# Patient Record
Sex: MALE | Race: WHITE | NOT HISPANIC OR LATINO | Employment: OTHER | URBAN - METROPOLITAN AREA
[De-identification: names, ages, dates, MRNs, and addresses within clinical notes are randomized per-mention and may not be internally consistent; named-entity substitution may affect disease eponyms.]

---

## 2017-01-16 ENCOUNTER — OFFICE VISIT (OUTPATIENT)
Dept: PODIATRY | Facility: CLINIC | Age: 72
End: 2017-01-16
Payer: MEDICARE

## 2017-01-16 DIAGNOSIS — B35.1 ONYCHOMYCOSIS DUE TO DERMATOPHYTE: ICD-10-CM

## 2017-01-16 DIAGNOSIS — B35.3 TINEA PEDIS, UNSPECIFIED LATERALITY: ICD-10-CM

## 2017-01-16 DIAGNOSIS — E08.42 DIABETIC POLYNEUROPATHY ASSOCIATED WITH DIABETES MELLITUS DUE TO UNDERLYING CONDITION: Primary | ICD-10-CM

## 2017-01-16 PROCEDURE — 1160F RVW MEDS BY RX/DR IN RCRD: CPT | Mod: S$GLB,,,

## 2017-01-16 PROCEDURE — 1126F AMNT PAIN NOTED NONE PRSNT: CPT | Mod: S$GLB,,,

## 2017-01-16 PROCEDURE — 11721 DEBRIDE NAIL 6 OR MORE: CPT | Mod: Q9,S$GLB,,

## 2017-01-16 PROCEDURE — 99499 UNLISTED E&M SERVICE: CPT | Mod: S$GLB,,,

## 2017-01-16 PROCEDURE — 1157F ADVNC CARE PLAN IN RCRD: CPT | Mod: S$GLB,,,

## 2017-01-16 PROCEDURE — 1159F MED LIST DOCD IN RCRD: CPT | Mod: S$GLB,,,

## 2017-01-16 PROCEDURE — 3045F PR MOST RECENT HEMOGLOBIN A1C LEVEL 7.0-9.0%: CPT | Mod: S$GLB,,,

## 2017-01-16 PROCEDURE — 99999 PR PBB SHADOW E&M-EST. PATIENT-LVL II: CPT | Mod: PBBFAC,,,

## 2017-01-16 PROCEDURE — 99213 OFFICE O/P EST LOW 20 MIN: CPT | Mod: 25,S$GLB,,

## 2017-01-16 RX ORDER — GABAPENTIN 100 MG/1
100 CAPSULE ORAL NIGHTLY
Qty: 30 CAPSULE | Refills: 11 | Status: ON HOLD | OUTPATIENT
Start: 2017-01-16 | End: 2017-07-20 | Stop reason: HOSPADM

## 2017-01-16 NOTE — MR AVS SNAPSHOT
[facility]  [address]  [facility] [address]  Phone: [phone]                  Stu Garcia   2017 8:30 AM   Office Visit    Description:  Male : 1945   Provider:  Gabino Pathak DPM   [facility]  [facility]           Reason for Visit     Diabetic Foot Exam     Foot Pain           Diagnoses this Visit        Comments    Diabetic polyneuropathy associated with diabetes mellitus due to underlying condition    -  Primary     Onychomycosis due to dermatophyte         Tinea pedis, unspecified laterality                To Do List           Future Appointments        Provider Department Dept Phone    2017 8:00 AM LAB, [place] [facility] [phone]    2017 8:40 AM Cade Juan MD [facility] [phone]    2017 11:30 AM Gabino Pathak DPM [facility] [phone]      Goals (5 Years of Data)     None       These Medications        Disp Refills Start End    gabapentin (NEURONTIN) 100 MG capsule 30 capsule 11 2017    Take 1 capsule (100 mg total) by mouth every evening. - Oral    Pharmacy: [facility] [address] [place] [address] AT [facility] & [address] #: [phone]         [facility] [place] [facility]     [facility] [place] [facility] Nurse Care Line - 24/7 Assistance  Registered nurses in the [facility] provide clinical advisement, health education, appointment booking, and other advisory services.  Call for this free service at [phone].             Medications           Message regarding Medications     Verify the changes and/or additions to your medication regime listed below are the same as discussed with your clinician today.  If any of these changes or additions are incorrect, please notify your healthcare provider.        START taking these NEW medications        Refills    gabapentin (NEURONTIN) 100 MG capsule 11    Sig: Take 1 capsule  (100 mg total) by mouth every evening.    Class: Normal    Route: Oral           Verify that the below list of medications is an accurate representation of the medications you are currently taking.  If none reported, the list may be blank. If incorrect, please contact your healthcare provider. Carry this list with you in case of emergency.           Current Medications     alcohol swabs (BD ALCOHOL SWABS) PadM Use each time he test his blood sugar.    aspirin (ECOTRIN) 81 MG EC tablet Take 81 mg by mouth once daily.    blood glucose control, normal Soln Use as directed for accu check alyssa plus  testing device    blood sugar diagnostic (BLOOD GLUCOSE TEST) [facility] accu check alyssa plus test strips use once a day    clotrimazole (LOTRIMIN) 1 % Soln Apply topically 2 (two) times daily.    docusate sodium (COLACE) 100 MG capsule Take 100 mg by mouth 2 (two) times daily.    ferrous gluconate (FERGON) 324 MG tablet Take 324 mg by mouth 2 (two) times daily with meals.    ferrous sulfate 325 mg (65 mg iron) Tab tablet     glimepiride (AMARYL) 2 MG tablet Take 2 tablets (4 mg total) by mouth 2 (two) times daily.    GLUCOSAMINE HCL/CHONDR ZAMORA A NA (OSTEO BI-FLEX ORAL) Take by mouth.    lancets (ACCU-CHEK SOFTCLIX LANCETS) Misc 1 strip by Misc.(Non-Drug; Combo Route) route 3 (three) times daily.    metformin (GLUCOPHAGE) 500 MG tablet TAKE 2 TABLETS BY MOUTH TWICE DAILY    metoprolol tartrate (LOPRESSOR) 50 MG tablet TAKE 1 TABLET BY MOUTH TWICE DAILY    omeprazole (PRILOSEC) 40 MG capsule TAKE 1 CAPSULE BEFORE BREAKFAST.    simvastatin (ZOCOR) 40 MG tablet Take 1 tablet (40 mg total) by mouth every evening.    sodium-potassium bicarbonate (BELINDA-SELTZER GOLD) TbEF Take 2 tablets by mouth every evening.    terbinafine HCl (LAMISIL) 1 % cream Apply topically 2 (two) times daily. Apply to feet.    VIT A/VIT C/VIT E/ZINC/COPPER (ICAPS AREDS ORAL) Take 1 capsule by mouth 2 (two) times daily.    XARELTO 20 mg Tab TAKE 1 TABLET ONE TIME  DAILY    blood glucose control high&low (ACCU-CHEK SHASHANK CONTROL SOLN) Soln Inject 1 strip into the skin 2 (two) times daily.    gabapentin (NEURONTIN) 100 MG capsule Take 1 capsule (100 mg total) by mouth every evening.           Clinical Reference Information           Allergies as of 1/16/2017     Codeine      Immunizations Administered on Date of Encounter - 1/16/2017     None

## 2017-01-16 NOTE — PROGRESS NOTES
Chief Complaint   Patient presents with    Diabetic Foot Exam     HgbA1C: 7.3 PCP: Drake 11/7/16    Foot Pain     octaviano feet        Referred by Dr. Juan    History of present illness: Patient presents to the clinic for at risk diabetic foot care. Hx of neuropathy, CAD.    Patient is reporting thick and yellow discolored painful toenails and burning tingling and numbness to the extremities.  Patient wears [employer] tennis shoes.   Also reporting 6 months LE swelling. History of knee arthritis.    Review of Systems:   Vascular : negative rest pain, claudication, bruising   Musculoskeletal:+ for joint pain   Skin: negative for rashes, open wounds and lesions, + for thickened, painful and discolored toenails  Neurological: + for burning, tingling, paresthesia and numbness   All other unremarkable.    Past Medical, Family and Social History reviewed.    Constitutional:   Patient is oriented to person, place, and time. Vital signs are normal. Appears well-developed and well-nourished.     Vascular:   Dorsalis pedis pulses are 2+ on the right side, and 2+ on the left side.   Posterior tibial pulses are 2+ on the right side, and 2+ on the left side.   - digital hair growth, capillary fill time to all toes <3 seconds, toes are cool to touch  + swelling feet and ankles 2+ pitting    Skin/Dermatological:   Skin is thin, warm, shiny and atrophic. No cyanosis or clubbing. No rashes noted. No open wounds.   Nails yellow discolored, thickened 3 mm, elongated 3 mm with subungual debris and tenderness.    Musculoskeletal:   Pes cavus.  Mild bunions, hammertoes and bunionettes observed.  Decreased range of motion of bilateral midtarsal, subtalar joints, ankle joint dorsiflexion is restricted bilaterally. Muscle strength to tibialis anterior, extensor hallucis longus, extensor digitorum longus, peroneal muscles, flexor hallucis/digotorum longus, posterior tibial and gastrosoleal complex is 5/5.    Neurological:   + deficits  to sharp/dull, light touch or vibratory sensation bilateral feet     Assessment/Plan:   #1 Diabetes, neuropathy, foot deformity: Discussed diabetic footcare, daily foot inspections, tight blood sugar control and proper shoe gear. Recommended diabetic depth shoes with high toebox, padding for foot deformity and diabetic accommodative inserts/shoes and prescription was written.    #2 Onychomycosis/onychodystrophy, tinea fungus: With patient permission, all toenails were debrided sharply with a nail nipper down to the base. Rasp was used to reduce thickening of toenails.  Patient tolerated well. Rx clotrimazole solution for toenails and lamisil cream for feet.   Return to clinic 4 months.

## 2017-01-16 NOTE — PROGRESS NOTES
Chief Complaint   Patient presents with    Diabetic Foot Exam     HgbA1C: 7.3 PCP: Drake 11/7/16    Foot Pain     octaviano feet          History of present illness: Patient presents to the clinic for at risk diabetic foot care. Hx of neuropathy, CAD.    Patient is reporting thick and yellow discolored painful toenails and burning tingling and numbness to the extremities.  Patient wears diabetic shoes and inserts.  History of knee arthritis.    Review of Systems:   Vascular : negative rest pain, claudication, bruising   Musculoskeletal:+ for joint pain   Skin: negative for rashes, open wounds and lesions, + for thickened, painful and discolored toenails  Neurological: + for burning, tingling, paresthesia and numbness   All other unremarkable.    Past Medical, Family and Social History reviewed.    Constitutional:   Patient is oriented to person, place, and time. Vital signs are normal. Appears well-developed and well-nourished.     Vascular:   Dorsalis pedis pulses are 2+ on the right side, and 2+ on the left side.   Posterior tibial pulses are 2+ on the right side, and 2+ on the left side.   - digital hair growth, capillary fill time to all toes <3 seconds, toes are cool to touch  + swelling feet and ankles 2+ pitting    Skin/Dermatological:   Skin is thin, warm, shiny and atrophic. No cyanosis or clubbing. No rashes noted. No open wounds.   Nails yellow discolored, thickened 3 mm, elongated 3 mm with subungual debris and tenderness.    Musculoskeletal:   Pes cavus.  Mild bunions, hammertoes and bunionettes observed.  Decreased range of motion of bilateral midtarsal, subtalar joints, ankle joint dorsiflexion is restricted bilaterally. Muscle strength to tibialis anterior, extensor hallucis longus, extensor digitorum longus, peroneal muscles, flexor hallucis/digotorum longus, posterior tibial and gastrosoleal complex is 5/5.    Neurological:   + deficits to sharp/dull, light touch or vibratory sensation bilateral  feet     Assessment/Plan:   #1 Diabetes, neuropathy, foot deformity: Discussed diabetic footcare, daily foot inspections, tight blood sugar control and proper shoe gear. Continue diabetic shoes/inserts.  Rx neurontin 100 mg qhs.  #2 Onychomycosis/onychodystrophy, tinea fungus: With patient permission, all toenails were debrided sharply with a nail nipper down to the base. Rasp was used to reduce thickening of toenails.  Patient tolerated well. Rx clotrimazole solution for toenails and lamisil cream for feet.   Return to clinic 1 month to review neurontin dosage.

## 2017-02-06 ENCOUNTER — LAB VISIT (OUTPATIENT)
Dept: LAB | Facility: HOSPITAL | Age: 72
End: 2017-02-06
Attending: FAMILY MEDICINE
Payer: MEDICARE

## 2017-02-06 DIAGNOSIS — E11.319 TYPE 2 DIABETES MELLITUS WITH RETINOPATHY WITHOUT MACULAR EDEMA, WITHOUT LONG-TERM CURRENT USE OF INSULIN, UNSPECIFIED LATERALITY, UNSPECIFIED RETINOPATHY SEVERITY: ICD-10-CM

## 2017-02-06 LAB
ALBUMIN SERPL BCP-MCNC: 3.7 G/DL
ALP SERPL-CCNC: 89 U/L
ALT SERPL W/O P-5'-P-CCNC: 15 U/L
ANION GAP SERPL CALC-SCNC: 7 MMOL/L
AST SERPL-CCNC: 18 U/L
BILIRUB SERPL-MCNC: 0.4 MG/DL
BUN SERPL-MCNC: 18 MG/DL
CALCIUM SERPL-MCNC: 9.5 MG/DL
CHLORIDE SERPL-SCNC: 107 MMOL/L
CHOLEST/HDLC SERPL: 3.5 {RATIO}
CO2 SERPL-SCNC: 28 MMOL/L
CREAT SERPL-MCNC: 1.3 MG/DL
EST. GFR  (AFRICAN AMERICAN): >60 ML/MIN/1.73 M^2
EST. GFR  (NON AFRICAN AMERICAN): 54.5 ML/MIN/1.73 M^2
GLUCOSE SERPL-MCNC: 167 MG/DL
HDL/CHOLESTEROL RATIO: 28.5 %
HDLC SERPL-MCNC: 123 MG/DL
HDLC SERPL-MCNC: 35 MG/DL
LDLC SERPL CALC-MCNC: 62.8 MG/DL
NONHDLC SERPL-MCNC: 88 MG/DL
POTASSIUM SERPL-SCNC: 5.5 MMOL/L
PROT SERPL-MCNC: 6.9 G/DL
SODIUM SERPL-SCNC: 142 MMOL/L
TRIGL SERPL-MCNC: 126 MG/DL

## 2017-02-06 PROCEDURE — 80053 COMPREHEN METABOLIC PANEL: CPT

## 2017-02-06 PROCEDURE — 36415 COLL VENOUS BLD VENIPUNCTURE: CPT | Mod: PO

## 2017-02-06 PROCEDURE — 80061 LIPID PANEL: CPT

## 2017-02-06 PROCEDURE — 83036 HEMOGLOBIN GLYCOSYLATED A1C: CPT

## 2017-02-07 LAB
ESTIMATED AVG GLUCOSE: 169 MG/DL
HBA1C MFR BLD HPLC: 7.5 %

## 2017-02-13 ENCOUNTER — OFFICE VISIT (OUTPATIENT)
Dept: FAMILY MEDICINE | Facility: CLINIC | Age: 72
End: 2017-02-13
Payer: MEDICARE

## 2017-02-13 VITALS
SYSTOLIC BLOOD PRESSURE: 150 MMHG | WEIGHT: 283.94 LBS | HEART RATE: 80 BPM | DIASTOLIC BLOOD PRESSURE: 70 MMHG | HEIGHT: 77 IN | TEMPERATURE: 99 F | BODY MASS INDEX: 33.53 KG/M2

## 2017-02-13 DIAGNOSIS — M17.12 PRIMARY OSTEOARTHRITIS OF LEFT KNEE: ICD-10-CM

## 2017-02-13 DIAGNOSIS — I25.83 CORONARY ARTERY DISEASE DUE TO LIPID RICH PLAQUE: ICD-10-CM

## 2017-02-13 DIAGNOSIS — E11.319 TYPE 2 DIABETES MELLITUS WITH RETINOPATHY WITHOUT MACULAR EDEMA, WITHOUT LONG-TERM CURRENT USE OF INSULIN, UNSPECIFIED LATERALITY, UNSPECIFIED RETINOPATHY SEVERITY: Primary | ICD-10-CM

## 2017-02-13 DIAGNOSIS — E78.5 HYPERLIPIDEMIA, UNSPECIFIED HYPERLIPIDEMIA TYPE: ICD-10-CM

## 2017-02-13 DIAGNOSIS — I10 ESSENTIAL HYPERTENSION: ICD-10-CM

## 2017-02-13 DIAGNOSIS — I48.0 PAROXYSMAL ATRIAL FIBRILLATION: ICD-10-CM

## 2017-02-13 DIAGNOSIS — I25.10 CORONARY ARTERY DISEASE DUE TO LIPID RICH PLAQUE: ICD-10-CM

## 2017-02-13 PROCEDURE — 99999 PR PBB SHADOW E&M-EST. PATIENT-LVL III: CPT | Mod: PBBFAC,,, | Performed by: FAMILY MEDICINE

## 2017-02-13 PROCEDURE — 99499 UNLISTED E&M SERVICE: CPT | Mod: S$GLB,,, | Performed by: FAMILY MEDICINE

## 2017-02-13 PROCEDURE — 1125F AMNT PAIN NOTED PAIN PRSNT: CPT | Mod: S$GLB,,, | Performed by: FAMILY MEDICINE

## 2017-02-13 PROCEDURE — 1159F MED LIST DOCD IN RCRD: CPT | Mod: S$GLB,,, | Performed by: FAMILY MEDICINE

## 2017-02-13 PROCEDURE — 99214 OFFICE O/P EST MOD 30 MIN: CPT | Mod: S$GLB,,, | Performed by: FAMILY MEDICINE

## 2017-02-13 PROCEDURE — 1157F ADVNC CARE PLAN IN RCRD: CPT | Mod: S$GLB,,, | Performed by: FAMILY MEDICINE

## 2017-02-13 PROCEDURE — 3060F POS MICROALBUMINURIA REV: CPT | Mod: S$GLB,,, | Performed by: FAMILY MEDICINE

## 2017-02-13 PROCEDURE — 1160F RVW MEDS BY RX/DR IN RCRD: CPT | Mod: S$GLB,,, | Performed by: FAMILY MEDICINE

## 2017-02-13 PROCEDURE — 3078F DIAST BP <80 MM HG: CPT | Mod: S$GLB,,, | Performed by: FAMILY MEDICINE

## 2017-02-13 PROCEDURE — 2022F DILAT RTA XM EVC RTNOPTHY: CPT | Mod: S$GLB,,, | Performed by: FAMILY MEDICINE

## 2017-02-13 PROCEDURE — 3077F SYST BP >= 140 MM HG: CPT | Mod: S$GLB,,, | Performed by: FAMILY MEDICINE

## 2017-02-13 PROCEDURE — 3045F PR MOST RECENT HEMOGLOBIN A1C LEVEL 7.0-9.0%: CPT | Mod: S$GLB,,, | Performed by: FAMILY MEDICINE

## 2017-02-13 NOTE — PROGRESS NOTES
Subjective:       Patient ID: Stu Garcia is a 71 y.o. male.    Chief Complaint: Diabetes (3 month follow up with labs prior)    HPI Comments: Here to f/u on chronic health issues.    Diabetes - taking metformin 500mg 2 tabs BID and glimepiride 4mg BID(increased this 3 months ago)  Average 140, low of 108  CAD - following with Dr. Maldonado; assymptomatic; taking ASA 81mg daily. He has 1 episode of angina in the last 3 months.  Paroxysmal afib - taking xarelto 20mg daily; taking metoprolol 50mg 1/2 tab BID  HLD - taking simvastatin 40mg daily  Gerd, healing ulcers - taking Omeprazole 40mg daily    C/o progressive pain in the left knee. It is causing pain with walking and has been causing increased issues.  He is interested in definitive treatment for this.                  Diabetes   Pertinent negatives for hypoglycemia include no confusion, dizziness or headaches. Pertinent negatives for diabetes include no chest pain, no polydipsia, no polyuria and no weakness.   Knee Pain      Hypertension   Pertinent negatives include no chest pain, headaches, neck pain, palpitations or shortness of breath.       Past Medical History   Diagnosis Date    Anticoagulant long-term use      xarelto    Atrial fibrillation 2013     cardioverted    Bleb, lung     CAD (coronary artery disease) 2004     stents x 2    Cataract      OS    Colon polyp     Diabetes mellitus type 2 with retinopathy     Diverticulosis     GERD (gastroesophageal reflux disease)     HEARING LOSS     Hemorrhoid     HLD (hyperlipidemia)     Iron deficiency anemia     Neuropathy of leg     Pneumonia due to other staphylococcus     Prostate cancer        Past Surgical History   Procedure Laterality Date    Prostatectomy  2001     open    Tube thoracotomy  1966     pneumothorax left    Colonoscopy      Cataract extraction       bilateral    Hernia repair       umbilical       Review of patient's allergies indicates:   Allergen Reactions    Codeine  Nausea And Vomiting       Social History     Social History    Marital status:      Spouse name: N/A    Number of children: 2    Years of education: N/A     Occupational History         prior [employer]       Social History Main Topics    Smoking status: Former Smoker     Years: 20.00    Smokeless tobacco: Never Used      Comment: quit smoking in 1980's.    Alcohol use Yes      Comment: 1 drink every 2 weeks    Drug use: No    Sexual activity: Not on file     Other Topics Concern    Not on file     Social History Narrative    From [place]       Current Outpatient Prescriptions on File Prior to Visit   Medication Sig Dispense Refill    alcohol swabs (BD ALCOHOL SWABS) PadM Use each time he test his blood sugar. 100 each 4    aspirin (ECOTRIN) 81 MG EC tablet Take 81 mg by mouth once daily.      blood glucose control high&low (ACCU-CHEK SHASHANK CONTROL SOLN) Soln Inject 1 strip into the skin 2 (two) times daily. 100 each 12    blood glucose control, normal Soln Use as directed for accu check shashank plus  testing device 1 each 4    blood sugar diagnostic (BLOOD GLUCOSE TEST) Str[facility] accu check shashank plus test strips use once a day 100 strip 4    clotrimazole (LOTRIMIN) 1 % Soln Apply topically 2 (two) times daily. 30 mL 11    gabapentin (NEURONTIN) 100 MG capsule Take 1 capsule (100 mg total) by mouth every evening. 30 capsule 11    glimepiride (AMARYL) 2 MG tablet Take 2 tablets (4 mg total) by mouth 2 (two) times daily. 360 tablet 3    lancets (ACCU-CHEK SOFTCLIX LANCETS) Misc 1 strip by Misc.(Non-Drug; Combo Route) route 3 (three) times daily. 270 each 3    metformin (GLUCOPHAGE) 500 MG tablet TAKE 2 TABLETS BY MOUTH TWICE DAILY 360 tablet 3    metoprolol tartrate (LOPRESSOR) 50 MG tablet TAKE 1 TABLET BY MOUTH TWICE DAILY 60 tablet 6    omeprazole (PRILOSEC) 40 MG capsule TAKE 1 CAPSULE BEFORE BREAKFAST. 90 capsule 3    simvastatin (ZOCOR) 40 MG tablet Take 1 tablet (40 mg  total) by mouth every evening. 90 tablet 3    terbinafine HCl (LAMISIL) 1 % cream Apply topically 2 (two) times daily. Apply to feet. 42 g 11    VIT A/VIT C/VIT E/ZINC/COPPER (ICAPS AREDS ORAL) Take 1 capsule by mouth 2 (two) times daily.      XARELTO 20 mg Tab TAKE 1 TABLET ONE TIME DAILY 90 tablet 3    docusate sodium (COLACE) 100 MG capsule Take 100 mg by mouth 2 (two) times daily.      ferrous gluconate (FERGON) 324 MG tablet Take 324 mg by mouth 2 (two) times daily with meals.      ferrous sulfate 325 mg (65 mg iron) Tab tablet   2    GLUCOSAMINE HCL/CHONDR ZAMORA A NA (OSTEO BI-FLEX ORAL) Take by mouth.      sodium-potassium bicarbonate (BELINDA-SELTZER GOLD) TbEF Take 2 tablets by mouth every evening.       No current facility-administered medications on file prior to visit.        Family History   Problem Relation Age of Onset    Diabetes Father     Coronary artery disease Father     Cancer Father      stomach       Review of Systems   Constitutional: Negative for activity change, appetite change, chills, fever and unexpected weight change.   HENT: Positive for hearing loss. Negative for rhinorrhea, sore throat and trouble swallowing.    Eyes: Negative for pain, discharge and visual disturbance.   Respiratory: Negative for cough, chest tightness, shortness of breath and wheezing.    Cardiovascular: Positive for leg swelling. Negative for chest pain and palpitations.   Gastrointestinal: Negative for abdominal pain, blood in stool, constipation, diarrhea, nausea and vomiting.   Endocrine: Negative for polydipsia and polyuria.   Genitourinary: Negative for difficulty urinating, dysuria, frequency, hematuria and urgency.   Musculoskeletal: Positive for arthralgias (both knees) and back pain. Negative for gait problem, joint swelling and neck pain.   Skin: Negative for rash and wound.   Neurological: Negative for dizziness, weakness, light-headedness and headaches.   Hematological: Negative for adenopathy.  "  Psychiatric/Behavioral: Negative for confusion and dysphoric mood.       Objective:      Visit Vitals    BP (!) 150/70 (BP Location: Left arm, Patient Position: Sitting, BP Method: Manual)    Pulse 80    Temp 98.9 °F (37.2 °C) (Oral)    Ht 6' 4.5" (1.943 m)    Wt 128.8 kg (283 lb 15.2 oz)    BMI 34.11 kg/m2     Physical Exam   Constitutional: He is oriented to person, place, and time. He appears well-developed and well-nourished. He is active. No distress.   HENT:   Head: Normocephalic and atraumatic.   Right Ear: Tympanic membrane and external ear normal.   Left Ear: Tympanic membrane and external ear normal.   Mouth/Throat: Uvula is midline, oropharynx is clear and moist and mucous membranes are normal. No oropharyngeal exudate.   Eyes: Conjunctivae, EOM and lids are normal. Pupils are equal, round, and reactive to light.   Neck: Normal range of motion, full passive range of motion without pain and phonation normal. Neck supple. No thyroid mass and no thyromegaly present.   Cardiovascular: Normal rate, regular rhythm, normal heart sounds and intact distal pulses.  Exam reveals no gallop and no friction rub.    No murmur heard.  Pulmonary/Chest: Effort normal and breath sounds normal. No respiratory distress. He has no wheezes. He has no rales.   Musculoskeletal:        Left knee: He exhibits decreased range of motion. Tenderness found. Medial joint line and lateral joint line tenderness noted.   Lymphadenopathy:     He has no cervical adenopathy.   Neurological: He is alert and oriented to person, place, and time. No cranial nerve deficit. Coordination normal.   Skin: Skin is warm and dry.   Psychiatric: He has a normal mood and affect. His speech is normal and behavior is normal. Judgment and thought content normal.     bilateral LE 1+ edema to mid-shin    Results for orders placed or performed in visit on 02/06/17   Comprehensive metabolic panel   Result Value Ref Range    Sodium 142 136 - 145 mmol/L    " Potassium 5.5 (H) 3.5 - 5.1 mmol/L    Chloride 107 95 - 110 mmol/L    CO2 28 23 - 29 mmol/L    Glucose 167 (H) 70 - 110 mg/dL    BUN, Bld 18 8 - 23 mg/dL    Creatinine 1.3 0.5 - 1.4 mg/dL    Calcium 9.5 8.7 - 10.5 mg/dL    Total Protein 6.9 6.0 - 8.4 g/dL    Albumin 3.7 3.5 - 5.2 g/dL    Total Bilirubin 0.4 0.1 - 1.0 mg/dL    Alkaline Phosphatase 89 55 - 135 U/L    AST 18 10 - 40 U/L    ALT 15 10 - 44 U/L    Anion Gap 7 (L) 8 - 16 mmol/L    eGFR if African American >60.0 >60 mL/min/1.73 m^2    eGFR if non African American 54.5 (A) >60 mL/min/1.73 m^2   Hemoglobin A1c   Result Value Ref Range    Hemoglobin A1C 7.5 (H) 4.5 - 6.2 %    Estimated Avg Glucose 169 (H) 68 - 131 mg/dL   Lipid panel   Result Value Ref Range    Cholesterol 123 120 - 199 mg/dL    Triglycerides 126 30 - 150 mg/dL    HDL 35 (L) 40 - 75 mg/dL    LDL Cholesterol 62.8 (L) 63.0 - 159.0 mg/dL    HDL/Chol Ratio 28.5 20.0 - 50.0 %    Total Cholesterol/HDL Ratio 3.5 2.0 - 5.0    Non-HDL Cholesterol 88 mg/dL     Assessment:       1. 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 Primary osteoarthritis of left knee    3. Essential hypertension    4. Hyperlipidemia, unspecified hyperlipidemia type    5. Paroxysmal atrial fibrillation    6. Coronary artery disease due to lipid rich plaque        Plan:       Type 2 diabetes mellitus with retinopathy without macular edema, without long-term current use of insulin, unspecified laterality, unspecified retinopathy severity  -     Hemoglobin A1c; Future; Expected date: 8/12/17  -     Comprehensive metabolic panel; Future; Expected date: 8/12/17  -     Lipid panel; Future; Expected date: 8/12/17    Primary osteoarthritis of left knee  -     Ambulatory referral to Orthopedics    Essential hypertension    Hyperlipidemia, unspecified hyperlipidemia type    Paroxysmal atrial  fibrillation    Coronary artery disease due to lipid rich plaque        Continue metformin and Amaryl 4mg BID  Patient wishes to try weight loss before adding more medications  Goal weight of 260  F/u 6 months with labs  Continue present meds and specialist follow-up  Counseled on regular exercise, maintenance of a healthy weight, balanced diet rich in fruits/vegetables and lean protein, and avoidance of unhealthy habits like smoking and excessive alcohol intake.

## 2017-02-13 NOTE — MR AVS SNAPSHOT
[facility]  [address]  [facility] [address]  Phone: [phone]  Fax: [phone]                  Stu Garcia   2017 8:40 AM   Office Visit    Description:  Male : 1945   Provider:  Cade Juan MD   Department:  [facility]           Reason for Visit     Diabetes           Diagnoses this Visit        Comments    Primary osteoarthritis of left knee    -  Primary     Type 2 diabetes mellitus with retinopathy without macular edema, without long-term current use of insulin, unspecified laterality, unspecified retinopathy severity         Essential hypertension         Hyperlipidemia, unspecified hyperlipidemia type         Paroxysmal atrial fibrillation         Coronary artery disease due to lipid rich plaque                To Do List           Future Appointments        Provider Department Dept Phone    2/15/2017 9:15 AM Kale Cleary MD [facility] [phone]    2017 11:30 AM Gabino Pathak DPM [facility] [phone]    2017 9:25 AM [facility] [place] [facility] [phone]    2017 9:00 AM Cade Juan MD [facility] [phone]      Goals (5 Years of Data)     None      Follow-Up and Disposition     Return in about 6 months (around 2017).      [facility]     [facility] On [facility] Nurse Care Line - 24/7 Assistance  Registered nurses in the [facility] provide clinical advisement, health education, appointment booking, and other advisory services.  Call for this free service at [phone].             Medications           Message regarding Medications     Verify the changes and/or additions to your medication regime listed below are the same as discussed with your clinician today.  If any of these changes or additions are incorrect, please notify your healthcare provider.             Verify that the below list of medications is an  accurate representation of the medications you are currently taking.  If none reported, the list may be blank. If incorrect, please contact your healthcare provider. Carry this list with you in case of emergency.           Current Medications     alcohol swabs (BD ALCOHOL SWABS) PadM Use each time he test his blood sugar.    aspirin (ECOTRIN) 81 MG EC tablet Take 81 mg by mouth once daily.    blood glucose control high&low (ACCU-CHEK SHASHANK CONTROL SOLN) Soln Inject 1 strip into the skin 2 (two) times daily.    blood glucose control, normal Soln Use as directed for accu check shashank plus  testing device    blood sugar diagnostic (BLOOD GLUCOSE TEST) Str accu check shashank plus test strips use once a day    clotrimazole (LOTRIMIN) 1 % Soln Apply topically 2 (two) times daily.    gabapentin (NEURONTIN) 100 MG capsule Take 1 capsule (100 mg total) by mouth every evening.    glimepiride (AMARYL) 2 MG tablet Take 2 tablets (4 mg total) by mouth 2 (two) times daily.    lancets (ACCU-CHEK SOFTCLIX LANCETS) Misc 1 strip by Misc.(Non-Drug; Combo Route) route 3 (three) times daily.    metformin (GLUCOPHAGE) 500 MG tablet TAKE 2 TABLETS BY MOUTH TWICE DAILY    metoprolol tartrate (LOPRESSOR) 50 MG tablet TAKE 1 TABLET BY MOUTH TWICE DAILY    omeprazole (PRILOSEC) 40 MG capsule TAKE 1 CAPSULE BEFORE BREAKFAST.    simvastatin (ZOCOR) 40 MG tablet Take 1 tablet (40 mg total) by mouth every evening.    terbinafine HCl (LAMISIL) 1 % cream Apply topically 2 (two) times daily. Apply to feet.    VIT A/VIT C/VIT E/ZINC/COPPER (ICAPS AREDS ORAL) Take 1 capsule by mouth 2 (two) times daily.    XARELTO 20 mg Tab TAKE 1 TABLET ONE TIME DAILY    docusate sodium (COLACE) 100 MG capsule Take 100 mg by mouth 2 (two) times daily.    ferrous gluconate (FERGON) 324 MG tablet Take 324 mg by mouth 2 (two) times daily with meals.    ferrous sulfate 325 mg (65 mg iron) Tab tablet     GLUCOSAMINE HCL/CHONDR ZAMORA A NA (OSTEO BI-FLEX ORAL) Take by mouth.     "sodium-potassium bicarbonate (BELINDA-SELTZER GOLD) TbEF Take 2 tablets by mouth every evening.           Clinical Reference Information           Your Vitals Were     BP Pulse Temp    150/70 (BP Location: Left arm, Patient Position: Sitting, BP Method: Manual) 80 98.9 °F (37.2 °C) (Oral)    Height Weight BMI    6' 4.5" (1.943 m) 128.8 kg (283 lb 15.2 oz) 34.11 kg/m2      Blood Pressure          Most Recent Value    BP  (!)  150/70      Allergies as of 2/13/2017     Codeine      Immunizations Administered on Date of Encounter - 2/13/2017     None      Orders Placed During Today's Visit      Normal Orders This Visit    Ambulatory referral to Orthopedics     Future Labs/Procedures Expected by Expires    Comprehensive metabolic panel  8/12/2017 4/14/2018    Hemoglobin A1c  8/12/2017 2/13/2018    Lipid panel  8/12/2017 2/13/2018      Language Assistance Services     ATTENTION: Language assistance services are available, free of charge. Please call [phone].      ATENCIÓN: Si geoffla kailee, tiene a hoyos disposición servicios gratuitos de asistencia lingüística. Llame al [phone].     VIGNESH Ý: N?u b?n nói Ti?ng Vi?t, có các d?ch v? h? tr? ngôn ng? mi?n phí dành cho b?n. G?i s? [phone].         [facility] complies with applicable Federal civil rights laws and does not discriminate on the basis of race, color, national origin, age, disability, or sex.        "

## 2017-02-14 ENCOUNTER — CLINICAL SUPPORT (OUTPATIENT)
Dept: AUDIOLOGY | Facility: CLINIC | Age: 72
End: 2017-02-14

## 2017-02-14 ENCOUNTER — OFFICE VISIT (OUTPATIENT)
Dept: OTOLARYNGOLOGY | Facility: CLINIC | Age: 72
End: 2017-02-14
Payer: MEDICARE

## 2017-02-14 VITALS
SYSTOLIC BLOOD PRESSURE: 127 MMHG | WEIGHT: 283.75 LBS | HEIGHT: 76 IN | HEART RATE: 81 BPM | BODY MASS INDEX: 34.55 KG/M2 | DIASTOLIC BLOOD PRESSURE: 69 MMHG

## 2017-02-14 DIAGNOSIS — Z97.4 WEARS HEARING AID: Primary | ICD-10-CM

## 2017-02-14 DIAGNOSIS — T16.2XXA FOREIGN BODY OF LEFT EAR, INITIAL ENCOUNTER: ICD-10-CM

## 2017-02-14 DIAGNOSIS — H61.21 IMPACTED CERUMEN OF RIGHT EAR: ICD-10-CM

## 2017-02-14 DIAGNOSIS — H90.3 BILATERAL SENSORINEURAL HEARING LOSS: Primary | ICD-10-CM

## 2017-02-14 DIAGNOSIS — Z97.4 WEARS HEARING AID: ICD-10-CM

## 2017-02-14 DIAGNOSIS — M25.562 LEFT KNEE PAIN, UNSPECIFIED CHRONICITY: Primary | ICD-10-CM

## 2017-02-14 PROCEDURE — 1159F MED LIST DOCD IN RCRD: CPT | Mod: S$GLB,,, | Performed by: NURSE PRACTITIONER

## 2017-02-14 PROCEDURE — V5299 HEARING SERVICE: HCPCS | Mod: CSM,,, | Performed by: AUDIOLOGIST-HEARING AID FITTER

## 2017-02-14 PROCEDURE — 99999 PR PBB SHADOW E&M-EST. PATIENT-LVL IV: CPT | Mod: PBBFAC,,, | Performed by: NURSE PRACTITIONER

## 2017-02-14 PROCEDURE — 3074F SYST BP LT 130 MM HG: CPT | Mod: S$GLB,,, | Performed by: NURSE PRACTITIONER

## 2017-02-14 PROCEDURE — 1160F RVW MEDS BY RX/DR IN RCRD: CPT | Mod: S$GLB,,, | Performed by: NURSE PRACTITIONER

## 2017-02-14 PROCEDURE — 69200 CLEAR OUTER EAR CANAL: CPT | Mod: LT,S$GLB,, | Performed by: NURSE PRACTITIONER

## 2017-02-14 PROCEDURE — 99213 OFFICE O/P EST LOW 20 MIN: CPT | Mod: 25,S$GLB,, | Performed by: NURSE PRACTITIONER

## 2017-02-14 PROCEDURE — 1157F ADVNC CARE PLAN IN RCRD: CPT | Mod: S$GLB,,, | Performed by: NURSE PRACTITIONER

## 2017-02-14 PROCEDURE — 3078F DIAST BP <80 MM HG: CPT | Mod: S$GLB,,, | Performed by: NURSE PRACTITIONER

## 2017-02-14 PROCEDURE — 69210 REMOVE IMPACTED EAR WAX UNI: CPT | Mod: 59,RT,S$GLB, | Performed by: NURSE PRACTITIONER

## 2017-02-14 PROCEDURE — 1126F AMNT PAIN NOTED NONE PRSNT: CPT | Mod: S$GLB,,, | Performed by: NURSE PRACTITIONER

## 2017-02-14 NOTE — PROGRESS NOTES
"Subjective:       Patient ID: Stu Garcia is a 71 y.o. male.    Chief Complaint: Foreign Body in Ear    Foreign Body in Ear   Associated symptoms include hearing loss (wears hearing aids). Pertinent negatives include no cough, fever or wheezing.   Hearing Loss:    Associated symptoms: tinnitus.  No ear pain and no fever.    Patient states his hearing is declining despite using hearing aid, so not sure if problem with hearing aid. He has a hearing aid dome in his left ear canal. He states he needs to schedule audiogram and hearing aid adjustment.  He worked as an , wore ear protection.  He served an [employer] with artillary exposure many years ago.  He reports hearing damage occurred after a concert ~ 20 years ago.  He also has significant family history of hearing loss stating his mother "went deaf."  He has constant high pitched tinnitus.     Review of Systems   Constitutional: Negative.  Negative for fatigue and fever.   HENT: Positive for hearing loss (wears hearing aids) and tinnitus. Negative for ear discharge, ear pain and facial swelling.    Eyes: Negative.    Respiratory: Negative.  Negative for cough, shortness of breath and wheezing.    Cardiovascular: Negative.    Gastrointestinal: Negative.    Musculoskeletal: Negative.  Negative for neck pain.   Skin: Negative.  Negative for pallor and rash.   Neurological: Negative.    Hematological: Negative.    Psychiatric/Behavioral: Negative.        Objective:      Physical Exam   Constitutional: He is oriented to person, place, and time. Vital signs are normal. He appears well-developed and well-nourished. He is cooperative. He does not appear ill. No distress.   HENT:   Head: Normocephalic and atraumatic.   Right Ear: Tympanic membrane, external ear and ear canal normal. No drainage. Tympanic membrane is not erythematous. No middle ear effusion.   Left Ear: Tympanic membrane, external ear and ear canal normal. No drainage. Tympanic membrane is " not erythematous.  No middle ear effusion.   Nose: Nose normal. No mucosal edema, rhinorrhea or septal deviation. Right sinus exhibits no maxillary sinus tenderness and no frontal sinus tenderness. Left sinus exhibits no maxillary sinus tenderness and no frontal sinus tenderness.   Mouth/Throat: Uvula is midline, oropharynx is clear and moist and mucous membranes are normal. Mucous membranes are not pale, not dry and not cyanotic. No oral lesions. No posterior oropharyngeal edema or posterior oropharyngeal erythema.     SEPARATE PROCEDURE IN OFFICE:   Procedure: Removal of impacted cerumen, RIGHT   Pre Procedure Diagnosis: Cerumen Impaction   Post Procedure Diagnosis: Cerumen Impaction   Verbal informed consent in regards to risk of trauma to ear canal, ear drum or hearing, discomfort during procedure and/or inability to remove cerumen impaction in one session or unforeseen events or complications.   No anesthesia.     Procedure in detail:   Ear canal visualized bilateral with appropriate size ear speculum utilizing Operating Head Binocular Otomicroscope   Utilizing the following: Ring Currette of appropriate size, alligator forceps AD. The impacted cerumen of the ear canals was removed atraumatically. The TM and EAC were then inspected and found to be clear of wax. See description of TMs/EACs in PE above.   Complications: No   Condition: Improved/Good    SEPARATE PROCEDURE IN OFFICE:   Procedure: Removal of foreign body, LEFT  Pre Procedure Diagnosis: Foreign body of ear canal  Post Procedure Diagnosis: Foreign body of ear canal  Verbal informed consent in regards to risk of trauma to ear canal, ear drum or hearing, discomfort during procedure and/or inability to remove cerumen impaction in one session or unforeseen events or complications.   No anesthesia.     Procedure in detail:   Ear canal visualized bilateral with appropriate size ear speculum utilizing Operating Head Binocular Otomicroscope   Utilizing the  following: alligator forceps AS. Hearing aid dome was removed atraumatically. The TM and EAC were then inspected and found to be clear of wax. See description of TMs/EACs in PE above.   Complications: No   Condition: Improved/Good       Eyes: Conjunctivae are normal. Pupils are equal, round, and reactive to light. Right eye exhibits no discharge. Left eye exhibits no discharge. No scleral icterus.   Neck: Trachea normal and normal range of motion. Neck supple. No tracheal deviation present. No thyroid mass and no thyromegaly present.   Pulmonary/Chest: Effort normal. No respiratory distress. He has no wheezes.   Musculoskeletal: Normal range of motion.   Lymphadenopathy:        Head (right side): No submental, no preauricular and no posterior auricular adenopathy present.        Head (left side): No submental, no preauricular and no posterior auricular adenopathy present.     He has no cervical adenopathy.   Neurological: He is alert and oriented to person, place, and time.   Skin: Skin is warm, dry and intact. No lesion and no rash noted. He is not diaphoretic. No erythema. No pallor.   Psychiatric: He has a normal mood and affect. His speech is normal and behavior is normal. Judgment and thought content normal. Cognition and memory are normal.   Nursing note and vitals reviewed.      Assessment:     Hearing loss, wears hearing aids  Cerumen impaction removed AD  Foreign body removed AS  Plan:           Proceed with audiogram and hearing aid adjustment at patient's convenience  Return as needed for further ENT symptoms or concerns

## 2017-02-14 NOTE — PROGRESS NOTES
Pt was not seen in clinic this morning but dropped his left hearing aid due to a broken . The  box had broken off in his ear which he had to be removed by Nayana Peck, ENT NP. After a new  was put on the aid, the aid still wasn't working properly so it will be sent off for repair. Pt also renewed his warranty for both the hearing aids with a new expiration date of 2/5/18. When the aid returns from repair, pt needs to be scheduled for an Audio only appt for an updated audio and hearing aid adj appt.

## 2017-02-15 ENCOUNTER — OFFICE VISIT (OUTPATIENT)
Dept: ORTHOPEDICS | Facility: CLINIC | Age: 72
End: 2017-02-15
Payer: MEDICARE

## 2017-02-15 ENCOUNTER — HOSPITAL ENCOUNTER (OUTPATIENT)
Dept: RADIOLOGY | Facility: HOSPITAL | Age: 72
Discharge: HOME | End: 2017-02-15
Attending: ORTHOPAEDIC SURGERY
Payer: MEDICARE

## 2017-02-15 VITALS
DIASTOLIC BLOOD PRESSURE: 76 MMHG | BODY MASS INDEX: 34.46 KG/M2 | SYSTOLIC BLOOD PRESSURE: 127 MMHG | HEIGHT: 76 IN | HEART RATE: 89 BPM | WEIGHT: 283 LBS

## 2017-02-15 DIAGNOSIS — S83.242D TEAR OF MEDIAL MENISCUS OF LEFT KNEE, UNSPECIFIED TEAR TYPE, UNSPECIFIED WHETHER OLD OR CURRENT TEAR, SUBSEQUENT ENCOUNTER: Primary | ICD-10-CM

## 2017-02-15 DIAGNOSIS — M25.562 LEFT KNEE PAIN, UNSPECIFIED CHRONICITY: ICD-10-CM

## 2017-02-15 PROCEDURE — 1160F RVW MEDS BY RX/DR IN RCRD: CPT | Mod: S$GLB,,, | Performed by: ORTHOPAEDIC SURGERY

## 2017-02-15 PROCEDURE — 1125F AMNT PAIN NOTED PAIN PRSNT: CPT | Mod: S$GLB,,, | Performed by: ORTHOPAEDIC SURGERY

## 2017-02-15 PROCEDURE — 3078F DIAST BP <80 MM HG: CPT | Mod: S$GLB,,, | Performed by: ORTHOPAEDIC SURGERY

## 2017-02-15 PROCEDURE — 1159F MED LIST DOCD IN RCRD: CPT | Mod: S$GLB,,, | Performed by: ORTHOPAEDIC SURGERY

## 2017-02-15 PROCEDURE — 73564 X-RAY EXAM KNEE 4 OR MORE: CPT | Mod: 26,LT,, | Performed by: RADIOLOGY

## 2017-02-15 PROCEDURE — 99999 PR PBB SHADOW E&M-EST. PATIENT-LVL III: CPT | Mod: PBBFAC,,, | Performed by: ORTHOPAEDIC SURGERY

## 2017-02-15 PROCEDURE — 99213 OFFICE O/P EST LOW 20 MIN: CPT | Mod: S$GLB,,, | Performed by: ORTHOPAEDIC SURGERY

## 2017-02-15 PROCEDURE — 73564 X-RAY EXAM KNEE 4 OR MORE: CPT | Mod: TC,PO,LT

## 2017-02-15 PROCEDURE — 73562 X-RAY EXAM OF KNEE 3: CPT | Mod: 26,59,RT, | Performed by: RADIOLOGY

## 2017-02-15 PROCEDURE — 3074F SYST BP LT 130 MM HG: CPT | Mod: S$GLB,,, | Performed by: ORTHOPAEDIC SURGERY

## 2017-02-15 PROCEDURE — 1157F ADVNC CARE PLAN IN RCRD: CPT | Mod: S$GLB,,, | Performed by: ORTHOPAEDIC SURGERY

## 2017-02-15 NOTE — PROGRESS NOTES
Past Medical History   Diagnosis Date    Anticoagulant long-term use      xarelto    Atrial fibrillation 2013     cardioverted    Bleb, lung     CAD (coronary artery disease) 2004     stents x 2    Cataract      OS    Colon polyp     Diabetes mellitus type 2 with retinopathy     Diverticulosis     GERD (gastroesophageal reflux disease)     HEARING LOSS     Hemorrhoid     HLD (hyperlipidemia)     Iron deficiency anemia     Neuropathy of leg     Pneumonia due to other staphylococcus     Prostate cancer        Past Surgical History   Procedure Laterality Date    Prostatectomy  2001     open    Tube thoracotomy  1966     pneumothorax left    Colonoscopy      Cataract extraction       bilateral    Hernia repair       umbilical       Current Outpatient Prescriptions   Medication Sig    alcohol swabs (BD ALCOHOL SWABS) PadM Use each time he test his blood sugar.    aspirin (ECOTRIN) 81 MG EC tablet Take 81 mg by mouth once daily.    blood glucose control high&low (ACCU-CHEK SHASHANK CONTROL SOLN) Soln Inject 1 strip into the skin 2 (two) times daily.    blood glucose control, normal Soln Use as directed for accu check shashank plus  testing device    blood sugar diagnostic (BLOOD GLUCOSE TEST) Strp accu check shashank plus test strips use once a day    clotrimazole (LOTRIMIN) 1 % Soln Apply topically 2 (two) times daily.    docusate sodium (COLACE) 100 MG capsule Take 100 mg by mouth 2 (two) times daily.    ferrous gluconate (FERGON) 324 MG tablet Take 324 mg by mouth 2 (two) times daily with meals.    ferrous sulfate 325 mg (65 mg iron) Tab tablet     gabapentin (NEURONTIN) 100 MG capsule Take 1 capsule (100 mg total) by mouth every evening.    glimepiride (AMARYL) 2 MG tablet Take 2 tablets (4 mg total) by mouth 2 (two) times daily.    GLUCOSAMINE HCL/CHONDR ZAMORA A NA (OSTEO BI-FLEX ORAL) Take by mouth.    lancets (ACCU-CHEK SOFTCLIX LANCETS) Misc 1 strip by Misc.(Non-Drug; Combo Route) route 3  (three) times daily.    metformin (GLUCOPHAGE) 500 MG tablet TAKE 2 TABLETS BY MOUTH TWICE DAILY    metoprolol tartrate (LOPRESSOR) 50 MG tablet TAKE 1 TABLET BY MOUTH TWICE DAILY    omeprazole (PRILOSEC) 40 MG capsule TAKE 1 CAPSULE BEFORE BREAKFAST.    simvastatin (ZOCOR) 40 MG tablet Take 1 tablet (40 mg total) by mouth every evening.    sodium-potassium bicarbonate (BELINDA-SELTZER GOLD) TbEF Take 2 tablets by mouth every evening.    terbinafine HCl (LAMISIL) 1 % cream Apply topically 2 (two) times daily. Apply to feet.    VIT A/VIT C/VIT E/ZINC/COPPER (ICAPS AREDS ORAL) Take 1 capsule by mouth 2 (two) times daily.    XARELTO 20 mg Tab TAKE 1 TABLET ONE TIME DAILY     No current facility-administered medications for this visit.        Review of patient's allergies indicates:   Allergen Reactions    Codeine Nausea And Vomiting       Family History   Problem Relation Age of Onset    Diabetes Father     Coronary artery disease Father     Cancer Father      stomach       Social History     Social History    Marital status:      Spouse name: N/A    Number of children: 2    Years of education: N/A     Occupational History         prior [employer]       Social History Main Topics    Smoking status: Former Smoker     Years: 20.00    Smokeless tobacco: Never Used      Comment: quit smoking in 1980's.    Alcohol use Yes      Comment: 1 drink every 2 weeks    Drug use: No    Sexual activity: Not on file     Other Topics Concern    Not on file     Social History Narrative    From [place]       Chief Complaint:   Chief Complaint   Patient presents with    Left Knee - Pain       History: This is a 70-year-old male seen for left knee pain and swelling.  He has had pain for several months now.  Pain when walking and standing.  The patient states that the pain is manageable but does not like getting periodic swelling.  Rates the pain as 2 out of 10.  Patient did a little physical  therapy which seemed to aggravate it more.  Has tried anti-inflammatories without great success.    Present: The Synvisc one that we did back in July didn't really help.  He started to get some buckling in his knee.  Pain is medial and worse with hyperflexion.  Pain as a 2 out of 10.      Review of Systems:    Constitution: Negative for chills, fever, and sweats.  Negative for unexplained weight loss.    HENT:  Negative for headaches and blurry vision.    Cardiovascular:Negative for chest pain or irregular heart beat. Negative for hypertension.    Respiratory:  Negative for cough and shortness of breath.    Gastrointestinal: Negative for abdominal pain, heartburn, melena, nausea, and vomitting.    Genitourinary:  Negative bladder incontinence and dysuria.    Musculoskeletal:  See HPI    Neurological: Negative for numbness.    Psychiatric/Behavioral: Negative for depression.  The patient is not nervous/anxious.      Endocrine: Negative for polyuria    Hematologic/Lymphatic: Negative for bleeding problem.  Does not bruise/bleed easily.    Skin: Negative for poor would healing and rash      Physical Examination:    Vital Signs:    Vitals:    02/15/17 0857   BP: 127/76   Pulse: 89       Body mass index is 34.45 kg/(m^2).    This a well-developed, well nourished patient in no acute distress.  They are alert and oriented and cooperative to examination.  Pt. walks without an antalgic gait.      Examination of the left knee shows no rashes or erythema. There are no masses ecchymosis or effusion. Patient has full range of motion from 0-130°. Patient is nontender to palpation over lateral joint line and markedly tender to palpation over the medial joint line. Patient has a - Lachman exam, - anterior drawer exam, and - posterior drawer exam.  Positive medial Apley exam. Knee is stable to varus and valgus stress. 5 out of 5 motor strength. Palpable distal pulses. Intact light touch sensation.  Moderate Patellofemoral  crepitus      X-rays: X-rays of the left knee are available for review which show mild arthritic changes and patellar tilt more predominant the left knee     Assessment:: Left knee Medial meniscal tear and mild arthritis    Plan:  I reviewed the findings with him today.  I recommended getting an MRI to evaluate for possible meniscal tear.  Patient is having more significant joint line pain and now has mechanical symptoms and buckling of his knee.  He is tried a Synvisc 1 injection and some physical therapy without success.

## 2017-02-15 NOTE — LETTER
February 15, 2017      Cade Juan MD  [address]  [facility] [address]           [facility]  [address] [place] [address]  Phone: [phone]          Patient: Stu Garcia   MR Number: [id number]   YOB: 1945   Date of Visit: 2/15/2017       Dear Dr. Cade Juan:    Thank you for referring Stu Garcia to me for evaluation. Attached you will find relevant portions of my assessment and plan of care.    If you have questions, please do not hesitate to call me. I look forward to following Stu Garcia along with you.    Sincerely,    Kale Cleary MD    Enclosure  CC:  No Recipients    If you would like to receive this communication electronically, please contact [email or web address] or [phone] to request more information on [employer] access.    For providers and/or their staff who would like to refer a patient to [facility], please contact us through our one-stop-shop provider referral line, [facility] , at [phone].    If you feel you have received this communication in error or would no longer like to receive these types of communications, please e-mail [email or web address]

## 2017-02-20 ENCOUNTER — OFFICE VISIT (OUTPATIENT)
Dept: PODIATRY | Facility: CLINIC | Age: 72
End: 2017-02-20
Payer: MEDICARE

## 2017-02-20 VITALS — BODY MASS INDEX: 34.28 KG/M2 | WEIGHT: 281.5 LBS | HEIGHT: 76 IN

## 2017-02-20 DIAGNOSIS — E11.42 DIABETIC PERIPHERAL NEUROPATHY ASSOCIATED WITH TYPE 2 DIABETES MELLITUS: Primary | ICD-10-CM

## 2017-02-20 DIAGNOSIS — B35.1 ONYCHOMYCOSIS DUE TO DERMATOPHYTE: ICD-10-CM

## 2017-02-20 PROCEDURE — 3045F PR MOST RECENT HEMOGLOBIN A1C LEVEL 7.0-9.0%: CPT | Mod: S$GLB,,,

## 2017-02-20 PROCEDURE — 99999 PR PBB SHADOW E&M-EST. PATIENT-LVL II: CPT | Mod: PBBFAC,,,

## 2017-02-20 PROCEDURE — 1157F ADVNC CARE PLAN IN RCRD: CPT | Mod: S$GLB,,,

## 2017-02-20 PROCEDURE — 99213 OFFICE O/P EST LOW 20 MIN: CPT | Mod: S$GLB,,,

## 2017-02-20 PROCEDURE — 1126F AMNT PAIN NOTED NONE PRSNT: CPT | Mod: S$GLB,,,

## 2017-02-20 PROCEDURE — 2022F DILAT RTA XM EVC RTNOPTHY: CPT | Mod: S$GLB,,,

## 2017-02-20 PROCEDURE — 1159F MED LIST DOCD IN RCRD: CPT | Mod: S$GLB,,,

## 2017-02-20 PROCEDURE — 99499 UNLISTED E&M SERVICE: CPT | Mod: S$GLB,,,

## 2017-02-20 PROCEDURE — 3060F POS MICROALBUMINURIA REV: CPT | Mod: S$GLB,,,

## 2017-02-20 NOTE — MR AVS SNAPSHOT
[facility]  [address]  [facility] [address]  Phone: [phone]                  Stu Garcia   2017 11:30 AM   Office Visit    Description:  Male : 1945   Provider:  aGbino Pathak DPM   Department:  [facility]           Reason for Visit     Follow-up     Diabetes Mellitus                To Do List           Future Appointments        Provider Department Dept Phone    2017 10:45 AM Gabino Pathak DPM [facility] [phone]    2017 9:25 AM [facility], [place] [facility] [phone]    2017 9:00 AM Cade Juan MD [facility] [phone]      Goals (5 Years of Data)     None      [facility] On Call     [facility] On [facility] Nurse Care Line -  Assistance  Registered nurses in the [facility] provide clinical advisement, health education, appointment booking, and other advisory services.  Call for this free service at [phone].             Medications           Message regarding Medications     Verify the changes and/or additions to your medication regime listed below are the same as discussed with your clinician today.  If any of these changes or additions are incorrect, please notify your healthcare provider.             Verify that the below list of medications is an accurate representation of the medications you are currently taking.  If none reported, the list may be blank. If incorrect, please contact your healthcare provider. Carry this list with you in case of emergency.           Current Medications     alcohol swabs (BD ALCOHOL SWABS) PadM Use each time he test his blood sugar.    aspirin (ECOTRIN) 81 MG EC tablet Take 81 mg by mouth once daily.    blood glucose control high&low (ACCU-CHEK SHASHANK CONTROL SOLN) Soln Inject 1 strip into the skin 2 (two) times daily.    blood glucose control, normal Soln Use as directed for accu check shashank plus  testing device    blood sugar  "diagnostic (BLOOD GLUCOSE TEST) Strp accu check alyssa plus test strips use once a day    clotrimazole (LOTRIMIN) 1 % Soln Apply topically 2 (two) times daily.    docusate sodium (COLACE) 100 MG capsule Take 100 mg by mouth 2 (two) times daily.    ferrous gluconate (FERGON) 324 MG tablet Take 324 mg by mouth 2 (two) times daily with meals.    ferrous sulfate 325 mg (65 mg iron) Tab tablet     gabapentin (NEURONTIN) 100 MG capsule Take 1 capsule (100 mg total) by mouth every evening.    glimepiride (AMARYL) 2 MG tablet Take 2 tablets (4 mg total) by mouth 2 (two) times daily.    GLUCOSAMINE HCL/CHONDR HOYOS A NA (OSTEO BI-FLEX ORAL) Take by mouth.    lancets (ACCU-CHEK SOFTCLIX LANCETS) Misc 1 strip by Misc.(Non-Drug; Combo Route) route 3 (three) times daily.    metformin (GLUCOPHAGE) 500 MG tablet TAKE 2 TABLETS BY MOUTH TWICE DAILY    metoprolol tartrate (LOPRESSOR) 50 MG tablet TAKE 1 TABLET BY MOUTH TWICE DAILY    omeprazole (PRILOSEC) 40 MG capsule TAKE 1 CAPSULE BEFORE BREAKFAST.    simvastatin (ZOCOR) 40 MG tablet Take 1 tablet (40 mg total) by mouth every evening.    sodium-potassium bicarbonate (BELINDA-SELTZER GOLD) TbEF Take 2 tablets by mouth every evening.    terbinafine HCl (LAMISIL) 1 % cream Apply topically 2 (two) times daily. Apply to feet.    VIT A/VIT C/VIT E/ZINC/COPPER (ICAPS AREDS ORAL) Take 1 capsule by mouth 2 (two) times daily.    XARELTO 20 mg Tab TAKE 1 TABLET ONE TIME DAILY           Clinical Reference Information           Your Vitals Were     Height Weight BMI          6' 4" (1.93 m) 127.7 kg (281 lb 8.4 oz) 34.27 kg/m2        Allergies as of 2/20/2017     Codeine      Immunizations Administered on Date of Encounter - 2/20/2017     None      Language Assistance Services     ATTENTION: Language assistance services are available, free of charge. Please call [phone].      ATENCIÓN: Si habla perlaañol, tiene a hoyos disposición servicios gratuitos de asistencia lingüística. Llame al " [phone].     VIGNESH Ý: N?u b?n nói Ti?ng Vi?t, có các d?ch v? h? tr? ngôn ng? mi?n phí dành cho b?n. G?i s? [phone].         [facility] complies with applicable Federal civil rights laws and does not discriminate on the basis of race, color, national origin, age, disability, or sex.

## 2017-02-22 ENCOUNTER — OFFICE VISIT (OUTPATIENT)
Dept: ORTHOPEDICS | Facility: CLINIC | Age: 72
End: 2017-02-22
Payer: MEDICARE

## 2017-02-22 VITALS
HEIGHT: 76 IN | WEIGHT: 281 LBS | HEART RATE: 89 BPM | DIASTOLIC BLOOD PRESSURE: 74 MMHG | BODY MASS INDEX: 34.22 KG/M2 | SYSTOLIC BLOOD PRESSURE: 148 MMHG

## 2017-02-22 DIAGNOSIS — S83.242D ACUTE MEDIAL MENISCAL TEAR, LEFT, SUBSEQUENT ENCOUNTER: Primary | ICD-10-CM

## 2017-02-22 DIAGNOSIS — Z01.818 PREOP EXAMINATION: Primary | ICD-10-CM

## 2017-02-22 PROCEDURE — 3078F DIAST BP <80 MM HG: CPT | Mod: S$GLB,,, | Performed by: ORTHOPAEDIC SURGERY

## 2017-02-22 PROCEDURE — 1159F MED LIST DOCD IN RCRD: CPT | Mod: S$GLB,,, | Performed by: ORTHOPAEDIC SURGERY

## 2017-02-22 PROCEDURE — 1157F ADVNC CARE PLAN IN RCRD: CPT | Mod: S$GLB,,, | Performed by: ORTHOPAEDIC SURGERY

## 2017-02-22 PROCEDURE — 99999 PR PBB SHADOW E&M-EST. PATIENT-LVL III: CPT | Mod: PBBFAC,,, | Performed by: ORTHOPAEDIC SURGERY

## 2017-02-22 PROCEDURE — 1160F RVW MEDS BY RX/DR IN RCRD: CPT | Mod: S$GLB,,, | Performed by: ORTHOPAEDIC SURGERY

## 2017-02-22 PROCEDURE — 99214 OFFICE O/P EST MOD 30 MIN: CPT | Mod: 57,S$GLB,, | Performed by: ORTHOPAEDIC SURGERY

## 2017-02-22 PROCEDURE — 1125F AMNT PAIN NOTED PAIN PRSNT: CPT | Mod: S$GLB,,, | Performed by: ORTHOPAEDIC SURGERY

## 2017-02-22 PROCEDURE — 3077F SYST BP >= 140 MM HG: CPT | Mod: S$GLB,,, | Performed by: ORTHOPAEDIC SURGERY

## 2017-02-22 NOTE — PROGRESS NOTES
Past Medical History   Diagnosis Date    Anticoagulant long-term use      xarelto    Atrial fibrillation 2013     cardioverted    Bleb, lung     CAD (coronary artery disease) 2004     stents x 2    Cataract      OS    Colon polyp     Diabetes mellitus type 2 with retinopathy     Diverticulosis     GERD (gastroesophageal reflux disease)     HEARING LOSS     Hemorrhoid     HLD (hyperlipidemia)     Iron deficiency anemia     Neuropathy of leg     Pneumonia due to other staphylococcus     Prostate cancer        Past Surgical History   Procedure Laterality Date    Prostatectomy  2001     open    Tube thoracotomy  1966     pneumothorax left    Colonoscopy      Cataract extraction       bilateral    Hernia repair       umbilical       Current Outpatient Prescriptions   Medication Sig    alcohol swabs (BD ALCOHOL SWABS) PadM Use each time he test his blood sugar.    aspirin (ECOTRIN) 81 MG EC tablet Take 81 mg by mouth once daily.    blood glucose control high&low (ACCU-CHEK SHASHANK CONTROL SOLN) Soln Inject 1 strip into the skin 2 (two) times daily.    blood glucose control, normal Soln Use as directed for accu check shashank plus  testing device    blood sugar diagnostic (BLOOD GLUCOSE TEST) Strp accu check shashank plus test strips use once a day    clotrimazole (LOTRIMIN) 1 % Soln Apply topically 2 (two) times daily.    docusate sodium (COLACE) 100 MG capsule Take 100 mg by mouth 2 (two) times daily.    ferrous gluconate (FERGON) 324 MG tablet Take 324 mg by mouth 2 (two) times daily with meals.    ferrous sulfate 325 mg (65 mg iron) Tab tablet     gabapentin (NEURONTIN) 100 MG capsule Take 1 capsule (100 mg total) by mouth every evening.    glimepiride (AMARYL) 2 MG tablet Take 2 tablets (4 mg total) by mouth 2 (two) times daily.    GLUCOSAMINE HCL/CHONDR ZAMORA A NA (OSTEO BI-FLEX ORAL) Take by mouth.    lancets (ACCU-CHEK SOFTCLIX LANCETS) Misc 1 strip by Misc.(Non-Drug; Combo Route) route 3  (three) times daily.    metformin (GLUCOPHAGE) 500 MG tablet TAKE 2 TABLETS BY MOUTH TWICE DAILY    metoprolol tartrate (LOPRESSOR) 50 MG tablet TAKE 1 TABLET BY MOUTH TWICE DAILY    omeprazole (PRILOSEC) 40 MG capsule TAKE 1 CAPSULE BEFORE BREAKFAST.    simvastatin (ZOCOR) 40 MG tablet Take 1 tablet (40 mg total) by mouth every evening.    sodium-potassium bicarbonate (BELINDA-SELTZER GOLD) TbEF Take 2 tablets by mouth every evening.    terbinafine HCl (LAMISIL) 1 % cream Apply topically 2 (two) times daily. Apply to feet.    VIT A/VIT C/VIT E/ZINC/COPPER (ICAPS AREDS ORAL) Take 1 capsule by mouth 2 (two) times daily.    XARELTO 20 mg Tab TAKE 1 TABLET ONE TIME DAILY     No current facility-administered medications for this visit.        Review of patient's allergies indicates:   Allergen Reactions    Codeine Nausea And Vomiting       Family History   Problem Relation Age of Onset    Diabetes Father     Coronary artery disease Father     Cancer Father      stomach       Social History     Social History    Marital status:      Spouse name: N/A    Number of children: 2    Years of education: N/A     Occupational History         prior [employer]       Social History Main Topics    Smoking status: Former Smoker     Years: 20.00    Smokeless tobacco: Never Used      Comment: quit smoking in 1980's.    Alcohol use Yes      Comment: 1 drink every 2 weeks    Drug use: No    Sexual activity: Not on file     Other Topics Concern    Not on file     Social History Narrative    From [place]       Chief Complaint:   Chief Complaint   Patient presents with    Knee Pain     left knee-MRI Results        History: This is a 70-year-old male seen for left knee pain and swelling.  He has had pain for several months now.  Pain when walking and standing.  The patient states that the pain is manageable but does not like getting periodic swelling.  Rates the pain as 2 out of 10.  Patient did a  little physical therapy which seemed to aggravate it more.  Has tried anti-inflammatories without great success.    Present: The Synvisc one that we did back in July didn't really help.  He started to get some buckling in his knee.  Pain is medial and worse with hyperflexion.  Pain as a 2 out of 10.  MRI did show what looks like medial and lateral meniscal tears.      Review of Systems:    Constitution: Negative for chills, fever, and sweats.  Negative for unexplained weight loss.    HENT:  Negative for headaches and blurry vision.    Cardiovascular:Negative for chest pain or irregular heart beat. Negative for hypertension.    Respiratory:  Negative for cough and shortness of breath.    Gastrointestinal: Negative for abdominal pain, heartburn, melena, nausea, and vomitting.    Genitourinary:  Negative bladder incontinence and dysuria.    Musculoskeletal:  See HPI    Neurological: Negative for numbness.    Psychiatric/Behavioral: Negative for depression.  The patient is not nervous/anxious.      Endocrine: Negative for polyuria    Hematologic/Lymphatic: Negative for bleeding problem.  Does not bruise/bleed easily.    Skin: Negative for poor would healing and rash      Physical Examination:    Vital Signs:    Vitals:    02/22/17 1506   BP: (!) 148/74   Pulse: 89       Body mass index is 34.2 kg/(m^2).    This a well-developed, well nourished patient in no acute distress.  They are alert and oriented and cooperative to examination.  Pt. walks without an antalgic gait.      Examination of the left knee shows no rashes or erythema. There are no masses ecchymosis or effusion. Patient has full range of motion from 0-130°. Patient is nontender to palpation over lateral joint line and markedly tender to palpation over the medial joint line. Patient has a - Lachman exam, - anterior drawer exam, and - posterior drawer exam.  Positive medial Apley exam. Knee is stable to varus and valgus stress. 5 out of 5 motor strength.  Palpable distal pulses. Intact light touch sensation.  Moderate Patellofemoral crepitus    Heart is regular rate and rhythm without abnormal murmurs rubs or gallops  Lungs are clear to auscultation bilaterally  Abdomen is soft nontender nondistended    X-rays: X-rays of the left knee are available for review which show mild arthritic changes and patellar tilt more predominant the left knee    MRI of the left knee:1.  Tricompartmental osseous and chondral degenerative changes without evidence of acute marrow edema or displaced fracture.  2.  Degenerative changes of the medial and lateral menisci.  There are focal signal irregularities noted at the medial meniscal body, extending to the tibial articular surface, as well as the lateral meniscal root.  These findings raise suspicion for meniscal tears with no significantly displaced component identified.     Assessment:: Left knee Medial meniscal tear and mild arthritis    Plan:  I reviewed the findings with him today.  I recommended arthroscopy of the left knee.  Patient is tried formal physical therapy as well as injections without great success.  Pain is been present now for at least 7 months.  We discussed knee arthroscopy in detail in its recovery.  Patient will schedule at his convenience.Risks, benefits, and alternatives to the procedure were explained to the patient including but not limited to damage to nerves, arteries, blood vessels, bones, tendons, ligaments, stiffness, instability, infection, DVT, PE, as well as general anesthetic complications including seizure, stroke, heart attack and even death. The patient understood these risks and wished to proceed and signed the informed consent.

## 2017-03-06 DIAGNOSIS — H91.93 BILATERAL HEARING LOSS, UNSPECIFIED HEARING LOSS TYPE: Primary | ICD-10-CM

## 2017-03-08 ENCOUNTER — CLINICAL SUPPORT (OUTPATIENT)
Dept: AUDIOLOGY | Facility: CLINIC | Age: 72
End: 2017-03-08
Payer: MEDICARE

## 2017-03-08 ENCOUNTER — OFFICE VISIT (OUTPATIENT)
Dept: ORTHOPEDICS | Facility: CLINIC | Age: 72
End: 2017-03-08
Payer: MEDICARE

## 2017-03-08 ENCOUNTER — LAB VISIT (OUTPATIENT)
Dept: LAB | Facility: HOSPITAL | Age: 72
End: 2017-03-08
Attending: ORTHOPAEDIC SURGERY
Payer: MEDICARE

## 2017-03-08 VITALS
BODY MASS INDEX: 34.22 KG/M2 | HEART RATE: 82 BPM | WEIGHT: 281 LBS | SYSTOLIC BLOOD PRESSURE: 137 MMHG | DIASTOLIC BLOOD PRESSURE: 77 MMHG | HEIGHT: 76 IN

## 2017-03-08 DIAGNOSIS — Z01.818 PRE-OP TESTING: Primary | ICD-10-CM

## 2017-03-08 DIAGNOSIS — H93.293 IMPAIRED AUDITORY DISCRIMINATION, BILATERAL: ICD-10-CM

## 2017-03-08 DIAGNOSIS — Z97.4 WEARS HEARING AID: ICD-10-CM

## 2017-03-08 DIAGNOSIS — Z01.818 PRE-OP TESTING: ICD-10-CM

## 2017-03-08 DIAGNOSIS — H90.3 ASYMMETRIC SNHL (SENSORINEURAL HEARING LOSS): Primary | ICD-10-CM

## 2017-03-08 DIAGNOSIS — H93.13 BILATERAL TINNITUS: ICD-10-CM

## 2017-03-08 DIAGNOSIS — S83.242D ACUTE MEDIAL MENISCAL TEAR, LEFT, SUBSEQUENT ENCOUNTER: Primary | ICD-10-CM

## 2017-03-08 LAB
ANION GAP SERPL CALC-SCNC: 9 MMOL/L
BASOPHILS # BLD AUTO: 0.03 K/UL
BASOPHILS NFR BLD: 0.5 %
BUN SERPL-MCNC: 19 MG/DL
CALCIUM SERPL-MCNC: 9.4 MG/DL
CHLORIDE SERPL-SCNC: 105 MMOL/L
CO2 SERPL-SCNC: 26 MMOL/L
CREAT SERPL-MCNC: 1.2 MG/DL
DIFFERENTIAL METHOD: ABNORMAL
EOSINOPHIL # BLD AUTO: 0.3 K/UL
EOSINOPHIL NFR BLD: 5.3 %
ERYTHROCYTE [DISTWIDTH] IN BLOOD BY AUTOMATED COUNT: 14 %
EST. GFR  (AFRICAN AMERICAN): >60 ML/MIN/1.73 M^2
EST. GFR  (NON AFRICAN AMERICAN): >60 ML/MIN/1.73 M^2
GLUCOSE SERPL-MCNC: 156 MG/DL
HCT VFR BLD AUTO: 40.8 %
HGB BLD-MCNC: 12.8 G/DL
LYMPHOCYTES # BLD AUTO: 1 K/UL
LYMPHOCYTES NFR BLD: 16.5 %
MCH RBC QN AUTO: 28.8 PG
MCHC RBC AUTO-ENTMCNC: 31.4 %
MCV RBC AUTO: 92 FL
MONOCYTES # BLD AUTO: 0.5 K/UL
MONOCYTES NFR BLD: 8.9 %
NEUTROPHILS # BLD AUTO: 4.2 K/UL
NEUTROPHILS NFR BLD: 68.5 %
PLATELET # BLD AUTO: 131 K/UL
PMV BLD AUTO: 12.5 FL
POTASSIUM SERPL-SCNC: 5 MMOL/L
RBC # BLD AUTO: 4.45 M/UL
SODIUM SERPL-SCNC: 140 MMOL/L
WBC # BLD AUTO: 6.06 K/UL

## 2017-03-08 PROCEDURE — 99214 OFFICE O/P EST MOD 30 MIN: CPT | Mod: 57,S$GLB,, | Performed by: ORTHOPAEDIC SURGERY

## 2017-03-08 PROCEDURE — 1125F AMNT PAIN NOTED PAIN PRSNT: CPT | Mod: S$GLB,,, | Performed by: ORTHOPAEDIC SURGERY

## 2017-03-08 PROCEDURE — 1157F ADVNC CARE PLAN IN RCRD: CPT | Mod: S$GLB,,, | Performed by: ORTHOPAEDIC SURGERY

## 2017-03-08 PROCEDURE — 3078F DIAST BP <80 MM HG: CPT | Mod: S$GLB,,, | Performed by: ORTHOPAEDIC SURGERY

## 2017-03-08 PROCEDURE — 1159F MED LIST DOCD IN RCRD: CPT | Mod: S$GLB,,, | Performed by: ORTHOPAEDIC SURGERY

## 2017-03-08 PROCEDURE — 92557 COMPREHENSIVE HEARING TEST: CPT | Mod: S$GLB,,, | Performed by: AUDIOLOGIST

## 2017-03-08 PROCEDURE — 3075F SYST BP GE 130 - 139MM HG: CPT | Mod: S$GLB,,, | Performed by: ORTHOPAEDIC SURGERY

## 2017-03-08 PROCEDURE — 92567 TYMPANOMETRY: CPT | Mod: S$GLB,,, | Performed by: AUDIOLOGIST

## 2017-03-08 PROCEDURE — 99999 PR PBB SHADOW E&M-EST. PATIENT-LVL III: CPT | Mod: PBBFAC,,, | Performed by: ORTHOPAEDIC SURGERY

## 2017-03-08 PROCEDURE — 1160F RVW MEDS BY RX/DR IN RCRD: CPT | Mod: S$GLB,,, | Performed by: ORTHOPAEDIC SURGERY

## 2017-03-08 NOTE — PROGRESS NOTES
Past Medical History:   Diagnosis Date    Anticoagulant long-term use     xarelto    Atrial fibrillation 2013    cardioverted    Bleb, lung     CAD (coronary artery disease) 2004    stents x 2    Cataract     OS    Colon polyp     Diabetes mellitus type 2 with retinopathy     Diverticulosis     GERD (gastroesophageal reflux disease)     HEARING LOSS     Hemorrhoid     HLD (hyperlipidemia)     Iron deficiency anemia     Neuropathy of leg     Pneumonia due to other staphylococcus     Prostate cancer        Past Surgical History:   Procedure Laterality Date    CATARACT EXTRACTION      bilateral    COLONOSCOPY      HERNIA REPAIR      umbilical    PROSTATECTOMY  2001    open    TUBE THORACOTOMY  1966    pneumothorax left       Current Outpatient Prescriptions   Medication Sig    alcohol swabs (BD ALCOHOL SWABS) PadM Use each time he test his blood sugar.    aspirin (ECOTRIN) 81 MG EC tablet Take 81 mg by mouth once daily.    blood glucose control high&low (ACCU-CHEK SHASHANK CONTROL SOLN) Soln Inject 1 strip into the skin 2 (two) times daily.    blood glucose control, normal Soln Use as directed for accu check shashank plus  testing device    blood sugar diagnostic (BLOOD GLUCOSE TEST) Strp accu check shashank plus test strips use once a day    clotrimazole (LOTRIMIN) 1 % Soln Apply topically 2 (two) times daily.    docusate sodium (COLACE) 100 MG capsule Take 100 mg by mouth 2 (two) times daily.    ferrous gluconate (FERGON) 324 MG tablet Take 324 mg by mouth 2 (two) times daily with meals.    ferrous sulfate 325 mg (65 mg iron) Tab tablet     gabapentin (NEURONTIN) 100 MG capsule Take 1 capsule (100 mg total) by mouth every evening.    glimepiride (AMARYL) 2 MG tablet Take 2 tablets (4 mg total) by mouth 2 (two) times daily.    GLUCOSAMINE HCL/CHONDR ZAMORA A NA (OSTEO BI-FLEX ORAL) Take by mouth.    lancets (ACCU-CHEK SOFTCLIX LANCETS) Misc 1 strip by Misc.(Non-Drug; Combo Route) route 3 (three)  times daily.    metformin (GLUCOPHAGE) 500 MG tablet TAKE 2 TABLETS BY MOUTH TWICE DAILY    metoprolol tartrate (LOPRESSOR) 50 MG tablet TAKE 1 TABLET BY MOUTH TWICE DAILY    omeprazole (PRILOSEC) 40 MG capsule TAKE 1 CAPSULE BEFORE BREAKFAST.    simvastatin (ZOCOR) 40 MG tablet Take 1 tablet (40 mg total) by mouth every evening.    sodium-potassium bicarbonate (BELINDA-SELTZER GOLD) TbEF Take 2 tablets by mouth every evening.    terbinafine HCl (LAMISIL) 1 % cream Apply topically 2 (two) times daily. Apply to feet.    VIT A/VIT C/VIT E/ZINC/COPPER (ICAPS AREDS ORAL) Take 1 capsule by mouth 2 (two) times daily.    XARELTO 20 mg Tab TAKE 1 TABLET ONE TIME DAILY     No current facility-administered medications for this visit.        Review of patient's allergies indicates:   Allergen Reactions    Codeine Nausea And Vomiting       Family History   Problem Relation Age of Onset    Diabetes Father     Coronary artery disease Father     Cancer Father      stomach       Social History     Social History    Marital status:      Spouse name: N/A    Number of children: 2    Years of education: N/A     Occupational History         prior [employer]       Social History Main Topics    Smoking status: Former Smoker     Years: 20.00    Smokeless tobacco: Never Used      Comment: quit smoking in 1980's.    Alcohol use Yes      Comment: 1 drink every 2 weeks    Drug use: No    Sexual activity: Not on file     Other Topics Concern    Not on file     Social History Narrative    From [place]       Chief Complaint:   Chief Complaint   Patient presents with    Knee Pain     left knee pain-discuss sx       History: This is a 70-year-old male seen for left knee pain and swelling.  He has had pain for several months now.  Pain when walking and standing.  The patient states that the pain is manageable but does not like getting periodic swelling.  Rates the pain as 2 out of 10.  Patient did a  little physical therapy which seemed to aggravate it more.  Has tried anti-inflammatories without great success.    Present: The Synvisc one that we did back in July didn't really help.  He started to get some buckling in his knee.  Pain is medial and worse with hyperflexion.  Pain as a 2 out of 10.  MRI did show what looks like medial and lateral meniscal tears.      Review of Systems:    Constitution: Negative for chills, fever, and sweats.  Negative for unexplained weight loss.    HENT:  Negative for headaches and blurry vision.    Cardiovascular:Negative for chest pain or irregular heart beat. Negative for hypertension.    Respiratory:  Negative for cough and shortness of breath.    Gastrointestinal: Negative for abdominal pain, heartburn, melena, nausea, and vomitting.    Genitourinary:  Negative bladder incontinence and dysuria.    Musculoskeletal:  See HPI    Neurological: Negative for numbness.    Psychiatric/Behavioral: Negative for depression.  The patient is not nervous/anxious.      Endocrine: Negative for polyuria    Hematologic/Lymphatic: Negative for bleeding problem.  Does not bruise/bleed easily.    Skin: Negative for poor would healing and rash      Physical Examination:    Vital Signs:    Vitals:    03/08/17 0850   BP: 137/77   Pulse: 82       Body mass index is 34.2 kg/(m^2).    This a well-developed, well nourished patient in no acute distress.  They are alert and oriented and cooperative to examination.  Pt. walks without an antalgic gait.      Examination of the left knee shows no rashes or erythema. There are no masses ecchymosis or effusion. Patient has full range of motion from 0-130°. Patient is nontender to palpation over lateral joint line and markedly tender to palpation over the medial joint line. Patient has a - Lachman exam, - anterior drawer exam, and - posterior drawer exam.  Positive medial Apley exam. Knee is stable to varus and valgus stress. 5 out of 5 motor strength. Palpable  distal pulses. Intact light touch sensation.  Moderate Patellofemoral crepitus    Heart is regular rate and rhythm without abnormal murmurs rubs or gallops  Lungs are clear to auscultation bilaterally  Abdomen is soft nontender nondistended    X-rays: X-rays of the left knee are available for review which show mild arthritic changes and patellar tilt more predominant the left knee    MRI of the left knee:1.  Tricompartmental osseous and chondral degenerative changes without evidence of acute marrow edema or displaced fracture.  2.  Degenerative changes of the medial and lateral menisci.  There are focal signal irregularities noted at the medial meniscal body, extending to the tibial articular surface, as well as the lateral meniscal root.  These findings raise suspicion for meniscal tears with no significantly displaced component identified.     Assessment:: Left knee Medial meniscal tear and mild arthritis    Plan:  I reviewed the findings with him today.  I recommended arthroscopy of the left knee.  Patient has tried formal physical therapy as well as injections without great success.  Pain is been present now for at least 7 months.  We discussed knee arthroscopy in detail in its recovery.  Patient will schedule at his convenience.Risks, benefits, and alternatives to the procedure were explained to the patient including but not limited to damage to nerves, arteries, blood vessels, bones, tendons, ligaments, stiffness, instability, infection, DVT, PE, as well as general anesthetic complications including seizure, stroke, heart attack and even death. The patient understood these risks and wished to proceed and signed the informed consent.

## 2017-03-08 NOTE — PROGRESS NOTES
Audiological evaluation completed per order from Nayana Peck due to patient's history of bilateral hearing loss and use of binaural amplification. Pt reported that he has not been hearing as well out of his right ear but is unsure if the problem is his hearing aid or his hearing.      Audiogram results were reviewed in detail with patient and all questions were answered.    Consulted with Nayana Peck regarding the unilateral decrease in the patient's right-sided hearing.   Recommend continued daily use of binaural amplification and annual audiogram to monitor hearing loss.  The pt was seen for a hearing aid check and adjustment after his audiogram today.

## 2017-03-09 NOTE — PROGRESS NOTES
The patient was seen for a hearing aid follow-up appointment after having a hearing evaluation performed today.  Today's hearing thresholds were entered into the hearing aid software, and the programming for the left and right hearing aid was recalculated using the current hearing thresholds.  The right and left hearing aid was cleaned and checked.  A listening check revealed each aid to sound good.  An annual audiological evaluation was recommended.  The patient will contact us as needed.

## 2017-03-29 ENCOUNTER — PATIENT MESSAGE (OUTPATIENT)
Dept: ORTHOPEDICS | Facility: CLINIC | Age: 72
End: 2017-03-29

## 2017-03-29 ENCOUNTER — PATIENT MESSAGE (OUTPATIENT)
Dept: CARDIOLOGY | Facility: CLINIC | Age: 72
End: 2017-03-29

## 2017-03-29 ENCOUNTER — PATIENT MESSAGE (OUTPATIENT)
Dept: FAMILY MEDICINE | Facility: CLINIC | Age: 72
End: 2017-03-29

## 2017-03-31 ENCOUNTER — TELEPHONE (OUTPATIENT)
Dept: CARDIOLOGY | Facility: CLINIC | Age: 72
End: 2017-03-31

## 2017-03-31 NOTE — TELEPHONE ENCOUNTER
Forwarded msg to Dr Cleary re: ok to hold ASA 5-7 days prior to procedure & no contraindication for surgery/anesthesia from cardiac standpoint.    Called pt, wife answsered. Wife states needs to know how long to be off of Xarelto also (not in original msg inquiring to hold aspirin).  Gave instructions to hold aspirin 5-7 days prior to procedure. msg to Dr Maldonado to find out how long pt needs to hold Xarelto.   Will forward Dr Maldonado's response as soon as he responds.

## 2017-03-31 NOTE — TELEPHONE ENCOUNTER
Dr Maldonado,    Dr Cleary needs to know how long to hold  XARELTO prior to knee surgery on pt scheduled 4/7. Please advise

## 2017-03-31 NOTE — TELEPHONE ENCOUNTER
Per Dr Ennis     No contraindication for surgery/anesthesia from cardiac standpoint   Ok to hold asa 5-7 days

## 2017-04-03 RX ORDER — METOPROLOL TARTRATE 50 MG/1
TABLET ORAL
Qty: 60 TABLET | Refills: 0 | Status: SHIPPED | OUTPATIENT
Start: 2017-04-03 | End: 2017-04-26 | Stop reason: SDUPTHER

## 2017-04-06 ENCOUNTER — ANESTHESIA EVENT (OUTPATIENT)
Dept: SURGERY | Facility: HOSPITAL | Age: 72
End: 2017-04-06
Payer: MEDICARE

## 2017-04-06 NOTE — ANESTHESIA PREPROCEDURE EVALUATION
04/06/2017  Stu Garcia is a 71 y.o., male.    [facility] Anesthesia Evaluation    I have reviewed the Patient Summary Reports.    I have reviewed the Nursing Notes.      Review of Systems  Anesthesia Hx:  No problems with previous Anesthesia Denies Hx of Anesthetic complications    Social:  Former Smoker, Alcohol Use    Hematology/Oncology:         -- Anemia: Oncology Comments: Prostate ca    Cardiovascular:   Hypertension CAD  CABG/stent Dysrhythmias atrial fibrillation  Denies Angina. hyperlipidemia ECG has been reviewed. 12/2013 echo:  CONCLUSIONS     1 - Low normal to mildly depressed left ventricular systolic function (EF 50-55%).     2 - Normal left ventricular diastolic function.     3 - Concentric hypertrophy.     4 - The mitral valve is mildly sclerotic with evidence of mild systolic prolapse.   Neurological:   Peripheral Neuropathy    Endocrine:   Diabetes, type 2        Physical Exam  General:  Obesity    Airway/Jaw/Neck:  Airway Findings: Mouth Opening: Normal Tongue: Normal  General Airway Assessment: Adult  Mallampati: II  TM Distance: Normal, at least 6 cm  Jaw/Neck Findings:  Neck ROM: Normal ROM      Dental:  Dental Findings: Edentulous   Chest/Lungs:  Chest/Lungs Findings: Clear to auscultation, Normal Respiratory Rate     Heart/Vascular:  Heart Findings: Rate: Normal  Rhythm: Regular Rhythm             Anesthesia Plan  Type of Anesthesia, risks & benefits discussed:  Anesthesia Type:  general  Patient's Preference:   Intra-op Monitoring Plan: standard ASA monitors  Intra-op Monitoring Plan Comments:   Post Op Pain Control Plan:   Post Op Pain Control Plan Comments:   Induction:   IV  Beta Blocker:  Patient is on a Beta-Blocker and has received one dose within the past 24 hours (No further documentation required).       Informed Consent: Patient understands risks and agrees with Anesthesia  plan.  Questions answered. Anesthesia consent signed with patient.  ASA Score: 3     Day of Surgery Review of History & Physical: I have interviewed and examined the patient. I have reviewed the patient's H&P dated:  There are no significant changes.  H&P update referred to the provider.     Anesthesia Plan Notes: gen hernandez lma        Ready For Surgery From Anesthesia Perspective.

## 2017-04-07 ENCOUNTER — SURGERY (OUTPATIENT)
Age: 72
End: 2017-04-07

## 2017-04-07 ENCOUNTER — HOSPITAL ENCOUNTER (OUTPATIENT)
Facility: HOSPITAL | Age: 72
Discharge: HOME | End: 2017-04-07
Attending: ORTHOPAEDIC SURGERY | Admitting: ORTHOPAEDIC SURGERY
Payer: MEDICARE

## 2017-04-07 ENCOUNTER — ANESTHESIA (OUTPATIENT)
Dept: SURGERY | Facility: HOSPITAL | Age: 72
End: 2017-04-07
Payer: MEDICARE

## 2017-04-07 VITALS
TEMPERATURE: 98 F | HEART RATE: 70 BPM | HEIGHT: 76 IN | SYSTOLIC BLOOD PRESSURE: 148 MMHG | BODY MASS INDEX: 32.27 KG/M2 | DIASTOLIC BLOOD PRESSURE: 70 MMHG | WEIGHT: 265 LBS | RESPIRATION RATE: 16 BRPM | OXYGEN SATURATION: 98 %

## 2017-04-07 DIAGNOSIS — S83.242D ACUTE MEDIAL MENISCAL TEAR, LEFT, SUBSEQUENT ENCOUNTER: ICD-10-CM

## 2017-04-07 PROBLEM — S83.249A ACUTE MEDIAL MENISCAL TEAR: Status: ACTIVE | Noted: 2017-04-07

## 2017-04-07 LAB — GLUCOSE SERPL-MCNC: 113 MG/DL (ref 70–110)

## 2017-04-07 PROCEDURE — 25000003 PHARM REV CODE 250: Mod: PO | Performed by: ORTHOPAEDIC SURGERY

## 2017-04-07 PROCEDURE — 63600175 PHARM REV CODE 636 W HCPCS: Mod: PO | Performed by: NURSE ANESTHETIST, CERTIFIED REGISTERED

## 2017-04-07 PROCEDURE — 37000008 HC ANESTHESIA 1ST 15 MINUTES: Mod: PO | Performed by: ORTHOPAEDIC SURGERY

## 2017-04-07 PROCEDURE — 82962 GLUCOSE BLOOD TEST: CPT | Mod: PO | Performed by: ORTHOPAEDIC SURGERY

## 2017-04-07 PROCEDURE — 36000710: Mod: PO | Performed by: ORTHOPAEDIC SURGERY

## 2017-04-07 PROCEDURE — 63600175 PHARM REV CODE 636 W HCPCS: Mod: PO | Performed by: ORTHOPAEDIC SURGERY

## 2017-04-07 PROCEDURE — 71000039 HC RECOVERY, EACH ADD'L HOUR: Mod: PO | Performed by: ORTHOPAEDIC SURGERY

## 2017-04-07 PROCEDURE — D9220A PRA ANESTHESIA: Mod: CRNA,,, | Performed by: NURSE ANESTHETIST, CERTIFIED REGISTERED

## 2017-04-07 PROCEDURE — 63600175 PHARM REV CODE 636 W HCPCS: Mod: PO | Performed by: ANESTHESIOLOGY

## 2017-04-07 PROCEDURE — 37000009 HC ANESTHESIA EA ADD 15 MINS: Mod: PO | Performed by: ORTHOPAEDIC SURGERY

## 2017-04-07 PROCEDURE — 36000711: Mod: PO | Performed by: ORTHOPAEDIC SURGERY

## 2017-04-07 PROCEDURE — 25000003 PHARM REV CODE 250: Mod: PO | Performed by: ANESTHESIOLOGY

## 2017-04-07 PROCEDURE — 27201423 OPTIME MED/SURG SUP & DEVICES STERILE SUPPLY: Mod: PO | Performed by: ORTHOPAEDIC SURGERY

## 2017-04-07 PROCEDURE — 25000003 PHARM REV CODE 250: Mod: PO | Performed by: NURSE ANESTHETIST, CERTIFIED REGISTERED

## 2017-04-07 PROCEDURE — 29880 ARTHRS KNE SRG MNISECTMY M&L: CPT | Mod: LT,,, | Performed by: ORTHOPAEDIC SURGERY

## 2017-04-07 PROCEDURE — 71000033 HC RECOVERY, INTIAL HOUR: Mod: PO | Performed by: ORTHOPAEDIC SURGERY

## 2017-04-07 PROCEDURE — 27200651 HC AIRWAY, LMA: Mod: PO | Performed by: NURSE ANESTHETIST, CERTIFIED REGISTERED

## 2017-04-07 PROCEDURE — D9220A PRA ANESTHESIA: Mod: ANES,,, | Performed by: ANESTHESIOLOGY

## 2017-04-07 RX ORDER — LIDOCAINE HYDROCHLORIDE 10 MG/ML
1 INJECTION, SOLUTION EPIDURAL; INFILTRATION; INTRACAUDAL; PERINEURAL ONCE AS NEEDED
Status: DISCONTINUED | OUTPATIENT
Start: 2017-04-07 | End: 2017-04-07 | Stop reason: HOSPADM

## 2017-04-07 RX ORDER — SODIUM CHLORIDE 0.9 % (FLUSH) 0.9 %
3 SYRINGE (ML) INJECTION
Status: DISCONTINUED | OUTPATIENT
Start: 2017-04-07 | End: 2017-04-07 | Stop reason: HOSPADM

## 2017-04-07 RX ORDER — OXYCODONE HYDROCHLORIDE 5 MG/1
5 TABLET ORAL
Status: DISCONTINUED | OUTPATIENT
Start: 2017-04-07 | End: 2017-04-07 | Stop reason: HOSPADM

## 2017-04-07 RX ORDER — PROPOFOL 10 MG/ML
VIAL (ML) INTRAVENOUS
Status: DISCONTINUED | OUTPATIENT
Start: 2017-04-07 | End: 2017-04-07

## 2017-04-07 RX ORDER — HYDROCODONE BITARTRATE AND ACETAMINOPHEN 5; 325 MG/1; MG/1
1 TABLET ORAL EVERY 6 HOURS PRN
Qty: 40 TABLET | Refills: 0 | Status: SHIPPED | OUTPATIENT
Start: 2017-04-07 | End: 2017-04-17

## 2017-04-07 RX ORDER — FENTANYL CITRATE 50 UG/ML
INJECTION, SOLUTION INTRAMUSCULAR; INTRAVENOUS
Status: DISCONTINUED | OUTPATIENT
Start: 2017-04-07 | End: 2017-04-07

## 2017-04-07 RX ORDER — EPINEPHRINE 1 MG/ML
INJECTION, SOLUTION INTRACARDIAC; INTRAMUSCULAR; INTRAVENOUS; SUBCUTANEOUS
Status: DISCONTINUED | OUTPATIENT
Start: 2017-04-07 | End: 2017-04-07 | Stop reason: HOSPADM

## 2017-04-07 RX ORDER — CEFAZOLIN SODIUM 1 G/3ML
INJECTION, POWDER, FOR SOLUTION INTRAMUSCULAR; INTRAVENOUS
Status: DISCONTINUED | OUTPATIENT
Start: 2017-04-07 | End: 2017-04-07

## 2017-04-07 RX ORDER — FENTANYL CITRATE 50 UG/ML
25 INJECTION, SOLUTION INTRAMUSCULAR; INTRAVENOUS EVERY 5 MIN PRN
Status: COMPLETED | OUTPATIENT
Start: 2017-04-07 | End: 2017-04-07

## 2017-04-07 RX ORDER — BUPIVACAINE HYDROCHLORIDE AND EPINEPHRINE 2.5; 5 MG/ML; UG/ML
INJECTION, SOLUTION EPIDURAL; INFILTRATION; INTRACAUDAL; PERINEURAL
Status: DISCONTINUED | OUTPATIENT
Start: 2017-04-07 | End: 2017-04-07 | Stop reason: HOSPADM

## 2017-04-07 RX ORDER — ONDANSETRON 2 MG/ML
INJECTION INTRAMUSCULAR; INTRAVENOUS
Status: DISCONTINUED | OUTPATIENT
Start: 2017-04-07 | End: 2017-04-07

## 2017-04-07 RX ORDER — SODIUM CHLORIDE, SODIUM LACTATE, POTASSIUM CHLORIDE, CALCIUM CHLORIDE 600; 310; 30; 20 MG/100ML; MG/100ML; MG/100ML; MG/100ML
INJECTION, SOLUTION INTRAVENOUS CONTINUOUS
Status: DISCONTINUED | OUTPATIENT
Start: 2017-04-07 | End: 2017-04-07 | Stop reason: HOSPADM

## 2017-04-07 RX ORDER — DEXAMETHASONE SODIUM PHOSPHATE 4 MG/ML
8 INJECTION, SOLUTION INTRA-ARTICULAR; INTRALESIONAL; INTRAMUSCULAR; INTRAVENOUS; SOFT TISSUE
Status: COMPLETED | OUTPATIENT
Start: 2017-04-07 | End: 2017-04-07

## 2017-04-07 RX ORDER — MIDAZOLAM HYDROCHLORIDE 1 MG/ML
INJECTION, SOLUTION INTRAMUSCULAR; INTRAVENOUS
Status: DISCONTINUED | OUTPATIENT
Start: 2017-04-07 | End: 2017-04-07

## 2017-04-07 RX ORDER — ACETAMINOPHEN 10 MG/ML
INJECTION, SOLUTION INTRAVENOUS
Status: DISCONTINUED | OUTPATIENT
Start: 2017-04-07 | End: 2017-04-07

## 2017-04-07 RX ORDER — LIDOCAINE HCL/PF 100 MG/5ML
SYRINGE (ML) INTRAVENOUS
Status: DISCONTINUED | OUTPATIENT
Start: 2017-04-07 | End: 2017-04-07

## 2017-04-07 RX ADMIN — FENTANYL CITRATE 25 MCG: 50 INJECTION, SOLUTION INTRAMUSCULAR; INTRAVENOUS at 07:04

## 2017-04-07 RX ADMIN — MIDAZOLAM HYDROCHLORIDE 1 MG: 1 INJECTION, SOLUTION INTRAMUSCULAR; INTRAVENOUS at 06:04

## 2017-04-07 RX ADMIN — DEXAMETHASONE SODIUM PHOSPHATE 8 MG: 4 INJECTION, SOLUTION INTRAMUSCULAR; INTRAVENOUS at 06:04

## 2017-04-07 RX ADMIN — FENTANYL CITRATE 25 MCG: 50 INJECTION INTRAMUSCULAR; INTRAVENOUS at 08:04

## 2017-04-07 RX ADMIN — CEFAZOLIN 2 G: 1 INJECTION, POWDER, FOR SOLUTION INTRAVENOUS at 07:04

## 2017-04-07 RX ADMIN — LIDOCAINE HYDROCHLORIDE 100 MG: 20 INJECTION PARENTERAL at 07:04

## 2017-04-07 RX ADMIN — SODIUM CHLORIDE, SODIUM LACTATE, POTASSIUM CHLORIDE, AND CALCIUM CHLORIDE: .6; .31; .03; .02 INJECTION, SOLUTION INTRAVENOUS at 06:04

## 2017-04-07 RX ADMIN — OXYCODONE HYDROCHLORIDE 5 MG: 5 TABLET ORAL at 07:04

## 2017-04-07 RX ADMIN — FENTANYL CITRATE 25 MCG: 50 INJECTION INTRAMUSCULAR; INTRAVENOUS at 07:04

## 2017-04-07 RX ADMIN — BUPIVACAINE HYDROCHLORIDE AND EPINEPHRINE BITARTRATE 30 ML: 2.5; .0091 INJECTION, SOLUTION EPIDURAL; INFILTRATION; INTRACAUDAL; PERINEURAL at 07:04

## 2017-04-07 RX ADMIN — PROPOFOL 150 MG: 10 INJECTION, EMULSION INTRAVENOUS at 07:04

## 2017-04-07 RX ADMIN — EPINEPHRINE 1 MG: 1 INJECTION, SOLUTION INTRAMUSCULAR; SUBCUTANEOUS at 07:04

## 2017-04-07 RX ADMIN — FENTANYL CITRATE 50 MCG: 50 INJECTION, SOLUTION INTRAMUSCULAR; INTRAVENOUS at 07:04

## 2017-04-07 RX ADMIN — ACETAMINOPHEN 1000 MG: 10 INJECTION, SOLUTION INTRAVENOUS at 07:04

## 2017-04-07 RX ADMIN — ONDANSETRON 4 MG: 2 INJECTION, SOLUTION INTRAMUSCULAR; INTRAVENOUS at 07:04

## 2017-04-07 NOTE — ANESTHESIA POSTPROCEDURE EVALUATION
"Anesthesia Post Evaluation    Patient: Stu Garcia    Procedure(s) Performed: Procedure(s) (LRB):  ARTHROSCOPY-MENISCECTOMY (Left)    Final Anesthesia Type: general  Patient location during evaluation: PACU  Patient participation: Yes- Able to Participate  Level of consciousness: awake and alert and oriented  Post-procedure vital signs: reviewed and stable  Pain management: adequate  Airway patency: patent  PONV status at discharge: No PONV  Anesthetic complications: no      Cardiovascular status: hemodynamically stable  Respiratory status: unassisted, spontaneous ventilation and room air  Hydration status: euvolemic  Follow-up not needed.        Visit Vitals    BP (!) 148/70 (BP Location: Left arm, Patient Position: Lying, BP Method: Automatic)    Pulse 70    Temp 36.7 °C (98 °F) (Skin)    Resp 16    Ht 6' 4" (1.93 m)    Wt 120.2 kg (265 lb)    SpO2 98%    BMI 32.26 kg/m2       Pain/Sanchez Score: Pain Assessment Performed: Yes (4/7/2017  8:37 AM)  Presence of Pain: complains of pain/discomfort (4/7/2017  8:37 AM)  Pain Rating Prior to Med Admin: 2 (4/7/2017  8:37 AM)  Pain Rating Post Med Admin: 2 (4/7/2017  8:37 AM)  Sanchez Score: 10 (4/7/2017  8:37 AM)      "

## 2017-04-07 NOTE — IP AVS SNAPSHOT
[facility]  [address] [facility]  [place] [address]  Phone: [phone]           Patient Discharge Instructions   Our goal is to set you up for success. This packet includes information on your condition, medications, and your home care.  It will help you care for yourself to prevent having to return to the hospital.     Please ask your nurse if you have any questions.      There are many details to remember when preparing to leave the hospital. Here is what you will need to do:    1. Take your medicine. If you are prescribed medications, review your Medication List on the following pages. You may have new medications to  at the pharmacy and others that you'll need to stop taking. Review the instructions for how and when to take your medications. Talk with your doctor or nurses if you are unsure of what to do.     2. Go to your follow-up appointments. Specific follow-up information is listed in the following pages. Your may be contacted by a nurse or clinical provider about future appointments. Be sure we have all of the phone numbers to reach you. Please contact your provider's office if you are unable to make an appointment.     3. Watch for warning signs. Your doctor or nurse will give you detailed warning signs to watch for and when to call for assistance. These instructions may also include educational information about your condition. If you experience any of warning signs to your health, call your doctor.           [facility]  Unless otherwise directed by your provider, please   contact [facility], our nurse care line   that is available for 24/7 assistance.     [phone] (toll-free)     Registered nurses in the [facility]   provide: appointment scheduling, clinical advisement, health education, and other advisory services.                  ** Verify the list of medication(s) below is accurate and up to date. Carry this with you in case of  emergency. If your medications have changed, please notify your healthcare provider.             Medication List      START taking these medications        Additional Info                      hydrocodone-acetaminophen 5-325mg 5-325 mg per tablet   Commonly known as:  NORCO   Quantity:  40 tablet   Refills:  0   Dose:  1 tablet    Instructions:  Take 1 tablet by mouth every 6 (six) hours as needed for Pain.     Begin Date    AM    Noon    PM    Bedtime         CONTINUE taking these medications        Additional Info                      alcohol swabs Padm   Commonly known as:  BD ALCOHOL SWABS   Quantity:  100 each   Refills:  4    Instructions:  Use each time he test his blood sugar.     Begin Date    AM    Noon    PM    Bedtime       aspirin 81 MG EC tablet   Commonly known as:  ECOTRIN   Refills:  0   Dose:  81 mg    Instructions:  Take 81 mg by mouth once daily.     Begin Date    AM    Noon    PM    Bedtime       blood glucose control high,low Soln   Commonly known as:  ACCU-CHEK SHASHANK CONTROL SOLN   Quantity:  100 each   Refills:  12   Dose:  1 strip    Instructions:  Inject 1 strip into the skin 2 (two) times daily.     Begin Date    AM    Noon    PM    Bedtime       blood glucose control, normal Soln   Quantity:  1 each   Refills:  4    Instructions:  Use as directed for accu check shashank plus  testing device     Begin Date    AM    Noon    PM    Bedtime       blood sugar diagnostic Strp   Commonly known as:  BLOOD GLUCOSE TEST   Quantity:  100 strip   Refills:  4    Instructions:  accu check shashank plus test strips use once a day     Begin Date    AM    Noon    PM    Bedtime       gabapentin 100 MG capsule   Commonly known as:  NEURONTIN   Quantity:  30 capsule   Refills:  11   Dose:  100 mg    Instructions:  Take 1 capsule (100 mg total) by mouth every evening.     Begin Date    AM    Noon    PM    Bedtime       glimepiride 2 MG tablet   Commonly known as:  AMARYL   Quantity:  360 tablet   Refills:  3   Dose:   4 mg    Instructions:  Take 2 tablets (4 mg total) by mouth 2 (two) times daily.     Begin Date    AM    Noon    PM    Bedtime       ICAPS AREDS ORAL   Refills:  0   Dose:  1 capsule    Instructions:  Take 1 capsule by mouth 2 (two) times daily.     Begin Date    AM    Noon    PM    Bedtime       lancets Misc   Commonly known as:  ACCU-CHEK SOFTCLIX LANCETS   Quantity:  270 each   Refills:  3   Dose:  1 strip    Instructions:  1 strip by Misc.(Non-Drug; Combo Route) route 3 (three) times daily.     Begin Date    AM    Noon    PM    Bedtime       metformin 500 MG tablet   Commonly known as:  GLUCOPHAGE   Quantity:  360 tablet   Refills:  3    Instructions:  TAKE 2 TABLETS BY MOUTH TWICE DAILY     Begin Date    AM    Noon    PM    Bedtime       metoprolol tartrate 50 MG tablet   Commonly known as:  LOPRESSOR   Quantity:  60 tablet   Refills:  0    Instructions:  TAKE ONE TABLET BY MOUTH TWICE DAILY     Begin Date    AM    Noon    PM    Bedtime       multivitamin capsule   Refills:  0   Dose:  1 capsule    Instructions:  Take 1 capsule by mouth once daily.     Begin Date    AM    Noon    PM    Bedtime       omeprazole 40 MG capsule   Commonly known as:  PRILOSEC   Quantity:  90 capsule   Refills:  3    Instructions:  TAKE 1 CAPSULE BEFORE BREAKFAST.     Begin Date    AM    Noon    PM    Bedtime       simvastatin 40 MG tablet   Commonly known as:  ZOCOR   Quantity:  90 tablet   Refills:  3   Dose:  40 mg    Instructions:  Take 1 tablet (40 mg total) by mouth every evening.     Begin Date    AM    Noon    PM    Bedtime       terbinafine HCl 1 % cream   Commonly known as:  LAMISIL   Quantity:  42 g   Refills:  11    Instructions:  Apply topically 2 (two) times daily. Apply to feet.     Begin Date    AM    Noon    PM    Bedtime       XARELTO 20 mg Tab   Quantity:  90 tablet   Refills:  3   Generic drug:  rivaroxaban    Instructions:  TAKE 1 TABLET ONE TIME DAILY     Begin Date    AM    Noon    PM    Bedtime             Where to Get Your Medications      You can get these medications from any pharmacy     Bring a paper prescription for each of these medications     hydrocodone-acetaminophen 5-325mg 5-325 mg per tablet                  Please bring to all follow up appointments:    1. A copy of your discharge instructions.  2. All medicines you are currently taking in their original bottles.  3. Identification and insurance card.    Please arrive 15 minutes ahead of scheduled appointment time.    Please call 24 hours in advance if you must reschedule your appointment and/or time.        Your Scheduled Appointments     Apr 27, 2017 10:45 AM CDT   Established Patient Visit with Gabino Pathak DPM   [facility] ([facility] [place])    [address] [facility] [place] [address]   [phone]            Aug 07, 2017  9:25 AM CDT   Fasting Lab with [facility] [place] [facility] ([facility] [place])    [address] [facility] [place] [address]   [phone]            Aug 14, 2017  9:00 AM CDT   Established Patient Visit with Cade Juan MD   [facility] ([facility] [place])    [address] [facility] [place] [address]   [phone]              Follow-up Information     Follow up with Kale Cleary MD In 2 weeks.    Specialties:  Sports Medicine, Orthopedic Surgery    Why:  For suture removal, For wound re-check    Contact information:    [address] [place] [address] [place] [address]  [phone]          Discharge Instructions     Future Orders    Activity as tolerated     Call MD for:  redness, tenderness, or signs of infection (pain, swelling, redness, odor or green/yellow discharge around incision site)     Call MD for:  severe uncontrolled pain     Call MD for:  temperature >100.4     Diet general     Questions:    Total calories:      Fat restriction, if any:      Protein restriction, if any:      Na restriction, if any:      Fluid restriction:      Additional  restrictions:          Discharge Instructions       1.Diet: Ice chips, clear liquids, and then diet as tolerated. Drink plenty of liquids.  2.Ice the area at least three times a day (20 minutes per session).  3.Elevate the extremity above the level of the heart to help reduce swelling.  4.Pain medication can be taken every four to six hours as needed. It is helpful to take pain medication prior to physical therapy.  5.Any activity that requires precise thinking or accuracy should be avoided for a minimum of 72 hours after surgery and while on narcotic pain medication. This includes operating machinery and/or driving a vehicle.  6.All sutures/staples will be removed approximately 14 days from the time of surgery. Leave steri-strips (skin tapes) in place until sutures are removed.  7. If skin glue is used instead of stitches, do not apply ointments or solutions to the incision. Keep the incision dry. The skin glue will peel off in 3-4 weeks.  8. Change dressing on the first post-op day. Use gauze for the first 3 days, then start using Band-Aids over the incision sites.   9. All casts, splints, braces, slings, crutches, abduction pillows, etc... Are to be worn as instructed. Crutches are just to be used as needed. If not needed for soreness or balance, may weight bear as tolerated without the crutches.  10. Keep the incision dry for 10-14 days. A waterproof dressing (purchase at [employer], [employer], etc) can be used to shower. No bath, pool, hot tub until instructed.  11. Call [phone] with any questions or concerns.      Discharge Instructions: After Your Surgery  Youve just had surgery. During surgery you were given medicine called anesthesia to keep you relaxed and free of pain. After surgery you may have some pain or nausea. This is common. Here are some tips for feeling better and getting well after surgery.     Stay on schedule with your medication.   Going home  Your doctor or nurse will show you how to take care of  yourself when you go home. He or she will also answer your questions. Have an adult family member or friend drive you home. For the first 24 hours after your surgery:  · Do not drive or use heavy equipment.  · Do not make important decisions or sign legal papers.  · Do not drink alcohol.  · Have someone stay with you, if needed. He or she can watch for problems and help keep you safe.  Be sure to go to all follow-up visits with your doctor. And rest after your surgery for as long as your doctor tells you to.  Coping with pain  If you have pain after surgery, pain medicine will help you feel better. Take it as told, before pain becomes severe. Also, ask your doctor or pharmacist about other ways to control pain. This might be with heat, ice, or relaxation. And follow any other instructions your surgeon or nurse gives you.  Tips for taking pain medicine  To get the best relief possible, remember these points:  · Pain medicines can upset your stomach. Taking them with a little food may help.  · Most pain relievers taken by mouth need at least 20 to 30 minutes to start to work.  · Taking medicine on a schedule can help you remember to take it. Try to time your medicine so that you can take it before starting an activity. This might be before you get dressed, go for a walk, or sit down for dinner.  · Constipation is a common side effect of pain medicines. Call your doctor before taking any medicines such as laxatives or stool softeners to help ease constipation. Also ask if you should skip any foods. Drinking lots of fluids and eating foods such as fruits and vegetables that are high in fiber can also help. Remember, do not take laxatives unless your surgeon has prescribed them.  · Drinking alcohol and taking pain medicine can cause dizziness and slow your breathing. It can even be deadly. Do not drink alcohol while taking pain medicine.  · Pain medicine can make you react more slowly to things. Do not drive or run  machinery while taking pain medicine.  Your health care provider may tell you to take acetaminophen to help ease your pain. Ask him or her how much you are supposed to take each day. Acetaminophen or other pain relievers may interact with your prescription medicines or other over-the-counter (OTC) drugs. Some prescription medicines have acetaminophen and other ingredients. Using both prescription and OTC acetaminophen for pain can cause you to overdose. Read the labels on your OTC medicines with care. This will help you to clearly know the list of ingredients, how much to take, and any warnings. It may also help you not take too much acetaminophen. If you have questions or do not understand the information, ask your pharmacist or health care provider to explain it to you before you take the OTC medicine.  Managing nausea  Some people have an upset stomach after surgery. This is often because of anesthesia, pain, or pain medicine, or the stress of surgery. These tips will help you handle nausea and eat healthy foods as you get better. If you were on a special food plan before surgery, ask your doctor if you should follow it while you get better. These tips may help:  · Do not push yourself to eat. Your body will tell you when to eat and how much.  · Start off with clear liquids and soup. They are easier to digest.  · Next try semi-solid foods, such as mashed potatoes, applesauce, and gelatin, as you feel ready.  · Slowly move to solid foods. Dont eat fatty, rich, or spicy foods at first.  · Do not force yourself to have 3 large meals a day. Instead eat smaller amounts more often.  · Take pain medicines with a small amount of solid food, such as crackers or toast, to avoid nausea.     Call your surgeon if  · You still have pain an hour after taking medicine. The medicine may not be strong enough.  · You feel too sleepy, dizzy, or groggy. The medicine may be too strong.  · You have side effects like nausea, vomiting,  "or skin changes, such as rash, itching, or hives.       If you have obstructive sleep apnea  You were given anesthesia medicine during surgery to keep you comfortable and free of pain. After surgery, you may have more apnea spells because of this medicine and other medicines you were given. The spells may last longer than usual.   At home:  · Keep using the continuous positive airway pressure (CPAP) device when you sleep. Unless your health care provider tells you not to, use it when you sleep, day or night. CPAP is a common device used to treat obstructive sleep apnea.  · Talk with your provider before taking any pain medicine, muscle relaxants, or sedatives. Your provider will tell you about the possible dangers of taking these medicines.  Date Last Reviewed: 10/16/2014  © 6349-2947 [employer]. [address] [place] [address]. All rights reserved. This information is not intended as a substitute for professional medical care. Always follow your healthcare professional's instructions.              Primary Diagnosis     Your primary diagnosis was:  Tear Of Medial Meniscus Of Knee      Admission Information     Date & Time Provider Department CSN    4/7/2017  5:50 AM Kale Cleary MD [facility] [id number]      Care Providers     Provider Role Specialty Primary office phone    Kale Cleary MD Attending Provider Sports Medicine [phone]    Kale Cleary MD Surgeon  Sports Medicine [phone]      Your Vitals Were     BP Pulse Temp Resp Height Weight    126/59 (BP Location: Left arm, Patient Position: Lying, BP Method: Automatic) 66 97.8 °F (36.6 °C) (Skin) 16 6' 4" (1.93 m) 120.2 kg (265 lb)    SpO2 BMI             97% 32.26 kg/m2         Recent Lab Values        6/12/2012 6/18/2013 8/6/2014 6/12/2015 12/7/2015 8/2/2016 11/3/2016 2/6/2017     12:10 PM  4:20 PM  8:26 AM  8:33 AM  7:27 AM 10:21 AM  8:42 AM  8:19 AM    A1C 12.6 (H) 8.4 (H) 8.4 " (H) 7.3 (H) 7.1 (H) 7.6 (H) 7.3 (H) 7.5 (H)    Comment for A1C at 10:21 AM on 8/2/2016:  According to ADA guidelines, hemoglobin A1C <7.0% represents  optimal control in non-pregnant diabetic patients.  Different  metrics may apply to specific populations.   Standards of Medical Care in Diabetes - 2016.  For the purpose of screening for the presence of diabetes:  <5.7%     Consistent with the absence of diabetes  5.7-6.4%  Consistent with increasing risk for diabetes   (prediabetes)  >or=6.5%  Consistent with diabetes  Currently no consensus exists for use of hemoglobin A1C  for diagnosis of diabetes for children.      Comment for A1C at  8:42 AM on 11/3/2016:  According to ADA guidelines, hemoglobin A1C <7.0% represents  optimal control in non-pregnant diabetic patients.  Different  metrics may apply to specific populations.   Standards of Medical Care in Diabetes - 2016.  For the purpose of screening for the presence of diabetes:  <5.7%     Consistent with the absence of diabetes  5.7-6.4%  Consistent with increasing risk for diabetes   (prediabetes)  >or=6.5%  Consistent with diabetes  Currently no consensus exists for use of hemoglobin A1C  for diagnosis of diabetes for children.      Comment for A1C at  8:19 AM on 2/6/2017:  According to ADA guidelines, hemoglobin A1C <7.0% represents  optimal control in non-pregnant diabetic patients.  Different  metrics may apply to specific populations.   Standards of Medical Care in Diabetes - 2016.  For the purpose of screening for the presence of diabetes:  <5.7%     Consistent with the absence of diabetes  5.7-6.4%  Consistent with increasing risk for diabetes   (prediabetes)  >or=6.5%  Consistent with diabetes  Currently no consensus exists for use of hemoglobin A1C  for diagnosis of diabetes for children.        Allergies as of 4/7/2017        Reactions    Codeine Nausea And Vomiting      Advance Directives     An advance directive is a document which, in the event you  are no longer able to make decisions for yourself, tells your healthcare team what kind of treatment you do or do not want to receive, or who you would like to make those decisions for you.  If you do not currently have an advance directive, [facility] encourages you to create one.  For more information call:  [phone]), [phone] ([phone]),  or log on to [email or web address].        Smoking Cessation     If you would like to quit smoking:   You may be eligible for free services if you are a [place] resident and started smoking cigarettes before September 1, 1988.  Call the Smoking Cessation Trust (SCT) toll free at (100) 883-6972 or (288) 845-6546.   Call 7-861-QUIT-NOW if you do not meet the above criteria.   Contact us via email: [email or web address]   View our website for more information: [email or web address]        Language Assistance Services     ATTENTION: Language assistance services are available, free of charge. Please call [phone].      ATENCIÓN: Si habla español, tiene a hoyos disposición servicios gratuitos de asistencia lingüística. Llame al [phone].     CHÚ Ý: N?u b?n nói Ti?ng Vi?t, có các d?ch v? h? tr? ngôn ng? mi?n phí dành cho b?n. G?i s? [phone].        [employer] Information            [facility] complies with applicable Federal civil rights laws and does not discriminate on the basis of race, color, national origin, age, disability, or sex.

## 2017-04-07 NOTE — TRANSFER OF CARE
"Anesthesia Transfer of Care Note    Patient: Stu Garcia    Procedure(s) Performed: Procedure(s) (LRB):  ARTHROSCOPY-MENISCECTOMY (Left)    Patient location: PACU    Anesthesia Type: general    Transport from OR: Transported from OR on room air with adequate spontaneous ventilation    Post pain: adequate analgesia    Post assessment: no apparent anesthetic complications    Post vital signs: stable    Level of consciousness: awake    Nausea/Vomiting: no nausea/vomiting    Complications: none          Last vitals:   Visit Vitals    BP (!) 139/91 (BP Location: Right arm, Patient Position: Lying, BP Method: Automatic)    Pulse 72    Temp 36.4 °C (97.6 °F) (Skin)    Resp 18    Ht 6' 4" (1.93 m)    Wt 120.2 kg (265 lb)    SpO2 99%    BMI 32.26 kg/m2     "

## 2017-04-07 NOTE — DISCHARGE SUMMARY
[facility]  Discharge Note  Short Stay    Admit Date: 4/7/2017    Discharge Date and Time: 4/7/2017    Attending Physician: Kale Cleary MD     Discharge Provider: Kale Cleary    Diagnoses:  Active Hospital Problems    Diagnosis  POA    *Acute medial meniscal tear [S83.249A]  Yes      Resolved Hospital Problems    Diagnosis Date Resolved POA   No resolved problems to display.       Discharged Condition: good    Hospital Course: Patient was admitted for an outpatient procedure and tolerated the procedure well with no complications.    Final Diagnoses: Same as principal problem.    Disposition: Home or Self Care    Follow up/Patient Instructions:    Medications:  Reconciled Home Medications:   Current Discharge Medication List      START taking these medications    Details   hydrocodone-acetaminophen 5-325mg (NORCO) 5-325 mg per tablet Take 1 tablet by mouth every 6 (six) hours as needed for Pain.  Qty: 40 tablet, Refills: 0         CONTINUE these medications which have NOT CHANGED    Details   aspirin (ECOTRIN) 81 MG EC tablet Take 81 mg by mouth once daily.      gabapentin (NEURONTIN) 100 MG capsule Take 1 capsule (100 mg total) by mouth every evening.  Qty: 30 capsule, Refills: 11      glimepiride (AMARYL) 2 MG tablet Take 2 tablets (4 mg total) by mouth 2 (two) times daily.  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      omeprazole (PRILOSEC) 40 MG capsule TAKE 1 CAPSULE BEFORE BREAKFAST.  Qty: 90 capsule, Refills: 3       simvastatin (ZOCOR) 40 MG tablet Take 1 tablet (40 mg total) by mouth every evening.  Qty: 90 tablet, Refills: 3      VIT A/VIT C/VIT E/ZINC/COPPER (ICAPS AREDS ORAL) Take 1 capsule by mouth 2 (two) times daily.      XARELTO 20 mg Tab TAKE 1 TABLET ONE TIME DAILY  Qty: 90 tablet, Refills: 3    Associated Diagnoses: Paroxysmal atrial fibrillation      alcohol swabs (BD ALCOHOL SWABS) PadM Use each time he test his blood sugar.  Qty: 100 each, Refills: 4      blood glucose control high&low (ACCU-CHEK SHASHANK CONTROL SOLN) Soln Inject 1 strip into the skin 2 (two) times daily.  Qty: 100 each, Refills: 12      blood glucose control, normal Soln Use as directed for accu check shashank plus  testing device  Qty: 1 each, Refills: 4      blood sugar diagnostic (BLOOD GLUCOSE TEST) Strp accu check shashank plus test strips use once a day  Qty: 100 strip, Refills: 4      lancets (ACCU-CHEK SOFTCLIX LANCETS) Misc 1 strip by Misc.(Non-Drug; Combo Route) route 3 (three) times daily.  Qty: 270 each, Refills: 3      terbinafine HCl (LAMISIL) 1 % cream Apply topically 2 (two) times daily. Apply to feet.  Qty: 42 g, Refills: 11    Associated Diagnoses: Onychomycosis due to dermatophyte             Discharge Procedure Orders  Diet general     Activity as tolerated     Keep surgical extremity elevated     Ice to affected area     Call MD for:  temperature >100.4     Call MD for:  severe uncontrolled pain     Call MD for:  redness, tenderness, or signs of infection (pain, swelling, redness, odor or green/yellow discharge around incision site)     Remove dressing in 48 hours     Change dressing (specify)   Order Comments: Dressing change: One time per day using Waterproof Bandaids.       Follow-up Information     Follow up with Kale Cleary MD In 2 weeks.    Specialties:  Sports Medicine, Orthopedic Surgery    Why:  For suture removal, For wound re-check    Contact information:    [address] [place] [address] [address] Edwin [place]YES [address]  [phone]             Discharge Procedure Orders (must include Diet, Follow-up, Activity):    Discharge Procedure Orders (must include Diet, Follow-up, Activity)  Diet general     Activity as tolerated     Keep surgical extremity elevated     Ice to affected area     Call MD for:  temperature >100.4     Call MD for:  severe uncontrolled pain     Call MD for:  redness, tenderness, or signs of infection (pain, swelling, redness, odor or green/yellow discharge around incision site)     Remove dressing in 48 hours     Change dressing (specify)   Order Comments: Dressing change: One time per day using Waterproof Bandaids.

## 2017-04-07 NOTE — DISCHARGE INSTRUCTIONS
1.Diet: Ice chips, clear liquids, and then diet as tolerated. Drink plenty of liquids.  2.Ice the area at least three times a day (20 minutes per session).  3.Elevate the extremity above the level of the heart to help reduce swelling.  4.Pain medication can be taken every four to six hours as needed. It is helpful to take pain medication prior to physical therapy.  5.Any activity that requires precise thinking or accuracy should be avoided for a minimum of 72 hours after surgery and while on narcotic pain medication. This includes operating machinery and/or driving a vehicle.  6.All sutures/staples will be removed approximately 14 days from the time of surgery. Leave steri-strips (skin tapes) in place until sutures are removed.  7. If skin glue is used instead of stitches, do not apply ointments or solutions to the incision. Keep the incision dry. The skin glue will peel off in 3-4 weeks.  8. Change dressing on the first post-op day. Use gauze for the first 3 days, then start using Band-Aids over the incision sites.   9. All casts, splints, braces, slings, crutches, abduction pillows, etc... Are to be worn as instructed. Crutches are just to be used as needed. If not needed for soreness or balance, may weight bear as tolerated without the crutches.  10. Keep the incision dry for 10-14 days. A waterproof dressing (purchase at [employer], [employer], etc) can be used to shower. No bath, pool, hot tub until instructed.  11. Call [phone] with any questions or concerns.      Discharge Instructions: After Your Surgery  Youve just had surgery. During surgery you were given medicine called anesthesia to keep you relaxed and free of pain. After surgery you may have some pain or nausea. This is common. Here are some tips for feeling better and getting well after surgery.     Stay on schedule with your medication.   Going home  Your doctor or nurse will show you how to take care of yourself when you go home. He or she will also  answer your questions. Have an adult family member or friend drive you home. For the first 24 hours after your surgery:  · Do not drive or use heavy equipment.  · Do not make important decisions or sign legal papers.  · Do not drink alcohol.  · Have someone stay with you, if needed. He or she can watch for problems and help keep you safe.  Be sure to go to all follow-up visits with your doctor. And rest after your surgery for as long as your doctor tells you to.  Coping with pain  If you have pain after surgery, pain medicine will help you feel better. Take it as told, before pain becomes severe. Also, ask your doctor or pharmacist about other ways to control pain. This might be with heat, ice, or relaxation. And follow any other instructions your surgeon or nurse gives you.  Tips for taking pain medicine  To get the best relief possible, remember these points:  · Pain medicines can upset your stomach. Taking them with a little food may help.  · Most pain relievers taken by mouth need at least 20 to 30 minutes to start to work.  · Taking medicine on a schedule can help you remember to take it. Try to time your medicine so that you can take it before starting an activity. This might be before you get dressed, go for a walk, or sit down for dinner.  · Constipation is a common side effect of pain medicines. Call your doctor before taking any medicines such as laxatives or stool softeners to help ease constipation. Also ask if you should skip any foods. Drinking lots of fluids and eating foods such as fruits and vegetables that are high in fiber can also help. Remember, do not take laxatives unless your surgeon has prescribed them.  · Drinking alcohol and taking pain medicine can cause dizziness and slow your breathing. It can even be deadly. Do not drink alcohol while taking pain medicine.  · Pain medicine can make you react more slowly to things. Do not drive or run machinery while taking pain medicine.  Your health  care provider may tell you to take acetaminophen to help ease your pain. Ask him or her how much you are supposed to take each day. Acetaminophen or other pain relievers may interact with your prescription medicines or other over-the-counter (OTC) drugs. Some prescription medicines have acetaminophen and other ingredients. Using both prescription and OTC acetaminophen for pain can cause you to overdose. Read the labels on your OTC medicines with care. This will help you to clearly know the list of ingredients, how much to take, and any warnings. It may also help you not take too much acetaminophen. If you have questions or do not understand the information, ask your pharmacist or health care provider to explain it to you before you take the OTC medicine.  Managing nausea  Some people have an upset stomach after surgery. This is often because of anesthesia, pain, or pain medicine, or the stress of surgery. These tips will help you handle nausea and eat healthy foods as you get better. If you were on a special food plan before surgery, ask your doctor if you should follow it while you get better. These tips may help:  · Do not push yourself to eat. Your body will tell you when to eat and how much.  · Start off with clear liquids and soup. They are easier to digest.  · Next try semi-solid foods, such as mashed potatoes, applesauce, and gelatin, as you feel ready.  · Slowly move to solid foods. Dont eat fatty, rich, or spicy foods at first.  · Do not force yourself to have 3 large meals a day. Instead eat smaller amounts more often.  · Take pain medicines with a small amount of solid food, such as crackers or toast, to avoid nausea.     Call your surgeon if  · You still have pain an hour after taking medicine. The medicine may not be strong enough.  · You feel too sleepy, dizzy, or groggy. The medicine may be too strong.  · You have side effects like nausea, vomiting, or skin changes, such as rash, itching, or hives.        If you have obstructive sleep apnea  You were given anesthesia medicine during surgery to keep you comfortable and free of pain. After surgery, you may have more apnea spells because of this medicine and other medicines you were given. The spells may last longer than usual.   At home:  · Keep using the continuous positive airway pressure (CPAP) device when you sleep. Unless your health care provider tells you not to, use it when you sleep, day or night. CPAP is a common device used to treat obstructive sleep apnea.  · Talk with your provider before taking any pain medicine, muscle relaxants, or sedatives. Your provider will tell you about the possible dangers of taking these medicines.  Date Last Reviewed: 10/16/2014  © 2636-6669 [employer]. [address] [place] [address], [place] [address]. All rights reserved. This information is not intended as a substitute for professional medical care. Always follow your healthcare professional's instructions.

## 2017-04-07 NOTE — H&P (VIEW-ONLY)
Past Medical History:   Diagnosis Date    Anticoagulant long-term use     xarelto    Atrial fibrillation 2013    cardioverted    Bleb, lung     CAD (coronary artery disease) 2004    stents x 2    Cataract     OS    Colon polyp     Diabetes mellitus type 2 with retinopathy     Diverticulosis     GERD (gastroesophageal reflux disease)     HEARING LOSS     Hemorrhoid     HLD (hyperlipidemia)     Iron deficiency anemia     Neuropathy of leg     Pneumonia due to other staphylococcus     Prostate cancer        Past Surgical History:   Procedure Laterality Date    CATARACT EXTRACTION      bilateral    COLONOSCOPY      HERNIA REPAIR      umbilical    PROSTATECTOMY  2001    open    TUBE THORACOTOMY  1966    pneumothorax left       Current Outpatient Prescriptions   Medication Sig    alcohol swabs (BD ALCOHOL SWABS) PadM Use each time he test his blood sugar.    aspirin (ECOTRIN) 81 MG EC tablet Take 81 mg by mouth once daily.    blood glucose control high&low (ACCU-CHEK SHASHANK CONTROL SOLN) Soln Inject 1 strip into the skin 2 (two) times daily.    blood glucose control, normal Soln Use as directed for accu check shashank plus  testing device    blood sugar diagnostic (BLOOD GLUCOSE TEST) Strp accu check shashank plus test strips use once a day    clotrimazole (LOTRIMIN) 1 % Soln Apply topically 2 (two) times daily.    docusate sodium (COLACE) 100 MG capsule Take 100 mg by mouth 2 (two) times daily.    ferrous gluconate (FERGON) 324 MG tablet Take 324 mg by mouth 2 (two) times daily with meals.    ferrous sulfate 325 mg (65 mg iron) Tab tablet     gabapentin (NEURONTIN) 100 MG capsule Take 1 capsule (100 mg total) by mouth every evening.    glimepiride (AMARYL) 2 MG tablet Take 2 tablets (4 mg total) by mouth 2 (two) times daily.    GLUCOSAMINE HCL/CHONDR ZAMORA A NA (OSTEO BI-FLEX ORAL) Take by mouth.    lancets (ACCU-CHEK SOFTCLIX LANCETS) Misc 1 strip by Misc.(Non-Drug; Combo Route) route 3 (three)  times daily.    metformin (GLUCOPHAGE) 500 MG tablet TAKE 2 TABLETS BY MOUTH TWICE DAILY    metoprolol tartrate (LOPRESSOR) 50 MG tablet TAKE 1 TABLET BY MOUTH TWICE DAILY    omeprazole (PRILOSEC) 40 MG capsule TAKE 1 CAPSULE BEFORE BREAKFAST.    simvastatin (ZOCOR) 40 MG tablet Take 1 tablet (40 mg total) by mouth every evening.    sodium-potassium bicarbonate (BELINDA-SELTZER GOLD) TbEF Take 2 tablets by mouth every evening.    terbinafine HCl (LAMISIL) 1 % cream Apply topically 2 (two) times daily. Apply to feet.    VIT A/VIT C/VIT E/ZINC/COPPER (ICAPS AREDS ORAL) Take 1 capsule by mouth 2 (two) times daily.    XARELTO 20 mg Tab TAKE 1 TABLET ONE TIME DAILY     No current facility-administered medications for this visit.        Review of patient's allergies indicates:   Allergen Reactions    Codeine Nausea And Vomiting       Family History   Problem Relation Age of Onset    Diabetes Father     Coronary artery disease Father     Cancer Father      stomach       Social History     Social History    Marital status:      Spouse name: N/A    Number of children: 2    Years of education: N/A     Occupational History         prior Inflection       Social History Main Topics    Smoking status: Former Smoker     Years: 20.00    Smokeless tobacco: Never Used      Comment: quit smoking in 1980's.    Alcohol use Yes      Comment: 1 drink every 2 weeks    Drug use: No    Sexual activity: Not on file     Other Topics Concern    Not on file     Social History Narrative    From [place]       Chief Complaint:   Chief Complaint   Patient presents with    Knee Pain     left knee pain-discuss sx       History: This is a 70-year-old male seen for left knee pain and swelling.  He has had pain for several months now.  Pain when walking and standing.  The patient states that the pain is manageable but does not like getting periodic swelling.  Rates the pain as 2 out of 10.  Patient did a  little physical therapy which seemed to aggravate it more.  Has tried anti-inflammatories without great success.    Present: The Synvisc one that we did back in July didn't really help.  He started to get some buckling in his knee.  Pain is medial and worse with hyperflexion.  Pain as a 2 out of 10.  MRI did show what looks like medial and lateral meniscal tears.      Review of Systems:    Constitution: Negative for chills, fever, and sweats.  Negative for unexplained weight loss.    HENT:  Negative for headaches and blurry vision.    Cardiovascular:Negative for chest pain or irregular heart beat. Negative for hypertension.    Respiratory:  Negative for cough and shortness of breath.    Gastrointestinal: Negative for abdominal pain, heartburn, melena, nausea, and vomitting.    Genitourinary:  Negative bladder incontinence and dysuria.    Musculoskeletal:  See HPI    Neurological: Negative for numbness.    Psychiatric/Behavioral: Negative for depression.  The patient is not nervous/anxious.      Endocrine: Negative for polyuria    Hematologic/Lymphatic: Negative for bleeding problem.  Does not bruise/bleed easily.    Skin: Negative for poor would healing and rash      Physical Examination:    Vital Signs:    Vitals:    03/08/17 0850   BP: 137/77   Pulse: 82       Body mass index is 34.2 kg/(m^2).    This a well-developed, well nourished patient in no acute distress.  They are alert and oriented and cooperative to examination.  Pt. walks without an antalgic gait.      Examination of the left knee shows no rashes or erythema. There are no masses ecchymosis or effusion. Patient has full range of motion from 0-130°. Patient is nontender to palpation over lateral joint line and markedly tender to palpation over the medial joint line. Patient has a - Lachman exam, - anterior drawer exam, and - posterior drawer exam.  Positive medial Apley exam. Knee is stable to varus and valgus stress. 5 out of 5 motor strength. Palpable  distal pulses. Intact light touch sensation.  Moderate Patellofemoral crepitus    Heart is regular rate and rhythm without abnormal murmurs rubs or gallops  Lungs are clear to auscultation bilaterally  Abdomen is soft nontender nondistended    X-rays: X-rays of the left knee are available for review which show mild arthritic changes and patellar tilt more predominant the left knee    MRI of the left knee:1.  Tricompartmental osseous and chondral degenerative changes without evidence of acute marrow edema or displaced fracture.  2.  Degenerative changes of the medial and lateral menisci.  There are focal signal irregularities noted at the medial meniscal body, extending to the tibial articular surface, as well as the lateral meniscal root.  These findings raise suspicion for meniscal tears with no significantly displaced component identified.     Assessment:: Left knee Medial meniscal tear and mild arthritis    Plan:  I reviewed the findings with him today.  I recommended arthroscopy of the left knee.  Patient has tried formal physical therapy as well as injections without great success.  Pain is been present now for at least 7 months.  We discussed knee arthroscopy in detail in its recovery.  Patient will schedule at his convenience.Risks, benefits, and alternatives to the procedure were explained to the patient including but not limited to damage to nerves, arteries, blood vessels, bones, tendons, ligaments, stiffness, instability, infection, DVT, PE, as well as general anesthetic complications including seizure, stroke, heart attack and even death. The patient understood these risks and wished to proceed and signed the informed consent.

## 2017-04-07 NOTE — INTERVAL H&P NOTE
The patient has been examined and the H&P has been reviewed:    I concur with the findings and no changes have occurred since H&P was written.    Anesthesia/Surgery risks, benefits and alternative options discussed and understood by patient/family.          Active Hospital Problems    Diagnosis  POA    Acute medial meniscal tear [S83.856A]  Yes      Resolved Hospital Problems    Diagnosis Date Resolved POA   No resolved problems to display.

## 2017-04-07 NOTE — OP NOTE
[facility]  Orthopedic Surgery  Operative Note    SUMMARY     Date of Procedure: 4/7/2017     Procedure: Procedure(s) (LRB):  ARTHROSCOPY-partial medial and lateral MENISCECTOMY (Left)       Surgeon(s) and Role:     * Kale Cleary MD - Primary    Assistant: Rajinder Salvador    Pre-Operative Diagnosis: Acute medial meniscal tear, left, subsequent encounter [S83.242D]    Post-Operative Diagnosis: Post-Op Diagnosis Codes:     * Acute medial meniscal tear, left, subsequent encounter [S83.242D]    Anesthesia: LMA    Diagnostic arthroscopy findings: Diagnostic arthroscopy findings, the patient's medial compartment was thoroughly examined. The patient had mild cartilage wear of the femoral condyle and a complex posterior horn medial meniscal tear. ACL and PCL were both intact with   good tension. Lateral compartment showed central tearing as well with no articular   wear. In the patellofemoral joint, the patient had good central tracking without tilt and minimal chondromalacia    Complications: No    Estimated Blood Loss (EBL): 15mL           Implants: * No implants in log *    Specimens:   Specimen     None                  Condition: Good    Disposition: PACU - hemodynamically stable.    Attestation: I was present and scrubbed for the entire procedure.    INDICATIONS FOR THE PROCEDURE:70 yo male with left knee pain and medial meniscal tear that failed to respond to nonoperative measures.    PROCEDURE IN DETAIL: Risks, benefits and alternatives of the procedure were   explained to the patient including, but not limited to damage to nerves,   arteries, blood vessels. Also explained risk of infection, DVT, PE, continued pain due to arthritis,  as well as   anesthetic complications including seizure, stroke, heart attack and death. They  understood this and signed informed consent. The patient's Left knee was marked prior to coming to the Operating Room. Once there a formal   timeout was done in  which correct patient, procedure and op site were all   correctly identified and confirmed by the entire operating team. Ancef 2 g was   given prior to surgical incision. Laryngeal mask anesthesia was induced. The   patient's Left lower extremity was prepped and draped in normal sterile fashion.   Leg was then exsanguinated with a tourniquet and tourniquet was inflated up   300 mmHg. Standard inferior lateral portal was then made. A spinal needle was   used to localize an inferior medial portal and this was made under direct   arthroscopic visualization. Diagnostic arthroscopy was then performed with the   findings listed above. A combination of arthroscopic biters and 3.5mm full radius shaver was used to perform a partial medial menisectomy back to stable healthy appearing tissue. Shaver was then used to perform a chondroplasty of the medial femoral condyle to stable cartilage margins. The shaver was then used to resent the torn and frayed lateral meniscal tissue.      Proceeded with closing. All   excess water was removed from the knee joint. Portals were closed using   nylons. Portal was then injected with 0.25% Marcaine with epinephrine. Sterile   dressing was then applied. They were then extubated and awakened and transferred   from the Operating Room to the Recovery Room in stable condition.     Postop course is for an arthroscopic menisectomy

## 2017-04-10 ENCOUNTER — PATIENT MESSAGE (OUTPATIENT)
Dept: ORTHOPEDICS | Facility: CLINIC | Age: 72
End: 2017-04-10

## 2017-04-19 ENCOUNTER — OFFICE VISIT (OUTPATIENT)
Dept: ORTHOPEDICS | Facility: CLINIC | Age: 72
End: 2017-04-19
Payer: MEDICARE

## 2017-04-19 VITALS
BODY MASS INDEX: 32.27 KG/M2 | HEIGHT: 76 IN | WEIGHT: 265 LBS | HEART RATE: 70 BPM | SYSTOLIC BLOOD PRESSURE: 137 MMHG | DIASTOLIC BLOOD PRESSURE: 69 MMHG

## 2017-04-19 DIAGNOSIS — S83.242D ACUTE MEDIAL MENISCAL TEAR, LEFT, SUBSEQUENT ENCOUNTER: ICD-10-CM

## 2017-04-19 DIAGNOSIS — M17.12 PRIMARY OSTEOARTHRITIS OF LEFT KNEE: Primary | ICD-10-CM

## 2017-04-19 PROCEDURE — 99024 POSTOP FOLLOW-UP VISIT: CPT | Mod: S$GLB,,, | Performed by: ORTHOPAEDIC SURGERY

## 2017-04-19 PROCEDURE — 99999 PR PBB SHADOW E&M-EST. PATIENT-LVL III: CPT | Mod: PBBFAC,,, | Performed by: ORTHOPAEDIC SURGERY

## 2017-04-19 NOTE — PROGRESS NOTES
This note was created using Dragon dictation software.  It occasionally misinterpreted phrases or words.      Date of surgery: April 7, 2017    Chief complaint: Left knee pain    History of present illness: Is a 71-year-old gentleman who is 2 weeks out from left partial medial and lateral meniscectomies.  He is doing well.  The sharp pain medially is gone.  He still has a little bruising over the medial compartment.  Mild swelling.       Physical exam: Examination left leg shows well-healing surgical portals.  Patient does have a little bruising over the medial compartment.   Negative Homans sign.  Full range of motion. No calf Pain.       X-rays: None    Assessment: Status post left partial medial and lateral meniscectomies    Plan: I reviewed the arthroscopic photos with him today.  We removed the stitches.  Patient will start to do an knee exercise program that a previously for physical therapy.  I will see him back in 4 weeks.

## 2017-04-21 ENCOUNTER — PATIENT OUTREACH (OUTPATIENT)
Dept: ADMINISTRATIVE | Facility: HOSPITAL | Age: 72
End: 2017-04-21

## 2017-04-25 RX ORDER — SIMVASTATIN 40 MG/1
TABLET, FILM COATED ORAL
Qty: 90 TABLET | Refills: 3 | Status: SHIPPED | OUTPATIENT
Start: 2017-04-25 | End: 2017-08-09

## 2017-04-26 RX ORDER — METOPROLOL TARTRATE 50 MG/1
TABLET ORAL
Qty: 60 TABLET | Refills: 0 | Status: SHIPPED | OUTPATIENT
Start: 2017-04-26 | End: 2017-06-22 | Stop reason: DRUGHIGH

## 2017-04-27 ENCOUNTER — OFFICE VISIT (OUTPATIENT)
Dept: PODIATRY | Facility: CLINIC | Age: 72
End: 2017-04-27
Payer: MEDICARE

## 2017-04-27 VITALS — BODY MASS INDEX: 31.16 KG/M2 | HEIGHT: 77 IN | WEIGHT: 263.88 LBS

## 2017-04-27 DIAGNOSIS — B35.1 ONYCHOMYCOSIS DUE TO DERMATOPHYTE: ICD-10-CM

## 2017-04-27 DIAGNOSIS — E11.42 DIABETIC PERIPHERAL NEUROPATHY ASSOCIATED WITH TYPE 2 DIABETES MELLITUS: Primary | ICD-10-CM

## 2017-04-27 DIAGNOSIS — M21.6X9 CAVUS DEFORMITY, UNSPECIFIED LATERALITY: ICD-10-CM

## 2017-04-27 PROCEDURE — 99499 UNLISTED E&M SERVICE: CPT | Mod: S$GLB,,,

## 2017-04-27 PROCEDURE — 11721 DEBRIDE NAIL 6 OR MORE: CPT | Mod: Q9,S$GLB,,

## 2017-04-27 PROCEDURE — 99999 PR PBB SHADOW E&M-EST. PATIENT-LVL II: CPT | Mod: PBBFAC,,,

## 2017-04-27 NOTE — MR AVS SNAPSHOT
[facility]  [address] [facility] [address]  [place] [address]  Phone: [phone]                  Stu Garcia   2017 10:45 AM   Office Visit    Description:  Male : 1945   Provider:  Gabino Pathak DPM   Department:  [facility]           Reason for Visit     Diabetes Mellitus     Nail Care                To Do List           Future Appointments        Provider Department Dept Phone    2017 11:00 AM Kale Cleary MD [facility] [phone]    8/3/2017 10:45 AM Gabino Pathak DPM [facility] [phone]    2017 9:25 AM [facility] [place] [facility] [phone]    2017 9:00 AM Cade Juan MD [facility] [phone]      Goals (5 Years of Data)     None      [facility]     [facility] Nurse Care Line -  Assistance  Unless otherwise directed by your provider, please contact [facility], our nurse care line that is available for  assistance.     Registered nurses in the [facility] provide: appointment scheduling, clinical advisement, health education, and other advisory services.  Call: [phone] (toll free)               Medications           Message regarding Medications     Verify the changes and/or additions to your medication regime listed below are the same as discussed with your clinician today.  If any of these changes or additions are incorrect, please notify your healthcare provider.             Verify that the below list of medications is an accurate representation of the medications you are currently taking.  If none reported, the list may be blank. If incorrect, please contact your healthcare provider. Carry this list with you in case of emergency.           Current Medications     alcohol swabs (BD ALCOHOL SWABS) PadM Use each time he test his blood sugar.    aspirin (ECOTRIN) 81 MG EC tablet Take 81 mg by mouth once daily.    blood  "glucose control high&low (ACCU-CHEK SHASHANK CONTROL SOLN) Soln Inject 1 strip into the skin 2 (two) times daily.    blood glucose control, normal Soln Use as directed for accu check shashank plus  testing device    blood sugar diagnostic (BLOOD GLUCOSE TEST) Strp accu check shashank plus test strips use once a day    gabapentin (NEURONTIN) 100 MG capsule Take 1 capsule (100 mg total) by mouth every evening.    glimepiride (AMARYL) 2 MG tablet Take 2 tablets (4 mg total) by mouth 2 (two) times daily.    lancets (ACCU-CHEK SOFTCLIX LANCETS) Misc 1 strip by Misc.(Non-Drug; Combo Route) route 3 (three) times daily.    metformin (GLUCOPHAGE) 500 MG tablet TAKE 2 TABLETS BY MOUTH TWICE DAILY    metoprolol tartrate (LOPRESSOR) 50 MG tablet TAKE ONE TABLET BY MOUTH TWICE DAILY    multivitamin capsule Take 1 capsule by mouth once daily.    omeprazole (PRILOSEC) 40 MG capsule TAKE 1 CAPSULE BEFORE BREAKFAST.    simvastatin (ZOCOR) 40 MG tablet TAKE 1 TABLET EVERY EVENING    VIT A/VIT C/VIT E/ZINC/COPPER (ICAPS AREDS ORAL) Take 1 capsule by mouth 2 (two) times daily.    XARELTO 20 mg Tab TAKE 1 TABLET ONE TIME DAILY    terbinafine HCl (LAMISIL) 1 % cream Apply topically 2 (two) times daily. Apply to feet.           Clinical Reference Information           Your Vitals Were     Height Weight BMI          6' 4.5" (1.943 m) 119.7 kg (263 lb 14.3 oz) 31.7 kg/m2        Allergies as of 4/27/2017     Codeine      Immunizations Administered on Date of Encounter - 4/27/2017     None      Language Assistance Services     ATTENTION: Language assistance services are available, free of charge. Please call [phone].      ATENCIÓN: Si habla kailee, tiene a hoyos disposición servicios gratuitos de asistencia lingüística. Llame al [phone].     CHÚ Ý: N?u b?n nói Ti?ng Vi?t, có các d?ch v? h? tr? ngôn ng? mi?n phí dành cho b?n. G?i s? [phone].         [facility] complies with applicable Federal civil rights laws and does not " discriminate on the basis of race, color, national origin, age, disability, or sex.

## 2017-04-27 NOTE — PROGRESS NOTES
Chief Complaint   Patient presents with    Diabetes Mellitus     PCP:  Dr Juan  2/13/17; HgbA1c: 2/6/17  7.5    Nail Care         History of present illness: Patient returns to the clinic for at risk diabetic foot care. Hx of neuropathy, CAD.    Patient started gabapentin 100 mg qhs and has noticed improvement in the pain in his feet, still gets shooting pain.    Review of Systems:   Vascular : negative rest pain, claudication, bruising   Musculoskeletal:+ for joint pain   Skin: negative for rashes, open wounds and lesions, + for thickened, painful and discolored toenails  Neurological: + for burning, tingling, paresthesia and numbness   All other unremarkable.    Past Medical, Family and Social History reviewed.    Constitutional:   Patient is oriented to person, place, and time. Vital signs are normal. Appears well-developed and well-nourished.     Vascular:   Dorsalis pedis pulses are 2+ on the right side, and 2+ on the left side.   Posterior tibial pulses are 2+ on the right side, and 2+ on the left side.   - digital hair growth, capillary fill time to all toes <3 seconds, toes are cool to touch  + swelling feet and ankles 2+ pitting    Skin/Dermatological:   Skin is thin, warm, shiny and atrophic. No cyanosis or clubbing. No rashes noted. No open wounds.   Nails yellow discolored, thickened 3 mm, elongated 3 mm with subungual debris and tenderness.    Musculoskeletal:   Pes cavus.  Mild bunions, hammertoes and bunionettes observed.  Decreased range of motion of bilateral midtarsal, subtalar joints, ankle joint dorsiflexion is restricted bilaterally. Muscle strength to tibialis anterior, extensor hallucis longus, extensor digitorum longus, peroneal muscles, flexor hallucis/digotorum longus, posterior tibial and gastrosoleal complex is 5/5.    Neurological:   + deficits to sharp/dull, light touch or vibratory sensation bilateral feet     Assessment/Plan:   #1 Diabetes, neuropathy, foot deformity: Discussed  diabetic footcare, daily foot inspections, tight blood sugar control and proper shoe gear. Continue diabetic shoes/inserts.  Continue neurontin 100 mg qhs.  #2 Onychomycosis/onychodystrophy: Shoe inspection. Diabetic Foot Education. Patient reminded of the importance of good nutrition and blood sugar control to help prevent podiatric complications of diabetes. Patient instructed on proper foot hygeine. We discussed wearing proper shoe gear, daily foot inspections, never walking without protective shoe gear, never putting sharp instruments to feet.  We also discussed padding and shoes with high toe boxes for  foot deformities.    - With patient's permission, all ten toenails were aggressively reduced and debrided  to their soft tissue attachment mechanically with nail nipper, removing all offending nail and debris.  Patient relates relief following the procedure. Patient will continue to monitor the areas daily, inspect her feet, wear protective shoe gear when ambulatory, moisturizer to maintain skin integrity and follow in this office in approximately 3 months, sooner p.r.n.

## 2017-05-17 ENCOUNTER — OFFICE VISIT (OUTPATIENT)
Dept: ORTHOPEDICS | Facility: CLINIC | Age: 72
End: 2017-05-17
Payer: MEDICARE

## 2017-05-17 VITALS
HEIGHT: 77 IN | BODY MASS INDEX: 31.05 KG/M2 | HEART RATE: 75 BPM | DIASTOLIC BLOOD PRESSURE: 79 MMHG | WEIGHT: 263 LBS | SYSTOLIC BLOOD PRESSURE: 138 MMHG

## 2017-05-17 DIAGNOSIS — M17.12 PRIMARY OSTEOARTHRITIS OF LEFT KNEE: Primary | ICD-10-CM

## 2017-05-17 PROCEDURE — 99999 PR PBB SHADOW E&M-EST. PATIENT-LVL III: CPT | Mod: PBBFAC,,, | Performed by: ORTHOPAEDIC SURGERY

## 2017-05-17 PROCEDURE — 99024 POSTOP FOLLOW-UP VISIT: CPT | Mod: S$GLB,,, | Performed by: ORTHOPAEDIC SURGERY

## 2017-05-17 NOTE — PROGRESS NOTES
This note was created using Dragon dictation software.  It occasionally misinterpreted phrases or words.      Date of surgery: April 7, 2017    Chief complaint: Left knee pain    History of present illness: Is a 71-year-old gentleman who is 6 weeks out from left partial medial and lateral meniscectomies.  He is doing well.  The sharp pain medially is gone.  No more swelling.  Doing the home exercises.    Physical exam: Examination left leg shows healed surgical portals.  No joint line pain. Negative Homans sign.  Full range of motion. No calf Pain.     X-rays: None    Assessment: Status post left partial medial and lateral meniscectomies    Plan: He is doing well.  Continue with the exercises.  He will follow-up as needed.

## 2017-06-06 ENCOUNTER — TELEPHONE (OUTPATIENT)
Dept: FAMILY MEDICINE | Facility: CLINIC | Age: 72
End: 2017-06-06

## 2017-06-06 NOTE — TELEPHONE ENCOUNTER
Advised no appointment available today with Dr Juan, information for [facility] given.  States he has an appointment scheduled tomorrow with Nurse Kristie, and if he decides to go to [facility] today, he will cancel tomorrow's appointment.
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
## 2017-06-07 ENCOUNTER — OFFICE VISIT (OUTPATIENT)
Dept: FAMILY MEDICINE | Facility: CLINIC | Age: 72
End: 2017-06-07
Payer: MEDICARE

## 2017-06-07 VITALS
DIASTOLIC BLOOD PRESSURE: 60 MMHG | TEMPERATURE: 99 F | BODY MASS INDEX: 30.64 KG/M2 | HEART RATE: 75 BPM | HEIGHT: 77 IN | RESPIRATION RATE: 16 BRPM | SYSTOLIC BLOOD PRESSURE: 134 MMHG | WEIGHT: 259.5 LBS

## 2017-06-07 DIAGNOSIS — M25.562 ACUTE PAIN OF LEFT KNEE: ICD-10-CM

## 2017-06-07 DIAGNOSIS — S50.319A ABRASION, ELBOW W/O INFECTION: ICD-10-CM
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
## 2017-06-07 DIAGNOSIS — S50.02XA TRAUMATIC HEMATOMA OF LEFT ELBOW, INITIAL ENCOUNTER: Primary | ICD-10-CM

## 2017-06-07 PROCEDURE — 1159F MED LIST DOCD IN RCRD: CPT | Mod: S$GLB,,, | Performed by: NURSE PRACTITIONER

## 2017-06-07 PROCEDURE — 1125F AMNT PAIN NOTED PAIN PRSNT: CPT | Mod: S$GLB,,, | Performed by: NURSE PRACTITIONER

## 2017-06-07 PROCEDURE — 99999 PR PBB SHADOW E&M-EST. PATIENT-LVL V: CPT | Mod: PBBFAC,,, | Performed by: NURSE PRACTITIONER

## 2017-06-07 PROCEDURE — 99499 UNLISTED E&M SERVICE: CPT | Mod: S$GLB,,, | Performed by: NURSE PRACTITIONER

## 2017-06-07 PROCEDURE — 99214 OFFICE O/P EST MOD 30 MIN: CPT | Mod: S$GLB,,, | Performed by: NURSE PRACTITIONER

## 2017-06-07 RX ORDER — MUPIROCIN 20 MG/G
OINTMENT TOPICAL 3 TIMES DAILY
Qty: 30 G | Refills: 0 | Status: SHIPPED | OUTPATIENT
Start: 2017-06-07 | End: 2017-06-17

## 2017-06-07 NOTE — PROGRESS NOTES
Patient, Stu Garcia (MRN #[id number]), presented with a recent Platelet count less than 150 K/uL consistent with the definition of thrombocytopenia (ICD10 - D69.6).    Platelets   Date Value Ref Range Status   03/08/2017 131 (L) 150 - 350 K/uL Final     The patient's thrombocytopenia was monitored, evaluated, addressed and/or treated. This addendum to the medical record is made on 06/07/2017.
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
## 2017-06-08 ENCOUNTER — HOSPITAL ENCOUNTER (OUTPATIENT)
Dept: RADIOLOGY | Facility: HOSPITAL | Age: 72
Discharge: HOME | End: 2017-06-08
Attending: NURSE PRACTITIONER
Payer: MEDICARE
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
## 2017-06-08 DIAGNOSIS — S50.319A ABRASION, ELBOW W/O INFECTION: ICD-10-CM

## 2017-06-08 DIAGNOSIS — S50.02XA TRAUMATIC HEMATOMA OF LEFT ELBOW, INITIAL ENCOUNTER: ICD-10-CM

## 2017-06-08 DIAGNOSIS — M25.562 ACUTE PAIN OF LEFT KNEE: ICD-10-CM

## 2017-06-08 PROCEDURE — 73562 X-RAY EXAM OF KNEE 3: CPT | Mod: TC,PO,LT

## 2017-06-08 PROCEDURE — 76999 ECHO EXAMINATION PROCEDURE: CPT | Mod: TC,PO

## 2017-06-08 PROCEDURE — 73562 X-RAY EXAM OF KNEE 3: CPT | Mod: 26,LT,, | Performed by: RADIOLOGY

## 2017-06-08 PROCEDURE — 76882 US LMTD JT/FCL EVL NVASC XTR: CPT | Mod: 26,,, | Performed by: RADIOLOGY

## 2017-06-09 ENCOUNTER — TELEPHONE (OUTPATIENT)
Dept: FAMILY MEDICINE | Facility: CLINIC | Age: 72
End: 2017-06-09

## 2017-06-09 NOTE — TELEPHONE ENCOUNTER
----- Message from Tiny Kern sent at 6/9/2017  9:29 AM CDT -----  Test results.  Call patient at [phone].

## 2017-06-09 NOTE — TELEPHONE ENCOUNTER
----- Message from Mary Alice Toth sent at 6/9/2017 10:06 AM CDT -----  Contact: self  Patient called regarding test results. Please contact [phone]

## 2017-06-22 ENCOUNTER — OFFICE VISIT (OUTPATIENT)
Dept: FAMILY MEDICINE | Facility: CLINIC | Age: 72
End: 2017-06-22
Payer: MEDICARE

## 2017-06-22 VITALS
WEIGHT: 259.25 LBS | BODY MASS INDEX: 30.61 KG/M2 | HEART RATE: 76 BPM | TEMPERATURE: 99 F | DIASTOLIC BLOOD PRESSURE: 60 MMHG | SYSTOLIC BLOOD PRESSURE: 130 MMHG | HEIGHT: 77 IN | RESPIRATION RATE: 16 BRPM

## 2017-06-22 DIAGNOSIS — S50.319A ABRASION, ELBOW W/O INFECTION: ICD-10-CM
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
## 2017-06-22 DIAGNOSIS — M25.562 CHRONIC PAIN OF LEFT KNEE: ICD-10-CM

## 2017-06-22 DIAGNOSIS — Z79.01 LONG TERM CURRENT USE OF ANTICOAGULANT: ICD-10-CM

## 2017-06-22 DIAGNOSIS — G89.29 CHRONIC PAIN OF LEFT KNEE: ICD-10-CM

## 2017-06-22 DIAGNOSIS — S50.02XA TRAUMATIC HEMATOMA OF LEFT ELBOW, INITIAL ENCOUNTER: Primary | ICD-10-CM

## 2017-06-22 PROCEDURE — 99999 PR PBB SHADOW E&M-EST. PATIENT-LVL IV: CPT | Mod: PBBFAC,,, | Performed by: NURSE PRACTITIONER

## 2017-06-22 PROCEDURE — 99213 OFFICE O/P EST LOW 20 MIN: CPT | Mod: S$GLB,,, | Performed by: NURSE PRACTITIONER

## 2017-06-22 PROCEDURE — 1159F MED LIST DOCD IN RCRD: CPT | Mod: S$GLB,,, | Performed by: NURSE PRACTITIONER

## 2017-06-22 PROCEDURE — 1125F AMNT PAIN NOTED PAIN PRSNT: CPT | Mod: S$GLB,,, | Performed by: NURSE PRACTITIONER

## 2017-06-22 RX ORDER — METOPROLOL TARTRATE 50 MG/1
50 TABLET ORAL 2 TIMES DAILY
COMMUNITY
End: 2017-08-09 | Stop reason: SDUPTHER
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
## 2017-06-26 ENCOUNTER — HOSPITAL ENCOUNTER (OUTPATIENT)
Dept: RADIOLOGY | Facility: HOSPITAL | Age: 72
Discharge: HOME | End: 2017-06-26
Attending: FAMILY MEDICINE
Payer: MEDICARE

## 2017-06-26 ENCOUNTER — OFFICE VISIT (OUTPATIENT)
Dept: FAMILY MEDICINE | Facility: CLINIC | Age: 72
End: 2017-06-26
Payer: MEDICARE

## 2017-06-26 VITALS
WEIGHT: 261.44 LBS | BODY MASS INDEX: 30.87 KG/M2 | HEIGHT: 77 IN | TEMPERATURE: 99 F | SYSTOLIC BLOOD PRESSURE: 138 MMHG | HEART RATE: 80 BPM | RESPIRATION RATE: 18 BRPM | DIASTOLIC BLOOD PRESSURE: 60 MMHG

## 2017-06-26 DIAGNOSIS — S33.5XXS LUMBAR BACK SPRAIN, SEQUELA: Primary | ICD-10-CM

## 2017-06-26 DIAGNOSIS — S33.5XXS LUMBAR BACK SPRAIN, SEQUELA: ICD-10-CM

## 2017-06-26 PROCEDURE — 99213 OFFICE O/P EST LOW 20 MIN: CPT | Mod: S$GLB,,, | Performed by: FAMILY MEDICINE

## 2017-06-26 PROCEDURE — 1125F AMNT PAIN NOTED PAIN PRSNT: CPT | Mod: S$GLB,,, | Performed by: FAMILY MEDICINE

## 2017-06-26 PROCEDURE — 72100 X-RAY EXAM L-S SPINE 2/3 VWS: CPT | Mod: TC,PO

## 2017-06-26 PROCEDURE — 99999 PR PBB SHADOW E&M-EST. PATIENT-LVL III: CPT | Mod: PBBFAC,,, | Performed by: FAMILY MEDICINE

## 2017-06-26 PROCEDURE — 72100 X-RAY EXAM L-S SPINE 2/3 VWS: CPT | Mod: 26,,, | Performed by: RADIOLOGY

## 2017-06-26 PROCEDURE — 1159F MED LIST DOCD IN RCRD: CPT | Mod: S$GLB,,, | Performed by: FAMILY MEDICINE

## 2017-06-26 RX ORDER — TIZANIDINE 4 MG/1
4 TABLET ORAL EVERY 6 HOURS PRN
Qty: 30 TABLET | Refills: 0 | Status: SHIPPED | OUTPATIENT
Start: 2017-06-26 | End: 2017-07-28 | Stop reason: SDUPTHER

## 2017-06-26 NOTE — PROGRESS NOTES
Subjective:       Patient ID: Stu Garcia is a 71 y.o. male.    Chief Complaint: Back Pain (Symptoms for about four weeks)    It has been intermittent for the last month.  He was without pain for a couple of days last week.  Today he developed pain in the right lower back. Worse with movement.   He does get intermittent spasms.  using cane for support today.  2/10 presently, but can get to 9/10 intensity.  Used Ibuprofen with mild relief.      Back Pain   This is a new problem. The current episode started 1 to 4 weeks ago. The problem occurs 2 to 4 times per day. The problem has been rapidly worsening since onset. The quality of the pain is described as stabbing. The pain does not radiate. The pain is at a severity of 9/10. The pain is severe. The pain is worse during the day. The symptoms are aggravated by position. Stiffness is present all day. Associated symptoms include pelvic pain and weakness. Pertinent negatives include no abdominal pain, bladder incontinence, bowel incontinence, chest pain, dysuria, fever, headaches, numbness, paresis, paresthesias, perianal numbness, tingling or weight loss. Risk factors include history of cancer, history of osteoporosis, lack of exercise, poor posture, recent trauma, sedentary lifestyle and history of renal stones. He has tried NSAIDs for the symptoms. The treatment provided mild relief.       Past Medical History:   Diagnosis Date    Anticoagulant long-term use     xarelto,asa    Atrial fibrillation 2013    cardioverted    Bleb, lung     Cataract     OS    Colon polyp     Coronary artery disease     Diabetes mellitus     Diabetes mellitus, type 2     Diverticulosis     GERD (gastroesophageal reflux disease)     HEARING LOSS     bilateral aids    Hemorrhoid     HLD (hyperlipidemia)     Hypertension     Iron deficiency anemia     Neuropathy of leg     Pneumonia due to other staphylococcus     Prostate cancer     Spontaneous pneumothorax     bilateral        Past Surgical History:   Procedure Laterality Date    CARDIOVERSION      CATARACT EXTRACTION      bilateral    CHEST TUBE INSERTION      COLONOSCOPY      CORONARY STENT PLACEMENT      x 2    HERNIA REPAIR      umbilical    KNEE SURGERY Left 2017    PROSTATECTOMY  2001    open    TUBE THORACOTOMY  1966    pneumothorax left--open       Review of patient's allergies indicates:   Allergen Reactions    Codeine Nausea And Vomiting       Social History     Social History    Marital status:      Spouse name: N/A    Number of children: 2    Years of education: N/A     Occupational History         prior [employer]       Social History Main Topics    Smoking status: Former Smoker     Years: 20.00    Smokeless tobacco: Never Used      Comment: quit smoking in 1980's.    Alcohol use Yes      Comment: 1 drink every 2 weeks    Drug use: No    Sexual activity: Not on file     Other Topics Concern    Not on file     Social History Narrative    From [place]       Current Outpatient Prescriptions on File Prior to Visit   Medication Sig Dispense Refill    alcohol swabs (BD ALCOHOL SWABS) PadM Use each time he test his blood sugar. 100 each 4    aspirin (ECOTRIN) 81 MG EC tablet Take 81 mg by mouth once daily.      blood glucose control high&low (ACCU-CHEK SHASHANK CONTROL SOLN) Soln Inject 1 strip into the skin 2 (two) times daily. 100 each 12    blood glucose control, normal Soln Use as directed for accu check shashank plus  testing device 1 each 4    blood sugar diagnostic (BLOOD GLUCOSE TEST) Strp accu check shashank plus test strips use once a day 100 strip 4    gabapentin (NEURONTIN) 100 MG capsule Take 1 capsule (100 mg total) by mouth every evening. 30 capsule 11    glimepiride (AMARYL) 2 MG tablet Take 2 tablets (4 mg total) by mouth 2 (two) times daily. 360 tablet 3    lancets (ACCU-CHEK SOFTCLIX LANCETS) Misc 1 strip by Misc.(Non-Drug; Combo Route) route 3 (three) times  daily. 270 each 3    metformin (GLUCOPHAGE) 500 MG tablet TAKE 2 TABLETS BY MOUTH TWICE DAILY 360 tablet 3    metoprolol tartrate (LOPRESSOR) 50 MG tablet Take 50 mg by mouth 2 (two) times daily. Takes different: 1/2 a tablet bid      multivitamin capsule Take 1 capsule by mouth once daily.      omeprazole (PRILOSEC) 40 MG capsule TAKE 1 CAPSULE BEFORE BREAKFAST. 90 capsule 3    simvastatin (ZOCOR) 40 MG tablet TAKE 1 TABLET EVERY EVENING 90 tablet 3    terbinafine HCl (LAMISIL) 1 % cream Apply topically 2 (two) times daily. Apply to feet. 42 g 11    VIT A/VIT C/VIT E/ZINC/COPPER (ICAPS AREDS ORAL) Take 1 capsule by mouth 2 (two) times daily.      XARELTO 20 mg Tab TAKE 1 TABLET ONE TIME DAILY 90 tablet 3     No current facility-administered medications on file prior to visit.        Family History   Problem Relation Age of Onset    Diabetes Father     Coronary artery disease Father     Cancer Father      stomach       Review of Systems   Constitutional: Negative for appetite change, chills, fever, unexpected weight change and weight loss.   HENT: Negative for sore throat and trouble swallowing.    Eyes: Negative for pain and visual disturbance.   Respiratory: Negative for cough, chest tightness, shortness of breath and wheezing.    Cardiovascular: Negative for chest pain, palpitations and leg swelling.   Gastrointestinal: Negative for abdominal pain, blood in stool, bowel incontinence, constipation, diarrhea and nausea.   Genitourinary: Positive for pelvic pain. Negative for bladder incontinence, difficulty urinating, dysuria and hematuria.   Musculoskeletal: Positive for back pain. Negative for arthralgias, gait problem and neck pain.   Skin: Negative for rash and wound.   Neurological: Positive for weakness. Negative for dizziness, tingling, light-headedness, numbness, headaches and paresthesias.   Hematological: Negative for adenopathy.   Psychiatric/Behavioral: Negative for dysphoric mood.      "  Objective:      /60 (BP Location: Left arm, Patient Position: Sitting, BP Method: Manual)   Pulse 80   Temp 99.1 °F (37.3 °C) (Oral)   Resp 18   Ht 6' 4.5" (1.943 m)   Wt 118.6 kg (261 lb 7.5 oz)   BMI 31.41 kg/m²   Physical Exam   Constitutional: He is oriented to person, place, and time. He appears well-developed and well-nourished. He is active. No distress.   HENT:   Head: Normocephalic and atraumatic.   Right Ear: External ear normal.   Left Ear: External ear normal.   Mouth/Throat: Uvula is midline, oropharynx is clear and moist and mucous membranes are normal. No oropharyngeal exudate.   Eyes: Conjunctivae, EOM and lids are normal. Pupils are equal, round, and reactive to light.   Neck: Normal range of motion, full passive range of motion without pain and phonation normal. Neck supple. No thyroid mass and no thyromegaly present.   Cardiovascular: Normal rate, regular rhythm, normal heart sounds and intact distal pulses.  Exam reveals no gallop and no friction rub.    No murmur heard.  Pulmonary/Chest: Effort normal and breath sounds normal. No respiratory distress. He has no wheezes. He has no rales.   Musculoskeletal: Normal range of motion.        Lumbar back: He exhibits spasm. He exhibits normal range of motion and no bony tenderness.        Back:    Lymphadenopathy:     He has no cervical adenopathy.   Neurological: He is alert and oriented to person, place, and time. No cranial nerve deficit. Coordination normal.   Skin: Skin is warm and dry.   Psychiatric: He has a normal mood and affect. His speech is normal and behavior is normal. Judgment and thought content normal.       Assessment:       1. Lumbar back sprain, sequela        Plan:       Lumbar back sprain, sequela  -     X-Ray Lumbar Spine AP And Lateral; Future; Expected date: 06/26/2017  -     tizanidine (ZANAFLEX) 4 MG tablet; Take 1 tablet (4 mg total) by mouth every 6 (six) hours as needed.  Dispense: 30 tablet; Refill: 0      "   Back stretching, relative rest  Counseled on regular exercise, maintenance of a healthy weight, balanced diet rich in fruits/vegetables and lean protein, and avoidance of unhealthy habits like smoking and excessive alcohol intake.

## 2017-06-28 ENCOUNTER — PATIENT MESSAGE (OUTPATIENT)
Dept: FAMILY MEDICINE | Facility: CLINIC | Age: 72
End: 2017-06-28

## 2017-07-11 PROBLEM — S32.009A LUMBAR VERTEBRAL FRACTURE: Status: ACTIVE | Noted: 2017-07-11

## 2017-07-20 DIAGNOSIS — E11.319 TYPE 2 DIABETES MELLITUS WITH RETINOPATHY WITHOUT MACULAR EDEMA, WITHOUT LONG-TERM CURRENT USE OF INSULIN, UNSPECIFIED LATERALITY, UNSPECIFIED RETINOPATHY SEVERITY: ICD-10-CM

## 2017-07-20 RX ORDER — GLIMEPIRIDE 2 MG/1
TABLET ORAL
Qty: 360 TABLET | Refills: 3 | Status: SHIPPED | OUTPATIENT
Start: 2017-07-20 | End: 2017-08-03

## 2017-07-20 RX ORDER — OMEPRAZOLE 40 MG/1
CAPSULE, DELAYED RELEASE ORAL
Qty: 90 CAPSULE | Refills: 3 | Status: SHIPPED | OUTPATIENT
Start: 2017-07-20 | End: 2017-08-03

## 2017-07-25 ENCOUNTER — PATIENT MESSAGE (OUTPATIENT)
Dept: FAMILY MEDICINE | Facility: CLINIC | Age: 72
End: 2017-07-25

## 2017-07-27 NOTE — TELEPHONE ENCOUNTER
Ok to use tizanidine as needed.  Schedule him in held slot with me for hospital follow-up on the week of my return.

## 2017-07-28 ENCOUNTER — PATIENT MESSAGE (OUTPATIENT)
Dept: FAMILY MEDICINE | Facility: CLINIC | Age: 72
End: 2017-07-28

## 2017-07-28 DIAGNOSIS — S33.5XXS LUMBAR BACK SPRAIN, SEQUELA: ICD-10-CM

## 2017-07-28 RX ORDER — TIZANIDINE 4 MG/1
4 TABLET ORAL EVERY 6 HOURS PRN
Qty: 30 TABLET | Refills: 0 | Status: SHIPPED | OUTPATIENT
Start: 2017-07-28 | End: 2017-08-07 | Stop reason: SDUPTHER

## 2017-07-28 RX ORDER — TIZANIDINE 4 MG/1
TABLET ORAL
Qty: 385 TABLET | Refills: 0 | OUTPATIENT
Start: 2017-07-28

## 2017-07-28 RX ORDER — TIZANIDINE 4 MG/1
TABLET ORAL
Qty: 385 TABLET | Refills: 0 | Status: SHIPPED | OUTPATIENT
Start: 2017-07-28 | End: 2017-08-03 | Stop reason: SDUPTHER

## 2017-07-31 RX ORDER — METFORMIN HYDROCHLORIDE 500 MG/1
TABLET ORAL
Qty: 360 TABLET | Refills: 0 | Status: SHIPPED | OUTPATIENT
Start: 2017-07-31 | End: 2017-08-09

## 2017-08-03 ENCOUNTER — OFFICE VISIT (OUTPATIENT)
Dept: PODIATRY | Facility: CLINIC | Age: 72
End: 2017-08-03
Payer: MEDICARE

## 2017-08-03 VITALS — WEIGHT: 240.06 LBS | HEIGHT: 77 IN | BODY MASS INDEX: 28.34 KG/M2

## 2017-08-03 DIAGNOSIS — M20.10 HALLUX ABDUCTO VALGUS, UNSPECIFIED LATERALITY: ICD-10-CM

## 2017-08-03 DIAGNOSIS — E11.42 DIABETIC PERIPHERAL NEUROPATHY ASSOCIATED WITH TYPE 2 DIABETES MELLITUS: Primary | ICD-10-CM

## 2017-08-03 DIAGNOSIS — B35.1 ONYCHOMYCOSIS DUE TO DERMATOPHYTE: ICD-10-CM

## 2017-08-03 DIAGNOSIS — M20.40 HAMMER TOE, UNSPECIFIED LATERALITY: ICD-10-CM

## 2017-08-03 PROCEDURE — 99499 UNLISTED E&M SERVICE: CPT | Mod: S$GLB,,,

## 2017-08-03 PROCEDURE — 99999 PR PBB SHADOW E&M-EST. PATIENT-LVL II: CPT | Mod: PBBFAC,,,

## 2017-08-03 PROCEDURE — 11721 DEBRIDE NAIL 6 OR MORE: CPT | Mod: Q9,S$GLB,,

## 2017-08-03 NOTE — PROGRESS NOTES
Chief Complaint   Patient presents with    3 month f/u     6-26 Dr. Juan, 7-12 A1c 6.3         History of present illness: Patient returns to the clinic for at risk diabetic foot care. Hx of neuropathy, CAD.  He has had a fall and broke neck and back, Neurontin was topped, he is able to walk with walker.    Review of Systems:   Vascular : negative rest pain, claudication, bruising   Musculoskeletal:+ for joint pain   Skin: negative for rashes, open wounds and lesions, + for thickened, painful and discolored toenails  Neurological: + for burning, tingling, paresthesia and numbness   All other unremarkable.    Past Medical, Family and Social History reviewed.    Constitutional:   Patient is oriented to person, place, and time. Vital signs are normal. Appears well-developed and well-nourished.     Vascular:   Dorsalis pedis pulses are 2+ on the right side, and 2+ on the left side.   Posterior tibial pulses are 2+ on the right side, and 2+ on the left side.   - digital hair growth, capillary fill time to all toes <3 seconds, toes are cool to touch  + swelling feet and ankles 2+ pitting    Skin/Dermatological:   Skin is thin, warm, shiny and atrophic. No cyanosis or clubbing. No rashes noted. No open wounds.   Nails yellow discolored, thickened 3 mm, elongated 3 mm with subungual debris and tenderness.    Musculoskeletal:   Pes cavus.  Mild bunions, hammertoes and bunionettes observed.  Decreased range of motion of bilateral midtarsal, subtalar joints, ankle joint dorsiflexion is restricted bilaterally. Muscle strength to tibialis anterior, extensor hallucis longus, extensor digitorum longus, peroneal muscles, flexor hallucis/digotorum longus, posterior tibial and gastrosoleal complex is 5/5.    Neurological:   + deficits to sharp/dull, light touch or vibratory sensation bilateral feet     Assessment/Plan:   #1 Diabetes, neuropathy, foot deformity: Discussed diabetic footcare, daily foot inspections, tight blood  sugar control and proper shoe gear. Continue diabetic shoes/inserts.  #2 Onychomycosis/onychodystrophy, tinea fungus: Continue clotrimazole solution for toenails and lamisil cream for feet. All tend toenails were debrided to base x 10, patient tolerated well.  Return to clinic 4 months.

## 2017-08-07 DIAGNOSIS — S33.5XXS LUMBAR BACK SPRAIN, SEQUELA: ICD-10-CM

## 2017-08-07 RX ORDER — TIZANIDINE 4 MG/1
TABLET ORAL
Qty: 30 TABLET | Refills: 5 | Status: SHIPPED | OUTPATIENT
Start: 2017-08-07 | End: 2017-11-01

## 2017-08-08 ENCOUNTER — LAB VISIT (OUTPATIENT)
Dept: LAB | Facility: HOSPITAL | Age: 72
End: 2017-08-08
Attending: FAMILY MEDICINE
Payer: MEDICARE

## 2017-08-08 DIAGNOSIS — E11.319 TYPE 2 DIABETES MELLITUS WITH RETINOPATHY WITHOUT MACULAR EDEMA, WITHOUT LONG-TERM CURRENT USE OF INSULIN, UNSPECIFIED LATERALITY, UNSPECIFIED RETINOPATHY SEVERITY: ICD-10-CM

## 2017-08-08 LAB
ALBUMIN SERPL BCP-MCNC: 3.7 G/DL
ALP SERPL-CCNC: 127 U/L
ALT SERPL W/O P-5'-P-CCNC: 11 U/L
ANION GAP SERPL CALC-SCNC: 7 MMOL/L
AST SERPL-CCNC: 18 U/L
BILIRUB SERPL-MCNC: 0.6 MG/DL
BUN SERPL-MCNC: 13 MG/DL
CALCIUM SERPL-MCNC: 9.5 MG/DL
CHLORIDE SERPL-SCNC: 103 MMOL/L
CHOLEST/HDLC SERPL: 3.5 {RATIO}
CO2 SERPL-SCNC: 30 MMOL/L
CREAT SERPL-MCNC: 1.3 MG/DL
EST. GFR  (AFRICAN AMERICAN): >60 ML/MIN/1.73 M^2
EST. GFR  (NON AFRICAN AMERICAN): 54.5 ML/MIN/1.73 M^2
GLUCOSE SERPL-MCNC: 136 MG/DL
HDL/CHOLESTEROL RATIO: 28.9 %
HDLC SERPL-MCNC: 135 MG/DL
HDLC SERPL-MCNC: 39 MG/DL
LDLC SERPL CALC-MCNC: 72.8 MG/DL
NONHDLC SERPL-MCNC: 96 MG/DL
POTASSIUM SERPL-SCNC: 4.3 MMOL/L
PROT SERPL-MCNC: 6.9 G/DL
SODIUM SERPL-SCNC: 140 MMOL/L
TRIGL SERPL-MCNC: 116 MG/DL

## 2017-08-08 PROCEDURE — 80061 LIPID PANEL: CPT

## 2017-08-08 PROCEDURE — 36415 COLL VENOUS BLD VENIPUNCTURE: CPT | Mod: PO

## 2017-08-08 PROCEDURE — 80053 COMPREHEN METABOLIC PANEL: CPT

## 2017-08-08 PROCEDURE — 83036 HEMOGLOBIN GLYCOSYLATED A1C: CPT

## 2017-08-09 ENCOUNTER — OFFICE VISIT (OUTPATIENT)
Dept: FAMILY MEDICINE | Facility: CLINIC | Age: 72
End: 2017-08-09
Payer: MEDICARE

## 2017-08-09 ENCOUNTER — PATIENT MESSAGE (OUTPATIENT)
Dept: OTOLARYNGOLOGY | Facility: CLINIC | Age: 72
End: 2017-08-09

## 2017-08-09 VITALS
WEIGHT: 238.13 LBS | DIASTOLIC BLOOD PRESSURE: 58 MMHG | HEIGHT: 76 IN | BODY MASS INDEX: 29 KG/M2 | SYSTOLIC BLOOD PRESSURE: 100 MMHG | TEMPERATURE: 98 F | HEART RATE: 68 BPM | RESPIRATION RATE: 18 BRPM

## 2017-08-09 DIAGNOSIS — I48.0 PAROXYSMAL ATRIAL FIBRILLATION: ICD-10-CM

## 2017-08-09 DIAGNOSIS — S32.009D CLOSED FRACTURE OF LUMBAR VERTEBRA WITH ROUTINE HEALING, UNSPECIFIED FRACTURE MORPHOLOGY, UNSPECIFIED LUMBAR VERTEBRAL LEVEL, SUBSEQUENT ENCOUNTER: Primary | ICD-10-CM

## 2017-08-09 DIAGNOSIS — I25.10 CORONARY ARTERY DISEASE DUE TO LIPID RICH PLAQUE: ICD-10-CM

## 2017-08-09 DIAGNOSIS — I10 ESSENTIAL HYPERTENSION: ICD-10-CM

## 2017-08-09 DIAGNOSIS — E11.319 TYPE 2 DIABETES MELLITUS WITH RETINOPATHY WITHOUT MACULAR EDEMA, WITHOUT LONG-TERM CURRENT USE OF INSULIN, UNSPECIFIED LATERALITY, UNSPECIFIED RETINOPATHY SEVERITY: ICD-10-CM

## 2017-08-09 DIAGNOSIS — D50.9 IRON DEFICIENCY ANEMIA, UNSPECIFIED IRON DEFICIENCY ANEMIA TYPE: ICD-10-CM

## 2017-08-09 DIAGNOSIS — I25.83 CORONARY ARTERY DISEASE DUE TO LIPID RICH PLAQUE: ICD-10-CM

## 2017-08-09 DIAGNOSIS — E78.5 HYPERLIPIDEMIA, UNSPECIFIED HYPERLIPIDEMIA TYPE: ICD-10-CM

## 2017-08-09 DIAGNOSIS — R09.A2 GLOBUS SENSATION: ICD-10-CM

## 2017-08-09 DIAGNOSIS — S12.9XXS CERVICAL COMPRESSION FRACTURE, SEQUELA: ICD-10-CM

## 2017-08-09 LAB
ESTIMATED AVG GLUCOSE: 111 MG/DL
HBA1C MFR BLD HPLC: 5.5 %

## 2017-08-09 PROCEDURE — 1159F MED LIST DOCD IN RCRD: CPT | Mod: S$GLB,,, | Performed by: FAMILY MEDICINE

## 2017-08-09 PROCEDURE — 3074F SYST BP LT 130 MM HG: CPT | Mod: S$GLB,,, | Performed by: FAMILY MEDICINE

## 2017-08-09 PROCEDURE — 3044F HG A1C LEVEL LT 7.0%: CPT | Mod: S$GLB,,, | Performed by: FAMILY MEDICINE

## 2017-08-09 PROCEDURE — 99999 PR PBB SHADOW E&M-EST. PATIENT-LVL III: CPT | Mod: PBBFAC,,, | Performed by: FAMILY MEDICINE

## 2017-08-09 PROCEDURE — 3008F BODY MASS INDEX DOCD: CPT | Mod: S$GLB,,, | Performed by: FAMILY MEDICINE

## 2017-08-09 PROCEDURE — 99499 UNLISTED E&M SERVICE: CPT | Mod: S$GLB,,, | Performed by: FAMILY MEDICINE

## 2017-08-09 PROCEDURE — 1125F AMNT PAIN NOTED PAIN PRSNT: CPT | Mod: S$GLB,,, | Performed by: FAMILY MEDICINE

## 2017-08-09 PROCEDURE — 99214 OFFICE O/P EST MOD 30 MIN: CPT | Mod: S$GLB,,, | Performed by: FAMILY MEDICINE

## 2017-08-09 PROCEDURE — 3078F DIAST BP <80 MM HG: CPT | Mod: S$GLB,,, | Performed by: FAMILY MEDICINE

## 2017-08-09 RX ORDER — TRAMADOL HYDROCHLORIDE 50 MG/1
50 TABLET ORAL EVERY 6 HOURS PRN
COMMUNITY
End: 2017-11-01

## 2017-08-09 RX ORDER — METOPROLOL TARTRATE 50 MG/1
25 TABLET ORAL 2 TIMES DAILY
Qty: 30 TABLET | Refills: 11 | Status: SHIPPED | OUTPATIENT
Start: 2017-08-09 | End: 2017-08-14 | Stop reason: SDUPTHER

## 2017-08-09 NOTE — PROGRESS NOTES
Subjective:       Patient ID: Stu Garcia is a 71 y.o. male.    Chief Complaint: Lumbar Back Sprain (Hospital recheck)    He had fall in July from his attic and was hospitalized at [facility] and then [facility].  He suffered cervical fracture and lumbar vertebral fracture of L3 and L5.  Anterior cervical decompression fusion done on 7/6/2017 by Dr. Jeter.  He is wearing cervical collar with plan to remove this next week. He is still seeing Dr. Bee. 1-2/10  He is wearing a lumbar corsette for treatment of the lumbar vertebral fracture.     Patient reports difficulty swallowing solids (meats, rice) since the surgery. He still has a glubus sensation.  Last cervical xray showed no isues  He was seen in  in [facility] rehab. He did have a barium swallow, which I did review.   He is drinking soups and Ensure. He is crushing his tablet.  Neurosurgery discussed that these symptoms will resolve in time.    Back is gradually improving.  Using tizanidine every 4 hours.  Still with some issues with sleeping.    Diabetes - following diabetic diet diet; stopped taking metformin 500mg 2 tabs BID and glimepiride 4mg BID due to trouble swallowing.  Average 140, low of 108  CAD - following with Dr. Maldonado; assymptomatic; taking ASA 81mg daily.   Paroxysmal afib - taking Multaq 400mg BID, xarelto 20mg daily; taking metoprolol 50mg 1/2 tab BID  HLD - stopped taking simvastatin 40mg daily  Gerd, healing ulcers - stopped taking Omeprazole 40mg daily due to swallowing problems              Past Medical History:   Diagnosis Date    Anticoagulant long-term use     xarelto,asa    Atrial fibrillation 2013    cardioverted    Bleb, lung     Cataract     OS    Colon polyp     Coronary artery disease     Diabetes mellitus     Diabetes mellitus, type 2     Diverticulosis     GERD (gastroesophageal reflux disease)     HEARING LOSS     bilateral aids    Hemorrhoid     HLD (hyperlipidemia)     Hypertension     Iron deficiency anemia      Neuropathy of leg     Pneumonia due to other staphylococcus     Prostate cancer     Spontaneous pneumothorax     bilateral       Past Surgical History:   Procedure Laterality Date    CARDIOVERSION      CATARACT EXTRACTION      bilateral    CHEST TUBE INSERTION      COLONOSCOPY      CORONARY STENT PLACEMENT      x 2    FRACTURE SURGERY      c7 t1 ACDF     HERNIA REPAIR      umbilical    KNEE SURGERY Left 2017    PROSTATECTOMY  2001    open    TUBE THORACOTOMY  1966    pneumothorax left--open       Review of patient's allergies indicates:   Allergen Reactions    Codeine Nausea And Vomiting       Social History     Social History    Marital status:      Spouse name: N/A    Number of children: 2    Years of education: N/A     Occupational History         prior [employer]       Social History Main Topics    Smoking status: Former Smoker     Years: 20.00    Smokeless tobacco: Never Used      Comment: quit smoking in 1980's.    Alcohol use Yes      Comment: 1 drink every 2 weeks    Drug use: No    Sexual activity: Not on file     Other Topics Concern    Not on file     Social History Narrative    From [place]       Current Outpatient Prescriptions on File Prior to Visit   Medication Sig Dispense Refill    aspirin (ECOTRIN) 81 MG EC tablet Take 81 mg by mouth once daily.      dronedarone (MULTAQ) 400 mg Tab Take 1 tablet (400 mg total) by mouth 2 (two) times daily with meals. 60 tablet 0    hydrocodone-acetaminophen 5-325mg (NORCO) 5-325 mg per tablet Take 1 tablet by mouth every 4 (four) hours as needed (pain). 30 tablet 0    tizanidine (ZANAFLEX) 4 MG tablet TAKE 1 TABLET(4 MG) BY MOUTH EVERY 6 HOURS AS NEEDED 30 tablet 5     No current facility-administered medications on file prior to visit.        Family History   Problem Relation Age of Onset    Diabetes Father     Coronary artery disease Father     Cancer Father      stomach       Review of Systems  "  Constitutional: Negative for appetite change, chills, fever and unexpected weight change.   HENT: Positive for trouble swallowing. Negative for sore throat.    Eyes: Negative for pain and visual disturbance.   Respiratory: Negative for cough, chest tightness, shortness of breath and wheezing.    Cardiovascular: Negative for chest pain, palpitations and leg swelling.   Gastrointestinal: Negative for abdominal pain, blood in stool, constipation, diarrhea and nausea.   Genitourinary: Negative for difficulty urinating, dysuria and hematuria.   Musculoskeletal: Positive for back pain and neck pain. Negative for arthralgias and gait problem.   Skin: Negative for rash and wound.   Neurological: Negative for dizziness, weakness, light-headedness and headaches.   Hematological: Negative for adenopathy.   Psychiatric/Behavioral: Negative for dysphoric mood.       Objective:      BP (!) 100/58 (BP Location: Left arm, Patient Position: Sitting)   Pulse 68   Temp 98.3 °F (36.8 °C) (Oral)   Resp 18   Ht 6' 4" (1.93 m)   Wt 108 kg (238 lb 1.6 oz)   BMI 28.98 kg/m²   Physical Exam   Constitutional: He is oriented to person, place, and time. He appears well-developed and well-nourished. He is active. No distress.   HENT:   Head: Normocephalic and atraumatic.   Right Ear: External ear normal.   Left Ear: External ear normal.   Mouth/Throat: Uvula is midline, oropharynx is clear and moist and mucous membranes are normal. No oropharyngeal exudate.   Eyes: Conjunctivae, EOM and lids are normal. Pupils are equal, round, and reactive to light.   Neck: Normal range of motion, full passive range of motion without pain and phonation normal. Neck supple. No thyroid mass and no thyromegaly present.   Cardiovascular: Normal rate, regular rhythm, normal heart sounds and intact distal pulses.  Exam reveals no gallop and no friction rub.    No murmur heard.  Pulmonary/Chest: Effort normal and breath sounds normal. No respiratory distress. " He has no wheezes. He has no rales.   Musculoskeletal: Normal range of motion.   Lymphadenopathy:     He has no cervical adenopathy.   Neurological: He is alert and oriented to person, place, and time. No cranial nerve deficit. Coordination normal.   Skin: Skin is warm and dry.   Psychiatric: He has a normal mood and affect. His speech is normal and behavior is normal. Judgment and thought content normal.         Wearing neck and back brace    Results for orders placed or performed in visit on 08/08/17   Hemoglobin A1c   Result Value Ref Range    Hemoglobin A1C 5.5 4.0 - 5.6 %    Estimated Avg Glucose 111 68 - 131 mg/dL   Comprehensive metabolic panel   Result Value Ref Range    Sodium 140 136 - 145 mmol/L    Potassium 4.3 3.5 - 5.1 mmol/L    Chloride 103 95 - 110 mmol/L    CO2 30 (H) 23 - 29 mmol/L    Glucose 136 (H) 70 - 110 mg/dL    BUN, Bld 13 8 - 23 mg/dL    Creatinine 1.3 0.5 - 1.4 mg/dL    Calcium 9.5 8.7 - 10.5 mg/dL    Total Protein 6.9 6.0 - 8.4 g/dL    Albumin 3.7 3.5 - 5.2 g/dL    Total Bilirubin 0.6 0.1 - 1.0 mg/dL    Alkaline Phosphatase 127 55 - 135 U/L    AST 18 10 - 40 U/L    ALT 11 10 - 44 U/L    Anion Gap 7 (L) 8 - 16 mmol/L    eGFR if African American >60.0 >60 mL/min/1.73 m^2    eGFR if non African American 54.5 (A) >60 mL/min/1.73 m^2   Lipid panel   Result Value Ref Range    Cholesterol 135 120 - 199 mg/dL    Triglycerides 116 30 - 150 mg/dL    HDL 39 (L) 40 - 75 mg/dL    LDL Cholesterol 72.8 63.0 - 159.0 mg/dL    HDL/Chol Ratio 28.9 20.0 - 50.0 %    Total Cholesterol/HDL Ratio 3.5 2.0 - 5.0    Non-HDL Cholesterol 96 mg/dL     Hospital records reviewed    Assessment:       1. Closed fracture of lumbar vertebra with routine healing, unspecified fracture morphology, unspecified lumbar vertebral level, subsequent encounter    2. 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  Hyperlipidemia, unspecified hyperlipidemia type    4. Essential hypertension    5. Paroxysmal atrial fibrillation    6. Iron deficiency anemia, unspecified iron deficiency anemia type    7. Coronary artery disease due to lipid rich plaque    8. Cervical compression fracture, sequela    9. 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, without long-term current use of insulin, unspecified laterality, unspecified retinopathy severity  -     Comprehensive metabolic panel; Future; Expected date: 11/07/2017  -     Hemoglobin A1c; Future; Expected date: 11/07/2017  -     Lipid panel; Future; Expected date: 11/07/2017    Hyperlipidemia, unspecified hyperlipidemia type    Essential hypertension    Paroxysmal atrial fibrillation    Iron deficiency anemia, unspecified iron deficiency anemia type  -     CBC auto differential; Future; Expected date: 11/07/2017    Coronary artery disease due to lipid rich plaque    Cervical compression fracture, sequela    Globus sensation    Other orders  -     Discontinue: metoprolol tartrate (LOPRESSOR) 50 MG tablet; Take 0.5 tablets (25 mg total) by mouth 2 (two) times daily.  Dispense: 30 tablet; Refill: 11  -     rivaroxaban (XARELTO) 20 mg Tab; Take 1 tablet (20 mg total) by mouth daily with dinner or evening meal.  Dispense: 30 tablet; Refill: 11  -     Discontinue: lidocaine 5 % Gel; Apply 1 application topically every 4 (four) hours as needed.  Dispense: 113 g; Refill: 2

## 2017-08-09 NOTE — TELEPHONE ENCOUNTER
Pharmacy asking to change to 2% gel or change to 5% ointment.Lidocaine Gel does not come in 5%.Please advise Thanks

## 2017-08-10 ENCOUNTER — OFFICE VISIT (OUTPATIENT)
Dept: OTOLARYNGOLOGY | Facility: CLINIC | Age: 72
End: 2017-08-10
Payer: MEDICARE

## 2017-08-10 VITALS
HEART RATE: 68 BPM | BODY MASS INDEX: 27.9 KG/M2 | HEIGHT: 77 IN | SYSTOLIC BLOOD PRESSURE: 127 MMHG | DIASTOLIC BLOOD PRESSURE: 62 MMHG | WEIGHT: 236.31 LBS

## 2017-08-10 DIAGNOSIS — T16.2XXA FOREIGN BODY OF LEFT EAR, INITIAL ENCOUNTER: ICD-10-CM

## 2017-08-10 PROCEDURE — 69200 CLEAR OUTER EAR CANAL: CPT | Mod: LT,S$GLB,, | Performed by: OTOLARYNGOLOGY

## 2017-08-10 PROCEDURE — 99999 PR PBB SHADOW E&M-EST. PATIENT-LVL II: CPT | Mod: PBBFAC,,, | Performed by: OTOLARYNGOLOGY

## 2017-08-10 PROCEDURE — 99499 UNLISTED E&M SERVICE: CPT | Mod: S$GLB,,, | Performed by: OTOLARYNGOLOGY

## 2017-08-10 NOTE — TELEPHONE ENCOUNTER
----- Message from Arpit Reyes sent at 8/10/2017  7:51 AM CDT -----  Contact: wife-   Ania -[phone]  Patient needs an appointment today, patient has part of hearing aid stuck in his left ear. Thanks!

## 2017-08-10 NOTE — PROGRESS NOTES
Stu Garcia  1945   [id number]     HPI:  Stu is a 71 y.o. male with a foreign body in external auditory canal requiring magnification and micro instrumentation to remove.      Pre-procedure diagnosis: left Ear Foreign Body   Post-procedure diagnosis: Same    Procedure: Removal of left ear foreign body    Restraints: none   Anesthesia: none    Detail: Consent was obtained for the removal of foreign body from the ear(s). Removal of the object required a high level of expertise and use of an operating microscope and multiple micro-instruments.     With the patient in the supine position, we used the operating microscope to examine both ears with the appropriate sized ear speculum.  A variety of sterile, micro-instruments, including suction 3 and 5 and right angle pick, were utilized to remove the object atraumatically.  I performed the procedure which required a significant amount of time and effort. The tympanic membrane was then well visualized.  The patient tolerated the procedure well and there were no complications.    Findings:   The foreign body was removed from the left ear.  It was identified to be a hearing aid plug.    Right ear  The external canal was normal, and the tympanic membrane was intact with no evidence of middle ear fluid. But dry cerumen against TM    Left ear The external canal was normal, and the tympanic membrane was intact with no evidence of middle ear fluid.      Plan:   Drops: None  Follow-up 6 months for ear cleaning  Continue audiogram follow-up

## 2017-08-11 RX ORDER — LIDOCAINE 50 MG/G
OINTMENT TOPICAL
Qty: 240 G | Refills: 5 | Status: SHIPPED | OUTPATIENT
Start: 2017-08-11 | End: 2017-11-01

## 2017-08-14 RX ORDER — METOPROLOL TARTRATE 50 MG/1
TABLET ORAL
Qty: 60 TABLET | Refills: 0 | Status: SHIPPED | OUTPATIENT
Start: 2017-08-14 | End: 2018-03-28 | Stop reason: DRUGHIGH

## 2017-08-15 ENCOUNTER — TELEPHONE (OUTPATIENT)
Dept: CARDIOLOGY | Facility: CLINIC | Age: 72
End: 2017-08-15

## 2017-08-15 NOTE — TELEPHONE ENCOUNTER
Called patient to cancel his appointment for 8/16 with Dr Maldonado. He is to have a holter monitor as well as an echo before he comes in to see him left message and appointment canceled.

## 2017-08-16 DIAGNOSIS — I48.92 ATRIAL FLUTTER, UNSPECIFIED TYPE: Primary | ICD-10-CM

## 2017-08-16 DIAGNOSIS — I48.91 ATRIAL FIBRILLATION, UNSPECIFIED TYPE: ICD-10-CM

## 2017-08-16 NOTE — TELEPHONE ENCOUNTER
Please advise: patient was recently placed on MULTAQ and stopped his ELIQUIS due to a procedure. He wants to know if it ok to start back taking ELIQUIS with the MULTAQ

## 2017-08-27 ENCOUNTER — PATIENT MESSAGE (OUTPATIENT)
Dept: FAMILY MEDICINE | Facility: CLINIC | Age: 72
End: 2017-08-27

## 2017-09-06 ENCOUNTER — CLINICAL SUPPORT (OUTPATIENT)
Dept: CARDIOLOGY | Facility: CLINIC | Age: 72
End: 2017-09-06
Payer: MEDICARE

## 2017-09-06 DIAGNOSIS — I48.91 ATRIAL FIBRILLATION, UNSPECIFIED TYPE: ICD-10-CM

## 2017-09-06 DIAGNOSIS — I48.92 ATRIAL FLUTTER, UNSPECIFIED TYPE: ICD-10-CM

## 2017-09-06 LAB
AORTIC VALVE REGURGITATION: ABNORMAL
DIASTOLIC DYSFUNCTION: YES
ESTIMATED PA SYSTOLIC PRESSURE: 29.63
MITRAL VALVE MOBILITY: ABNORMAL
MITRAL VALVE REGURGITATION: ABNORMAL
RETIRED EF AND QEF - SEE NOTES: 45 (ref 55–65)
TRICUSPID VALVE REGURGITATION: ABNORMAL

## 2017-09-06 PROCEDURE — 93306 TTE W/DOPPLER COMPLETE: CPT | Mod: S$GLB,,, | Performed by: INTERNAL MEDICINE

## 2017-09-06 PROCEDURE — 93224 XTRNL ECG REC UP TO 48 HRS: CPT | Mod: S$GLB,,, | Performed by: INTERNAL MEDICINE

## 2017-09-08 ENCOUNTER — CLINICAL SUPPORT (OUTPATIENT)
Dept: AUDIOLOGY | Facility: CLINIC | Age: 72
End: 2017-09-08
Payer: MEDICARE

## 2017-09-08 ENCOUNTER — TELEPHONE (OUTPATIENT)
Dept: CARDIOLOGY | Facility: CLINIC | Age: 72
End: 2017-09-08

## 2017-09-08 DIAGNOSIS — Z97.4 HEARING AID WORN: Primary | ICD-10-CM

## 2017-09-08 NOTE — PROGRESS NOTES
Pt had a small closed Phonak dome stuck in his left ear that had come off of his hearing aid and remained in his ear canal when removing the hearing aid a few day ago.  He stated that he is concerned about this happening again.  Pt currently has size 2xS  for both Phonak PETRA BTE hearing aids so they were switched out for size 1xS receivers for both aids.  Pt informed that this will prevent the aids from going as deeply into his ear canal but that the shorter wires may make the hearing aids feel like they are pulling over the top of his ear and more likely to come out.  If this happens, patient is to call the clinic so his 2xS receivers can be put back on his hearing aids.  Pt was issued two packs of wax filters upon request. Pt is to f/u with a phone call to the hearing aid clinic next week to report how the 1xS receivers worked for him over the weekend.

## 2017-09-12 ENCOUNTER — TELEPHONE (OUTPATIENT)
Dept: CARDIOLOGY | Facility: CLINIC | Age: 72
End: 2017-09-12

## 2017-09-12 NOTE — TELEPHONE ENCOUNTER
----- Message from Eduard Cano MD sent at 9/11/2017  8:52 PM CDT -----  holter ok except for premature atrial contractions, benign

## 2017-09-15 ENCOUNTER — PATIENT MESSAGE (OUTPATIENT)
Dept: CARDIOLOGY | Facility: CLINIC | Age: 72
End: 2017-09-15

## 2017-11-01 ENCOUNTER — OFFICE VISIT (OUTPATIENT)
Dept: FAMILY MEDICINE | Facility: CLINIC | Age: 72
End: 2017-11-01
Payer: MEDICARE

## 2017-11-01 ENCOUNTER — PATIENT MESSAGE (OUTPATIENT)
Dept: FAMILY MEDICINE | Facility: CLINIC | Age: 72
End: 2017-11-01

## 2017-11-01 VITALS
SYSTOLIC BLOOD PRESSURE: 96 MMHG | WEIGHT: 235.25 LBS | DIASTOLIC BLOOD PRESSURE: 56 MMHG | TEMPERATURE: 102 F | OXYGEN SATURATION: 95 % | RESPIRATION RATE: 16 BRPM | BODY MASS INDEX: 27.78 KG/M2 | HEIGHT: 77 IN | HEART RATE: 115 BPM

## 2017-11-01 DIAGNOSIS — I48.0 PAROXYSMAL ATRIAL FIBRILLATION: ICD-10-CM

## 2017-11-01 DIAGNOSIS — I10 ESSENTIAL HYPERTENSION: ICD-10-CM

## 2017-11-01 DIAGNOSIS — Z79.01 LONG TERM CURRENT USE OF ANTICOAGULANT: ICD-10-CM

## 2017-11-01 DIAGNOSIS — R11.10 VOMITING, INTRACTABILITY OF VOMITING NOT SPECIFIED, PRESENCE OF NAUSEA NOT SPECIFIED, UNSPECIFIED VOMITING TYPE: ICD-10-CM

## 2017-11-01 DIAGNOSIS — R50.9 FEVER, UNSPECIFIED FEVER CAUSE: ICD-10-CM

## 2017-11-01 DIAGNOSIS — E11.69 TYPE 2 DIABETES MELLITUS WITH HYPERLIPIDEMIA: ICD-10-CM

## 2017-11-01 DIAGNOSIS — R00.0 TACHYCARDIA: ICD-10-CM

## 2017-11-01 DIAGNOSIS — I95.9 HYPOTENSION, UNSPECIFIED HYPOTENSION TYPE: ICD-10-CM

## 2017-11-01 DIAGNOSIS — I48.92 ATRIAL FLUTTER, UNSPECIFIED TYPE: ICD-10-CM

## 2017-11-01 DIAGNOSIS — R10.32 LLQ PAIN: Primary | ICD-10-CM

## 2017-11-01 DIAGNOSIS — R19.7 DIARRHEA, UNSPECIFIED TYPE: ICD-10-CM

## 2017-11-01 DIAGNOSIS — E78.5 TYPE 2 DIABETES MELLITUS WITH HYPERLIPIDEMIA: ICD-10-CM

## 2017-11-01 PROBLEM — K52.9 GASTROENTERITIS, ACUTE: Status: ACTIVE | Noted: 2017-11-01

## 2017-11-01 PROBLEM — N17.9 AKI (ACUTE KIDNEY INJURY): Status: ACTIVE | Noted: 2017-11-01

## 2017-11-01 PROBLEM — E86.0 DEHYDRATION, MODERATE: Status: ACTIVE | Noted: 2017-11-01

## 2017-11-01 PROCEDURE — 99215 OFFICE O/P EST HI 40 MIN: CPT | Mod: S$GLB,,, | Performed by: NURSE PRACTITIONER

## 2017-11-01 PROCEDURE — 99499 UNLISTED E&M SERVICE: CPT | Mod: S$GLB,,, | Performed by: NURSE PRACTITIONER

## 2017-11-01 PROCEDURE — 99999 PR PBB SHADOW E&M-EST. PATIENT-LVL IV: CPT | Mod: PBBFAC,,, | Performed by: NURSE PRACTITIONER

## 2017-11-01 RX ORDER — OMEPRAZOLE 40 MG/1
40 CAPSULE, DELAYED RELEASE ORAL EVERY MORNING
Status: ON HOLD | COMMUNITY
Start: 2017-08-21 | End: 2019-02-25 | Stop reason: HOSPADM

## 2017-11-01 NOTE — PROGRESS NOTES
"Subjective:       Patient ID: Stu Garcia is a 72 y.o. male.    Chief Complaint: Nausea (Started Sunday night ); Diarrhea; Emesis; Fatigue; and Fall    Saturday pt began with N/V and diarrhea "all day long". Daughter was sick with flu like symptoms last week.       Diarrhea    This is a new problem. The current episode started in the past 7 days. The problem occurs 5 to 10 times per day. The problem has been unchanged. The stool consistency is described as watery. Associated symptoms include abdominal pain, chills, a fever, myalgias and vomiting. Pertinent negatives include no arthralgias, bloating, coughing, headaches, increased  flatus, sweats, URI or weight loss. Nothing aggravates the symptoms. Risk factors include ill contacts (also listeria outbreak in bagged salads). Treatments tried: aleve. The treatment provided no relief. There is no history of inflammatory bowel disease, irritable bowel syndrome, malabsorption, a recent abdominal surgery or short gut syndrome.     Vitals:    11/01/17 1008   BP: (!) 96/56   Pulse: (!) 115   Resp: 16   Temp: (!) 102 °F (38.9 °C)     Review of Systems   Constitutional: Positive for activity change, appetite change, chills and fever. Negative for fatigue and weight loss.   HENT: Negative.  Negative for congestion, postnasal drip, sinus pain and sinus pressure.    Eyes: Negative.    Respiratory: Negative.  Negative for cough, chest tightness, shortness of breath and wheezing.    Cardiovascular: Negative.  Negative for chest pain.   Gastrointestinal: Positive for abdominal pain, diarrhea, nausea and vomiting. Negative for bloating, blood in stool and flatus.   Endocrine: Negative.    Genitourinary: Positive for difficulty urinating, flank pain and frequency. Negative for dysuria and hematuria.   Musculoskeletal: Positive for back pain, gait problem and myalgias. Negative for arthralgias.   Skin: Negative.  Negative for color change and rash.   Allergic/Immunologic: " Negative.    Neurological: Positive for weakness. Negative for dizziness, numbness and headaches.   Hematological: Negative.    Psychiatric/Behavioral: Negative.  Negative for agitation, behavioral problems, confusion and decreased concentration.       Past Medical History:   Diagnosis Date    Anticoagulant long-term use     xarelto,asa    Atrial fibrillation 2013    cardioverted    Bleb, lung     Cataract     OS    Colon polyp     Coronary artery disease     Diabetes mellitus     Diabetes mellitus, type 2     Diverticulosis     GERD (gastroesophageal reflux disease)     HEARING LOSS     bilateral aids    Hemorrhoid     HLD (hyperlipidemia)     Hypertension     Iron deficiency anemia     Neuropathy of leg     Pneumonia due to other staphylococcus     Prostate cancer     Spontaneous pneumothorax     bilateral     Objective:      Physical Exam   Constitutional: He is oriented to person, place, and time. He appears well-developed and well-nourished. He has a sickly appearance. He appears ill.   HENT:   Head: Normocephalic and atraumatic.   Right Ear: Hearing, tympanic membrane and ear canal normal.   Left Ear: Hearing, tympanic membrane and ear canal normal.   Nose: Nose normal. Right sinus exhibits no maxillary sinus tenderness and no frontal sinus tenderness. Left sinus exhibits no maxillary sinus tenderness and no frontal sinus tenderness.   Mouth/Throat: Uvula is midline and mucous membranes are normal. No posterior oropharyngeal erythema.   Eyes: Conjunctivae and EOM are normal. Pupils are equal, round, and reactive to light.   Neck: Normal range of motion. Neck supple.   Cardiovascular: Normal rate, regular rhythm, normal heart sounds and intact distal pulses.  Exam reveals no gallop and no friction rub.    No murmur heard.  Pulmonary/Chest: Effort normal and breath sounds normal. No respiratory distress. He has no wheezes. He has no rales.   Abdominal: Soft. Bowel sounds are normal. He  exhibits distension. There is tenderness in the left lower quadrant. There is no rigidity, no rebound and no guarding. No hernia. Hernia confirmed negative in the ventral area.   Musculoskeletal: Normal range of motion.   Generalized weakness - uses walker    Neurological: He is alert and oriented to person, place, and time.   Skin: Skin is warm and dry. No rash noted. No erythema.   Psychiatric: He has a normal mood and affect. His behavior is normal. Judgment and thought content normal.   Nursing note and vitals reviewed.      Assessment:       1. LLQ pain    2. Fever, unspecified fever cause    3. Diarrhea, unspecified type    4. Vomiting, intractability of vomiting not specified, presence of nausea not specified, unspecified vomiting type    5. Hypotension, unspecified hypotension type    6. Tachycardia    7. Paroxysmal atrial fibrillation    8. Essential hypertension    9. Atrial flutter, unspecified type    10. Long term current use of anticoagulant    11. Type 2 diabetes mellitus with hyperlipidemia        Plan:       -        Stu was seen today for nausea, diarrhea, emesis, fatigue and fall.    Diagnoses and all orders for this visit:    Stu was seen today for nausea, diarrhea, emesis, fatigue and fall.    Diagnoses and all orders for this visit:    LLQ pain  -   Fever, unspecified fever cause  -  Diarrhea, unspecified type  -    Vomiting, intractability of vomiting not specified, presence of nausea not specified, unspecified vomiting type  Hypotension, unspecified hypotension type  Tachycardia  Paroxysmal atrial fibrillation    Essential hypertension  Took meds this am   atrial flutter, unspecified type  On meds     Long term current use of anticoagulant  On meds for A fib   Type 2 diabetes mellitus with hyperlipidemia  Took metformin this am         Discussed case with Dr Juan - based on co morbidities and status of patient - will forward to ER for evaluation   Needs CT scan, labs and fluids    Patient and wife voiced understanding   Sent to [facility] ER

## 2017-11-01 NOTE — TELEPHONE ENCOUNTER
Spoke with spouse and patient is having flu like symptoms and not feeling well. Have patient scheduled to see Aminata Kristie NP this am. Verbalized understanding.

## 2017-11-02 PROBLEM — R78.81 BACTEREMIA: Status: ACTIVE | Noted: 2017-11-02

## 2017-11-02 PROBLEM — R78.81 GRAM-NEGATIVE BACTEREMIA: Status: ACTIVE | Noted: 2017-11-02

## 2017-11-03 DIAGNOSIS — E11.9 DIABETES MELLITUS WITHOUT COMPLICATION: ICD-10-CM

## 2017-11-04 PROBLEM — Z75.8 DISCHARGE PLANNING ISSUES: Status: ACTIVE | Noted: 2017-11-04

## 2017-11-04 PROBLEM — A02.0 COLITIS DUE TO SALMONELLA SPECIES: Status: ACTIVE | Noted: 2017-11-01

## 2017-11-09 ENCOUNTER — OFFICE VISIT (OUTPATIENT)
Dept: FAMILY MEDICINE | Facility: CLINIC | Age: 72
End: 2017-11-09
Payer: MEDICARE

## 2017-11-09 VITALS
HEIGHT: 77 IN | BODY MASS INDEX: 28.32 KG/M2 | SYSTOLIC BLOOD PRESSURE: 118 MMHG | WEIGHT: 239.88 LBS | OXYGEN SATURATION: 99 % | DIASTOLIC BLOOD PRESSURE: 70 MMHG | HEART RATE: 66 BPM

## 2017-11-09 DIAGNOSIS — I48.0 PAROXYSMAL ATRIAL FIBRILLATION: ICD-10-CM

## 2017-11-09 DIAGNOSIS — E78.5 HYPERLIPIDEMIA, UNSPECIFIED HYPERLIPIDEMIA TYPE: ICD-10-CM

## 2017-11-09 DIAGNOSIS — I10 ESSENTIAL HYPERTENSION: ICD-10-CM

## 2017-11-09 DIAGNOSIS — E11.69 TYPE 2 DIABETES MELLITUS WITH HYPERLIPIDEMIA: Primary | ICD-10-CM

## 2017-11-09 DIAGNOSIS — E78.5 TYPE 2 DIABETES MELLITUS WITH HYPERLIPIDEMIA: Primary | ICD-10-CM

## 2017-11-09 DIAGNOSIS — I25.83 CORONARY ARTERY DISEASE DUE TO LIPID RICH PLAQUE: ICD-10-CM

## 2017-11-09 DIAGNOSIS — A02.0 COLITIS DUE TO SALMONELLA SPECIES: ICD-10-CM

## 2017-11-09 DIAGNOSIS — I25.10 CORONARY ARTERY DISEASE DUE TO LIPID RICH PLAQUE: ICD-10-CM

## 2017-11-09 DIAGNOSIS — R78.81 BACTEREMIA: ICD-10-CM

## 2017-11-09 PROBLEM — E86.0 DEHYDRATION, MODERATE: Status: RESOLVED | Noted: 2017-11-01 | Resolved: 2017-11-09

## 2017-11-09 PROBLEM — R11.10 VOMITING: Status: RESOLVED | Noted: 2017-11-01 | Resolved: 2017-11-09

## 2017-11-09 PROBLEM — S83.249A ACUTE MEDIAL MENISCAL TEAR: Status: RESOLVED | Noted: 2017-04-07 | Resolved: 2017-11-09

## 2017-11-09 PROBLEM — N17.9 AKI (ACUTE KIDNEY INJURY): Status: RESOLVED | Noted: 2017-11-01 | Resolved: 2017-11-09

## 2017-11-09 PROCEDURE — 99499 UNLISTED E&M SERVICE: CPT | Mod: S$GLB,,, | Performed by: FAMILY MEDICINE

## 2017-11-09 PROCEDURE — 99214 OFFICE O/P EST MOD 30 MIN: CPT | Mod: S$GLB,,, | Performed by: FAMILY MEDICINE

## 2017-11-09 PROCEDURE — G0008 ADMIN INFLUENZA VIRUS VAC: HCPCS | Mod: S$GLB,,, | Performed by: FAMILY MEDICINE

## 2017-11-09 PROCEDURE — 90662 IIV NO PRSV INCREASED AG IM: CPT | Mod: S$GLB,,, | Performed by: FAMILY MEDICINE

## 2017-11-09 PROCEDURE — 99999 PR PBB SHADOW E&M-EST. PATIENT-LVL III: CPT | Mod: PBBFAC,,, | Performed by: FAMILY MEDICINE

## 2017-11-09 RX ORDER — GLIMEPIRIDE 2 MG/1
4 TABLET ORAL
Qty: 60 TABLET | Refills: 11 | Status: SHIPPED | OUTPATIENT
Start: 2017-11-09 | End: 2018-02-09 | Stop reason: SDUPTHER

## 2017-11-09 NOTE — PROGRESS NOTES
Subjective:       Patient ID: Stu Garcia is a 72 y.o. male.    Chief Complaint: Coronary Artery Disease (3 mos f/u, Hosp f/u) and Atrial Fibrillation    Salmonella bactermemia - recently hospitalized at [facility]. It was thought to be related to some contaminated salad. He is taking Augmentin. No further fever. BMs are once daily  He was also given 20 day of potassium.  S/p Anterior cervical decompression fusion done on 7/6/2017 by Dr. Jeter - neck stable; swallowing improved, but still crushing pills  Lumbar fracture - back pain resolved  Diabetes - following diabetic diet diet; He had stopped taking metformin 500mg 2 tabs BID and glimepiride 4mg BID due to trouble swallowing 3 months ago. Metformin reportedly makes him feel bad.  CAD - following with Dr. Maldonado; assymptomatic; taking ASA 81mg daily.   Paroxysmal afib - taking xarelto 20mg daily; taking metoprolol 50mg 1/2 tab BID  HLD - stopped taking simvastatin 40mg daily  Gerd, healing ulcers - taking Omeprazole 40mg daily            Coronary Artery Disease   Pertinent negatives include no chest pain, chest tightness, dizziness, leg swelling, palpitations or shortness of breath.   Atrial Fibrillation   Symptoms are negative for chest pain, dizziness, palpitations, shortness of breath and weakness. Past medical history includes atrial fibrillation and CAD.       Past Medical History:   Diagnosis Date    Anticoagulant long-term use     xarelto,asa    Atrial fibrillation 2013    cardioverted    Bleb, lung     Cataract     OS    Colon polyp     Coronary artery disease     Diabetes mellitus     Diabetes mellitus, type 2     Diverticulosis     GERD (gastroesophageal reflux disease)     HEARING LOSS     bilateral aids    Hemorrhoid     HLD (hyperlipidemia)     Hypertension     Iron deficiency anemia     Neuropathy of leg     Pneumonia due to other staphylococcus     Prostate cancer     prostate    Spontaneous pneumothorax     bilateral       Past  Surgical History:   Procedure Laterality Date    CARDIOVERSION      CATARACT EXTRACTION      bilateral    CHEST TUBE INSERTION      COLONOSCOPY      CORONARY STENT PLACEMENT      x 2    FRACTURE SURGERY      c7 t1 ACDF     HERNIA REPAIR      umbilical    KNEE SURGERY Left 2017    PROSTATECTOMY  2001    open    TUBE THORACOTOMY  1966    pneumothorax left--open       Review of patient's allergies indicates:   Allergen Reactions    Codeine Nausea And Vomiting       Social History     Social History    Marital status:      Spouse name: N/A    Number of children: 2    Years of education: N/A     Occupational History         prior [employer]       Social History Main Topics    Smoking status: Former Smoker     Years: 20.00    Smokeless tobacco: Never Used      Comment: quit smoking in 1980's.    Alcohol use Yes      Comment: 1 drink every 2 weeks    Drug use: No    Sexual activity: Not on file     Other Topics Concern    Not on file     Social History Narrative    From [place]       Current Outpatient Prescriptions on File Prior to Visit   Medication Sig Dispense Refill    acetaminophen (TYLENOL) 325 MG tablet Take 2 tablets (650 mg total) by mouth every 4 (four) hours as needed.  0    amoxicillin-pot clavulanate 250-62.5 mg/5ml (AUGMENTIN) 250-62.5 mg/5 mL suspension Take 10 mLs (500 mg total) by mouth every 8 (eight) hours. 300 mL 0    aspirin (ECOTRIN) 81 MG EC tablet Take 81 mg by mouth once daily.      L.acidophil,parac-S.therm-Bif. (RISAQUAD) Cap capsule Take 1 capsule by mouth once daily.      metoprolol tartrate (LOPRESSOR) 50 MG tablet TAKE ONE TABLET BY MOUTH TWICE DAILY 60 tablet 0    omeprazole (PRILOSEC) 40 MG capsule Take 40 mg by mouth every morning.       potassium chloride (KLOR-CON) 10 MEQ TbSR Take 1 tablet (10 mEq total) by mouth once daily. 20 tablet 0    rivaroxaban (XARELTO) 20 mg Tab Take 1 tablet (20 mg total) by mouth daily with dinner or  "evening meal. 30 tablet 11    traMADol (ULTRAM) 50 mg tablet Take 1 tablet (50 mg total) by mouth every 6 (six) hours as needed. 20 tablet 0    metFORMIN (GLUCOPHAGE) 1000 MG tablet Take 1,000 mg by mouth 2 (two) times daily with meals.       No current facility-administered medications on file prior to visit.        Family History   Problem Relation Age of Onset    Diabetes Father     Coronary artery disease Father     Cancer Father      stomach       Review of Systems   Constitutional: Negative for appetite change, chills, fever and unexpected weight change.   HENT: Positive for trouble swallowing. Negative for sore throat.    Eyes: Negative for pain and visual disturbance.   Respiratory: Negative for cough, chest tightness, shortness of breath and wheezing.    Cardiovascular: Negative for chest pain, palpitations and leg swelling.   Gastrointestinal: Negative for abdominal pain, blood in stool, constipation, diarrhea and nausea.   Genitourinary: Negative for difficulty urinating, dysuria and hematuria.   Musculoskeletal: Positive for back pain and neck pain. Negative for arthralgias and gait problem.   Skin: Negative for rash and wound.   Neurological: Negative for dizziness, weakness, light-headedness and headaches.   Hematological: Negative for adenopathy.   Psychiatric/Behavioral: Negative for dysphoric mood.       Objective:      /70   Pulse 66   Ht 6' 4.5" (1.943 m)   Wt 108.8 kg (239 lb 13.8 oz)   SpO2 99%   BMI 28.82 kg/m²   Physical Exam   Constitutional: He is oriented to person, place, and time. He appears well-developed and well-nourished. He is active. No distress.   HENT:   Head: Normocephalic and atraumatic.   Right Ear: External ear normal.   Left Ear: External ear normal.   Mouth/Throat: Uvula is midline, oropharynx is clear and moist and mucous membranes are normal. No oropharyngeal exudate.   Eyes: Conjunctivae, EOM and lids are normal. Pupils are equal, round, and reactive to " light.   Neck: Normal range of motion, full passive range of motion without pain and phonation normal. Neck supple. No thyroid mass and no thyromegaly present.   Cardiovascular: Normal rate, regular rhythm, normal heart sounds and intact distal pulses.  Exam reveals no gallop and no friction rub.    No murmur heard.  Pulmonary/Chest: Effort normal and breath sounds normal. No respiratory distress. He has no wheezes. He has no rales.   Abdominal: Soft. Normal appearance and bowel sounds are normal. There is no tenderness.   Musculoskeletal: Normal range of motion.   Lymphadenopathy:     He has no cervical adenopathy.   Neurological: He is alert and oriented to person, place, and time. No cranial nerve deficit. Coordination normal.   Skin: Skin is warm and dry.   Psychiatric: He has a normal mood and affect. His speech is normal and behavior is normal. Judgment and thought content normal.         Results for orders placed or performed during the hospital encounter of 11/01/17   Blood culture #1 **CANNOT BE ORDERED STAT**   Result Value Ref Range    Blood Culture, Routine No growth after 5 days.    Blood culture #2 **CANNOT BE ORDERED STAT**   Result Value Ref Range    Blood Culture, Routine Gram stain yandel bottle:  Gram negative rods      Blood Culture, Routine       Results called to and read back by: Clint Wills RN 11/02/2017  05:41 AD    Blood Culture, Routine Gram stain aer bottle: Gram negative rods      Blood Culture, Routine       Positive results previously called 11/02/2017  16:50 DTO    Blood Culture, Routine       SALMONELLA SPECIES  Sending to reference lab for confirmation  Results called to and read back by  Venessa Burden 11/04/2017  07:15 LCL         Susceptibility    Salmonella species - CULTURE, BLOOD     Ampicillin <=8 Sensitive mcg/mL     Ceftriaxone <=8 Sensitive mcg/mL     Ciprofloxacin <=1 Sensitive mcg/mL     Cefepime <=8 Sensitive mcg/mL     Trimeth/Sulfa <=2/38 Sensitive mcg/mL   Stool  culture **CANNOT BE ORDERED AS STAT**   Result Value Ref Range    Stool Culture       SALMONELLA SPECIES  Many  Results called to and read back by Venessa Burden 11/04/2017  07:17  Sending to reference lab for confirmation         Susceptibility    Salmonella species - CULTURE, STOOL     Ampicillin <=8 Sensitive mcg/mL     Ciprofloxacin <=1 Sensitive mcg/mL     Trimeth/Sulfa <=2/38 Sensitive mcg/mL   C Diff Toxin by PCR   Result Value Ref Range    C. diff PCR Negative Negative   E. coli 0157 antigen   Result Value Ref Range    Shiga Toxin 1 E.coli Negative     Shiga Toxin 2 E.coli Negative    Blood culture   Result Value Ref Range    Blood Culture, Routine No growth after 5 days.    Blood culture   Result Value Ref Range    Blood Culture, Routine No growth after 5 days.    CBC auto differential   Result Value Ref Range    WBC 3.60 (L) 3.90 - 12.70 K/uL    RBC 4.29 (L) 4.60 - 6.20 M/uL    Hemoglobin 13.0 (L) 14.0 - 18.0 g/dL    Hematocrit 39.0 (L) 40.0 - 54.0 %    MCV 91 82 - 98 fL    MCH 30.3 27.0 - 31.0 pg    MCHC 33.3 32.0 - 36.0 g/dL    RDW 13.2 11.5 - 14.5 %    Platelets 100 (L) 150 - 350 K/uL    MPV 12.2 9.2 - 12.9 fL    Gran # 2.8 1.8 - 7.7 K/uL    Lymph # 0.4 (L) 1.0 - 4.8 K/uL    Mono # 0.4 0.3 - 1.0 K/uL    Eos # 0.0 0.0 - 0.5 K/uL    Baso # 0.04 0.00 - 0.20 K/uL    nRBC 0 0 /100 WBC    Gran% 78.9 (H) 38.0 - 73.0 %    Lymph% 10.3 (L) 18.0 - 48.0 %    Mono% 9.7 4.0 - 15.0 %    Eosinophil% 0.0 0.0 - 8.0 %    Basophil% 1.1 0.0 - 1.9 %    Platelet Estimate Decreased (A)     Poik Slight     Poly Occasional     Ovalocytes Occasional     Differential Method Automated    Comprehensive metabolic panel (CMP)   Result Value Ref Range    Sodium 135 (L) 136 - 145 mmol/L    Potassium 3.2 (L) 3.5 - 5.1 mmol/L    Chloride 95 95 - 110 mmol/L    CO2 25 22 - 31 mmol/L    Glucose 177 (H) 70 - 110 mg/dL    BUN, Bld 45 (H) 9 - 21 mg/dL    Creatinine 2.12 (H) 0.50 - 1.40 mg/dL    Calcium 9.1 8.4 - 10.2 mg/dL    Total Protein 7.3  6.0 - 8.4 g/dL    Albumin 4.1 3.5 - 5.2 g/dL    Total Bilirubin 0.8 0.2 - 1.3 mg/dL    Alkaline Phosphatase 84 38 - 145 U/L    AST 33 17 - 59 U/L    ALT 28 10 - 44 U/L    Anion Gap 15 8 - 16 mmol/L    eGFR if African American 35 (A) >60 mL/min/1.73 m^2    eGFR if non African American 30 (A) >60 mL/min/1.73 m^2   Urinalysis Clean Catch   Result Value Ref Range    Specimen UA Urine, Unspecified     Color, UA Yellow Yellow, Straw, Romina    Appearance, UA Cloudy (A) Clear    pH, UA 6.0 5.0 - 8.0    Specific Gravity, UA 1.025 1.005 - 1.030    Protein, UA 2+ (A) Negative    Glucose, UA Negative Negative    Ketones, UA Negative Negative    Bilirubin (UA) Negative Negative    Occult Blood UA Negative Negative    Nitrite, UA Negative Negative    Urobilinogen, UA Negative <2.0 EU/dL    Leukocytes, UA Negative Negative   Lactic acid, plasma   Result Value Ref Range    Lactate (Lactic Acid) 1.6 0.5 - 2.2 mmol/L   WBC, Stool   Result Value Ref Range    Stool WBC No WBCs seen No neutrophils seen   Urinalysis Microscopic   Result Value Ref Range    RBC, UA 0 0 - 4 /hpf    WBC, UA 4 0 - 5 /hpf    Bacteria, UA Rare None-Occ /hpf    Hyaline Casts, UA 4 (A) 0 - 1 /lpf    Granular Casts, UA 10 (A) None /lpf    Amorphous, UA Moderate None-Moderate    Microscopic Comment SEE COMMENT    Hemoglobin A1c   Result Value Ref Range    Hemoglobin A1C 9.3 (H) 0.0 - 5.6 %    Estimated Avg Glucose 220 (H) 68 - 131 mg/dL   Basic Metabolic Panel (BMP)   Result Value Ref Range    Sodium 137 136 - 145 mmol/L    Potassium 3.0 (L) 3.5 - 5.1 mmol/L    Chloride 100 95 - 110 mmol/L    CO2 26 22 - 31 mmol/L    Glucose 109 70 - 110 mg/dL    BUN, Bld 42 (H) 9 - 21 mg/dL    Creatinine 1.66 (H) 0.50 - 1.40 mg/dL    Calcium 8.6 8.4 - 10.2 mg/dL    Anion Gap 11 8 - 16 mmol/L    eGFR if African American 47 (A) >60 mL/min/1.73 m^2    eGFR if non African American 40 (A) >60 mL/min/1.73 m^2   CBC auto differential   Result Value Ref Range    WBC 2.75 (L) 3.90 - 12.70  K/uL    RBC 3.60 (L) 4.60 - 6.20 M/uL    Hemoglobin 11.1 (L) 14.0 - 18.0 g/dL    Hematocrit 33.3 (L) 40.0 - 54.0 %    MCV 93 82 - 98 fL    MCH 30.8 27.0 - 31.0 pg    MCHC 33.3 32.0 - 36.0 g/dL    RDW 13.2 11.5 - 14.5 %    Platelets 75 (L) 150 - 350 K/uL    MPV 11.6 9.2 - 12.9 fL    Gran # 2.2 1.8 - 7.7 K/uL    nRBC 0 0 /100 WBC    Gran% 61.0 38.0 - 73.0 %    Lymph% 16.0 (L) 18.0 - 48.0 %    Mono% 4.0 4.0 - 15.0 %    Eosinophil% 1.0 0.0 - 8.0 %    Basophil% 0.0 0.0 - 1.9 %    Bands 18.0 %    Platelet Estimate Appears normal     Differential Method Manual    Magnesium   Result Value Ref Range    Magnesium 1.7 1.6 - 2.6 mg/dL   Basic metabolic panel   Result Value Ref Range    Sodium 138 136 - 145 mmol/L    Potassium 3.2 (L) 3.5 - 5.1 mmol/L    Chloride 102 95 - 110 mmol/L    CO2 27 22 - 31 mmol/L    Glucose 160 (H) 70 - 110 mg/dL    BUN, Bld 29 (H) 9 - 21 mg/dL    Creatinine 1.48 (H) 0.50 - 1.40 mg/dL    Calcium 9.0 8.4 - 10.2 mg/dL    Anion Gap 9 8 - 16 mmol/L    eGFR if African American 53 (A) >60 mL/min/1.73 m^2    eGFR if non African American 46 (A) >60 mL/min/1.73 m^2   CBC auto differential   Result Value Ref Range    WBC 3.66 (L) 3.90 - 12.70 K/uL    RBC 3.64 (L) 4.60 - 6.20 M/uL    Hemoglobin 11.0 (L) 14.0 - 18.0 g/dL    Hematocrit 33.2 (L) 40.0 - 54.0 %    MCV 91 82 - 98 fL    MCH 30.2 27.0 - 31.0 pg    MCHC 33.1 32.0 - 36.0 g/dL    RDW 13.2 11.5 - 14.5 %    Platelets 92 (L) 150 - 350 K/uL    MPV 11.4 9.2 - 12.9 fL    Gran # 2.4 1.8 - 7.7 K/uL    Lymph # 0.6 (L) 1.0 - 4.8 K/uL    Mono # 0.6 0.3 - 1.0 K/uL    Eos # 0.1 0.0 - 0.5 K/uL    Baso # 0.04 0.00 - 0.20 K/uL    nRBC 0 0 /100 WBC    Gran% 65.9 38.0 - 73.0 %    Lymph% 15.3 (L) 18.0 - 48.0 %    Mono% 15.8 (H) 4.0 - 15.0 %    Eosinophil% 1.9 0.0 - 8.0 %    Basophil% 1.1 0.0 - 1.9 %    Platelet Estimate Decreased (A)     Differential Method Automated    Magnesium   Result Value Ref Range    Magnesium 1.8 1.6 - 2.6 mg/dL   Basic metabolic panel   Result Value  Ref Range    Sodium 139 136 - 145 mmol/L    Potassium 3.0 (L) 3.5 - 5.1 mmol/L    Chloride 104 95 - 110 mmol/L    CO2 28 22 - 31 mmol/L    Glucose 151 (H) 70 - 110 mg/dL    BUN, Bld 16 9 - 21 mg/dL    Creatinine 1.13 0.50 - 1.40 mg/dL    Calcium 8.7 8.4 - 10.2 mg/dL    Anion Gap 7 (L) 8 - 16 mmol/L    eGFR if African American >60 >60 mL/min/1.73 m^2    eGFR if non African American >60 >60 mL/min/1.73 m^2   CBC auto differential   Result Value Ref Range    WBC 3.91 3.90 - 12.70 K/uL    RBC 3.37 (L) 4.60 - 6.20 M/uL    Hemoglobin 10.2 (L) 14.0 - 18.0 g/dL    Hematocrit 30.5 (L) 40.0 - 54.0 %    MCV 91 82 - 98 fL    MCH 30.3 27.0 - 31.0 pg    MCHC 33.4 32.0 - 36.0 g/dL    RDW 13.2 11.5 - 14.5 %    Platelets 106 (L) 150 - 350 K/uL    MPV 11.0 9.2 - 12.9 fL    Gran # 2.2 1.8 - 7.7 K/uL    Lymph # 1.0 1.0 - 4.8 K/uL    Mono # 0.6 0.3 - 1.0 K/uL    Eos # 0.2 0.0 - 0.5 K/uL    Baso # 0.03 0.00 - 0.20 K/uL    nRBC 0 0 /100 WBC    Gran% 55.5 38.0 - 73.0 %    Lymph% 24.8 18.0 - 48.0 %    Mono% 14.3 4.0 - 15.0 %    Eosinophil% 4.6 0.0 - 8.0 %    Basophil% 0.8 0.0 - 1.9 %    Differential Method Automated    Basic metabolic panel   Result Value Ref Range    Sodium 139 136 - 145 mmol/L    Potassium 3.3 (L) 3.5 - 5.1 mmol/L    Chloride 105 95 - 110 mmol/L    CO2 27 22 - 31 mmol/L    Glucose 138 (H) 70 - 110 mg/dL    BUN, Bld 9 9 - 21 mg/dL    Creatinine 1.06 0.50 - 1.40 mg/dL    Calcium 8.8 8.4 - 10.2 mg/dL    Anion Gap 7 (L) 8 - 16 mmol/L    eGFR if African American >60 >60 mL/min/1.73 m^2    eGFR if non African American >60 >60 mL/min/1.73 m^2   POCT glucose   Result Value Ref Range    POCT Glucose 143 (H) 70 - 110 mg/dL   POCT glucose   Result Value Ref Range    POCT Glucose 101 70 - 110 mg/dL   POCT glucose   Result Value Ref Range    POCT Glucose 144 (H) 70 - 110 mg/dL   POCT glucose   Result Value Ref Range    POCT Glucose 122 (H) 70 - 110 mg/dL   POCT glucose   Result Value Ref Range    POCT Glucose 181 (H) 70 - 110 mg/dL    POCT glucose   Result Value Ref Range    POCT Glucose 152 (H) 70 - 110 mg/dL   POCT glucose   Result Value Ref Range    POCT Glucose 193 (H) 70 - 110 mg/dL   POCT glucose   Result Value Ref Range    POCT Glucose 164 (H) 70 - 110 mg/dL   POCT glucose   Result Value Ref Range    POCT Glucose 204 (H) 70 - 110 mg/dL   POCT glucose   Result Value Ref Range    POCT Glucose 153 (H) 70 - 110 mg/dL   POCT glucose   Result Value Ref Range    POCT Glucose 151 (H) 70 - 110 mg/dL   POCT glucose   Result Value Ref Range    POCT Glucose 164 (H) 70 - 110 mg/dL   POCT glucose   Result Value Ref Range    POCT Glucose 175 (H) 70 - 110 mg/dL   POCT glucose   Result Value Ref Range    POCT Glucose 137 (H) 70 - 110 mg/dL     Hospital records reviewed    Hgb A1c 9.3    Assessment:       1. Type 2 diabetes mellitus with hyperlipidemia    2. Paroxysmal atrial fibrillation    3. Colitis due to Salmonella species    4. Bacteremia    5. Coronary artery disease due to lipid rich plaque    6. Essential hypertension    7. Hyperlipidemia, unspecified hyperlipidemia type        Plan:       Type 2 diabetes mellitus with hyperlipidemia  -     Comprehensive metabolic panel; Future; Expected date: 02/07/2018  -     Hemoglobin A1c; Future; Expected date: 02/07/2018  -     Lipid panel; Future; Expected date: 02/07/2018    Paroxysmal atrial fibrillation    Colitis due to Salmonella species    Bacteremia    Coronary artery disease due to lipid rich plaque    Essential hypertension    Hyperlipidemia, unspecified hyperlipidemia type    Other orders  -     glimepiride (AMARYL) 2 MG tablet; Take 2 tablets (4 mg total) by mouth daily with breakfast.  Dispense: 60 tablet; Refill: 11        Ok to stop potassium once completed current Rx  Diarrhea resolved. Complete course of Augmentin  Resume glimepiride  Continue f/u with neurosurgeon  Will recheck lipids in 3 months and consider resuming statin at that time  F/u 3 months with labs

## 2017-11-24 RX ORDER — RIVAROXABAN 20 MG/1
TABLET, FILM COATED ORAL
Qty: 30 TABLET | Refills: 3 | Status: SHIPPED | OUTPATIENT
Start: 2017-11-24 | End: 2018-03-26 | Stop reason: SDUPTHER

## 2017-12-06 ENCOUNTER — OFFICE VISIT (OUTPATIENT)
Dept: FAMILY MEDICINE | Facility: CLINIC | Age: 72
End: 2017-12-06
Payer: MEDICARE

## 2017-12-06 DIAGNOSIS — S32.009D CLOSED FRACTURE OF LUMBAR VERTEBRA WITH ROUTINE HEALING, UNSPECIFIED FRACTURE MORPHOLOGY, UNSPECIFIED LUMBAR VERTEBRAL LEVEL, SUBSEQUENT ENCOUNTER: ICD-10-CM

## 2017-12-06 DIAGNOSIS — E78.5 HYPERLIPIDEMIA, UNSPECIFIED HYPERLIPIDEMIA TYPE: ICD-10-CM

## 2017-12-06 DIAGNOSIS — I25.10 CORONARY ARTERY DISEASE DUE TO LIPID RICH PLAQUE: ICD-10-CM

## 2017-12-06 DIAGNOSIS — E11.9 TYPE 2 DIABETES MELLITUS TREATED WITHOUT INSULIN: ICD-10-CM

## 2017-12-06 DIAGNOSIS — I48.0 PAROXYSMAL ATRIAL FIBRILLATION: ICD-10-CM

## 2017-12-06 DIAGNOSIS — Z00.00 ENCOUNTER FOR PREVENTIVE HEALTH EXAMINATION: Primary | ICD-10-CM

## 2017-12-06 DIAGNOSIS — E78.5 TYPE 2 DIABETES MELLITUS WITH HYPERLIPIDEMIA: ICD-10-CM

## 2017-12-06 DIAGNOSIS — I25.83 CORONARY ARTERY DISEASE DUE TO LIPID RICH PLAQUE: ICD-10-CM

## 2017-12-06 DIAGNOSIS — Z79.01 LONG TERM CURRENT USE OF ANTICOAGULANT: ICD-10-CM

## 2017-12-06 DIAGNOSIS — I10 ESSENTIAL HYPERTENSION: ICD-10-CM

## 2017-12-06 DIAGNOSIS — D50.9 IRON DEFICIENCY ANEMIA, UNSPECIFIED IRON DEFICIENCY ANEMIA TYPE: ICD-10-CM

## 2017-12-06 DIAGNOSIS — E11.69 TYPE 2 DIABETES MELLITUS WITH HYPERLIPIDEMIA: ICD-10-CM

## 2017-12-06 DIAGNOSIS — M17.12 PRIMARY OSTEOARTHRITIS OF LEFT KNEE: ICD-10-CM

## 2017-12-06 DIAGNOSIS — D69.6 THROMBOCYTOPENIA: ICD-10-CM

## 2017-12-06 DIAGNOSIS — I42.9 CARDIOMYOPATHY, UNSPECIFIED TYPE: ICD-10-CM

## 2017-12-06 DIAGNOSIS — E11.319 TYPE 2 DIABETES MELLITUS WITH RETINOPATHY WITHOUT MACULAR EDEMA, WITHOUT LONG-TERM CURRENT USE OF INSULIN, UNSPECIFIED LATERALITY, UNSPECIFIED RETINOPATHY SEVERITY: ICD-10-CM

## 2017-12-06 DIAGNOSIS — Z85.46 HISTORY OF PROSTATE CANCER: ICD-10-CM

## 2017-12-06 PROBLEM — R78.81 BACTEREMIA: Status: RESOLVED | Noted: 2017-11-02 | Resolved: 2017-12-06

## 2017-12-06 PROBLEM — A02.0 COLITIS DUE TO SALMONELLA SPECIES: Status: RESOLVED | Noted: 2017-11-01 | Resolved: 2017-12-06

## 2017-12-06 PROCEDURE — 99999 PR PBB SHADOW E&M-EST. PATIENT-LVL V: CPT | Mod: PBBFAC,,, | Performed by: NURSE PRACTITIONER

## 2017-12-06 PROCEDURE — 99499 UNLISTED E&M SERVICE: CPT | Mod: S$GLB,,, | Performed by: NURSE PRACTITIONER

## 2017-12-06 PROCEDURE — G0439 PPPS, SUBSEQ VISIT: HCPCS | Mod: S$GLB,,, | Performed by: NURSE PRACTITIONER

## 2017-12-06 NOTE — PATIENT INSTRUCTIONS
Counseling and Referral of Other Preventative  (Italic type indicates deductible and co-insurance are waived)    Patient Name: Stu Garcia  Today's Date: 12/6/2017      SERVICE LIMITATIONS RECOMMENDATION    Vaccines    · Pneumococcal (once after 65)    · Influenza (annually)    · Hepatitis B (if medium/high risk)    · Prevnar 13      Hepatitis B medium/high risk factors:       - End-stage renal disease       - Hemophiliacs who received Factor VII or         IX concentrates       - Clients of institutions for the mentally             retarded       - Persons who live in the same house as          a HepB carrier       - Homosexual men       - Illicit injectable drug abusers     Pneumococcal: Done, no repeat necessary     Influenza: Done, repeat in one year     Hepatitis B: N/A     Prevnar 13: Done, no repeat necessary    Prostate cancer screening (annually to age 75)     Prostate specific antigen (PSA) Shared decision making with Provider. Sometimes a co-pay may be required if the patient decides to have this test. The USPSTF no longer recommends prostate cancer screening routinely in medicine: every 1 year    Colorectal cancer screening (to age 75)    · Fecal occult blood test (annual)  · Flexible sigmoidoscopy (5y)  · Screening colonoscopy (10y)  · Barium enema   Last done 2015, recommend to repeat every 5-7  years    Diabetes self-management training (no USPSTF recommendations)  Requires referral by treating physician for patient with diabetes or renal disease. 10 hours of initial DSMT sessions of no less than 30 minutes each in a continuous 12-month period. 2 hours of follow-up DSMT in subsequent years.  Scheduled, see appointments    Glaucoma screening (no USPSTF recommendation)  Diabetes mellitus, family history   , age 50 or over    American, age 65 or over  Recommend follow up with eye care professional regularly    Medical nutrition therapy for diabetes or renal disease (no  recommended schedule)  Requires referral by treating physician for patient with diabetes or renal disease or kidney transplant within the past 3 years.  Can be provided in same year as diabetes self-management training (DSMT), and CMS recommends medical nutrition therapy take place after DSMT. Up to 3 hours for initial year and 2 hours in subsequent years.  Scheduled, see appointments    Cardiovascular screening blood tests (every 5 years)  · Fasting lipid panel  Order as a panel if possible  Done this year, repeat every year    Diabetes screening tests (at least every 3 years, Medicare covers annually or at 6-month intervals for prediabetic patients)  · Fasting blood sugar (FBS) or glucose tolerance test (GTT)  Patient must be diagnosed with one of the following:       - Hypertension       - Dyslipidemia       - Obesity (BMI 30kg/m2)       - Previous elevated impaired FBS or GTT       ... or any two of the following:       - Overweight (BMI 25 but <30)       - Family history of diabetes       - Age 65 or older       - History of gestational diabetes or birth of baby weighing more than 9 pounds  N/A    Abdominal aortic aneurysm screening (once)  · Sonogram   Limited to patients who meet one of the following criteria:       - Men who are 65-75 years old and have smoked more than 100 cigarette in their lifetime       - Anyone with a family history of abdominal aortic aneurysm       - Anyone recommended for screening by the USPSTF  Done, no repeat necessary    HIV screening (annually for increased risk patients)  · HIV-1 and HIV-2 by EIA, or SEVERO, rapid antibody test or oral mucosa transudate  Patients must be at increased risk for HIV infection per USPSTF guidelines or pregnant. Tests covered annually for patient at increased risk or as requested by the patient. Pregnant patients may receive up to 3 tests during pregnancy.  Risks discussed, screening is not recommended    Smoking cessation counseling (up to 8  sessions per year)  Patients must be asymptomatic of tobacco-related conditions to receive as a preventative service.  Non-smoker    Subsequent annual wellness visit  At least 12 months since last AWV  Return in one year     The following information is provided to all patients.  This information is to help you find resources for any of the problems found today that may be affecting your health:                Living healthy guide: www.[facility].[place].[facility]      Understanding Diabetes: www.diabetes.org      Eating healthy: www.cdc.gov/healthyweight      CDC home safety checklist: www.cdc.gov/steadi/patient.html      Agency on Aging: www.goea.[place].[facility]      Alcoholics anonymous (AA): www.aa.org      Physical Activity: www.ron.nih.gov/yh6xyzq      Tobacco use: www.quitwithusla.org

## 2017-12-07 VITALS
DIASTOLIC BLOOD PRESSURE: 70 MMHG | SYSTOLIC BLOOD PRESSURE: 138 MMHG | HEART RATE: 70 BPM | HEIGHT: 77 IN | BODY MASS INDEX: 29.16 KG/M2 | WEIGHT: 246.94 LBS

## 2017-12-07 PROBLEM — I42.9 CARDIOMYOPATHY: Status: ACTIVE | Noted: 2017-12-07

## 2017-12-07 PROBLEM — D69.6 THROMBOCYTOPENIA: Status: ACTIVE | Noted: 2017-12-07

## 2017-12-07 PROBLEM — Z75.8 DISCHARGE PLANNING ISSUES: Status: RESOLVED | Noted: 2017-11-04 | Resolved: 2017-12-07

## 2017-12-07 NOTE — PROGRESS NOTES
"Stu Garcia presented for a  Medicare AWV and comprehensive Health Risk Assessment today. The following components were reviewed and updated:    · Medical history  · Family History  · Social history  · Allergies and Current Medications  · Health Risk Assessment  · Health Maintenance  · Care Team     ** See Completed Assessments for Annual Wellness Visit within the encounter summary.**       The following assessments were completed:  · Living Situation  · CAGE  · Depression Screening  · Timed Get Up and Go  · Whisper Test  · Cognitive Function Screening              · Nutrition Screening  · ADL Screening  · PAQ Screening    Vitals:    12/06/17 1310 12/06/17 1320   BP: (!) 142/72 138/70   BP Location: Left arm    Patient Position: Sitting    BP Method: Medium (Automatic)    Pulse: 70    Weight: 112 kg (246 lb 14.6 oz)    Height: 6' 4.5" (1.943 m)      Body mass index is 29.66 kg/m².  Physical Exam   Constitutional: He is oriented to person, place, and time. He appears well-developed and well-nourished. No distress.   HENT:   Head: Normocephalic and atraumatic.   Neck: Carotid bruit is not present.   Cardiovascular: Normal rate, regular rhythm and intact distal pulses.    No murmur heard.  Pulmonary/Chest: Effort normal and breath sounds normal. No respiratory distress. He has no wheezes.   Musculoskeletal:        Cervical back: He exhibits decreased range of motion.   Neurological: He is alert and oriented to person, place, and time.   Skin: Skin is warm and dry.   Psychiatric: He has a normal mood and affect. His behavior is normal. Judgment and thought content normal.         Diagnoses and health risks identified today and associated recommendations/orders:      1. Encounter for preventive health examination  Reviewed health maintenance and provided recommendations        2. 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     - Ambulatory Referral to Diabetes Education    3. Paroxysmal atrial fibrillation  Stable.   Controlled on current medications.  Followed by Rachael.       4. Coronary artery disease due to lipid rich plaque  Stable.   No CP  Followed by rachael.       5. Essential hypertension  Stable.   Controlled on current medications.  Followed by Rachael.       6. Hyperlipidemia, unspecified hyperlipidemia type  Continue to monitor lipids  Followed by Rachael.       7. Closed fracture of lumbar vertebra with routine healing, unspecified fracture morphology, unspecified lumbar vertebral level, subsequent encounter  Healing    Followed by neurosurgery.       8. History of prostate cancer  Stable.   Continue to monitor psa  Followed by Cade Juan MD .       9. Type 2 diabetes mellitus with hyperlipidemia  Last a1c 9.3  Encourage healthy food choices  Followed by Cade Juan MD .     - Ambulatory Referral to Diabetes Education    10. Primary osteoarthritis of left knee  Stable.     Followed by Cade Juan MD .       11. Iron deficiency anemia, unspecified iron deficiency anemia type  Continue to monitor with cbc  Followed by Cade Juan MD .       12. Long term current use of anticoagulant  Stable.     Followed by Rachael.       13. Type 2 diabetes mellitus treated without insulin  Encourage healthy food choices  Followed by Cade Juan MD .     - Ambulatory Referral to Diabetes Education    14. Thrombocytopenia  Continue to monitor   Followed by Cade Juan MD .       15. Cardiomyopathy, unspecified type  Continue to monitor with echo  Followed by Rachael.             Provided Stu with a 5-10 year written screening schedule and personal prevention plan. Recommendations were developed using the USPSTF age appropriate recommendations. Education, counseling, and referrals were provided as  needed. After Visit Summary printed and given to patient which includes a list of additional screenings\tests needed.    Return in about 1 year (around 12/6/2018).    Pilar Shields NP

## 2018-01-11 ENCOUNTER — PATIENT MESSAGE (OUTPATIENT)
Dept: FAMILY MEDICINE | Facility: CLINIC | Age: 73
End: 2018-01-11

## 2018-01-14 RX ORDER — BLOOD SUGAR DIAGNOSTIC
STRIP MISCELLANEOUS
Qty: 100 STRIP | Refills: 0 | Status: SHIPPED | OUTPATIENT
Start: 2018-01-14 | End: 2019-01-18 | Stop reason: SDUPTHER

## 2018-02-05 ENCOUNTER — LAB VISIT (OUTPATIENT)
Dept: LAB | Facility: HOSPITAL | Age: 73
End: 2018-02-05
Attending: FAMILY MEDICINE
Payer: MEDICARE

## 2018-02-05 DIAGNOSIS — E11.69 TYPE 2 DIABETES MELLITUS WITH HYPERLIPIDEMIA: ICD-10-CM

## 2018-02-05 DIAGNOSIS — E78.5 TYPE 2 DIABETES MELLITUS WITH HYPERLIPIDEMIA: ICD-10-CM

## 2018-02-05 DIAGNOSIS — R78.81 SALMONELLA BACTEREMIA: ICD-10-CM

## 2018-02-05 LAB
ALBUMIN SERPL BCP-MCNC: 3.6 G/DL
ALP SERPL-CCNC: 111 U/L
ALT SERPL W/O P-5'-P-CCNC: 14 U/L
ANION GAP SERPL CALC-SCNC: 6 MMOL/L
ANION GAP SERPL CALC-SCNC: 6 MMOL/L
AST SERPL-CCNC: 16 U/L
BILIRUB SERPL-MCNC: 0.3 MG/DL
BUN SERPL-MCNC: 22 MG/DL
BUN SERPL-MCNC: 22 MG/DL
CALCIUM SERPL-MCNC: 9 MG/DL
CALCIUM SERPL-MCNC: 9 MG/DL
CHLORIDE SERPL-SCNC: 105 MMOL/L
CHLORIDE SERPL-SCNC: 105 MMOL/L
CHOLEST SERPL-MCNC: 174 MG/DL
CHOLEST/HDLC SERPL: 4.1 {RATIO}
CO2 SERPL-SCNC: 30 MMOL/L
CO2 SERPL-SCNC: 30 MMOL/L
CREAT SERPL-MCNC: 1.2 MG/DL
CREAT SERPL-MCNC: 1.2 MG/DL
EST. GFR  (AFRICAN AMERICAN): >60 ML/MIN/1.73 M^2
EST. GFR  (AFRICAN AMERICAN): >60 ML/MIN/1.73 M^2
EST. GFR  (NON AFRICAN AMERICAN): 59.6 ML/MIN/1.73 M^2
EST. GFR  (NON AFRICAN AMERICAN): 59.6 ML/MIN/1.73 M^2
ESTIMATED AVG GLUCOSE: 154 MG/DL
GLUCOSE SERPL-MCNC: 171 MG/DL
GLUCOSE SERPL-MCNC: 171 MG/DL
HBA1C MFR BLD HPLC: 7 %
HDLC SERPL-MCNC: 42 MG/DL
HDLC SERPL: 24.1 %
LDLC SERPL CALC-MCNC: 115.2 MG/DL
NONHDLC SERPL-MCNC: 132 MG/DL
POTASSIUM SERPL-SCNC: 4.5 MMOL/L
POTASSIUM SERPL-SCNC: 4.5 MMOL/L
PROT SERPL-MCNC: 6.8 G/DL
SODIUM SERPL-SCNC: 141 MMOL/L
SODIUM SERPL-SCNC: 141 MMOL/L
TRIGL SERPL-MCNC: 84 MG/DL

## 2018-02-05 PROCEDURE — 80053 COMPREHEN METABOLIC PANEL: CPT

## 2018-02-05 PROCEDURE — 83036 HEMOGLOBIN GLYCOSYLATED A1C: CPT

## 2018-02-05 PROCEDURE — 36415 COLL VENOUS BLD VENIPUNCTURE: CPT | Mod: PO

## 2018-02-05 PROCEDURE — 80061 LIPID PANEL: CPT

## 2018-02-09 ENCOUNTER — OFFICE VISIT (OUTPATIENT)
Dept: FAMILY MEDICINE | Facility: CLINIC | Age: 73
End: 2018-02-09
Payer: MEDICARE

## 2018-02-09 VITALS
DIASTOLIC BLOOD PRESSURE: 70 MMHG | RESPIRATION RATE: 18 BRPM | OXYGEN SATURATION: 98 % | HEIGHT: 77 IN | SYSTOLIC BLOOD PRESSURE: 128 MMHG | HEART RATE: 80 BPM | WEIGHT: 262.13 LBS | BODY MASS INDEX: 30.95 KG/M2

## 2018-02-09 DIAGNOSIS — I48.0 PAROXYSMAL ATRIAL FIBRILLATION: ICD-10-CM

## 2018-02-09 DIAGNOSIS — E11.319 TYPE 2 DIABETES MELLITUS WITH RETINOPATHY WITHOUT MACULAR EDEMA, WITHOUT LONG-TERM CURRENT USE OF INSULIN, UNSPECIFIED LATERALITY, UNSPECIFIED RETINOPATHY SEVERITY: Primary | ICD-10-CM

## 2018-02-09 DIAGNOSIS — I10 ESSENTIAL HYPERTENSION: ICD-10-CM

## 2018-02-09 DIAGNOSIS — E78.5 HYPERLIPIDEMIA, UNSPECIFIED HYPERLIPIDEMIA TYPE: ICD-10-CM

## 2018-02-09 DIAGNOSIS — I25.10 CORONARY ARTERY DISEASE DUE TO LIPID RICH PLAQUE: ICD-10-CM

## 2018-02-09 DIAGNOSIS — I25.83 CORONARY ARTERY DISEASE DUE TO LIPID RICH PLAQUE: ICD-10-CM

## 2018-02-09 PROCEDURE — 3008F BODY MASS INDEX DOCD: CPT | Mod: S$GLB,,, | Performed by: FAMILY MEDICINE

## 2018-02-09 PROCEDURE — 1159F MED LIST DOCD IN RCRD: CPT | Mod: S$GLB,,, | Performed by: FAMILY MEDICINE

## 2018-02-09 PROCEDURE — 99999 PR PBB SHADOW E&M-EST. PATIENT-LVL IV: CPT | Mod: PBBFAC,,, | Performed by: FAMILY MEDICINE

## 2018-02-09 PROCEDURE — 99499 UNLISTED E&M SERVICE: CPT | Mod: S$GLB,,, | Performed by: FAMILY MEDICINE

## 2018-02-09 PROCEDURE — 99214 OFFICE O/P EST MOD 30 MIN: CPT | Mod: S$GLB,,, | Performed by: FAMILY MEDICINE

## 2018-02-09 PROCEDURE — 1126F AMNT PAIN NOTED NONE PRSNT: CPT | Mod: S$GLB,,, | Performed by: FAMILY MEDICINE

## 2018-02-09 RX ORDER — GLIMEPIRIDE 2 MG/1
8 TABLET ORAL
Qty: 360 TABLET | Refills: 3
Start: 2018-02-09 | End: 2018-03-28 | Stop reason: DRUGHIGH

## 2018-02-09 RX ORDER — SIMVASTATIN 40 MG/1
40 TABLET, FILM COATED ORAL NIGHTLY
Qty: 90 TABLET | Refills: 3 | Status: ON HOLD | OUTPATIENT
Start: 2018-02-09 | End: 2018-03-22 | Stop reason: HOSPADM

## 2018-02-09 NOTE — PROGRESS NOTES
Subjective:       Patient ID: Stu Garcia is a 72 y.o. male.    Chief Complaint: Follow-up (3 mos)        S/p Anterior cervical decompression fusion done on 7/6/2017 by Dr. Jeter - neck stable; swallowing improved, but still crushing pills  Lumbar fracture - back pain only occurring at night periodically  Diabetes - following diabetic diet diet; He glimepiride 2mg BID. Metformin reportedly makes him feel bad. BGs 160s. No hypoglycemia..  CAD - following with Dr. Maldonado; assymptomatic; taking ASA 81mg daily.   Paroxysmal afib - taking xarelto 20mg daily; taking metoprolol 50mg 1/2 tab BID  HLD - stopped taking simvastatin 40mg daily  Gerd, healing ulcers - taking Omeprazole 40mg daily            Coronary Artery Disease   Pertinent negatives include no chest pain, chest tightness, dizziness, leg swelling, palpitations or shortness of breath.   Atrial Fibrillation   Symptoms are negative for chest pain, dizziness, palpitations, shortness of breath and weakness. Past medical history includes atrial fibrillation and CAD.       Past Medical History:   Diagnosis Date    Anticoagulant long-term use     xarelto,asa    Atrial fibrillation 2013    cardioverted    Bleb, lung     Cataract     OS    Colon polyp     Coronary artery disease     Diabetes mellitus     Diabetes mellitus, type 2     Diverticulosis     GERD (gastroesophageal reflux disease)     HEARING LOSS     bilateral aids    Hemorrhoid     HLD (hyperlipidemia)     Hypertension     Iron deficiency anemia     Neuropathy of leg     Pneumonia due to other staphylococcus     Prostate cancer     prostate    Spontaneous pneumothorax     bilateral       Past Surgical History:   Procedure Laterality Date    CARDIOVERSION      CATARACT EXTRACTION      bilateral    CHEST TUBE INSERTION      COLONOSCOPY      CORONARY STENT PLACEMENT      x 2    FRACTURE SURGERY      c7 t1 ACDF     HERNIA REPAIR      umbilical    KNEE SURGERY Left 2017     PROSTATECTOMY  2001    open    TUBE THORACOTOMY  1966    pneumothorax left--open       Review of patient's allergies indicates:   Allergen Reactions    Codeine Nausea And Vomiting       Social History     Social History    Marital status:      Spouse name: N/A    Number of children: 2    Years of education: N/A     Occupational History         prior [employer]       Social History Main Topics    Smoking status: Former Smoker     Years: 20.00    Smokeless tobacco: Never Used      Comment: quit smoking in 1980's.    Alcohol use Yes      Comment: 1 drink every 2 weeks    Drug use: No    Sexual activity: Not on file     Other Topics Concern    Not on file     Social History Narrative    From [place]       Current Outpatient Prescriptions on File Prior to Visit   Medication Sig Dispense Refill    ACCU-CHEK SHASHANK PLUS TEST STRP Strp TEST ONCE DAILY 100 strip 0    acetaminophen (TYLENOL) 325 MG tablet Take 2 tablets (650 mg total) by mouth every 4 (four) hours as needed.  0    aspirin (ECOTRIN) 81 MG EC tablet Take 81 mg by mouth once daily.      blood sugar diagnostic Strp 1 strip by Misc.(Non-Drug; Combo Route) route once daily. 50 strip 11    metoprolol tartrate (LOPRESSOR) 50 MG tablet TAKE ONE TABLET BY MOUTH TWICE DAILY 60 tablet 0    omeprazole (PRILOSEC) 40 MG capsule Take 40 mg by mouth every morning.       XARELTO 20 mg Tab TAKE 1 TABLET ONE TIME DAILY 30 tablet 3     No current facility-administered medications on file prior to visit.        Family History   Problem Relation Age of Onset    Diabetes Father     Coronary artery disease Father     Cancer Father      stomach       Review of Systems   Constitutional: Negative for activity change, appetite change, chills, fever and unexpected weight change.   HENT: Positive for hearing loss, rhinorrhea and trouble swallowing. Negative for sore throat.    Eyes: Negative for pain, discharge and visual disturbance.  "  Respiratory: Negative for cough, chest tightness, shortness of breath and wheezing.    Cardiovascular: Negative for chest pain, palpitations and leg swelling.   Gastrointestinal: Negative for abdominal pain, blood in stool, constipation, diarrhea, nausea and vomiting.   Endocrine: Negative for polydipsia and polyuria.   Genitourinary: Negative for difficulty urinating, dysuria, hematuria and urgency.   Musculoskeletal: Positive for back pain and neck pain. Negative for arthralgias, gait problem and joint swelling.   Skin: Negative for rash and wound.   Neurological: Negative for dizziness, weakness, light-headedness and headaches.   Hematological: Negative for adenopathy.   Psychiatric/Behavioral: Negative for confusion and dysphoric mood.       Objective:      /70   Pulse 80   Resp 18   Ht 6' 4.5" (1.943 m)   Wt 118.9 kg (262 lb 2 oz)   SpO2 98%   BMI 31.49 kg/m²   Physical Exam   Constitutional: He is oriented to person, place, and time. He appears well-developed and well-nourished. He is active. No distress.   HENT:   Head: Normocephalic and atraumatic.   Right Ear: External ear normal.   Left Ear: External ear normal.   Mouth/Throat: Uvula is midline, oropharynx is clear and moist and mucous membranes are normal. No oropharyngeal exudate.   Eyes: Conjunctivae, EOM and lids are normal. Pupils are equal, round, and reactive to light.   Neck: Normal range of motion, full passive range of motion without pain and phonation normal. Neck supple. No thyroid mass and no thyromegaly present.   Cardiovascular: Normal rate, regular rhythm, normal heart sounds and intact distal pulses.  Exam reveals no gallop and no friction rub.    No murmur heard.  Pulmonary/Chest: Effort normal and breath sounds normal. No respiratory distress. He has no wheezes. He has no rales.   Abdominal: Soft. Normal appearance and bowel sounds are normal. There is no tenderness.   Musculoskeletal: Normal range of motion. "   Lymphadenopathy:     He has no cervical adenopathy.   Neurological: He is alert and oriented to person, place, and time. No cranial nerve deficit. Coordination normal.   Skin: Skin is warm and dry.   Psychiatric: He has a normal mood and affect. His speech is normal and behavior is normal. Judgment and thought content normal.         Results for orders placed or performed in visit on 02/05/18   Basic metabolic panel   Result Value Ref Range    Sodium 141 136 - 145 mmol/L    Potassium 4.5 3.5 - 5.1 mmol/L    Chloride 105 95 - 110 mmol/L    CO2 30 (H) 23 - 29 mmol/L    Glucose 171 (H) 70 - 110 mg/dL    BUN, Bld 22 8 - 23 mg/dL    Creatinine 1.2 0.5 - 1.4 mg/dL    Calcium 9.0 8.7 - 10.5 mg/dL    Anion Gap 6 (L) 8 - 16 mmol/L    eGFR if African American >60.0 >60 mL/min/1.73 m^2    eGFR if non  59.6 (A) >60 mL/min/1.73 m^2   Comprehensive metabolic panel   Result Value Ref Range    Sodium 141 136 - 145 mmol/L    Potassium 4.5 3.5 - 5.1 mmol/L    Chloride 105 95 - 110 mmol/L    CO2 30 (H) 23 - 29 mmol/L    Glucose 171 (H) 70 - 110 mg/dL    BUN, Bld 22 8 - 23 mg/dL    Creatinine 1.2 0.5 - 1.4 mg/dL    Calcium 9.0 8.7 - 10.5 mg/dL    Total Protein 6.8 6.0 - 8.4 g/dL    Albumin 3.6 3.5 - 5.2 g/dL    Total Bilirubin 0.3 0.1 - 1.0 mg/dL    Alkaline Phosphatase 111 55 - 135 U/L    AST 16 10 - 40 U/L    ALT 14 10 - 44 U/L    Anion Gap 6 (L) 8 - 16 mmol/L    eGFR if African American >60.0 >60 mL/min/1.73 m^2    eGFR if non  59.6 (A) >60 mL/min/1.73 m^2   Hemoglobin A1c   Result Value Ref Range    Hemoglobin A1C 7.0 (H) 4.0 - 5.6 %    Estimated Avg Glucose 154 (H) 68 - 131 mg/dL   Lipid panel   Result Value Ref Range    Cholesterol 174 120 - 199 mg/dL    Triglycerides 84 30 - 150 mg/dL    HDL 42 40 - 75 mg/dL    LDL Cholesterol 115.2 63.0 - 159.0 mg/dL    HDL/Chol Ratio 24.1 20.0 - 50.0 %    Total Cholesterol/HDL Ratio 4.1 2.0 - 5.0    Non-HDL Cholesterol 132 mg/dL     Hgb A1c down from  9.3  Assessment:       1. 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 Coronary artery disease due to lipid rich plaque    3. Essential hypertension    4. Hyperlipidemia, unspecified hyperlipidemia type    5. 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  Dispense: 90 tablet; Refill: 3  -     glimepiride (AMARYL) 2 MG tablet; Take 4 tablets (8 mg total) by mouth daily with breakfast.  Dispense: 360 tablet; Refill: 3          Overall improving  increase  glimepiride  Continue other present meds  Counseled on regular exercise, maintenance of a healthy weight, balanced diet rich in fruits/vegetables and lean protein, and avoidance of unhealthy habits like smoking and excessive alcohol intake.  F/u 3 months with labs

## 2018-03-16 ENCOUNTER — OFFICE VISIT (OUTPATIENT)
Dept: PRIMARY CARE CLINIC | Facility: CLINIC | Age: 73
End: 2018-03-16
Payer: MEDICARE

## 2018-03-16 VITALS
DIASTOLIC BLOOD PRESSURE: 64 MMHG | BODY MASS INDEX: 32.69 KG/M2 | HEIGHT: 77 IN | TEMPERATURE: 99 F | OXYGEN SATURATION: 97 % | HEART RATE: 77 BPM | WEIGHT: 276.88 LBS | SYSTOLIC BLOOD PRESSURE: 130 MMHG

## 2018-03-16 DIAGNOSIS — R07.9 CHEST PAIN ON EXERTION: Primary | ICD-10-CM

## 2018-03-16 DIAGNOSIS — R06.02 SOB (SHORTNESS OF BREATH) ON EXERTION: ICD-10-CM

## 2018-03-16 PROBLEM — I20.0 UNSTABLE ANGINA: Status: ACTIVE | Noted: 2018-03-16

## 2018-03-16 PROCEDURE — 99999 PR PBB SHADOW E&M-EST. PATIENT-LVL III: CPT | Mod: PBBFAC,,, | Performed by: NURSE PRACTITIONER

## 2018-03-16 PROCEDURE — 3078F DIAST BP <80 MM HG: CPT | Mod: CPTII,S$GLB,, | Performed by: NURSE PRACTITIONER

## 2018-03-16 PROCEDURE — 99214 OFFICE O/P EST MOD 30 MIN: CPT | Mod: S$GLB,,, | Performed by: NURSE PRACTITIONER

## 2018-03-16 PROCEDURE — 3075F SYST BP GE 130 - 139MM HG: CPT | Mod: CPTII,S$GLB,, | Performed by: NURSE PRACTITIONER

## 2018-03-16 NOTE — PROGRESS NOTES
Subjective:       Patient ID: Stu Garcia is a 72 y.o. male.    Chief Complaint: Chest Pain (started month ago- noticed walking to mailbox) and Shortness of Breath    Patient who is new to me presents with a c/o chest pain. Patient has been noticing an increase in chest pain with less activity. He has noticed he gets CP and SOB when walking to the mailbox. When the chest pain occurs it last for about 5 minutes and is relieved with rest. Patient has two cardiac stents.       Chest Pain    This is a new problem. The current episode started 1 to 4 weeks ago. The onset quality is sudden. The problem occurs daily. The problem has been gradually worsening. The pain is present in the epigastric region. The pain is at a severity of 4/10. The quality of the pain is described as pressure and stabbing. The pain radiates to the left arm, right arm and mid back. Associated symptoms include exertional chest pressure, lower extremity edema and shortness of breath. Pertinent negatives include no abdominal pain, back pain, fever, headaches, irregular heartbeat, malaise/fatigue, nausea, near-syncope, numbness or palpitations. The pain is aggravated by walking and movement. He has tried rest for the symptoms. The treatment provided mild relief. Risk factors include being elderly, male gender, obesity and sedentary lifestyle.   His past medical history is significant for CAD, diabetes and hypertension.     Review of Systems   Constitutional: Negative for chills, fatigue, fever and malaise/fatigue.   HENT: Negative for congestion, ear pain, sinus pressure and sore throat.    Respiratory: Positive for shortness of breath. Negative for wheezing.    Cardiovascular: Positive for chest pain and leg swelling. Negative for palpitations and near-syncope.   Gastrointestinal: Negative for abdominal distention, abdominal pain and nausea.   Genitourinary: Negative for dysuria and flank pain.   Musculoskeletal: Negative for back pain.   Skin:  Negative for color change and pallor.   Neurological: Negative for light-headedness, numbness and headaches.       Objective:      Physical Exam   Constitutional: He is oriented to person, place, and time. He appears well-developed and well-nourished.   HENT:   Head: Normocephalic and atraumatic.   Right Ear: External ear normal.   Left Ear: External ear normal.   Eyes: Conjunctivae and EOM are normal. Pupils are equal, round, and reactive to light.   Neck: Normal range of motion. Neck supple.   Cardiovascular: Normal rate and regular rhythm.    Murmur heard.  Pulmonary/Chest: Effort normal and breath sounds normal.   Abdominal: Soft. Bowel sounds are normal.   Musculoskeletal: Normal range of motion.   Neurological: He is alert and oriented to person, place, and time.   Skin: Skin is warm and dry.   Nursing note and vitals reviewed.      Assessment:       1. Chest pain on exertion    2. SOB (shortness of breath) on exertion        Plan:       Chest pain on exertion    SOB (shortness of breath) on exertion    Patient with chest pain on exertion and SOB, advised by Dr. Cano office to send patient to ED for further work up. Patient refusing to go by EMS and wants to wait for wife to pick him up. Discussed with patient that this may result in delay of care and he verbalized understanding. Patient monitored until wife was able to come and transport patient to [facility] ED. Report called to [facility] ED.

## 2018-03-17 PROBLEM — N17.9 AKI (ACUTE KIDNEY INJURY): Status: ACTIVE | Noted: 2018-03-17

## 2018-03-18 PROBLEM — I21.4 NSTEMI (NON-ST ELEVATED MYOCARDIAL INFARCTION): Status: ACTIVE | Noted: 2018-03-18

## 2018-03-18 PROBLEM — I25.110 CORONARY ARTERY DISEASE INVOLVING NATIVE CORONARY ARTERY OF NATIVE HEART WITH UNSTABLE ANGINA PECTORIS: Status: ACTIVE | Noted: 2018-03-18

## 2018-03-22 ENCOUNTER — TELEPHONE (OUTPATIENT)
Dept: CARDIOLOGY | Facility: CLINIC | Age: 73
End: 2018-03-22

## 2018-03-22 NOTE — TELEPHONE ENCOUNTER
----- Message from Tisha Mckeon sent at 3/22/2018  9:57 AM CDT -----  Contact: Jenny at [facility] [phone] on [address]   Jenny at [facility] [phone] on [address], calling for a one month follow up on stent placement  At [facility]. Please advise. Thanks.

## 2018-03-26 ENCOUNTER — LAB VISIT (OUTPATIENT)
Dept: LAB | Facility: HOSPITAL | Age: 73
End: 2018-03-26
Attending: INTERNAL MEDICINE
Payer: MEDICARE

## 2018-03-26 DIAGNOSIS — N17.9 AKI (ACUTE KIDNEY INJURY): ICD-10-CM

## 2018-03-26 LAB
ANION GAP SERPL CALC-SCNC: 6 MMOL/L
BUN SERPL-MCNC: 18 MG/DL
CALCIUM SERPL-MCNC: 9.5 MG/DL
CHLORIDE SERPL-SCNC: 103 MMOL/L
CO2 SERPL-SCNC: 28 MMOL/L
CREAT SERPL-MCNC: 1.3 MG/DL
EST. GFR  (AFRICAN AMERICAN): >60 ML/MIN/1.73 M^2
EST. GFR  (NON AFRICAN AMERICAN): 54.1 ML/MIN/1.73 M^2
GLUCOSE SERPL-MCNC: 190 MG/DL
POTASSIUM SERPL-SCNC: 5.6 MMOL/L
SODIUM SERPL-SCNC: 137 MMOL/L

## 2018-03-26 PROCEDURE — 80048 BASIC METABOLIC PNL TOTAL CA: CPT

## 2018-03-26 PROCEDURE — 36415 COLL VENOUS BLD VENIPUNCTURE: CPT | Mod: PO

## 2018-03-26 RX ORDER — RIVAROXABAN 20 MG/1
TABLET, FILM COATED ORAL
Qty: 30 TABLET | Refills: 3 | Status: SHIPPED | OUTPATIENT
Start: 2018-03-26 | End: 2018-06-28 | Stop reason: SDUPTHER

## 2018-03-28 ENCOUNTER — PES CALL (OUTPATIENT)
Dept: ADMINISTRATIVE | Facility: CLINIC | Age: 73
End: 2018-03-28

## 2018-03-28 PROBLEM — I42.9 CARDIOMYOPATHY: Status: RESOLVED | Noted: 2017-12-07 | Resolved: 2018-03-28

## 2018-04-04 DIAGNOSIS — I21.4 NSTEMI (NON-ST ELEVATED MYOCARDIAL INFARCTION): Primary | ICD-10-CM

## 2018-04-06 ENCOUNTER — CLINICAL SUPPORT (OUTPATIENT)
Dept: AUDIOLOGY | Facility: CLINIC | Age: 73
End: 2018-04-06
Payer: MEDICARE

## 2018-04-06 DIAGNOSIS — Z97.4 WEARS HEARING AID: Primary | ICD-10-CM

## 2018-04-06 NOTE — PROGRESS NOTES
"Pt brought his left hearing aid to the clinic. He stated that he dropped it on the floor at home and before he could pick it up his dog picked it up and chewed on it.  The hearing aid was severely damaged (crushed case, broken battery door, exposed electronics, etc.) but pt stated that he wanted to have the hearing aid sent to [employer] to see if they could "just replace the case if the electronics are working okay".  Informed pt that this is not an issue that is considered a routine "repair" and would instead fall under the Loss/Damage policy but due to the age of the hearing aid he is likely out of warranty.  Will send aid to [employer] for a repair consult and call patient regarding the outcome once we have heard back from [employer].   "

## 2018-04-12 ENCOUNTER — TELEPHONE (OUTPATIENT)
Dept: AUDIOLOGY | Facility: CLINIC | Age: 73
End: 2018-04-12

## 2018-04-17 NOTE — TELEPHONE ENCOUNTER
Informed pt that his original hearing aid cannot be repaired per [employer] due to excessive damage from the dog biting it (electronics not salvagable).  He is out of warranty so there is no Loss/Damage coverage available.  Told pt that I am waiting to hear back from [employer] rep re: the possibility of a different model of hearing aid being available for the pt to get as a replacement instead.  Will call pt back when I hear from her.

## 2018-04-19 ENCOUNTER — TELEPHONE (OUTPATIENT)
Dept: CARDIOLOGY | Facility: CLINIC | Age: 73
End: 2018-04-19

## 2018-04-19 NOTE — TELEPHONE ENCOUNTER
----- Message from Sis Ng sent at 4/19/2018  9:44 AM CDT -----  Contact: Tyson sue/ [facility]   Tyson sue/ [facility] calling to speak to the Nurse regarding patient. Please advise. Spoke with Debbie about correcting cardiac rehab and she is wanting to touch base.   Call back    Thanks!

## 2018-04-23 ENCOUNTER — OFFICE VISIT (OUTPATIENT)
Dept: CARDIOLOGY | Facility: CLINIC | Age: 73
End: 2018-04-23
Payer: MEDICARE

## 2018-04-23 VITALS
DIASTOLIC BLOOD PRESSURE: 67 MMHG | BODY MASS INDEX: 33.72 KG/M2 | HEART RATE: 72 BPM | SYSTOLIC BLOOD PRESSURE: 130 MMHG | WEIGHT: 276.88 LBS | RESPIRATION RATE: 16 BRPM | HEIGHT: 76 IN

## 2018-04-23 DIAGNOSIS — I10 ESSENTIAL HYPERTENSION: ICD-10-CM

## 2018-04-23 DIAGNOSIS — I48.0 PAF (PAROXYSMAL ATRIAL FIBRILLATION): ICD-10-CM

## 2018-04-23 DIAGNOSIS — I25.110 CORONARY ARTERY DISEASE INVOLVING NATIVE CORONARY ARTERY OF NATIVE HEART WITH UNSTABLE ANGINA PECTORIS: ICD-10-CM

## 2018-04-23 DIAGNOSIS — I25.10 CORONARY ARTERY DISEASE INVOLVING NATIVE CORONARY ARTERY OF NATIVE HEART WITHOUT ANGINA PECTORIS: Primary | ICD-10-CM

## 2018-04-23 DIAGNOSIS — E78.5 DYSLIPIDEMIA: ICD-10-CM

## 2018-04-23 DIAGNOSIS — S33.5XXS LUMBAR BACK SPRAIN, SEQUELA: ICD-10-CM

## 2018-04-23 PROCEDURE — 99499 UNLISTED E&M SERVICE: CPT | Mod: S$GLB,,, | Performed by: INTERNAL MEDICINE

## 2018-04-23 PROCEDURE — 3078F DIAST BP <80 MM HG: CPT | Mod: CPTII,S$GLB,, | Performed by: INTERNAL MEDICINE

## 2018-04-23 PROCEDURE — 99999 PR PBB SHADOW E&M-EST. PATIENT-LVL III: CPT | Mod: PBBFAC,,, | Performed by: INTERNAL MEDICINE

## 2018-04-23 PROCEDURE — 3075F SYST BP GE 130 - 139MM HG: CPT | Mod: CPTII,S$GLB,, | Performed by: INTERNAL MEDICINE

## 2018-04-23 PROCEDURE — 99214 OFFICE O/P EST MOD 30 MIN: CPT | Mod: S$GLB,,, | Performed by: INTERNAL MEDICINE

## 2018-04-23 RX ORDER — TIZANIDINE 4 MG/1
TABLET ORAL
Qty: 385 TABLET | Refills: 5 | Status: ON HOLD | OUTPATIENT
Start: 2018-04-23 | End: 2019-07-09 | Stop reason: HOSPADM

## 2018-04-23 RX ORDER — FUROSEMIDE 20 MG/1
20 TABLET ORAL
Qty: 20 TABLET | Refills: 3 | Status: SHIPPED | OUTPATIENT
Start: 2018-04-23 | End: 2018-08-20 | Stop reason: SDUPTHER

## 2018-04-23 NOTE — PROGRESS NOTES
Subjective:    Patient ID:  Stu Garcia is a 72 y.o. male who presents for follow-up of cad    HPI  Admitted to [facility] last month with cp/sob  Ended up getting multivessel pci  He states he felt much better after pci. Nevertheless, for the last few weeks he's got some swelling both feet and some SOB  No chest pain    Review of Systems   Constitution: Negative for decreased appetite, weakness, malaise/fatigue, weight gain and weight loss.   Cardiovascular: Positive for leg swelling. Negative for chest pain, dyspnea on exertion, palpitations and syncope.   Respiratory: Negative for cough and shortness of breath.    Gastrointestinal: Negative.    All other systems reviewed and are negative.       Objective:    Physical Exam   Constitutional: He is oriented to person, place, and time. He appears well-developed and well-nourished.   HENT:   Head: Normocephalic.   Eyes: Pupils are equal, round, and reactive to light.   Neck: Normal range of motion. Neck supple. No JVD present. Carotid bruit is not present. No thyromegaly present.   Cardiovascular: Normal rate, regular rhythm, normal heart sounds, intact distal pulses and normal pulses.   Occasional extrasystoles are present. PMI is not displaced.  Exam reveals no gallop.    No murmur heard.  Pulmonary/Chest: Effort normal and breath sounds normal.   Abdominal: Soft. Normal appearance. He exhibits no mass. There is no hepatosplenomegaly. There is no tenderness.   Musculoskeletal: Normal range of motion. He exhibits no edema.   Neurological: He is alert and oriented to person, place, and time. He has normal strength and normal reflexes. No sensory deficit.   Skin: Skin is warm and intact.   Psychiatric: He has a normal mood and affect.   Nursing note and vitals reviewed.        Assessment:       1. Coronary artery disease involving native coronary artery of native heart without angina pectoris    2. PAF (paroxysmal atrial fibrillation)    3. Essential hypertension    4.  Dyslipidemia    5. Coronary artery disease involving native coronary artery of native heart with unstable angina pectoris         Plan:   Lasix m-w-f  Continue all cardiac medications  Regular exercise program  Weight loss  3 m f/u

## 2018-04-24 ENCOUNTER — OFFICE VISIT (OUTPATIENT)
Dept: PODIATRY | Facility: CLINIC | Age: 73
End: 2018-04-24
Payer: MEDICARE

## 2018-04-24 VITALS — HEIGHT: 76 IN | BODY MASS INDEX: 33.92 KG/M2 | WEIGHT: 278.56 LBS

## 2018-04-24 DIAGNOSIS — B35.1 ONYCHOMYCOSIS DUE TO DERMATOPHYTE: ICD-10-CM

## 2018-04-24 DIAGNOSIS — E11.42 DIABETIC PERIPHERAL NEUROPATHY ASSOCIATED WITH TYPE 2 DIABETES MELLITUS: Primary | ICD-10-CM

## 2018-04-24 DIAGNOSIS — M20.40 HAMMER TOE, UNSPECIFIED LATERALITY: ICD-10-CM

## 2018-04-24 PROCEDURE — 3045F PR MOST RECENT HEMOGLOBIN A1C LEVEL 7.0-9.0%: CPT | Mod: CPTII,S$GLB,, | Performed by: PODIATRIST

## 2018-04-24 PROCEDURE — 99499 UNLISTED E&M SERVICE: CPT | Mod: S$GLB,,, | Performed by: PODIATRIST

## 2018-04-24 PROCEDURE — 99999 PR PBB SHADOW E&M-EST. PATIENT-LVL III: CPT | Mod: PBBFAC,,, | Performed by: PODIATRIST

## 2018-04-24 PROCEDURE — 99212 OFFICE O/P EST SF 10 MIN: CPT | Mod: S$GLB,,, | Performed by: PODIATRIST

## 2018-04-24 NOTE — PROGRESS NOTES
Subjective:      Patient ID: Stu Garcia is a 72 y.o. male.    Chief Complaint: Diabetic Foot Exam (Dr. Juan 02/19/2018)    Stu is a 72 y.o. male who presents to the clinic for evaluation and treatment of high risk feet. Stu has a past medical history of Anticoagulant long-term use; Arthritis; Atrial fibrillation (2013); Bleb, lung; Cataract; Colon polyp; Coronary artery disease; Coronary artery disease involving native coronary artery of native heart with unstable angina pectoris (3/18/2018); Diabetes mellitus; Diabetes mellitus, type 2; Diverticulosis; GERD (gastroesophageal reflux disease); HEARING LOSS; Hemorrhoid; HLD (hyperlipidemia); Hypertension; Iron deficiency anemia; Neuropathy of leg; Pneumonia due to other staphylococcus; Prostate cancer; and Spontaneous pneumothorax. The patient's chief complaint is Diabetes, increased risk amputation needing evaluation/management/optomization of foot care.    This patient has documented high risk feet requiring routine maintenance secondary to diabetes mellitis and those secondary complications of diabetes, as mentioned. no current symptoms.    PCP: Cade Juan MD    Date Last Seen by PCP:   Chief Complaint   Patient presents with    Diabetic Foot Exam     Dr. Juan 02/19/2018         Current shoe gear:  Affected Foot: Casual shoes     Unaffected Foot: Casual shoes    Hemoglobin A1C   Date Value Ref Range Status   03/16/2018 7.8 (H) 0.0 - 5.6 % Final     Comment:     Reference Interval:  5.0 - 5.6 Normal   5.7 - 6.4 High Risk   > 6.5 Diabetic    Hgb A1c results are standardized based on the (NGSP) National   Glycohemoglobin Standardization Program.    Hemoglobin A1C levels are related to mean serum/plasma glucose   during the preceding 2-3 months.        02/05/2018 7.0 (H) 4.0 - 5.6 % Final     Comment:     According to ADA guidelines, hemoglobin A1c <7.0% represents  optimal control in non-pregnant diabetic patients. Different  metrics may  apply to specific patient populations.   Standards of Medical Care in Diabetes-2016.  For the purpose of screening for the presence of diabetes:  <5.7%     Consistent with the absence of diabetes  5.7-6.4%  Consistent with increasing risk for diabetes   (prediabetes)  >or=6.5%  Consistent with diabetes  Currently, no consensus exists for use of hemoglobin A1c  for diagnosis of diabetes for children.  This Hemoglobin A1c assay has significant interference with fetal   hemoglobin   (HbF). The results are invalid for patients with abnormal amounts of   HbF,   including those with known Hereditary Persistence   of Fetal Hemoglobin. Heterozygous hemoglobin variants (HbAS, HbAC,   HbAD, HbAE, HbA2) do not significantly interfere with this assay;   however, presence of multiple variants in a sample may impact the %   interference.     11/01/2017 9.3 (H) 0.0 - 5.6 % Final     Comment:     Reference Interval:  5.0 - 5.6 Normal   5.7 - 6.4 High Risk   > 6.5 Diabetic    Hgb A1c results are standardized based on the (NGSP) National   Glycohemoglobin Standardization Program.    Hemoglobin A1C levels are related to mean serum/plasma glucose   during the preceding 2-3 months.            Review of Systems   Constitution: Negative for chills, diaphoresis, fever, malaise/fatigue and night sweats.   Cardiovascular: Positive for leg swelling. Negative for claudication, cyanosis and syncope.   Skin: Positive for nail changes. Negative for color change, dry skin, rash, suspicious lesions and unusual hair distribution.   Musculoskeletal: Negative for falls, joint pain, joint swelling, muscle cramps, muscle weakness and stiffness.   Gastrointestinal: Negative for constipation, diarrhea, nausea and vomiting.   Neurological: Positive for numbness and sensory change. Negative for brief paralysis, disturbances in coordination, focal weakness, paresthesias and tremors.           Objective:      Physical Exam   Constitutional: He is oriented to  person, place, and time. He appears well-developed and well-nourished. He is cooperative.   Oriented to time, place, and person.   Cardiovascular:   Pulses:       Dorsalis pedis pulses are 1+ on the right side, and 1+ on the left side.        Posterior tibial pulses are 1+ on the right side, and 1+ on the left side.   Capillary fill time 3-5 seconds.  All toes warm to touch.      Negative lower extremity edema bilateral.    Negative elevational pallor and dependent rubor bilateral.     Musculoskeletal:   Normal angle, base, station of gait. Decreased stride length, early heel off, moderately propulsive toe off bilateral.    All ten toes without clubbing, cyanosis, or signs of ischemia.      Patient has hammertoes of digits  2 bilateral with adductovarus position                 partially reducible without symptom today.      No pain to palpation bilateral lower extremities.      Range of motion, stability, muscle strength, and muscle tone are age and health appropriate normal bilateral feet and legs.       Lymphadenopathy:   Negative lymphadenopathy bilateral popliteal fossa and tarsal tunnel.  Negative lymphangitic streaking bilateral foot/ankle bilateral.     Neurological: He is alert and oriented to person, place, and time. He has normal strength. He is not disoriented. He displays no atrophy and no tremor. A sensory deficit is present. He exhibits normal muscle tone.   Reflex Scores:       Patellar reflexes are 2+ on the right side and 2+ on the left side.       Achilles reflexes are 2+ on the right side and 2+ on the left side.  Decreased/absent vibratory sensation bilateral feet to 128Hz tuning fork.    Diminished/loss of protective sensation all toes bilateral to 10 gram monofilament.     Skin: Skin is warm, dry and intact. No abrasion, no bruising, no burn, no ecchymosis, no laceration, no lesion, no petechiae and no rash noted. He is not diaphoretic. No cyanosis or erythema. No pallor. Nails show no  clubbing.   Skin thin, atrophic, with decreased density and distribution of pedal hair bilateral, but without hyperpigmentation, phil discoloration,  ulcers, masses, nodules or cords palpated bilateral feet and legs.      Toenails 1st, 2nd, 3rd, 4th, 5th  bilateral are hypertrophic thickened 2-3 mm, dystrophic, discolored tanish brown with tan, gray crumbly subungual debris.  Trimmed well and not tender to distal nail plate pressure, without periungual skin abnormality of each.               Assessment:       Encounter Diagnoses   Name Primary?    Diabetic peripheral neuropathy associated with type 2 diabetes mellitus Yes    Onychomycosis due to dermatophyte          Plan:       Stu was seen today for diabetic foot exam.    Diagnoses and all orders for this visit:    Diabetic peripheral neuropathy associated with type 2 diabetes mellitus    Onychomycosis due to dermatophyte      I counseled the patient on his conditions, their implications and medical management.        - Shoe inspection. Diabetic Foot Education. Patient reminded of the importance of good nutrition and blood sugar control to help prevent podiatric complications of diabetes. Patient instructed on proper foot hygeine. We discussed wearing proper shoe gear, daily foot inspections, never walking without protective shoe gear, never putting sharp instruments to feet, routine podiatric visits at least annually.      Discussed conservative treatment with shoes of adequate dimensions, material, and style to alleviate symptoms and delay or prevent surgical intervention.     Rx DM shoes, inserts - patient request - these may not be covered by insurance based on findings at this visit.    [facility]        Follow-up in about 1 year (around 4/24/2019).

## 2018-05-01 ENCOUNTER — TELEPHONE (OUTPATIENT)
Dept: PODIATRY | Facility: CLINIC | Age: 73
End: 2018-05-01

## 2018-05-01 NOTE — TELEPHONE ENCOUNTER
----- Message from Luis Wilson sent at 5/1/2018 10:03 AM CDT -----  Contact: Umberto from [facility]  Name of Who is Calling:Umberto      What is the request in detail: Umberto is calling requesting pt's latest clinical notes      Can the clinic reply by [facility]: no      [facility] Number to Call Back [facility] Umberto [phone] fax [phone]

## 2018-05-07 ENCOUNTER — LAB VISIT (OUTPATIENT)
Dept: LAB | Facility: HOSPITAL | Age: 73
End: 2018-05-07
Attending: FAMILY MEDICINE
Payer: MEDICARE

## 2018-05-07 DIAGNOSIS — E11.319 TYPE 2 DIABETES MELLITUS WITH RETINOPATHY WITHOUT MACULAR EDEMA, WITHOUT LONG-TERM CURRENT USE OF INSULIN, UNSPECIFIED LATERALITY, UNSPECIFIED RETINOPATHY SEVERITY: ICD-10-CM

## 2018-05-07 LAB
ALBUMIN SERPL BCP-MCNC: 3.9 G/DL
ALP SERPL-CCNC: 109 U/L
ALT SERPL W/O P-5'-P-CCNC: 17 U/L
ANION GAP SERPL CALC-SCNC: 8 MMOL/L
AST SERPL-CCNC: 16 U/L
BILIRUB SERPL-MCNC: 0.6 MG/DL
BUN SERPL-MCNC: 19 MG/DL
CALCIUM SERPL-MCNC: 9.7 MG/DL
CHLORIDE SERPL-SCNC: 106 MMOL/L
CHOLEST SERPL-MCNC: 98 MG/DL
CHOLEST/HDLC SERPL: 2.9 {RATIO}
CO2 SERPL-SCNC: 28 MMOL/L
CREAT SERPL-MCNC: 1.3 MG/DL
EST. GFR  (AFRICAN AMERICAN): >60 ML/MIN/1.73 M^2
EST. GFR  (NON AFRICAN AMERICAN): 54.1 ML/MIN/1.73 M^2
ESTIMATED AVG GLUCOSE: 183 MG/DL
GLUCOSE SERPL-MCNC: 185 MG/DL
HBA1C MFR BLD HPLC: 8 %
HDLC SERPL-MCNC: 34 MG/DL
HDLC SERPL: 34.7 %
LDLC SERPL CALC-MCNC: 45.4 MG/DL
NONHDLC SERPL-MCNC: 64 MG/DL
POTASSIUM SERPL-SCNC: 5.1 MMOL/L
PROT SERPL-MCNC: 7.2 G/DL
SODIUM SERPL-SCNC: 142 MMOL/L
TRIGL SERPL-MCNC: 93 MG/DL

## 2018-05-07 PROCEDURE — 80053 COMPREHEN METABOLIC PANEL: CPT

## 2018-05-07 PROCEDURE — 80061 LIPID PANEL: CPT

## 2018-05-07 PROCEDURE — 36415 COLL VENOUS BLD VENIPUNCTURE: CPT | Mod: PO

## 2018-05-07 PROCEDURE — 83036 HEMOGLOBIN GLYCOSYLATED A1C: CPT

## 2018-05-11 ENCOUNTER — OFFICE VISIT (OUTPATIENT)
Dept: FAMILY MEDICINE | Facility: CLINIC | Age: 73
End: 2018-05-11
Payer: MEDICARE

## 2018-05-11 VITALS
DIASTOLIC BLOOD PRESSURE: 60 MMHG | BODY MASS INDEX: 33.15 KG/M2 | HEART RATE: 74 BPM | OXYGEN SATURATION: 98 % | RESPIRATION RATE: 16 BRPM | SYSTOLIC BLOOD PRESSURE: 126 MMHG | HEIGHT: 76 IN | WEIGHT: 272.25 LBS | TEMPERATURE: 99 F

## 2018-05-11 DIAGNOSIS — I25.10 CORONARY ARTERY DISEASE DUE TO LIPID RICH PLAQUE: ICD-10-CM

## 2018-05-11 DIAGNOSIS — E78.5 HYPERLIPIDEMIA, UNSPECIFIED HYPERLIPIDEMIA TYPE: ICD-10-CM

## 2018-05-11 DIAGNOSIS — I10 ESSENTIAL HYPERTENSION: ICD-10-CM

## 2018-05-11 DIAGNOSIS — I25.83 CORONARY ARTERY DISEASE DUE TO LIPID RICH PLAQUE: ICD-10-CM

## 2018-05-11 DIAGNOSIS — E11.319 TYPE 2 DIABETES MELLITUS WITH RETINOPATHY WITHOUT MACULAR EDEMA, WITHOUT LONG-TERM CURRENT USE OF INSULIN, UNSPECIFIED LATERALITY, UNSPECIFIED RETINOPATHY SEVERITY: Primary | ICD-10-CM

## 2018-05-11 PROCEDURE — 3078F DIAST BP <80 MM HG: CPT | Mod: CPTII,S$GLB,, | Performed by: FAMILY MEDICINE

## 2018-05-11 PROCEDURE — 99999 PR PBB SHADOW E&M-EST. PATIENT-LVL IV: CPT | Mod: PBBFAC,,, | Performed by: FAMILY MEDICINE

## 2018-05-11 PROCEDURE — 3045F PR MOST RECENT HEMOGLOBIN A1C LEVEL 7.0-9.0%: CPT | Mod: CPTII,S$GLB,, | Performed by: FAMILY MEDICINE

## 2018-05-11 PROCEDURE — 99499 UNLISTED E&M SERVICE: CPT | Mod: S$GLB,,, | Performed by: FAMILY MEDICINE

## 2018-05-11 PROCEDURE — 99214 OFFICE O/P EST MOD 30 MIN: CPT | Mod: S$GLB,,, | Performed by: FAMILY MEDICINE

## 2018-05-11 PROCEDURE — 3074F SYST BP LT 130 MM HG: CPT | Mod: CPTII,S$GLB,, | Performed by: FAMILY MEDICINE

## 2018-05-11 NOTE — PROGRESS NOTES
Subjective:       Patient ID: Stu Garcia is a 72 y.o. male.    Chief Complaint: Diabetes (3 month follow up )        S/p Anterior cervical decompression fusion done on 7/6/2017 by Dr. Jeter - neck stable; swallowing improved, but still crushing pills  Lumbar fracture - back pain only occurring at night periodically  Diabetes - following diabetic diet diet; He glimepiride 2mg BID. Metformin reportedly makes him feel bad. BGs 150s. No hypoglycemia.  He is confused about the glimepiride and will need to check if he is taking this.  CAD - s/p 4 stents since last visit; following with Dr. Maldonado; assymptomatic; taking Plavix, xaelto and ASA 81mg daily. Going to cardiac rehab now  Paroxysmal afib - taking xarelto 20mg daily; taking metoprolol 50mg 1/2 tab BID, lisinopril 5mg  HLD - taking Lipitor 80mg daily  Gerd, healing ulcers - taking Omeprazole 40mg daily            Coronary Artery Disease   Pertinent negatives include no chest pain, chest tightness, dizziness, leg swelling, palpitations or shortness of breath.   Atrial Fibrillation   Symptoms are negative for chest pain, dizziness, palpitations, shortness of breath and weakness. Past medical history includes atrial fibrillation and CAD.   Diabetes   Pertinent negatives for hypoglycemia include no confusion, dizziness or headaches. Pertinent negatives for diabetes include no chest pain, no polydipsia, no polyuria and no weakness.       Past Medical History:   Diagnosis Date    Anticoagulant long-term use     xarelto,asa    Arthritis     Atrial fibrillation 2013    cardioverted    Bleb, lung     Cataract     OS    Colon polyp     Coronary artery disease     Coronary artery disease involving native coronary artery of native heart with unstable angina pectoris 3/18/2018    Diabetes mellitus     Diabetes mellitus, type 2     Diverticulosis     GERD (gastroesophageal reflux disease)     HEARING LOSS     bilateral aids    Hemorrhoid     HLD (hyperlipidemia)      Hypertension     Iron deficiency anemia     Neuropathy of leg     Pneumonia due to other staphylococcus     Prostate cancer     prostate    Spontaneous pneumothorax     bilateral       Past Surgical History:   Procedure Laterality Date    CARDIOVERSION      CATARACT EXTRACTION      bilateral    CERVICAL SPINE FRACTURE SURGERY      c7 t1 ACDF     CHEST TUBE INSERTION Right     COLONOSCOPY      CORONARY STENT PLACEMENT      x 2    HERNIA REPAIR      umbilical    KNEE SURGERY Left 2017    open thoracotomy Left 1966    With pleurectomy    Pleurectomy Left 1966    For treatment of left pneumothorax    PROSTATECTOMY  2001    open    TUBE THORACOTOMY  1966    pneumothorax left--open       Review of patient's allergies indicates:   Allergen Reactions    Codeine Nausea And Vomiting       Social History     Social History    Marital status:      Spouse name: N/A    Number of children: 2    Years of education: N/A     Occupational History         prior [employer]       Social History Main Topics    Smoking status: Former Smoker     Years: 20.00    Smokeless tobacco: Never Used      Comment: quit smoking in 1980's.    Alcohol use Yes      Comment: 1 drink every 2 weeks    Drug use: No    Sexual activity: Yes     Other Topics Concern    Not on file     Social History Narrative    From [place]       Current Outpatient Prescriptions on File Prior to Visit   Medication Sig Dispense Refill    ACCU-CHEK SHASHANK PLUS TEST STRP Strp TEST ONCE DAILY 100 strip 0    acetaminophen (TYLENOL) 325 MG tablet Take 2 tablets (650 mg total) by mouth every 4 (four) hours as needed.  0    aspirin (ECOTRIN) 81 MG EC tablet Take 1 tablet (81 mg total) by mouth every 7 days.  0    atorvastatin (LIPITOR) 80 MG tablet Take 1 tablet (80 mg total) by mouth every evening. 90 tablet 3    clopidogrel (PLAVIX) 75 mg tablet Take 1 tablet (75 mg total) by mouth once daily. 90 tablet 3    furosemide  (LASIX) 20 MG tablet Take 1 tablet (20 mg total) by mouth every Mon, Wed, Fri. 20 tablet 3    glimepiride (AMARYL) 2 MG tablet Take 4 mg by mouth 2 (two) times daily with meals.      lisinopril (PRINIVIL,ZESTRIL) 2.5 MG tablet Take 2 tablets (5 mg total) by mouth once daily. 180 tablet 3    metoprolol tartrate (LOPRESSOR) 50 MG tablet Take 25 mg by mouth 2 (two) times daily.      multivitamin capsule Take 1 capsule by mouth once daily.      nitroGLYCERIN (NITROSTAT) 0.4 MG SL tablet Place 1 tablet (0.4 mg total) under the tongue every 5 (five) minutes as needed for Chest pain. 20 tablet 0    omeprazole (PRILOSEC) 40 MG capsule Take 40 mg by mouth every morning.       tiZANidine (ZANAFLEX) 4 MG tablet TAKE 1 TABLET(4 MG) BY MOUTH EVERY 6 HOURS AS NEEDED 385 tablet 5    vit C,Z-Sh-oobtd-lutein-zeaxan (PRESERVISION AREDS 2) 840-856-67-1 mg-unit-mg-mg Cap Take 1 tablet by mouth once daily.      XARELTO 20 mg Tab TAKE 1 TABLET EVERY DAY 30 tablet 3     No current facility-administered medications on file prior to visit.        Family History   Problem Relation Age of Onset    Diabetes Father     Coronary artery disease Father     Cancer Father         stomach    Diabetes Brother        Review of Systems   Constitutional: Negative for activity change, appetite change, chills, fever and unexpected weight change.   HENT: Positive for hearing loss, rhinorrhea and trouble swallowing. Negative for sore throat.    Eyes: Negative for pain, discharge and visual disturbance.   Respiratory: Negative for cough, chest tightness, shortness of breath and wheezing.    Cardiovascular: Negative for chest pain, palpitations and leg swelling.   Gastrointestinal: Negative for abdominal pain, blood in stool, constipation, diarrhea, nausea and vomiting.   Endocrine: Negative for polydipsia and polyuria.   Genitourinary: Negative for difficulty urinating, dysuria, hematuria and urgency.   Musculoskeletal: Positive for back pain and  "neck pain. Negative for arthralgias, gait problem and joint swelling.   Skin: Negative for rash and wound.   Neurological: Negative for dizziness, weakness, light-headedness and headaches.   Hematological: Negative for adenopathy.   Psychiatric/Behavioral: Negative for confusion and dysphoric mood.       Objective:      /60 (BP Location: Right arm, Patient Position: Sitting, BP Method: Large (Manual))   Pulse 74   Temp 98.6 °F (37 °C) (Oral)   Resp 16   Ht 6' 4" (1.93 m)   Wt 123.5 kg (272 lb 4.3 oz)   SpO2 98%   BMI 33.14 kg/m²   Physical Exam   Constitutional: He is oriented to person, place, and time. He appears well-developed and well-nourished. He is active. No distress.   HENT:   Head: Normocephalic and atraumatic.   Right Ear: External ear normal.   Left Ear: External ear normal.   Mouth/Throat: Uvula is midline, oropharynx is clear and moist and mucous membranes are normal. No oropharyngeal exudate.   Eyes: Conjunctivae, EOM and lids are normal. Pupils are equal, round, and reactive to light.   Neck: Normal range of motion, full passive range of motion without pain and phonation normal. Neck supple. No thyroid mass and no thyromegaly present.   Cardiovascular: Normal rate, regular rhythm, normal heart sounds and intact distal pulses.  Exam reveals no gallop and no friction rub.    No murmur heard.  Pulmonary/Chest: Effort normal and breath sounds normal. No respiratory distress. He has no wheezes. He has no rales.   Abdominal: Soft. Normal appearance and bowel sounds are normal. There is no tenderness.   Musculoskeletal: Normal range of motion.   Lymphadenopathy:     He has no cervical adenopathy.   Neurological: He is alert and oriented to person, place, and time. No cranial nerve deficit. Coordination normal.   Skin: Skin is warm and dry.   Psychiatric: He has a normal mood and affect. His speech is normal and behavior is normal. Judgment and thought content normal.         Results for orders " placed or performed in visit on 05/07/18   Comprehensive metabolic panel   Result Value Ref Range    Sodium 142 136 - 145 mmol/L    Potassium 5.1 3.5 - 5.1 mmol/L    Chloride 106 95 - 110 mmol/L    CO2 28 23 - 29 mmol/L    Glucose 185 (H) 70 - 110 mg/dL    BUN, Bld 19 8 - 23 mg/dL    Creatinine 1.3 0.5 - 1.4 mg/dL    Calcium 9.7 8.7 - 10.5 mg/dL    Total Protein 7.2 6.0 - 8.4 g/dL    Albumin 3.9 3.5 - 5.2 g/dL    Total Bilirubin 0.6 0.1 - 1.0 mg/dL    Alkaline Phosphatase 109 55 - 135 U/L    AST 16 10 - 40 U/L    ALT 17 10 - 44 U/L    Anion Gap 8 8 - 16 mmol/L    eGFR if African American >60.0 >60 mL/min/1.73 m^2    eGFR if non  54.1 (A) >60 mL/min/1.73 m^2   Hemoglobin A1c   Result Value Ref Range    Hemoglobin A1C 8.0 (H) 4.0 - 5.6 %    Estimated Avg Glucose 183 (H) 68 - 131 mg/dL   Lipid panel   Result Value Ref Range    Cholesterol 98 (L) 120 - 199 mg/dL    Triglycerides 93 30 - 150 mg/dL    HDL 34 (L) 40 - 75 mg/dL    LDL Cholesterol 45.4 (L) 63.0 - 159.0 mg/dL    HDL/Chol Ratio 34.7 20.0 - 50.0 %    Total Cholesterol/HDL Ratio 2.9 2.0 - 5.0    Non-HDL Cholesterol 64 mg/dL     Hgb A1c down from 9.3    Assessment:       1. 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 Coronary artery disease due to lipid rich plaque    3. Essential hypertension    4. 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, without long-term current use of insulin, unspecified laterality, unspecified retinopathy severity  -     Comprehensive metabolic panel; Future; Expected date: 08/09/2018  -     Hemoglobin A1c; Future; Expected date: 08/09/2018    Coronary artery disease due to lipid rich plaque    Essential hypertension    Hyperlipidemia, unspecified hyperlipidemia type        Continue cardiac rehab  F/u with  cardiology as planned  Diabetes stable  Emphasized compliance with Glimepiride   Goal weight of 220  Continue other present meds  Counseled on regular exercise, maintenance of a healthy weight, balanced diet rich in fruits/vegetables and lean protein, and avoidance of unhealthy habits like smoking and excessive alcohol intake.  F/u 3 months with labs

## 2018-05-14 NOTE — PROGRESS NOTES
Patient, Stu Garcia (MRN #[id number]), presented with a recent Platelet count less than 150 K/uL consistent with the definition of thrombocytopenia (ICD10 - D69.6).    Platelets   Date Value Ref Range Status   03/17/2018 118 (L) 150 - 350 K/uL Final     The patient's thrombocytopenia was monitored, evaluated, addressed and/or treated. This addendum to the medical record is made on 05/14/2018.

## 2018-06-28 RX ORDER — RIVAROXABAN 20 MG/1
TABLET, FILM COATED ORAL
Qty: 90 TABLET | Refills: 3 | Status: ON HOLD | OUTPATIENT
Start: 2018-06-28 | End: 2019-02-25 | Stop reason: HOSPADM

## 2018-07-05 ENCOUNTER — PES CALL (OUTPATIENT)
Dept: ADMINISTRATIVE | Facility: CLINIC | Age: 73
End: 2018-07-05

## 2018-07-10 ENCOUNTER — OFFICE VISIT (OUTPATIENT)
Dept: CARDIOLOGY | Facility: CLINIC | Age: 73
End: 2018-07-10
Payer: MEDICARE

## 2018-07-10 VITALS
WEIGHT: 265.19 LBS | SYSTOLIC BLOOD PRESSURE: 124 MMHG | DIASTOLIC BLOOD PRESSURE: 71 MMHG | HEART RATE: 62 BPM | BODY MASS INDEX: 32.29 KG/M2 | HEIGHT: 76 IN

## 2018-07-10 DIAGNOSIS — I25.10 CORONARY ARTERY DISEASE INVOLVING NATIVE CORONARY ARTERY OF NATIVE HEART WITHOUT ANGINA PECTORIS: Primary | ICD-10-CM

## 2018-07-10 DIAGNOSIS — I48.0 PAF (PAROXYSMAL ATRIAL FIBRILLATION): ICD-10-CM

## 2018-07-10 DIAGNOSIS — E78.5 HYPERLIPIDEMIA, UNSPECIFIED HYPERLIPIDEMIA TYPE: ICD-10-CM

## 2018-07-10 DIAGNOSIS — E78.5 DYSLIPIDEMIA: ICD-10-CM

## 2018-07-10 DIAGNOSIS — I10 ESSENTIAL HYPERTENSION: ICD-10-CM

## 2018-07-10 PROCEDURE — 3078F DIAST BP <80 MM HG: CPT | Mod: CPTII,S$GLB,, | Performed by: INTERNAL MEDICINE

## 2018-07-10 PROCEDURE — 99999 PR PBB SHADOW E&M-EST. PATIENT-LVL II: CPT | Mod: PBBFAC,,, | Performed by: INTERNAL MEDICINE

## 2018-07-10 PROCEDURE — 3074F SYST BP LT 130 MM HG: CPT | Mod: CPTII,S$GLB,, | Performed by: INTERNAL MEDICINE

## 2018-07-10 PROCEDURE — 99214 OFFICE O/P EST MOD 30 MIN: CPT | Mod: S$GLB,,, | Performed by: INTERNAL MEDICINE

## 2018-07-10 NOTE — PROGRESS NOTES
Subjective:    Patient ID:  Stu Garcia is a 72 y.o. male who presents for follow-up of cad, paf    HPI  He comes for follow up with no major problems, no chest pain, no shortness of breath.  FC II, going to cardiac rehab    Review of Systems   Constitution: Negative for decreased appetite, weakness, malaise/fatigue, weight gain and weight loss.   Cardiovascular: Negative for chest pain, dyspnea on exertion, leg swelling, palpitations and syncope.   Respiratory: Negative for cough and shortness of breath.    Gastrointestinal: Negative.    All other systems reviewed and are negative.       Objective:    Physical Exam   Constitutional: He is oriented to person, place, and time. He appears well-developed and well-nourished.   HENT:   Head: Normocephalic.   Eyes: Pupils are equal, round, and reactive to light.   Neck: Normal range of motion. Neck supple. No JVD present. Carotid bruit is not present. No thyromegaly present.   Cardiovascular: Normal rate, regular rhythm, normal heart sounds, intact distal pulses and normal pulses.  PMI is not displaced.  Exam reveals no gallop.    No murmur heard.  Pulmonary/Chest: Effort normal and breath sounds normal.   Abdominal: Soft. Normal appearance. He exhibits no mass. There is no hepatosplenomegaly. There is no tenderness.   Musculoskeletal: Normal range of motion. He exhibits no edema.   Neurological: He is alert and oriented to person, place, and time. He has normal strength and normal reflexes. No sensory deficit.   Skin: Skin is warm and intact.   Psychiatric: He has a normal mood and affect.   Nursing note and vitals reviewed.        Assessment:       1. Coronary artery disease involving native coronary artery of native heart without angina pectoris    2. PAF (paroxysmal atrial fibrillation)    3. Hyperlipidemia, unspecified hyperlipidemia type    4. Essential hypertension    5. Dyslipidemia         Plan:     Continue all cardiac medications  Regular exercise  program  Weight loss  9 m f/u

## 2018-07-11 ENCOUNTER — OUTPATIENT CASE MANAGEMENT (OUTPATIENT)
Dept: ADMINISTRATIVE | Facility: OTHER | Age: 73
End: 2018-07-11

## 2018-07-11 NOTE — PROGRESS NOTES
The following patient has been assigned to Antoinette Pierre RN with Outpatient Complex Care Management for high risk screening.    Reason: High Risk Recently Seen in Clinic    Please contact \[facility]\""M at [phone] with any questions.    Thank you,    Carmela Bui    Outpatient Case Management

## 2018-07-12 ENCOUNTER — OUTPATIENT CASE MANAGEMENT (OUTPATIENT)
Dept: ADMINISTRATIVE | Facility: OTHER | Age: 73
End: 2018-07-12

## 2018-07-12 NOTE — PROGRESS NOTES
07/12/18-Attempt to screen patient for outpatient case management. No answer. Left message requesting call back. 1'st attempt to screen patient.

## 2018-07-17 ENCOUNTER — OUTPATIENT CASE MANAGEMENT (OUTPATIENT)
Dept: ADMINISTRATIVE | Facility: OTHER | Age: 73
End: 2018-07-17

## 2018-07-17 NOTE — PROGRESS NOTES
07/17/18-Attempt to screen patient for outpatient case management. Patient declined OPCM after explaining the program. Will mail OPCM brochure and my card in the mail to patient.

## 2018-08-06 ENCOUNTER — PES CALL (OUTPATIENT)
Dept: ADMINISTRATIVE | Facility: CLINIC | Age: 73
End: 2018-08-06

## 2018-08-08 ENCOUNTER — LAB VISIT (OUTPATIENT)
Dept: LAB | Facility: HOSPITAL | Age: 73
End: 2018-08-08
Attending: FAMILY MEDICINE
Payer: MEDICARE

## 2018-08-08 DIAGNOSIS — E11.319 TYPE 2 DIABETES MELLITUS WITH RETINOPATHY WITHOUT MACULAR EDEMA, WITHOUT LONG-TERM CURRENT USE OF INSULIN, UNSPECIFIED LATERALITY, UNSPECIFIED RETINOPATHY SEVERITY: ICD-10-CM

## 2018-08-08 LAB
ALBUMIN SERPL BCP-MCNC: 3.8 G/DL
ALP SERPL-CCNC: 115 U/L
ALT SERPL W/O P-5'-P-CCNC: 17 U/L
ANION GAP SERPL CALC-SCNC: 8 MMOL/L
AST SERPL-CCNC: 17 U/L
BILIRUB SERPL-MCNC: 0.5 MG/DL
BUN SERPL-MCNC: 19 MG/DL
CALCIUM SERPL-MCNC: 9.6 MG/DL
CHLORIDE SERPL-SCNC: 104 MMOL/L
CO2 SERPL-SCNC: 25 MMOL/L
CREAT SERPL-MCNC: 1.2 MG/DL
EST. GFR  (AFRICAN AMERICAN): >60 ML/MIN/1.73 M^2
EST. GFR  (NON AFRICAN AMERICAN): 59.6 ML/MIN/1.73 M^2
ESTIMATED AVG GLUCOSE: 169 MG/DL
GLUCOSE SERPL-MCNC: 178 MG/DL
HBA1C MFR BLD HPLC: 7.5 %
POTASSIUM SERPL-SCNC: 4.9 MMOL/L
PROT SERPL-MCNC: 6.8 G/DL
SODIUM SERPL-SCNC: 137 MMOL/L

## 2018-08-08 PROCEDURE — 36415 COLL VENOUS BLD VENIPUNCTURE: CPT | Mod: PO

## 2018-08-08 PROCEDURE — 83036 HEMOGLOBIN GLYCOSYLATED A1C: CPT

## 2018-08-08 PROCEDURE — 80053 COMPREHEN METABOLIC PANEL: CPT

## 2018-08-13 ENCOUNTER — OFFICE VISIT (OUTPATIENT)
Dept: FAMILY MEDICINE | Facility: CLINIC | Age: 73
End: 2018-08-13
Payer: MEDICARE

## 2018-08-13 VITALS
HEART RATE: 72 BPM | SYSTOLIC BLOOD PRESSURE: 120 MMHG | WEIGHT: 268.31 LBS | HEIGHT: 76 IN | BODY MASS INDEX: 32.67 KG/M2 | DIASTOLIC BLOOD PRESSURE: 60 MMHG | OXYGEN SATURATION: 97 % | RESPIRATION RATE: 18 BRPM

## 2018-08-13 DIAGNOSIS — I48.0 PAF (PAROXYSMAL ATRIAL FIBRILLATION): ICD-10-CM

## 2018-08-13 DIAGNOSIS — E78.5 DYSLIPIDEMIA: ICD-10-CM

## 2018-08-13 DIAGNOSIS — E78.5 HYPERLIPIDEMIA, UNSPECIFIED HYPERLIPIDEMIA TYPE: ICD-10-CM

## 2018-08-13 DIAGNOSIS — B07.9 VIRAL WARTS, UNSPECIFIED TYPE: ICD-10-CM

## 2018-08-13 DIAGNOSIS — I25.10 CORONARY ARTERY DISEASE INVOLVING NATIVE CORONARY ARTERY OF NATIVE HEART WITHOUT ANGINA PECTORIS: ICD-10-CM

## 2018-08-13 DIAGNOSIS — I10 ESSENTIAL HYPERTENSION: ICD-10-CM

## 2018-08-13 DIAGNOSIS — E11.69 TYPE 2 DIABETES MELLITUS WITH HYPERLIPIDEMIA: Primary | ICD-10-CM

## 2018-08-13 DIAGNOSIS — E78.5 TYPE 2 DIABETES MELLITUS WITH HYPERLIPIDEMIA: Primary | ICD-10-CM

## 2018-08-13 PROCEDURE — 99214 OFFICE O/P EST MOD 30 MIN: CPT | Mod: 25,S$GLB,, | Performed by: FAMILY MEDICINE

## 2018-08-13 PROCEDURE — 3074F SYST BP LT 130 MM HG: CPT | Mod: CPTII,S$GLB,, | Performed by: FAMILY MEDICINE

## 2018-08-13 PROCEDURE — 3078F DIAST BP <80 MM HG: CPT | Mod: CPTII,S$GLB,, | Performed by: FAMILY MEDICINE

## 2018-08-13 PROCEDURE — 99999 PR PBB SHADOW E&M-EST. PATIENT-LVL IV: CPT | Mod: PBBFAC,,, | Performed by: FAMILY MEDICINE

## 2018-08-13 PROCEDURE — 17000 DESTRUCT PREMALG LESION: CPT | Mod: S$GLB,,, | Performed by: FAMILY MEDICINE

## 2018-08-13 PROCEDURE — 3045F PR MOST RECENT HEMOGLOBIN A1C LEVEL 7.0-9.0%: CPT | Mod: CPTII,S$GLB,, | Performed by: FAMILY MEDICINE

## 2018-08-13 NOTE — PROGRESS NOTES
Subjective:       Patient ID: Stu Garcia is a 72 y.o. male.    Chief Complaint: Follow-up        S/p Anterior cervical decompression fusion done on 7/6/2017 by Dr. Jeter - neck stable; swallowing improved, but still crushing pills  Lumbar fracture - back pain only occurring at night periodically  Diabetes - following diabetic diet diet; He is taking glimepiride 4mg BID. Metformin reportedly makes him feel bad. BGs 170s. No hypoglycemia. Lost 4 pounds  CAD - s/p 4 stents since last visit; following with Dr. Maldonado; assymptomatic; taking Plavix, xaelto and ASA 81mg daily. Completing cardiac rehab today  Paroxysmal afib - taking xarelto 20mg daily; taking metoprolol 50mg 1/2 tab BID, lisinopril 5mg  HLD - taking Lipitor 80mg daily  Gerd - taking Omeprazole 40mg daily            Diabetes   Pertinent negatives for hypoglycemia include no confusion, dizziness or headaches. Pertinent negatives for diabetes include no chest pain, no polydipsia, no polyuria and no weakness.   Coronary Artery Disease   Pertinent negatives include no chest pain, chest tightness, dizziness, leg swelling, palpitations or shortness of breath.   Atrial Fibrillation   Symptoms are negative for chest pain, dizziness, palpitations, shortness of breath and weakness. Past medical history includes atrial fibrillation and CAD.       Past Medical History:   Diagnosis Date    Anticoagulant long-term use     xarelto,asa    Arthritis     Atrial fibrillation 2013    cardioverted    Bleb, lung     Cataract     OS    Colon polyp     Coronary artery disease     Coronary artery disease involving native coronary artery of native heart with unstable angina pectoris 3/18/2018    Diabetes mellitus     Diabetes mellitus, type 2     Diverticulosis     GERD (gastroesophageal reflux disease)     HEARING LOSS     bilateral aids    Hemorrhoid     HLD (hyperlipidemia)     Hypertension     Iron deficiency anemia     Neuropathy of leg     Pneumonia due to  other staphylococcus     Prostate cancer     prostate    Spontaneous pneumothorax     bilateral       Past Surgical History:   Procedure Laterality Date    CARDIOVERSION      CATARACT EXTRACTION      bilateral    CERVICAL SPINE FRACTURE SURGERY      c7 t1 ACDF     CHEST TUBE INSERTION Right     COLONOSCOPY      CORONARY STENT PLACEMENT      x 2    HERNIA REPAIR      umbilical    KNEE SURGERY Left 2017    open thoracotomy Left 1966    With pleurectomy    Pleurectomy Left 1966    For treatment of left pneumothorax    PROSTATECTOMY  2001    open    TUBE THORACOTOMY  1966    pneumothorax left--open       Review of patient's allergies indicates:   Allergen Reactions    Codeine Nausea And Vomiting       Social History     Socioeconomic History    Marital status:      Spouse name: Not on file    Number of children: 2    Years of education: Not on file    Highest education level: Not on file   Social Needs    Financial resource strain: Not on file    Food insecurity - worry: Not on file    Food insecurity - inability: Not on file    Transportation needs - medical: Not on file    Transportation needs - non-medical: Not on file   Occupational History    Occupation:     Occupation: prior [employer]    Tobacco Use    Smoking status: Former Smoker     Years: 20.00    Smokeless tobacco: Never Used    Tobacco comment: quit smoking in 1980's.   Substance and Sexual Activity    Alcohol use: Yes     Comment: 1 drink every 2 weeks    Drug use: No    Sexual activity: Yes   Other Topics Concern    Not on file   Social History Narrative    From [place]       Current Outpatient Medications on File Prior to Visit   Medication Sig Dispense Refill    ACCU-CHEK SHASHANK PLUS TEST STRP Strp TEST ONCE DAILY 100 strip 0    aspirin (ECOTRIN) 81 MG EC tablet Take 1 tablet (81 mg total) by mouth every 7 days.  0    atorvastatin (LIPITOR) 80 MG tablet Take 1 tablet (80 mg total) by mouth  every evening. 90 tablet 3    clopidogrel (PLAVIX) 75 mg tablet Take 1 tablet (75 mg total) by mouth once daily. 90 tablet 3    glimepiride (AMARYL) 2 MG tablet Take 4 mg by mouth 2 (two) times daily with meals.      lisinopril (PRINIVIL,ZESTRIL) 2.5 MG tablet Take 2 tablets (5 mg total) by mouth once daily. 180 tablet 3    multivitamin capsule Take 1 capsule by mouth once daily.      nitroGLYCERIN (NITROSTAT) 0.4 MG SL tablet Place 1 tablet (0.4 mg total) under the tongue every 5 (five) minutes as needed for Chest pain. 20 tablet 0    omeprazole (PRILOSEC) 40 MG capsule Take 40 mg by mouth every morning.       tiZANidine (ZANAFLEX) 4 MG tablet TAKE 1 TABLET(4 MG) BY MOUTH EVERY 6 HOURS AS NEEDED 385 tablet 5    vit C,F-Yq-xxymx-lutein-zeaxan (PRESERVISION AREDS 2) 073-199-85-1 mg-unit-mg-mg Cap Take 1 tablet by mouth once daily.      XARELTO 20 mg Tab TAKE 1 TABLET EVERY DAY 90 tablet 3     No current facility-administered medications on file prior to visit.        Family History   Problem Relation Age of Onset    Diabetes Father     Coronary artery disease Father     Cancer Father         stomach    Diabetes Brother        Review of Systems   Constitutional: Negative for activity change, appetite change, chills, fever and unexpected weight change.   HENT: Positive for hearing loss, rhinorrhea and trouble swallowing. Negative for sore throat.    Eyes: Negative for pain, discharge and visual disturbance.   Respiratory: Negative for cough, chest tightness, shortness of breath and wheezing.    Cardiovascular: Negative for chest pain, palpitations and leg swelling.   Gastrointestinal: Negative for abdominal pain, blood in stool, constipation, diarrhea, nausea and vomiting.   Endocrine: Negative for polydipsia and polyuria.   Genitourinary: Negative for difficulty urinating, dysuria, hematuria and urgency.   Musculoskeletal: Positive for back pain and neck pain. Negative for arthralgias, gait problem and  "joint swelling.   Skin: Negative for rash and wound.   Neurological: Negative for dizziness, weakness, light-headedness and headaches.   Hematological: Negative for adenopathy.   Psychiatric/Behavioral: Negative for confusion and dysphoric mood.       Objective:      /60   Pulse 72   Resp 18   Ht 6' 4" (1.93 m)   Wt 121.7 kg (268 lb 4.8 oz)   SpO2 97%   BMI 32.66 kg/m²   Physical Exam   Constitutional: He is oriented to person, place, and time. He appears well-developed and well-nourished. He is active. No distress.   HENT:   Head: Normocephalic and atraumatic.   Right Ear: External ear normal.   Left Ear: External ear normal.   Mouth/Throat: Uvula is midline, oropharynx is clear and moist and mucous membranes are normal. No oropharyngeal exudate.   Eyes: Conjunctivae, EOM and lids are normal. Pupils are equal, round, and reactive to light.   Neck: Normal range of motion, full passive range of motion without pain and phonation normal. Neck supple. No thyroid mass and no thyromegaly present.   Cardiovascular: Normal rate, regular rhythm, normal heart sounds and intact distal pulses. Exam reveals no gallop and no friction rub.   No murmur heard.  Pulmonary/Chest: Effort normal and breath sounds normal. No respiratory distress. He has no wheezes. He has no rales.   Abdominal: Soft. Normal appearance and bowel sounds are normal. There is no tenderness.   Musculoskeletal: Normal range of motion.   Lymphadenopathy:     He has no cervical adenopathy.   Neurological: He is alert and oriented to person, place, and time. No cranial nerve deficit. Coordination normal.   Skin: Skin is warm and dry.   Psychiatric: He has a normal mood and affect. His speech is normal and behavior is normal. Judgment and thought content normal.     right forearm: 7mm verrucuous papule    Results for orders placed or performed in visit on 08/08/18   Comprehensive metabolic panel   Result Value Ref Range    Sodium 137 136 - 145 mmol/L "    Potassium 4.9 3.5 - 5.1 mmol/L    Chloride 104 95 - 110 mmol/L    CO2 25 23 - 29 mmol/L    Glucose 178 (H) 70 - 110 mg/dL    BUN, Bld 19 8 - 23 mg/dL    Creatinine 1.2 0.5 - 1.4 mg/dL    Calcium 9.6 8.7 - 10.5 mg/dL    Total Protein 6.8 6.0 - 8.4 g/dL    Albumin 3.8 3.5 - 5.2 g/dL    Total Bilirubin 0.5 0.1 - 1.0 mg/dL    Alkaline Phosphatase 115 55 - 135 U/L    AST 17 10 - 40 U/L    ALT 17 10 - 44 U/L    Anion Gap 8 8 - 16 mmol/L    eGFR if African American >60.0 >60 mL/min/1.73 m^2    eGFR if non  59.6 (A) >60 mL/min/1.73 m^2   Hemoglobin A1c   Result Value Ref Range    Hemoglobin A1C 7.5 (H) 4.0 - 5.6 %    Estimated Avg Glucose 169 (H) 68 - 131 mg/dL       Assessment:       1. Type 2 diabetes mellitus with hyperlipidemia    2. Hyperlipidemia, unspecified hyperlipidemia type    3. Coronary artery disease involving native coronary artery of native heart without angina pectoris    4. Viral warts, unspecified type    5. PAF (paroxysmal atrial fibrillation)    6. Essential hypertension    7. Dyslipidemia        Plan:       Type 2 diabetes mellitus with hyperlipidemia  -     Comprehensive metabolic panel; Future; Expected date: 11/11/2018  -     Hemoglobin A1c; Future; Expected date: 11/11/2018    Hyperlipidemia, unspecified hyperlipidemia type    Coronary artery disease involving native coronary artery of native heart without angina pectoris    Viral warts, unspecified type    PAF (paroxysmal atrial fibrillation)    Essential hypertension    Dyslipidemia        Treated right forearm with Ln2 5 second freeze repeated once; counseled on basic wound care  F/u with cardiology as planned  Diabetes improving  Emphasized compliance with Glimepiride   Goal weight of 220  Continue other present meds  Counseled on regular exercise, maintenance of a healthy weight, balanced diet rich in fruits/vegetables and lean protein, and avoidance of unhealthy habits like smoking and excessive alcohol intake.  F/u 3  months with labs

## 2018-08-17 RX ORDER — METOPROLOL TARTRATE 50 MG/1
TABLET ORAL
Qty: 60 TABLET | Refills: 0 | Status: SHIPPED | OUTPATIENT
Start: 2018-08-17 | End: 2018-09-10 | Stop reason: SDUPTHER

## 2018-08-20 RX ORDER — FUROSEMIDE 20 MG/1
TABLET ORAL
Qty: 20 TABLET | Refills: 0 | Status: SHIPPED | OUTPATIENT
Start: 2018-08-20 | End: 2018-09-14 | Stop reason: SDUPTHER

## 2018-08-27 ENCOUNTER — PES CALL (OUTPATIENT)
Dept: ADMINISTRATIVE | Facility: CLINIC | Age: 73
End: 2018-08-27

## 2018-08-27 PROBLEM — I20.0 UNSTABLE ANGINA: Status: RESOLVED | Noted: 2018-03-16 | Resolved: 2018-08-27

## 2018-08-27 PROBLEM — N17.9 AKI (ACUTE KIDNEY INJURY): Status: RESOLVED | Noted: 2018-03-17 | Resolved: 2018-08-27

## 2018-08-27 PROBLEM — I21.4 NSTEMI (NON-ST ELEVATED MYOCARDIAL INFARCTION): Status: RESOLVED | Noted: 2018-03-18 | Resolved: 2018-08-27

## 2018-09-06 ENCOUNTER — TELEPHONE (OUTPATIENT)
Dept: FAMILY MEDICINE | Facility: CLINIC | Age: 73
End: 2018-09-06

## 2018-09-10 RX ORDER — METOPROLOL TARTRATE 50 MG/1
TABLET ORAL
Qty: 60 TABLET | Refills: 0 | Status: SHIPPED | OUTPATIENT
Start: 2018-09-10 | End: 2018-10-07 | Stop reason: SDUPTHER

## 2018-09-14 RX ORDER — FUROSEMIDE 20 MG/1
TABLET ORAL
Qty: 20 TABLET | Refills: 0 | Status: SHIPPED | OUTPATIENT
Start: 2018-09-14 | End: 2018-10-10 | Stop reason: SDUPTHER

## 2018-10-08 RX ORDER — METOPROLOL TARTRATE 50 MG/1
TABLET ORAL
Qty: 60 TABLET | Refills: 0 | Status: SHIPPED | OUTPATIENT
Start: 2018-10-08 | End: 2018-11-05 | Stop reason: SDUPTHER

## 2018-10-11 RX ORDER — FUROSEMIDE 20 MG/1
TABLET ORAL
Qty: 20 TABLET | Refills: 0 | Status: SHIPPED | OUTPATIENT
Start: 2018-10-11 | End: 2018-11-08 | Stop reason: SDUPTHER

## 2018-10-19 ENCOUNTER — PES CALL (OUTPATIENT)
Dept: ADMINISTRATIVE | Facility: CLINIC | Age: 73
End: 2018-10-19

## 2018-11-05 RX ORDER — METOPROLOL TARTRATE 50 MG/1
TABLET ORAL
Qty: 60 TABLET | Refills: 0 | Status: SHIPPED | OUTPATIENT
Start: 2018-11-05 | End: 2018-12-16 | Stop reason: SDUPTHER

## 2018-11-07 ENCOUNTER — LAB VISIT (OUTPATIENT)
Dept: LAB | Facility: HOSPITAL | Age: 73
End: 2018-11-07
Attending: FAMILY MEDICINE
Payer: MEDICARE

## 2018-11-07 DIAGNOSIS — E11.69 TYPE 2 DIABETES MELLITUS WITH HYPERLIPIDEMIA: ICD-10-CM

## 2018-11-07 DIAGNOSIS — E78.5 TYPE 2 DIABETES MELLITUS WITH HYPERLIPIDEMIA: ICD-10-CM

## 2018-11-07 LAB
ALBUMIN SERPL BCP-MCNC: 3.9 G/DL
ALP SERPL-CCNC: 116 U/L
ALT SERPL W/O P-5'-P-CCNC: 15 U/L
ANION GAP SERPL CALC-SCNC: 7 MMOL/L
AST SERPL-CCNC: 17 U/L
BILIRUB SERPL-MCNC: 0.7 MG/DL
BUN SERPL-MCNC: 23 MG/DL
CALCIUM SERPL-MCNC: 9.4 MG/DL
CHLORIDE SERPL-SCNC: 105 MMOL/L
CO2 SERPL-SCNC: 27 MMOL/L
CREAT SERPL-MCNC: 1.4 MG/DL
EST. GFR  (AFRICAN AMERICAN): 56.8 ML/MIN/1.73 M^2
EST. GFR  (NON AFRICAN AMERICAN): 49.1 ML/MIN/1.73 M^2
ESTIMATED AVG GLUCOSE: 192 MG/DL
GLUCOSE SERPL-MCNC: 188 MG/DL
HBA1C MFR BLD HPLC: 8.3 %
POTASSIUM SERPL-SCNC: 4.9 MMOL/L
PROT SERPL-MCNC: 7.3 G/DL
SODIUM SERPL-SCNC: 139 MMOL/L

## 2018-11-07 PROCEDURE — 36415 COLL VENOUS BLD VENIPUNCTURE: CPT | Mod: HCWC,PO

## 2018-11-07 PROCEDURE — 83036 HEMOGLOBIN GLYCOSYLATED A1C: CPT | Mod: HCWC

## 2018-11-07 PROCEDURE — 80053 COMPREHEN METABOLIC PANEL: CPT | Mod: HCWC

## 2018-11-08 NOTE — PROGRESS NOTES
Chief Complaint   Patient presents with    Follow-up     1 month f/u     Diabetes Mellitus     HgbA1c: 7.5 2/6/17 PCP: Drake 2/13/17         History of present illness: Patient returns to the clinic for at risk diabetic foot care. Hx of neuropathy, CAD.    Patient started gabapentin 100 mg qhs and has noticed improvement in the pain in his feet, still gets shooting pain.    Review of Systems:   Vascular : negative rest pain, claudication, bruising   Musculoskeletal:+ for joint pain   Skin: negative for rashes, open wounds and lesions, + for thickened, painful and discolored toenails  Neurological: + for burning, tingling, paresthesia and numbness   All other unremarkable.    Past Medical, Family and Social History reviewed.    Constitutional:   Patient is oriented to person, place, and time. Vital signs are normal. Appears well-developed and well-nourished.     Vascular:   Dorsalis pedis pulses are 2+ on the right side, and 2+ on the left side.   Posterior tibial pulses are 2+ on the right side, and 2+ on the left side.   - digital hair growth, capillary fill time to all toes <3 seconds, toes are cool to touch  + swelling feet and ankles 2+ pitting    Skin/Dermatological:   Skin is thin, warm, shiny and atrophic. No cyanosis or clubbing. No rashes noted. No open wounds.   Nails yellow discolored, thickened 3 mm, elongated 3 mm with subungual debris and tenderness.    Musculoskeletal:   Pes cavus.  Mild bunions, hammertoes and bunionettes observed.  Decreased range of motion of bilateral midtarsal, subtalar joints, ankle joint dorsiflexion is restricted bilaterally. Muscle strength to tibialis anterior, extensor hallucis longus, extensor digitorum longus, peroneal muscles, flexor hallucis/digotorum longus, posterior tibial and gastrosoleal complex is 5/5.    Neurological:   + deficits to sharp/dull, light touch or vibratory sensation bilateral feet     Assessment/Plan:   #1 Diabetes, neuropathy, foot deformity:  Discussed diabetic footcare, daily foot inspections, tight blood sugar control and proper shoe gear. Continue diabetic shoes/inserts.  Continue neurontin 100 mg qhs.  #2 Onychomycosis/onychodystrophy, tinea fungus: Continue clotrimazole solution for toenails and lamisil cream for feet.   Return to clinic 4 months.   Notification Instructions: Patient will be notified of biopsy results. However, patient instructed to call the office if not contacted within 2 weeks.

## 2018-11-09 RX ORDER — FUROSEMIDE 20 MG/1
TABLET ORAL
Qty: 20 TABLET | Refills: 0 | Status: SHIPPED | OUTPATIENT
Start: 2018-11-09 | End: 2019-05-14

## 2018-11-14 ENCOUNTER — OFFICE VISIT (OUTPATIENT)
Dept: FAMILY MEDICINE | Facility: CLINIC | Age: 73
End: 2018-11-14
Payer: MEDICARE

## 2018-11-14 VITALS
SYSTOLIC BLOOD PRESSURE: 130 MMHG | RESPIRATION RATE: 18 BRPM | HEIGHT: 76 IN | HEART RATE: 68 BPM | BODY MASS INDEX: 32.48 KG/M2 | DIASTOLIC BLOOD PRESSURE: 70 MMHG | OXYGEN SATURATION: 98 % | WEIGHT: 266.75 LBS

## 2018-11-14 DIAGNOSIS — I25.10 CORONARY ARTERY DISEASE INVOLVING NATIVE CORONARY ARTERY OF NATIVE HEART WITHOUT ANGINA PECTORIS: ICD-10-CM

## 2018-11-14 DIAGNOSIS — E11.69 TYPE 2 DIABETES MELLITUS WITH HYPERLIPIDEMIA: Primary | ICD-10-CM

## 2018-11-14 DIAGNOSIS — I48.0 PAF (PAROXYSMAL ATRIAL FIBRILLATION): ICD-10-CM

## 2018-11-14 DIAGNOSIS — E78.5 TYPE 2 DIABETES MELLITUS WITH HYPERLIPIDEMIA: Primary | ICD-10-CM

## 2018-11-14 DIAGNOSIS — I10 ESSENTIAL HYPERTENSION: ICD-10-CM

## 2018-11-14 PROCEDURE — G0008 ADMIN INFLUENZA VIRUS VAC: HCPCS | Mod: HCWC,S$GLB,, | Performed by: FAMILY MEDICINE

## 2018-11-14 PROCEDURE — 99999 PR PBB SHADOW E&M-EST. PATIENT-LVL IV: CPT | Mod: PBBFAC,HCWC,, | Performed by: FAMILY MEDICINE

## 2018-11-14 PROCEDURE — 1101F PT FALLS ASSESS-DOCD LE1/YR: CPT | Mod: CPTII,HCWC,S$GLB, | Performed by: FAMILY MEDICINE

## 2018-11-14 PROCEDURE — 99214 OFFICE O/P EST MOD 30 MIN: CPT | Mod: HCWC,25,S$GLB, | Performed by: FAMILY MEDICINE

## 2018-11-14 PROCEDURE — 3075F SYST BP GE 130 - 139MM HG: CPT | Mod: CPTII,HCWC,S$GLB, | Performed by: FAMILY MEDICINE

## 2018-11-14 PROCEDURE — 3045F PR MOST RECENT HEMOGLOBIN A1C LEVEL 7.0-9.0%: CPT | Mod: CPTII,HCWC,S$GLB, | Performed by: FAMILY MEDICINE

## 2018-11-14 PROCEDURE — 90662 IIV NO PRSV INCREASED AG IM: CPT | Mod: HCWC,S$GLB,, | Performed by: FAMILY MEDICINE

## 2018-11-14 PROCEDURE — 3078F DIAST BP <80 MM HG: CPT | Mod: CPTII,HCWC,S$GLB, | Performed by: FAMILY MEDICINE

## 2018-11-14 NOTE — PROGRESS NOTES
Subjective:       Patient ID: Stu Garcia is a 73 y.o. male.    Chief Complaint: Follow-up (flu shot)        S/p Anterior cervical decompression fusion done on 7/6/2017 by Dr. Jeter - neck stable; swallowing improved, but still crushing pills  Lumbar fracture - back pain only occurring at night periodically  Diabetes - following diabetic diet diet; He is taking glimepiride 4mg BID. Metformin reportedly makes him feel bad. BGs 170s. No hypoglycemia.   CAD - s/p 4 stents since last visit; following with Dr. Maldonado; assymptomatic; taking Plavix, xaelto and ASA 81mg daily.   Paroxysmal afib - taking xarelto 20mg daily; taking metoprolol 50mg 1/2 tab BID, lisinopril 5mg  HLD - taking Lipitor 80mg daily  Gerd - taking Omeprazole 40mg daily    Exercising 3 days a week            Diabetes   Pertinent negatives for hypoglycemia include no confusion, dizziness or headaches. Pertinent negatives for diabetes include no chest pain, no polydipsia, no polyuria and no weakness.   Coronary Artery Disease   Pertinent negatives include no chest pain, chest tightness, dizziness, leg swelling, palpitations or shortness of breath.   Atrial Fibrillation   Symptoms are negative for chest pain, dizziness, palpitations, shortness of breath and weakness. Past medical history includes atrial fibrillation and CAD.       Past Medical History:   Diagnosis Date    Anticoagulant long-term use     xarelto,asa    Arthritis     Atrial fibrillation 2013    cardioverted    Bleb, lung     Cataract     OS    Colon polyp     Coronary artery disease     Coronary artery disease involving native coronary artery of native heart with unstable angina pectoris 3/18/2018    Diabetes mellitus     Diabetes mellitus, type 2     Diverticulosis     GERD (gastroesophageal reflux disease)     HEARING LOSS     bilateral aids    Hemorrhoid     HLD (hyperlipidemia)     Hypertension     Iron deficiency anemia     Neuropathy of leg     Pneumonia due to  other staphylococcus     Prostate cancer     prostate    Spontaneous pneumothorax     bilateral       Past Surgical History:   Procedure Laterality Date    ARTHROSCOPY-KNEE W/ CHONDROPLASTY Left 4/7/2017    [facility]    ARTHROSCOPY-MENISCECTOMY Left 4/7/2017    [facility]    CARDIOVERSION      CATARACT EXTRACTION      bilateral    CERVICAL SPINE FRACTURE SURGERY      c7 t1 ACDF     CHEST TUBE INSERTION Right     COLONOSCOPY      COLONOSCOPY N/A 5/20/2015    [facility]s Jr[facility] ENDO    CORONARY STENT PLACEMENT      x 2    ESOPHAGOGASTRODUODENOSCOPY (EGD) N/A 5/20/2015    [facility] ENDO    HERNIA REPAIR      umbilical    KNEE SURGERY Left 2017    Left heart cath Right 3/19/2018    [facility] C[facility]    Left heart cath Left 3/17/2018    [facility] Parminder Duenas III, [facility] CATH    Left heart cath-RM # [id number] B Right 3/21/2018    [facility] CATH    open thoracotomy Left 1966    With pleurectomy    Pleurectomy Left 1966    For treatment of left pneumothorax    PROSTATECTOMY  2001    open    TUBE THORACOTOMY  1966    pneumothorax left--open       Review of patient's allergies indicates:   Allergen Reactions    Codeine Nausea And Vomiting       Social History     Socioeconomic History    Marital status:      Spouse name: Not on file    Number of children: 2    Years of education: Not on file    Highest education level: Not on file   Social Needs    Financial resource strain: Not on file    Food insecurity - worry: Not on file    Food insecurity - inability: Not on file    Transportation needs - medical: Not on file    Transportation needs - non-medical: Not on file   Occupational History    Occupation:     Occupation: prior [employer]    Tobacco Use     Smoking status: Former Smoker     Years: 20.00    Smokeless tobacco: Never Used    Tobacco comment: quit smoking in 1980's.   Substance and Sexual Activity    Alcohol use: Yes     Comment: 1 drink every 2 weeks    Drug use: No    Sexual activity: Yes   Other Topics Concern    Not on file   Social History Narrative    From [place]       Current Outpatient Medications on File Prior to Visit   Medication Sig Dispense Refill    ACCU-CHEK SHASHANK PLUS TEST STRP Strp TEST ONCE DAILY 100 strip 0    aspirin (ECOTRIN) 81 MG EC tablet Take 1 tablet (81 mg total) by mouth every 7 days.  0    atorvastatin (LIPITOR) 80 MG tablet Take 1 tablet (80 mg total) by mouth every evening. 90 tablet 3    clopidogrel (PLAVIX) 75 mg tablet Take 1 tablet (75 mg total) by mouth once daily. 90 tablet 3    furosemide (LASIX) 20 MG tablet TAKE ONE TABLET BY MOUTH EVERY MONDAY, WEDNESDAY, AND FRIDAY 20 tablet 0    glimepiride (AMARYL) 2 MG tablet Take 4 mg by mouth 2 (two) times daily with meals.      lisinopril (PRINIVIL,ZESTRIL) 2.5 MG tablet Take 2 tablets (5 mg total) by mouth once daily. 180 tablet 3    metoprolol tartrate (LOPRESSOR) 50 MG tablet TAKE ONE TABLET BY MOUTH TWICE DAILY 60 tablet 0    multivitamin capsule Take 1 capsule by mouth once daily.      nitroGLYCERIN (NITROSTAT) 0.4 MG SL tablet Place 1 tablet (0.4 mg total) under the tongue every 5 (five) minutes as needed for Chest pain. 20 tablet 0    omeprazole (PRILOSEC) 40 MG capsule Take 40 mg by mouth every morning.       tiZANidine (ZANAFLEX) 4 MG tablet TAKE 1 TABLET(4 MG) BY MOUTH EVERY 6 HOURS AS NEEDED 385 tablet 5    vit C,W-Bz-mwhep-lutein-zeaxan (PRESERVISION AREDS 2) 998-138-00-1 mg-unit-mg-mg Cap Take 1 tablet by mouth once daily.      XARELTO 20 mg Tab TAKE 1 TABLET EVERY DAY 90 tablet 3     No current facility-administered medications on file prior to visit.        Family History   Problem Relation Age of Onset    Diabetes Father     Coronary  "artery disease Father     Cancer Father         stomach    Diabetes Brother        Review of Systems   Constitutional: Negative for activity change, appetite change, chills, fever and unexpected weight change.   HENT: Positive for hearing loss, rhinorrhea and trouble swallowing. Negative for sore throat.    Eyes: Negative for pain, discharge and visual disturbance.   Respiratory: Negative for cough, chest tightness, shortness of breath and wheezing.    Cardiovascular: Negative for chest pain, palpitations and leg swelling.   Gastrointestinal: Negative for abdominal pain, blood in stool, constipation, diarrhea, nausea and vomiting.   Endocrine: Negative for polydipsia and polyuria.   Genitourinary: Negative for difficulty urinating, dysuria, hematuria and urgency.   Musculoskeletal: Positive for back pain and neck pain. Negative for arthralgias, gait problem and joint swelling.   Skin: Negative for rash and wound.   Neurological: Negative for dizziness, weakness, light-headedness and headaches.   Hematological: Negative for adenopathy.   Psychiatric/Behavioral: Negative for confusion and dysphoric mood.       Objective:      /70   Pulse 68   Resp 18   Ht 6' 4" (1.93 m)   Wt 121 kg (266 lb 12.1 oz)   SpO2 98%   BMI 32.47 kg/m²   Physical Exam   Constitutional: He is oriented to person, place, and time. He appears well-developed and well-nourished. He is active. No distress.   HENT:   Head: Normocephalic and atraumatic.   Right Ear: External ear normal.   Left Ear: External ear normal.   Mouth/Throat: Uvula is midline, oropharynx is clear and moist and mucous membranes are normal. No oropharyngeal exudate.   Eyes: Conjunctivae, EOM and lids are normal. Pupils are equal, round, and reactive to light.   Neck: Normal range of motion, full passive range of motion without pain and phonation normal. Neck supple. No thyroid mass and no thyromegaly present.   Cardiovascular: Normal rate, regular rhythm, normal " heart sounds and intact distal pulses. Exam reveals no gallop and no friction rub.   No murmur heard.  Pulmonary/Chest: Effort normal and breath sounds normal. No respiratory distress. He has no wheezes. He has no rales.   Abdominal: Soft. Normal appearance and bowel sounds are normal. There is no tenderness.   Musculoskeletal: Normal range of motion.   Lymphadenopathy:     He has no cervical adenopathy.   Neurological: He is alert and oriented to person, place, and time. No cranial nerve deficit. Coordination normal.   Skin: Skin is warm and dry.   Psychiatric: He has a normal mood and affect. His speech is normal and behavior is normal. Judgment and thought content normal.     right forearm: 7mm verrucuous papule    Results for orders placed or performed in visit on 11/07/18   Comprehensive metabolic panel   Result Value Ref Range    Sodium 139 136 - 145 mmol/L    Potassium 4.9 3.5 - 5.1 mmol/L    Chloride 105 95 - 110 mmol/L    CO2 27 23 - 29 mmol/L    Glucose 188 (H) 70 - 110 mg/dL    BUN, Bld 23 8 - 23 mg/dL    Creatinine 1.4 0.5 - 1.4 mg/dL    Calcium 9.4 8.7 - 10.5 mg/dL    Total Protein 7.3 6.0 - 8.4 g/dL    Albumin 3.9 3.5 - 5.2 g/dL    Total Bilirubin 0.7 0.1 - 1.0 mg/dL    Alkaline Phosphatase 116 55 - 135 U/L    AST 17 10 - 40 U/L    ALT 15 10 - 44 U/L    Anion Gap 7 (L) 8 - 16 mmol/L    eGFR if African American 56.8 (A) >60 mL/min/1.73 m^2    eGFR if non  49.1 (A) >60 mL/min/1.73 m^2   Hemoglobin A1c   Result Value Ref Range    Hemoglobin A1C 8.3 (H) 4.0 - 5.6 %    Estimated Avg Glucose 192 (H) 68 - 131 mg/dL       Assessment:       1. Type 2 diabetes mellitus with hyperlipidemia    2. Essential hypertension    3. Coronary artery disease involving native coronary artery of native heart without angina pectoris    4. PAF (paroxysmal atrial fibrillation)        Plan:       Type 2 diabetes mellitus with hyperlipidemia  -     Hemoglobin A1c; Future; Expected date: 02/12/2019  -      Comprehensive metabolic panel; Future; Expected date: 02/12/2019  -     Lipid panel; Future; Expected date: 02/12/2019    Essential hypertension    Coronary artery disease involving native coronary artery of native heart without angina pectoris    PAF (paroxysmal atrial fibrillation)    Other orders  -     Influenza - High Dose (65+) (PF) (IM)        Flu shot  F/u with cardiology as planned  Diabetes slightly higher  Emphasized compliance with Glimepiride   Goal weight of 220  Weight loss of 5 pounds  Continue other present meds  Counseled on regular exercise, maintenance of a healthy weight, balanced diet rich in fruits/vegetables and lean protein, and avoidance of unhealthy habits like smoking and excessive alcohol intake.    F/u 3 months with labs

## 2018-11-21 RX ORDER — GLIMEPIRIDE 2 MG/1
TABLET ORAL
Qty: 360 TABLET | Refills: 3 | Status: SHIPPED | OUTPATIENT
Start: 2018-11-21 | End: 2020-05-17 | Stop reason: SDUPTHER

## 2018-12-13 DIAGNOSIS — E78.5 TYPE 2 DIABETES MELLITUS WITH HYPERLIPIDEMIA: ICD-10-CM

## 2018-12-13 DIAGNOSIS — E11.69 TYPE 2 DIABETES MELLITUS WITH HYPERLIPIDEMIA: ICD-10-CM

## 2018-12-17 RX ORDER — METOPROLOL TARTRATE 50 MG/1
TABLET ORAL
Qty: 60 TABLET | Refills: 0 | Status: SHIPPED | OUTPATIENT
Start: 2018-12-17 | End: 2019-01-18 | Stop reason: SDUPTHER

## 2019-01-18 DIAGNOSIS — E78.5 TYPE 2 DIABETES MELLITUS WITH HYPERLIPIDEMIA: Primary | ICD-10-CM

## 2019-01-18 DIAGNOSIS — E11.69 TYPE 2 DIABETES MELLITUS WITH HYPERLIPIDEMIA: Primary | ICD-10-CM

## 2019-01-18 RX ORDER — METOPROLOL TARTRATE 50 MG/1
TABLET ORAL
Qty: 60 TABLET | Refills: 0 | Status: SHIPPED | OUTPATIENT
Start: 2019-01-18 | End: 2019-02-14

## 2019-01-18 RX ORDER — BLOOD SUGAR DIAGNOSTIC
STRIP MISCELLANEOUS
Qty: 100 STRIP | Refills: 2 | Status: SHIPPED | OUTPATIENT
Start: 2019-01-18 | End: 2019-11-04 | Stop reason: SDUPTHER

## 2019-01-18 NOTE — PROGRESS NOTES
Refill Authorization Note     is requesting a refill authorization.    Brief assessment and rationale for refill: APPROVE: prr  Amount/Quantity of medication ordered: 90d        Refills Authorized: Yes  If authorized number of refills: 2           Medication Therapy Plan: A1C elevated 11/18; LOV 11/18; approve 9 more  Name and strength of medication: ACCU-CHEK SHASHANK PLUS TEST STRIP 50S  How patient will take medication: test once daily  Medication reconciliation completed: No        Comments:   Lab Results   Component Value Date    HGBA1C 8.3 (H) 11/07/2018    HGBA1C 7.5 (H) 08/08/2018    HGBA1C 8.0 (H) 05/07/2018     No results found for: LABA1C

## 2019-02-07 ENCOUNTER — LAB VISIT (OUTPATIENT)
Dept: LAB | Facility: HOSPITAL | Age: 74
End: 2019-02-07
Attending: FAMILY MEDICINE
Payer: MEDICARE

## 2019-02-07 DIAGNOSIS — E78.5 TYPE 2 DIABETES MELLITUS WITH HYPERLIPIDEMIA: ICD-10-CM

## 2019-02-07 DIAGNOSIS — E11.69 TYPE 2 DIABETES MELLITUS WITH HYPERLIPIDEMIA: ICD-10-CM

## 2019-02-07 LAB
ALBUMIN SERPL BCP-MCNC: 3.8 G/DL
ALP SERPL-CCNC: 102 U/L
ALT SERPL W/O P-5'-P-CCNC: 12 U/L
ANION GAP SERPL CALC-SCNC: 7 MMOL/L
AST SERPL-CCNC: 14 U/L
BILIRUB SERPL-MCNC: 0.6 MG/DL
BUN SERPL-MCNC: 27 MG/DL
CALCIUM SERPL-MCNC: 9.9 MG/DL
CHLORIDE SERPL-SCNC: 105 MMOL/L
CHOLEST SERPL-MCNC: 94 MG/DL
CHOLEST/HDLC SERPL: 3 {RATIO}
CO2 SERPL-SCNC: 26 MMOL/L
CREAT SERPL-MCNC: 1.4 MG/DL
EST. GFR  (AFRICAN AMERICAN): 56.8 ML/MIN/1.73 M^2
EST. GFR  (NON AFRICAN AMERICAN): 49.1 ML/MIN/1.73 M^2
ESTIMATED AVG GLUCOSE: 180 MG/DL
GLUCOSE SERPL-MCNC: 189 MG/DL
HBA1C MFR BLD HPLC: 7.9 %
HDLC SERPL-MCNC: 31 MG/DL
HDLC SERPL: 33 %
LDLC SERPL CALC-MCNC: 42.6 MG/DL
NONHDLC SERPL-MCNC: 63 MG/DL
POTASSIUM SERPL-SCNC: 5.3 MMOL/L
PROT SERPL-MCNC: 6.8 G/DL
SODIUM SERPL-SCNC: 138 MMOL/L
TRIGL SERPL-MCNC: 102 MG/DL

## 2019-02-07 PROCEDURE — 36415 COLL VENOUS BLD VENIPUNCTURE: CPT | Mod: HCNC,PO

## 2019-02-07 PROCEDURE — 80061 LIPID PANEL: CPT | Mod: HCNC

## 2019-02-07 PROCEDURE — 83036 HEMOGLOBIN GLYCOSYLATED A1C: CPT | Mod: HCNC

## 2019-02-07 PROCEDURE — 80053 COMPREHEN METABOLIC PANEL: CPT | Mod: HCNC

## 2019-02-14 ENCOUNTER — PES CALL (OUTPATIENT)
Dept: ADMINISTRATIVE | Facility: CLINIC | Age: 74
End: 2019-02-14

## 2019-02-14 ENCOUNTER — OFFICE VISIT (OUTPATIENT)
Dept: FAMILY MEDICINE | Facility: CLINIC | Age: 74
End: 2019-02-14
Payer: MEDICARE

## 2019-02-14 ENCOUNTER — TELEPHONE (OUTPATIENT)
Dept: CARDIOLOGY | Facility: CLINIC | Age: 74
End: 2019-02-14

## 2019-02-14 VITALS
DIASTOLIC BLOOD PRESSURE: 64 MMHG | WEIGHT: 270.75 LBS | HEART RATE: 85 BPM | HEIGHT: 76 IN | RESPIRATION RATE: 18 BRPM | SYSTOLIC BLOOD PRESSURE: 122 MMHG | BODY MASS INDEX: 32.97 KG/M2 | OXYGEN SATURATION: 96 %

## 2019-02-14 DIAGNOSIS — I10 ESSENTIAL HYPERTENSION: ICD-10-CM

## 2019-02-14 DIAGNOSIS — I25.10 CORONARY ARTERY DISEASE INVOLVING NATIVE CORONARY ARTERY OF NATIVE HEART WITHOUT ANGINA PECTORIS: ICD-10-CM

## 2019-02-14 DIAGNOSIS — I48.0 PAF (PAROXYSMAL ATRIAL FIBRILLATION): ICD-10-CM

## 2019-02-14 DIAGNOSIS — I20.89 ANGINA OF EFFORT: ICD-10-CM

## 2019-02-14 DIAGNOSIS — E78.5 TYPE 2 DIABETES MELLITUS WITH HYPERLIPIDEMIA: Primary | ICD-10-CM

## 2019-02-14 DIAGNOSIS — E78.5 HYPERLIPIDEMIA, UNSPECIFIED HYPERLIPIDEMIA TYPE: ICD-10-CM

## 2019-02-14 DIAGNOSIS — E11.69 TYPE 2 DIABETES MELLITUS WITH HYPERLIPIDEMIA: Primary | ICD-10-CM

## 2019-02-14 PROCEDURE — 99999 PR PBB SHADOW E&M-EST. PATIENT-LVL IV: ICD-10-PCS | Mod: PBBFAC,,, | Performed by: FAMILY MEDICINE

## 2019-02-14 PROCEDURE — 3078F DIAST BP <80 MM HG: CPT | Mod: CPTII,S$GLB,, | Performed by: FAMILY MEDICINE

## 2019-02-14 PROCEDURE — 93000 EKG 12-LEAD: ICD-10-PCS | Mod: HCNC,S$GLB,, | Performed by: INTERNAL MEDICINE

## 2019-02-14 PROCEDURE — 99214 OFFICE O/P EST MOD 30 MIN: CPT | Mod: S$GLB,,, | Performed by: FAMILY MEDICINE

## 2019-02-14 PROCEDURE — 99999 PR PBB SHADOW E&M-EST. PATIENT-LVL IV: CPT | Mod: PBBFAC,,, | Performed by: FAMILY MEDICINE

## 2019-02-14 PROCEDURE — 1101F PT FALLS ASSESS-DOCD LE1/YR: CPT | Mod: CPTII,S$GLB,, | Performed by: FAMILY MEDICINE

## 2019-02-14 PROCEDURE — 99214 PR OFFICE/OUTPT VISIT, EST, LEVL IV, 30-39 MIN: ICD-10-PCS | Mod: S$GLB,,, | Performed by: FAMILY MEDICINE

## 2019-02-14 PROCEDURE — 3074F SYST BP LT 130 MM HG: CPT | Mod: CPTII,S$GLB,, | Performed by: FAMILY MEDICINE

## 2019-02-14 PROCEDURE — 3045F PR MOST RECENT HEMOGLOBIN A1C LEVEL 7.0-9.0%: CPT | Mod: CPTII,S$GLB,, | Performed by: FAMILY MEDICINE

## 2019-02-14 PROCEDURE — 99499 UNLISTED E&M SERVICE: CPT | Mod: HCNC,S$GLB,, | Performed by: FAMILY MEDICINE

## 2019-02-14 PROCEDURE — 93000 ELECTROCARDIOGRAM COMPLETE: CPT | Mod: HCNC,S$GLB,, | Performed by: INTERNAL MEDICINE

## 2019-02-14 PROCEDURE — 3074F PR MOST RECENT SYSTOLIC BLOOD PRESSURE < 130 MM HG: ICD-10-PCS | Mod: CPTII,S$GLB,, | Performed by: FAMILY MEDICINE

## 2019-02-14 PROCEDURE — 3045F PR MOST RECENT HEMOGLOBIN A1C LEVEL 7.0-9.0%: ICD-10-PCS | Mod: CPTII,S$GLB,, | Performed by: FAMILY MEDICINE

## 2019-02-14 PROCEDURE — 99499 RISK ADDL DX/OHS AUDIT: ICD-10-PCS | Mod: HCNC,S$GLB,, | Performed by: FAMILY MEDICINE

## 2019-02-14 PROCEDURE — 3078F PR MOST RECENT DIASTOLIC BLOOD PRESSURE < 80 MM HG: ICD-10-PCS | Mod: CPTII,S$GLB,, | Performed by: FAMILY MEDICINE

## 2019-02-14 PROCEDURE — 1101F PR PT FALLS ASSESS DOC 0-1 FALLS W/OUT INJ PAST YR: ICD-10-PCS | Mod: CPTII,S$GLB,, | Performed by: FAMILY MEDICINE

## 2019-02-14 RX ORDER — NITROGLYCERIN 0.4 MG/1
0.4 TABLET SUBLINGUAL EVERY 5 MIN PRN
Qty: 20 TABLET | Refills: 0 | Status: SHIPPED | OUTPATIENT
Start: 2019-02-14 | End: 2019-03-12 | Stop reason: SDUPTHER

## 2019-02-14 RX ORDER — METOPROLOL TARTRATE 50 MG/1
25 TABLET ORAL 2 TIMES DAILY
Qty: 30 TABLET | Refills: 11
Start: 2019-02-14 | End: 2019-12-23

## 2019-02-14 RX ORDER — LANCETS
1 EACH MISCELLANEOUS DAILY
Qty: 100 EACH | Refills: 3 | Status: SHIPPED | OUTPATIENT
Start: 2019-02-14 | End: 2019-11-05 | Stop reason: SDUPTHER

## 2019-02-14 NOTE — PROGRESS NOTES
Subjective:       Patient ID: Stu Garcia is a 73 y.o. male.    Chief Complaint: Follow-up        S/p Anterior cervical decompression fusion done on 7/6/2017 by Dr. Jeter - neck stable; swallowing improved, but still crushing pills  Lumbar fracture - back pain only occurring at night periodically  Diabetes - following diabetic diet diet; He is taking glimepiride 4mg BID. Metformin reportedly makes him feel bad. BGs 190s. No hypoglycemia.   CAD - s/p 4 stents; following with Dr. Maldonado; taking Plavix, xarelto and ASA 81mg daily.   He has been getting some intermittent angina pains. It does improve with rest.  Paroxysmal afib - taking xarelto 20mg daily; taking metoprolol 50mg 1/2 tab BID, lisinopril 5mg  HLD - taking Lipitor 80mg daily  Gerd - taking Omeprazole 40mg daily    Exercising 3 days a week at gym            Diabetes   Pertinent negatives for hypoglycemia include no confusion, dizziness or headaches. Pertinent negatives for diabetes include no chest pain, no polydipsia, no polyuria and no weakness.   Coronary Artery Disease   Symptoms include chest tightness. Pertinent negatives include no chest pain, dizziness, leg swelling, palpitations or shortness of breath.   Atrial Fibrillation   Symptoms are negative for chest pain, dizziness, palpitations, shortness of breath and weakness. Past medical history includes atrial fibrillation and CAD.       Past Medical History:   Diagnosis Date    Anticoagulant long-term use     xarelto,asa    Arthritis     Atrial fibrillation 2013    cardioverted    Bleb, lung     Cataract     OS    Colon polyp     Coronary artery disease     Coronary artery disease involving native coronary artery of native heart with unstable angina pectoris 3/18/2018    Diabetes mellitus     Diabetes mellitus, type 2     Diverticulosis     GERD (gastroesophageal reflux disease)     HEARING LOSS     bilateral aids    Hemorrhoid     HLD (hyperlipidemia)     Hypertension     Iron  deficiency anemia     Neuropathy of leg     Pneumonia due to other staphylococcus     Prostate cancer     prostate    Spontaneous pneumothorax     bilateral       Past Surgical History:   Procedure Laterality Date    ARTHROSCOPY-KNEE W/ CHONDROPLASTY Left 4/7/2017    Performed by Kale Cleary [facility] at [facility]    ARTHROSCOPY-MENISCECTOMY Left 4/7/2017    [facility]ary [facility] at [facility]    CARDIOVERSION      CATARACT EXTRACTION      bilateral    CERVICAL SPINE FRACTURE SURGERY      c7 t1 ACDF     CHEST TUBE INSERTION Right     COLONOSCOPY      COLONOSCOPY N/A 5/20/2015    Performed by Andrea Kerns Jr., MD at [facility]    CORONARY STENT PLACEMENT      x 2    ESOPHAGOGASTRODUODENOSCOPY (EGD) N/A 5/20/2015    Performed by Andrea Kerns Jr[facility] at [facility]    HERNIA REPAIR      umbilical    KNEE SURGERY Left 2017    Left heart cath Right 3/19/2018    [email or web address] CATH    Left heart cath Left 3/17/2018    Performed [facility] Parminder Duenas III, [facility] at [email or web address] CATH    Left heart cath-RM # 222 B Right 3/21/2018    [email or web address] CATH    open thoracotomy Left 1966    With pleurectomy    Pleurectomy Left 1966    For treatment of left pneumothorax    PROSTATECTOMY  2001    open    TUBE THORACOTOMY  1966    pneumothorax left--open       Review of patient's allergies indicates:   Allergen Reactions    Codeine Nausea And Vomiting       Social History     Socioeconomic History    Marital status:      Spouse name: Not on file    Number of children: 2    Years of education: Not on file    Highest education level: Not on file   Social Needs    Financial resource strain: Not on file    Food insecurity - worry: Not on file    Food insecurity - inability: Not on file    Transportation needs - medical: Not on file    Transportation needs - non-medical: Not on file   Occupational History    Occupation: service      Occupation: prior [employer]    Tobacco Use    Smoking status: Former Smoker     Years: 20.00    Smokeless tobacco: Never Used    Tobacco comment: quit smoking in 1980's.   Substance and Sexual Activity    Alcohol use: Yes     Comment: 1 drink every 2 weeks    Drug use: No    Sexual activity: Yes   Other Topics Concern    Not on file   Social History Narrative    From [place]       Current Outpatient Medications on File Prior to Visit   Medication Sig Dispense Refill    ACCU-CHEK SHASHANK PLUS TEST STRP Strp TEST ONCE DAILY 100 strip 2    aspirin (ECOTRIN) 81 MG EC tablet Take 1 tablet (81 mg total) by mouth every 7 days.  0    atorvastatin (LIPITOR) 80 MG tablet Take 1 tablet (80 mg total) by mouth every evening. 90 tablet 3    clopidogrel (PLAVIX) 75 mg tablet Take 1 tablet (75 mg total) by mouth once daily. 90 tablet 3    furosemide (LASIX) 20 MG tablet TAKE ONE TABLET BY MOUTH EVERY MONDAY, WEDNESDAY, AND FRIDAY 20 tablet 0    glimepiride (AMARYL) 2 MG tablet TAKE 2 TABLETS TWICE DAILY 360 tablet 3    lisinopril (PRINIVIL,ZESTRIL) 2.5 MG tablet Take 2 tablets (5 mg total) by mouth once daily. 180 tablet 3    multivitamin capsule Take 1 capsule by mouth once daily.      omeprazole (PRILOSEC) 40 MG capsule Take 40 mg by mouth every morning.       tiZANidine (ZANAFLEX) 4 MG tablet TAKE 1 TABLET(4 MG) BY MOUTH EVERY 6 HOURS AS NEEDED 385 tablet 5    vit C,N-Sg-mdaji-lutein-zeaxan (PRESERVISION AREDS 2) [id number] mg-unit-mg-mg Cap Take 1 tablet by mouth once daily.      XARELTO 20 mg Tab TAKE 1 TABLET EVERY DAY 90 tablet 3     No current facility-administered medications on file prior to visit.        Family History   Problem Relation Age of Onset    Diabetes Father     Coronary artery disease Father     Cancer Father         stomach    Diabetes Brother        Review of Systems   Constitutional: Negative for activity change, appetite change, chills, fever and unexpected weight  "change.   HENT: Positive for hearing loss, rhinorrhea and trouble swallowing. Negative for sore throat.    Eyes: Negative for pain, discharge and visual disturbance.   Respiratory: Positive for chest tightness. Negative for cough, shortness of breath and wheezing.    Cardiovascular: Negative for chest pain, palpitations and leg swelling.   Gastrointestinal: Negative for abdominal pain, blood in stool, constipation, diarrhea, nausea and vomiting.   Endocrine: Negative for polydipsia and polyuria.   Genitourinary: Negative for difficulty urinating, dysuria, hematuria and urgency.   Musculoskeletal: Positive for back pain and neck pain. Negative for arthralgias, gait problem and joint swelling.   Skin: Negative for rash and wound.   Neurological: Negative for dizziness, weakness, light-headedness and headaches.   Hematological: Negative for adenopathy.   Psychiatric/Behavioral: Negative for confusion and dysphoric mood.       Objective:      /64   Pulse 85   Resp 18   Ht 6' 4" (1.93 m)   Wt 122.8 kg (270 lb 11.6 oz)   SpO2 96%   BMI 32.95 kg/m²   Physical Exam   Constitutional: He is oriented to person, place, and time. He appears well-developed and well-nourished. He is active. No distress.   HENT:   Head: Normocephalic and atraumatic.   Right Ear: External ear normal.   Left Ear: External ear normal.   Mouth/Throat: Uvula is midline, oropharynx is clear and moist and mucous membranes are normal. No oropharyngeal exudate.   Eyes: Conjunctivae, EOM and lids are normal. Pupils are equal, round, and reactive to light.   Neck: Normal range of motion, full passive range of motion without pain and phonation normal. Neck supple. No thyroid mass and no thyromegaly present.   Cardiovascular: Normal rate, regular rhythm, normal heart sounds and intact distal pulses. Exam reveals no gallop and no friction rub.   No murmur heard.  Pulmonary/Chest: Effort normal and breath sounds normal. No respiratory distress. He has " no wheezes. He has no rales.   Abdominal: Soft. Normal appearance and bowel sounds are normal. There is no tenderness.   Musculoskeletal: Normal range of motion.   Lymphadenopathy:     He has no cervical adenopathy.   Neurological: He is alert and oriented to person, place, and time. No cranial nerve deficit. Coordination normal.   Skin: Skin is warm and dry.   Psychiatric: He has a normal mood and affect. His speech is normal and behavior is normal. Judgment and thought content normal.         Results for orders placed or performed in visit on 02/07/19   Hemoglobin A1c   Result Value Ref Range    Hemoglobin A1C 7.9 (H) 4.0 - 5.6 %    Estimated Avg Glucose 180 (H) 68 - 131 mg/dL   Comprehensive metabolic panel   Result Value Ref Range    Sodium 138 136 - 145 mmol/L    Potassium 5.3 (H) 3.5 - 5.1 mmol/L    Chloride 105 95 - 110 mmol/L    CO2 26 23 - 29 mmol/L    Glucose 189 (H) 70 - 110 mg/dL    BUN, Bld 27 (H) 8 - 23 mg/dL    Creatinine 1.4 0.5 - 1.4 mg/dL    Calcium 9.9 8.7 - 10.5 mg/dL    Total Protein 6.8 6.0 - 8.4 g/dL    Albumin 3.8 3.5 - 5.2 g/dL    Total Bilirubin 0.6 0.1 - 1.0 mg/dL    Alkaline Phosphatase 102 55 - 135 U/L    AST 14 10 - 40 U/L    ALT 12 10 - 44 U/L    Anion Gap 7 (L) 8 - 16 mmol/L    eGFR if African American 56.8 (A) >60 mL/min/1.73 m^2    eGFR if non  49.1 (A) >60 mL/min/1.73 m^2   Lipid panel   Result Value Ref Range    Cholesterol 94 (L) 120 - 199 mg/dL    Triglycerides 102 30 - 150 mg/dL    HDL 31 (L) 40 - 75 mg/dL    LDL Cholesterol 42.6 (L) 63.0 - 159.0 mg/dL    HDL/Chol Ratio 33.0 20.0 - 50.0 %    Total Cholesterol/HDL Ratio 3.0 2.0 - 5.0    Non-HDL Cholesterol 63 mg/dL     EKG: normal with no changes    Assessment:       1. Type 2 diabetes mellitus with hyperlipidemia    2. Essential hypertension    3. Coronary artery disease involving native coronary artery of native heart without angina pectoris    4. PAF (paroxysmal atrial fibrillation)    5. Hyperlipidemia,  unspecified hyperlipidemia type    6. Angina of effort        Plan:       Type 2 diabetes mellitus with hyperlipidemia  -     Hemoglobin A1c; Future; Expected date: 05/15/2019  -     Comprehensive metabolic panel; Future; Expected date: 05/15/2019    Essential hypertension    Coronary artery disease involving native coronary artery of native heart without angina pectoris    PAF (paroxysmal atrial fibrillation)    Hyperlipidemia, unspecified hyperlipidemia type    Angina of effort  -     IN OFFICE EKG 12-LEAD (to [place])    Other orders  -     metoprolol tartrate (LOPRESSOR) 50 MG tablet; Take 0.5 tablets (25 mg total) by mouth 2 (two) times daily.  Dispense: 30 tablet; Refill: 11  -     lancets (ACCU-CHEK SOFTCLIX LANCETS) Misc; 1 strip by Misc.(Non-Drug; Combo Route) route once daily.  Dispense: 100 each; Refill: 3  -     nitroGLYCERIN (NITROSTAT) 0.4 MG SL tablet; Place 1 tablet (0.4 mg total) under the tongue every 5 (five) minutes as needed for Chest pain.  Dispense: 20 tablet; Refill: 0          F/u with cardiology; appt arranged due to recent increase in angina  NTG to use PRN  Diabetes improved  Continue Glimepiride 4mg BID  Goal weight of 220  Weight loss efforts  Continue other present meds  Counseled on regular exercise, maintenance of a healthy weight, balanced diet rich in fruits/vegetables and lean protein, and avoidance of unhealthy habits like smoking and excessive alcohol intake.    F/u 3 months with labs

## 2019-02-14 NOTE — Clinical Note
Patient having some regular angina symoptoms. He is status post multiple stents last January.  He is due for his follow-up with Dr. Maldonado. Please schdule him an appointment to evaluate his concerns.

## 2019-02-14 NOTE — TELEPHONE ENCOUNTER
----- Message from Cade Juan MD sent at 2/14/2019  9:38 AM CST -----  Patient having some regular angina symoptoms. He is status post multiple stents last January.  He is due for his follow-up with Dr. Maldonado. Please schdule him an appointment to evaluate his concerns.

## 2019-02-15 ENCOUNTER — TELEPHONE (OUTPATIENT)
Dept: AUDIOLOGY | Facility: CLINIC | Age: 74
End: 2019-02-15

## 2019-02-15 NOTE — TELEPHONE ENCOUNTER
----- Message from Ludin Mota sent at 2/14/2019  3:05 PM CST -----  Type:  Patient Returning Call    Who Called:  Patient  Who Left Message for Patient:  Grazyna  Does the patient know what this is regarding?:  Appointment  Best Call Back Number:  [phone]  Additional Information:

## 2019-02-18 ENCOUNTER — PATIENT MESSAGE (OUTPATIENT)
Dept: CARDIOLOGY | Facility: CLINIC | Age: 74
End: 2019-02-18

## 2019-02-22 ENCOUNTER — TELEPHONE (OUTPATIENT)
Dept: GASTROENTEROLOGY | Facility: CLINIC | Age: 74
End: 2019-02-22

## 2019-02-22 PROBLEM — K92.2 GASTROINTESTINAL HEMORRHAGE: Status: ACTIVE | Noted: 2019-02-22

## 2019-02-22 PROBLEM — R07.9 CHEST PAIN: Status: ACTIVE | Noted: 2019-02-22

## 2019-02-22 PROBLEM — D62 ACUTE BLOOD LOSS ANEMIA: Status: ACTIVE | Noted: 2019-02-22

## 2019-02-22 NOTE — TELEPHONE ENCOUNTER
----- Message from Shala Rizzo sent at 2/22/2019  2:50 PM CST -----  Contact: [facility]  Placed call to pod, Dr. Cesar Zavala need to speak with Dr. Kerns please call back at [phone]

## 2019-02-26 ENCOUNTER — TELEPHONE (OUTPATIENT)
Dept: FAMILY MEDICINE | Facility: CLINIC | Age: 74
End: 2019-02-26

## 2019-02-26 DIAGNOSIS — C15.9 ESOPHAGEAL ADENOCARCINOMA: Primary | ICD-10-CM

## 2019-02-26 NOTE — TELEPHONE ENCOUNTER
----- Message from Kina Lowery sent at 2/25/2019  4:34 PM CST -----  Contact: pt wife   Calling to schedule a hospital follow up in a week and please advise. [phone]

## 2019-02-28 ENCOUNTER — PATIENT MESSAGE (OUTPATIENT)
Dept: FAMILY MEDICINE | Facility: CLINIC | Age: 74
End: 2019-02-28

## 2019-02-28 RX ORDER — PANTOPRAZOLE SODIUM 40 MG/1
40 FOR SUSPENSION ORAL DAILY
Qty: 30 PACKET | Refills: 1 | Status: CANCELLED | OUTPATIENT
Start: 2019-02-28 | End: 2020-02-28

## 2019-02-28 RX ORDER — PANTOPRAZOLE SODIUM 40 MG/1
40 TABLET, DELAYED RELEASE ORAL DAILY
Qty: 30 TABLET | Refills: 11 | Status: SHIPPED | OUTPATIENT
Start: 2019-02-28 | End: 2019-03-06

## 2019-03-01 ENCOUNTER — LAB VISIT (OUTPATIENT)
Dept: LAB | Facility: HOSPITAL | Age: 74
End: 2019-03-01
Payer: MEDICARE

## 2019-03-01 DIAGNOSIS — K22.89 ESOPHAGEAL MASS: ICD-10-CM

## 2019-03-01 DIAGNOSIS — E11.9 TYPE 2 DIABETES MELLITUS WITHOUT COMPLICATION: ICD-10-CM

## 2019-03-01 LAB
BASOPHILS # BLD AUTO: 0.04 K/UL
BASOPHILS NFR BLD: 0.6 %
DIFFERENTIAL METHOD: ABNORMAL
EOSINOPHIL # BLD AUTO: 0.3 K/UL
EOSINOPHIL NFR BLD: 4.1 %
ERYTHROCYTE [DISTWIDTH] IN BLOOD BY AUTOMATED COUNT: 18.6 %
HCT VFR BLD AUTO: 29.3 %
HGB BLD-MCNC: 8.3 G/DL
IMM GRANULOCYTES # BLD AUTO: 0.02 K/UL
IMM GRANULOCYTES NFR BLD AUTO: 0.3 %
LYMPHOCYTES # BLD AUTO: 0.9 K/UL
LYMPHOCYTES NFR BLD: 14 %
MCH RBC QN AUTO: 24.6 PG
MCHC RBC AUTO-ENTMCNC: 28.3 G/DL
MCV RBC AUTO: 87 FL
MONOCYTES # BLD AUTO: 0.6 K/UL
MONOCYTES NFR BLD: 10 %
NEUTROPHILS # BLD AUTO: 4.5 K/UL
NEUTROPHILS NFR BLD: 71 %
NRBC BLD-RTO: 0 /100 WBC
PLATELET # BLD AUTO: 184 K/UL
PMV BLD AUTO: 11.9 FL
RBC # BLD AUTO: 3.37 M/UL
WBC # BLD AUTO: 6.3 K/UL

## 2019-03-01 PROCEDURE — 36415 COLL VENOUS BLD VENIPUNCTURE: CPT | Mod: HCNC,PO

## 2019-03-01 PROCEDURE — 85025 COMPLETE CBC W/AUTO DIFF WBC: CPT | Mod: HCNC

## 2019-03-06 ENCOUNTER — TELEPHONE (OUTPATIENT)
Dept: ENDOSCOPY | Facility: HOSPITAL | Age: 74
End: 2019-03-06

## 2019-03-06 ENCOUNTER — OFFICE VISIT (OUTPATIENT)
Dept: FAMILY MEDICINE | Facility: CLINIC | Age: 74
End: 2019-03-06
Payer: MEDICARE

## 2019-03-06 VITALS
HEART RATE: 69 BPM | HEIGHT: 76 IN | OXYGEN SATURATION: 99 % | DIASTOLIC BLOOD PRESSURE: 68 MMHG | SYSTOLIC BLOOD PRESSURE: 130 MMHG | TEMPERATURE: 99 F | BODY MASS INDEX: 32.03 KG/M2 | WEIGHT: 263 LBS

## 2019-03-06 DIAGNOSIS — I48.0 PAF (PAROXYSMAL ATRIAL FIBRILLATION): ICD-10-CM

## 2019-03-06 DIAGNOSIS — C15.9 MALIGNANT NEOPLASM OF ESOPHAGUS, UNSPECIFIED LOCATION: Primary | ICD-10-CM

## 2019-03-06 DIAGNOSIS — E11.9 TYPE 2 DIABETES MELLITUS TREATED WITHOUT INSULIN: ICD-10-CM

## 2019-03-06 DIAGNOSIS — D62 ACUTE BLOOD LOSS ANEMIA: ICD-10-CM

## 2019-03-06 DIAGNOSIS — K92.2 GASTROINTESTINAL HEMORRHAGE, UNSPECIFIED GASTROINTESTINAL HEMORRHAGE TYPE: ICD-10-CM

## 2019-03-06 DIAGNOSIS — C16.0 GE JUNCTION CARCINOMA: Primary | ICD-10-CM

## 2019-03-06 PROCEDURE — 99499 RISK ADDL DX/OHS AUDIT: ICD-10-PCS | Mod: HCNC,S$GLB,, | Performed by: FAMILY MEDICINE

## 2019-03-06 PROCEDURE — 3078F PR MOST RECENT DIASTOLIC BLOOD PRESSURE < 80 MM HG: ICD-10-PCS | Mod: HCNC,CPTII,S$GLB, | Performed by: FAMILY MEDICINE

## 2019-03-06 PROCEDURE — 3075F PR MOST RECENT SYSTOLIC BLOOD PRESS GE 130-139MM HG: ICD-10-PCS | Mod: HCNC,CPTII,S$GLB, | Performed by: FAMILY MEDICINE

## 2019-03-06 PROCEDURE — 99499 UNLISTED E&M SERVICE: CPT | Mod: HCNC,S$GLB,, | Performed by: FAMILY MEDICINE

## 2019-03-06 PROCEDURE — 99999 PR PBB SHADOW E&M-EST. PATIENT-LVL V: ICD-10-PCS | Mod: PBBFAC,HCNC,, | Performed by: FAMILY MEDICINE

## 2019-03-06 PROCEDURE — 3045F PR MOST RECENT HEMOGLOBIN A1C LEVEL 7.0-9.0%: CPT | Mod: HCNC,CPTII,S$GLB, | Performed by: FAMILY MEDICINE

## 2019-03-06 PROCEDURE — 3045F PR MOST RECENT HEMOGLOBIN A1C LEVEL 7.0-9.0%: ICD-10-PCS | Mod: HCNC,CPTII,S$GLB, | Performed by: FAMILY MEDICINE

## 2019-03-06 PROCEDURE — 1101F PT FALLS ASSESS-DOCD LE1/YR: CPT | Mod: HCNC,CPTII,S$GLB, | Performed by: FAMILY MEDICINE

## 2019-03-06 PROCEDURE — 99214 PR OFFICE/OUTPT VISIT, EST, LEVL IV, 30-39 MIN: ICD-10-PCS | Mod: HCNC,S$GLB,, | Performed by: FAMILY MEDICINE

## 2019-03-06 PROCEDURE — 3075F SYST BP GE 130 - 139MM HG: CPT | Mod: HCNC,CPTII,S$GLB, | Performed by: FAMILY MEDICINE

## 2019-03-06 PROCEDURE — 1101F PR PT FALLS ASSESS DOC 0-1 FALLS W/OUT INJ PAST YR: ICD-10-PCS | Mod: HCNC,CPTII,S$GLB, | Performed by: FAMILY MEDICINE

## 2019-03-06 PROCEDURE — 99214 OFFICE O/P EST MOD 30 MIN: CPT | Mod: HCNC,S$GLB,, | Performed by: FAMILY MEDICINE

## 2019-03-06 PROCEDURE — 3078F DIAST BP <80 MM HG: CPT | Mod: HCNC,CPTII,S$GLB, | Performed by: FAMILY MEDICINE

## 2019-03-06 PROCEDURE — 99999 PR PBB SHADOW E&M-EST. PATIENT-LVL V: CPT | Mod: PBBFAC,HCNC,, | Performed by: FAMILY MEDICINE

## 2019-03-06 RX ORDER — PANTOPRAZOLE SODIUM 40 MG/1
40 TABLET, DELAYED RELEASE ORAL 2 TIMES DAILY
Qty: 60 TABLET | Refills: 11 | Status: SHIPPED | OUTPATIENT
Start: 2019-03-06 | End: 2019-08-14

## 2019-03-06 NOTE — PROGRESS NOTES
"Subjective:       Patient ID: Stu Garcia is a 73 y.o. male.    Chief Complaint: Follow-up (Hospital follow up, Needs a referal to Oncology )      Here to f/u on recent hospitalization.    Admission Date: 2/22/2019  Hospital Length of Stay: 1 days  Discharge Date and Time:  02/25/2019 11:33 AM  Attending Physician: Clint Trivedi MD   Discharging Provider: Clint Trivedi MD  Primary Care Provider: Cade Juan MD  HPI:   Per my initial H&P: "This is a 73-year-old gentleman with a history of hypertension, coronary artery disease whose last stent was approximately 1 year ago and is currently maintained on aspirin 81 mg every week, and atrial fibrillation maintained on Xarelto.  He has had issues in the past with a bleeding peptic ulcer.  His last colonoscopy was in 2015.  A polyp was removed at that time, path negative for malignancy.  He has had increasing dyspnea and dark stool for the past month or so.  No obvious bleeding.  More recently, he has experienced episodic chest pain that is associated with exertion.  It is relieved with rest, nitroglycerin, and aspirin. (He has been taking his aspirin more frequently due to chest pain.) His worsening symptoms prompted his presentation to the emergency department.  He was found to have significant anemia.  2 units PRBC were ordered.  The emergency room physician has a call in to GI.  The department hospital medicine is now consulted for admission."  Procedure(s) (LRB):  ESOPHAGOGASTRODUODENOSCOPY (EGD) (N/A)    Hospital Course:   The patient was admitted for further evaluation and treatment. Hb responded to total of 4 u PRBC this admission and has remained stable following most recent transfusion. No signs of gross bleeding. Chest pain resolved with transfusion. Cardiology believes this was due to anemia. GI was consulted. 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 Biopsy is pending. Hemostatic spray was applied. PPI was changed to PO BID. CT chest/abdomen with IV contrast was obtained for staging. This showed no obvious metastasis. GI is considering EUS as an outpatient. Xarelto and ASA remain on hold at discharge, but could potentially be restarted by cardiology as an outpatient (patient has an appointment with Dr. Cano in 1-2 weeks). The patient was cleared for discharge by GI and cardiology. He feels sufficiently improved to return home and is ready to leave. The patient will be discharged to home in good condition. He was told to return to the ER for any bleeding, dizziness, fatigue, chest pain, shortness of breath, fever, or any other concern. He will have close followup with the [facility] as well as oncology, GI, and cardiology. The patient is to have a CBC drawn on Friday to monitor anemia, results to Dr. Higgins. I discussed the case with Dr. Andrews on the day of discharge. The patient, Dr. Richards, Dr. Andrews, and Dr. Darnell are in agreement with the discharge plan.     Pathology revealled: GASTROESOPHAGEAL JUNCTION, TUMOR, BIOPSY:  - INVASIVE MODERATELY DIFFERENTIATED ADENOCARCINOMA.      He will see Dr. Milian for his Onc follow up rather than Dr. Andrews per request of YEN Sauer.      S/p Anterior cervical decompression fusion done on 7/6/2017 by Dr. Jeter - neck stable; swallowing improved, but still crushing pills  Lumbar fracture - back pain only occurring at night periodically  Diabetes - following diabetic diet diet; He is taking glimepiride 4mg BID. Metformin reportedly makes him feel bad. BGs 190s. No hypoglycemia.   CAD - s/p 4 stents; following with Dr. Maldonado; taking Plavix, xarelto and ASA 81mg daily.   He has been getting some intermittent angina pains. It does improve with rest.  Paroxysmal afib - taking xarelto 20mg daily; taking metoprolol 50mg 1/2 tab BID, lisinopril 5mg  HLD - taking Lipitor 80mg daily  Gerd - taking Omeprazole  40mg daily    Exercising 3 days a week at gym            Diabetes   Pertinent negatives for hypoglycemia include no confusion, dizziness or headaches. Pertinent negatives for diabetes include no chest pain, no polydipsia, no polyuria and no weakness.   Coronary Artery Disease   Symptoms include chest tightness. Pertinent negatives include no chest pain, dizziness, leg swelling, palpitations or shortness of breath.   Atrial Fibrillation   Symptoms are negative for chest pain, dizziness, palpitations, shortness of breath and weakness. Past medical history includes atrial fibrillation and CAD.       Past Medical History:   Diagnosis Date    Anticoagulant long-term use     xarelto,asa    Arthritis     Atrial fibrillation 2013    cardioverted    Bleb, lung     Cataract     OS    Colon polyp     Coronary artery disease     Coronary artery disease involving native coronary artery of native heart with unstable angina pectoris 3/18/2018    Diabetes mellitus     Diabetes mellitus, type 2     Diverticulosis     GERD (gastroesophageal reflux disease)     HEARING LOSS     bilateral aids    Hemorrhoid     HLD (hyperlipidemia)     Hypertension     Iron deficiency anemia     Neuropathy of leg     Pneumonia due to other staphylococcus     Prostate cancer     prostate    Spontaneous pneumothorax     bilateral       Past Surgical History:   Procedure Laterality Date    ARTHROSCOPY-KNEE W/ CHONDROPLASTY Left 4/7/2017    [facility] at [facility]    ARTHROSCOPY-MENISCECTOMY Left 4/7/2017    [facility] at [facility] OR    CARDIOVERSION      CATARACT EXTRACTION      bilateral    CERVICAL SPINE FRACTURE SURGERY      c7 t1 ACDF     CHEST TUBE INSERTION Right     COLONOSCOPY      COLONOSCOPY N/A 5/20/2015    Performed by Andrea Kerns Jr., MD at [facility] ENDO    CORONARY STENT PLACEMENT      x 2    ESOPHAGOGASTRODUODENOSCOPY (EGD) N/A 2/23/2019    Performed by  Jackeline Richards MD at [facility]    ESOPHAGOGASTRODUODENOSCOPY (EGD) N/A 5/20/2015    [facility]    HERNIA REPAIR      umbilical    KNEE SURGERY Left 2017    Left heart cath Right 3/19/2018    [facility]    Left heart cath Left 3/17/2018    Performed by Parminder Duenas III, MD at [facility]    Left heart cath-RM # 222 B Right 3/21/2018    [facility] at [facility]    open thoracotomy Left 1966    With pleurectomy    Pleurectomy Left 1966    For treatment of left pneumothorax    PROSTATECTOMY  2001    open    TUBE THORACOTOMY  1966    pneumothorax left--open       Review of patient's allergies indicates:   Allergen Reactions    Codeine Nausea And Vomiting       Social History     Socioeconomic History    Marital status:      Spouse name: Not on file    Number of children: 2    Years of education: Not on file    Highest education level: Not on file   Social Needs    Financial resource strain: Not on file    Food insecurity - worry: Not on file    Food insecurity - inability: Not on file    Transportation needs - medical: Not on file    Transportation needs - non-medical: Not on file   Occupational History    Occupation:     Occupation: prior [employer]    Tobacco Use    Smoking status: Former Smoker     Years: 20.00    Smokeless tobacco: Never Used    Tobacco comment: quit smoking in 1980's.   Substance and Sexual Activity    Alcohol use: Yes     Comment: 1 drink every 2 weeks    Drug use: No    Sexual activity: Yes   Other Topics Concern    Not on file   Social History Narrative    From [place]       Current Outpatient Medications on File Prior to Visit   Medication Sig Dispense Refill    ACCU-CHEK SHASHANK PLUS TEST STRP Strp TEST ONCE DAILY 100 strip 2    ferrous gluconate (FERGON) 324 MG tablet Take 1 tablet (324 mg total) by mouth daily with breakfast.       furosemide (LASIX) 20 MG tablet TAKE ONE TABLET BY MOUTH EVERY MONDAY, WEDNESDAY, AND FRIDAY 20 tablet 0    glimepiride (AMARYL) 2 MG tablet TAKE 2 TABLETS TWICE DAILY 360 tablet 3    lancets (ACCU-CHEK SOFTCLIX LANCETS) Misc 1 strip by Misc.(Non-Drug; Combo Route) route once daily. 100 each 3    metoprolol tartrate (LOPRESSOR) 50 MG tablet Take 0.5 tablets (25 mg total) by mouth 2 (two) times daily. 30 tablet 11    multivitamin capsule Take 1 capsule by mouth once daily.      nitroGLYCERIN (NITROSTAT) 0.4 MG SL tablet Place 1 tablet (0.4 mg total) under the tongue every 5 (five) minutes as needed for Chest pain. 20 tablet 0    tiZANidine (ZANAFLEX) 4 MG tablet TAKE 1 TABLET(4 MG) BY MOUTH EVERY 6 HOURS AS NEEDED 385 tablet 5    vit C,P-Zq-blcjl-lutein-zeaxan (PRESERVISION AREDS 2) 429-745-11-1 mg-unit-mg-mg Cap Take 1 tablet by mouth once daily.      [DISCONTINUED] pantoprazole (PROTONIX) 40 MG tablet Take 1 tablet (40 mg total) by mouth once daily. 30 tablet 11    atorvastatin (LIPITOR) 80 MG tablet Take 1 tablet (80 mg total) by mouth every evening. 90 tablet 3     No current facility-administered medications on file prior to visit.        Family History   Problem Relation Age of Onset    Diabetes Father     Coronary artery disease Father     Cancer Father         stomach    Diabetes Brother        Review of Systems   Constitutional: Negative for activity change, appetite change, chills, fever and unexpected weight change.   HENT: Positive for hearing loss, rhinorrhea and trouble swallowing. Negative for sore throat.    Eyes: Negative for pain, discharge and visual disturbance.   Respiratory: Positive for chest tightness. Negative for cough, shortness of breath and wheezing.    Cardiovascular: Negative for chest pain, palpitations and leg swelling.   Gastrointestinal: Negative for abdominal pain, blood in stool, constipation, diarrhea, nausea and vomiting.   Endocrine: Negative for polydipsia and  "polyuria.   Genitourinary: Negative for difficulty urinating, dysuria, hematuria and urgency.   Musculoskeletal: Positive for back pain and neck pain. Negative for arthralgias, gait problem and joint swelling.   Skin: Negative for rash and wound.   Neurological: Negative for dizziness, weakness, light-headedness and headaches.   Hematological: Negative for adenopathy.   Psychiatric/Behavioral: Negative for confusion and dysphoric mood.       Objective:      /68   Pulse 69   Temp 98.9 °F (37.2 °C)   Ht 6' 4" (1.93 m)   Wt 119.3 kg (263 lb 0.1 oz)   SpO2 99%   BMI 32.01 kg/m²   Physical Exam   Constitutional: He is oriented to person, place, and time. He appears well-developed and well-nourished. He is active. No distress.   HENT:   Head: Normocephalic and atraumatic.   Right Ear: External ear normal.   Left Ear: External ear normal.   Mouth/Throat: Uvula is midline, oropharynx is clear and moist and mucous membranes are normal. No oropharyngeal exudate.   Eyes: Conjunctivae, EOM and lids are normal. Pupils are equal, round, and reactive to light.   Neck: Normal range of motion, full passive range of motion without pain and phonation normal. Neck supple. No thyroid mass and no thyromegaly present.   Cardiovascular: Normal rate, regular rhythm, normal heart sounds and intact distal pulses. Exam reveals no gallop and no friction rub.   No murmur heard.  Pulmonary/Chest: Effort normal and breath sounds normal. No respiratory distress. He has no wheezes. He has no rales.   Abdominal: Soft. Normal appearance and bowel sounds are normal. There is tenderness in the epigastric area.   Musculoskeletal: Normal range of motion.   Lymphadenopathy:     He has no cervical adenopathy.   Neurological: He is alert and oriented to person, place, and time. No cranial nerve deficit. Coordination normal.   Skin: Skin is warm and dry.   Psychiatric: He has a normal mood and affect. His speech is normal and behavior is normal. " Judgment and thought content normal.         Results for orders placed or performed in visit on 03/01/19   CBC auto differential   Result Value Ref Range    WBC 6.30 3.90 - 12.70 K/uL    RBC 3.37 (L) 4.60 - 6.20 M/uL    Hemoglobin 8.3 (L) 14.0 - 18.0 g/dL    Hematocrit 29.3 (L) 40.0 - 54.0 %    MCV 87 82 - 98 fL    MCH 24.6 (L) 27.0 - 31.0 pg    MCHC 28.3 (L) 32.0 - 36.0 g/dL    RDW 18.6 (H) 11.5 - 14.5 %    Platelets 184 150 - 350 K/uL    MPV 11.9 9.2 - 12.9 fL    Immature Granulocytes 0.3 0.0 - 0.5 %    Gran # (ANC) 4.5 1.8 - 7.7 K/uL    Immature Grans (Abs) 0.02 0.00 - 0.04 K/uL    Lymph # 0.9 (L) 1.0 - 4.8 K/uL    Mono # 0.6 0.3 - 1.0 K/uL    Eos # 0.3 0.0 - 0.5 K/uL    Baso # 0.04 0.00 - 0.20 K/uL    nRBC 0 0 /100 WBC    Gran% 71.0 38.0 - 73.0 %    Lymph% 14.0 (L) 18.0 - 48.0 %    Mono% 10.0 4.0 - 15.0 %    Eosinophil% 4.1 0.0 - 8.0 %    Basophil% 0.6 0.0 - 1.9 %    Differential Method Automated      Hospital records reviewed    Assessment:       1. GE junction carcinoma    2. Type 2 diabetes mellitus treated without insulin    3. PAF (paroxysmal atrial fibrillation)    4. Gastrointestinal hemorrhage, unspecified gastrointestinal hemorrhage type    5. Acute blood loss anemia        Plan:       GE junction carcinoma  -     Ambulatory referral to Hematology / Oncology  -     Ambulatory referral to General Surgery    Type 2 diabetes mellitus treated without insulin    PAF (paroxysmal atrial fibrillation)    Gastrointestinal hemorrhage, unspecified gastrointestinal hemorrhage type    Acute blood loss anemia    Other orders  -     pantoprazole (PROTONIX) 40 MG tablet; Take 1 tablet (40 mg total) by mouth 2 (two) times daily.  Dispense: 60 tablet; Refill: 11        Take protonix BID  F/u with cardiology as planned  Surgery and oncology urgent appts arranged  Continue Glimepiride 4mg BID  Goal weight of 220  Weight loss efforts  Continue other present meds  Counseled on regular exercise, maintenance of a healthy  weight, balanced diet rich in fruits/vegetables and lean protein, and avoidance of unhealthy habits like smoking and excessive alcohol intake.    F/u 3 months with labs as planned

## 2019-03-07 ENCOUNTER — TELEPHONE (OUTPATIENT)
Dept: SURGERY | Facility: CLINIC | Age: 74
End: 2019-03-07

## 2019-03-07 ENCOUNTER — TELEPHONE (OUTPATIENT)
Dept: ENDOSCOPY | Facility: HOSPITAL | Age: 74
End: 2019-03-07

## 2019-03-08 ENCOUNTER — TELEPHONE (OUTPATIENT)
Dept: ENDOSCOPY | Facility: HOSPITAL | Age: 74
End: 2019-03-08

## 2019-03-08 NOTE — TELEPHONE ENCOUNTER
Dr. MORENO Shirley,  Received a notification patient is scheduled for an UEUS on 3/13/19 at 11 am. After reviewing chart and recent cardiac events, I requested for anesthesia (Dr. WILVER Cano) to review. He states due to recent events that the patient should be seen by Cardiologist (Dr. JESSICA Cano) to address blood thinners  and for clearance prior to procedure. Patient is not on blood thinners since last procedure. He is scheduled for a follow up with cardiologist on 4/9/19.    Thanks,  Fatou

## 2019-03-09 ENCOUNTER — PATIENT OUTREACH (OUTPATIENT)
Dept: ADMINISTRATIVE | Facility: HOSPITAL | Age: 74
End: 2019-03-09

## 2019-03-09 NOTE — PROGRESS NOTES
Health Maintenance Due   Topic Date Due    Urine Microalbumin  02/20/2018    Foot Exam  04/24/2019

## 2019-03-11 ENCOUNTER — TELEPHONE (OUTPATIENT)
Dept: CARDIOLOGY | Facility: CLINIC | Age: 74
End: 2019-03-11

## 2019-03-11 NOTE — TELEPHONE ENCOUNTER
----- Message from Arpit Reyes sent at 3/11/2019 12:35 PM CDT -----  Type: Needs Medical Advice    Who Called:  Wife Ania   Symptoms (please be specific):  NA   How long has patient had these symptoms:  RUBY   Pharmacy name and phone #:  RUBY   Best Call Back Number: [phone]  Additional Information: Patient's wife called for an update for clearance for EGD for the patient.

## 2019-03-11 NOTE — TELEPHONE ENCOUNTER
MD Fatou Mireles, GERTRUDE; Dora Joiner MA   Cc: Parminder De León RN   Caller: Unspecified (3 days ago,  3:26 PM)             OK. We can hold on scheduling his EUS for now. He can see surgical oncology first, and follow through with his cardiologist.      Patient informed. EUS postponed.

## 2019-03-12 ENCOUNTER — TELEPHONE (OUTPATIENT)
Dept: ENDOSCOPY | Facility: HOSPITAL | Age: 74
End: 2019-03-12

## 2019-03-12 ENCOUNTER — PATIENT MESSAGE (OUTPATIENT)
Dept: CARDIOLOGY | Facility: CLINIC | Age: 74
End: 2019-03-12

## 2019-03-12 NOTE — TELEPHONE ENCOUNTER
----- Message from Marga Duckworth sent at 3/12/2019  3:44 PM CDT -----  Contact: Ania Garcia (Spouse)[phone]  Needs Advice    Reason for call:She wants to speak with Sera. She did not want to leave detail        Communication Preference:call    Additional Information:

## 2019-03-12 NOTE — TELEPHONE ENCOUNTER
Spoke with the pt cardiac clearance has been faxed he has been notified and verbalized understanding.

## 2019-03-12 NOTE — TELEPHONE ENCOUNTER
----- Message from Marga Duckworth sent at 3/12/2019  3:44 PM CDT -----  Contact: Ania Garcia (Spouse)[phone]  Needs Advice    Reason for call:She wants to speak with Sera. She did not want to leave detail        Communication Preference:call    Additional Information:

## 2019-03-13 ENCOUNTER — INITIAL CONSULT (OUTPATIENT)
Dept: SURGERY | Facility: CLINIC | Age: 74
End: 2019-03-13
Payer: MEDICARE

## 2019-03-13 ENCOUNTER — ANESTHESIA EVENT (OUTPATIENT)
Dept: ENDOSCOPY | Facility: HOSPITAL | Age: 74
End: 2019-03-13
Payer: MEDICARE

## 2019-03-13 ENCOUNTER — ANESTHESIA (OUTPATIENT)
Dept: ENDOSCOPY | Facility: HOSPITAL | Age: 74
End: 2019-03-13
Payer: MEDICARE

## 2019-03-13 ENCOUNTER — PATIENT MESSAGE (OUTPATIENT)
Dept: SURGERY | Facility: CLINIC | Age: 74
End: 2019-03-13

## 2019-03-13 ENCOUNTER — HOSPITAL ENCOUNTER (OUTPATIENT)
Facility: HOSPITAL | Age: 74
Discharge: HOME | End: 2019-03-13
Attending: INTERNAL MEDICINE | Admitting: INTERNAL MEDICINE
Payer: MEDICARE

## 2019-03-13 VITALS
TEMPERATURE: 98 F | WEIGHT: 263 LBS | HEIGHT: 77 IN | BODY MASS INDEX: 31.05 KG/M2 | DIASTOLIC BLOOD PRESSURE: 62 MMHG | SYSTOLIC BLOOD PRESSURE: 130 MMHG | HEART RATE: 72 BPM | RESPIRATION RATE: 16 BRPM | OXYGEN SATURATION: 99 %

## 2019-03-13 VITALS
HEART RATE: 74 BPM | DIASTOLIC BLOOD PRESSURE: 62 MMHG | WEIGHT: 263.69 LBS | HEIGHT: 76 IN | SYSTOLIC BLOOD PRESSURE: 154 MMHG | BODY MASS INDEX: 32.11 KG/M2 | TEMPERATURE: 99 F

## 2019-03-13 DIAGNOSIS — E11.9 TYPE 2 DIABETES MELLITUS TREATED WITHOUT INSULIN: ICD-10-CM

## 2019-03-13 DIAGNOSIS — C16.0 GE JUNCTION CARCINOMA: Primary | ICD-10-CM

## 2019-03-13 DIAGNOSIS — Z95.5 HISTORY OF HEART ARTERY STENT: ICD-10-CM

## 2019-03-13 DIAGNOSIS — C15.9 ESOPHAGEAL CANCER: ICD-10-CM

## 2019-03-13 DIAGNOSIS — I48.0 PAF (PAROXYSMAL ATRIAL FIBRILLATION): ICD-10-CM

## 2019-03-13 DIAGNOSIS — I25.110 CORONARY ARTERY DISEASE INVOLVING NATIVE CORONARY ARTERY OF NATIVE HEART WITH UNSTABLE ANGINA PECTORIS: ICD-10-CM

## 2019-03-13 DIAGNOSIS — D64.9 SYMPTOMATIC ANEMIA: ICD-10-CM

## 2019-03-13 PROBLEM — R07.9 CHEST PAIN: Status: RESOLVED | Noted: 2019-02-22 | Resolved: 2019-03-13

## 2019-03-13 PROBLEM — D62 ACUTE BLOOD LOSS ANEMIA: Status: RESOLVED | Noted: 2019-02-22 | Resolved: 2019-03-13

## 2019-03-13 PROBLEM — K92.2 GASTROINTESTINAL HEMORRHAGE: Status: RESOLVED | Noted: 2019-02-22 | Resolved: 2019-03-13

## 2019-03-13 LAB — POCT GLUCOSE: 193 MG/DL (ref 70–110)

## 2019-03-13 PROCEDURE — 99204 PR OFFICE/OUTPT VISIT, NEW, LEVL IV, 45-59 MIN: ICD-10-PCS | Mod: HCNC,S$GLB,, | Performed by: NURSE PRACTITIONER

## 2019-03-13 PROCEDURE — 25000003 PHARM REV CODE 250: Mod: HCNC | Performed by: INTERNAL MEDICINE

## 2019-03-13 PROCEDURE — D9220A PRA ANESTHESIA: Mod: HCNC,ANES,, | Performed by: ANESTHESIOLOGY

## 2019-03-13 PROCEDURE — 43231 ESOPHAGOSCOP ULTRASOUND EXAM: CPT | Mod: HCNC | Performed by: INTERNAL MEDICINE

## 2019-03-13 PROCEDURE — 99999 PR PBB SHADOW E&M-EST. PATIENT-LVL III: CPT | Mod: PBBFAC,HCNC,, | Performed by: NURSE PRACTITIONER

## 2019-03-13 PROCEDURE — D9220A PRA ANESTHESIA: ICD-10-PCS | Mod: HCNC,ANES,, | Performed by: ANESTHESIOLOGY

## 2019-03-13 PROCEDURE — 37000008 HC ANESTHESIA 1ST 15 MINUTES: Mod: HCNC | Performed by: INTERNAL MEDICINE

## 2019-03-13 PROCEDURE — 37000009 HC ANESTHESIA EA ADD 15 MINS: Mod: HCNC | Performed by: INTERNAL MEDICINE

## 2019-03-13 PROCEDURE — 99999 PR PBB SHADOW E&M-EST. PATIENT-LVL III: ICD-10-PCS | Mod: PBBFAC,HCNC,, | Performed by: NURSE PRACTITIONER

## 2019-03-13 PROCEDURE — 63600175 PHARM REV CODE 636 W HCPCS: Mod: HCNC | Performed by: NURSE ANESTHETIST, CERTIFIED REGISTERED

## 2019-03-13 PROCEDURE — 99204 OFFICE O/P NEW MOD 45 MIN: CPT | Mod: HCNC,S$GLB,, | Performed by: NURSE PRACTITIONER

## 2019-03-13 PROCEDURE — 43259 PR ENDOSCOPIC ULTRASOUND EXAM: ICD-10-PCS | Mod: HCNC,,, | Performed by: INTERNAL MEDICINE

## 2019-03-13 PROCEDURE — 43259 EGD US EXAM DUODENUM/JEJUNUM: CPT | Mod: HCNC,,, | Performed by: INTERNAL MEDICINE

## 2019-03-13 PROCEDURE — 25000003 PHARM REV CODE 250: Mod: HCNC | Performed by: NURSE ANESTHETIST, CERTIFIED REGISTERED

## 2019-03-13 RX ORDER — FENTANYL CITRATE 50 UG/ML
INJECTION, SOLUTION INTRAMUSCULAR; INTRAVENOUS
Status: DISCONTINUED | OUTPATIENT
Start: 2019-03-13 | End: 2019-03-14

## 2019-03-13 RX ORDER — PROPOFOL 10 MG/ML
VIAL (ML) INTRAVENOUS
Status: DISCONTINUED | OUTPATIENT
Start: 2019-03-13 | End: 2019-03-14

## 2019-03-13 RX ORDER — SODIUM CHLORIDE 9 MG/ML
INJECTION, SOLUTION INTRAVENOUS CONTINUOUS
Status: DISCONTINUED | OUTPATIENT
Start: 2019-03-13 | End: 2019-03-13 | Stop reason: HOSPADM

## 2019-03-13 RX ORDER — PROPOFOL 10 MG/ML
VIAL (ML) INTRAVENOUS CONTINUOUS PRN
Status: DISCONTINUED | OUTPATIENT
Start: 2019-03-13 | End: 2019-03-14

## 2019-03-13 RX ORDER — LIDOCAINE HCL/PF 100 MG/5ML
SYRINGE (ML) INTRAVENOUS
Status: DISCONTINUED | OUTPATIENT
Start: 2019-03-13 | End: 2019-03-14

## 2019-03-13 RX ORDER — SODIUM CHLORIDE 9 MG/ML
INJECTION, SOLUTION INTRAVENOUS CONTINUOUS PRN
Status: DISCONTINUED | OUTPATIENT
Start: 2019-03-13 | End: 2019-03-14

## 2019-03-13 RX ADMIN — SODIUM CHLORIDE: 9 INJECTION, SOLUTION INTRAVENOUS at 12:03

## 2019-03-13 RX ADMIN — PROPOFOL 60 MG: 10 INJECTION, EMULSION INTRAVENOUS at 12:03

## 2019-03-13 RX ADMIN — SODIUM CHLORIDE: 0.9 INJECTION, SOLUTION INTRAVENOUS at 11:03

## 2019-03-13 RX ADMIN — FENTANYL CITRATE 25 MCG: 50 INJECTION, SOLUTION INTRAMUSCULAR; INTRAVENOUS at 12:03

## 2019-03-13 RX ADMIN — PROPOFOL 150 MCG/KG/MIN: 10 INJECTION, EMULSION INTRAVENOUS at 12:03

## 2019-03-13 RX ADMIN — LIDOCAINE HYDROCHLORIDE 75 MG: 20 INJECTION, SOLUTION INTRAVENOUS at 12:03

## 2019-03-13 NOTE — [employer] PATIENT INSTRUCTIONS
Discharge Summary/Instructions after an Endoscopic Procedure  Patient Name: Stu Garcia  Patient MRN: [id number]  Patient YOB: 1945  Wednesday, March 13, 2019  Andrew Shirley MD  RESTRICTIONS:  During your procedure today, you received medications for sedation.  These   medications may affect your judgment, balance and coordination.  Therefore,   for 24 hours, you have the following restrictions:   - DO NOT drive a car, operate machinery, make legal/financial decisions,   sign important papers or drink alcohol.    ACTIVITY:  Today: no heavy lifting, straining or running due to procedural   sedation/anesthesia.  The following day: return to full activity including work.  DIET:  Eat and drink normally unless instructed otherwise.     TREATMENT FOR COMMON SIDE EFFECTS:  - Mild abdominal pain, nausea, belching, bloating or excessive gas:  rest,   eat lightly and use a heating pad.  - Sore Throat: treat with throat lozenges and/or gargle with warm salt   water.  - Because air was used during the procedure, expelling large amounts of air   from your rectum or belching is normal.  - If a bowel prep was taken, you may not have a bowel movement for 1-3 days.    This is normal.  SYMPTOMS TO WATCH FOR AND REPORT TO YOUR PHYSICIAN:  1. Abdominal pain or bloating, other than gas cramps.  2. Chest pain.  3. Back pain.  4. Signs of infection such as: chills or fever occurring within 24 hours   after the procedure.  5. Rectal bleeding, which would show as bright red, maroon, or black stools.   (A tablespoon of blood from the rectum is not serious, especially if   hemorrhoids are present.)  6. Vomiting.  7. Weakness or dizziness.  GO DIRECTLY TO THE NEAREST EMERGENCY ROOM IF YOU HAVE ANY OF THE FOLLOWING:      Difficulty breathing              Chills and/or fever over 101 F   Persistent vomiting and/or vomiting blood   Severe abdominal pain   Severe chest pain   Black, tarry stools   Bleeding- more than one  tablespoon   Any other symptom or condition that you feel may need urgent attention  Your doctor recommends these additional instructions:  If any biopsies were taken, your doctors clinic will contact you in 1 to 2   weeks with any results.  - Discharge patient to home (ambulatory).   - Resume previous diet; Discharge to home (ambulatory); Resume outpatient   medications  - Return to primary care physician as previously scheduled.   - Follow-up with surgical oncology  For questions, problems or results please call your physician - Andrew Shirley MD at Work:  [phone].  [facility], EMERGENCY ROOM PHONE NUMBER: [phone]  IF A COMPLICATION OR EMERGENCY SITUATION ARISES AND YOU ARE UNABLE TO REACH   YOUR PHYSICIAN - GO DIRECTLY TO THE EMERGENCY ROOM.  Andrew Shirley MD  3/13/2019 12:23:15 PM  This report has been verified and signed electronically.  [employer]

## 2019-03-13 NOTE — ANESTHESIA PREPROCEDURE EVALUATION
03/13/2019  Stu Garcia is a 73 y.o., male.    Anesthesia Evaluation    I have reviewed the Patient Summary Reports.    I have reviewed the Nursing Notes.   I have reviewed the Medications.     Review of Systems  Anesthesia Hx:  No problems with previous Anesthesia  Neg history of prior surgery. Denies Family Hx of Anesthesia complications.   Denies Personal Hx of Anesthesia complications.   Social:  Non-Smoker    Hematology/Oncology:     Oncology Normal    -- Anemia: Hematology Comments: Hgb = 6.5    EENT/Dental:EENT/Dental Normal   Cardiovascular:   Hypertension CAD  Dysrhythmias atrial fibrillation Angina Coronary sttents   Pulmonary:  Pulmonary Normal Hx spontaneous pneumo   Renal/:  Renal/ Normal     Hepatic/GI:  Hepatic/GI Normal    Musculoskeletal:   Arthritis     Neurological:   Neuromuscular Disease, C spine surgery  Peripheral Neuropathy    Endocrine:   Diabetes, type 2    Dermatological:  Skin Normal    Psych:  Psychiatric Normal           Physical Exam  General:  Well nourished, Obesity    Airway/Jaw/Neck:  Airway Findings: Mouth Opening: Normal Tongue: Normal  General Airway Assessment: Adult, Average  Mallampati: II  Improves to I with phonation.  TM Distance: Normal, at least 6 cm  Jaw/Neck Findings:  Neck ROM: Normal ROM, Normal Extension, Painful  Neck Findings:     Eyes/Ears/Nose:  EYES/EARS/NOSE FINDINGS: Normal   Dental:  Dental Findings: Edentulous   Chest/Lungs:  Chest/Lungs Findings: Clear to auscultation, Normal Respiratory Rate     Heart/Vascular:  Heart Findings: Rate: Normal  Rhythm: Regular Rhythm  Sounds: Normal  Heart murmur: negative Vascular Findings:     Abdomen:  Abdomen Findings:  Normal     Musculoskeletal:  Musculoskeletal Findings:    Skin:  Skin Findings:     Mental Status:  Mental Status Findings:  Cooperative, Alert and Oriented         Anesthesia Plan  Type of  Anesthesia, risks & benefits discussed:  Anesthesia Type:  general  Patient's Preference:   Intra-op Monitoring Plan: standard ASA monitors  Intra-op Monitoring Plan Comments:   Post Op Pain Control Plan:   Post Op Pain Control Plan Comments:   Induction:   IV  Beta Blocker:  Patient is not currently on a Beta-Blocker (No further documentation required).       Informed Consent: Patient understands risks and agrees with Anesthesia plan.  Questions answered. Anesthesia consent signed with patient.  ASA Score: 3     Day of Surgery Review of History & Physical:            Ready For Surgery From Anesthesia Perspective.

## 2019-03-13 NOTE — PROGRESS NOTES
Patient seen with MELY Canchola. He presented with dark stool, CHAUDHARI, and anemia, but has NO swallowing symptoms or weight loss. EGD shows an esophageal mass at GE jxn beginning at 41 cm and extending into the cardia to a limited extent (pictures reviewed), and biopsy shows mod-diff JULISSA. CT of C/A/P shows little or no mass effect at the EG jxn and no metastatic disease. Incidental gallstones are noted.   He is s/p left open thoracotomy in 1966 with pleurectomy for blebs with recurrent PTX. Also s/p anterior cervical fusion, and coronary stenting x 4 (last in 2018). PMH also included DM2, afib, HTN and is on xarelto, ASA, and plavix.  Cscope last in 2015 showed a single polyp, removed.   EUS scheduled with Dr Shirley.

## 2019-03-13 NOTE — H&P
Short Stay Endoscopy History and Physical    PCP - Cade Juan MD  Referring Physician - Jackeline Richards MD  [address] [facility]  [address]  [place] [address]    Procedure - EGD/EUS  ASA - per anesthesia  Mallampati - per anesthesia  History of Anesthesia problems - no  Family history Anesthesia problems -  no   Plan of anesthesia - General    HPI:  This is a 73 y.o. male here for evaluation of: GE junction cancer    Reflux - no  Dysphagia - no  Abdominal pain - no  Diarrhea - no    ROS:  Constitutional: No fevers, chills, No weight loss  CV: No chest pain  Pulm: No cough, No shortness of breath  Ophtho: No vision changes  GI: see HPI  Derm: No rash    Medical History:  has a past medical history of Anticoagulant long-term use, Arthritis, Atrial fibrillation (2013), Bleb, lung, Cataract, Colon polyp, Coronary artery disease, Coronary artery disease involving native coronary artery of native heart with unstable angina pectoris (3/18/2018), Diabetes mellitus, Diabetes mellitus, type 2, Diverticulosis, General anesthetics causing adverse effect in therapeutic use, GERD (gastroesophageal reflux disease), HEARING LOSS, Hemorrhoid, HLD (hyperlipidemia), Hypertension, Iron deficiency anemia, Neuropathy of leg, Pneumonia due to other staphylococcus, Prostate cancer, and Spontaneous pneumothorax.    Surgical History:  has a past surgical history that includes Prostatectomy (2001); Tube thoracotomy (1966); Colonoscopy; Cataract extraction; Hernia repair; Coronary stent placement; Cardioversion; Chest tube insertion (Right); Knee surgery (Left, 2017); open thoracotomy (Left, 1966); Pleurectomy (Left, 1966); CERVICAL SPINE FRACTURE SURGERY; and Esophagogastroduodenoscopy (N/A, 2/23/2019).    Family History: family history includes Cancer in his father; Coronary artery disease in his father; Diabetes in his brother; Mental illness in his mother..    Social History:  reports that he has quit smoking. He quit after  20.00 years of use. he has never used smokeless tobacco. He reports that he drinks alcohol. He reports that he does not use drugs.    Review of patient's allergies indicates:   Allergen Reactions    Codeine Nausea And Vomiting       Medications:   Medications Prior to Admission   Medication Sig Dispense Refill Last Dose    atorvastatin (LIPITOR) 80 MG tablet Take 1 tablet (80 mg total) by mouth every evening. 90 tablet 3 3/12/2019 at Unknown time    ferrous gluconate (FERGON) 324 MG tablet Take 1 tablet (324 mg total) by mouth daily with breakfast.   3/12/2019 at Unknown time    glimepiride (AMARYL) 2 MG tablet TAKE 2 TABLETS TWICE DAILY 360 tablet 3 3/12/2019 at Unknown time    metoprolol tartrate (LOPRESSOR) 50 MG tablet Take 0.5 tablets (25 mg total) by mouth 2 (two) times daily. 30 tablet 11 3/12/2019 at Unknown time    multivitamin capsule Take 1 capsule by mouth once daily.   3/12/2019 at Unknown time    pantoprazole (PROTONIX) 40 MG tablet Take 1 tablet (40 mg total) by mouth 2 (two) times daily. 60 tablet 11 3/12/2019 at Unknown time    vit C,G-Oi-jkkey-lutein-zeaxan (PRESERVISION AREDS 2) 401-439-40-1 mg-unit-mg-mg Cap Take 1 tablet by mouth once daily.   3/12/2019 at Unknown time    ACCU-CHEK SHASHANK PLUS TEST STRP Strp TEST ONCE DAILY 100 strip 2 Taking    furosemide (LASIX) 20 MG tablet TAKE ONE TABLET BY MOUTH EVERY MONDAY, WEDNESDAY, AND FRIDAY 20 tablet 0 3/11/2019    lancets (ACCU-CHEK SOFTCLIX LANCETS) Misc 1 strip by Misc.(Non-Drug; Combo Route) route once daily. 100 each 3 Taking    nitroGLYCERIN (NITROSTAT) 0.4 MG SL tablet Place 1 tablet (0.4 mg total) under the tongue every 5 (five) minutes as needed for Chest pain. 20 tablet 0 More than a month at Unknown time    tiZANidine (ZANAFLEX) 4 MG tablet TAKE 1 TABLET(4 MG) BY MOUTH EVERY 6 HOURS AS NEEDED 385 tablet 5 Unknown at Unknown time       Physical Exam:    Vital Signs:   Vitals:    03/13/19 1122   BP: (!) 164/79   Pulse: 87    Resp: 16   Temp: 98.1 °F (36.7 °C)       General Appearance: Well appearing in no acute distress  Eyes:    No scleral icterus  ENT: Neck supple  Lungs: CTA anteriorly  Heart:  Regular rate  Abdomen: Soft, non tender, non distended with normal bowel sounds.  Extremities: No edema  Skin: No rash    Labs:  Lab Results   Component Value Date    WBC 6.30 03/01/2019    HGB 8.3 (L) 03/01/2019    HCT 29.3 (L) 03/01/2019     03/01/2019    CHOL 94 (L) 02/07/2019    TRIG 102 02/07/2019    HDL 31 (L) 02/07/2019    ALT 20 02/22/2019    AST 20 02/22/2019     02/24/2019    K 5.1 02/24/2019     02/24/2019    CREATININE 1.31 02/24/2019    BUN 30 (H) 02/24/2019    CO2 28 02/24/2019    TSH 0.67 07/23/2004    PSA 0.08 07/30/2010    INR 2.5 07/18/2014    HGBA1C 7.9 (H) 02/07/2019       I have explained the risks and benefits of this endoscopic procedure to the patient including but not limited to bleeding, inflammation, infection, perforation, and death.      Andrew Shirley MD

## 2019-03-13 NOTE — PROGRESS NOTES
"  Encounter Date:  3/13/2019    Patient ID: Stu Garcia  Age:  73 y.o. :  1945     Chief Complaint   Patient presents with    Consult     History:    Mr. Garcia is a 73 y.o. 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     Referred by: Dr. Juan    He had increasing dyspnea and dark stool that started in mid Dec 2018 without obvious bleeding. Then he developed episodic exertion chest pain. His worsening symptoms prompted his presentation to the ED, dx with significant anemia and admitted to hospital. Total of 4 units PRBC were transfused. Chest pain and SOB/ CHAUDHARI resolved with transfusion. Cardiology believed this was due to anemia. GI was consulted- EGD revealed a partially obstructing, likely malignant esophageal tumor at the GE junction with extension into the gastric cardia. CT chest/abdomen with IV contrast was obtained for staging. This showed no obvious metastasis.  Scheduled for EUS today. Xarelto, Plavix and ASA remain on hold since discharge. No further CP or SOB since discharge.   He has cardiology f/u appt scheduled with Dr. Cano on 19. Underwent 4 cardiac stents 2018, completed cardiac rehab , then for maintenance exercised at [facility] 1 hour 2-3 x week. He has not exercised at [facility] since hospital admission.     Tolerating 3 meals daily, occ snacks. Reports baseline dysphagia 2017 r/t trauma ("broke my neck when fell out of the attic when ladder slipped", s/p cervical fusion). No change to swallowing.   Intentional 13# wt loss over the past year with diet and exercise.     PMH also includes prostate CA tx with open surgery in , CAD - see above, afib on Xarelto, Plavix, ASA 81mg weekly T2DM taking single oral agent oral ZAMORA, off metformin in 2017 (A1c 7.9% prior to PRBC transfusion)    Data:     Radiology:  I personally reviewed these images with Dr. Milian:  19: CT " C/A/P:  No acute abnormalities are seen or evidence of metastatic disease.    2/23/19: Endoscopy:    A medium-sized, fungating mass with bleeding and stigmata of recent bleeding was found at the gastroesophageal junction, 41 cm from the incisors extending to the gastric cardia. The mass was partially obstructing and not circumferential.    2/23/19: Pathology:    GASTROESOPHAGEAL JUNCTION, TUMOR, BIOPSY:  - INVASIVE MODERATELY DIFFERENTIATED ADENOCARCINOMA    Labs:    Lab Results   Component Value Date    WBC 6.30 03/01/2019    HGB 8.3 (L) 03/01/2019    HCT 29.3 (L) 03/01/2019    MCV 87 03/01/2019     03/01/2019       Chemistry        Component Value Date/Time     02/24/2019 0313    K 5.1 02/24/2019 0313     02/24/2019 0313    CO2 28 02/24/2019 0313    BUN 30 (H) 02/24/2019 0313    CREATININE 1.31 02/24/2019 0313     (H) 02/24/2019 0313        Component Value Date/Time    CALCIUM 8.8 02/24/2019 0313    ALKPHOS 96 02/22/2019 1317    AST 20 02/22/2019 1317    ALT 20 02/22/2019 1317    BILITOT 0.3 02/22/2019 1317    ESTGFRAFRICA >60 02/24/2019 0313    EGFRNONAA 53 (A) 02/24/2019 0313          No results found for: CRP  No results found for: PREALBUMIN  Lab Results   Component Value Date    HGBA1C 7.9 (H) 02/07/2019       Past Medical History:   Diagnosis Date    Anticoagulant long-term use     xarelto,asa    Arthritis     Atrial fibrillation 2013    cardioverted    Bleb, lung     Cataract     OS    Colon polyp     Coronary artery disease     Coronary artery disease involving native coronary artery of native heart with unstable angina pectoris 3/18/2018    Diabetes mellitus     Diabetes mellitus, type 2     Diverticulosis     GERD (gastroesophageal reflux disease)     HEARING LOSS     bilateral aids    Hemorrhoid     HLD (hyperlipidemia)     Hypertension     Iron deficiency anemia     Neuropathy of leg     Pneumonia due to other staphylococcus     Prostate cancer     prostate     Spontaneous pneumothorax     bilateral     Past Surgical History:   Procedure Laterality Date    ARTHROSCOPY-KNEE W/ CHONDROPLASTY Left 4/7/2017    [facility]    ARTHROSCOPY-MENISCECTOMY Left 4/7/2017    [facility] OR    CARDIOVERSION      CATARACT EXTRACTION      bilateral    CERVICAL SPINE FRACTURE SURGERY      c7 t1 ACDF     CHEST TUBE INSERTION Right     COLONOSCOPY      COLONOSCOPY N/A 5/20/2015    [facility] Andrea Kerns Jr[facility] at [facility] ENDO    CORONARY STENT PLACEMENT      x 2    ESOPHAGOGASTRODUODENOSCOPY (EGD) N/A 2/23/2019    [facility] ENDO    ESOPHAGOGASTRODUODENOSCOPY (EGD) N/A 5/20/2015    [facility] Jr[facility] ENDO    HERNIA REPAIR      umbilical    KNEE SURGERY Left 2017    Left heart cath Right 3/19/2018    [facility] at [facility] CATH    Left heart cath Left 3/17/2018    [facility] Parminder Duenas III, [facility] at [facility]    Left heart cath-RM # 222 B Right 3/21/2018    [facility] at [facility]    open thoracotomy Left 1966    With pleurectomy    Pleurectomy Left 1966    For treatment of left pneumothorax    PROSTATECTOMY  2001    open    TUBE THORACOTOMY  1966    pneumothorax left--open     Current Outpatient Medications on File Prior to Visit   Medication Sig Dispense Refill    ACCU-CHEK SHASHANK PLUS TEST STRP Strp TEST ONCE DAILY 100 strip 2    atorvastatin (LIPITOR) 80 MG tablet Take 1 tablet (80 mg total) by mouth every evening. 90 tablet 3    ferrous gluconate (FERGON) 324 MG tablet Take 1 tablet (324 mg total) by mouth daily with breakfast.      furosemide (LASIX) 20 MG tablet TAKE ONE TABLET BY MOUTH EVERY MONDAY, WEDNESDAY, AND FRIDAY 20 tablet 0    glimepiride (AMARYL) 2 MG tablet TAKE 2 TABLETS TWICE DAILY 360 tablet 3    lancets (ACCU-CHEK SOFTCLIX LANCETS) Misc 1 strip by  Misc.(Non-Drug; Combo Route) route once daily. 100 each 3    metoprolol tartrate (LOPRESSOR) 50 MG tablet Take 0.5 tablets (25 mg total) by mouth 2 (two) times daily. 30 tablet 11    multivitamin capsule Take 1 capsule by mouth once daily.      nitroGLYCERIN (NITROSTAT) 0.4 MG SL tablet Place 1 tablet (0.4 mg total) under the tongue every 5 (five) minutes as needed for Chest pain. 20 tablet 0    pantoprazole (PROTONIX) 40 MG tablet Take 1 tablet (40 mg total) by mouth 2 (two) times daily. 60 tablet 11    tiZANidine (ZANAFLEX) 4 MG tablet TAKE 1 TABLET(4 MG) BY MOUTH EVERY 6 HOURS AS NEEDED 385 tablet 5    vit C,X-Bx-advdv-lutein-zeaxan (PRESERVISION AREDS 2) 424-477-29-1 mg-unit-mg-mg Cap Take 1 tablet by mouth once daily.       No current facility-administered medications on file prior to visit.      Review of patient's allergies indicates:   Allergen Reactions    Codeine Nausea And Vomiting       Family History:  His family history includes Cancer in his father; Coronary artery disease in his father; Diabetes in his brother; Mental illness in his mother.     Social History:  He reports that he has quit smoking. He quit after 20.00 years of use. he has never used smokeless tobacco. He reports that he drinks alcohol. He reports that he does not use drugs.     ROS:     Review of Systems   Constitutional: Negative for activity change, appetite change, fever and unexpected weight change.   HENT: Positive for trouble swallowing.    Eyes: Negative for visual disturbance.   Respiratory: Negative for cough, choking and shortness of breath.    Cardiovascular: Negative for chest pain and leg swelling.   Gastrointestinal: Negative for abdominal pain, diarrhea, nausea and vomiting.   Genitourinary: Negative for difficulty urinating.   Musculoskeletal: Positive for back pain.   Neurological: Positive for numbness (in bilateral hands, he attributes to fall in 2017).   Psychiatric/Behavioral: Negative for sleep  disturbance.     Pertinent positive/negatives detailed in HPI, all other systems negative.     Physical Exam:  There were no vitals taken for this visit.    Physical Exam    Constitutional:  Non-toxic, no acute distress.  Performance status: ECOG 0  Eyes:  Sclerae anicteric, gaze symmetrical  Neck:  Trachea midline, FROM  Resp:  Even and unlabored resp, CTA anterior bilaterally  CV:  Regular pulse, S1, S2, no murmurs, no rubs, no edema  Abd:  Soft, non-tender, no masses, no ascites, no superficial varices  Lymphatics:  No cervical, supraclavicular, lymphadenopathy  Musculoskeletal:  Ambulatory, normal gait, no muscle wasting  Neuro:  No gross deficits  Psych:  Awake, alert, oriented.  Answers and asks questions appropriately      ICD-10-CM ICD-9-CM    1. GE junction carcinoma C16.0 151.0    2. Symptomatic anemia D64.9 285.9    3. Coronary artery disease involving native coronary artery of native heart with unstable angina pectoris I25.110 414.01      411.1    4. PAF (paroxysmal atrial fibrillation) I48.0 427.31    5. History of heart artery stent Z95.5 V45.82    6. Type 2 diabetes mellitus treated without insulin E11.9 250.00        Plan:  EUS today to complete staging.   Discussed GE junction JULISSA and curative tx options including chemotherapy, chemoRT, and minimally invasive robotic esophagectomy.  Refer to hem onc [facility], they are requesting Dr. Cartwright to discuss neoadj tx options.     Keep upcoming cardiology appt 4/9/19 to discuss resume Xarelto/ Plavix/ ASA and exercise regimen. If no overt signs of bleeding on EUS, may consider resuming Rx next week. He is eager to return to [facility] but concerned about risk of re-bleeding.     T2DM reasonable controlled on single oral agent, A1c near goal. F/u with local PCP for long term DM mgmt.       Pt seen today with Dr. Milian.     Julia Goetz NP  Surgical Oncology  [facility] [place], [place]  Office: [phone]  Fax: [phone]        CC:   [place], Dr. Cartwright, Dr. Rachael Garcia 1945

## 2019-03-13 NOTE — LETTER
March 13, 2019      Cade Juan MD  [address] [facility] [address]  [facility] [address]           [facility]  [address]  [place] [address]  Phone: [phone]          Patient: Stu Garcia   MR Number: [id number]   YOB: 1945   Date of Visit: 3/13/2019       Dear Dr. Cade Juan:    Thank you for referring Stu Garcia to me for evaluation. Attached you will find relevant portions of my assessment and plan of care.    If you have questions, please do not hesitate to call me. I look forward to following Stu Garcia along with you.    Sincerely,    JENNIFFER Thomas,ANP-C    Enclosure  CC:  No Recipients    If you would like to receive this communication electronically, please contact [email or web address] or [phone] to request more information on [facility] access.    For providers and/or their staff who would like to refer a patient to [facility], please contact us through our one-stop-shop provider referral line, [facility], at [phone].    If you feel you have received this communication in error or would no longer like to receive these types of communications, please e-mail [email or web address]

## 2019-03-14 RX ORDER — NITROGLYCERIN 0.4 MG/1
TABLET SUBLINGUAL
Qty: 100 TABLET | Status: ON HOLD | OUTPATIENT
Start: 2019-03-14 | End: 2022-01-01

## 2019-03-14 NOTE — PROGRESS NOTES
I have reviewed and agree with the assessment below.  Seen by provider; commented as helping with CP; will approve.  Thanks

## 2019-03-14 NOTE — TRANSFER OF CARE
"Anesthesia Transfer of Care Note    Patient: Stu Garcia    Procedure(s) Performed: Procedure(s) (LRB):  ULTRASOUND, UPPER GI TRACT, ENDOSCOPIC (N/A)    Patient location: PACU    Anesthesia Type: general    Transport from OR: Transported from OR on room air with adequate spontaneous ventilation    Post pain: adequate analgesia    Post assessment: no apparent anesthetic complications and tolerated procedure well    Post vital signs: stable    Level of consciousness: awake, alert and oriented    Nausea/Vomiting: no nausea/vomiting    Complications: none    Transfer of care protocol was followed      Last vitals:   Visit Vitals  /62 (BP Location: Left arm)   Pulse 72   Temp 36.7 °C (98 °F)   Resp 16   Ht 6' 4.5" (1.943 m)   Wt 119.3 kg (263 lb)   SpO2 99%   BMI 31.60 kg/m²     "

## 2019-03-14 NOTE — PROGRESS NOTES
Refill Authorization Note     is requesting a refill authorization.    Brief assessment and rationale for refill: DEFER; new start  Name and strength of medication: nitroGLYCERIN (NITROSTAT) 0.4 MG SL tablet       Medication Therapy Plan: recently prescribed last month 02/14/2019; will pend for a full box of 100; defer to you     Medication reconciliation completed: No              How patient will take medication: tud          Comments:   Blood Pressure Readings: <139/89mmHg (12 months)  BP Readings from Last 3 Encounters:   03/13/19 130/62   03/13/19 (!) 154/62   03/06/19 130/68       APPOINTMENTS (past 12 m or future 3m authorizing provider)  LAST VISIT DATE  Cade uJan MD 3/6/2019         NEXT VISIT DATE  Cade Juan MD 5/14/2019

## 2019-03-14 NOTE — ANESTHESIA POSTPROCEDURE EVALUATION
"Anesthesia Post Evaluation    Patient: Stu Garcia    Procedure(s) Performed: Procedure(s) (LRB):  ULTRASOUND, UPPER GI TRACT, ENDOSCOPIC (N/A)    Final Anesthesia Type: general  Patient location during evaluation: PACU  Patient participation: Yes- Able to Participate  Level of consciousness: awake and alert and oriented  Post-procedure vital signs: reviewed and stable  Pain management: adequate  Airway patency: patent  PONV status at discharge: No PONV  Anesthetic complications: no      Cardiovascular status: hemodynamically stable  Respiratory status: unassisted, spontaneous ventilation and room air  Hydration status: euvolemic  Follow-up not needed.        Visit Vitals  /62 (BP Location: Left arm)   Pulse 72   Temp 36.7 °C (98 °F)   Resp 16   Ht 6' 4.5" (1.943 m)   Wt 119.3 kg (263 lb)   SpO2 99%   BMI 31.60 kg/m²       Pain/Sanchez Score: Sanchez Score: 9 (3/13/2019 12:30 PM)        "

## 2019-03-15 ENCOUNTER — TELEPHONE (OUTPATIENT)
Dept: SURGERY | Facility: CLINIC | Age: 74
End: 2019-03-15

## 2019-03-15 NOTE — TELEPHONE ENCOUNTER
Dr. Milian and I reviewed EUS procedure 3/13/19 per Dr. Shirley.  - A mass was found in the gastroesophageal                      junction. This was staged T2 N0 Mx by                       endosonographic criteria.    Spoke with pt regarding these results, confirmed he was already aware.     He plans to keep his appt for Wednesday 3/20/19 with Dr. Cartwright, med onc to discuss portia-adj chemotherapy.     He did not have any questions today.     Recommend f/u with surg onc after completed therapy per Dr. Cartwright.

## 2019-03-20 ENCOUNTER — INITIAL CONSULT (OUTPATIENT)
Dept: HEMATOLOGY/ONCOLOGY | Facility: CLINIC | Age: 74
End: 2019-03-20
Payer: MEDICARE

## 2019-03-20 ENCOUNTER — TELEPHONE (OUTPATIENT)
Dept: HEMATOLOGY/ONCOLOGY | Facility: CLINIC | Age: 74
End: 2019-03-20

## 2019-03-20 VITALS
TEMPERATURE: 99 F | DIASTOLIC BLOOD PRESSURE: 66 MMHG | BODY MASS INDEX: 31.1 KG/M2 | WEIGHT: 263.44 LBS | HEIGHT: 77 IN | SYSTOLIC BLOOD PRESSURE: 135 MMHG | HEART RATE: 66 BPM | RESPIRATION RATE: 18 BRPM

## 2019-03-20 DIAGNOSIS — C16.0 GE JUNCTION CARCINOMA: Primary | ICD-10-CM

## 2019-03-20 DIAGNOSIS — I48.0 PAF (PAROXYSMAL ATRIAL FIBRILLATION): ICD-10-CM

## 2019-03-20 DIAGNOSIS — D50.8 OTHER IRON DEFICIENCY ANEMIA: ICD-10-CM

## 2019-03-20 DIAGNOSIS — I25.110 CORONARY ARTERY DISEASE INVOLVING NATIVE CORONARY ARTERY OF NATIVE HEART WITH UNSTABLE ANGINA PECTORIS: ICD-10-CM

## 2019-03-20 DIAGNOSIS — E11.9 TYPE 2 DIABETES MELLITUS TREATED WITHOUT INSULIN: ICD-10-CM

## 2019-03-20 PROCEDURE — 99205 OFFICE O/P NEW HI 60 MIN: CPT | Mod: HCNC,S$GLB,, | Performed by: INTERNAL MEDICINE

## 2019-03-20 PROCEDURE — 3075F SYST BP GE 130 - 139MM HG: CPT | Mod: HCNC,CPTII,S$GLB, | Performed by: INTERNAL MEDICINE

## 2019-03-20 PROCEDURE — 1101F PR PT FALLS ASSESS DOC 0-1 FALLS W/OUT INJ PAST YR: ICD-10-PCS | Mod: HCNC,CPTII,S$GLB, | Performed by: INTERNAL MEDICINE

## 2019-03-20 PROCEDURE — 3075F PR MOST RECENT SYSTOLIC BLOOD PRESS GE 130-139MM HG: ICD-10-PCS | Mod: HCNC,CPTII,S$GLB, | Performed by: INTERNAL MEDICINE

## 2019-03-20 PROCEDURE — 99499 RISK ADDL DX/OHS AUDIT: ICD-10-PCS | Mod: HCNC,S$GLB,, | Performed by: INTERNAL MEDICINE

## 2019-03-20 PROCEDURE — 99999 PR PBB SHADOW E&M-EST. PATIENT-LVL IV: ICD-10-PCS | Mod: PBBFAC,HCNC,, | Performed by: INTERNAL MEDICINE

## 2019-03-20 PROCEDURE — 99499 UNLISTED E&M SERVICE: CPT | Mod: HCNC,S$GLB,, | Performed by: INTERNAL MEDICINE

## 2019-03-20 PROCEDURE — 3078F DIAST BP <80 MM HG: CPT | Mod: HCNC,CPTII,S$GLB, | Performed by: INTERNAL MEDICINE

## 2019-03-20 PROCEDURE — 3045F PR MOST RECENT HEMOGLOBIN A1C LEVEL 7.0-9.0%: CPT | Mod: HCNC,CPTII,S$GLB, | Performed by: INTERNAL MEDICINE

## 2019-03-20 PROCEDURE — 1101F PT FALLS ASSESS-DOCD LE1/YR: CPT | Mod: HCNC,CPTII,S$GLB, | Performed by: INTERNAL MEDICINE

## 2019-03-20 PROCEDURE — 99999 PR PBB SHADOW E&M-EST. PATIENT-LVL IV: CPT | Mod: PBBFAC,HCNC,, | Performed by: INTERNAL MEDICINE

## 2019-03-20 PROCEDURE — 3045F PR MOST RECENT HEMOGLOBIN A1C LEVEL 7.0-9.0%: ICD-10-PCS | Mod: HCNC,CPTII,S$GLB, | Performed by: INTERNAL MEDICINE

## 2019-03-20 PROCEDURE — 99205 PR OFFICE/OUTPT VISIT, NEW, LEVL V, 60-74 MIN: ICD-10-PCS | Mod: HCNC,S$GLB,, | Performed by: INTERNAL MEDICINE

## 2019-03-20 PROCEDURE — 3078F PR MOST RECENT DIASTOLIC BLOOD PRESSURE < 80 MM HG: ICD-10-PCS | Mod: HCNC,CPTII,S$GLB, | Performed by: INTERNAL MEDICINE

## 2019-03-20 NOTE — PLAN OF CARE
Gastroesophageal - No Medical Intervention - Off Treatment.    Patient Characteristics:  Esophageal & GE Junction, Adenocarcinoma, Preoperative or Nonsurgical Candidate   (Clinical Staging), cT1 - cT2, cN0, Surgical Candidate  Histology: Adenocarcinoma  Disease Classification: GE Junction  Therapeutic Status: Preoperative or Nonsurgical Candidate (Clinical Staging)  AJCC Grade: G2  AJCC 8 Stage Grouping: IIB  AJCC T Category: cT2  AJCC N Category: cN0  AJCC M Category: cM0  Patient Characteristics: Surgical Candidate

## 2019-03-20 NOTE — PROGRESS NOTES
Chief complaint:  GE junction carcinoma    History of present illness:  The patient is a 73-year-old white gentleman who had GI bleed that necessitated hospitalization and transfusion of 4 units of packed red blood cells.  Endoscopy revealed partially circumferential mass lesion at the GE junction which is biopsy proven to be adenocarcinoma.  The lesion appears to be 11 mm thick and penetrate the muscularis.  Endoscopic ultrasound did not reveal any evidence of lymph node or liver involvement.  CT scans of the chest abdomen and pelvis were negative for signs of distant metastatic disease.  Patient has been seen and evaluated by Dr. Milian who has requested our assistance with the administration neoadjuvant therapy prior to surgical resection.  Patient has not yet been evaluated by Radiation Oncology.    Patient's review of systems is remarkable for 10 lb weight loss over the last 3 months.  However, he has been actively dieting and exercising.  Patient suffers from persistent atrial fibrillation and had been fully anticoagulated at the time of his admission for GI bleed.  He is currently in sinus rhythm and off all blood thinner.  He is able to eat without difficulty.  No other pertinent findings on a 14 point review of systems.    Past medical history:  1.  Obesity  2.  Hypertension  3.  Hyperlipidemia  4.  Type 2 diabetes mellitus without insulin use  5.  Atrial fibrillation-currently sinus rhythm.  6.  Had been completely anticoagulated prior to this illness.  7.  Bilateral lower extremity venous varicosities  8.  Chronic thrombocytopenia.  9.  Degenerative joint disease  10.  Status post lumbar spinal fracture  11.  History of resected prostate cancer.  Twelve.  Coronary artery disease-status post implantation of intra coronary stent  13.  Status post hernia repair  14.  History of peptic ulcer disease    Allergies:  Codeine    Medications:  1.  Lipitor 80 mg nightly  2.  Fergon 324 mg daily with breakfast  3.   Lasix 20 mg p.o. daily  4.  Amaryl 2 mg p.o. daily  5.  Metoprolol 25 mg p.o. twice daily  6.  Multivitamin once daily  7.  Sublingual nitroglycerin as needed for angina  8.  Protonix 40 mg p.o. twice daily  9.  Zanaflex 4 mg 3 times daily as needed for muscle spasm  10.  Preservision 1 daily    Family/social history:  Nonsmoker, social alcohol user, care for primarily by his wife.  Father is  from cancer of unknown origin.    Physical examination:  The patient is a well-developed, obese, elderly, white gentleman, in no acute distress, who is alert and oriented x4 and has a weight of 263.5 lb.  VITAL SIGNS: Documented  and reviewed this visit.  HEENT: Normocephalic, atraumatic. Oral mucosa pink and moist. Lips without   lesions. Tongue midline. Oropharynx clear. Nonicteric sclerae.   NECK: Supple, no adenopathy. No carotid bruits, thyromegaly or thyroid nodule.   HEART: Regular rate and rhythm without murmur, gallop or rub.   LUNGS: Clear to auscultation bilaterally. Normal respiratory effort.   ABDOMEN: Soft, nontender, nondistended with positive normoactive bowel sounds,   no hepatosplenomegaly.   EXTREMITIES:  Bilateral venous varicosities/edema. Distal pulses are intact.   AXILLAE AND GROIN: No palpable pathologic lymphadenopathy is appreciated.   SKIN: Intact/turgor normal   NEUROLOGIC: Cranial nerves II-XII grossly intact. Motor: Good muscle bulk and   tone. Strength/sensory 5/5 throughout. Gait unstable.    Laboratory:  White count 6.3, hemoglobin 8.3, hematocrit 29.3, MCV 87, RDW 18.6, platelets 184, absolute neutrophil count 4500.  Sodium 140, potassium 5.1, chloride 107, CO2 28, BUN 30, creatinine 1.3, glucose 173, calcium 8.8, GFR 53.    Impression:  1.  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  2.  Suspected iron deficiency anemia due to chronic GI loss.  3.  Coronary artery disease.  4.  Type 2 diabetes mellitus without complication or insulin use.  5.  History of atrial  fibrillation-currently off all anticoagulation.    Plan:  1.  Obtain baseline lab to include CBC, CMP, LDH, magnesium, and 24 urine for creatinine clearance.  2.  Obtain iron, TIBC, ferritin, and soluble transferrin receptor.  We will supplement with intravenous iron as indicated after review of results.  3.  Consult Dr. Zaidi for implantation of MediPort catheter in order to facilitate the administration neoadjuvant therapy.  4.  Consult Radiation Oncology for assistance with neoadjuvant therapy.  5.  Arrange for evaluations by , financial counselor, and dietitian in anticipation of treatment.  6.  Have patient return to clinic to initiate combined modality therapy.  Specifically, the patient's chemotherapy will include weekly carboplatin at an AUC of 2 and weekly Taxol at 50 milligram/meter squared.  7.  40 min of contact time was spent counseling concerning diagnosis, stage II disease, rationale for neoadjuvant therapy, and I neoplastic/supportive care meds to be employed in therapy, aspects of the administration, potential side effects associated with therapy, interventions for these etc.  The patient and his wife voice understanding and wish to proceed as above.      renate Foley.

## 2019-03-20 NOTE — TELEPHONE ENCOUNTER
Spoke with Pt and notified him that Dr Andrews would like him to do a 24hr unine collection. Pt will need to come by and p/u the instructions and collection container. Pt stated he will come tomorrow and p/u it up. Pt also notified he will have labs to do as well. Pt verbalized understanding.

## 2019-03-21 ENCOUNTER — LAB VISIT (OUTPATIENT)
Dept: LAB | Facility: HOSPITAL | Age: 74
End: 2019-03-21
Attending: INTERNAL MEDICINE
Payer: MEDICARE

## 2019-03-21 DIAGNOSIS — C16.0 GE JUNCTION CARCINOMA: ICD-10-CM

## 2019-03-21 DIAGNOSIS — I25.110 CORONARY ARTERY DISEASE INVOLVING NATIVE CORONARY ARTERY OF NATIVE HEART WITH UNSTABLE ANGINA PECTORIS: ICD-10-CM

## 2019-03-21 DIAGNOSIS — E11.9 TYPE 2 DIABETES MELLITUS TREATED WITHOUT INSULIN: ICD-10-CM

## 2019-03-21 DIAGNOSIS — I48.0 PAF (PAROXYSMAL ATRIAL FIBRILLATION): ICD-10-CM

## 2019-03-21 DIAGNOSIS — D50.8 OTHER IRON DEFICIENCY ANEMIA: ICD-10-CM

## 2019-03-21 LAB
ALBUMIN SERPL BCP-MCNC: 4 G/DL
ALP SERPL-CCNC: 112 U/L
ALT SERPL W/O P-5'-P-CCNC: 18 U/L
ANION GAP SERPL CALC-SCNC: 8 MMOL/L
AST SERPL-CCNC: 18 U/L
BASOPHILS # BLD AUTO: 0.03 K/UL
BASOPHILS NFR BLD: 0.6 %
BILIRUB SERPL-MCNC: 0.4 MG/DL
BUN SERPL-MCNC: 19 MG/DL
CALCIUM SERPL-MCNC: 9.4 MG/DL
CHLORIDE SERPL-SCNC: 102 MMOL/L
CO2 SERPL-SCNC: 29 MMOL/L
CREAT SERPL-MCNC: 1.16 MG/DL
DIFFERENTIAL METHOD: ABNORMAL
EOSINOPHIL # BLD AUTO: 0.2 K/UL
EOSINOPHIL NFR BLD: 4.6 %
ERYTHROCYTE [DISTWIDTH] IN BLOOD BY AUTOMATED COUNT: 16.7 %
EST. GFR  (AFRICAN AMERICAN): >60 ML/MIN/1.73 M^2
EST. GFR  (NON AFRICAN AMERICAN): >60 ML/MIN/1.73 M^2
FERRITIN SERPL-MCNC: 8 NG/ML
GLUCOSE SERPL-MCNC: 237 MG/DL
HCT VFR BLD AUTO: 34.1 %
HGB BLD-MCNC: 10 G/DL
IMM GRANULOCYTES # BLD AUTO: 0.02 K/UL
IMM GRANULOCYTES NFR BLD AUTO: 0.4 %
IRON SATN MFR SERPL: 5 %
IRON SERPL-MCNC: 18 UG/DL
LDH SERPL L TO P-CCNC: 293 U/L
LYMPHOCYTES # BLD AUTO: 0.6 K/UL
LYMPHOCYTES NFR BLD: 12.9 %
MAGNESIUM SERPL-MCNC: 1.9 MG/DL
MCH RBC QN AUTO: 25.1 PG
MCHC RBC AUTO-ENTMCNC: 29.3 G/DL
MCV RBC AUTO: 86 FL
MONOCYTES # BLD AUTO: 0.4 K/UL
MONOCYTES NFR BLD: 8 %
NEUTROPHILS # BLD AUTO: 3.7 K/UL
NEUTROPHILS NFR BLD: 73.5 %
NRBC BLD-RTO: 0 /100 WBC
PLATELET # BLD AUTO: 146 K/UL
PMV BLD AUTO: 11.6 FL
POTASSIUM SERPL-SCNC: 4.5 MMOL/L
PROT SERPL-MCNC: 6.9 G/DL
RBC # BLD AUTO: 3.98 M/UL
SODIUM SERPL-SCNC: 139 MMOL/L
TOTAL IRON BINDING CAPACITY: 382 UG/DL
WBC # BLD AUTO: 4.98 K/UL

## 2019-03-21 PROCEDURE — 83615 LACTATE (LD) (LDH) ENZYME: CPT

## 2019-03-21 PROCEDURE — 83540 ASSAY OF IRON: CPT | Mod: PN

## 2019-03-21 PROCEDURE — 83735 ASSAY OF MAGNESIUM: CPT

## 2019-03-21 PROCEDURE — 80053 COMPREHEN METABOLIC PANEL: CPT

## 2019-03-21 PROCEDURE — 83735 ASSAY OF MAGNESIUM: CPT | Mod: PN

## 2019-03-21 PROCEDURE — 80053 COMPREHEN METABOLIC PANEL: CPT | Mod: PN

## 2019-03-21 PROCEDURE — 82728 ASSAY OF FERRITIN: CPT | Mod: PN

## 2019-03-21 PROCEDURE — 83615 LACTATE (LD) (LDH) ENZYME: CPT | Mod: PN

## 2019-03-21 PROCEDURE — 85025 COMPLETE CBC W/AUTO DIFF WBC: CPT | Mod: PN

## 2019-03-21 PROCEDURE — 84238 ASSAY NONENDOCRINE RECEPTOR: CPT | Mod: HCNC

## 2019-03-21 PROCEDURE — 82728 ASSAY OF FERRITIN: CPT

## 2019-03-21 PROCEDURE — 85025 COMPLETE CBC W/AUTO DIFF WBC: CPT

## 2019-03-21 PROCEDURE — 83540 ASSAY OF IRON: CPT

## 2019-03-22 ENCOUNTER — LAB VISIT (OUTPATIENT)
Dept: LAB | Facility: HOSPITAL | Age: 74
End: 2019-03-22
Attending: INTERNAL MEDICINE
Payer: MEDICARE

## 2019-03-22 DIAGNOSIS — C16.0 GE JUNCTION CARCINOMA: ICD-10-CM

## 2019-03-22 LAB
CREAT CL/1.73 SQ M 12H UR+SERPL-ARVRAT: 121 ML/MIN
CREAT SERPL-MCNC: 1.16 MG/DL
CREAT UR-MCNC: 83.4 MG/DL
CREATININE, URINE (MG/SPEC): 2022.5 MG/SPEC
URINE COLLECTION DURATION: 24 HR
URINE VOLUME: 2425 ML

## 2019-03-22 PROCEDURE — 82575 CREATININE CLEARANCE TEST: CPT

## 2019-03-22 PROCEDURE — 82575 CREATININE CLEARANCE TEST: CPT | Mod: PN

## 2019-03-23 LAB — STFR SERPL-MCNC: 14.1 MG/L

## 2019-03-26 ENCOUNTER — DOCUMENTATION ONLY (OUTPATIENT)
Dept: INFUSION THERAPY | Facility: HOSPITAL | Age: 74
End: 2019-03-26

## 2019-03-26 NOTE — PROGRESS NOTES
[3/26/2019 8:38 AM]  Katia Marie:    Thom Zendejas, [id number], need Feraheme dates, please     [3/26/2019 8:43 AM]    thom zendejas n [id number] 04/08/19 @2:30 and 04/11/19@1:30

## 2019-03-27 ENCOUNTER — OFFICE VISIT (OUTPATIENT)
Dept: SURGERY | Facility: CLINIC | Age: 74
End: 2019-03-27
Payer: MEDICARE

## 2019-03-27 ENCOUNTER — DOCUMENTATION ONLY (OUTPATIENT)
Dept: HEMATOLOGY/ONCOLOGY | Facility: CLINIC | Age: 74
End: 2019-03-27

## 2019-03-27 ENCOUNTER — TELEPHONE (OUTPATIENT)
Dept: SURGERY | Facility: CLINIC | Age: 74
End: 2019-03-27

## 2019-03-27 VITALS
TEMPERATURE: 99 F | WEIGHT: 264 LBS | HEIGHT: 77 IN | HEART RATE: 84 BPM | SYSTOLIC BLOOD PRESSURE: 144 MMHG | DIASTOLIC BLOOD PRESSURE: 67 MMHG | BODY MASS INDEX: 31.17 KG/M2

## 2019-03-27 DIAGNOSIS — C16.0 GE JUNCTION CARCINOMA: Primary | ICD-10-CM

## 2019-03-27 PROCEDURE — 99999 PR PBB SHADOW E&M-EST. PATIENT-LVL V: CPT | Mod: PBBFAC,,, | Performed by: SURGERY

## 2019-03-27 PROCEDURE — 1101F PR PT FALLS ASSESS DOC 0-1 FALLS W/OUT INJ PAST YR: ICD-10-PCS | Mod: CPTII,S$GLB,, | Performed by: SURGERY

## 2019-03-27 PROCEDURE — 99214 OFFICE O/P EST MOD 30 MIN: CPT | Mod: S$GLB,,, | Performed by: SURGERY

## 2019-03-27 PROCEDURE — 3077F PR MOST RECENT SYSTOLIC BLOOD PRESSURE >= 140 MM HG: ICD-10-PCS | Mod: CPTII,S$GLB,, | Performed by: SURGERY

## 2019-03-27 PROCEDURE — 3078F PR MOST RECENT DIASTOLIC BLOOD PRESSURE < 80 MM HG: ICD-10-PCS | Mod: CPTII,S$GLB,, | Performed by: SURGERY

## 2019-03-27 PROCEDURE — 99999 PR PBB SHADOW E&M-EST. PATIENT-LVL V: ICD-10-PCS | Mod: PBBFAC,,, | Performed by: SURGERY

## 2019-03-27 PROCEDURE — 99214 PR OFFICE/OUTPT VISIT, EST, LEVL IV, 30-39 MIN: ICD-10-PCS | Mod: S$GLB,,, | Performed by: SURGERY

## 2019-03-27 PROCEDURE — 1101F PT FALLS ASSESS-DOCD LE1/YR: CPT | Mod: CPTII,S$GLB,, | Performed by: SURGERY

## 2019-03-27 PROCEDURE — 3078F DIAST BP <80 MM HG: CPT | Mod: CPTII,S$GLB,, | Performed by: SURGERY

## 2019-03-27 PROCEDURE — 3077F SYST BP >= 140 MM HG: CPT | Mod: CPTII,S$GLB,, | Performed by: SURGERY

## 2019-03-27 RX ORDER — SODIUM CHLORIDE 9 MG/ML
INJECTION, SOLUTION INTRAVENOUS CONTINUOUS
Status: CANCELLED | OUTPATIENT
Start: 2019-03-27

## 2019-03-27 RX ORDER — LIDOCAINE HYDROCHLORIDE 10 MG/ML
1 INJECTION, SOLUTION EPIDURAL; INFILTRATION; INTRACAUDAL; PERINEURAL ONCE
Status: CANCELLED | OUTPATIENT
Start: 2019-03-27 | End: 2019-03-27

## 2019-03-27 NOTE — PROGRESS NOTES
Patient and wife here for chemotherapy education.  Discussed possible side effects with self-care tips. Provided copies of [facility] information.   Patient was allowed time to voice questions and concerns.  All were answered and patient verbalized understanding.    Patient does have prescriptions for anti-nausea medications. Instructed on how to take.  Date and time of 1st chemo to be determined, depending on XRT.  Voiced understanding.  Consent signed and witnessed.

## 2019-03-27 NOTE — PROGRESS NOTES
Subjective:       Patient ID: Stu Garcia is a 73 y.o. male.    Chief Complaint: Consult (Port placement)    HPI  This is a pleasant 72 yo M referred to me for evaluation of port placement. Pt notes that he was recently diagnosed with adenocarcinoma of the GE junction.  He has seen Dr Milian and plan has been made to proceed with neoadjuvant treatment.  Pt is in need of port to begin chemo.  Pt has history of CAD and had stents placed over a year ago.  He has had L sided chest surgery including pleurodesis for pneumothorax.  He deneis cardiac surgery and denies history of ESRD.  Pt stopped all blood thinners at time of diagnosis.   '     Review of Systems   Constitutional: Negative for activity change, appetite change, diaphoresis, fever and unexpected weight change.   Respiratory: Negative for cough, chest tightness and wheezing.    Cardiovascular: Negative for chest pain and palpitations.   Gastrointestinal: Positive for anal bleeding. Negative for abdominal distention, abdominal pain, blood in stool, constipation, diarrhea, nausea, rectal pain and vomiting.   Genitourinary: Negative for difficulty urinating, dysuria and hematuria.   Musculoskeletal: Negative for back pain and gait problem.   Skin: Negative for color change and wound.   Neurological: Negative for tremors and seizures.       Objective:      Physical Exam   Constitutional: He is oriented to person, place, and time. He appears well-developed and well-nourished.   HENT:   Head: Normocephalic and atraumatic.   Eyes: Pupils are equal, round, and reactive to light.   Neck: Normal range of motion. No tracheal deviation present. No thyromegaly present.   Cardiovascular: Normal rate, regular rhythm and normal heart sounds. Exam reveals no gallop and no friction rub.   No murmur heard.  Pulmonary/Chest: Effort normal and breath sounds normal. He has no wheezes. He exhibits no tenderness.   Abdominal: He exhibits no distension and no mass. There is no  tenderness. There is no rebound and no guarding.   Musculoskeletal: Normal range of motion.   Neurological: He is alert and oriented to person, place, and time. Coordination normal.   Skin: Skin is warm.   Psychiatric: He has a normal mood and affect.   Vitals reviewed.      Assessment:       1. GE junction carcinoma        Plan:       D/w pt.  I have offered to place port. I have explained risks and benefits of port placement to pt.  Informed consent obtained. Surgery scheduled for next thursday

## 2019-03-27 NOTE — PATIENT INSTRUCTIONS
Surgery is scheduled for 4/4/19 arrival time per the preop nurse.  Preop will call from [phone]  Fasting after midnight  Someone to drive you home      THE PREOP NURSE WILL CALL, SOMETIMES AS LATE AS 4 PM IN THE AFTERNOON THE DAY BEFORE SURGERY.    Bathe the night before and the morning of your procedure with a Chlorhexidine wash such as Hibiclens, can be purchased at most Pharmacy's.    Special Instruction:

## 2019-03-27 NOTE — LETTER
March 27, 2019      Clinton Andrews MD  [address] [facility] [address]  [facility] [address]           [facility]  [address] [facility] [address] [place] [address]  Phone: [phone]          Patient: Stu Garcia   MR Number: [id number]   YOB: 1945   Date of Visit: 3/27/2019       Dear Dr. Clinton Andrews:    Thank you for referring Stu Garcia to me for evaluation. Attached you will find relevant portions of my assessment and plan of care.    If you have questions, please do not hesitate to call me. I look forward to following Stu Garcia along with you.    Sincerely,    Nahum Zaidi MD    Enclosure  CC:  No Recipients    If you would like to receive this communication electronically, please contact [email or web address] or [phone] to request more information on [facility] access.    For providers and/or their staff who would like to refer a patient to [facility], please contact us through our one-stop-shop provider referral line, [facility] , at [phone].    If you feel you have received this communication in error or would no longer like to receive these types of communications, please e-mail [email or web address]

## 2019-03-27 NOTE — H&P (VIEW-ONLY)
Subjective:       Patient ID: Stu Garcia is a 73 y.o. male.    Chief Complaint: Consult (Port placement)    HPI  This is a pleasant 74 yo M referred to me for evaluation of port placement. Pt notes that he was recently diagnosed with adenocarcinoma of the GE junction.  He has seen Dr Milian and plan has been made to proceed with neoadjuvant treatment.  Pt is in need of port to begin chemo.  Pt has history of CAD and had stents placed over a year ago.  He has had L sided chest surgery including pleurodesis for pneumothorax.  He deneis cardiac surgery and denies history of ESRD.  Pt stopped all blood thinners at time of diagnosis.   '     Review of Systems   Constitutional: Negative for activity change, appetite change, diaphoresis, fever and unexpected weight change.   Respiratory: Negative for cough, chest tightness and wheezing.    Cardiovascular: Negative for chest pain and palpitations.   Gastrointestinal: Positive for anal bleeding. Negative for abdominal distention, abdominal pain, blood in stool, constipation, diarrhea, nausea, rectal pain and vomiting.   Genitourinary: Negative for difficulty urinating, dysuria and hematuria.   Musculoskeletal: Negative for back pain and gait problem.   Skin: Negative for color change and wound.   Neurological: Negative for tremors and seizures.       Objective:      Physical Exam   Constitutional: He is oriented to person, place, and time. He appears well-developed and well-nourished.   HENT:   Head: Normocephalic and atraumatic.   Eyes: Pupils are equal, round, and reactive to light.   Neck: Normal range of motion. No tracheal deviation present. No thyromegaly present.   Cardiovascular: Normal rate, regular rhythm and normal heart sounds. Exam reveals no gallop and no friction rub.   No murmur heard.  Pulmonary/Chest: Effort normal and breath sounds normal. He has no wheezes. He exhibits no tenderness.   Abdominal: He exhibits no distension and no mass. There is no  tenderness. There is no rebound and no guarding.   Musculoskeletal: Normal range of motion.   Neurological: He is alert and oriented to person, place, and time. Coordination normal.   Skin: Skin is warm.   Psychiatric: He has a normal mood and affect.   Vitals reviewed.      Assessment:       1. GE junction carcinoma        Plan:       D/w pt.  I have offered to place port. I have explained risks and benefits of port placement to pt.  Informed consent obtained. Surgery scheduled for next thursday

## 2019-03-28 RX ORDER — PROCHLORPERAZINE MALEATE 10 MG
10 TABLET ORAL EVERY 6 HOURS PRN
Qty: 120 TABLET | Refills: 1 | OUTPATIENT
Start: 2019-03-28

## 2019-03-31 RX ORDER — SODIUM CHLORIDE 0.9 % (FLUSH) 0.9 %
10 SYRINGE (ML) INJECTION
Status: CANCELLED | OUTPATIENT
Start: 2019-04-15

## 2019-03-31 RX ORDER — DIPHENHYDRAMINE HYDROCHLORIDE 50 MG/ML
25 INJECTION INTRAMUSCULAR; INTRAVENOUS
Status: CANCELLED
Start: 2019-04-08

## 2019-03-31 RX ORDER — DIPHENHYDRAMINE HYDROCHLORIDE 50 MG/ML
50 INJECTION INTRAMUSCULAR; INTRAVENOUS ONCE AS NEEDED
Status: CANCELLED | OUTPATIENT
Start: 2019-04-29

## 2019-03-31 RX ORDER — HEPARIN 100 UNIT/ML
500 SYRINGE INTRAVENOUS
Status: CANCELLED | OUTPATIENT
Start: 2019-04-08

## 2019-03-31 RX ORDER — HEPARIN 100 UNIT/ML
500 SYRINGE INTRAVENOUS
Status: CANCELLED | OUTPATIENT
Start: 2019-04-29

## 2019-03-31 RX ORDER — EPINEPHRINE 0.3 MG/.3ML
0.3 INJECTION SUBCUTANEOUS ONCE AS NEEDED
Status: CANCELLED | OUTPATIENT
Start: 2019-04-22

## 2019-03-31 RX ORDER — DIPHENHYDRAMINE HYDROCHLORIDE 50 MG/ML
50 INJECTION INTRAMUSCULAR; INTRAVENOUS ONCE AS NEEDED
Status: CANCELLED | OUTPATIENT
Start: 2019-05-06

## 2019-03-31 RX ORDER — SODIUM CHLORIDE 0.9 % (FLUSH) 0.9 %
10 SYRINGE (ML) INJECTION
Status: CANCELLED | OUTPATIENT
Start: 2019-04-08

## 2019-03-31 RX ORDER — FAMOTIDINE 10 MG/ML
20 INJECTION INTRAVENOUS
Status: CANCELLED | OUTPATIENT
Start: 2019-04-08

## 2019-03-31 RX ORDER — DIPHENHYDRAMINE HYDROCHLORIDE 50 MG/ML
50 INJECTION INTRAMUSCULAR; INTRAVENOUS ONCE AS NEEDED
Status: CANCELLED | OUTPATIENT
Start: 2019-04-22

## 2019-03-31 RX ORDER — SODIUM CHLORIDE 0.9 % (FLUSH) 0.9 %
10 SYRINGE (ML) INJECTION
Status: CANCELLED | OUTPATIENT
Start: 2019-05-06

## 2019-03-31 RX ORDER — HEPARIN 100 UNIT/ML
500 SYRINGE INTRAVENOUS
Status: CANCELLED | OUTPATIENT
Start: 2019-05-06

## 2019-03-31 RX ORDER — EPINEPHRINE 0.3 MG/.3ML
0.3 INJECTION SUBCUTANEOUS ONCE AS NEEDED
Status: CANCELLED | OUTPATIENT
Start: 2019-04-08

## 2019-03-31 RX ORDER — HEPARIN 100 UNIT/ML
500 SYRINGE INTRAVENOUS
Status: CANCELLED | OUTPATIENT
Start: 2019-04-22

## 2019-03-31 RX ORDER — SODIUM CHLORIDE 0.9 % (FLUSH) 0.9 %
10 SYRINGE (ML) INJECTION
Status: CANCELLED | OUTPATIENT
Start: 2019-04-29

## 2019-03-31 RX ORDER — SODIUM CHLORIDE 0.9 % (FLUSH) 0.9 %
10 SYRINGE (ML) INJECTION
Status: CANCELLED | OUTPATIENT
Start: 2019-04-22

## 2019-03-31 RX ORDER — DIPHENHYDRAMINE HYDROCHLORIDE 50 MG/ML
50 INJECTION INTRAMUSCULAR; INTRAVENOUS ONCE AS NEEDED
Status: CANCELLED | OUTPATIENT
Start: 2019-04-15

## 2019-03-31 RX ORDER — EPINEPHRINE 0.3 MG/.3ML
0.3 INJECTION SUBCUTANEOUS ONCE AS NEEDED
Status: CANCELLED | OUTPATIENT
Start: 2019-04-15

## 2019-03-31 RX ORDER — FAMOTIDINE 10 MG/ML
20 INJECTION INTRAVENOUS
Status: CANCELLED | OUTPATIENT
Start: 2019-05-06

## 2019-03-31 RX ORDER — HEPARIN 100 UNIT/ML
500 SYRINGE INTRAVENOUS
Status: CANCELLED | OUTPATIENT
Start: 2019-04-15

## 2019-03-31 RX ORDER — FAMOTIDINE 10 MG/ML
20 INJECTION INTRAVENOUS
Status: CANCELLED | OUTPATIENT
Start: 2019-04-29

## 2019-03-31 RX ORDER — DIPHENHYDRAMINE HYDROCHLORIDE 50 MG/ML
50 INJECTION INTRAMUSCULAR; INTRAVENOUS ONCE AS NEEDED
Status: CANCELLED | OUTPATIENT
Start: 2019-04-08

## 2019-03-31 RX ORDER — FAMOTIDINE 10 MG/ML
20 INJECTION INTRAVENOUS
Status: CANCELLED | OUTPATIENT
Start: 2019-04-15

## 2019-03-31 RX ORDER — FAMOTIDINE 10 MG/ML
20 INJECTION INTRAVENOUS
Status: CANCELLED | OUTPATIENT
Start: 2019-04-22

## 2019-03-31 RX ORDER — EPINEPHRINE 0.3 MG/.3ML
0.3 INJECTION SUBCUTANEOUS ONCE AS NEEDED
Status: CANCELLED | OUTPATIENT
Start: 2019-04-29

## 2019-03-31 RX ORDER — EPINEPHRINE 0.3 MG/.3ML
0.3 INJECTION SUBCUTANEOUS ONCE AS NEEDED
Status: CANCELLED | OUTPATIENT
Start: 2019-05-06

## 2019-03-31 RX ORDER — FAMOTIDINE 10 MG/ML
20 INJECTION INTRAVENOUS DAILY
Status: CANCELLED
Start: 2019-04-08

## 2019-04-01 DIAGNOSIS — C16.0 GE JUNCTION CARCINOMA: Primary | ICD-10-CM

## 2019-04-03 ENCOUNTER — TELEPHONE (OUTPATIENT)
Dept: SURGERY | Facility: CLINIC | Age: 74
End: 2019-04-03

## 2019-04-03 DIAGNOSIS — C16.0 GE JUNCTION CARCINOMA: Primary | ICD-10-CM

## 2019-04-03 NOTE — TELEPHONE ENCOUNTER
----- Message from Nas Singh sent at 4/3/2019  9:56 AM CDT -----  Contact: Ania Garcia (Spouse)  Type: Needs Medical Advice    Who Called:  Ania Garcia (Spouse)  Best Call Back Number: [phone]  Additional Information: Patient has not received call for information regarding procedure scheduled for 4/4/19 at 14:10. Please advise

## 2019-04-03 NOTE — TELEPHONE ENCOUNTER
I informed patients wife that the nurse from the surgery center will call today with instructions for his surgery tomorrow.  Pelon

## 2019-04-04 ENCOUNTER — ANESTHESIA (OUTPATIENT)
Dept: SURGERY | Facility: HOSPITAL | Age: 74
End: 2019-04-04
Payer: MEDICARE

## 2019-04-04 ENCOUNTER — HOSPITAL ENCOUNTER (OUTPATIENT)
Dept: RADIOLOGY | Facility: HOSPITAL | Age: 74
Discharge: HOME | End: 2019-04-04
Attending: SURGERY | Admitting: SURGERY
Payer: MEDICARE

## 2019-04-04 ENCOUNTER — HOSPITAL ENCOUNTER (OUTPATIENT)
Facility: HOSPITAL | Age: 74
Discharge: HOME | End: 2019-04-04
Attending: SURGERY | Admitting: SURGERY
Payer: MEDICARE

## 2019-04-04 ENCOUNTER — ANESTHESIA EVENT (OUTPATIENT)
Dept: SURGERY | Facility: HOSPITAL | Age: 74
End: 2019-04-04
Payer: MEDICARE

## 2019-04-04 DIAGNOSIS — C15.9 MALIGNANT NEOPLASM OF ESOPHAGUS, UNSPECIFIED LOCATION: Primary | ICD-10-CM

## 2019-04-04 DIAGNOSIS — C16.0 GE JUNCTION CARCINOMA: ICD-10-CM

## 2019-04-04 LAB — GLUCOSE SERPL-MCNC: 155 MG/DL (ref 70–110)

## 2019-04-04 PROCEDURE — 36561 INSERT TUNNELED CV CATH: CPT | Mod: HCNC,,, | Performed by: SURGERY

## 2019-04-04 PROCEDURE — 63600175 PHARM REV CODE 636 W HCPCS: Mod: HCNC,PO | Performed by: SURGERY

## 2019-04-04 PROCEDURE — 71000033 HC RECOVERY, INTIAL HOUR: Mod: HCNC,PO | Performed by: SURGERY

## 2019-04-04 PROCEDURE — D9220A PRA ANESTHESIA: ICD-10-PCS | Mod: HCNC,ANES,, | Performed by: ANESTHESIOLOGY

## 2019-04-04 PROCEDURE — 36561 PR INSERT TUNNELED CV CATH WITH PORT: ICD-10-PCS | Mod: HCNC,,, | Performed by: SURGERY

## 2019-04-04 PROCEDURE — 36000707: Mod: HCNC,PO | Performed by: SURGERY

## 2019-04-04 PROCEDURE — 76000 FLUOROSCOPY <1 HR PHYS/QHP: CPT | Mod: TC,HCNC,PO

## 2019-04-04 PROCEDURE — 37000008 HC ANESTHESIA 1ST 15 MINUTES: Mod: HCNC,PO | Performed by: SURGERY

## 2019-04-04 PROCEDURE — 25000003 PHARM REV CODE 250: Mod: HCNC,PO | Performed by: SURGERY

## 2019-04-04 PROCEDURE — 63600175 PHARM REV CODE 636 W HCPCS: Mod: HCNC,PO | Performed by: NURSE ANESTHETIST, CERTIFIED REGISTERED

## 2019-04-04 PROCEDURE — 82962 GLUCOSE BLOOD TEST: CPT | Mod: HCNC,PO | Performed by: SURGERY

## 2019-04-04 PROCEDURE — 77001 CHG FLUOROGUIDE CNTRL VEN ACCESS,PLACE,REPLACE,REMOVE: ICD-10-PCS | Mod: 26,HCNC,, | Performed by: SURGERY

## 2019-04-04 PROCEDURE — 37000009 HC ANESTHESIA EA ADD 15 MINS: Mod: HCNC,PO | Performed by: SURGERY

## 2019-04-04 PROCEDURE — 36000706: Mod: HCNC,PO | Performed by: SURGERY

## 2019-04-04 PROCEDURE — 71045 X-RAY EXAM CHEST 1 VIEW: CPT | Mod: 26,HCNC,, | Performed by: RADIOLOGY

## 2019-04-04 PROCEDURE — C1788 PORT, INDWELLING, IMP: HCPCS | Mod: HCNC,PO | Performed by: SURGERY

## 2019-04-04 PROCEDURE — D9220A PRA ANESTHESIA: Mod: HCNC,ANES,, | Performed by: ANESTHESIOLOGY

## 2019-04-04 PROCEDURE — 71000015 HC POSTOP RECOV 1ST HR: Mod: HCNC,PO | Performed by: SURGERY

## 2019-04-04 PROCEDURE — 77001 FLUOROGUIDE FOR VEIN DEVICE: CPT | Mod: TC,HCNC,PO

## 2019-04-04 PROCEDURE — 25000003 PHARM REV CODE 250: Mod: HCNC,PO | Performed by: ANESTHESIOLOGY

## 2019-04-04 PROCEDURE — 71045 X-RAY EXAM CHEST 1 VIEW: CPT | Mod: TC,HCNC,FY,PO

## 2019-04-04 PROCEDURE — 77001 FLUOROGUIDE FOR VEIN DEVICE: CPT | Mod: 26,HCNC,, | Performed by: SURGERY

## 2019-04-04 PROCEDURE — 71045 XR CHEST AP PORTABLE: ICD-10-PCS | Mod: 26,HCNC,, | Performed by: RADIOLOGY

## 2019-04-04 DEVICE — KIT POWERPORT SINGLE 8FR: Type: IMPLANTABLE DEVICE | Site: CHEST | Status: FUNCTIONAL

## 2019-04-04 RX ORDER — LIDOCAINE AND PRILOCAINE 25; 25 MG/G; MG/G
CREAM TOPICAL
Refills: 0 | Status: ON HOLD | COMMUNITY
Start: 2019-03-27 | End: 2022-01-01

## 2019-04-04 RX ORDER — SODIUM CHLORIDE 9 MG/ML
INJECTION, SOLUTION INTRAVENOUS CONTINUOUS
Status: DISCONTINUED | OUTPATIENT
Start: 2019-04-04 | End: 2019-04-04

## 2019-04-04 RX ORDER — SODIUM CHLORIDE 9 MG/ML
INJECTION, SOLUTION INTRAVENOUS CONTINUOUS
Status: DISCONTINUED | OUTPATIENT
Start: 2019-04-04 | End: 2019-04-04 | Stop reason: HOSPADM

## 2019-04-04 RX ORDER — PROPOFOL 10 MG/ML
VIAL (ML) INTRAVENOUS CONTINUOUS PRN
Status: DISCONTINUED | OUTPATIENT
Start: 2019-04-04 | End: 2019-04-04

## 2019-04-04 RX ORDER — SODIUM CHLORIDE, SODIUM LACTATE, POTASSIUM CHLORIDE, CALCIUM CHLORIDE 600; 310; 30; 20 MG/100ML; MG/100ML; MG/100ML; MG/100ML
INJECTION, SOLUTION INTRAVENOUS CONTINUOUS
Status: DISCONTINUED | OUTPATIENT
Start: 2019-04-04 | End: 2019-04-04 | Stop reason: HOSPADM

## 2019-04-04 RX ORDER — ONDANSETRON HYDROCHLORIDE 8 MG/1
TABLET, FILM COATED ORAL
Refills: 0 | COMMUNITY
Start: 2019-04-01 | End: 2019-04-22 | Stop reason: SDUPTHER

## 2019-04-04 RX ORDER — ONDANSETRON 2 MG/ML
INJECTION INTRAMUSCULAR; INTRAVENOUS
Status: DISCONTINUED | OUTPATIENT
Start: 2019-04-04 | End: 2019-04-04

## 2019-04-04 RX ORDER — CEFAZOLIN SODIUM 2 G/50ML
2 SOLUTION INTRAVENOUS
Status: COMPLETED | OUTPATIENT
Start: 2019-04-04 | End: 2019-04-04

## 2019-04-04 RX ORDER — LIDOCAINE HCL/PF 100 MG/5ML
SYRINGE (ML) INTRAVENOUS
Status: DISCONTINUED | OUTPATIENT
Start: 2019-04-04 | End: 2019-04-04

## 2019-04-04 RX ORDER — MIDAZOLAM HYDROCHLORIDE 1 MG/ML
INJECTION, SOLUTION INTRAMUSCULAR; INTRAVENOUS
Status: DISCONTINUED | OUTPATIENT
Start: 2019-04-04 | End: 2019-04-04

## 2019-04-04 RX ORDER — PROCHLORPERAZINE MALEATE 10 MG
TABLET ORAL
Refills: 6 | COMMUNITY
Start: 2019-03-27 | End: 2019-05-06 | Stop reason: SDUPTHER

## 2019-04-04 RX ORDER — PROPOFOL 10 MG/ML
VIAL (ML) INTRAVENOUS
Status: DISCONTINUED | OUTPATIENT
Start: 2019-04-04 | End: 2019-04-04

## 2019-04-04 RX ORDER — HYDROCODONE BITARTRATE AND ACETAMINOPHEN 5; 325 MG/1; MG/1
1 TABLET ORAL EVERY 6 HOURS PRN
Qty: 20 TABLET | Refills: 0 | Status: SHIPPED | OUTPATIENT
Start: 2019-04-04 | End: 2019-04-18 | Stop reason: SDUPTHER

## 2019-04-04 RX ORDER — OXYCODONE HYDROCHLORIDE 5 MG/1
5 TABLET ORAL
Status: DISCONTINUED | OUTPATIENT
Start: 2019-04-04 | End: 2019-04-04 | Stop reason: HOSPADM

## 2019-04-04 RX ORDER — HEPARIN 100 UNIT/ML
SYRINGE INTRAVENOUS
Status: DISCONTINUED | OUTPATIENT
Start: 2019-04-04 | End: 2019-04-04 | Stop reason: HOSPADM

## 2019-04-04 RX ORDER — FENTANYL CITRATE 50 UG/ML
25 INJECTION, SOLUTION INTRAMUSCULAR; INTRAVENOUS EVERY 5 MIN PRN
Status: DISCONTINUED | OUTPATIENT
Start: 2019-04-04 | End: 2019-04-04 | Stop reason: HOSPADM

## 2019-04-04 RX ORDER — BUPIVACAINE HCL/EPINEPHRINE 0.25-.0005
VIAL (ML) INJECTION
Status: DISCONTINUED | OUTPATIENT
Start: 2019-04-04 | End: 2019-04-04 | Stop reason: HOSPADM

## 2019-04-04 RX ORDER — HYDROMORPHONE HYDROCHLORIDE 2 MG/ML
0.2 INJECTION, SOLUTION INTRAMUSCULAR; INTRAVENOUS; SUBCUTANEOUS EVERY 5 MIN PRN
Status: DISCONTINUED | OUTPATIENT
Start: 2019-04-04 | End: 2019-04-04 | Stop reason: HOSPADM

## 2019-04-04 RX ORDER — FENTANYL CITRATE 50 UG/ML
INJECTION, SOLUTION INTRAMUSCULAR; INTRAVENOUS
Status: DISCONTINUED | OUTPATIENT
Start: 2019-04-04 | End: 2019-04-04

## 2019-04-04 RX ORDER — LIDOCAINE HYDROCHLORIDE 10 MG/ML
1 INJECTION, SOLUTION EPIDURAL; INFILTRATION; INTRACAUDAL; PERINEURAL ONCE
Status: DISCONTINUED | OUTPATIENT
Start: 2019-04-04 | End: 2019-04-04 | Stop reason: HOSPADM

## 2019-04-04 RX ADMIN — PROPOFOL 75 MCG/KG/MIN: 10 INJECTION, EMULSION INTRAVENOUS at 01:04

## 2019-04-04 RX ADMIN — SODIUM CHLORIDE, SODIUM LACTATE, POTASSIUM CHLORIDE, AND CALCIUM CHLORIDE: .6; .31; .03; .02 INJECTION, SOLUTION INTRAVENOUS at 12:04

## 2019-04-04 RX ADMIN — LIDOCAINE HYDROCHLORIDE 75 MG: 20 INJECTION PARENTERAL at 01:04

## 2019-04-04 RX ADMIN — PROPOFOL 30 MG: 10 INJECTION, EMULSION INTRAVENOUS at 01:04

## 2019-04-04 RX ADMIN — CEFAZOLIN SODIUM 2 G: 2 SOLUTION INTRAVENOUS at 01:04

## 2019-04-04 RX ADMIN — ONDANSETRON 4 MG: 2 INJECTION, SOLUTION INTRAMUSCULAR; INTRAVENOUS at 01:04

## 2019-04-04 RX ADMIN — MIDAZOLAM HYDROCHLORIDE 2 MG: 1 INJECTION, SOLUTION INTRAMUSCULAR; INTRAVENOUS at 01:04

## 2019-04-04 RX ADMIN — FENTANYL CITRATE 50 MCG: 50 INJECTION, SOLUTION INTRAMUSCULAR; INTRAVENOUS at 01:04

## 2019-04-04 NOTE — ANESTHESIA POSTPROCEDURE EVALUATION
Anesthesia Post Evaluation    Patient: Stu Garcia    Procedure(s) Performed: Procedure(s) (LRB):  [id number]-CATH (Right)    Final Anesthesia Type: MAC  Patient location during evaluation: PACU  Patient participation: Yes- Able to Participate  Level of consciousness: awake and alert, oriented and awake  Post-procedure vital signs: reviewed and stable  Pain management: adequate  Airway patency: patent  PONV status at discharge: No PONV  Anesthetic complications: no      Cardiovascular status: blood pressure returned to baseline and hemodynamically stable  Respiratory status: unassisted, spontaneous ventilation and room air  Hydration status: euvolemic  Follow-up not needed.          Vitals Value Taken Time   /59 4/4/2019  2:00 PM   Temp 36.1 °C (97 °F) 4/4/2019  2:00 PM   Pulse 67 4/4/2019  2:00 PM   Resp 17 4/4/2019  2:00 PM   SpO2 99 % 4/4/2019  2:00 PM         No case tracking events are documented in the log.      Pain/Sanchez Score: Sanchez Score: 10 (4/4/2019  2:01 PM)

## 2019-04-04 NOTE — ANESTHESIA PREPROCEDURE EVALUATION
04/04/2019  Stu Garcia is a 73 y.o., male.    Pre-op Assessment    I have reviewed the Patient Summary Reports.     I have reviewed the Nursing Notes.   I have reviewed the Medications.     Review of Systems  Anesthesia Hx:  No problems with previous Anesthesia  Neg history of prior surgery. Denies Family Hx of Anesthesia complications.   Denies Personal Hx of Anesthesia complications.   Social:  Non-Smoker    Hematology/Oncology:     Oncology Normal    -- Anemia: Hematology Comments: Hgb = 6.5    EENT/Dental:EENT/Dental Normal   Cardiovascular:   Hypertension CAD  Dysrhythmias atrial fibrillation Angina Coronary sttents   Pulmonary:  Pulmonary Normal Hx spontaneous pneumo   Renal/:  Renal/ Normal     Hepatic/GI:  Hepatic/GI Normal    Musculoskeletal:   Arthritis     Neurological:   Neuromuscular Disease, C spine surgery  Peripheral Neuropathy    Endocrine:   Diabetes, type 2    Dermatological:  Skin Normal    Psych:  Psychiatric Normal           Physical Exam  General:  Well nourished, Obesity    Airway/Jaw/Neck:  Airway Findings: Mouth Opening: Normal Tongue: Normal  General Airway Assessment: Adult, Average  Mallampati: II  Improves to I with phonation.  TM Distance: Normal, at least 6 cm  Jaw/Neck Findings:  Neck ROM: Normal ROM, Normal Extension, Painful  Neck Findings:     Eyes/Ears/Nose:  EYES/EARS/NOSE FINDINGS: Normal   Dental:  Dental Findings: Edentulous   Chest/Lungs:  Chest/Lungs Findings: Clear to auscultation, Normal Respiratory Rate     Heart/Vascular:  Heart Findings: Rate: Normal  Rhythm: Regular Rhythm  Sounds: Normal  Heart murmur: negative Vascular Findings:     Abdomen:  Abdomen Findings:  Normal     Musculoskeletal:  Musculoskeletal Findings:    Skin:  Skin Findings:     Mental Status:  Mental Status Findings:  Cooperative, Alert and Oriented         Anesthesia Plan  Type of  Anesthesia, risks & benefits discussed:  Anesthesia Type:  MAC  Patient's Preference: MAC  Intra-op Monitoring Plan: standard ASA monitors  Intra-op Monitoring Plan Comments:   Post Op Pain Control Plan:   Post Op Pain Control Plan Comments:   Induction:   IV  Beta Blocker:  Patient is not currently on a Beta-Blocker (No further documentation required).       Informed Consent: Patient understands risks and agrees with Anesthesia plan.  Questions answered. Anesthesia consent signed with patient.  ASA Score: 3     Day of Surgery Review of History & Physical:    H&P update referred to the surgeon.         Ready For Surgery From Anesthesia Perspective.

## 2019-04-04 NOTE — INTERVAL H&P NOTE
The patient has been examined and the H&P has been reviewed:    I concur with the findings and no changes have occurred since H&P was written.    Anesthesia/Surgery risks, benefits and alternative options discussed and understood by patient/family.          Active Hospital Problems    Diagnosis  POA    GE junction carcinoma [C16.0]  Yes      Resolved Hospital Problems   No resolved problems to display.

## 2019-04-04 NOTE — BRIEF OP NOTE
[facility]  Brief Operative Note     SUMMARY     Surgery Date: 4/4/2019     Surgeon(s) and Role:     * Nahum Zaidi MD - Primary    Assisting Surgeon: None    Pre-op Diagnosis:  GE junction carcinoma [C16.0]    Post-op Diagnosis:  Post-Op Diagnosis Codes:     * GE junction carcinoma [C16.0]    Procedure(s) (LRB):  [id number]-CATH (Bilateral)    Anesthesia: General/MAC    Description of the findings of the procedure: Attempted placement on L, unable to obtain access ( 2 arterial sticks).  Move to Right and obtain access on the R with one stick.     Findings/Key Components: see above    Estimated Blood Loss: 15 cc's      Specimens:   Specimen (12h ago, onward)    None          Discharge Note    SUMMARY     Admit Date: 4/4/2019    Discharge Date and Time:  04/04/2019 1:59 PM    Hospital Course (synopsis of major diagnoses, care, treatment, and services provided during the course of the hospital stay): Pt presented for port placement. Procedure and post op course were uneventful and pt was discharged to home.      Final Diagnosis: Post-Op Diagnosis Codes:     * GE junction carcinoma [C16.0]    Disposition: Home or Self Care    Follow Up/Patient Instructions:     Medications:  Reconciled Home Medications:      Medication List      START taking these medications    HYDROcodone-acetaminophen 5-325 mg per tablet  Commonly known as:  NORCO  Take 1 tablet by mouth every 6 (six) hours as needed for Pain.        ASK your doctor about these medications    ACCU-CHEK SHASHANK PLUS TEST STRP Strp  Generic drug:  blood sugar diagnostic  TEST ONCE DAILY     atorvastatin 80 MG tablet  Commonly known as:  LIPITOR  Take 1 tablet (80 mg total) by mouth every evening.     diphenhydrAMINE-aluminum-magnesium hydroxide-simethicone-lidocaine HCl 2%  Swish and spit 15 mLs every 4 (four) hours as needed.     ferrous gluconate 324 MG tablet  Commonly known as:  FERGON  Take 1 tablet (324 mg total) by mouth daily with  breakfast.     furosemide 20 MG tablet  Commonly known as:  LASIX  TAKE ONE TABLET BY MOUTH EVERY MONDAY, WEDNESDAY, AND FRIDAY     glimepiride 2 MG tablet  Commonly known as:  AMARYL  TAKE 2 TABLETS TWICE DAILY     lancets Misc  Commonly known as:  ACCU-CHEK SOFTCLIX LANCETS  1 strip by Misc.(Non-Drug; Combo Route) route once daily.     metoprolol tartrate 50 MG tablet  Commonly known as:  LOPRESSOR  Take 0.5 tablets (25 mg total) by mouth 2 (two) times daily.     multivitamin capsule  Take 1 capsule by mouth once daily.     nitroGLYCERIN 0.4 MG SL tablet  Commonly known as:  NITROSTAT  DISSOLVE 1 TABLET UNDER THE TONGUE EVERY 5 MINUTES AS NEEDED FOR CHEST PAIN     pantoprazole 40 MG tablet  Commonly known as:  PROTONIX  Take 1 tablet (40 mg total) by mouth 2 (two) times daily.     PRESERVISION AREDS-2 [phone] mg-unit-mg-mg Cap  Generic drug:  vit C,O-Hd-fasea-lutein-zeaxan  Take 1 tablet by mouth once daily.     tiZANidine 4 MG tablet  Commonly known as:  ZANAFLEX  TAKE 1 TABLET(4 MG) BY MOUTH EVERY 6 HOURS AS NEEDED          Discharge Procedure Orders   Diet general     Call MD for:  extreme fatigue     Call MD for:  persistent dizziness or light-headedness     Call MD for:  hives     Call MD for:  redness, tenderness, or signs of infection (pain, swelling, redness, odor or green/yellow discharge around incision site)     Call MD for:  difficulty breathing, headache or visual disturbances     Call MD for:  severe uncontrolled pain     Call MD for:  persistent nausea and vomiting     Call MD for:  temperature >100.4     Remove dressing in 48 hours     Activity as tolerated     Follow-up Information     Nahum Zaidi MD In 2 weeks.    Specialties:  General Surgery, Colon and Rectal Surgery, Surgery  Contact information:  [address] [place] [address] [phone]

## 2019-04-04 NOTE — TRANSFER OF CARE
"Anesthesia Transfer of Care Note    Patient: Stu Garcia    Procedure(s) Performed: Procedure(s) (LRB):  [id number]-[id number] (Bilateral)    Patient location: PACU    Anesthesia Type: MAC    Transport from OR: Transported from OR on room air with adequate spontaneous ventilation    Post pain: adequate analgesia    Post assessment: no apparent anesthetic complications and tolerated procedure well    Post vital signs: stable    Level of consciousness: awake    Nausea/Vomiting: no nausea/vomiting    Complications: none    Transfer of care protocol was followed      Last vitals:   Visit Vitals  /64 (BP Location: Right arm, Patient Position: Sitting)   Pulse 80   Temp 36.4 °C (97.5 °F) (Skin)   Resp 16   Ht 6' 4.5" (1.943 m)   Wt 118.8 kg (262 lb)   SpO2 99%   BMI 31.48 kg/m²     "

## 2019-04-04 NOTE — DISCHARGE INSTRUCTIONS
WOUND CARE    After Surgery    DOS:   May shower in 48 hours   May remove outer dressing in 48 hours. Leave steri-strips in place. If steri-strips get wet, pat dry. If they fall off, do not worry.   Minimal activity for 48 hours.   Advance diet as tolerated.   Resume home medications as prescribed    DONT:   Do not soak in the tub   No driving for 24 hours or while taking narcotic pain medication   DO NOT TAKE ADDITIONAL TYLENOL/ACETAMINOPHEN WHILE TAKING NARCOTIC PAIN MEDICATION THAT CONTAINS TYLENOL/ACETAMINOPHEN.    CALL PHYSICIAN FOR:   Redness, swelling or bleeding.   Fever greater then 101.   Drainage from the incision site that appears infected.   Persistent pain not relieved by pain medication.    RETURN APPOINTMENT: Call to schedule appointment in 10-14 days    FOR EMERGENCIES: Contact your doctor at [phone]

## 2019-04-04 NOTE — OP NOTE
DATE OF PROCEDURE:  04/04/2019    STAFF SURGEON:  Nahum Zaidi M.D.    PREOPERATIVE DIAGNOSIS:  Esophageal cancer.    POSTOPERATIVE DIAGNOSIS:  Esophageal cancer.    PROCEDURE:  Placement of right subclavian Port-A-Cath.    ANESTHESIA:  Monitored anesthesia.    INDICATION FOR PROCEDURE:  A pleasant 73-year-old gentleman presented to my   office with recent diagnosis of esophageal cancer and being treated with   chemoradiation.  He is scheduled for placement of port on the above-mentioned   date.    DESCRIPTION OF PROCEDURE:  Following signing of informed consent, he received   preoperative IV antibiotics, taken to the OR and placed in supine position.    SCDs were placed.  Monitored anesthesia was administered.  Chest and neck were   prepped and draped in standard fashion and appropriate timeout procedure was   then performed.  Having performed a timeout procedure, I obtained an access to   the left subclavian vein with one percutaneous stick.  On the first stick, I did   have an arterial blood confirmed by pulsatile flow.  I then hold pressure for 5   minutes, repeated this to the left side again and again obtained arterial flow.    At this point, all pressure goes to the right side.  I injected local.  I then   obtained access to the right subclavian vein with one percutaneous stick.    Guidewire was placed and position was confirmed with fluoroscopy.  I then   inserted local into the right chest and made an incision incorporating the   insertion site into my incision, carried down through the deep dermal tissue and   made a pocket to hold the port.  Having done this, I placed the dilator   tunneling sheath over the guidewire, removed the guidewire.  This was all done   under fluoroscopic visualization.  I removed the dilator and passed the catheter   into the tunneling sheath.  The tunneling sheath was removed.  I cut and then   positioned the catheter such that the proximal tip at the confluence of the    subclavian vein.  I then cut the distal tip and secured the port to the catheter   and secured the port down to the chest wall.  I ensured that the port both   flushed and aspirated easily, which it does.  I then irrigated and ensured   adequate hemostasis and closed the wound in 2 layers with 3-0 Vicryl and 4-0   Monocryl.  The patient was awakened and taken to the Recovery Room in stable   condition.  There were no immediate complications.  Blood loss was minimal.      LADI/IN  dd: 04/04/2019 14:03:52 (CDT)  td: 04/04/2019 14:43:14 (CDT)  Doc ID   #[id number]  Job ID #[id number]    CC:

## 2019-04-05 ENCOUNTER — DOCUMENTATION ONLY (OUTPATIENT)
Dept: INFUSION THERAPY | Facility: HOSPITAL | Age: 74
End: 2019-04-05

## 2019-04-05 VITALS
RESPIRATION RATE: 16 BRPM | WEIGHT: 262 LBS | HEIGHT: 77 IN | OXYGEN SATURATION: 99 % | HEART RATE: 72 BPM | TEMPERATURE: 97 F | SYSTOLIC BLOOD PRESSURE: 141 MMHG | BODY MASS INDEX: 30.94 KG/M2 | DIASTOLIC BLOOD PRESSURE: 66 MMHG

## 2019-04-05 NOTE — PROGRESS NOTES
Stuanny Garcia, 73 y.o.  1945    Diagnosis: Esophageal Cancer     History:  No prior history of chemotherapy    Labs:  WBC   Date Value Ref Range Status   03/21/2019 4.98 3.90 - 12.70 K/uL Final     Creatinine   Date Value Ref Range Status   03/22/2019 1.16 0.50 - 1.40 mg/dL Final       The patient is scheduled to start the following infusion: OP ESOPHAGEAL PACLITAXEL CARBOPLATIN WEEKLY    29-day cycle for 1 cycle of:    Chemotherapy:   -PACLitaxel (TAXOL) 50 mg/m2 = 102 mg in sodium chloride 0.9% 250 mL chemo infusion, Intravenous, on day 1, 8, 15, 22, 29    -CARBOplatin (PARAPLATIN) AUC = 2, 290 mg in sodium chloride 0.9% 250 mL chemo infusion,  Intravenous, on day 1, 8, 15, 22, 29    Premeds  -Diphenhydramine 50 mg IVPB  -Famotidine 20 mg IV  -Palonosetron 0.25 mg/Dexamethasone 12 mg     The treatment plan matches NCCN template

## 2019-04-08 ENCOUNTER — DOCUMENTATION ONLY (OUTPATIENT)
Dept: INFUSION THERAPY | Facility: HOSPITAL | Age: 74
End: 2019-04-08

## 2019-04-08 ENCOUNTER — OFFICE VISIT (OUTPATIENT)
Dept: HEMATOLOGY/ONCOLOGY | Facility: CLINIC | Age: 74
End: 2019-04-08
Payer: MEDICARE

## 2019-04-08 ENCOUNTER — INFUSION (OUTPATIENT)
Dept: INFUSION THERAPY | Facility: HOSPITAL | Age: 74
End: 2019-04-08
Attending: INTERNAL MEDICINE
Payer: MEDICARE

## 2019-04-08 VITALS
HEIGHT: 77 IN | SYSTOLIC BLOOD PRESSURE: 149 MMHG | TEMPERATURE: 98 F | BODY MASS INDEX: 30.64 KG/M2 | HEART RATE: 70 BPM | RESPIRATION RATE: 18 BRPM | WEIGHT: 259.5 LBS | DIASTOLIC BLOOD PRESSURE: 74 MMHG

## 2019-04-08 VITALS
HEART RATE: 71 BPM | DIASTOLIC BLOOD PRESSURE: 59 MMHG | RESPIRATION RATE: 18 BRPM | HEIGHT: 77 IN | SYSTOLIC BLOOD PRESSURE: 104 MMHG | TEMPERATURE: 98 F | WEIGHT: 259.5 LBS | BODY MASS INDEX: 30.64 KG/M2

## 2019-04-08 DIAGNOSIS — C16.0 GE JUNCTION CARCINOMA: Primary | ICD-10-CM

## 2019-04-08 DIAGNOSIS — D50.8 OTHER IRON DEFICIENCY ANEMIA: ICD-10-CM

## 2019-04-08 DIAGNOSIS — I25.110 CORONARY ARTERY DISEASE INVOLVING NATIVE CORONARY ARTERY OF NATIVE HEART WITH UNSTABLE ANGINA PECTORIS: ICD-10-CM

## 2019-04-08 DIAGNOSIS — I48.0 PAF (PAROXYSMAL ATRIAL FIBRILLATION): ICD-10-CM

## 2019-04-08 DIAGNOSIS — E11.9 TYPE 2 DIABETES MELLITUS TREATED WITHOUT INSULIN: ICD-10-CM

## 2019-04-08 PROCEDURE — 3045F PR MOST RECENT HEMOGLOBIN A1C LEVEL 7.0-9.0%: CPT | Mod: HCNC,CPTII,S$GLB, | Performed by: INTERNAL MEDICINE

## 2019-04-08 PROCEDURE — 96376 TX/PRO/DX INJ SAME DRUG ADON: CPT | Mod: HCNC,PN

## 2019-04-08 PROCEDURE — 3074F PR MOST RECENT SYSTOLIC BLOOD PRESSURE < 130 MM HG: ICD-10-PCS | Mod: HCNC,CPTII,S$GLB, | Performed by: INTERNAL MEDICINE

## 2019-04-08 PROCEDURE — 99214 OFFICE O/P EST MOD 30 MIN: CPT | Mod: HCNC,S$GLB,, | Performed by: INTERNAL MEDICINE

## 2019-04-08 PROCEDURE — A4216 STERILE WATER/SALINE, 10 ML: HCPCS | Mod: HCNC,PN | Performed by: INTERNAL MEDICINE

## 2019-04-08 PROCEDURE — 3078F PR MOST RECENT DIASTOLIC BLOOD PRESSURE < 80 MM HG: ICD-10-PCS | Mod: HCNC,CPTII,S$GLB, | Performed by: INTERNAL MEDICINE

## 2019-04-08 PROCEDURE — 99499 UNLISTED E&M SERVICE: CPT | Mod: HCNC,S$GLB,, | Performed by: INTERNAL MEDICINE

## 2019-04-08 PROCEDURE — 96417 CHEMO IV INFUS EACH ADDL SEQ: CPT | Mod: HCNC,PN

## 2019-04-08 PROCEDURE — 3078F DIAST BP <80 MM HG: CPT | Mod: HCNC,CPTII,S$GLB, | Performed by: INTERNAL MEDICINE

## 2019-04-08 PROCEDURE — 99999 PR PBB SHADOW E&M-EST. PATIENT-LVL III: ICD-10-PCS | Mod: PBBFAC,HCNC,, | Performed by: INTERNAL MEDICINE

## 2019-04-08 PROCEDURE — 99999 PR PBB SHADOW E&M-EST. PATIENT-LVL III: CPT | Mod: PBBFAC,HCNC,, | Performed by: INTERNAL MEDICINE

## 2019-04-08 PROCEDURE — 25000003 PHARM REV CODE 250: Mod: HCNC,PN | Performed by: INTERNAL MEDICINE

## 2019-04-08 PROCEDURE — 63600175 PHARM REV CODE 636 W HCPCS: Mod: HCNC,PN | Performed by: INTERNAL MEDICINE

## 2019-04-08 PROCEDURE — 96367 TX/PROPH/DG ADDL SEQ IV INF: CPT | Mod: HCNC,PN

## 2019-04-08 PROCEDURE — 1101F PR PT FALLS ASSESS DOC 0-1 FALLS W/OUT INJ PAST YR: ICD-10-PCS | Mod: HCNC,CPTII,S$GLB, | Performed by: INTERNAL MEDICINE

## 2019-04-08 PROCEDURE — 3074F SYST BP LT 130 MM HG: CPT | Mod: HCNC,CPTII,S$GLB, | Performed by: INTERNAL MEDICINE

## 2019-04-08 PROCEDURE — 1101F PT FALLS ASSESS-DOCD LE1/YR: CPT | Mod: HCNC,CPTII,S$GLB, | Performed by: INTERNAL MEDICINE

## 2019-04-08 PROCEDURE — S0028 INJECTION, FAMOTIDINE, 20 MG: HCPCS | Mod: HCNC,PN | Performed by: INTERNAL MEDICINE

## 2019-04-08 PROCEDURE — 99214 PR OFFICE/OUTPT VISIT, EST, LEVL IV, 30-39 MIN: ICD-10-PCS | Mod: HCNC,S$GLB,, | Performed by: INTERNAL MEDICINE

## 2019-04-08 PROCEDURE — 99499 RISK ADDL DX/OHS AUDIT: ICD-10-PCS | Mod: HCNC,S$GLB,, | Performed by: INTERNAL MEDICINE

## 2019-04-08 PROCEDURE — 3045F PR MOST RECENT HEMOGLOBIN A1C LEVEL 7.0-9.0%: ICD-10-PCS | Mod: HCNC,CPTII,S$GLB, | Performed by: INTERNAL MEDICINE

## 2019-04-08 PROCEDURE — 96413 CHEMO IV INFUSION 1 HR: CPT | Mod: HCNC,PN

## 2019-04-08 RX ORDER — HEPARIN 100 UNIT/ML
500 SYRINGE INTRAVENOUS
Status: CANCELLED | OUTPATIENT
Start: 2019-04-16

## 2019-04-08 RX ORDER — EPINEPHRINE 0.3 MG/.3ML
0.3 INJECTION SUBCUTANEOUS ONCE AS NEEDED
Status: DISCONTINUED | OUTPATIENT
Start: 2019-04-08 | End: 2019-04-08 | Stop reason: HOSPADM

## 2019-04-08 RX ORDER — HEPARIN 100 UNIT/ML
500 SYRINGE INTRAVENOUS
Status: DISCONTINUED | OUTPATIENT
Start: 2019-04-08 | End: 2019-04-08 | Stop reason: HOSPADM

## 2019-04-08 RX ORDER — SODIUM CHLORIDE 0.9 % (FLUSH) 0.9 %
10 SYRINGE (ML) INJECTION
Status: DISCONTINUED | OUTPATIENT
Start: 2019-04-08 | End: 2019-04-08 | Stop reason: HOSPADM

## 2019-04-08 RX ORDER — FAMOTIDINE 10 MG/ML
20 INJECTION INTRAVENOUS
Status: COMPLETED | OUTPATIENT
Start: 2019-04-08 | End: 2019-04-08

## 2019-04-08 RX ORDER — DIPHENHYDRAMINE HYDROCHLORIDE 50 MG/ML
50 INJECTION INTRAMUSCULAR; INTRAVENOUS ONCE AS NEEDED
Status: DISCONTINUED | OUTPATIENT
Start: 2019-04-08 | End: 2019-04-08 | Stop reason: HOSPADM

## 2019-04-08 RX ORDER — SODIUM CHLORIDE 0.9 % (FLUSH) 0.9 %
10 SYRINGE (ML) INJECTION
Status: CANCELLED | OUTPATIENT
Start: 2019-04-16

## 2019-04-08 RX ORDER — DIPHENHYDRAMINE HYDROCHLORIDE 50 MG/ML
25 INJECTION INTRAMUSCULAR; INTRAVENOUS
Status: CANCELLED
Start: 2019-04-16

## 2019-04-08 RX ORDER — FAMOTIDINE 10 MG/ML
20 INJECTION INTRAVENOUS DAILY
Status: CANCELLED
Start: 2019-04-16

## 2019-04-08 RX ADMIN — PACLITAXEL 102 MG: 6 INJECTION, SOLUTION INTRAVENOUS at 02:04

## 2019-04-08 RX ADMIN — Medication 10 ML: at 11:04

## 2019-04-08 RX ADMIN — DIPHENHYDRAMINE HYDROCHLORIDE 50 MG: 50 INJECTION, SOLUTION INTRAMUSCULAR; INTRAVENOUS at 12:04

## 2019-04-08 RX ADMIN — FAMOTIDINE 20 MG: 10 INJECTION, SOLUTION INTRAVENOUS at 11:04

## 2019-04-08 RX ADMIN — HEPARIN 500 UNITS: 100 SYRINGE at 03:04

## 2019-04-08 RX ADMIN — DEXAMETHASONE SODIUM PHOSPHATE: 4 INJECTION, SOLUTION INTRA-ARTICULAR; INTRALESIONAL; INTRAMUSCULAR; INTRAVENOUS; SOFT TISSUE at 11:04

## 2019-04-08 RX ADMIN — FERUMOXYTOL 510 MG: 510 INJECTION INTRAVENOUS at 12:04

## 2019-04-08 RX ADMIN — SODIUM CHLORIDE: 9 INJECTION, SOLUTION INTRAVENOUS at 11:04

## 2019-04-08 RX ADMIN — CARBOPLATIN 290 MG: 10 INJECTION, SOLUTION INTRAVENOUS at 03:04

## 2019-04-08 NOTE — PROGRESS NOTES
Patient, Stu Garcia (MRN #[id number]), presented with a recent Platelet count less than 150 K/uL consistent with the definition of thrombocytopenia (ICD10 - D69.6).    Platelets   Date Value Ref Range Status   03/21/2019 146 (L) 150 - 350 K/uL Final     The patient's thrombocytopenia was monitored, evaluated, addressed and/or treated. This addendum to the medical record is made on 04/08/2019.

## 2019-04-08 NOTE — PROGRESS NOTES
History of present illness:  The patient is a 73-year-old white gentleman with GE junction adenocarcinoma who returns to clinic to initiate combined modality neoadjuvant therapy.  No new complaints for pertinent findings on a 14 point review of systems.    Physical examination:  Well-developed, well-nourished, elderly, white gentleman, in no acute distress, who has a weight of 259.5 lb (decreased by 4 lb).  VITAL SIGNS: Documented  and reviewed this visit.  HEENT: Normocephalic, atraumatic. Oral mucosa pink and moist. Lips without   lesions. Tongue midline. Oropharynx clear. Nonicteric sclerae.   NECK: Supple, no adenopathy. No carotid bruits, thyromegaly or thyroid nodule.   HEART: Regular rate and rhythm without murmur, gallop or rub.   LUNGS: Clear to auscultation bilaterally. Normal respiratory effort.   ABDOMEN: Soft, nontender, nondistended with positive normoactive bowel sounds,   no hepatosplenomegaly.   EXTREMITIES:  Bilateral venous varicosities/edema. Distal pulses are intact.   AXILLAE AND GROIN: No palpable pathologic lymphadenopathy is appreciated.   SKIN: Intact/turgor normal   NEUROLOGIC: Cranial nerves II-XII grossly intact. Motor: Good muscle bulk and   tone. Strength/sensory 5/5 throughout. Gait unstable.     Laboratory:  White count 5, hemoglobin 10, hematocrit 34.1, platelets 146, absolute neutrophil count 3700.  Serum iron 18, TIBC 382, ferritin 8, percent saturated iron 5, soluble transferrin receptor 14.1.  Sodium 139, potassium 4.5, chloride 102, CO2 29, BUN 19, creatinine 1.2, glucose 237, calcium 9.4, magnesium 1.9, liver function test within normal limits, , GFR greater than 60.  Twenty-four urine for creatinine clearance reveals 121 mL/min clearance.    Impression:  1.  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  2.  Iron deficiency anemia due to chronic GI loss.  3.  Coronary artery disease.  4.  Type 2 diabetes mellitus without complication or insulin use.  5.  History of  atrial fibrillation-currently off all anticoagulation.    Plan:  1.  Proceed with 1st week of combined modality therapy is outlined in my last note.  2.  Intravenous iron replacement with Feraheme per protocol.  3.  Continue ongoing medical management of patient's cardiac disease and diabetes.  4.  Return to clinic in 1 week with interval CBC, BMP, and magnesium.    Advance Care Planning During this visit, I engaged the patient in the advance care planning process.  The patient and I reviewed the role for advance directives and their purpose in directing future healthcare if the patients unable to speak for him/herself.  At this point in time, the patient does have full decision-making capacity.  We discussed different extreme health states that they could experience, and reviewed what kind of medical care the patient would want in those situations.  The patient communicated that if they were comatose and had little chance of a meaningful recovery, they  would want machines/life-sustaining treatments used.  In addition to the above preference, other important end-of-life issues for the patient include any.  The patient  has not  completed a living will to reflect these preferences.  The patient has not already designated a healthcare power of  to make decisions on their behalf.

## 2019-04-08 NOTE — PLAN OF CARE
Problem: Fatigue (Oncology Care)  Goal: Improved Activity Tolerance    Intervention: Promote Energy Conservation  Pt in this shift for new start taxol/carbo and first dose of feraheme. Pt c/o fatigue this shift. Port accessed x1 attempt; positive blood return noted. All questions and concerns addressed at this time. Will continue to monitor.

## 2019-04-08 NOTE — PLAN OF CARE
Problem: Adult Inpatient Plan of Care  Goal: Plan of Care Review  Outcome: Ongoing (interventions implemented as appropriate)  Pt tolerated infusion well this shift. VSS. Pt uses [facility] for his appt schedule. Pt is leaving with wife at this time.

## 2019-04-09 ENCOUNTER — DOCUMENTATION ONLY (OUTPATIENT)
Dept: INFUSION THERAPY | Facility: HOSPITAL | Age: 74
End: 2019-04-09

## 2019-04-09 ENCOUNTER — OFFICE VISIT (OUTPATIENT)
Dept: CARDIOLOGY | Facility: CLINIC | Age: 74
End: 2019-04-09
Payer: MEDICARE

## 2019-04-09 VITALS
DIASTOLIC BLOOD PRESSURE: 67 MMHG | WEIGHT: 260.81 LBS | HEART RATE: 76 BPM | BODY MASS INDEX: 30.79 KG/M2 | HEIGHT: 77 IN | SYSTOLIC BLOOD PRESSURE: 123 MMHG

## 2019-04-09 DIAGNOSIS — E78.5 HYPERLIPIDEMIA, UNSPECIFIED HYPERLIPIDEMIA TYPE: ICD-10-CM

## 2019-04-09 DIAGNOSIS — I48.0 PAF (PAROXYSMAL ATRIAL FIBRILLATION): Primary | ICD-10-CM

## 2019-04-09 DIAGNOSIS — I10 ESSENTIAL HYPERTENSION: ICD-10-CM

## 2019-04-09 DIAGNOSIS — I25.110 CORONARY ARTERY DISEASE INVOLVING NATIVE CORONARY ARTERY OF NATIVE HEART WITH UNSTABLE ANGINA PECTORIS: ICD-10-CM

## 2019-04-09 PROCEDURE — 99499 UNLISTED E&M SERVICE: CPT | Mod: HCNC,S$GLB,, | Performed by: INTERNAL MEDICINE

## 2019-04-09 PROCEDURE — 99214 PR OFFICE/OUTPT VISIT, EST, LEVL IV, 30-39 MIN: ICD-10-PCS | Mod: HCNC,S$GLB,, | Performed by: INTERNAL MEDICINE

## 2019-04-09 PROCEDURE — 3078F PR MOST RECENT DIASTOLIC BLOOD PRESSURE < 80 MM HG: ICD-10-PCS | Mod: HCNC,CPTII,S$GLB, | Performed by: INTERNAL MEDICINE

## 2019-04-09 PROCEDURE — 99499 RISK ADDL DX/OHS AUDIT: ICD-10-PCS | Mod: HCNC,S$GLB,, | Performed by: INTERNAL MEDICINE

## 2019-04-09 PROCEDURE — 1101F PR PT FALLS ASSESS DOC 0-1 FALLS W/OUT INJ PAST YR: ICD-10-PCS | Mod: HCNC,CPTII,S$GLB, | Performed by: INTERNAL MEDICINE

## 2019-04-09 PROCEDURE — 1101F PT FALLS ASSESS-DOCD LE1/YR: CPT | Mod: HCNC,CPTII,S$GLB, | Performed by: INTERNAL MEDICINE

## 2019-04-09 PROCEDURE — 3074F PR MOST RECENT SYSTOLIC BLOOD PRESSURE < 130 MM HG: ICD-10-PCS | Mod: HCNC,CPTII,S$GLB, | Performed by: INTERNAL MEDICINE

## 2019-04-09 PROCEDURE — 3074F SYST BP LT 130 MM HG: CPT | Mod: HCNC,CPTII,S$GLB, | Performed by: INTERNAL MEDICINE

## 2019-04-09 PROCEDURE — 99999 PR PBB SHADOW E&M-EST. PATIENT-LVL III: ICD-10-PCS | Mod: PBBFAC,HCNC,, | Performed by: INTERNAL MEDICINE

## 2019-04-09 PROCEDURE — 99999 PR PBB SHADOW E&M-EST. PATIENT-LVL III: CPT | Mod: PBBFAC,HCNC,, | Performed by: INTERNAL MEDICINE

## 2019-04-09 PROCEDURE — 99214 OFFICE O/P EST MOD 30 MIN: CPT | Mod: HCNC,S$GLB,, | Performed by: INTERNAL MEDICINE

## 2019-04-09 PROCEDURE — 3078F DIAST BP <80 MM HG: CPT | Mod: HCNC,CPTII,S$GLB, | Performed by: INTERNAL MEDICINE

## 2019-04-10 ENCOUNTER — DOCUMENTATION ONLY (OUTPATIENT)
Dept: INFUSION THERAPY | Facility: HOSPITAL | Age: 74
End: 2019-04-10

## 2019-04-10 NOTE — PROGRESS NOTES
Nutrition: Met with patient and patients wife today while patient was here for radiation treatment. Already met patient  for chemo ed. Reviewed information regarding sore and  irritated throat. Will follow up with patient weekly. Ai uRsh MS, RD, LDN

## 2019-04-11 ENCOUNTER — INFUSION (OUTPATIENT)
Dept: INFUSION THERAPY | Facility: HOSPITAL | Age: 74
End: 2019-04-11
Attending: INTERNAL MEDICINE
Payer: MEDICARE

## 2019-04-11 VITALS
RESPIRATION RATE: 18 BRPM | WEIGHT: 259.06 LBS | SYSTOLIC BLOOD PRESSURE: 127 MMHG | TEMPERATURE: 98 F | HEIGHT: 77 IN | DIASTOLIC BLOOD PRESSURE: 65 MMHG | HEART RATE: 71 BPM | BODY MASS INDEX: 30.59 KG/M2

## 2019-04-11 DIAGNOSIS — D50.8 OTHER IRON DEFICIENCY ANEMIA: Primary | ICD-10-CM

## 2019-04-11 PROCEDURE — 96375 TX/PRO/DX INJ NEW DRUG ADDON: CPT | Mod: HCNC,PN

## 2019-04-11 PROCEDURE — 25000003 PHARM REV CODE 250: Mod: HCNC,PN | Performed by: INTERNAL MEDICINE

## 2019-04-11 PROCEDURE — 63600175 PHARM REV CODE 636 W HCPCS: Mod: HCNC,JG,PN | Performed by: INTERNAL MEDICINE

## 2019-04-11 PROCEDURE — S0028 INJECTION, FAMOTIDINE, 20 MG: HCPCS | Mod: HCNC,PN | Performed by: INTERNAL MEDICINE

## 2019-04-11 PROCEDURE — 96365 THER/PROPH/DIAG IV INF INIT: CPT | Mod: HCNC,PN

## 2019-04-11 PROCEDURE — A4216 STERILE WATER/SALINE, 10 ML: HCPCS | Mod: HCNC,PN | Performed by: INTERNAL MEDICINE

## 2019-04-11 RX ORDER — DEXAMETHASONE SODIUM PHOSPHATE 4 MG/ML
4 INJECTION, SOLUTION INTRA-ARTICULAR; INTRALESIONAL; INTRAMUSCULAR; INTRAVENOUS; SOFT TISSUE
Status: CANCELLED
Start: 2019-04-15

## 2019-04-11 RX ORDER — DEXAMETHASONE SODIUM PHOSPHATE 4 MG/ML
4 INJECTION, SOLUTION INTRA-ARTICULAR; INTRALESIONAL; INTRAMUSCULAR; INTRAVENOUS; SOFT TISSUE
Status: COMPLETED | OUTPATIENT
Start: 2019-04-11 | End: 2019-04-11

## 2019-04-11 RX ORDER — HEPARIN 100 UNIT/ML
500 SYRINGE INTRAVENOUS
Status: DISCONTINUED | OUTPATIENT
Start: 2019-04-11 | End: 2019-04-11 | Stop reason: HOSPADM

## 2019-04-11 RX ORDER — SODIUM CHLORIDE 0.9 % (FLUSH) 0.9 %
10 SYRINGE (ML) INJECTION
Status: CANCELLED | OUTPATIENT
Start: 2019-04-15

## 2019-04-11 RX ORDER — FAMOTIDINE 10 MG/ML
20 INJECTION INTRAVENOUS DAILY
Status: CANCELLED
Start: 2019-04-15

## 2019-04-11 RX ORDER — DIPHENHYDRAMINE HYDROCHLORIDE 50 MG/ML
25 INJECTION INTRAMUSCULAR; INTRAVENOUS
Status: COMPLETED | OUTPATIENT
Start: 2019-04-11 | End: 2019-04-11

## 2019-04-11 RX ORDER — SODIUM CHLORIDE 0.9 % (FLUSH) 0.9 %
10 SYRINGE (ML) INJECTION
Status: DISCONTINUED | OUTPATIENT
Start: 2019-04-11 | End: 2019-04-11 | Stop reason: HOSPADM

## 2019-04-11 RX ORDER — HEPARIN 100 UNIT/ML
500 SYRINGE INTRAVENOUS
Status: CANCELLED | OUTPATIENT
Start: 2019-04-15

## 2019-04-11 RX ORDER — DIPHENHYDRAMINE HYDROCHLORIDE 50 MG/ML
25 INJECTION INTRAMUSCULAR; INTRAVENOUS
Status: CANCELLED
Start: 2019-04-15

## 2019-04-11 RX ORDER — FAMOTIDINE 10 MG/ML
20 INJECTION INTRAVENOUS DAILY
Status: DISCONTINUED | OUTPATIENT
Start: 2019-04-11 | End: 2019-04-11 | Stop reason: HOSPADM

## 2019-04-11 RX ADMIN — FAMOTIDINE 20 MG: 10 INJECTION, SOLUTION INTRAVENOUS at 01:04

## 2019-04-11 RX ADMIN — DEXAMETHASONE SODIUM PHOSPHATE 4 MG: 4 INJECTION, SOLUTION INTRA-ARTICULAR; INTRALESIONAL; INTRAMUSCULAR; INTRAVENOUS; SOFT TISSUE at 01:04

## 2019-04-11 RX ADMIN — SODIUM CHLORIDE: 9 INJECTION, SOLUTION INTRAVENOUS at 01:04

## 2019-04-11 RX ADMIN — FERUMOXYTOL 510 MG: 510 INJECTION INTRAVENOUS at 02:04

## 2019-04-11 RX ADMIN — Medication 10 ML: at 03:04

## 2019-04-11 RX ADMIN — DIPHENHYDRAMINE HYDROCHLORIDE 25 MG: 50 INJECTION INTRAMUSCULAR; INTRAVENOUS at 01:04

## 2019-04-11 NOTE — PLAN OF CARE
Problem: Adult Inpatient Plan of Care  Goal: Plan of Care Review  Outcome: Ongoing (interventions implemented as appropriate)  Patient tolerated treatment without any complications. PAC NSL and deaccessed. D/C VS taken and within normal range. Patient aware of next appt date and time. Patient leaving with ind gait.

## 2019-04-15 ENCOUNTER — INFUSION (OUTPATIENT)
Dept: INFUSION THERAPY | Facility: HOSPITAL | Age: 74
End: 2019-04-15
Attending: INTERNAL MEDICINE
Payer: MEDICARE

## 2019-04-15 ENCOUNTER — OFFICE VISIT (OUTPATIENT)
Dept: HEMATOLOGY/ONCOLOGY | Facility: CLINIC | Age: 74
End: 2019-04-15
Payer: MEDICARE

## 2019-04-15 VITALS
DIASTOLIC BLOOD PRESSURE: 62 MMHG | HEIGHT: 77 IN | WEIGHT: 253.5 LBS | BODY MASS INDEX: 29.93 KG/M2 | SYSTOLIC BLOOD PRESSURE: 112 MMHG | HEART RATE: 81 BPM | TEMPERATURE: 98 F | RESPIRATION RATE: 18 BRPM

## 2019-04-15 VITALS
WEIGHT: 253.5 LBS | HEART RATE: 71 BPM | SYSTOLIC BLOOD PRESSURE: 124 MMHG | HEIGHT: 77 IN | DIASTOLIC BLOOD PRESSURE: 59 MMHG | RESPIRATION RATE: 16 BRPM | BODY MASS INDEX: 29.93 KG/M2 | TEMPERATURE: 98 F

## 2019-04-15 DIAGNOSIS — C16.0 GE JUNCTION CARCINOMA: Primary | ICD-10-CM

## 2019-04-15 DIAGNOSIS — T45.1X5A CHEMOTHERAPY-INDUCED THROMBOCYTOPENIA: ICD-10-CM

## 2019-04-15 DIAGNOSIS — E11.9 TYPE 2 DIABETES MELLITUS TREATED WITHOUT INSULIN: ICD-10-CM

## 2019-04-15 DIAGNOSIS — I48.0 PAF (PAROXYSMAL ATRIAL FIBRILLATION): ICD-10-CM

## 2019-04-15 DIAGNOSIS — T45.1X5A ANEMIA ASSOCIATED WITH CHEMOTHERAPY: ICD-10-CM

## 2019-04-15 DIAGNOSIS — I25.110 CORONARY ARTERY DISEASE INVOLVING NATIVE CORONARY ARTERY OF NATIVE HEART WITH UNSTABLE ANGINA PECTORIS: ICD-10-CM

## 2019-04-15 DIAGNOSIS — D50.8 OTHER IRON DEFICIENCY ANEMIA: ICD-10-CM

## 2019-04-15 DIAGNOSIS — D64.81 ANEMIA ASSOCIATED WITH CHEMOTHERAPY: ICD-10-CM

## 2019-04-15 DIAGNOSIS — D69.59 CHEMOTHERAPY-INDUCED THROMBOCYTOPENIA: ICD-10-CM

## 2019-04-15 PROCEDURE — S0028 INJECTION, FAMOTIDINE, 20 MG: HCPCS | Mod: HCNC,PN | Performed by: INTERNAL MEDICINE

## 2019-04-15 PROCEDURE — 96375 TX/PRO/DX INJ NEW DRUG ADDON: CPT | Mod: HCNC,PN

## 2019-04-15 PROCEDURE — 99214 OFFICE O/P EST MOD 30 MIN: CPT | Mod: HCNC,S$GLB,, | Performed by: INTERNAL MEDICINE

## 2019-04-15 PROCEDURE — 3078F DIAST BP <80 MM HG: CPT | Mod: HCNC,CPTII,S$GLB, | Performed by: INTERNAL MEDICINE

## 2019-04-15 PROCEDURE — 99999 PR PBB SHADOW E&M-EST. PATIENT-LVL III: ICD-10-PCS | Mod: PBBFAC,HCNC,, | Performed by: INTERNAL MEDICINE

## 2019-04-15 PROCEDURE — 25000003 PHARM REV CODE 250: Mod: HCNC,PN | Performed by: INTERNAL MEDICINE

## 2019-04-15 PROCEDURE — 99999 PR PBB SHADOW E&M-EST. PATIENT-LVL III: CPT | Mod: PBBFAC,HCNC,, | Performed by: INTERNAL MEDICINE

## 2019-04-15 PROCEDURE — 3045F PR MOST RECENT HEMOGLOBIN A1C LEVEL 7.0-9.0%: CPT | Mod: HCNC,CPTII,S$GLB, | Performed by: INTERNAL MEDICINE

## 2019-04-15 PROCEDURE — A4216 STERILE WATER/SALINE, 10 ML: HCPCS | Mod: HCNC,PN | Performed by: INTERNAL MEDICINE

## 2019-04-15 PROCEDURE — 99499 UNLISTED E&M SERVICE: CPT | Mod: HCNC,S$GLB,, | Performed by: INTERNAL MEDICINE

## 2019-04-15 PROCEDURE — 1101F PR PT FALLS ASSESS DOC 0-1 FALLS W/OUT INJ PAST YR: ICD-10-PCS | Mod: HCNC,CPTII,S$GLB, | Performed by: INTERNAL MEDICINE

## 2019-04-15 PROCEDURE — 63600175 PHARM REV CODE 636 W HCPCS: Mod: HCNC,PN | Performed by: INTERNAL MEDICINE

## 2019-04-15 PROCEDURE — 3045F PR MOST RECENT HEMOGLOBIN A1C LEVEL 7.0-9.0%: ICD-10-PCS | Mod: HCNC,CPTII,S$GLB, | Performed by: INTERNAL MEDICINE

## 2019-04-15 PROCEDURE — 96413 CHEMO IV INFUSION 1 HR: CPT | Mod: HCNC,PN

## 2019-04-15 PROCEDURE — 96367 TX/PROPH/DG ADDL SEQ IV INF: CPT | Mod: HCNC,PN

## 2019-04-15 PROCEDURE — 99214 PR OFFICE/OUTPT VISIT, EST, LEVL IV, 30-39 MIN: ICD-10-PCS | Mod: HCNC,S$GLB,, | Performed by: INTERNAL MEDICINE

## 2019-04-15 PROCEDURE — 3074F SYST BP LT 130 MM HG: CPT | Mod: HCNC,CPTII,S$GLB, | Performed by: INTERNAL MEDICINE

## 2019-04-15 PROCEDURE — 3078F PR MOST RECENT DIASTOLIC BLOOD PRESSURE < 80 MM HG: ICD-10-PCS | Mod: HCNC,CPTII,S$GLB, | Performed by: INTERNAL MEDICINE

## 2019-04-15 PROCEDURE — 99499 RISK ADDL DX/OHS AUDIT: ICD-10-PCS | Mod: HCNC,S$GLB,, | Performed by: INTERNAL MEDICINE

## 2019-04-15 PROCEDURE — 3074F PR MOST RECENT SYSTOLIC BLOOD PRESSURE < 130 MM HG: ICD-10-PCS | Mod: HCNC,CPTII,S$GLB, | Performed by: INTERNAL MEDICINE

## 2019-04-15 PROCEDURE — 96417 CHEMO IV INFUS EACH ADDL SEQ: CPT | Mod: HCNC,PN

## 2019-04-15 PROCEDURE — 1101F PT FALLS ASSESS-DOCD LE1/YR: CPT | Mod: HCNC,CPTII,S$GLB, | Performed by: INTERNAL MEDICINE

## 2019-04-15 RX ORDER — FAMOTIDINE 10 MG/ML
20 INJECTION INTRAVENOUS
Status: COMPLETED | OUTPATIENT
Start: 2019-04-15 | End: 2019-04-15

## 2019-04-15 RX ORDER — SODIUM CHLORIDE 0.9 % (FLUSH) 0.9 %
10 SYRINGE (ML) INJECTION
Status: DISCONTINUED | OUTPATIENT
Start: 2019-04-15 | End: 2019-04-15 | Stop reason: HOSPADM

## 2019-04-15 RX ADMIN — Medication 10 ML: at 02:04

## 2019-04-15 RX ADMIN — DIPHENHYDRAMINE HYDROCHLORIDE 50 MG: 50 INJECTION, SOLUTION INTRAMUSCULAR; INTRAVENOUS at 12:04

## 2019-04-15 RX ADMIN — FAMOTIDINE 20 MG: 10 INJECTION, SOLUTION INTRAVENOUS at 12:04

## 2019-04-15 RX ADMIN — PACLITAXEL 102 MG: 6 INJECTION, SOLUTION INTRAVENOUS at 12:04

## 2019-04-15 RX ADMIN — DEXAMETHASONE SODIUM PHOSPHATE: 4 INJECTION, SOLUTION INTRA-ARTICULAR; INTRALESIONAL; INTRAMUSCULAR; INTRAVENOUS; SOFT TISSUE at 11:04

## 2019-04-15 RX ADMIN — SODIUM CHLORIDE: 9 INJECTION, SOLUTION INTRAVENOUS at 11:04

## 2019-04-15 RX ADMIN — CARBOPLATIN 290 MG: 10 INJECTION, SOLUTION INTRAVENOUS at 01:04

## 2019-04-15 NOTE — PROGRESS NOTES
History of present illness:  The patient is a 73-year-old white gentleman known to me for a diagnosis of GE junction carcinoma who is receiving combined modality neoadjuvant therapy and returns to clinic for week 2 of treatment.  Patient tolerated 1st week of therapy without significant difficulties.  He continues to have some epigastric abdominal pain and is reluctant to use analgesic medications available to him for fear of addiction.  No other complaints for pertinent findings on a 14 point review of systems.    Physical examination:  The patient is a well-developed, well-nourished, elderly, white gentleman, in no acute distress, who has a weight of 253.5 lb  VITAL SIGNS: Documented  and reviewed this visit.  HEENT: Normocephalic, atraumatic. Oral mucosa pink and moist. Lips without   lesions. Tongue midline. Oropharynx clear. Nonicteric sclerae.   NECK: Supple, no adenopathy. No carotid bruits, thyromegaly or thyroid nodule.   HEART: Regular rate and rhythm without murmur, gallop or rub.   LUNGS: Clear to auscultation bilaterally. Normal respiratory effort.   ABDOMEN: Soft, nontender, nondistended with positive normoactive bowel sounds,   no hepatosplenomegaly.   EXTREMITIES: No cyanosis, clubbing or edema. Distal pulses are intact.   AXILLAE AND GROIN: No palpable pathologic lymphadenopathy is appreciated.   SKIN: Intact/turgor normal   NEUROLOGIC: Cranial nerves II-XII grossly intact. Motor: Good muscle bulk and   tone. Strength/sensory 5/5 throughout. Gait stable.     Laboratory:  White count 5.7, hemoglobin 11.1, hematocrit 37, platelets 135, absolute neutrophil count 4800.  Sodium 137, potassium 5.5, chloride 102, CO2 28, BUN 17, creatinine 1, glucose 205, calcium 9.5, magnesium 1.7, liver function test within normal limits, LDH is 302, GFR is greater than 60.    Impression:  1.  Adenocarcinoma of the GE junction.  2.  Anemia secondary to iron deficiency/chemotherapy administration.  3.  Chemotherapy  associated thrombocytopenia-mild and not in need of intervention.  4.  Paroxysmal atrial fibrillation.  5.  Coronary artery disease.  6.  Type 2 diabetes mellitus.  7.  Neoplasm associated pain.    Plan:  1.  Proceed with 2nd week of therapy to include Taxol 100 mg IV and carboplatin 290 mg IV with typical dexamethasone/Pepcid/Benadryl premedications.  2.  No additional interventions for anemia or thrombocytopenia at this time.  3.  Continue current medical management of numbers 4-6.  4.  Counseled patient regarding appropriate use of analgesic medications.  5.  Return to clinic in 1 week with interval CBC, BMP, and magnesium.

## 2019-04-15 NOTE — PLAN OF CARE
Problem: Adult Inpatient Plan of Care  Goal: Plan of Care Review  Outcome: Ongoing (interventions implemented as appropriate)  Pt tolerated Taxol/Carbo infusion well. No s/s of infusion reaction noted.  Instructed to call MD with any problems.

## 2019-04-16 ENCOUNTER — DOCUMENTATION ONLY (OUTPATIENT)
Dept: INFUSION THERAPY | Facility: HOSPITAL | Age: 74
End: 2019-04-16

## 2019-04-16 NOTE — PROGRESS NOTES
Nutrition: Met with patient and patients wife on 4/15/2019. patient is currently a moderate nutritional risk due to pain when swallowing. patient consuming softer foods. Wt: 254# Discussed the importance of weight maintenance, protein rich foods, soft moist foods, smoothie ingredients, ons, boost vhc, and benecalorie. Offered support and encoruagement. Will assist if and when needed. Ai Rush,MS, RD, LDN

## 2019-04-18 ENCOUNTER — PATIENT MESSAGE (OUTPATIENT)
Dept: HEMATOLOGY/ONCOLOGY | Facility: CLINIC | Age: 74
End: 2019-04-18

## 2019-04-18 DIAGNOSIS — G89.3 NEOPLASM RELATED PAIN: Primary | ICD-10-CM

## 2019-04-18 RX ORDER — HYDROCODONE BITARTRATE AND ACETAMINOPHEN 5; 325 MG/1; MG/1
1 TABLET ORAL EVERY 6 HOURS PRN
Qty: 20 TABLET | Refills: 0 | Status: SHIPPED | OUTPATIENT
Start: 2019-04-18 | End: 2019-04-22 | Stop reason: SDUPTHER

## 2019-04-18 RX ORDER — HYDROCODONE BITARTRATE AND ACETAMINOPHEN 5; 325 MG/1; MG/1
1 TABLET ORAL EVERY 6 HOURS PRN
Refills: 0 | OUTPATIENT
Start: 2019-04-18

## 2019-04-22 ENCOUNTER — TELEPHONE (OUTPATIENT)
Dept: HEMATOLOGY/ONCOLOGY | Facility: CLINIC | Age: 74
End: 2019-04-22

## 2019-04-22 ENCOUNTER — INFUSION (OUTPATIENT)
Dept: INFUSION THERAPY | Facility: HOSPITAL | Age: 74
End: 2019-04-22
Attending: INTERNAL MEDICINE
Payer: MEDICARE

## 2019-04-22 ENCOUNTER — PATIENT MESSAGE (OUTPATIENT)
Dept: HEMATOLOGY/ONCOLOGY | Facility: CLINIC | Age: 74
End: 2019-04-22

## 2019-04-22 ENCOUNTER — OFFICE VISIT (OUTPATIENT)
Dept: HEMATOLOGY/ONCOLOGY | Facility: CLINIC | Age: 74
End: 2019-04-22
Payer: MEDICARE

## 2019-04-22 VITALS
HEART RATE: 66 BPM | BODY MASS INDEX: 31.71 KG/M2 | SYSTOLIC BLOOD PRESSURE: 134 MMHG | TEMPERATURE: 99 F | RESPIRATION RATE: 18 BRPM | DIASTOLIC BLOOD PRESSURE: 72 MMHG | HEIGHT: 76 IN | WEIGHT: 260.38 LBS

## 2019-04-22 VITALS
SYSTOLIC BLOOD PRESSURE: 108 MMHG | RESPIRATION RATE: 18 BRPM | HEIGHT: 77 IN | TEMPERATURE: 98 F | HEART RATE: 69 BPM | WEIGHT: 260.38 LBS | DIASTOLIC BLOOD PRESSURE: 63 MMHG | BODY MASS INDEX: 30.75 KG/M2

## 2019-04-22 DIAGNOSIS — T45.1X5A LEUKOPENIA DUE TO ANTINEOPLASTIC CHEMOTHERAPY: ICD-10-CM

## 2019-04-22 DIAGNOSIS — C16.0 GE JUNCTION CARCINOMA: Primary | ICD-10-CM

## 2019-04-22 DIAGNOSIS — D70.1 LEUKOPENIA DUE TO ANTINEOPLASTIC CHEMOTHERAPY: ICD-10-CM

## 2019-04-22 DIAGNOSIS — T45.1X5A CINV (CHEMOTHERAPY-INDUCED NAUSEA AND VOMITING): Primary | ICD-10-CM

## 2019-04-22 DIAGNOSIS — G89.3 NEOPLASM RELATED PAIN: ICD-10-CM

## 2019-04-22 DIAGNOSIS — D64.81 ANEMIA ASSOCIATED WITH CHEMOTHERAPY: ICD-10-CM

## 2019-04-22 DIAGNOSIS — T45.1X5A CHEMOTHERAPY-INDUCED THROMBOCYTOPENIA: ICD-10-CM

## 2019-04-22 DIAGNOSIS — T45.1X5A ANEMIA ASSOCIATED WITH CHEMOTHERAPY: ICD-10-CM

## 2019-04-22 DIAGNOSIS — D69.59 CHEMOTHERAPY-INDUCED THROMBOCYTOPENIA: ICD-10-CM

## 2019-04-22 DIAGNOSIS — R11.2 CINV (CHEMOTHERAPY-INDUCED NAUSEA AND VOMITING): Primary | ICD-10-CM

## 2019-04-22 DIAGNOSIS — E87.5 HYPERKALEMIA: ICD-10-CM

## 2019-04-22 DIAGNOSIS — C16.0 GE JUNCTION CARCINOMA: ICD-10-CM

## 2019-04-22 DIAGNOSIS — I25.110 CORONARY ARTERY DISEASE INVOLVING NATIVE CORONARY ARTERY OF NATIVE HEART WITH UNSTABLE ANGINA PECTORIS: ICD-10-CM

## 2019-04-22 PROCEDURE — 96367 TX/PROPH/DG ADDL SEQ IV INF: CPT | Mod: HCNC,PN

## 2019-04-22 PROCEDURE — 96376 TX/PRO/DX INJ SAME DRUG ADON: CPT | Mod: HCNC,PN

## 2019-04-22 PROCEDURE — 99999 PR PBB SHADOW E&M-EST. PATIENT-LVL IV: ICD-10-PCS | Mod: PBBFAC,HCNC,, | Performed by: INTERNAL MEDICINE

## 2019-04-22 PROCEDURE — 96417 CHEMO IV INFUS EACH ADDL SEQ: CPT | Mod: HCNC,PN

## 2019-04-22 PROCEDURE — 3074F SYST BP LT 130 MM HG: CPT | Mod: HCNC,CPTII,S$GLB, | Performed by: INTERNAL MEDICINE

## 2019-04-22 PROCEDURE — S0028 INJECTION, FAMOTIDINE, 20 MG: HCPCS | Mod: HCNC,PN | Performed by: INTERNAL MEDICINE

## 2019-04-22 PROCEDURE — 1101F PT FALLS ASSESS-DOCD LE1/YR: CPT | Mod: HCNC,CPTII,S$GLB, | Performed by: INTERNAL MEDICINE

## 2019-04-22 PROCEDURE — 63600175 PHARM REV CODE 636 W HCPCS: Mod: HCNC,PN | Performed by: INTERNAL MEDICINE

## 2019-04-22 PROCEDURE — 99499 UNLISTED E&M SERVICE: CPT | Mod: HCNC,S$GLB,, | Performed by: INTERNAL MEDICINE

## 2019-04-22 PROCEDURE — A4216 STERILE WATER/SALINE, 10 ML: HCPCS | Mod: HCNC,PN | Performed by: INTERNAL MEDICINE

## 2019-04-22 PROCEDURE — 99214 OFFICE O/P EST MOD 30 MIN: CPT | Mod: HCNC,S$GLB,, | Performed by: INTERNAL MEDICINE

## 2019-04-22 PROCEDURE — 1101F PR PT FALLS ASSESS DOC 0-1 FALLS W/OUT INJ PAST YR: ICD-10-PCS | Mod: HCNC,CPTII,S$GLB, | Performed by: INTERNAL MEDICINE

## 2019-04-22 PROCEDURE — 99214 PR OFFICE/OUTPT VISIT, EST, LEVL IV, 30-39 MIN: ICD-10-PCS | Mod: HCNC,S$GLB,, | Performed by: INTERNAL MEDICINE

## 2019-04-22 PROCEDURE — 3078F PR MOST RECENT DIASTOLIC BLOOD PRESSURE < 80 MM HG: ICD-10-PCS | Mod: HCNC,CPTII,S$GLB, | Performed by: INTERNAL MEDICINE

## 2019-04-22 PROCEDURE — 99999 PR PBB SHADOW E&M-EST. PATIENT-LVL IV: CPT | Mod: PBBFAC,HCNC,, | Performed by: INTERNAL MEDICINE

## 2019-04-22 PROCEDURE — 3074F PR MOST RECENT SYSTOLIC BLOOD PRESSURE < 130 MM HG: ICD-10-PCS | Mod: HCNC,CPTII,S$GLB, | Performed by: INTERNAL MEDICINE

## 2019-04-22 PROCEDURE — 96413 CHEMO IV INFUSION 1 HR: CPT | Mod: HCNC,PN

## 2019-04-22 PROCEDURE — 99499 RISK ADDL DX/OHS AUDIT: ICD-10-PCS | Mod: HCNC,S$GLB,, | Performed by: INTERNAL MEDICINE

## 2019-04-22 PROCEDURE — 25000003 PHARM REV CODE 250: Mod: HCNC,PN | Performed by: INTERNAL MEDICINE

## 2019-04-22 PROCEDURE — 3078F DIAST BP <80 MM HG: CPT | Mod: HCNC,CPTII,S$GLB, | Performed by: INTERNAL MEDICINE

## 2019-04-22 PROCEDURE — 96365 THER/PROPH/DIAG IV INF INIT: CPT | Mod: HCNC,PN

## 2019-04-22 RX ORDER — EPINEPHRINE 0.3 MG/.3ML
0.3 INJECTION SUBCUTANEOUS ONCE AS NEEDED
Status: DISCONTINUED | OUTPATIENT
Start: 2019-04-22 | End: 2019-04-22 | Stop reason: HOSPADM

## 2019-04-22 RX ORDER — HYDROCODONE BITARTRATE AND ACETAMINOPHEN 5; 325 MG/1; MG/1
1 TABLET ORAL EVERY 6 HOURS PRN
Qty: 20 TABLET | Refills: 0 | Status: CANCELLED | OUTPATIENT
Start: 2019-04-22

## 2019-04-22 RX ORDER — ONDANSETRON HYDROCHLORIDE 8 MG/1
TABLET, FILM COATED ORAL
Qty: 30 TABLET | Refills: 3 | Status: SHIPPED | OUTPATIENT
Start: 2019-04-22 | End: 2020-08-18

## 2019-04-22 RX ORDER — ATORVASTATIN CALCIUM 80 MG/1
80 TABLET, FILM COATED ORAL NIGHTLY
Qty: 90 TABLET | Refills: 0 | Status: SHIPPED | OUTPATIENT
Start: 2019-04-22 | End: 2019-07-24 | Stop reason: SDUPTHER

## 2019-04-22 RX ORDER — DIPHENHYDRAMINE HYDROCHLORIDE 50 MG/ML
50 INJECTION INTRAMUSCULAR; INTRAVENOUS ONCE AS NEEDED
Status: DISCONTINUED | OUTPATIENT
Start: 2019-04-22 | End: 2019-04-22 | Stop reason: HOSPADM

## 2019-04-22 RX ORDER — HYDROCODONE BITARTRATE AND ACETAMINOPHEN 5; 325 MG/1; MG/1
1 TABLET ORAL EVERY 6 HOURS PRN
Qty: 60 TABLET | Refills: 0 | Status: SHIPPED | OUTPATIENT
Start: 2019-04-22 | End: 2019-04-22 | Stop reason: SDUPTHER

## 2019-04-22 RX ORDER — HYDROCODONE BITARTRATE AND ACETAMINOPHEN 5; 325 MG/1; MG/1
1 TABLET ORAL EVERY 6 HOURS PRN
Qty: 60 TABLET | Refills: 0 | Status: ON HOLD | OUTPATIENT
Start: 2019-04-22 | End: 2019-07-09 | Stop reason: HOSPADM

## 2019-04-22 RX ORDER — SODIUM CHLORIDE 0.9 % (FLUSH) 0.9 %
10 SYRINGE (ML) INJECTION
Status: DISCONTINUED | OUTPATIENT
Start: 2019-04-22 | End: 2019-04-22 | Stop reason: HOSPADM

## 2019-04-22 RX ORDER — HEPARIN 100 UNIT/ML
500 SYRINGE INTRAVENOUS
Status: DISCONTINUED | OUTPATIENT
Start: 2019-04-22 | End: 2019-04-22 | Stop reason: HOSPADM

## 2019-04-22 RX ORDER — FAMOTIDINE 10 MG/ML
20 INJECTION INTRAVENOUS
Status: COMPLETED | OUTPATIENT
Start: 2019-04-22 | End: 2019-04-22

## 2019-04-22 RX ADMIN — DIPHENHYDRAMINE HYDROCHLORIDE 50 MG: 50 INJECTION, SOLUTION INTRAMUSCULAR; INTRAVENOUS at 10:04

## 2019-04-22 RX ADMIN — CARBOPLATIN 290 MG: 10 INJECTION, SOLUTION INTRAVENOUS at 12:04

## 2019-04-22 RX ADMIN — DEXAMETHASONE SODIUM PHOSPHATE: 4 INJECTION, SOLUTION INTRA-ARTICULAR; INTRALESIONAL; INTRAMUSCULAR; INTRAVENOUS; SOFT TISSUE at 10:04

## 2019-04-22 RX ADMIN — HEPARIN 500 UNITS: 100 SYRINGE at 01:04

## 2019-04-22 RX ADMIN — SODIUM CHLORIDE: 9 INJECTION, SOLUTION INTRAVENOUS at 10:04

## 2019-04-22 RX ADMIN — Medication 10 ML: at 10:04

## 2019-04-22 RX ADMIN — PACLITAXEL 102 MG: 6 INJECTION, SOLUTION INTRAVENOUS at 11:04

## 2019-04-22 RX ADMIN — FAMOTIDINE 20 MG: 10 INJECTION, SOLUTION INTRAVENOUS at 10:04

## 2019-04-22 RX ADMIN — Medication 10 ML: at 01:04

## 2019-04-22 NOTE — PROGRESS NOTES
History of present illness:  The patient is a 73-year-old white gentleman well known to me for GE junction carcinoma who is receiving combined modality, neoadjuvant therapy and returns to clinic for day 15 of treatment.  Patient is out of Lipitor and hydrocodone.  He requests refills on both of these medications.  No other new complaints for pertinent findings on a 14 point review of systems.    Physical examination:  The patient is a well-developed, well-nourished, white gentleman, in no acute distress, who has a weight of 260.5 lb (increased by 1 lb).  VITAL SIGNS: Documented  and reviewed this visit.  HEENT: Normocephalic, atraumatic. Oral mucosa pink and moist. Lips without   lesions. Tongue midline. Oropharynx clear. Nonicteric sclerae.   NECK: Supple, no adenopathy. No carotid bruits, thyromegaly or thyroid nodule.   HEART: Regular rate and rhythm without murmur, gallop or rub.   LUNGS: Clear to auscultation bilaterally. Normal respiratory effort.   ABDOMEN: Soft, nontender, nondistended with positive normoactive bowel sounds,   no hepatosplenomegaly.   EXTREMITIES: No cyanosis, clubbing or edema. Distal pulses are intact.   AXILLAE AND GROIN: No palpable pathologic lymphadenopathy is appreciated.   SKIN: Intact/turgor normal   NEUROLOGIC: Cranial nerves II-XII grossly intact. Motor: Good muscle bulk and   tone. Strength/sensory 5/5 throughout. Gait unstable.     Laboratory:  White count 3.9, hemoglobin 11.2, hematocrit 37.3, platelets 90, absolute neutrophil count 3300.  Sodium 137, potassium 5.7, chloride 102, CO2 29, BUN 14, creatinine 0.9, glucose 200, calcium 9.8, magnesium 1.7, GFR is greater than 60.    Impression:  1.  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  2.  Chemotherapy associated anemia-mild and not in need of intervention.  3.  Chemotherapy associated thrombocytopenia-moderate and not in need of intervention.  4.  Chemotherapy associated leukopenia-mild and not in need of  intervention.  5.  Hyperkalemia.  6.  Neoplasm associated pain-currently well palliated.  7.  Coronary artery disease without angina.    Plan:  1.  Proceed with day 15 of therapy did consist of 100 mg of Taxol, and 290 mg of carboplatin.  2.  Patient will receive DP 10/0.25 and as needed Compazine for control of acute and delayed emesis.  3.  Typical Benadryl/Pepcid premedications prior to Taxol administration.  4.  Kayexalate 15 g p.o. as a 1 time dose for correction of hyperkalemia.  Patient has been counseled on limiting high potassium foods.  5.  Continue current analgesic regimen.  6.  Continue current medical management of coronary artery disease.  7.  Provided prescription refills as requested by patient.  8.  Return to clinic 1 week from now with interval CBC, BMP, and magnesium prior to appointment.    This note was created using voice recognition software and may contain grammatical errors.

## 2019-04-23 NOTE — PLAN OF CARE
Problem: Adult Inpatient Plan of Care  Goal: Plan of Care Review  Outcome: Ongoing (interventions implemented as appropriate)  Tolerated chemo infusion without any c/o.  RTC in 1 week for next tx.  Amb off unit with independent gait accompanied by spouse.  Reminded pt of Rx for kayex to reduce potasium level and to avoid potassium rich foods.  Verb agreement with plan.  MIRIAN

## 2019-04-25 ENCOUNTER — DOCUMENTATION ONLY (OUTPATIENT)
Dept: INFUSION THERAPY | Facility: HOSPITAL | Age: 74
End: 2019-04-25

## 2019-04-25 NOTE — PROGRESS NOTES
Nutrition: Met with patient and patients wife today while patient was here for radiation treatment. Patient informed me he is eating 3 meals per day of soft moist foods and drinking 2 boost vhc and 1 ensure plus and 1 boost plus per day. WT: 257# Discussed the importance of weight maintenance and nutrition during treatment. Encouraged patient to continue consume current intake. Offered support and encouragement. Nani Rush, MS, RD, LDN

## 2019-04-29 ENCOUNTER — TELEPHONE (OUTPATIENT)
Dept: HEMATOLOGY/ONCOLOGY | Facility: CLINIC | Age: 74
End: 2019-04-29

## 2019-04-29 ENCOUNTER — OFFICE VISIT (OUTPATIENT)
Dept: HEMATOLOGY/ONCOLOGY | Facility: CLINIC | Age: 74
End: 2019-04-29
Payer: MEDICARE

## 2019-04-29 ENCOUNTER — LAB VISIT (OUTPATIENT)
Dept: LAB | Facility: HOSPITAL | Age: 74
End: 2019-04-29
Attending: INTERNAL MEDICINE
Payer: MEDICARE

## 2019-04-29 VITALS
HEART RATE: 71 BPM | SYSTOLIC BLOOD PRESSURE: 146 MMHG | BODY MASS INDEX: 30.07 KG/M2 | WEIGHT: 254.63 LBS | HEIGHT: 77 IN | TEMPERATURE: 99 F | RESPIRATION RATE: 16 BRPM | DIASTOLIC BLOOD PRESSURE: 71 MMHG

## 2019-04-29 DIAGNOSIS — D69.59 CHEMOTHERAPY-INDUCED THROMBOCYTOPENIA: ICD-10-CM

## 2019-04-29 DIAGNOSIS — C16.0 GE JUNCTION CARCINOMA: ICD-10-CM

## 2019-04-29 DIAGNOSIS — C16.0 GE JUNCTION CARCINOMA: Primary | ICD-10-CM

## 2019-04-29 DIAGNOSIS — T45.1X5A LEUKOPENIA DUE TO ANTINEOPLASTIC CHEMOTHERAPY: ICD-10-CM

## 2019-04-29 DIAGNOSIS — T45.1X5A ANEMIA ASSOCIATED WITH CHEMOTHERAPY: ICD-10-CM

## 2019-04-29 DIAGNOSIS — T45.1X5A CHEMOTHERAPY-INDUCED THROMBOCYTOPENIA: ICD-10-CM

## 2019-04-29 DIAGNOSIS — E87.5 HYPERKALEMIA: ICD-10-CM

## 2019-04-29 DIAGNOSIS — D64.81 ANEMIA ASSOCIATED WITH CHEMOTHERAPY: ICD-10-CM

## 2019-04-29 DIAGNOSIS — D70.1 LEUKOPENIA DUE TO ANTINEOPLASTIC CHEMOTHERAPY: ICD-10-CM

## 2019-04-29 LAB
ANION GAP SERPL CALC-SCNC: 4 MMOL/L (ref 8–16)
BASOPHILS # BLD AUTO: 0.01 K/UL (ref 0–0.2)
BASOPHILS NFR BLD: 0.4 % (ref 0–1.9)
BUN SERPL-MCNC: 21 MG/DL (ref 9–21)
CALCIUM SERPL-MCNC: 10.1 MG/DL (ref 8.4–10.2)
CHLORIDE SERPL-SCNC: 101 MMOL/L (ref 95–110)
CO2 SERPL-SCNC: 33 MMOL/L (ref 22–31)
CREAT SERPL-MCNC: 0.97 MG/DL (ref 0.5–1.4)
DIFFERENTIAL METHOD: ABNORMAL
EOSINOPHIL # BLD AUTO: 0 K/UL (ref 0–0.5)
EOSINOPHIL NFR BLD: 0.7 % (ref 0–8)
ERYTHROCYTE [DISTWIDTH] IN BLOOD BY AUTOMATED COUNT: 18.9 % (ref 11.5–14.5)
EST. GFR  (AFRICAN AMERICAN): >60 ML/MIN/1.73 M^2
EST. GFR  (NON AFRICAN AMERICAN): >60 ML/MIN/1.73 M^2
GLUCOSE SERPL-MCNC: 172 MG/DL (ref 70–110)
HCT VFR BLD AUTO: 38.7 % (ref 40–54)
HGB BLD-MCNC: 11.8 G/DL (ref 14–18)
IMM GRANULOCYTES # BLD AUTO: 0.02 K/UL (ref 0–0.04)
IMM GRANULOCYTES NFR BLD AUTO: 0.7 % (ref 0–0.5)
LYMPHOCYTES # BLD AUTO: 0.2 K/UL (ref 1–4.8)
LYMPHOCYTES NFR BLD: 5.8 % (ref 18–48)
MAGNESIUM SERPL-MCNC: 1.9 MG/DL (ref 1.6–2.6)
MCH RBC QN AUTO: 26.5 PG (ref 27–31)
MCHC RBC AUTO-ENTMCNC: 30.5 G/DL (ref 32–36)
MCV RBC AUTO: 87 FL (ref 82–98)
MONOCYTES # BLD AUTO: 0.3 K/UL (ref 0.3–1)
MONOCYTES NFR BLD: 11.9 % (ref 4–15)
NEUTROPHILS # BLD AUTO: 2.2 K/UL (ref 1.8–7.7)
NEUTROPHILS NFR BLD: 80.5 % (ref 38–73)
NRBC BLD-RTO: 0 /100 WBC
PLATELET # BLD AUTO: 69 K/UL (ref 150–350)
PMV BLD AUTO: 10.9 FL (ref 9.2–12.9)
POTASSIUM SERPL-SCNC: 4.9 MMOL/L (ref 3.5–5.1)
RBC # BLD AUTO: 4.46 M/UL (ref 4.6–6.2)
SODIUM SERPL-SCNC: 138 MMOL/L (ref 136–145)
WBC # BLD AUTO: 2.78 K/UL (ref 3.9–12.7)

## 2019-04-29 PROCEDURE — 1101F PR PT FALLS ASSESS DOC 0-1 FALLS W/OUT INJ PAST YR: ICD-10-PCS | Mod: HCNC,CPTII,S$GLB, | Performed by: NURSE PRACTITIONER

## 2019-04-29 PROCEDURE — 36415 COLL VENOUS BLD VENIPUNCTURE: CPT | Mod: HCNC,PN

## 2019-04-29 PROCEDURE — 1101F PT FALLS ASSESS-DOCD LE1/YR: CPT | Mod: HCNC,CPTII,S$GLB, | Performed by: NURSE PRACTITIONER

## 2019-04-29 PROCEDURE — 85025 COMPLETE CBC W/AUTO DIFF WBC: CPT

## 2019-04-29 PROCEDURE — 83735 ASSAY OF MAGNESIUM: CPT | Mod: PN

## 2019-04-29 PROCEDURE — 80048 BASIC METABOLIC PNL TOTAL CA: CPT | Mod: PN

## 2019-04-29 PROCEDURE — 99213 OFFICE O/P EST LOW 20 MIN: CPT | Mod: HCNC,S$GLB,, | Performed by: NURSE PRACTITIONER

## 2019-04-29 PROCEDURE — 3077F PR MOST RECENT SYSTOLIC BLOOD PRESSURE >= 140 MM HG: ICD-10-PCS | Mod: HCNC,CPTII,S$GLB, | Performed by: NURSE PRACTITIONER

## 2019-04-29 PROCEDURE — 3077F SYST BP >= 140 MM HG: CPT | Mod: HCNC,CPTII,S$GLB, | Performed by: NURSE PRACTITIONER

## 2019-04-29 PROCEDURE — 99999 PR PBB SHADOW E&M-EST. PATIENT-LVL IV: CPT | Mod: PBBFAC,HCNC,, | Performed by: NURSE PRACTITIONER

## 2019-04-29 PROCEDURE — 99213 PR OFFICE/OUTPT VISIT, EST, LEVL III, 20-29 MIN: ICD-10-PCS | Mod: HCNC,S$GLB,, | Performed by: NURSE PRACTITIONER

## 2019-04-29 PROCEDURE — 83735 ASSAY OF MAGNESIUM: CPT

## 2019-04-29 PROCEDURE — 85025 COMPLETE CBC W/AUTO DIFF WBC: CPT | Mod: PN

## 2019-04-29 PROCEDURE — 3078F DIAST BP <80 MM HG: CPT | Mod: HCNC,CPTII,S$GLB, | Performed by: NURSE PRACTITIONER

## 2019-04-29 PROCEDURE — 3078F PR MOST RECENT DIASTOLIC BLOOD PRESSURE < 80 MM HG: ICD-10-PCS | Mod: HCNC,CPTII,S$GLB, | Performed by: NURSE PRACTITIONER

## 2019-04-29 PROCEDURE — 80048 BASIC METABOLIC PNL TOTAL CA: CPT

## 2019-04-29 PROCEDURE — 99999 PR PBB SHADOW E&M-EST. PATIENT-LVL IV: ICD-10-PCS | Mod: PBBFAC,HCNC,, | Performed by: NURSE PRACTITIONER

## 2019-04-29 NOTE — PROGRESS NOTES
History of present illness:  HPI:  The patient is a 73-year-old white gentleman well known to Dr. Andrews for GE junction carcinoma who is receiving combined modality, neoadjuvant therapy.  He returns to clinic for day 22 of treatment. He denies any fevers, chills, abdominal discomfort, nausea, vomiting, diarrhea, bleeding, petechia, pain, mouth sore, PN, etc.  His only complaint is that of constipation.  He has not been using his Miralax.  He will resume Miralax.  No other new complaints for pertinent findings on a 14 point review of systems.    Physical examination:  GENERAL:  Well-developed, well-nourished, white gentleman, in no acute distress.  Alert & oriented x 3.  VITAL SIGNS: Weight:  Loss of 5 1/2 pounds in 1 week  Wt Readings from Last 3 Encounters:   04/29/19 115.5 kg (254 lb 10.1 oz)   04/22/19 118.1 kg (260 lb 5.8 oz)   04/22/19 118.1 kg (260 lb 5.8 oz)     Temp Readings from Last 3 Encounters:   04/29/19 98.9 °F (37.2 °C)   04/22/19 98.8 °F (37.1 °C)   04/22/19 98.1 °F (36.7 °C)     BP Readings from Last 3 Encounters:   04/29/19 (!) 146/71   04/22/19 134/72   04/22/19 108/63     Pulse Readings from Last 3 Encounters:   04/29/19 71   04/22/19 66   04/22/19 69     HEENT: Normocephalic, atraumatic. Oral mucosa pink and moist. Lips without   lesions. Tongue midline. Oropharynx clear. Nonicteric sclerae.   NECK: Supple, no adenopathy. No carotid bruits, thyromegaly or thyroid nodule.   HEART: Regular rate and rhythm without murmur, gallop or rub.   LUNGS: Clear to auscultation bilaterally. Normal respiratory effort.   ABDOMEN: Soft, nontender, nondistended with positive normoactive bowel sounds,   no hepatosplenomegaly.   EXTREMITIES: No cyanosis, clubbing or edema. Distal pulses are intact.   AXILLAE AND GROIN: No palpable pathologic lymphadenopathy is appreciated.   SKIN: Intact/turgor normal   NEUROLOGIC: Cranial nerves II-XII grossly intact. Motor: Good muscle bulk and   tone. Strength/sensory 5/5  throughout. Gait unstable.     Laboratory:  Lab Results   Component Value Date    WBC 2.78 (L) 04/29/2019    HGB 11.8 (L) 04/29/2019    HCT 38.7 (L) 04/29/2019    MCV 87 04/29/2019    PLT 69 (L) 04/29/2019     80.5% grans, 5.8% lymph, ANC = 2238    BMP  Lab Results   Component Value Date     04/29/2019    K 4.9 04/29/2019     04/29/2019    CO2 33 (H) 04/29/2019    BUN 21 04/29/2019    CREATININE 0.97 04/29/2019    CALCIUM 10.1 04/29/2019    ANIONGAP 4 (L) 04/29/2019    ESTGFRAFRICA >60 04/29/2019    EGFRNONAA >60 04/29/2019     Magnesium:  1.9    Impression:  1.  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  2.  Chemotherapy associated anemia-mild and not in need of intervention.  3.  Chemotherapy associated thrombocytopenia-moderate and not in need of intervention - too low to proceed with chemo  4.  Chemotherapy associated leukopenia-stable.  5.  Hyperkalemia - resolved  6.  Neoplasm associated pain-currently well palliated.  7.  Coronary artery disease without angina.    Plan:  1.  HOLD chemotherapy today.  Notify [facility].  2.  Return to clinic in 1 week with interval CBC, BMP, MG prior.      Assessment/plan reviewed and approved by Dr. Andrews.

## 2019-04-29 NOTE — TELEPHONE ENCOUNTER
Spoke with Dr Caballero in Rad. Per Jaun, NP pt's chemo would be on hold for 1 week. Per Dr Caballero pt to continue with rad tx.     Pt notified of chemo hold for 1 week but will continue with rad tx.

## 2019-05-01 ENCOUNTER — PATIENT OUTREACH (OUTPATIENT)
Dept: ADMINISTRATIVE | Facility: HOSPITAL | Age: 74
End: 2019-05-01

## 2019-05-01 NOTE — PROGRESS NOTES
Health Maintenance Due   Topic Date Due    Urine Microalbumin  02/20/2018    Foot Exam  04/24/2019     [facility].

## 2019-05-02 ENCOUNTER — DOCUMENTATION ONLY (OUTPATIENT)
Dept: INFUSION THERAPY | Facility: HOSPITAL | Age: 74
End: 2019-05-02

## 2019-05-03 RX ORDER — HEPARIN 100 UNIT/ML
500 SYRINGE INTRAVENOUS
Status: CANCELLED | OUTPATIENT
Start: 2019-05-06

## 2019-05-03 RX ORDER — SODIUM CHLORIDE 0.9 % (FLUSH) 0.9 %
10 SYRINGE (ML) INJECTION
Status: CANCELLED | OUTPATIENT
Start: 2019-05-06

## 2019-05-06 ENCOUNTER — OFFICE VISIT (OUTPATIENT)
Dept: HEMATOLOGY/ONCOLOGY | Facility: CLINIC | Age: 74
End: 2019-05-06
Payer: MEDICARE

## 2019-05-06 ENCOUNTER — DOCUMENTATION ONLY (OUTPATIENT)
Dept: INFUSION THERAPY | Facility: HOSPITAL | Age: 74
End: 2019-05-06

## 2019-05-06 ENCOUNTER — INFUSION (OUTPATIENT)
Dept: INFUSION THERAPY | Facility: HOSPITAL | Age: 74
End: 2019-05-06
Attending: INTERNAL MEDICINE
Payer: MEDICARE

## 2019-05-06 VITALS
TEMPERATURE: 100 F | WEIGHT: 257.94 LBS | RESPIRATION RATE: 18 BRPM | BODY MASS INDEX: 30.46 KG/M2 | HEIGHT: 77 IN | DIASTOLIC BLOOD PRESSURE: 58 MMHG | HEART RATE: 70 BPM | SYSTOLIC BLOOD PRESSURE: 112 MMHG

## 2019-05-06 VITALS
HEART RATE: 86 BPM | HEIGHT: 77 IN | RESPIRATION RATE: 18 BRPM | BODY MASS INDEX: 30.46 KG/M2 | WEIGHT: 257.94 LBS | TEMPERATURE: 100 F | DIASTOLIC BLOOD PRESSURE: 65 MMHG | SYSTOLIC BLOOD PRESSURE: 127 MMHG

## 2019-05-06 DIAGNOSIS — C16.0 GE JUNCTION CARCINOMA: Primary | ICD-10-CM

## 2019-05-06 DIAGNOSIS — R50.9 FEVER, UNSPECIFIED FEVER CAUSE: Primary | ICD-10-CM

## 2019-05-06 DIAGNOSIS — T45.1X5A ANEMIA ASSOCIATED WITH CHEMOTHERAPY: ICD-10-CM

## 2019-05-06 DIAGNOSIS — T45.1X5A LEUKOPENIA DUE TO ANTINEOPLASTIC CHEMOTHERAPY: ICD-10-CM

## 2019-05-06 DIAGNOSIS — D64.81 ANEMIA ASSOCIATED WITH CHEMOTHERAPY: ICD-10-CM

## 2019-05-06 DIAGNOSIS — T45.1X5A CHEMOTHERAPY-INDUCED THROMBOCYTOPENIA: ICD-10-CM

## 2019-05-06 DIAGNOSIS — R50.9 FEVER, UNSPECIFIED FEVER CAUSE: ICD-10-CM

## 2019-05-06 DIAGNOSIS — D69.59 CHEMOTHERAPY-INDUCED THROMBOCYTOPENIA: ICD-10-CM

## 2019-05-06 DIAGNOSIS — D70.1 LEUKOPENIA DUE TO ANTINEOPLASTIC CHEMOTHERAPY: ICD-10-CM

## 2019-05-06 PROCEDURE — 3074F SYST BP LT 130 MM HG: CPT | Mod: HCNC,CPTII,S$GLB, | Performed by: INTERNAL MEDICINE

## 2019-05-06 PROCEDURE — 3074F PR MOST RECENT SYSTOLIC BLOOD PRESSURE < 130 MM HG: ICD-10-PCS | Mod: HCNC,CPTII,S$GLB, | Performed by: INTERNAL MEDICINE

## 2019-05-06 PROCEDURE — 99214 PR OFFICE/OUTPT VISIT, EST, LEVL IV, 30-39 MIN: ICD-10-PCS | Mod: HCNC,S$GLB,, | Performed by: INTERNAL MEDICINE

## 2019-05-06 PROCEDURE — 1101F PR PT FALLS ASSESS DOC 0-1 FALLS W/OUT INJ PAST YR: ICD-10-PCS | Mod: HCNC,CPTII,S$GLB, | Performed by: INTERNAL MEDICINE

## 2019-05-06 PROCEDURE — 99499 RISK ADDL DX/OHS AUDIT: ICD-10-PCS | Mod: HCNC,S$GLB,, | Performed by: INTERNAL MEDICINE

## 2019-05-06 PROCEDURE — 96372 THER/PROPH/DIAG INJ SC/IM: CPT | Mod: HCNC,PN

## 2019-05-06 PROCEDURE — 63600175 PHARM REV CODE 636 W HCPCS: Mod: HCNC,JG,PN | Performed by: INTERNAL MEDICINE

## 2019-05-06 PROCEDURE — 99499 UNLISTED E&M SERVICE: CPT | Mod: HCNC,S$GLB,, | Performed by: INTERNAL MEDICINE

## 2019-05-06 PROCEDURE — 1101F PT FALLS ASSESS-DOCD LE1/YR: CPT | Mod: HCNC,CPTII,S$GLB, | Performed by: INTERNAL MEDICINE

## 2019-05-06 PROCEDURE — 99999 PR PBB SHADOW E&M-EST. PATIENT-LVL IV: ICD-10-PCS | Mod: PBBFAC,HCNC,, | Performed by: INTERNAL MEDICINE

## 2019-05-06 PROCEDURE — 3078F DIAST BP <80 MM HG: CPT | Mod: HCNC,CPTII,S$GLB, | Performed by: INTERNAL MEDICINE

## 2019-05-06 PROCEDURE — 3078F PR MOST RECENT DIASTOLIC BLOOD PRESSURE < 80 MM HG: ICD-10-PCS | Mod: HCNC,CPTII,S$GLB, | Performed by: INTERNAL MEDICINE

## 2019-05-06 PROCEDURE — 99214 OFFICE O/P EST MOD 30 MIN: CPT | Mod: HCNC,S$GLB,, | Performed by: INTERNAL MEDICINE

## 2019-05-06 PROCEDURE — 99999 PR PBB SHADOW E&M-EST. PATIENT-LVL IV: CPT | Mod: PBBFAC,HCNC,, | Performed by: INTERNAL MEDICINE

## 2019-05-06 RX ORDER — PROCHLORPERAZINE MALEATE 10 MG
TABLET ORAL
Qty: 60 TABLET | Refills: 6 | Status: SHIPPED | OUTPATIENT
Start: 2019-05-06 | End: 2019-12-10 | Stop reason: SDUPTHER

## 2019-05-06 RX ORDER — LEVOFLOXACIN 500 MG/1
500 TABLET, FILM COATED ORAL DAILY
Qty: 7 TABLET | Refills: 0 | Status: SHIPPED | OUTPATIENT
Start: 2019-05-06 | End: 2019-06-19

## 2019-05-06 RX ADMIN — FILGRASTIM 480 MCG: 480 INJECTION, SOLUTION INTRAVENOUS; SUBCUTANEOUS at 12:05

## 2019-05-06 NOTE — PROGRESS NOTES
[5/6/2019 10:14 AM]  Radha Sierra:    mrn [id number], will not be getting chemo today, but will need to be set up for 3 doses of neupogen     [5/6/2019 10:17 AM]    ok will start working on auth for neupogen      [5/6/2019 10:18 AM]  Radha Sierra:    thanks. He's having labs and an xray.  So he'll be here a while.     [5/6/2019 10:19 AM]    ok and i'm assuming we are just cancel not delaying since he will be finished xrt correct     [5/6/2019 10:21 AM]  Radha Sierra:    yes.

## 2019-05-06 NOTE — PROGRESS NOTES
History of present illness:  The patient is a 73-year-old white gentleman well known to me for GE junction carcinoma who is receiving combined modality, neoadjuvant therapy.  Patient is due for day 29 of treatment.  Patient's course has been complicated by treatment associated leukopenia and thrombocytopenia.  Chemotherapy was held last week due to these issues.  Radiation therapy has continued.  Patient returns clinic today in hopes of resuming treatment.  He is febrile with a temperature of 100.4° and is feeling somewhat sluggish and fatigued.  No other pertinent findings on of systems.    Physical examination:  Well-developed, obese, white gentleman, in no acute distress, with a weight of 258 lb (increased by 3.5 lb).  VITAL SIGNS: Documented  and reviewed this visit.  HEENT: Normocephalic, atraumatic. Oral mucosa pink and moist. Lips without   lesions. Tongue midline. Oropharynx clear. Nonicteric sclerae.   NECK: Supple, no adenopathy. No carotid bruits, thyromegaly or thyroid nodule.   HEART: Regular rate and rhythm without murmur, gallop or rub.   LUNGS: Clear to auscultation bilaterally. Normal respiratory effort.   ABDOMEN: Soft, nontender, nondistended with positive normoactive bowel sounds,   no hepatosplenomegaly.   EXTREMITIES: No cyanosis, clubbing or edema. Distal pulses are intact.   AXILLAE AND GROIN: No palpable pathologic lymphadenopathy is appreciated.   SKIN: Intact/turgor normal   NEUROLOGIC: Cranial nerves II-XII grossly intact. Motor: Good muscle bulk and   tone. Strength/sensory 5/5 throughout. Gait stable.     Laboratory:  White count 2.6, hemoglobin 12.1, hematocrit 381, platelets 50, absolute neutrophil count 1900.  Sodium 133, potassium 5.1, chloride 97, CO2 28, BUN 19, creatinine 1.1, glucose 257, calcium 9.5, magnesium 1.6, GFR greater than 60.  Urinalysis is remarkable for 2+ protein, 2+ glucose, trace ketones, 6 red cells, 2 white cells, negative nitrite, negative leukocyte  esterase.    Microbiology:  Blood in urine cultures are pending at the time of dictation.    Chest x-ray:  No acute cardiopulmonary infiltrate is appreciated.    Impression:  1.  Adenocarcinoma of the GE junction (T2, N0, M0).  2.  Chemotherapy associated anemia.  3.  Chemotherapy associated thrombocytopenia-too severe to allow for therapy.  4.  Chemotherapy associated leukopenia-too severe to allow for therapy.  5.  Fever of uncertain origin.    Plan:  1.  Cancel final planned week of chemotherapy.  2.  Continue with radiation therapy as scheduled.  3.  Neupogen 480 mcg subcu daily x3 days starting today.  4.  Levaquin 500 mg p.o. daily x7 days-starting today.  5.  Follow culture results as they become available.  6.  Return to clinic 12 weeks from now with interval CBC, CMP, LDH, magnesium, and CT PET scan for restaging unless surgery would like to see the patient back sooner with contrast and had stat CT, so as to planned resection.    This note was created using voice recognition software and may contain grammatical errors.

## 2019-05-06 NOTE — PLAN OF CARE
Problem: Adult Inpatient Plan of Care  Goal: Plan of Care Review  Outcome: Ongoing (interventions implemented as appropriate)  Tolerated neupogen injection without any c/o; Discussed infection prevention and S/S to report to MD; encouraged to f/u with MD via phone number provided.  RTC tomorrow  as directed for next injeciton; Verb agreement with plan; Amb off unit with independent gait accompanied by spouse

## 2019-05-07 ENCOUNTER — TELEPHONE (OUTPATIENT)
Dept: HEMATOLOGY/ONCOLOGY | Facility: CLINIC | Age: 74
End: 2019-05-07

## 2019-05-07 ENCOUNTER — DOCUMENTATION ONLY (OUTPATIENT)
Dept: INFUSION THERAPY | Facility: HOSPITAL | Age: 74
End: 2019-05-07

## 2019-05-07 ENCOUNTER — INFUSION (OUTPATIENT)
Dept: INFUSION THERAPY | Facility: HOSPITAL | Age: 74
End: 2019-05-07
Attending: INTERNAL MEDICINE
Payer: MEDICARE

## 2019-05-07 VITALS
TEMPERATURE: 99 F | HEART RATE: 73 BPM | SYSTOLIC BLOOD PRESSURE: 124 MMHG | RESPIRATION RATE: 18 BRPM | DIASTOLIC BLOOD PRESSURE: 67 MMHG

## 2019-05-07 DIAGNOSIS — C16.0 GE JUNCTION CARCINOMA: Primary | ICD-10-CM

## 2019-05-07 PROCEDURE — 63600175 PHARM REV CODE 636 W HCPCS: Mod: HCNC,JG,PN | Performed by: INTERNAL MEDICINE

## 2019-05-07 PROCEDURE — 96372 THER/PROPH/DIAG INJ SC/IM: CPT | Mod: HCNC,PN

## 2019-05-07 RX ADMIN — FILGRASTIM 480 MCG: 480 INJECTION, SOLUTION INTRAVENOUS; SUBCUTANEOUS at 09:05

## 2019-05-07 NOTE — PROGRESS NOTES
[5/7/2019 3:56 PM]  WENCESLAO Marie:    Dr. Andrews would like Thom Zendejas back on schedule for next week, please     [5/7/2019 4:05 PM]    thom zendejas mrn [id number] 12:30 on monday

## 2019-05-07 NOTE — TELEPHONE ENCOUNTER
Done, see previous note  ----- Message from Clinton Andrews MD sent at 5/7/2019 12:52 PM CDT -----  Ok, we will arrange to see him back in the event we can give him a dose of chemo.  Thx.    Katia, can you get the patient back on the treatment schedule for next week?  thx.  ----- Message -----  From: Nabil Garay MD  Sent: 5/7/2019   5:50 AM  To: MD Clinton Gayle,    I saw your note from yesterday.  I have decided to give pt a break from RT this week.  I treated all last week, however, as you know, CBC from yesterday showed decrease in plt and WBC.  I will see pt next Monday and hopefully restart and finish RT.   Clinton

## 2019-05-08 ENCOUNTER — INFUSION (OUTPATIENT)
Dept: INFUSION THERAPY | Facility: HOSPITAL | Age: 74
End: 2019-05-08
Attending: NURSE PRACTITIONER
Payer: MEDICARE

## 2019-05-08 ENCOUNTER — TELEPHONE (OUTPATIENT)
Dept: SURGERY | Facility: CLINIC | Age: 74
End: 2019-05-08

## 2019-05-08 VITALS
DIASTOLIC BLOOD PRESSURE: 71 MMHG | RESPIRATION RATE: 20 BRPM | TEMPERATURE: 98 F | SYSTOLIC BLOOD PRESSURE: 141 MMHG | HEART RATE: 78 BPM

## 2019-05-08 DIAGNOSIS — C16.0 GE JUNCTION CARCINOMA: Primary | ICD-10-CM

## 2019-05-08 PROCEDURE — 63600175 PHARM REV CODE 636 W HCPCS: Mod: HCNC,JG,PN | Performed by: INTERNAL MEDICINE

## 2019-05-08 PROCEDURE — 96372 THER/PROPH/DIAG INJ SC/IM: CPT | Mod: HCNC,PN

## 2019-05-08 RX ADMIN — FILGRASTIM 480 MCG: 480 INJECTION, SOLUTION INTRAVENOUS; SUBCUTANEOUS at 09:05

## 2019-05-08 NOTE — TELEPHONE ENCOUNTER
----- Message from Sara Slade sent at 5/8/2019  3:19 PM CDT -----  Contact: pts wife   Patient Returning Call from [facility]    Who Left Message for Patient: Katie    Communication Preference: [phone]     Additional Information:

## 2019-05-13 ENCOUNTER — OFFICE VISIT (OUTPATIENT)
Dept: HEMATOLOGY/ONCOLOGY | Facility: CLINIC | Age: 74
End: 2019-05-13
Payer: MEDICARE

## 2019-05-13 ENCOUNTER — INFUSION (OUTPATIENT)
Dept: INFUSION THERAPY | Facility: HOSPITAL | Age: 74
End: 2019-05-13
Attending: INTERNAL MEDICINE
Payer: MEDICARE

## 2019-05-13 VITALS
SYSTOLIC BLOOD PRESSURE: 134 MMHG | HEIGHT: 77 IN | WEIGHT: 251.31 LBS | HEART RATE: 77 BPM | DIASTOLIC BLOOD PRESSURE: 71 MMHG | RESPIRATION RATE: 17 BRPM | BODY MASS INDEX: 29.67 KG/M2 | TEMPERATURE: 98 F

## 2019-05-13 VITALS
TEMPERATURE: 98 F | DIASTOLIC BLOOD PRESSURE: 66 MMHG | SYSTOLIC BLOOD PRESSURE: 116 MMHG | HEART RATE: 96 BPM | BODY MASS INDEX: 29.67 KG/M2 | WEIGHT: 251.31 LBS | HEIGHT: 77 IN | RESPIRATION RATE: 18 BRPM

## 2019-05-13 DIAGNOSIS — D69.59 CHEMOTHERAPY-INDUCED THROMBOCYTOPENIA: ICD-10-CM

## 2019-05-13 DIAGNOSIS — D64.81 ANEMIA ASSOCIATED WITH CHEMOTHERAPY: ICD-10-CM

## 2019-05-13 DIAGNOSIS — C16.0 GE JUNCTION CARCINOMA: Primary | ICD-10-CM

## 2019-05-13 DIAGNOSIS — T45.1X5A CHEMOTHERAPY-INDUCED THROMBOCYTOPENIA: ICD-10-CM

## 2019-05-13 DIAGNOSIS — T45.1X5A ANEMIA ASSOCIATED WITH CHEMOTHERAPY: ICD-10-CM

## 2019-05-13 DIAGNOSIS — C15.5 MALIGNANT NEOPLASM OF LOWER THIRD OF ESOPHAGUS: Primary | ICD-10-CM

## 2019-05-13 PROCEDURE — 99214 OFFICE O/P EST MOD 30 MIN: CPT | Mod: HCNC,S$GLB,, | Performed by: NURSE PRACTITIONER

## 2019-05-13 PROCEDURE — 3078F PR MOST RECENT DIASTOLIC BLOOD PRESSURE < 80 MM HG: ICD-10-PCS | Mod: HCNC,CPTII,S$GLB, | Performed by: NURSE PRACTITIONER

## 2019-05-13 PROCEDURE — 96417 CHEMO IV INFUS EACH ADDL SEQ: CPT | Mod: HCNC,PN

## 2019-05-13 PROCEDURE — 99214 PR OFFICE/OUTPT VISIT, EST, LEVL IV, 30-39 MIN: ICD-10-PCS | Mod: HCNC,S$GLB,, | Performed by: NURSE PRACTITIONER

## 2019-05-13 PROCEDURE — 96413 CHEMO IV INFUSION 1 HR: CPT | Mod: HCNC,PN

## 2019-05-13 PROCEDURE — 3078F DIAST BP <80 MM HG: CPT | Mod: HCNC,CPTII,S$GLB, | Performed by: NURSE PRACTITIONER

## 2019-05-13 PROCEDURE — 96367 TX/PROPH/DG ADDL SEQ IV INF: CPT | Mod: HCNC,PN

## 2019-05-13 PROCEDURE — 3074F SYST BP LT 130 MM HG: CPT | Mod: HCNC,CPTII,S$GLB, | Performed by: NURSE PRACTITIONER

## 2019-05-13 PROCEDURE — 99999 PR PBB SHADOW E&M-EST. PATIENT-LVL IV: CPT | Mod: PBBFAC,HCNC,, | Performed by: NURSE PRACTITIONER

## 2019-05-13 PROCEDURE — A4216 STERILE WATER/SALINE, 10 ML: HCPCS | Mod: HCNC,PN | Performed by: NURSE PRACTITIONER

## 2019-05-13 PROCEDURE — 1101F PR PT FALLS ASSESS DOC 0-1 FALLS W/OUT INJ PAST YR: ICD-10-PCS | Mod: HCNC,CPTII,S$GLB, | Performed by: NURSE PRACTITIONER

## 2019-05-13 PROCEDURE — 63600175 PHARM REV CODE 636 W HCPCS: Mod: HCNC,PN | Performed by: NURSE PRACTITIONER

## 2019-05-13 PROCEDURE — 99999 PR PBB SHADOW E&M-EST. PATIENT-LVL IV: ICD-10-PCS | Mod: PBBFAC,HCNC,, | Performed by: NURSE PRACTITIONER

## 2019-05-13 PROCEDURE — 25000003 PHARM REV CODE 250: Mod: HCNC,PN | Performed by: NURSE PRACTITIONER

## 2019-05-13 PROCEDURE — S0028 INJECTION, FAMOTIDINE, 20 MG: HCPCS | Mod: HCNC,PN | Performed by: NURSE PRACTITIONER

## 2019-05-13 PROCEDURE — 1101F PT FALLS ASSESS-DOCD LE1/YR: CPT | Mod: HCNC,CPTII,S$GLB, | Performed by: NURSE PRACTITIONER

## 2019-05-13 PROCEDURE — 3074F PR MOST RECENT SYSTOLIC BLOOD PRESSURE < 130 MM HG: ICD-10-PCS | Mod: HCNC,CPTII,S$GLB, | Performed by: NURSE PRACTITIONER

## 2019-05-13 PROCEDURE — 96375 TX/PRO/DX INJ NEW DRUG ADDON: CPT | Mod: HCNC,PN

## 2019-05-13 RX ORDER — FAMOTIDINE 10 MG/ML
20 INJECTION INTRAVENOUS
Status: COMPLETED | OUTPATIENT
Start: 2019-05-13 | End: 2019-05-13

## 2019-05-13 RX ORDER — FAMOTIDINE 10 MG/ML
20 INJECTION INTRAVENOUS
Status: CANCELLED | OUTPATIENT
Start: 2019-05-13

## 2019-05-13 RX ORDER — HEPARIN 100 UNIT/ML
500 SYRINGE INTRAVENOUS
Status: CANCELLED | OUTPATIENT
Start: 2019-05-13

## 2019-05-13 RX ORDER — SODIUM CHLORIDE 0.9 % (FLUSH) 0.9 %
10 SYRINGE (ML) INJECTION
Status: DISCONTINUED | OUTPATIENT
Start: 2019-05-13 | End: 2019-05-13 | Stop reason: HOSPADM

## 2019-05-13 RX ORDER — HEPARIN 100 UNIT/ML
500 SYRINGE INTRAVENOUS
Status: DISCONTINUED | OUTPATIENT
Start: 2019-05-13 | End: 2019-05-13 | Stop reason: HOSPADM

## 2019-05-13 RX ORDER — EPINEPHRINE 0.3 MG/.3ML
0.3 INJECTION SUBCUTANEOUS ONCE AS NEEDED
Status: CANCELLED | OUTPATIENT
Start: 2019-05-13

## 2019-05-13 RX ORDER — EPINEPHRINE 0.3 MG/.3ML
0.3 INJECTION SUBCUTANEOUS ONCE AS NEEDED
Status: DISCONTINUED | OUTPATIENT
Start: 2019-05-13 | End: 2019-05-13 | Stop reason: HOSPADM

## 2019-05-13 RX ORDER — SODIUM CHLORIDE 0.9 % (FLUSH) 0.9 %
10 SYRINGE (ML) INJECTION
Status: CANCELLED | OUTPATIENT
Start: 2019-05-13

## 2019-05-13 RX ORDER — DIPHENHYDRAMINE HYDROCHLORIDE 50 MG/ML
50 INJECTION INTRAMUSCULAR; INTRAVENOUS ONCE AS NEEDED
Status: CANCELLED | OUTPATIENT
Start: 2019-05-13

## 2019-05-13 RX ORDER — DIPHENHYDRAMINE HYDROCHLORIDE 50 MG/ML
50 INJECTION INTRAMUSCULAR; INTRAVENOUS ONCE AS NEEDED
Status: DISCONTINUED | OUTPATIENT
Start: 2019-05-13 | End: 2019-05-13 | Stop reason: HOSPADM

## 2019-05-13 RX ADMIN — SODIUM CHLORIDE, PRESERVATIVE FREE 10 ML: 5 INJECTION INTRAVENOUS at 12:05

## 2019-05-13 RX ADMIN — DEXAMETHASONE SODIUM PHOSPHATE: 4 INJECTION, SOLUTION INTRA-ARTICULAR; INTRALESIONAL; INTRAMUSCULAR; INTRAVENOUS; SOFT TISSUE at 12:05

## 2019-05-13 RX ADMIN — FAMOTIDINE 20 MG: 10 INJECTION, SOLUTION INTRAVENOUS at 12:05

## 2019-05-13 RX ADMIN — PACLITAXEL 102 MG: 6 INJECTION, SOLUTION INTRAVENOUS at 01:05

## 2019-05-13 RX ADMIN — SODIUM CHLORIDE, PRESERVATIVE FREE 500 UNITS: 5 INJECTION INTRAVENOUS at 03:05

## 2019-05-13 RX ADMIN — SODIUM CHLORIDE: 9 INJECTION, SOLUTION INTRAVENOUS at 12:05

## 2019-05-13 RX ADMIN — DIPHENHYDRAMINE HYDROCHLORIDE 50 MG: 50 INJECTION, SOLUTION INTRAMUSCULAR; INTRAVENOUS at 12:05

## 2019-05-13 RX ADMIN — CARBOPLATIN 290 MG: 10 INJECTION, SOLUTION INTRAVENOUS at 02:05

## 2019-05-13 RX ADMIN — SODIUM CHLORIDE, PRESERVATIVE FREE 10 ML: 5 INJECTION INTRAVENOUS at 03:05

## 2019-05-13 NOTE — PLAN OF CARE
Problem: Adult Inpatient Plan of Care  Goal: Plan of Care Review  Outcome: Ongoing (interventions implemented as appropriate)  Pt. Completed treatment, tolerated without noted distress.Vtial signs stable. Patient discharged from infusion center ambulatory with wife, pt left forgot walking cane at home. Pt encouraged to use cane when ambulating, use of night lights, and remove throw rugs to help prevent falls. Patient had all present questions answered.

## 2019-05-13 NOTE — PROGRESS NOTES
History of present illness:  The patient is a 73-year-old white gentleman well known to Dr. Andrews for GE junction carcinoma who is receiving combined modality, neoadjuvant therapy.  Patient's treatment of Day 22 & 29 were held due to treatment associated leukopenia and thrombocytopenia.  Initial plans were to discontinue chemotherapy.  However, Dr. Andrews spoke with [facility] & he will receive an additional week of chemotherapy to finish radiology.  He presents to the clinic today with his wife for evaluation prior to chemotherapy.  He is relaxed as he took a muscle relaxer to assist with positioning during radiation.  He denies any fevers, chills, abdominal discomfort, nausea, vomiting, diarrhea, bleeding, petechia, pain, mouth sore, PN, etc. Constipation continues and is managed with Miralax.  No other new complaints or pertinent findings on a 14 point review of systems.    Physical examination:  GENERAL:  Well-developed, obese, white gentleman, in no acute distress.  Alert & oriented x 3.  VITAL SIGNS: Weight:  Loss of 6 1/2 pounds in 1 week  Wt Readings from Last 3 Encounters:   05/13/19 114 kg (251 lb 5.2 oz)   05/13/19 114 kg (251 lb 5.2 oz)   05/06/19 117 kg (257 lb 15 oz)     Temp Readings from Last 3 Encounters:   05/13/19 98 °F (36.7 °C)   05/13/19 98 °F (36.7 °C)   05/08/19 98 °F (36.7 °C)     BP Readings from Last 3 Encounters:   05/13/19 116/66   05/13/19 116/66   05/08/19 (!) 141/71     Pulse Readings from Last 3 Encounters:   05/13/19 96   05/13/19 96   05/08/19 78     HEENT: Normocephalic, atraumatic. Oral mucosa pink and moist. Lips without   lesions. Tongue midline. Oropharynx clear. Nonicteric sclerae.   NECK: Supple, no adenopathy. No carotid bruits, thyromegaly or thyroid nodule.   HEART: Regular rate and rhythm without murmur, gallop or rub.   LUNGS: Clear to auscultation bilaterally. Normal respiratory effort.   ABDOMEN: Soft, nontender, nondistended with positive normoactive bowel sounds,   no  hepatosplenomegaly.   EXTREMITIES: No cyanosis, clubbing or edema. Distal pulses are intact.   AXILLAE AND GROIN: No palpable pathologic lymphadenopathy is appreciated.   SKIN: Intact/turgor normal   NEUROLOGIC: Cranial nerves II-XII grossly intact. Motor: Good muscle bulk and   tone. Strength/sensory 5/5 throughout. Gait stable.     Laboratory:  Lab Results   Component Value Date    WBC 4.32 05/13/2019    HGB 12.6 (L) 05/13/2019    HCT 39.8 (L) 05/13/2019    MCV 87 05/13/2019     (L) 05/13/2019     Differential remarkable for 76.4% grans, 9.7% lymph, ANC = 3300    BMP  Lab Results   Component Value Date     (L) 05/13/2019    K 4.4 05/13/2019     05/13/2019    CO2 26 05/13/2019    BUN 25 (H) 05/13/2019    CREATININE 1.12 05/13/2019    CALCIUM 9.5 05/13/2019    ANIONGAP 8 05/13/2019    ESTGFRAFRICA >60 05/13/2019    EGFRNONAA >60 05/13/2019     Magnesium:  1.8    Microbiology:  Blood in urine cultures:  No growth.    Impression:  1.  Adenocarcinoma of the GE junction (T2, N0, M0).  2.  Chemotherapy associated anemia - stable, asymptomatic.  3.  Chemotherapy associated thrombocytopenia - improved, stable.  4.  Chemotherapy associated leukopenia - improved.    Plan:  1.  Proceed with final week of chemotherapy to consist of 100 mg of Taxol, and 290 mg of carboplatin.  2.  Patient will receive DP 10/0.25 and as needed Compazine for control of acute and delayed emesis.  3.  Typical Benadryl/Pepcid premedications prior to Taxol administration.  4.  Follow up with Dr. Andrews as arranged on 07/22/19.  CT PET tbd ON 06/11/19.    Assessment/plan reviewed and approved by Dr. Andrews.

## 2019-05-14 ENCOUNTER — PATIENT MESSAGE (OUTPATIENT)
Dept: SURGERY | Facility: CLINIC | Age: 74
End: 2019-05-14

## 2019-05-14 ENCOUNTER — OFFICE VISIT (OUTPATIENT)
Dept: FAMILY MEDICINE | Facility: CLINIC | Age: 74
End: 2019-05-14
Payer: MEDICARE

## 2019-05-14 VITALS
BODY MASS INDEX: 29.55 KG/M2 | HEART RATE: 82 BPM | OXYGEN SATURATION: 97 % | SYSTOLIC BLOOD PRESSURE: 122 MMHG | TEMPERATURE: 99 F | WEIGHT: 250.25 LBS | DIASTOLIC BLOOD PRESSURE: 58 MMHG | HEIGHT: 77 IN | RESPIRATION RATE: 18 BRPM

## 2019-05-14 DIAGNOSIS — C15.5 MALIGNANT NEOPLASM OF LOWER THIRD OF ESOPHAGUS: ICD-10-CM

## 2019-05-14 DIAGNOSIS — I48.0 PAF (PAROXYSMAL ATRIAL FIBRILLATION): ICD-10-CM

## 2019-05-14 DIAGNOSIS — I10 ESSENTIAL HYPERTENSION: ICD-10-CM

## 2019-05-14 DIAGNOSIS — E11.9 TYPE 2 DIABETES MELLITUS TREATED WITHOUT INSULIN: Primary | ICD-10-CM

## 2019-05-14 DIAGNOSIS — E78.5 HYPERLIPIDEMIA, UNSPECIFIED HYPERLIPIDEMIA TYPE: ICD-10-CM

## 2019-05-14 DIAGNOSIS — F43.22 ADJUSTMENT REACTION WITH ANXIETY: ICD-10-CM

## 2019-05-14 PROCEDURE — 99999 PR PBB SHADOW E&M-EST. PATIENT-LVL IV: CPT | Mod: PBBFAC,HCNC,, | Performed by: FAMILY MEDICINE

## 2019-05-14 PROCEDURE — 3074F PR MOST RECENT SYSTOLIC BLOOD PRESSURE < 130 MM HG: ICD-10-PCS | Mod: HCNC,CPTII,S$GLB, | Performed by: FAMILY MEDICINE

## 2019-05-14 PROCEDURE — 3045F PR MOST RECENT HEMOGLOBIN A1C LEVEL 7.0-9.0%: CPT | Mod: HCNC,CPTII,S$GLB, | Performed by: FAMILY MEDICINE

## 2019-05-14 PROCEDURE — 99214 PR OFFICE/OUTPT VISIT, EST, LEVL IV, 30-39 MIN: ICD-10-PCS | Mod: HCNC,S$GLB,, | Performed by: FAMILY MEDICINE

## 2019-05-14 PROCEDURE — 3078F PR MOST RECENT DIASTOLIC BLOOD PRESSURE < 80 MM HG: ICD-10-PCS | Mod: HCNC,CPTII,S$GLB, | Performed by: FAMILY MEDICINE

## 2019-05-14 PROCEDURE — 99999 PR PBB SHADOW E&M-EST. PATIENT-LVL IV: ICD-10-PCS | Mod: PBBFAC,HCNC,, | Performed by: FAMILY MEDICINE

## 2019-05-14 PROCEDURE — 1101F PR PT FALLS ASSESS DOC 0-1 FALLS W/OUT INJ PAST YR: ICD-10-PCS | Mod: HCNC,CPTII,S$GLB, | Performed by: FAMILY MEDICINE

## 2019-05-14 PROCEDURE — 99214 OFFICE O/P EST MOD 30 MIN: CPT | Mod: HCNC,S$GLB,, | Performed by: FAMILY MEDICINE

## 2019-05-14 PROCEDURE — 3074F SYST BP LT 130 MM HG: CPT | Mod: HCNC,CPTII,S$GLB, | Performed by: FAMILY MEDICINE

## 2019-05-14 PROCEDURE — 1101F PT FALLS ASSESS-DOCD LE1/YR: CPT | Mod: HCNC,CPTII,S$GLB, | Performed by: FAMILY MEDICINE

## 2019-05-14 PROCEDURE — 3078F DIAST BP <80 MM HG: CPT | Mod: HCNC,CPTII,S$GLB, | Performed by: FAMILY MEDICINE

## 2019-05-14 PROCEDURE — 3045F PR MOST RECENT HEMOGLOBIN A1C LEVEL 7.0-9.0%: ICD-10-PCS | Mod: HCNC,CPTII,S$GLB, | Performed by: FAMILY MEDICINE

## 2019-05-14 RX ORDER — HYDROXYZINE HYDROCHLORIDE 25 MG/1
25 TABLET, FILM COATED ORAL 3 TIMES DAILY PRN
Qty: 30 TABLET | Refills: 1 | Status: SHIPPED | OUTPATIENT
Start: 2019-05-14 | End: 2019-08-14 | Stop reason: SDUPTHER

## 2019-05-14 NOTE — PROGRESS NOTES
Subjective:       Patient ID: Stu Garcia is a 73 y.o. male.    Chief Complaint: Follow-up (3 month)      Here to f/u on chronic health issues    INVASIVE MODERATELY DIFFERENTIATED ADENOCARCINOMA - esophagectomy scheduled; he has completed chemotherapy and has 1 more radiation treatment  S/p Anterior cervical decompression fusion done on 7/6/2017 by Dr. Jeter - neck stable; swallowing improved, but still crushing pills  Lumbar fracture - back pain only occurring at night periodically  Diabetes - following diabetic diet diet; He is taking glimepiride 4mg BID. Metformin reportedly makes him feel bad. BGs 190s. No hypoglycemia.   CAD - s/p 4 stents; following with Dr. Maldonado  He has been getting some intermittent angina pains. It does improve with rest.  Paroxysmal afib - taking metoprolol 50mg 1/2 tab BID, lisinopril 5mg; holding all blood thinners presently  HLD - taking Lipitor 80mg daily  Gerd - taking Omeprazole 40mg daily  Down 10 pounds since last appt  Here today with wife who reports he is more anxious, irritable, anxious on occasion.          Diabetes   Pertinent negatives for hypoglycemia include no confusion, dizziness or headaches. Pertinent negatives for diabetes include no chest pain, no polydipsia, no polyuria and no weakness.   Coronary Artery Disease   Symptoms include chest tightness. Pertinent negatives include no chest pain, dizziness, leg swelling, palpitations or shortness of breath.   Atrial Fibrillation   Symptoms are negative for chest pain, dizziness, palpitations, shortness of breath and weakness. Past medical history includes atrial fibrillation and CAD.       Past Medical History:   Diagnosis Date    Anticoagulant long-term use     xarelto,asa    Arthritis     Atrial fibrillation 2013    cardioverted    Bleb, lung     Cataract     OS    Colon polyp     Coronary artery disease     Coronary artery disease involving native coronary artery of native heart with unstable angina pectoris  3/18/2018    Diabetes mellitus     Diabetes mellitus, type 2     Diverticulosis     General anesthetics causing adverse effect in therapeutic use     delayed emergence per patient's wife    GERD (gastroesophageal reflux disease)     HEARING LOSS     bilateral aids    Hemorrhoid     HLD (hyperlipidemia)     Hypertension     Iron deficiency anemia     Neuropathy of leg     Pneumonia due to other staphylococcus     Prostate cancer     prostate    Spontaneous pneumothorax     bilateral       Past Surgical History:   Procedure Laterality Date    ARTHROSCOPY-KNEE W/ CHONDROPLASTY Left 4/7/2017    [facility]    ARTHROSCOPY-MENISCECTOMY Left 4/7/2017    [facility]    CARDIOVERSION      CATARACT EXTRACTION      bilateral    CERVICAL SPINE FRACTURE SURGERY      c7 t1 ACDF     CHEST TUBE INSERTION Right     COLONOSCOPY      COLONOSCOPY N/A 5/20/2015    Performed by Andrea Kerns Jr., [facility] at [facility] ENDO    CORONARY STENT PLACEMENT      x 2    ESOPHAGOGASTRODUODENOSCOPY (EGD) N/A 2/23/2019    [facility]    ESOPHAGOGASTRODUODENOSCOPY (EGD) N/A 5/20/2015    Per[facility] Jr[facility] at [facility] ENDO    HERNIA REPAIR      umbilical    WAKYXTELE-WRKA-I-CATH Right 4/4/2019    [facility] by Nahum Zaidi [facility] at [facility] OR    KNEE SURGERY Left 2017    Left heart cath Right 3/19/2018    [facility]o [facility] at [facility] CATH    Left heart cath Left 3/17/2018    [facility]    Left heart cath-RM # 222 B Right 3/21/2018    [facility] at [facility]    open thoracotomy Left 1966    With pleurectomy    Pleurectomy Left 1966    For treatment of left pneumothorax    PROSTATECTOMY  2001    open    TUBE THORACOTOMY  1966    pneumothorax left--open    ULTRASOUND, UPPER GI TRACT, ENDOSCOPIC N/A 3/13/2019    Performed  by Andrew Shirley MD at [facility] (2ND FLR)       Review of patient's allergies indicates:   Allergen Reactions    Codeine Nausea And Vomiting       Social History     Socioeconomic History    Marital status:      Spouse name: Not on file    Number of children: 2    Years of education: Not on file    Highest education level: Not on file   Occupational History    Occupation:     Occupation: prior [employer]    Social Needs    Financial resource strain: Not hard at all    Food insecurity:     Worry: Never true     Inability: Never true    Transportation needs:     Medical: No     Non-medical: No   Tobacco Use    Smoking status: Former Smoker     Years: 20.00    Smokeless tobacco: Never Used    Tobacco comment: quit smoking in 1980's.   Substance and Sexual Activity    Alcohol use: Yes     Frequency: Monthly or less     Drinks per session: 1 or 2     Binge frequency: Never     Comment: 1 drink every 2 weeks    Drug use: No    Sexual activity: Yes   Lifestyle    Physical activity:     Days per week: 0 days     Minutes per session: Not on file    Stress: Very much   Relationships    Social connections:     Talks on phone: More than three times a week     Gets together: Once a week     Attends Nondenominational service: Not on file     Active member of club or organization: No     Attends meetings of clubs or organizations: Patient refused     Relationship status:    Other Topics Concern    Not on file   Social History Narrative    From [place]       Current Outpatient Medications on File Prior to Visit   Medication Sig Dispense Refill    ACCU-CHEK SHASHANK PLUS TEST STRP Strp TEST ONCE DAILY 100 strip 2    atorvastatin (LIPITOR) 80 MG tablet Take 1 tablet (80 mg total) by mouth every evening. 90 tablet 0    diphenhydrAMINE-aluminum-magnesium hydroxide-simethicone-lidocaine HCl 2% Swish and spit 15 mLs every 4 (four) hours as needed. 250 mL 0    ferrous gluconate (FERGON) 324  MG tablet Take 1 tablet (324 mg total) by mouth daily with breakfast.      glimepiride (AMARYL) 2 MG tablet TAKE 2 TABLETS TWICE DAILY 360 tablet 3    HYDROcodone-acetaminophen (NORCO) 5-325 mg per tablet Take 1 tablet by mouth every 6 (six) hours as needed for Pain. 60 tablet 0    lancets (ACCU-CHEK SOFTCLIX LANCETS) Misc 1 strip by Misc.(Non-Drug; Combo Route) route once daily. 100 each 3    levoFLOXacin (LEVAQUIN) 500 MG tablet Take 1 tablet (500 mg total) by mouth once daily. 7 tablet 0    lidocaine-prilocaine (EMLA) cream U UTD  0    metoprolol tartrate (LOPRESSOR) 50 MG tablet Take 0.5 tablets (25 mg total) by mouth 2 (two) times daily. 30 tablet 11    multivitamin capsule Take 1 capsule by mouth once daily.      nitroGLYCERIN (NITROSTAT) 0.4 MG SL tablet DISSOLVE 1 TABLET UNDER THE TONGUE EVERY 5 MINUTES AS NEEDED FOR CHEST PAIN 100 tablet prn    ondansetron (ZOFRAN) 8 MG tablet TK 1 T PO 1 HOUR B D RADIATION TREATMENT RO PREVENT NAUSEA UTD 30 tablet 3    pantoprazole (PROTONIX) 40 MG tablet Take 1 tablet (40 mg total) by mouth 2 (two) times daily. 60 tablet 11    prochlorperazine (COMPAZINE) 10 MG tablet TK 1 TABLET PO Q 6 HOURS PRN FOR NAUSEA/VOMITING 60 tablet 6    tiZANidine (ZANAFLEX) 4 MG tablet TAKE 1 TABLET(4 MG) BY MOUTH EVERY 6 HOURS AS NEEDED 385 tablet 5    vit C,W-Yi-xzlvm-lutein-zeaxan (PRESERVISION AREDS 2) 084-095-91-1 mg-unit-mg-mg Cap Take 1 tablet by mouth once daily.      [DISCONTINUED] furosemide (LASIX) 20 MG tablet TAKE ONE TABLET BY MOUTH EVERY MONDAY, WEDNESDAY, AND FRIDAY 20 tablet 0     Current Facility-Administered Medications on File Prior to Visit   Medication Dose Route Frequency Provider Last Rate Last Dose    [COMPLETED] CARBOplatin (PARAPLATIN) 290 mg in sodium chloride 0.9% 250 mL chemo infusion  290 mg Intravenous 1 time in Clinic/HOD Tyson Vidal NP   Stopped at 05/13/19 6336    [COMPLETED] diphenhydramine (BENADRYL) 50 mg in NS 50 mL IVPB  50 mg  Intravenous 1 time in Clinic/HOD Tyson Vidal NP   Stopped at 05/13/19 1310    [COMPLETED] famotidine (PF) injection 20 mg  20 mg Intravenous 1 time in Clinic/HOD Tyson Vidal NP   20 mg at 05/13/19 1247    [COMPLETED] PACLitaxel (TAXOL) 50 mg/m2 = 102 mg in sodium chloride 0.9% 250 mL chemo infusion  50 mg/m2 (Order-Specific) Intravenous 1 time in Clinic/HOD Tyson Vidal NP   Stopped at 05/13/19 1419    [COMPLETED] palonosetron 0.25mg/dexamethasone 10mg IVPB   Intravenous 1 time in Clinic/HOD Tyson Vidal NP   Stopped at 05/13/19 1246    [COMPLETED] sodium chloride 0.9% 250 mL flush bag   Intravenous 1 time in Clinic/[facility] Vidal NP   Stopped at 05/13/19 1530    [DISCONTINUED] alteplase injection 2 mg  2 mg Intra-Catheter PRN Tyson Vidal NP        [DISCONTINUED] diphenhydrAMINE injection 50 mg  50 mg Intravenous Once PRN Tyson Vidal NP        [DISCONTINUED] EPINEPHrine (EPIPEN) 0.3 mg/0.3 mL pen injection 0.3 mg  0.3 mg Intramuscular Once PRN Tyson Vidal NP        [DISCONTINUED] heparin, porcine (PF) 100 unit/mL injection flush 500 Units  500 Units Intravenous PRN Tyson Vidal NP   500 Units at 05/13/19 1533    [DISCONTINUED] hydrocortisone sodium succinate injection 100 mg  100 mg Intravenous Once PRN Tyson Vidal NP        [DISCONTINUED] sodium chloride 0.9% flush 10 mL  10 mL Intravenous PRN Tyson Vidal NP   10 mL at 05/13/19 1532       Family History   Problem Relation Age of Onset    Mental illness Mother         dementia    Coronary artery disease Father     Cancer Father         stomach with mets    Diabetes Brother        Review of Systems   Constitutional: Negative for activity change, appetite change, chills, fever and unexpected weight change.   HENT: Positive for hearing loss, rhinorrhea and trouble swallowing. Negative for sore throat.    Eyes: Negative for pain, discharge and visual disturbance.   Respiratory: Positive for chest  "tightness. Negative for cough, shortness of breath and wheezing.    Cardiovascular: Negative for chest pain, palpitations and leg swelling.   Gastrointestinal: Negative for abdominal pain, blood in stool, constipation, diarrhea, nausea and vomiting.   Endocrine: Negative for polydipsia and polyuria.   Genitourinary: Negative for difficulty urinating, dysuria, hematuria and urgency.   Musculoskeletal: Positive for back pain and neck pain. Negative for arthralgias, gait problem and joint swelling.   Skin: Negative for rash and wound.   Neurological: Negative for dizziness, weakness, light-headedness and headaches.   Hematological: Negative for adenopathy.   Psychiatric/Behavioral: Negative for confusion and dysphoric mood.       Objective:      BP (!) 122/58 (BP Location: Left arm, Patient Position: Sitting)   Pulse 82   Temp 98.5 °F (36.9 °C)   Resp 18   Ht 6' 5" (1.956 m)   Wt 113.5 kg (250 lb 3.6 oz)   SpO2 97%   BMI 29.67 kg/m²   Physical Exam   Constitutional: He is oriented to person, place, and time. He appears well-developed and well-nourished. He is active. No distress.   HENT:   Head: Normocephalic and atraumatic.   Right Ear: External ear normal.   Left Ear: External ear normal.   Mouth/Throat: Uvula is midline, oropharynx is clear and moist and mucous membranes are normal. No oropharyngeal exudate.   Eyes: Pupils are equal, round, and reactive to light. Conjunctivae, EOM and lids are normal.   Neck: Normal range of motion, full passive range of motion without pain and phonation normal. Neck supple. No thyroid mass and no thyromegaly present.   Cardiovascular: Normal rate, regular rhythm, normal heart sounds and intact distal pulses. Exam reveals no gallop and no friction rub.   No murmur heard.  Pulmonary/Chest: Effort normal and breath sounds normal. No respiratory distress. He has no wheezes. He has no rales.   Abdominal: Soft. Normal appearance and bowel sounds are normal. There is tenderness in " the epigastric area.   Musculoskeletal: Normal range of motion.   Lymphadenopathy:     He has no cervical adenopathy.   Neurological: He is alert and oriented to person, place, and time. No cranial nerve deficit. Coordination normal.   Skin: Skin is warm and dry.   Psychiatric: He has a normal mood and affect. His speech is normal and behavior is normal. Judgment and thought content normal.     Foot exam: inspection normal, sensation intact; 2+ DP pulses bilaterally    Results for orders placed or performed in visit on 05/13/19   CBC w/ DIFF   Result Value Ref Range    WBC 4.32 3.90 - 12.70 K/uL    RBC 4.56 (L) 4.60 - 6.20 M/uL    Hemoglobin 12.6 (L) 14.0 - 18.0 g/dL    Hematocrit 39.8 (L) 40.0 - 54.0 %    Mean Corpuscular Volume 87 82 - 98 fL    Mean Corpuscular Hemoglobin 27.6 27.0 - 31.0 pg    Mean Corpuscular Hemoglobin Conc 31.7 (L) 32.0 - 36.0 g/dL    RDW 21.2 (H) 11.5 - 14.5 %    Platelets 103 (L) 150 - 350 K/uL    MPV 11.2 9.2 - 12.9 fL    Immature Granulocytes 0.9 (H) 0.0 - 0.5 %    Gran # (ANC) 3.3 1.8 - 7.7 K/uL    Immature Grans (Abs) 0.04 0.00 - 0.04 K/uL    Lymph # 0.4 (L) 1.0 - 4.8 K/uL    Mono # 0.5 0.3 - 1.0 K/uL    Eos # 0.0 0.0 - 0.5 K/uL    Baso # 0.02 0.00 - 0.20 K/uL    nRBC 0 0 /100 WBC    Gran% 76.4 (H) 38.0 - 73.0 %    Lymph% 9.7 (L) 18.0 - 48.0 %    Mono% 11.6 4.0 - 15.0 %    Eosinophil% 0.9 0.0 - 8.0 %    Basophil% 0.5 0.0 - 1.9 %    Differential Method Automated    CMP   Result Value Ref Range    Sodium 135 (L) 136 - 145 mmol/L    Potassium 4.4 3.5 - 5.1 mmol/L    Chloride 101 95 - 110 mmol/L    CO2 26 22 - 31 mmol/L    Glucose 242 (H) 70 - 110 mg/dL    BUN, Bld 25 (H) 9 - 21 mg/dL    Creatinine 1.12 0.50 - 1.40 mg/dL    Calcium 9.5 8.4 - 10.2 mg/dL    Total Protein 6.8 6.0 - 8.4 g/dL    Albumin 3.9 3.5 - 5.2 g/dL    Total Bilirubin 0.5 0.2 - 1.3 mg/dL    Alkaline Phosphatase 123 38 - 145 U/L    AST 25 17 - 59 U/L    ALT 30 10 - 44 U/L    Anion Gap 8 8 - 16 mmol/L    eGFR if African  American >60 >60 mL/min/1.73 m^2    eGFR if non African American >60 >60 mL/min/1.73 m^2   LDH   Result Value Ref Range     313 - 618 U/L   Magnesium   Result Value Ref Range    Magnesium 1.8 1.6 - 2.6 mg/dL     Hospital records reviewed    Assessment:       1. Type 2 diabetes mellitus treated without insulin    2. PAF (paroxysmal atrial fibrillation)    3. Hyperlipidemia, unspecified hyperlipidemia type    4. Essential hypertension    5. Malignant neoplasm of lower third of esophagus    6. Adjustment reaction with anxiety        Plan:       Type 2 diabetes mellitus treated without insulin  -     Hemoglobin A1c; Future; Expected date: 08/12/2019  -     Comprehensive metabolic panel; Future; Expected date: 08/12/2019    PAF (paroxysmal atrial fibrillation)    Hyperlipidemia, unspecified hyperlipidemia type    Essential hypertension    Malignant neoplasm of lower third of esophagus    Adjustment reaction with anxiety  -     hydrOXYzine HCl (ATARAX) 25 MG tablet; Take 1 tablet (25 mg total) by mouth 3 (three) times daily as needed for Anxiety.  Dispense: 30 tablet; Refill: 1        Continue protonix BID  F/u with cardiology as planned   Surgery and oncology urgent appts arranged  Continue Glimepiride 4mg BID; monitor diabetes with anticipated weight loss with upcoming surgery  Continue other present meds  Counseled on regular exercise, maintenance of a healthy weight, balanced diet rich in fruits/vegetables and lean protein, and avoidance of unhealthy habits like smoking and excessive alcohol intake.    F/u 3 months with labs

## 2019-05-15 ENCOUNTER — DOCUMENTATION ONLY (OUTPATIENT)
Dept: INFUSION THERAPY | Facility: HOSPITAL | Age: 74
End: 2019-05-15

## 2019-05-15 ENCOUNTER — PATIENT MESSAGE (OUTPATIENT)
Dept: SURGERY | Facility: CLINIC | Age: 74
End: 2019-05-15

## 2019-05-15 NOTE — PROGRESS NOTES
Nutrition: Met with patient and patients wife on 5/14/2019 to discuss weight loss and patients oral intake. patient is still able to swallow solid foods. States he is drinking glucerna WT: 251# Weight loss of 5#s since last week. Discussed glucerna 1.5 as an option to increase calories while maintaining blood sugar. Offered support and encourgement. Will follow up with patient next week. Ai Rush,MS, Rd, LDN

## 2019-05-17 ENCOUNTER — CLINICAL SUPPORT (OUTPATIENT)
Dept: AUDIOLOGY | Facility: CLINIC | Age: 74
End: 2019-05-17
Payer: MEDICARE

## 2019-05-17 PROCEDURE — V5014 PR HEARING AID REPAIR/MODIFYING: ICD-10-PCS | Mod: CSM,,, | Performed by: AUDIOLOGIST

## 2019-05-17 PROCEDURE — V5014 HEARING AID REPAIR/MODIFYING: HCPCS | Mod: CSM,,, | Performed by: AUDIOLOGIST

## 2019-05-28 ENCOUNTER — DOCUMENTATION ONLY (OUTPATIENT)
Dept: INFUSION THERAPY | Facility: HOSPITAL | Age: 74
End: 2019-05-28

## 2019-06-11 ENCOUNTER — HOSPITAL ENCOUNTER (OUTPATIENT)
Dept: RADIOLOGY | Facility: HOSPITAL | Age: 74
Discharge: HOME | End: 2019-06-11
Attending: SURGERY
Payer: MEDICARE

## 2019-06-11 DIAGNOSIS — C16.0 GE JUNCTION CARCINOMA: ICD-10-CM

## 2019-06-11 PROCEDURE — 78815 PET IMAGE W/CT SKULL-THIGH: CPT | Mod: TC,HCNC,PO

## 2019-06-11 PROCEDURE — 78815 PET IMAGE W/CT SKULL-THIGH: CPT | Mod: 26,PI,HCNC, | Performed by: RADIOLOGY

## 2019-06-11 PROCEDURE — A9552 F18 FDG: HCPCS | Mod: HCNC,PO

## 2019-06-11 PROCEDURE — 78815 NM PET CT ROUTINE: ICD-10-PCS | Mod: 26,PI,HCNC, | Performed by: RADIOLOGY

## 2019-06-17 ENCOUNTER — INFUSION (OUTPATIENT)
Dept: INFUSION THERAPY | Facility: HOSPITAL | Age: 74
End: 2019-06-17
Attending: INTERNAL MEDICINE
Payer: MEDICARE

## 2019-06-17 DIAGNOSIS — C16.0 GE JUNCTION CARCINOMA: Primary | ICD-10-CM

## 2019-06-17 PROCEDURE — 96523 IRRIG DRUG DELIVERY DEVICE: CPT | Mod: HCNC,PN

## 2019-06-17 PROCEDURE — 63600175 PHARM REV CODE 636 W HCPCS: Mod: HCNC,PN | Performed by: INTERNAL MEDICINE

## 2019-06-17 PROCEDURE — 25000003 PHARM REV CODE 250: Mod: HCNC,PN | Performed by: INTERNAL MEDICINE

## 2019-06-17 PROCEDURE — A4216 STERILE WATER/SALINE, 10 ML: HCPCS | Mod: HCNC,PN | Performed by: INTERNAL MEDICINE

## 2019-06-17 RX ORDER — HEPARIN 100 UNIT/ML
500 SYRINGE INTRAVENOUS
Status: COMPLETED | OUTPATIENT
Start: 2019-06-17 | End: 2019-06-17

## 2019-06-17 RX ORDER — SODIUM CHLORIDE 0.9 % (FLUSH) 0.9 %
10 SYRINGE (ML) INJECTION
Status: COMPLETED | OUTPATIENT
Start: 2019-06-17 | End: 2019-06-17

## 2019-06-17 RX ORDER — SODIUM CHLORIDE 0.9 % (FLUSH) 0.9 %
10 SYRINGE (ML) INJECTION
Status: CANCELLED | OUTPATIENT
Start: 2019-06-17

## 2019-06-17 RX ORDER — HEPARIN 100 UNIT/ML
500 SYRINGE INTRAVENOUS
Status: CANCELLED | OUTPATIENT
Start: 2019-06-17

## 2019-06-17 RX ADMIN — Medication 10 ML: at 09:06

## 2019-06-17 RX ADMIN — HEPARIN 500 UNITS: 100 SYRINGE at 09:06

## 2019-06-19 ENCOUNTER — OFFICE VISIT (OUTPATIENT)
Dept: SURGICAL ONCOLOGY | Facility: CLINIC | Age: 74
End: 2019-06-19
Payer: MEDICARE

## 2019-06-19 VITALS
BODY MASS INDEX: 29.99 KG/M2 | HEART RATE: 87 BPM | HEIGHT: 77 IN | SYSTOLIC BLOOD PRESSURE: 148 MMHG | WEIGHT: 254 LBS | DIASTOLIC BLOOD PRESSURE: 68 MMHG | TEMPERATURE: 99 F

## 2019-06-19 DIAGNOSIS — C16.0 GE JUNCTION CARCINOMA: Primary | ICD-10-CM

## 2019-06-19 PROCEDURE — 3077F PR MOST RECENT SYSTOLIC BLOOD PRESSURE >= 140 MM HG: ICD-10-PCS | Mod: HCNC,CPTII,S$GLB, | Performed by: SURGERY

## 2019-06-19 PROCEDURE — 3078F PR MOST RECENT DIASTOLIC BLOOD PRESSURE < 80 MM HG: ICD-10-PCS | Mod: HCNC,CPTII,S$GLB, | Performed by: SURGERY

## 2019-06-19 PROCEDURE — 99214 OFFICE O/P EST MOD 30 MIN: CPT | Mod: HCNC,S$GLB,, | Performed by: SURGERY

## 2019-06-19 PROCEDURE — 99999 PR PBB SHADOW E&M-EST. PATIENT-LVL III: CPT | Mod: PBBFAC,HCNC,, | Performed by: SURGERY

## 2019-06-19 PROCEDURE — 1101F PR PT FALLS ASSESS DOC 0-1 FALLS W/OUT INJ PAST YR: ICD-10-PCS | Mod: HCNC,CPTII,S$GLB, | Performed by: SURGERY

## 2019-06-19 PROCEDURE — 1101F PT FALLS ASSESS-DOCD LE1/YR: CPT | Mod: HCNC,CPTII,S$GLB, | Performed by: SURGERY

## 2019-06-19 PROCEDURE — 3077F SYST BP >= 140 MM HG: CPT | Mod: HCNC,CPTII,S$GLB, | Performed by: SURGERY

## 2019-06-19 PROCEDURE — 3078F DIAST BP <80 MM HG: CPT | Mod: HCNC,CPTII,S$GLB, | Performed by: SURGERY

## 2019-06-19 PROCEDURE — 99999 PR PBB SHADOW E&M-EST. PATIENT-LVL III: ICD-10-PCS | Mod: PBBFAC,HCNC,, | Performed by: SURGERY

## 2019-06-19 PROCEDURE — 99214 PR OFFICE/OUTPT VISIT, EST, LEVL IV, 30-39 MIN: ICD-10-PCS | Mod: HCNC,S$GLB,, | Performed by: SURGERY

## 2019-06-19 NOTE — H&P (VIEW-ONLY)
uT2N0 JULISSA of [facility] 1, who finished NACR on 5/17/19. Had treatment related odynophaghia, now resolved, and fatigue, which is improving (says he is about 70% of normal). He has resumed his gym workouts twice per week. No interval new medical problems    Past Medical History:   Diagnosis Date    Anticoagulant long-term use     xarelto,asa    Arthritis     Atrial fibrillation 2013    cardioverted    Bleb, lung     Cataract     OS    Colon polyp     Coronary artery disease     Coronary artery disease involving native coronary artery of native heart with unstable angina pectoris 3/18/2018    Diabetes mellitus     Diabetes mellitus, type 2     Diverticulosis     General anesthetics causing adverse effect in therapeutic use     delayed emergence per patient's wife    GERD (gastroesophageal reflux disease)     HEARING LOSS     bilateral aids    Hemorrhoid     HLD (hyperlipidemia)     Hypertension     Iron deficiency anemia     Neuropathy of leg     Pneumonia due to other staphylococcus     Prostate cancer     prostate    Spontaneous pneumothorax     bilateral     Medication List with Changes/Refills   Current Medications    ACCU-CHEK SHASHANK PLUS TEST STRP STRP    TEST ONCE DAILY    ATORVASTATIN (LIPITOR) 80 MG TABLET    Take 1 tablet (80 mg total) by mouth every evening.    DIPHENHYDRAMINE-ALUMINUM-MAGNESIUM HYDROXIDE-SIMETHICONE-LIDOCAINE HCL 2%    Swish and spit 15 mLs every 4 (four) hours as needed.    FERROUS GLUCONATE (FERGON) 324 MG TABLET    Take 1 tablet (324 mg total) by mouth daily with breakfast.    GLIMEPIRIDE (AMARYL) 2 MG TABLET    TAKE 2 TABLETS TWICE DAILY    HYDROCODONE-ACETAMINOPHEN (NORCO) 5-325 MG PER TABLET    Take 1 tablet by mouth every 6 (six) hours as needed for Pain.    HYDROXYZINE HCL (ATARAX) 25 MG TABLET    Take 1 tablet (25 mg total) by mouth 3 (three) times daily as needed for Anxiety.    LANCETS (ACCU-CHEK SOFTCLIX LANCETS) MISC    1 strip by Misc.(Non-Drug;  Combo Route) route once daily.    LIDOCAINE-PRILOCAINE (EMLA) CREAM    U UTD    METOPROLOL TARTRATE (LOPRESSOR) 50 MG TABLET    Take 0.5 tablets (25 mg total) by mouth 2 (two) times daily.    MULTIVITAMIN CAPSULE    Take 1 capsule by mouth once daily.    NITROGLYCERIN (NITROSTAT) 0.4 MG SL TABLET    DISSOLVE 1 TABLET UNDER THE TONGUE EVERY 5 MINUTES AS NEEDED FOR CHEST PAIN    ONDANSETRON (ZOFRAN) 8 MG TABLET    TK 1 T PO 1 HOUR B D RADIATION TREATMENT RO PREVENT NAUSEA UTD    PANTOPRAZOLE (PROTONIX) 40 MG TABLET    Take 1 tablet (40 mg total) by mouth 2 (two) times daily.    PROCHLORPERAZINE (COMPAZINE) 10 MG TABLET    TK 1 TABLET PO Q 6 HOURS PRN FOR NAUSEA/VOMITING    TIZANIDINE (ZANAFLEX) 4 MG TABLET    TAKE 1 TABLET(4 MG) BY MOUTH EVERY 6 HOURS AS NEEDED    VIT C,M-OW-LIBFU-LUTEIN-ZEAXAN (PRESERVISION AREDS 2) [id number] MG-UNIT-MG-MG CAP    Take 1 tablet by mouth once daily.   Discontinued Medications    LEVOFLOXACIN (LEVAQUIN) 500 MG TABLET    Take 1 tablet (500 mg total) by mouth once daily.     Review of patient's allergies indicates:   Allergen Reactions    Codeine Nausea And Vomiting     WD WN man in NAD  Vitals:    06/19/19 1027   BP: (!) 148/68   Pulse: 87   Temp: 98.8 °F (37.1 °C)     Neck ROM mildly limited but I do not expect it to be a problem intraoperatively.   S/p prior left thoracotomy for blebs/recurring PTX    At today's visit, I discussed in detail with him the indications, technique of, and risks/complications of esophagectomy and answered his questions in detail. I have strongly encouraged him to intensify his exercise program into a progressive, daily regimen.   Tentative surgery date set for early July.

## 2019-06-19 NOTE — PROGRESS NOTES
uT2N0 JULISSA of [facility] [address], who finished NACR on 5/17/19. Had treatment related odynophaghia, now resolved, and fatigue, which is improving (says he is about 70% of normal). He has resumed his gym workouts twice per week. No interval new medical problems    Past Medical History:   Diagnosis Date    Anticoagulant long-term use     xarelto,asa    Arthritis     Atrial fibrillation 2013    cardioverted    Bleb, lung     Cataract     OS    Colon polyp     Coronary artery disease     Coronary artery disease involving native coronary artery of native heart with unstable angina pectoris 3/18/2018    Diabetes mellitus     Diabetes mellitus, type 2     Diverticulosis     General anesthetics causing adverse effect in therapeutic use     delayed emergence per patient's wife    GERD (gastroesophageal reflux disease)     HEARING LOSS     bilateral aids    Hemorrhoid     HLD (hyperlipidemia)     Hypertension     Iron deficiency anemia     Neuropathy of leg     Pneumonia due to other staphylococcus     Prostate cancer     prostate    Spontaneous pneumothorax     bilateral     Medication List with Changes/Refills   Current Medications    ACCU-CHEK SHASHANK PLUS TEST STRP STRP    TEST ONCE DAILY    ATORVASTATIN (LIPITOR) 80 MG TABLET    Take 1 tablet (80 mg total) by mouth every evening.    DIPHENHYDRAMINE-ALUMINUM-MAGNESIUM HYDROXIDE-SIMETHICONE-LIDOCAINE HCL 2%    Swish and spit 15 mLs every 4 (four) hours as needed.    FERROUS GLUCONATE (FERGON) 324 MG TABLET    Take 1 tablet (324 mg total) by mouth daily with breakfast.    GLIMEPIRIDE (AMARYL) 2 MG TABLET    TAKE 2 TABLETS TWICE DAILY    HYDROCODONE-ACETAMINOPHEN (NORCO) 5-325 MG PER TABLET    Take 1 tablet by mouth every 6 (six) hours as needed for Pain.    HYDROXYZINE HCL (ATARAX) 25 MG TABLET    Take 1 tablet (25 mg total) by mouth 3 (three) times daily as needed for Anxiety.    LANCETS (ACCU-CHEK SOFTCLIX LANCETS) MISC    1 strip by Misc.(Non-Drug;  Combo Route) route once daily.    LIDOCAINE-PRILOCAINE (EMLA) CREAM    U UTD    METOPROLOL TARTRATE (LOPRESSOR) 50 MG TABLET    Take 0.5 tablets (25 mg total) by mouth 2 (two) times daily.    MULTIVITAMIN CAPSULE    Take 1 capsule by mouth once daily.    NITROGLYCERIN (NITROSTAT) 0.4 MG SL TABLET    DISSOLVE 1 TABLET UNDER THE TONGUE EVERY 5 MINUTES AS NEEDED FOR CHEST PAIN    ONDANSETRON (ZOFRAN) 8 MG TABLET    TK 1 T PO 1 HOUR B D RADIATION TREATMENT RO PREVENT NAUSEA UTD    PANTOPRAZOLE (PROTONIX) 40 MG TABLET    Take 1 tablet (40 mg total) by mouth 2 (two) times daily.    PROCHLORPERAZINE (COMPAZINE) 10 MG TABLET    TK 1 TABLET PO Q 6 HOURS PRN FOR NAUSEA/VOMITING    TIZANIDINE (ZANAFLEX) 4 MG TABLET    TAKE 1 TABLET(4 MG) BY MOUTH EVERY 6 HOURS AS NEEDED    VIT C,T-ZM-ZZHUU-LUTEIN-ZEAXAN (PRESERVISION AREDS 2) [id number] MG-UNIT-MG-MG CAP    Take 1 tablet by mouth once daily.   Discontinued Medications    LEVOFLOXACIN (LEVAQUIN) 500 MG TABLET    Take 1 tablet (500 mg total) by mouth once daily.     Review of patient's allergies indicates:   Allergen Reactions    Codeine Nausea And Vomiting     WD WN man in NAD  Vitals:    06/19/19 1027   BP: (!) 148/68   Pulse: 87   Temp: 98.8 °F (37.1 °C)     Neck ROM mildly limited but I do not expect it to be a problem intraoperatively.   S/p prior left thoracotomy for blebs/recurring PTX    At today's visit, I discussed in detail with him the indications, technique of, and risks/complications of esophagectomy and answered his questions in detail. I have strongly encouraged him to intensify his exercise program into a progressive, daily regimen.   Tentative surgery date set for early July.

## 2019-06-28 ENCOUNTER — OUTPATIENT CASE MANAGEMENT (OUTPATIENT)
Dept: ADMINISTRATIVE | Facility: OTHER | Age: 74
End: 2019-06-28

## 2019-06-28 DIAGNOSIS — C15.5 MALIGNANT NEOPLASM OF LOWER THIRD OF ESOPHAGUS: Primary | ICD-10-CM

## 2019-06-28 DIAGNOSIS — C15.9 ESOPHAGEAL CANCER: ICD-10-CM

## 2019-06-28 RX ORDER — TAMSULOSIN HYDROCHLORIDE 0.4 MG/1
0.4 CAPSULE ORAL DAILY
Status: CANCELLED | OUTPATIENT
Start: 2019-06-28

## 2019-06-28 RX ORDER — ENOXAPARIN SODIUM 100 MG/ML
40 INJECTION SUBCUTANEOUS ONCE
Status: CANCELLED | OUTPATIENT
Start: 2019-06-28

## 2019-06-28 RX ORDER — SODIUM CHLORIDE 9 MG/ML
INJECTION, SOLUTION INTRAVENOUS CONTINUOUS
Status: CANCELLED | OUTPATIENT
Start: 2019-06-28

## 2019-06-28 RX ORDER — LIDOCAINE HYDROCHLORIDE 10 MG/ML
1 INJECTION, SOLUTION EPIDURAL; INFILTRATION; INTRACAUDAL; PERINEURAL ONCE
Status: CANCELLED | OUTPATIENT
Start: 2019-06-28 | End: 2019-06-28

## 2019-06-28 RX ORDER — METRONIDAZOLE 500 MG/100ML
500 INJECTION, SOLUTION INTRAVENOUS
Status: CANCELLED | OUTPATIENT
Start: 2019-06-28

## 2019-07-01 ENCOUNTER — ANESTHESIA EVENT (OUTPATIENT)
Dept: SURGERY | Facility: HOSPITAL | Age: 74
DRG: 327 | End: 2019-07-01
Payer: MEDICARE

## 2019-07-01 ENCOUNTER — OFFICE VISIT (OUTPATIENT)
Dept: OTOLARYNGOLOGY | Facility: CLINIC | Age: 74
End: 2019-07-01
Payer: MEDICARE

## 2019-07-01 ENCOUNTER — TELEPHONE (OUTPATIENT)
Dept: SURGERY | Facility: CLINIC | Age: 74
End: 2019-07-01

## 2019-07-01 VITALS
WEIGHT: 255 LBS | BODY MASS INDEX: 30.64 KG/M2 | SYSTOLIC BLOOD PRESSURE: 132 MMHG | DIASTOLIC BLOOD PRESSURE: 73 MMHG | HEART RATE: 96 BPM

## 2019-07-01 DIAGNOSIS — C15.5 MALIGNANT NEOPLASM OF LOWER THIRD OF ESOPHAGUS: ICD-10-CM

## 2019-07-01 PROCEDURE — 1101F PT FALLS ASSESS-DOCD LE1/YR: CPT | Mod: HCNC,CPTII,S$GLB, | Performed by: NURSE PRACTITIONER

## 2019-07-01 PROCEDURE — 31575 PR LARYNGOSCOPY, FLEXIBLE; DIAGNOSTIC: ICD-10-PCS | Mod: HCNC,S$GLB,, | Performed by: NURSE PRACTITIONER

## 2019-07-01 PROCEDURE — 3075F SYST BP GE 130 - 139MM HG: CPT | Mod: HCNC,CPTII,S$GLB, | Performed by: NURSE PRACTITIONER

## 2019-07-01 PROCEDURE — 99214 PR OFFICE/OUTPT VISIT, EST, LEVL IV, 30-39 MIN: ICD-10-PCS | Mod: 25,HCNC,S$GLB, | Performed by: NURSE PRACTITIONER

## 2019-07-01 PROCEDURE — 99999 PR PBB SHADOW E&M-EST. PATIENT-LVL V: ICD-10-PCS | Mod: PBBFAC,HCNC,, | Performed by: NURSE PRACTITIONER

## 2019-07-01 PROCEDURE — 99999 PR PBB SHADOW E&M-EST. PATIENT-LVL V: CPT | Mod: PBBFAC,HCNC,, | Performed by: NURSE PRACTITIONER

## 2019-07-01 PROCEDURE — 3078F PR MOST RECENT DIASTOLIC BLOOD PRESSURE < 80 MM HG: ICD-10-PCS | Mod: HCNC,CPTII,S$GLB, | Performed by: NURSE PRACTITIONER

## 2019-07-01 PROCEDURE — 31575 DIAGNOSTIC LARYNGOSCOPY: CPT | Mod: HCNC,S$GLB,, | Performed by: NURSE PRACTITIONER

## 2019-07-01 PROCEDURE — 3075F PR MOST RECENT SYSTOLIC BLOOD PRESS GE 130-139MM HG: ICD-10-PCS | Mod: HCNC,CPTII,S$GLB, | Performed by: NURSE PRACTITIONER

## 2019-07-01 PROCEDURE — 1101F PR PT FALLS ASSESS DOC 0-1 FALLS W/OUT INJ PAST YR: ICD-10-PCS | Mod: HCNC,CPTII,S$GLB, | Performed by: NURSE PRACTITIONER

## 2019-07-01 PROCEDURE — 3078F DIAST BP <80 MM HG: CPT | Mod: HCNC,CPTII,S$GLB, | Performed by: NURSE PRACTITIONER

## 2019-07-01 PROCEDURE — 99214 OFFICE O/P EST MOD 30 MIN: CPT | Mod: 25,HCNC,S$GLB, | Performed by: NURSE PRACTITIONER

## 2019-07-01 NOTE — ANESTHESIA PREPROCEDURE EVALUATION
07/01/2019  Stu Garcia is a 73 y.o., male.    Anesthesia Evaluation    I have reviewed the Patient Summary Reports.    I have reviewed the Nursing Notes.      Review of Systems  Anesthesia Hx:  No problems with previous Anesthesia Hx of Anesthetic complications Emerges combative per wife   Hematology/Oncology:  Hematology Normal   Oncology Normal     EENT/Dental:EENT/Dental Normal   Cardiovascular:   Hypertension CAD   Angina    Pulmonary:   Pneumonia    Renal/:  Renal/ Normal     Hepatic/GI:   GERD    Musculoskeletal:   Arthritis     Neurological:   Neuromuscular Disease,    Endocrine:   Diabetes    Dermatological:  Skin Normal    Psych:  Psychiatric Normal           Physical Exam  General:  Well nourished    Airway/Jaw/Neck:  Airway Findings: Mouth Opening: Normal Tongue: Normal  General Airway Assessment: Adult  Mallampati: III  Improves to II with phonation.  TM Distance: Normal, at least 6 cm  Jaw/Neck Findings:  Neck ROM: Extension Decreased, Mild       Chest/Lungs:  Chest/Lungs Findings: Normal Respiratory Rate     Heart/Vascular:  Heart Findings: Rate: Normal        Mental Status:  Mental Status Findings:  Alert and Oriented         Anesthesia Plan  Type of Anesthesia, risks & benefits discussed:  Anesthesia Type:  general  Patient's Preference: General   Intra-op Monitoring Plan: arterial line and standard ASA monitors  Intra-op Monitoring Plan Comments:   Post Op Pain Control Plan: multimodal analgesia  Post Op Pain Control Plan Comments:   Induction:   IV  Beta Blocker:  Patient is on a Beta-Blocker and has received one dose within the past 24 hours (No further documentation required).       Informed Consent: Patient understands risks and agrees with Anesthesia plan.  Questions answered. Anesthesia consent signed with patient.  ASA Score: 3     Day of Surgery Review of History & Physical:     H&P update referred to the surgeon.     Anesthesia Plan Notes: NPO confirmed (francia this AM).  Discussed case in detail with Dr. Milian.  Plan bronchial blocker initially.  No need for central venous access per Dr. Milian.  Plan rapid sequence induction for aspiration risk.  Coronary disease history noted and reviewed. Plan a-line for close monitoring.        Ready For Surgery From Anesthesia Perspective.

## 2019-07-01 NOTE — TELEPHONE ENCOUNTER
Instructed to shower twice with hibiclens (pm and am), no food after midnight but okay to have clear liquids including 12 oz gatorade before 5am.      Will take metoprolol and pantoprazole in am

## 2019-07-02 ENCOUNTER — HOSPITAL ENCOUNTER (INPATIENT)
Facility: HOSPITAL | Age: 74
LOS: 7 days | Discharge: HOME HEALTH | DRG: 327 | End: 2019-07-09
Attending: SURGERY | Admitting: SURGERY
Payer: MEDICARE

## 2019-07-02 ENCOUNTER — ANESTHESIA (OUTPATIENT)
Dept: SURGERY | Facility: HOSPITAL | Age: 74
DRG: 327 | End: 2019-07-02
Payer: MEDICARE

## 2019-07-02 DIAGNOSIS — I48.0 PAF (PAROXYSMAL ATRIAL FIBRILLATION): ICD-10-CM

## 2019-07-02 DIAGNOSIS — Z78.9 ON ENTERAL NUTRITION: ICD-10-CM

## 2019-07-02 DIAGNOSIS — D69.6 THROMBOCYTOPENIA: ICD-10-CM

## 2019-07-02 DIAGNOSIS — I25.110 CORONARY ARTERY DISEASE INVOLVING NATIVE CORONARY ARTERY OF NATIVE HEART WITH UNSTABLE ANGINA PECTORIS: ICD-10-CM

## 2019-07-02 DIAGNOSIS — Z79.01 CHRONIC ANTICOAGULATION: Primary | ICD-10-CM

## 2019-07-02 DIAGNOSIS — I48.91 ATRIAL FIBRILLATION, UNSPECIFIED TYPE: ICD-10-CM

## 2019-07-02 DIAGNOSIS — I48.91 A-FIB: ICD-10-CM

## 2019-07-02 DIAGNOSIS — I48.19 PERSISTENT ATRIAL FIBRILLATION: ICD-10-CM

## 2019-07-02 DIAGNOSIS — E11.9 TYPE 2 DIABETES MELLITUS TREATED WITHOUT INSULIN: ICD-10-CM

## 2019-07-02 DIAGNOSIS — C15.9 ESOPHAGEAL CANCER: ICD-10-CM

## 2019-07-02 DIAGNOSIS — I48.91 ATRIAL FIBRILLATION: ICD-10-CM

## 2019-07-02 DIAGNOSIS — C15.5 MALIGNANT NEOPLASM OF LOWER THIRD OF ESOPHAGUS: ICD-10-CM

## 2019-07-02 DIAGNOSIS — I10 ESSENTIAL HYPERTENSION: ICD-10-CM

## 2019-07-02 LAB
ABO + RH BLD: NORMAL
ALLENS TEST: ABNORMAL
BLD GP AB SCN CELLS X3 SERPL QL: NORMAL
DELSYS: ABNORMAL
FLOW: 6
GLUCOSE SERPL-MCNC: 139 MG/DL (ref 70–110)
GLUCOSE SERPL-MCNC: 161 MG/DL (ref 70–110)
GLUCOSE SERPL-MCNC: 194 MG/DL (ref 70–110)
GLUCOSE SERPL-MCNC: 202 MG/DL (ref 70–110)
GLUCOSE SERPL-MCNC: 214 MG/DL (ref 70–110)
GLUCOSE SERPL-MCNC: 239 MG/DL (ref 70–110)
GLUCOSE SERPL-MCNC: 239 MG/DL (ref 70–110)
HCO3 UR-SCNC: 18.9 MMOL/L (ref 24–28)
HCO3 UR-SCNC: 20 MMOL/L (ref 24–28)
HCO3 UR-SCNC: 20.5 MMOL/L (ref 24–28)
HCO3 UR-SCNC: 21.1 MMOL/L (ref 24–28)
HCO3 UR-SCNC: 22 MMOL/L (ref 24–28)
HCO3 UR-SCNC: 23.9 MMOL/L (ref 24–28)
HCO3 UR-SCNC: 25.4 MMOL/L (ref 24–28)
HCO3 UR-SCNC: 25.7 MMOL/L (ref 24–28)
HCO3 UR-SCNC: 26.3 MMOL/L (ref 24–28)
HCT VFR BLD CALC: 28 %PCV (ref 36–54)
HCT VFR BLD CALC: 30 %PCV (ref 36–54)
HCT VFR BLD CALC: 30 %PCV (ref 36–54)
HCT VFR BLD CALC: 33 %PCV (ref 36–54)
HCT VFR BLD CALC: 35 %PCV (ref 36–54)
HCT VFR BLD CALC: 36 %PCV (ref 36–54)
HCT VFR BLD CALC: 36 %PCV (ref 36–54)
LDH SERPL L TO P-CCNC: 1.57 MMOL/L (ref 0.36–1.25)
MODE: ABNORMAL
PCO2 BLDA: 35.4 MMHG (ref 35–45)
PCO2 BLDA: 41.1 MMHG (ref 35–45)
PCO2 BLDA: 41.5 MMHG (ref 35–45)
PCO2 BLDA: 42.5 MMHG (ref 35–45)
PCO2 BLDA: 45.2 MMHG (ref 35–45)
PCO2 BLDA: 45.7 MMHG (ref 35–45)
PCO2 BLDA: 56.2 MMHG (ref 35–45)
PCO2 BLDA: 59.6 MMHG (ref 35–45)
PCO2 BLDA: 72.2 MMHG (ref 35–45)
PH SMN: 7.17 [PH] (ref 7.35–7.45)
PH SMN: 7.24 [PH] (ref 7.35–7.45)
PH SMN: 7.25 [PH] (ref 7.35–7.45)
PH SMN: 7.27 [PH] (ref 7.35–7.45)
PH SMN: 7.27 [PH] (ref 7.35–7.45)
PH SMN: 7.28 [PH] (ref 7.35–7.45)
PH SMN: 7.34 [PH] (ref 7.35–7.45)
PH SMN: 7.36 [PH] (ref 7.35–7.45)
PH SMN: 7.37 [PH] (ref 7.35–7.45)
PO2 BLDA: 102 MMHG (ref 80–100)
PO2 BLDA: 112 MMHG (ref 80–100)
PO2 BLDA: 264 MMHG (ref 80–100)
PO2 BLDA: 275 MMHG (ref 80–100)
PO2 BLDA: 73 MMHG (ref 80–100)
PO2 BLDA: 77 MMHG (ref 80–100)
PO2 BLDA: 82 MMHG (ref 80–100)
PO2 BLDA: 84 MMHG (ref 80–100)
PO2 BLDA: 93 MMHG (ref 80–100)
POC BE: -1 MMOL/L
POC BE: -2 MMOL/L
POC BE: -4 MMOL/L
POC BE: -5 MMOL/L
POC BE: -6 MMOL/L
POC BE: -7 MMOL/L
POC BE: -8 MMOL/L
POC IONIZED CALCIUM: 1 MMOL/L (ref 1.06–1.42)
POC IONIZED CALCIUM: 1.02 MMOL/L (ref 1.06–1.42)
POC IONIZED CALCIUM: 1.07 MMOL/L (ref 1.06–1.42)
POC IONIZED CALCIUM: 1.17 MMOL/L (ref 1.06–1.42)
POC IONIZED CALCIUM: 1.22 MMOL/L (ref 1.06–1.42)
POC IONIZED CALCIUM: 1.22 MMOL/L (ref 1.06–1.42)
POC IONIZED CALCIUM: 1.27 MMOL/L (ref 1.06–1.42)
POC SATURATED O2: 100 % (ref 95–100)
POC SATURATED O2: 100 % (ref 95–100)
POC SATURATED O2: 92 % (ref 95–100)
POC SATURATED O2: 93 % (ref 95–100)
POC SATURATED O2: 94 % (ref 95–100)
POC SATURATED O2: 94 % (ref 95–100)
POC SATURATED O2: 95 % (ref 95–100)
POC SATURATED O2: 97 % (ref 95–100)
POC SATURATED O2: 98 % (ref 95–100)
POC TCO2: 20 MMOL/L (ref 23–27)
POC TCO2: 21 MMOL/L (ref 23–27)
POC TCO2: 22 MMOL/L (ref 23–27)
POC TCO2: 22 MMOL/L (ref 23–27)
POC TCO2: 23 MMOL/L (ref 23–27)
POC TCO2: 25 MMOL/L (ref 23–27)
POC TCO2: 27 MMOL/L (ref 23–27)
POC TCO2: 27 MMOL/L (ref 23–27)
POC TCO2: 28 MMOL/L (ref 23–27)
POCT GLUCOSE: 122 MG/DL (ref 70–110)
POCT GLUCOSE: 130 MG/DL (ref 70–110)
POCT GLUCOSE: 193 MG/DL (ref 70–110)
POCT GLUCOSE: 213 MG/DL (ref 70–110)
POCT GLUCOSE: 221 MG/DL (ref 70–110)
POCT GLUCOSE: 247 MG/DL (ref 70–110)
POTASSIUM BLD-SCNC: 2.9 MMOL/L (ref 3.5–5.1)
POTASSIUM BLD-SCNC: 2.9 MMOL/L (ref 3.5–5.1)
POTASSIUM BLD-SCNC: 3.5 MMOL/L (ref 3.5–5.1)
POTASSIUM BLD-SCNC: 4.1 MMOL/L (ref 3.5–5.1)
POTASSIUM BLD-SCNC: 4.1 MMOL/L (ref 3.5–5.1)
POTASSIUM BLD-SCNC: 4.3 MMOL/L (ref 3.5–5.1)
POTASSIUM BLD-SCNC: 4.6 MMOL/L (ref 3.5–5.1)
SAMPLE: ABNORMAL
SITE: ABNORMAL
SODIUM BLD-SCNC: 138 MMOL/L (ref 136–145)
SODIUM BLD-SCNC: 138 MMOL/L (ref 136–145)
SODIUM BLD-SCNC: 139 MMOL/L (ref 136–145)
SODIUM BLD-SCNC: 140 MMOL/L (ref 136–145)
SODIUM BLD-SCNC: 140 MMOL/L (ref 136–145)
SODIUM BLD-SCNC: 143 MMOL/L (ref 136–145)
SODIUM BLD-SCNC: 143 MMOL/L (ref 136–145)
SP02: 98

## 2019-07-02 PROCEDURE — 88307 TISSUE EXAM BY PATHOLOGIST: CPT | Mod: 26,HCNC,, | Performed by: PATHOLOGY

## 2019-07-02 PROCEDURE — 27000221 HC OXYGEN, UP TO 24 HOURS: Mod: HCNC

## 2019-07-02 PROCEDURE — 63600175 PHARM REV CODE 636 W HCPCS: Mod: HCNC | Performed by: ANESTHESIOLOGY

## 2019-07-02 PROCEDURE — 63600175 PHARM REV CODE 636 W HCPCS: Mod: HCNC

## 2019-07-02 PROCEDURE — 99900035 HC TECH TIME PER 15 MIN (STAT): Mod: HCNC

## 2019-07-02 PROCEDURE — 37799 UNLISTED PX VASCULAR SURGERY: CPT | Mod: HCNC

## 2019-07-02 PROCEDURE — 88332 PR  PATH CONSULT IN SURG,W ADDN FRZ SEC: ICD-10-PCS | Mod: 26,HCNC,, | Performed by: PATHOLOGY

## 2019-07-02 PROCEDURE — 27800505 HC CATH, RADIAL ARTERY KIT: Mod: HCNC | Performed by: NURSE ANESTHETIST, CERTIFIED REGISTERED

## 2019-07-02 PROCEDURE — 63600175 PHARM REV CODE 636 W HCPCS: Mod: HCNC | Performed by: STUDENT IN AN ORGANIZED HEALTH CARE EDUCATION/TRAINING PROGRAM

## 2019-07-02 PROCEDURE — S0028 INJECTION, FAMOTIDINE, 20 MG: HCPCS | Mod: HCNC | Performed by: NURSE ANESTHETIST, CERTIFIED REGISTERED

## 2019-07-02 PROCEDURE — 88309 TISSUE SPECIMEN TO PATHOLOGY - SURGERY: ICD-10-PCS | Mod: 26,HCNC,, | Performed by: PATHOLOGY

## 2019-07-02 PROCEDURE — D9220A PRA ANESTHESIA: ICD-10-PCS | Mod: HCNC,ANES,, | Performed by: ANESTHESIOLOGY

## 2019-07-02 PROCEDURE — 82962 GLUCOSE BLOOD TEST: CPT | Mod: HCNC | Performed by: SURGERY

## 2019-07-02 PROCEDURE — 88342 IMHCHEM/IMCYTCHM 1ST ANTB: CPT | Mod: 26,HCNC,, | Performed by: PATHOLOGY

## 2019-07-02 PROCEDURE — 32674 PR THORACOSCOPY LYMPH NODE EXC: ICD-10-PCS | Mod: HCNC,,, | Performed by: SURGERY

## 2019-07-02 PROCEDURE — 82803 BLOOD GASES ANY COMBINATION: CPT | Mod: HCNC

## 2019-07-02 PROCEDURE — P9045 ALBUMIN (HUMAN), 5%, 250 ML: HCPCS | Mod: HCNC,JG | Performed by: NURSE ANESTHETIST, CERTIFIED REGISTERED

## 2019-07-02 PROCEDURE — 43288 ESPHG THRSC MOBLJ: CPT | Mod: 22,HCNC,, | Performed by: SURGERY

## 2019-07-02 PROCEDURE — 27200677 HC TRANSDUCER MONITOR KIT SINGLE: Mod: HCNC | Performed by: NURSE ANESTHETIST, CERTIFIED REGISTERED

## 2019-07-02 PROCEDURE — 27201423 OPTIME MED/SURG SUP & DEVICES STERILE SUPPLY: Mod: HCNC | Performed by: SURGERY

## 2019-07-02 PROCEDURE — S0028 INJECTION, FAMOTIDINE, 20 MG: HCPCS | Mod: HCNC | Performed by: STUDENT IN AN ORGANIZED HEALTH CARE EDUCATION/TRAINING PROGRAM

## 2019-07-02 PROCEDURE — 31720 CLEARANCE OF AIRWAYS: CPT | Mod: HCNC

## 2019-07-02 PROCEDURE — 63600175 PHARM REV CODE 636 W HCPCS: Mod: HCNC | Performed by: SURGERY

## 2019-07-02 PROCEDURE — 27201037 HC PRESSURE MONITORING SET UP: Mod: HCNC

## 2019-07-02 PROCEDURE — 43288 PR ESOPHAGECTOMY, TOTAL/NEAR TOTAL, W/THORASC MOBL: ICD-10-PCS | Mod: 22,HCNC,, | Performed by: SURGERY

## 2019-07-02 PROCEDURE — 71000033 HC RECOVERY, INTIAL HOUR: Mod: HCNC | Performed by: SURGERY

## 2019-07-02 PROCEDURE — 36000712 HC OR TIME LEV V 1ST 15 MIN: Mod: HCNC | Performed by: SURGERY

## 2019-07-02 PROCEDURE — 25000003 PHARM REV CODE 250: Mod: HCNC | Performed by: NURSE ANESTHETIST, CERTIFIED REGISTERED

## 2019-07-02 PROCEDURE — 88309 TISSUE EXAM BY PATHOLOGIST: CPT | Mod: 26,HCNC,, | Performed by: PATHOLOGY

## 2019-07-02 PROCEDURE — 25000003 PHARM REV CODE 250: Mod: HCNC | Performed by: SURGERY

## 2019-07-02 PROCEDURE — 63600175 PHARM REV CODE 636 W HCPCS: Mod: HCNC | Performed by: NURSE ANESTHETIST, CERTIFIED REGISTERED

## 2019-07-02 PROCEDURE — 88342 IMHCHEM/IMCYTCHM 1ST ANTB: CPT | Mod: HCNC | Performed by: PATHOLOGY

## 2019-07-02 PROCEDURE — 86920 COMPATIBILITY TEST SPIN: CPT | Mod: HCNC

## 2019-07-02 PROCEDURE — 88307 TISSUE SPECIMEN TO PATHOLOGY - SURGERY: ICD-10-PCS | Mod: 26,HCNC,, | Performed by: PATHOLOGY

## 2019-07-02 PROCEDURE — 25000242 PHARM REV CODE 250 ALT 637 W/ HCPCS: Mod: HCNC | Performed by: STUDENT IN AN ORGANIZED HEALTH CARE EDUCATION/TRAINING PROGRAM

## 2019-07-02 PROCEDURE — S0030 INJECTION, METRONIDAZOLE: HCPCS | Mod: HCNC | Performed by: SURGERY

## 2019-07-02 PROCEDURE — 36000713 HC OR TIME LEV V EA ADD 15 MIN: Mod: HCNC | Performed by: SURGERY

## 2019-07-02 PROCEDURE — 27200696 HC TUBE, ENDOBRONCHIAL BLOCKER: Mod: HCNC | Performed by: NURSE ANESTHETIST, CERTIFIED REGISTERED

## 2019-07-02 PROCEDURE — A4216 STERILE WATER/SALINE, 10 ML: HCPCS | Mod: HCNC | Performed by: NURSE ANESTHETIST, CERTIFIED REGISTERED

## 2019-07-02 PROCEDURE — 27201040 HC RC 50 FILTER: Mod: HCNC

## 2019-07-02 PROCEDURE — 88342 TISSUE SPECIMEN TO PATHOLOGY - SURGERY: ICD-10-PCS | Mod: 26,HCNC,, | Performed by: PATHOLOGY

## 2019-07-02 PROCEDURE — 71000039 HC RECOVERY, EACH ADD'L HOUR: Mod: HCNC | Performed by: SURGERY

## 2019-07-02 PROCEDURE — 37000008 HC ANESTHESIA 1ST 15 MINUTES: Mod: HCNC | Performed by: SURGERY

## 2019-07-02 PROCEDURE — C1894 INTRO/SHEATH, NON-LASER: HCPCS | Mod: HCNC | Performed by: SURGERY

## 2019-07-02 PROCEDURE — 37000009 HC ANESTHESIA EA ADD 15 MINS: Mod: HCNC | Performed by: SURGERY

## 2019-07-02 PROCEDURE — 32674 THORACOSCOPY LYMPH NODE EXC: CPT | Mod: HCNC,,, | Performed by: SURGERY

## 2019-07-02 PROCEDURE — 86901 BLOOD TYPING SEROLOGIC RH(D): CPT | Mod: HCNC

## 2019-07-02 PROCEDURE — 88332 PATH CONSLTJ SURG EA ADD BLK: CPT | Mod: 26,HCNC,, | Performed by: PATHOLOGY

## 2019-07-02 PROCEDURE — C1729 CATH, DRAINAGE: HCPCS | Mod: HCNC | Performed by: SURGERY

## 2019-07-02 PROCEDURE — D9220A PRA ANESTHESIA: Mod: HCNC,ANES,, | Performed by: ANESTHESIOLOGY

## 2019-07-02 PROCEDURE — 27200695 HC TUBE, ENDOBRONCHIAL: Mod: HCNC | Performed by: NURSE ANESTHETIST, CERTIFIED REGISTERED

## 2019-07-02 PROCEDURE — 88331 PATH CONSLTJ SURG 1 BLK 1SPC: CPT | Mod: HCNC | Performed by: PATHOLOGY

## 2019-07-02 PROCEDURE — 44015 INSERT NEEDLE CATH BOWEL: CPT | Mod: HCNC,,, | Performed by: SURGERY

## 2019-07-02 PROCEDURE — 25000003 PHARM REV CODE 250: Mod: HCNC

## 2019-07-02 PROCEDURE — 12000002 HC ACUTE/MED SURGE SEMI-PRIVATE ROOM: Mod: HCNC

## 2019-07-02 PROCEDURE — 88331 TISSUE SPECIMEN TO PATHOLOGY - SURGERY: ICD-10-PCS | Mod: 26,HCNC,, | Performed by: PATHOLOGY

## 2019-07-02 PROCEDURE — 94640 AIRWAY INHALATION TREATMENT: CPT | Mod: HCNC

## 2019-07-02 PROCEDURE — 94761 N-INVAS EAR/PLS OXIMETRY MLT: CPT | Mod: HCNC

## 2019-07-02 PROCEDURE — C9290 INJ, BUPIVACAINE LIPOSOME: HCPCS | Mod: HCNC | Performed by: SURGERY

## 2019-07-02 PROCEDURE — 27100025 HC TUBING, SET FLUID WARMER: Mod: HCNC | Performed by: NURSE ANESTHETIST, CERTIFIED REGISTERED

## 2019-07-02 PROCEDURE — 44015 PR INSERT TUBE-BOWEL,ENTERAL ALIMENT: ICD-10-PCS | Mod: HCNC,,, | Performed by: SURGERY

## 2019-07-02 PROCEDURE — 25000003 PHARM REV CODE 250: Mod: HCNC | Performed by: STUDENT IN AN ORGANIZED HEALTH CARE EDUCATION/TRAINING PROGRAM

## 2019-07-02 PROCEDURE — 88331 PATH CONSLTJ SURG 1 BLK 1SPC: CPT | Mod: 26,HCNC,, | Performed by: PATHOLOGY

## 2019-07-02 RX ORDER — ONDANSETRON 2 MG/ML
INJECTION INTRAMUSCULAR; INTRAVENOUS
Status: DISCONTINUED | OUTPATIENT
Start: 2019-07-02 | End: 2019-07-02

## 2019-07-02 RX ORDER — DEXMEDETOMIDINE HYDROCHLORIDE 100 UG/ML
INJECTION, SOLUTION INTRAVENOUS
Status: DISCONTINUED | OUTPATIENT
Start: 2019-07-02 | End: 2019-07-02

## 2019-07-02 RX ORDER — MIDAZOLAM HYDROCHLORIDE 1 MG/ML
INJECTION, SOLUTION INTRAMUSCULAR; INTRAVENOUS
Status: DISCONTINUED | OUTPATIENT
Start: 2019-07-02 | End: 2019-07-02

## 2019-07-02 RX ORDER — IPRATROPIUM BROMIDE AND ALBUTEROL SULFATE 2.5; .5 MG/3ML; MG/3ML
3 SOLUTION RESPIRATORY (INHALATION) ONCE
Status: COMPLETED | OUTPATIENT
Start: 2019-07-02 | End: 2019-07-02

## 2019-07-02 RX ORDER — NALOXONE HCL 0.4 MG/ML
0.02 VIAL (ML) INJECTION
Status: DISCONTINUED | OUTPATIENT
Start: 2019-07-02 | End: 2019-07-03

## 2019-07-02 RX ORDER — EPHEDRINE SULFATE 50 MG/ML
INJECTION, SOLUTION INTRAVENOUS
Status: DISCONTINUED | OUTPATIENT
Start: 2019-07-02 | End: 2019-07-02

## 2019-07-02 RX ORDER — NEOSTIGMINE METHYLSULFATE 1 MG/ML
INJECTION, SOLUTION INTRAVENOUS
Status: DISCONTINUED | OUTPATIENT
Start: 2019-07-02 | End: 2019-07-02

## 2019-07-02 RX ORDER — ENOXAPARIN SODIUM 100 MG/ML
40 INJECTION SUBCUTANEOUS EVERY 24 HOURS
Status: DISCONTINUED | OUTPATIENT
Start: 2019-07-02 | End: 2019-07-05

## 2019-07-02 RX ORDER — PHENYLEPHRINE HYDROCHLORIDE 10 MG/ML
INJECTION INTRAVENOUS
Status: DISCONTINUED | OUTPATIENT
Start: 2019-07-02 | End: 2019-07-02

## 2019-07-02 RX ORDER — GLYCOPYRROLATE 0.2 MG/ML
INJECTION INTRAMUSCULAR; INTRAVENOUS
Status: DISCONTINUED | OUTPATIENT
Start: 2019-07-02 | End: 2019-07-02

## 2019-07-02 RX ORDER — FAMOTIDINE 10 MG/ML
20 INJECTION INTRAVENOUS EVERY 12 HOURS
Status: DISCONTINUED | OUTPATIENT
Start: 2019-07-02 | End: 2019-07-09 | Stop reason: HOSPADM

## 2019-07-02 RX ORDER — METOPROLOL TARTRATE 1 MG/ML
10 INJECTION, SOLUTION INTRAVENOUS EVERY 6 HOURS
Status: DISCONTINUED | OUTPATIENT
Start: 2019-07-02 | End: 2019-07-05

## 2019-07-02 RX ORDER — BUPIVACAINE HYDROCHLORIDE 2.5 MG/ML
INJECTION, SOLUTION EPIDURAL; INFILTRATION; INTRACAUDAL
Status: DISCONTINUED | OUTPATIENT
Start: 2019-07-02 | End: 2019-07-02 | Stop reason: HOSPADM

## 2019-07-02 RX ORDER — SODIUM CHLORIDE 0.9 % (FLUSH) 0.9 %
3 SYRINGE (ML) INJECTION
Status: DISCONTINUED | OUTPATIENT
Start: 2019-07-02 | End: 2019-07-03 | Stop reason: HOSPADM

## 2019-07-02 RX ORDER — HYDROMORPHONE HYDROCHLORIDE 1 MG/ML
0.2 INJECTION, SOLUTION INTRAMUSCULAR; INTRAVENOUS; SUBCUTANEOUS EVERY 5 MIN PRN
Status: DISCONTINUED | OUTPATIENT
Start: 2019-07-02 | End: 2019-07-03 | Stop reason: HOSPADM

## 2019-07-02 RX ORDER — PROPOFOL 10 MG/ML
VIAL (ML) INTRAVENOUS
Status: DISCONTINUED | OUTPATIENT
Start: 2019-07-02 | End: 2019-07-02

## 2019-07-02 RX ORDER — METOPROLOL TARTRATE 1 MG/ML
5 INJECTION, SOLUTION INTRAVENOUS EVERY 6 HOURS
Status: DISCONTINUED | OUTPATIENT
Start: 2019-07-02 | End: 2019-07-02

## 2019-07-02 RX ORDER — CALCIUM GLUCONATE 98 MG/ML
INJECTION, SOLUTION INTRAVENOUS
Status: DISCONTINUED | OUTPATIENT
Start: 2019-07-02 | End: 2019-07-02

## 2019-07-02 RX ORDER — LEVALBUTEROL INHALATION SOLUTION 0.63 MG/3ML
0.63 SOLUTION RESPIRATORY (INHALATION)
Status: DISCONTINUED | OUTPATIENT
Start: 2019-07-03 | End: 2019-07-09 | Stop reason: HOSPADM

## 2019-07-02 RX ORDER — TAMSULOSIN HYDROCHLORIDE 0.4 MG/1
0.4 CAPSULE ORAL DAILY
Status: DISCONTINUED | OUTPATIENT
Start: 2019-07-02 | End: 2019-07-03 | Stop reason: HOSPADM

## 2019-07-02 RX ORDER — SODIUM CHLORIDE 9 MG/ML
INJECTION, SOLUTION INTRAVENOUS CONTINUOUS
Status: DISCONTINUED | OUTPATIENT
Start: 2019-07-02 | End: 2019-07-03

## 2019-07-02 RX ORDER — DEXAMETHASONE SODIUM PHOSPHATE 4 MG/ML
INJECTION, SOLUTION INTRA-ARTICULAR; INTRALESIONAL; INTRAMUSCULAR; INTRAVENOUS; SOFT TISSUE
Status: DISCONTINUED | OUTPATIENT
Start: 2019-07-02 | End: 2019-07-02

## 2019-07-02 RX ORDER — KETAMINE HYDROCHLORIDE 10 MG/ML
INJECTION, SOLUTION INTRAMUSCULAR; INTRAVENOUS
Status: DISCONTINUED | OUTPATIENT
Start: 2019-07-02 | End: 2019-07-02

## 2019-07-02 RX ORDER — ONDANSETRON 2 MG/ML
8 INJECTION INTRAMUSCULAR; INTRAVENOUS EVERY 8 HOURS PRN
Status: DISCONTINUED | OUTPATIENT
Start: 2019-07-02 | End: 2019-07-09 | Stop reason: HOSPADM

## 2019-07-02 RX ORDER — HYDROMORPHONE HYDROCHLORIDE 1 MG/ML
INJECTION, SOLUTION INTRAMUSCULAR; INTRAVENOUS; SUBCUTANEOUS
Status: COMPLETED
Start: 2019-07-02 | End: 2019-07-02

## 2019-07-02 RX ORDER — ALBUMIN HUMAN 50 G/1000ML
SOLUTION INTRAVENOUS CONTINUOUS PRN
Status: DISCONTINUED | OUTPATIENT
Start: 2019-07-02 | End: 2019-07-02

## 2019-07-02 RX ORDER — ACETAMINOPHEN 10 MG/ML
INJECTION, SOLUTION INTRAVENOUS
Status: DISCONTINUED | OUTPATIENT
Start: 2019-07-02 | End: 2019-07-02

## 2019-07-02 RX ORDER — METRONIDAZOLE 500 MG/100ML
500 INJECTION, SOLUTION INTRAVENOUS
Status: COMPLETED | OUTPATIENT
Start: 2019-07-02 | End: 2019-07-02

## 2019-07-02 RX ORDER — ENOXAPARIN SODIUM 100 MG/ML
40 INJECTION SUBCUTANEOUS ONCE
Status: COMPLETED | OUTPATIENT
Start: 2019-07-02 | End: 2019-07-02

## 2019-07-02 RX ORDER — LIDOCAINE HCL/PF 100 MG/5ML
SYRINGE (ML) INTRAVENOUS
Status: DISCONTINUED | OUTPATIENT
Start: 2019-07-02 | End: 2019-07-02

## 2019-07-02 RX ORDER — SODIUM CHLORIDE 9 MG/ML
INJECTION, SOLUTION INTRAVENOUS CONTINUOUS
Status: DISCONTINUED | OUTPATIENT
Start: 2019-07-02 | End: 2019-07-02

## 2019-07-02 RX ORDER — HYDROMORPHONE HCL IN 0.9% NACL 6 MG/30 ML
PATIENT CONTROLLED ANALGESIA SYRINGE INTRAVENOUS CONTINUOUS
Status: DISCONTINUED | OUTPATIENT
Start: 2019-07-02 | End: 2019-07-03

## 2019-07-02 RX ORDER — CIPROFLOXACIN 2 MG/ML
400 INJECTION, SOLUTION INTRAVENOUS
Status: COMPLETED | OUTPATIENT
Start: 2019-07-02 | End: 2019-07-02

## 2019-07-02 RX ORDER — LIDOCAINE HYDROCHLORIDE 10 MG/ML
1 INJECTION, SOLUTION EPIDURAL; INFILTRATION; INTRACAUDAL; PERINEURAL ONCE
Status: COMPLETED | OUTPATIENT
Start: 2019-07-02 | End: 2019-07-02

## 2019-07-02 RX ORDER — FENTANYL CITRATE 50 UG/ML
INJECTION, SOLUTION INTRAMUSCULAR; INTRAVENOUS
Status: DISCONTINUED | OUTPATIENT
Start: 2019-07-02 | End: 2019-07-02

## 2019-07-02 RX ORDER — FAMOTIDINE 10 MG/ML
INJECTION INTRAVENOUS
Status: DISCONTINUED | OUTPATIENT
Start: 2019-07-02 | End: 2019-07-02

## 2019-07-02 RX ORDER — TAMSULOSIN HYDROCHLORIDE 0.4 MG/1
CAPSULE ORAL
Status: COMPLETED
Start: 2019-07-02 | End: 2019-07-02

## 2019-07-02 RX ORDER — CEFAZOLIN SODIUM 1 G/3ML
2 INJECTION, POWDER, FOR SOLUTION INTRAMUSCULAR; INTRAVENOUS
Status: COMPLETED | OUTPATIENT
Start: 2019-07-02 | End: 2019-07-03

## 2019-07-02 RX ORDER — POTASSIUM CHLORIDE 14.9 MG/ML
INJECTION INTRAVENOUS CONTINUOUS PRN
Status: DISCONTINUED | OUTPATIENT
Start: 2019-07-02 | End: 2019-07-02

## 2019-07-02 RX ORDER — SUCCINYLCHOLINE CHLORIDE 20 MG/ML
INJECTION INTRAMUSCULAR; INTRAVENOUS
Status: DISCONTINUED | OUTPATIENT
Start: 2019-07-02 | End: 2019-07-02

## 2019-07-02 RX ORDER — ROCURONIUM BROMIDE 10 MG/ML
INJECTION, SOLUTION INTRAVENOUS
Status: DISCONTINUED | OUTPATIENT
Start: 2019-07-02 | End: 2019-07-02

## 2019-07-02 RX ADMIN — TAMSULOSIN HYDROCHLORIDE 0.4 MG: 0.4 CAPSULE ORAL at 06:07

## 2019-07-02 RX ADMIN — SODIUM CHLORIDE, SODIUM GLUCONATE, SODIUM ACETATE, POTASSIUM CHLORIDE, MAGNESIUM CHLORIDE, SODIUM PHOSPHATE, DIBASIC, AND POTASSIUM PHOSPHATE: .53; .5; .37; .037; .03; .012; .00082 INJECTION, SOLUTION INTRAVENOUS at 10:07

## 2019-07-02 RX ADMIN — FENTANYL CITRATE 100 MCG: 50 INJECTION, SOLUTION INTRAMUSCULAR; INTRAVENOUS at 07:07

## 2019-07-02 RX ADMIN — KETAMINE HYDROCHLORIDE 20 MG: 10 INJECTION, SOLUTION INTRAMUSCULAR; INTRAVENOUS at 07:07

## 2019-07-02 RX ADMIN — KETAMINE HYDROCHLORIDE 10 MG: 10 INJECTION, SOLUTION INTRAMUSCULAR; INTRAVENOUS at 12:07

## 2019-07-02 RX ADMIN — ROCURONIUM BROMIDE 20 MG: 10 INJECTION, SOLUTION INTRAVENOUS at 12:07

## 2019-07-02 RX ADMIN — KETAMINE HYDROCHLORIDE 10 MG: 10 INJECTION, SOLUTION INTRAMUSCULAR; INTRAVENOUS at 09:07

## 2019-07-02 RX ADMIN — POTASSIUM CHLORIDE: 200 INJECTION, SOLUTION INTRAVENOUS at 02:07

## 2019-07-02 RX ADMIN — METRONIDAZOLE 500 MG: 500 SOLUTION INTRAVENOUS at 03:07

## 2019-07-02 RX ADMIN — PHENYLEPHRINE HYDROCHLORIDE 200 MCG: 10 INJECTION INTRAVENOUS at 07:07

## 2019-07-02 RX ADMIN — ALBUMIN (HUMAN): 12.5 SOLUTION INTRAVENOUS at 02:07

## 2019-07-02 RX ADMIN — HYDROMORPHONE HYDROCHLORIDE 0.2 MG: 1 INJECTION, SOLUTION INTRAMUSCULAR; INTRAVENOUS; SUBCUTANEOUS at 07:07

## 2019-07-02 RX ADMIN — DEXMEDETOMIDINE HYDROCHLORIDE 8 MCG: 100 INJECTION, SOLUTION, CONCENTRATE INTRAVENOUS at 05:07

## 2019-07-02 RX ADMIN — MIDAZOLAM HYDROCHLORIDE 2 MG: 1 INJECTION, SOLUTION INTRAMUSCULAR; INTRAVENOUS at 07:07

## 2019-07-02 RX ADMIN — ROCURONIUM BROMIDE 20 MG: 10 INJECTION, SOLUTION INTRAVENOUS at 01:07

## 2019-07-02 RX ADMIN — ROCURONIUM BROMIDE 40 MG: 10 INJECTION, SOLUTION INTRAVENOUS at 11:07

## 2019-07-02 RX ADMIN — PROPOFOL 50 MG: 10 INJECTION, EMULSION INTRAVENOUS at 04:07

## 2019-07-02 RX ADMIN — ROCURONIUM BROMIDE 10 MG: 10 INJECTION, SOLUTION INTRAVENOUS at 09:07

## 2019-07-02 RX ADMIN — CIPROFLOXACIN 400 MG: 2 INJECTION, SOLUTION INTRAVENOUS at 07:07

## 2019-07-02 RX ADMIN — SODIUM CHLORIDE: 0.9 INJECTION, SOLUTION INTRAVENOUS at 05:07

## 2019-07-02 RX ADMIN — IPRATROPIUM BROMIDE AND ALBUTEROL SULFATE 3 ML: .5; 3 SOLUTION RESPIRATORY (INHALATION) at 10:07

## 2019-07-02 RX ADMIN — SODIUM CHLORIDE, SODIUM GLUCONATE, SODIUM ACETATE, POTASSIUM CHLORIDE, MAGNESIUM CHLORIDE, SODIUM PHOSPHATE, DIBASIC, AND POTASSIUM PHOSPHATE: .53; .5; .37; .037; .03; .012; .00082 INJECTION, SOLUTION INTRAVENOUS at 03:07

## 2019-07-02 RX ADMIN — ROCURONIUM BROMIDE 40 MG: 10 INJECTION, SOLUTION INTRAVENOUS at 08:07

## 2019-07-02 RX ADMIN — FENTANYL CITRATE 50 MCG: 50 INJECTION, SOLUTION INTRAMUSCULAR; INTRAVENOUS at 09:07

## 2019-07-02 RX ADMIN — Medication: at 07:07

## 2019-07-02 RX ADMIN — NEOSTIGMINE METHYLSULFATE 5 MG: 1 INJECTION INTRAVENOUS at 05:07

## 2019-07-02 RX ADMIN — SODIUM CHLORIDE 3 UNITS/HR: 9 INJECTION, SOLUTION INTRAVENOUS at 08:07

## 2019-07-02 RX ADMIN — LIDOCAINE HYDROCHLORIDE 100 MG: 20 INJECTION, SOLUTION INTRAVENOUS at 07:07

## 2019-07-02 RX ADMIN — FENTANYL CITRATE 50 MCG: 50 INJECTION, SOLUTION INTRAMUSCULAR; INTRAVENOUS at 03:07

## 2019-07-02 RX ADMIN — FAMOTIDINE 20 MG: 10 INJECTION INTRAVENOUS at 08:07

## 2019-07-02 RX ADMIN — ACETAMINOPHEN 1000 MG: 10 INJECTION, SOLUTION INTRAVENOUS at 07:07

## 2019-07-02 RX ADMIN — METRONIDAZOLE 500 MG: 500 SOLUTION INTRAVENOUS at 07:07

## 2019-07-02 RX ADMIN — SODIUM CHLORIDE, SODIUM GLUCONATE, SODIUM ACETATE, POTASSIUM CHLORIDE, MAGNESIUM CHLORIDE, SODIUM PHOSPHATE, DIBASIC, AND POTASSIUM PHOSPHATE: .53; .5; .37; .037; .03; .012; .00082 INJECTION, SOLUTION INTRAVENOUS at 12:07

## 2019-07-02 RX ADMIN — SODIUM CHLORIDE, SODIUM GLUCONATE, SODIUM ACETATE, POTASSIUM CHLORIDE, MAGNESIUM CHLORIDE, SODIUM PHOSPHATE, DIBASIC, AND POTASSIUM PHOSPHATE: .53; .5; .37; .037; .03; .012; .00082 INJECTION, SOLUTION INTRAVENOUS at 07:07

## 2019-07-02 RX ADMIN — POTASSIUM CHLORIDE: 200 INJECTION, SOLUTION INTRAVENOUS at 03:07

## 2019-07-02 RX ADMIN — ENOXAPARIN SODIUM 40 MG: 100 INJECTION SUBCUTANEOUS at 06:07

## 2019-07-02 RX ADMIN — HYDROMORPHONE HYDROCHLORIDE 0.2 MG: 1 INJECTION, SOLUTION INTRAMUSCULAR; INTRAVENOUS; SUBCUTANEOUS at 08:07

## 2019-07-02 RX ADMIN — SODIUM CHLORIDE: 0.9 INJECTION, SOLUTION INTRAVENOUS at 06:07

## 2019-07-02 RX ADMIN — PROPOFOL 50 MG: 10 INJECTION, EMULSION INTRAVENOUS at 01:07

## 2019-07-02 RX ADMIN — ROCURONIUM BROMIDE 5 MG: 10 INJECTION, SOLUTION INTRAVENOUS at 07:07

## 2019-07-02 RX ADMIN — SODIUM CHLORIDE, SODIUM GLUCONATE, SODIUM ACETATE, POTASSIUM CHLORIDE, MAGNESIUM CHLORIDE, SODIUM PHOSPHATE, DIBASIC, AND POTASSIUM PHOSPHATE: .53; .5; .37; .037; .03; .012; .00082 INJECTION, SOLUTION INTRAVENOUS at 08:07

## 2019-07-02 RX ADMIN — KETAMINE HYDROCHLORIDE 30 MG: 10 INJECTION, SOLUTION INTRAMUSCULAR; INTRAVENOUS at 07:07

## 2019-07-02 RX ADMIN — EPHEDRINE SULFATE 10 MG: 50 INJECTION, SOLUTION INTRAMUSCULAR; INTRAVENOUS; SUBCUTANEOUS at 01:07

## 2019-07-02 RX ADMIN — KETAMINE HYDROCHLORIDE 10 MG: 10 INJECTION, SOLUTION INTRAMUSCULAR; INTRAVENOUS at 10:07

## 2019-07-02 RX ADMIN — ROCURONIUM BROMIDE 10 MG: 10 INJECTION, SOLUTION INTRAVENOUS at 10:07

## 2019-07-02 RX ADMIN — DEXAMETHASONE SODIUM PHOSPHATE 8 MG: 4 INJECTION, SOLUTION INTRAMUSCULAR; INTRAVENOUS at 07:07

## 2019-07-02 RX ADMIN — ROCURONIUM BROMIDE 10 MG: 10 INJECTION, SOLUTION INTRAVENOUS at 02:07

## 2019-07-02 RX ADMIN — LIDOCAINE HYDROCHLORIDE 0.2 MG: 10 INJECTION, SOLUTION EPIDURAL; INFILTRATION; INTRACAUDAL; PERINEURAL at 06:07

## 2019-07-02 RX ADMIN — ACETAMINOPHEN 1000 MG: 10 INJECTION, SOLUTION INTRAVENOUS at 04:07

## 2019-07-02 RX ADMIN — SODIUM CHLORIDE 0.25 MCG/KG/MIN: 9 INJECTION, SOLUTION INTRAVENOUS at 08:07

## 2019-07-02 RX ADMIN — SUCCINYLCHOLINE CHLORIDE 200 MG: 20 INJECTION, SOLUTION INTRAMUSCULAR; INTRAVENOUS at 07:07

## 2019-07-02 RX ADMIN — CEFAZOLIN 2 G: 1 INJECTION, POWDER, FOR SOLUTION INTRAMUSCULAR; INTRAVENOUS at 06:07

## 2019-07-02 RX ADMIN — GLYCOPYRROLATE 0.6 MG: 0.2 INJECTION, SOLUTION INTRAMUSCULAR; INTRAVENOUS at 05:07

## 2019-07-02 RX ADMIN — ONDANSETRON 4 MG: 2 INJECTION INTRAMUSCULAR; INTRAVENOUS at 05:07

## 2019-07-02 RX ADMIN — CALCIUM GLUCONATE 2 G: 94 INJECTION, SOLUTION INTRAVENOUS at 02:07

## 2019-07-02 RX ADMIN — DEXMEDETOMIDINE HYDROCHLORIDE 0.5 MCG/KG/HR: 100 INJECTION, SOLUTION INTRAVENOUS at 04:07

## 2019-07-02 RX ADMIN — FAMOTIDINE 20 MG: 10 INJECTION, SOLUTION INTRAVENOUS at 03:07

## 2019-07-02 RX ADMIN — ROCURONIUM BROMIDE 45 MG: 10 INJECTION, SOLUTION INTRAVENOUS at 07:07

## 2019-07-02 RX ADMIN — METOPROLOL TARTRATE 10 MG: 5 INJECTION INTRAVENOUS at 06:07

## 2019-07-02 RX ADMIN — KETAMINE HYDROCHLORIDE 10 MG: 10 INJECTION, SOLUTION INTRAMUSCULAR; INTRAVENOUS at 04:07

## 2019-07-02 RX ADMIN — KETAMINE HYDROCHLORIDE 10 MG: 10 INJECTION, SOLUTION INTRAMUSCULAR; INTRAVENOUS at 01:07

## 2019-07-02 RX ADMIN — PROPOFOL 150 MG: 10 INJECTION, EMULSION INTRAVENOUS at 07:07

## 2019-07-02 RX ADMIN — ALBUMIN (HUMAN): 12.5 SOLUTION INTRAVENOUS at 12:07

## 2019-07-02 NOTE — PLAN OF CARE
0720- prior to case Veronique LEDESMA checked davinci instruments Prior to use  0755- Family notified of case starting and notified every hour via text that procedure progressing

## 2019-07-02 NOTE — TRANSFER OF CARE
"Anesthesia Transfer of Care Note    Patient: Stu Garcia    Procedure(s) Performed: Procedure(s) (LRB):  XI ROBOTIC ESOPHAGECTOMY (N/A)    Patient location: PACU    Anesthesia Type: general    Transport from OR: Transported from OR on 6-10 L/min O2 by face mask with adequate spontaneous ventilation    Post pain: adequate analgesia    Post assessment: no apparent anesthetic complications and tolerated procedure well    Post vital signs: stable    Level of consciousness: sedated    Nausea/Vomiting: no nausea/vomiting    Complications: none          Last vitals:   Visit Vitals  BP (!) 156/74 (BP Location: Right arm, Patient Position: Lying)   Pulse 72   Temp 36.3 °C (97.3 °F) (Temporal)   Resp 10   Ht 6' 4" (1.93 m)   Wt 115.7 kg (255 lb)   SpO2 95%   BMI 31.04 kg/m²     "

## 2019-07-02 NOTE — BRIEF OP NOTE
Preop Dx: Adenocarcinoma of the distal esophagus, s/p neoadjuvant chemotherapy and radiation therapy  Postop Dx: same  Surgeon: Maicol  Assistant: Jeanette  Operative Procedure: Robotic-assisted total thoracic esophagectomy, proximal gastrectomy, and cervical esophagogastrostomy via right chest, abdomen, and left neck approach; mediastinal and abdominal lymphadenectomy; Witzel jejunostomy  Brief Detail: Moderately difficult case owing to mediastinal adhesions and fibrosis and the patient's large body habitus. No metastatic disease. Margins negative on frozen section. Right chest tube, left neck Ab drain, and 12 Fr Jtube placed  EBL: 400 ccs  CPT code:

## 2019-07-02 NOTE — INTERVAL H&P NOTE
The patient has been examined and the H&P has been reviewed:    I concur with the findings and no changes have occurred since H&P was written.    Anesthesia/Surgery risks, benefits and alternative options discussed and understood by patient/family.          Active Hospital Problems    Diagnosis  POA    Esophageal cancer [C15.9]  Yes      Resolved Hospital Problems   No resolved problems to display.

## 2019-07-02 NOTE — PROGRESS NOTES
"Subjective:       Patient ID: Stu Garcia is a 73 y.o. male.    Chief Complaint: Consult    HPI     Stu Garcia is a 73 y.o. male who has been referred by Dr. Milian for a vocal cord evaluation. He recently completed chemoradiation for esophageal cancer. He reports that his voice "hasn't been the same" since spine surgery last year. It is not hoarse, but he feels it is not as strong as before, especially when singing.  His voice is not progressively worsening over this time. There are not pitch breaks or cracks. There is not vocal fatigue. He  denies dysphagia, odynophagia, throat pain, and otalgia.  There is no hemoptysis or hematemesis.  He is breathing well.     Past Medical History:   Diagnosis Date    Anticoagulant long-term use     xarelto,asa    Arthritis     Atrial fibrillation 2013    cardioverted    Bleb, lung     Cataract     OS    Colon polyp     Coronary artery disease     Coronary artery disease involving native coronary artery of native heart with unstable angina pectoris 3/18/2018    Diabetes mellitus     Diabetes mellitus, type 2     Diverticulosis     General anesthetics causing adverse effect in therapeutic use     delayed emergence per patient's wife    GERD (gastroesophageal reflux disease)     HEARING LOSS     bilateral aids    Hemorrhoid     HLD (hyperlipidemia)     Hypertension     Iron deficiency anemia     Neuropathy of leg     Pneumonia due to other staphylococcus     Prostate cancer     prostate    Spontaneous pneumothorax     bilateral       Past Surgical History:   Procedure Laterality Date    ARTHROSCOPY-KNEE W/ CHONDROPLASTY Left 4/7/2017    [facility]ary [facility]    ARTHROSCOPY-MENISCECTOMY Left 4/7/2017    [facility] at [facility] OR    CARDIOVERSION      CATARACT EXTRACTION      bilateral    CERVICAL SPINE FRACTURE SURGERY      c7 t1 ACDF     CHEST TUBE INSERTION Right     COLONOSCOPY      " COLONOSCOPY N/A 5/20/2015    Per[facility] by [facility] Kerns Jr., [facility] at [facility] ENDO    CORONARY STENT PLACEMENT      x 2    ESOPHAGOGASTRODUODENOSCOPY (EGD) N/A 2/23/2019    [facility]eline Richards [facility] END[facility]    ESOPHAGOGASTRODUODENOSCOPY (EGD) N/A 5/20/2015    Performed by [facility] Kerns Jr[facility] at [facility] ENDO    HERNIA REPAIR      umbilical    HUJMYHQPE-FSER-C-CATH Right 4/4/2019    Performed by Nahum Zaidi [facility] at [facility] OR    KNEE SURGERY Left 2017    Left heart cath Right 3/19/2018    [facility]ard [facility]o [facility] at [facility] CATH    Left heart cath Left 3/17/2018    [facility] at [facility]    Left heart cath-RM # [id number] B Right 3/21/2018    [facility] at [facility]    open thoracotomy Left 1966    With pleurectomy    Pleurectomy Left 1966    For treatment of left pneumothorax    PROSTATECTOMY  2001    open    TUBE THORACOTOMY  1966    pneumothorax left--open    ULTRASOUND, UPPER GI TRACT, ENDOSCOPIC N/A 3/13/2019    Performed by Andrew Shirley MD at [facility] ENDO (2ND FLR)       No current facility-administered medications for this visit.   No current outpatient medications on file.    Facility-Administered Medications Ordered in Other Visits:     0.9%  NaCl infusion, , Intravenous, Continuous, Chad Milian MD, Stopped at 07/02/19 0858    acetaminophen (10 mg/mL) injection, , , PRN, Tisha Ospina CRNA, 1,000 mg at 07/02/19 0730    albumin human 5% bottle, , , Continuous PRN, Tisha Ospina CRNA, Stopped at 07/02/19 1306    bupivacaine (PF) 0.25% (2.5 mg/ml) injection, , , PRN, Chad Milian MD, 30 mL at 07/02/19 1216    bupivacaine liposome (PF) 1.3 % (13.3 mg/mL) suspension, , , PRN, Chad Milian MD, 20 mL at 07/02/19 1100    dexamethasone injection, , , PRN, Tisha Ospina CRNA, 8 mg at 07/02/19 0725    electrolyte-S (ISOLYTE), , , Continuous PRN, Tisha Ospina, CRNA    fentaNYL injection, , ,  PRN, Tisha Ospina CRNA, 50 mcg at 07/02/19 0932    insulin regular (Humulin R) 100 Units in sodium chloride 0.9% 100 mL infusion, , , Continuous PRN, Tisha Ospina CRNA, Last Rate: 3 mL/hr at 07/02/19 0830, 3 Units/hr at 07/02/19 0830    ketamine injection, , , PRN, Tisha Ospina CRNA, 10 mg at 07/02/19 1234    [START ON 7/3/2019] levalbuterol nebulizer solution 0.63 mg, 0.63 mg, Nebulization, Q6H WAKE, Caren Lassiter, NP    lidocaine (cardiac) injection, , , PRN, Tisha Ospina CRNA, 100 mg at 07/02/19 0712    midazolam injection, , Intravenous, PRN, Tisha Ospina CRNA, 2 mg at 07/02/19 0708    phenylephrine (KARLO-SYNEPHRINE) 20 mg in sodium chloride 0.9% 250 mL infusion, , , Continuous PRN, Tisha Ospina CRNA, Last Rate: 26 mL/hr at 07/02/19 1247, 0.3 mcg/kg/min at 07/02/19 1247    phenylephrine injection, , , PRN, Tisha Ospina CRNA, 200 mcg at 07/02/19 0728    propofol (DIPRIVAN) 10 mg/mL infusion, , , PRN, Tisha Ospina, CRNA, 150 mg at 07/02/19 0712    rocuronium injection, , , PRN, Tisha Ospina CRNA, 20 mg at 07/02/19 1234    succinylcholine injection, , , PRN, Tisha Ospina, CRNA, 200 mg at 07/02/19 0712    tamsulosin 24 hr capsule 0.4 mg, 0.4 mg, Oral, Daily, Chad Milian MD, 0.4 mg at 07/02/19 0609    Review of patient's allergies indicates:   Allergen Reactions    Codeine Nausea And Vomiting       Social History     Socioeconomic History    Marital status:      Spouse name: Not on file    Number of children: 2    Years of education: Not on file    Highest education level: Not on file   Occupational History    Occupation:     Occupation: prior [employer]    Social Needs    Financial resource strain: Not hard at all    Food insecurity:     Worry: Never true     Inability: Never true    Transportation needs:     Medical: No     Non-medical: No   Tobacco Use    Smoking status: Former  Smoker     Years: 20.00    Smokeless tobacco: Never Used    Tobacco comment: quit smoking in 1980's.   Substance and Sexual Activity    Alcohol use: Yes     Frequency: Monthly or less     Drinks per session: 1 or 2     Binge frequency: Never     Comment: 1 drink every 2 weeks    Drug use: No    Sexual activity: Yes   Lifestyle    Physical activity:     Days per week: 0 days     Minutes per session: Not on file    Stress: Very much   Relationships    Social connections:     Talks on phone: More than three times a week     Gets together: Once a week     Attends Mu-ism service: Not on file     Active member of club or organization: No     Attends meetings of clubs or organizations: Patient refused     Relationship status:    Other Topics Concern    Not on file   Social History Narrative    From [place]       Family History   Problem Relation Age of Onset    Mental illness Mother         dementia    Coronary artery disease Father     Cancer Father         stomach with mets    Diabetes Brother        Review of Systems   Constitutional: Negative for appetite change, chills, diaphoresis, fatigue, fever and unexpected weight change.   HENT: Positive for voice change. Negative for congestion, dental problem, drooling, ear discharge, ear pain, facial swelling, hearing loss, mouth sores, nosebleeds, postnasal drip, rhinorrhea, sinus pressure, sneezing, sore throat, tinnitus and trouble swallowing.    Eyes: Negative for pain, discharge, redness and itching.   Respiratory: Negative for cough and shortness of breath.    Cardiovascular: Negative for chest pain.   Gastrointestinal: Negative for abdominal distention, abdominal pain, diarrhea, nausea and vomiting.   Endocrine: Negative for cold intolerance and heat intolerance.   Genitourinary: Negative for difficulty urinating.   Musculoskeletal: Negative for neck pain and neck stiffness.   Skin: Negative for rash.   Neurological: Negative for dizziness,  weakness and headaches.   Hematological: Negative for adenopathy.       Objective:      Physical Exam   Constitutional: He is oriented to person, place, and time. He appears well-developed and well-nourished. He is cooperative. He does not appear ill. No distress.   HENT:   Head: Normocephalic and atraumatic.   Right Ear: Hearing and external ear normal.   Left Ear: Hearing and external ear normal.   Nose: Nose normal. No mucosal edema, rhinorrhea or nasal deformity. No epistaxis.  No foreign bodies. Right sinus exhibits no maxillary sinus tenderness and no frontal sinus tenderness. Left sinus exhibits no maxillary sinus tenderness and no frontal sinus tenderness.   Mouth/Throat: Uvula is midline, oropharynx is clear and moist and mucous membranes are normal. Mucous membranes are not pale, not dry and not cyanotic. He does not have dentures. No oral lesions. No trismus in the jaw. Normal dentition. No uvula swelling or dental caries. No oropharyngeal exudate, posterior oropharyngeal edema, posterior oropharyngeal erythema or tonsillar abscesses.   Procedure: Flexible laryngoscopy  In order to fully examine the upper aerodigestive tract, including the larynx, in a patient with a hyperactive gag reflex, flexible endoscopy is required.  After explaining the procedure and obtaining verbal consent, a timeout was performed with the patient's participation according to the universal protocol. Both nasal cavities were anesthetized with 4% Xylocaine spray mixed with Anselmo-Synephrine. The flexible laryngoscope (#[id number]) was inserted into the nasal cavity and advanced to visualize the nasal cavity, nasopharynx, the posterior oropharynx, hypopharynx, and the endolarynx with the above findings noted. The scope was removed and the procedure terminated. The patient tolerated this procedure well without apparent complication.      FINDINGS  Nasopharynx - the torus is clear. There are no lesions of the posterior wall.   Oropharynx -  no lesions of the tongue base. There is no obvious fullness or asymmetry.  Hypopharynx - there are no lesions of the pyriform sinuses or postcricoid region    Larynx - there are no lesions of the supraglottic or glottic larynx. Vocal fold mobility is normal with complete closure.      Eyes: Conjunctivae are normal. Right eye exhibits no discharge. Left eye exhibits no discharge. No scleral icterus.   Neck: Trachea normal, normal range of motion and phonation normal. Neck supple. No JVD present. No tracheal tenderness present. No tracheal deviation present. No thyroid mass and no thyromegaly present.   Salivary glands - there are no lesions or asymmetric findings in the submandibular or parotid glands     Cardiovascular: Normal rate.   Pulmonary/Chest: Effort normal. No stridor. No respiratory distress.   Lymphadenopathy:        Head (right side): No submental, no submandibular, no tonsillar and no preauricular adenopathy present.        Head (left side): No submental, no submandibular, no tonsillar and no preauricular adenopathy present.     He has no cervical adenopathy.   Neurological: He is alert and oriented to person, place, and time. No cranial nerve deficit.   Skin: Skin is warm and dry. No rash noted. He is not diaphoretic. No erythema. No pallor.   Psychiatric: He has a normal mood and affect. His behavior is normal. Thought content normal.   Vitals reviewed.      Assessment:       1. Malignant neoplasm of lower third of esophagus        Plan:       Problem List Items Addressed This Visit        Oncology    Esophageal cancer     Stu Garcia has normal vocal cord function.  No vocal cord abnormalities are seen on flexible laryngoscopy.  We discussed the possibility of injury to the recurrent laryngeal nerve during surgery.  If he has any issues with his voice post op, he should follow up with our laryngologist, Dr. Reynaldo Allan. He verbalized understanding.  Questions answered. He will RTC prn.

## 2019-07-02 NOTE — ASSESSMENT & PLAN NOTE
Stu Garcia has normal vocal cord function.  No vocal cord abnormalities are seen on flexible laryngoscopy.  We discussed the possibility of injury to the recurrent laryngeal nerve during surgery.  If he has any issues with his voice post op, he should follow up with our laryngologist, Dr. Reynaldo Allan. He verbalized understanding.  Questions answered. He will RTC prn.

## 2019-07-02 NOTE — PLAN OF CARE
Pt prepared for procedure.    Pt resting comfortably in room, call light within reach.    Needs: Anesthesia consent, H&P update

## 2019-07-03 LAB
ALBUMIN SERPL BCP-MCNC: 3.3 G/DL (ref 3.5–5.2)
ALBUMIN SERPL BCP-MCNC: 3.4 G/DL (ref 3.5–5.2)
ALLENS TEST: ABNORMAL
ALP SERPL-CCNC: 97 U/L (ref 55–135)
ALP SERPL-CCNC: 99 U/L (ref 55–135)
ALT SERPL W/O P-5'-P-CCNC: 64 U/L (ref 10–44)
ALT SERPL W/O P-5'-P-CCNC: 67 U/L (ref 10–44)
ANION GAP SERPL CALC-SCNC: 10 MMOL/L (ref 8–16)
ANION GAP SERPL CALC-SCNC: 9 MMOL/L (ref 8–16)
APTT BLDCRRT: 28.8 SEC (ref 21–32)
AST SERPL-CCNC: 87 U/L (ref 10–40)
AST SERPL-CCNC: 92 U/L (ref 10–40)
BASOPHILS # BLD AUTO: 0 K/UL (ref 0–0.2)
BASOPHILS # BLD AUTO: 0.01 K/UL (ref 0–0.2)
BASOPHILS NFR BLD: 0 % (ref 0–1.9)
BASOPHILS NFR BLD: 0.1 % (ref 0–1.9)
BILIRUB SERPL-MCNC: 0.8 MG/DL (ref 0.1–1)
BILIRUB SERPL-MCNC: 0.9 MG/DL (ref 0.1–1)
BUN SERPL-MCNC: 17 MG/DL (ref 8–23)
BUN SERPL-MCNC: 17 MG/DL (ref 8–23)
CALCIUM SERPL-MCNC: 8.3 MG/DL (ref 8.7–10.5)
CALCIUM SERPL-MCNC: 8.4 MG/DL (ref 8.7–10.5)
CHLORIDE SERPL-SCNC: 105 MMOL/L (ref 95–110)
CHLORIDE SERPL-SCNC: 107 MMOL/L (ref 95–110)
CO2 SERPL-SCNC: 19 MMOL/L (ref 23–29)
CO2 SERPL-SCNC: 21 MMOL/L (ref 23–29)
CREAT SERPL-MCNC: 0.8 MG/DL (ref 0.5–1.4)
CREAT SERPL-MCNC: 0.9 MG/DL (ref 0.5–1.4)
DELSYS: ABNORMAL
DIFFERENTIAL METHOD: ABNORMAL
DIFFERENTIAL METHOD: ABNORMAL
EOSINOPHIL # BLD AUTO: 0 K/UL (ref 0–0.5)
EOSINOPHIL # BLD AUTO: 0 K/UL (ref 0–0.5)
EOSINOPHIL NFR BLD: 0 % (ref 0–8)
EOSINOPHIL NFR BLD: 0 % (ref 0–8)
ERYTHROCYTE [DISTWIDTH] IN BLOOD BY AUTOMATED COUNT: 20.7 % (ref 11.5–14.5)
ERYTHROCYTE [DISTWIDTH] IN BLOOD BY AUTOMATED COUNT: 20.7 % (ref 11.5–14.5)
ERYTHROCYTE [SEDIMENTATION RATE] IN BLOOD BY WESTERGREN METHOD: 12 MM/H
ERYTHROCYTE [SEDIMENTATION RATE] IN BLOOD BY WESTERGREN METHOD: 12 MM/H
ERYTHROCYTE [SEDIMENTATION RATE] IN BLOOD BY WESTERGREN METHOD: 21 MM/H
EST. GFR  (AFRICAN AMERICAN): >60 ML/MIN/1.73 M^2
EST. GFR  (AFRICAN AMERICAN): >60 ML/MIN/1.73 M^2
EST. GFR  (NON AFRICAN AMERICAN): >60 ML/MIN/1.73 M^2
EST. GFR  (NON AFRICAN AMERICAN): >60 ML/MIN/1.73 M^2
FIO2: 50
FIO2: 50
FIO2: 60
FLOW: 25
FLOW: 25
FLOW: 6
GLUCOSE SERPL-MCNC: 241 MG/DL (ref 70–110)
GLUCOSE SERPL-MCNC: 248 MG/DL (ref 70–110)
HCO3 UR-SCNC: 22.8 MMOL/L (ref 24–28)
HCO3 UR-SCNC: 22.8 MMOL/L (ref 24–28)
HCO3 UR-SCNC: 23.6 MMOL/L (ref 24–28)
HCT VFR BLD AUTO: 34.6 % (ref 40–54)
HCT VFR BLD AUTO: 36.5 % (ref 40–54)
HGB BLD-MCNC: 11.3 G/DL (ref 14–18)
HGB BLD-MCNC: 11.6 G/DL (ref 14–18)
IMM GRANULOCYTES # BLD AUTO: 0.01 K/UL (ref 0–0.04)
IMM GRANULOCYTES # BLD AUTO: 0.01 K/UL (ref 0–0.04)
IMM GRANULOCYTES NFR BLD AUTO: 0.1 % (ref 0–0.5)
IMM GRANULOCYTES NFR BLD AUTO: 0.1 % (ref 0–0.5)
INR PPP: 1.1 (ref 0.8–1.2)
LYMPHOCYTES # BLD AUTO: 0.2 K/UL (ref 1–4.8)
LYMPHOCYTES # BLD AUTO: 0.2 K/UL (ref 1–4.8)
LYMPHOCYTES NFR BLD: 2.3 % (ref 18–48)
LYMPHOCYTES NFR BLD: 2.3 % (ref 18–48)
MAGNESIUM SERPL-MCNC: 1.8 MG/DL (ref 1.6–2.6)
MCH RBC QN AUTO: 30.9 PG (ref 27–31)
MCH RBC QN AUTO: 31 PG (ref 27–31)
MCHC RBC AUTO-ENTMCNC: 31.8 G/DL (ref 32–36)
MCHC RBC AUTO-ENTMCNC: 32.7 G/DL (ref 32–36)
MCV RBC AUTO: 95 FL (ref 82–98)
MCV RBC AUTO: 97 FL (ref 82–98)
MODE: ABNORMAL
MONOCYTES # BLD AUTO: 0.6 K/UL (ref 0.3–1)
MONOCYTES # BLD AUTO: 0.6 K/UL (ref 0.3–1)
MONOCYTES NFR BLD: 8.2 % (ref 4–15)
MONOCYTES NFR BLD: 8.5 % (ref 4–15)
NEUTROPHILS # BLD AUTO: 6.1 K/UL (ref 1.8–7.7)
NEUTROPHILS # BLD AUTO: 6.2 K/UL (ref 1.8–7.7)
NEUTROPHILS NFR BLD: 89 % (ref 38–73)
NEUTROPHILS NFR BLD: 89.4 % (ref 38–73)
NRBC BLD-RTO: 0 /100 WBC
NRBC BLD-RTO: 0 /100 WBC
PCO2 BLDA: 45.3 MMHG (ref 35–45)
PCO2 BLDA: 53.8 MMHG (ref 35–45)
PCO2 BLDA: 55.6 MMHG (ref 35–45)
PH SMN: 7.22 [PH] (ref 7.35–7.45)
PH SMN: 7.25 [PH] (ref 7.35–7.45)
PH SMN: 7.31 [PH] (ref 7.35–7.45)
PHOSPHATE SERPL-MCNC: 3.5 MG/DL (ref 2.7–4.5)
PLATELET # BLD AUTO: 79 K/UL (ref 150–350)
PLATELET # BLD AUTO: 82 K/UL (ref 150–350)
PMV BLD AUTO: 11 FL (ref 9.2–12.9)
PMV BLD AUTO: 11.1 FL (ref 9.2–12.9)
PO2 BLDA: 124 MMHG (ref 80–100)
PO2 BLDA: 127 MMHG (ref 80–100)
PO2 BLDA: 130 MMHG (ref 80–100)
POC BE: -3 MMOL/L
POC BE: -4 MMOL/L
POC BE: -5 MMOL/L
POC SATURATED O2: 98 % (ref 95–100)
POC SATURATED O2: 98 % (ref 95–100)
POC SATURATED O2: 99 % (ref 95–100)
POC TCO2: 24 MMOL/L (ref 23–27)
POC TCO2: 24 MMOL/L (ref 23–27)
POC TCO2: 25 MMOL/L (ref 23–27)
POCT GLUCOSE: 174 MG/DL (ref 70–110)
POCT GLUCOSE: 203 MG/DL (ref 70–110)
POTASSIUM SERPL-SCNC: 4.7 MMOL/L (ref 3.5–5.1)
POTASSIUM SERPL-SCNC: 5 MMOL/L (ref 3.5–5.1)
PROT SERPL-MCNC: 5.7 G/DL (ref 6–8.4)
PROT SERPL-MCNC: 5.9 G/DL (ref 6–8.4)
PROTHROMBIN TIME: 11.1 SEC (ref 9–12.5)
RBC # BLD AUTO: 3.64 M/UL (ref 4.6–6.2)
RBC # BLD AUTO: 3.76 M/UL (ref 4.6–6.2)
SAMPLE: ABNORMAL
SITE: ABNORMAL
SODIUM SERPL-SCNC: 135 MMOL/L (ref 136–145)
SODIUM SERPL-SCNC: 136 MMOL/L (ref 136–145)
SP02: 100
WBC # BLD AUTO: 6.86 K/UL (ref 3.9–12.7)
WBC # BLD AUTO: 6.91 K/UL (ref 3.9–12.7)

## 2019-07-03 PROCEDURE — 20000000 HC ICU ROOM: Mod: HCNC

## 2019-07-03 PROCEDURE — 80053 COMPREHEN METABOLIC PANEL: CPT | Mod: HCNC

## 2019-07-03 PROCEDURE — 25000242 PHARM REV CODE 250 ALT 637 W/ HCPCS: Mod: HCNC | Performed by: NURSE PRACTITIONER

## 2019-07-03 PROCEDURE — 37799 UNLISTED PX VASCULAR SURGERY: CPT | Mod: HCNC

## 2019-07-03 PROCEDURE — 94770 HC EXHALED C02 TEST: CPT | Mod: HCNC

## 2019-07-03 PROCEDURE — 85025 COMPLETE CBC W/AUTO DIFF WBC: CPT | Mod: HCNC

## 2019-07-03 PROCEDURE — 84100 ASSAY OF PHOSPHORUS: CPT | Mod: HCNC

## 2019-07-03 PROCEDURE — 99900035 HC TECH TIME PER 15 MIN (STAT): Mod: HCNC

## 2019-07-03 PROCEDURE — 27000646 HC AEROBIKA DEVICE: Mod: HCNC

## 2019-07-03 PROCEDURE — 94640 AIRWAY INHALATION TREATMENT: CPT | Mod: HCNC

## 2019-07-03 PROCEDURE — 36415 COLL VENOUS BLD VENIPUNCTURE: CPT | Mod: HCNC

## 2019-07-03 PROCEDURE — 82803 BLOOD GASES ANY COMBINATION: CPT | Mod: HCNC

## 2019-07-03 PROCEDURE — 27100171 HC OXYGEN HIGH FLOW UP TO 24 HOURS: Mod: HCNC

## 2019-07-03 PROCEDURE — 94761 N-INVAS EAR/PLS OXIMETRY MLT: CPT | Mod: HCNC

## 2019-07-03 PROCEDURE — 85610 PROTHROMBIN TIME: CPT | Mod: HCNC

## 2019-07-03 PROCEDURE — 94664 DEMO&/EVAL PT USE INHALER: CPT | Mod: HCNC

## 2019-07-03 PROCEDURE — 63600175 PHARM REV CODE 636 W HCPCS: Mod: HCNC | Performed by: STUDENT IN AN ORGANIZED HEALTH CARE EDUCATION/TRAINING PROGRAM

## 2019-07-03 PROCEDURE — 27100092 HC HIGH FLOW DELIVERY CANNULA: Mod: HCNC

## 2019-07-03 PROCEDURE — 97535 SELF CARE MNGMENT TRAINING: CPT | Mod: HCNC

## 2019-07-03 PROCEDURE — S0028 INJECTION, FAMOTIDINE, 20 MG: HCPCS | Mod: HCNC | Performed by: STUDENT IN AN ORGANIZED HEALTH CARE EDUCATION/TRAINING PROGRAM

## 2019-07-03 PROCEDURE — 97165 OT EVAL LOW COMPLEX 30 MIN: CPT | Mod: HCNC

## 2019-07-03 PROCEDURE — 80053 COMPREHEN METABOLIC PANEL: CPT | Mod: 91,HCNC

## 2019-07-03 PROCEDURE — 27000221 HC OXYGEN, UP TO 24 HOURS: Mod: HCNC

## 2019-07-03 PROCEDURE — 94799 UNLISTED PULMONARY SVC/PX: CPT | Mod: HCNC

## 2019-07-03 PROCEDURE — 63600175 PHARM REV CODE 636 W HCPCS: Mod: HCNC

## 2019-07-03 PROCEDURE — 99223 1ST HOSP IP/OBS HIGH 75: CPT | Mod: 24,HCNC,GC, | Performed by: SURGERY

## 2019-07-03 PROCEDURE — 85730 THROMBOPLASTIN TIME PARTIAL: CPT | Mod: HCNC

## 2019-07-03 PROCEDURE — 25000003 PHARM REV CODE 250: Mod: HCNC | Performed by: STUDENT IN AN ORGANIZED HEALTH CARE EDUCATION/TRAINING PROGRAM

## 2019-07-03 PROCEDURE — 83735 ASSAY OF MAGNESIUM: CPT | Mod: HCNC

## 2019-07-03 PROCEDURE — 99223 PR INITIAL HOSPITAL CARE,LEVL III: ICD-10-PCS | Mod: 24,HCNC,GC, | Performed by: SURGERY

## 2019-07-03 PROCEDURE — 97161 PT EVAL LOW COMPLEX 20 MIN: CPT | Mod: HCNC

## 2019-07-03 RX ORDER — POTASSIUM CHLORIDE 7.45 MG/ML
40 INJECTION INTRAVENOUS
Status: DISCONTINUED | OUTPATIENT
Start: 2019-07-03 | End: 2019-07-05

## 2019-07-03 RX ORDER — HYDROMORPHONE HYDROCHLORIDE 2 MG/ML
INJECTION, SOLUTION INTRAMUSCULAR; INTRAVENOUS; SUBCUTANEOUS
Status: COMPLETED
Start: 2019-07-03 | End: 2019-07-03

## 2019-07-03 RX ORDER — NALOXONE HCL 0.4 MG/ML
0.02 VIAL (ML) INJECTION
Status: DISCONTINUED | OUTPATIENT
Start: 2019-07-03 | End: 2019-07-05

## 2019-07-03 RX ORDER — GLUCAGON 1 MG
1 KIT INJECTION
Status: DISCONTINUED | OUTPATIENT
Start: 2019-07-03 | End: 2019-07-09 | Stop reason: HOSPADM

## 2019-07-03 RX ORDER — TAMSULOSIN HYDROCHLORIDE 0.4 MG/1
0.4 CAPSULE ORAL DAILY
Status: DISCONTINUED | OUTPATIENT
Start: 2019-07-03 | End: 2019-07-09 | Stop reason: HOSPADM

## 2019-07-03 RX ORDER — MAGNESIUM SULFATE HEPTAHYDRATE 40 MG/ML
4 INJECTION, SOLUTION INTRAVENOUS
Status: DISCONTINUED | OUTPATIENT
Start: 2019-07-03 | End: 2019-07-05

## 2019-07-03 RX ORDER — HYDROMORPHONE HYDROCHLORIDE 1 MG/ML
0.2 INJECTION, SOLUTION INTRAMUSCULAR; INTRAVENOUS; SUBCUTANEOUS EVERY 6 HOURS PRN
Status: DISCONTINUED | OUTPATIENT
Start: 2019-07-03 | End: 2019-07-03

## 2019-07-03 RX ORDER — INSULIN ASPART 100 [IU]/ML
0-5 INJECTION, SOLUTION INTRAVENOUS; SUBCUTANEOUS EVERY 6 HOURS PRN
Status: DISCONTINUED | OUTPATIENT
Start: 2019-07-03 | End: 2019-07-09 | Stop reason: HOSPADM

## 2019-07-03 RX ORDER — NALOXONE HCL 0.4 MG/ML
0.02 VIAL (ML) INJECTION
Status: DISCONTINUED | OUTPATIENT
Start: 2019-07-03 | End: 2019-07-03

## 2019-07-03 RX ORDER — MAGNESIUM SULFATE HEPTAHYDRATE 40 MG/ML
2 INJECTION, SOLUTION INTRAVENOUS ONCE
Status: COMPLETED | OUTPATIENT
Start: 2019-07-03 | End: 2019-07-03

## 2019-07-03 RX ORDER — HYDROMORPHONE HCL IN 0.9% NACL 6 MG/30 ML
PATIENT CONTROLLED ANALGESIA SYRINGE INTRAVENOUS CONTINUOUS
Status: DISCONTINUED | OUTPATIENT
Start: 2019-07-03 | End: 2019-07-03

## 2019-07-03 RX ORDER — MAGNESIUM SULFATE HEPTAHYDRATE 40 MG/ML
2 INJECTION, SOLUTION INTRAVENOUS
Status: DISCONTINUED | OUTPATIENT
Start: 2019-07-03 | End: 2019-07-05

## 2019-07-03 RX ORDER — HYDROMORPHONE HYDROCHLORIDE 2 MG/ML
2 INJECTION, SOLUTION INTRAMUSCULAR; INTRAVENOUS; SUBCUTANEOUS ONCE
Status: COMPLETED | OUTPATIENT
Start: 2019-07-03 | End: 2019-07-03

## 2019-07-03 RX ORDER — HYDROMORPHONE HYDROCHLORIDE 1 MG/ML
0.5 INJECTION, SOLUTION INTRAMUSCULAR; INTRAVENOUS; SUBCUTANEOUS EVERY 6 HOURS PRN
Status: DISCONTINUED | OUTPATIENT
Start: 2019-07-03 | End: 2019-07-04

## 2019-07-03 RX ADMIN — METOPROLOL TARTRATE 10 MG: 5 INJECTION INTRAVENOUS at 05:07

## 2019-07-03 RX ADMIN — HYDROMORPHONE HYDROCHLORIDE 0.5 MG: 1 INJECTION, SOLUTION INTRAMUSCULAR; INTRAVENOUS; SUBCUTANEOUS at 09:07

## 2019-07-03 RX ADMIN — LEVALBUTEROL HYDROCHLORIDE 0.63 MG: 0.63 SOLUTION RESPIRATORY (INHALATION) at 09:07

## 2019-07-03 RX ADMIN — MAGNESIUM SULFATE IN WATER 2 G: 40 INJECTION, SOLUTION INTRAVENOUS at 05:07

## 2019-07-03 RX ADMIN — HYDROMORPHONE HYDROCHLORIDE 2 MG: 2 INJECTION INTRAMUSCULAR; INTRAVENOUS; SUBCUTANEOUS at 01:07

## 2019-07-03 RX ADMIN — METOPROLOL TARTRATE 10 MG: 5 INJECTION INTRAVENOUS at 12:07

## 2019-07-03 RX ADMIN — LEVALBUTEROL HYDROCHLORIDE 0.63 MG: 0.63 SOLUTION RESPIRATORY (INHALATION) at 02:07

## 2019-07-03 RX ADMIN — FAMOTIDINE 20 MG: 10 INJECTION INTRAVENOUS at 08:07

## 2019-07-03 RX ADMIN — ENOXAPARIN SODIUM 40 MG: 100 INJECTION SUBCUTANEOUS at 05:07

## 2019-07-03 RX ADMIN — LEVALBUTEROL HYDROCHLORIDE 0.63 MG: 0.63 SOLUTION RESPIRATORY (INHALATION) at 08:07

## 2019-07-03 RX ADMIN — HYDROMORPHONE HYDROCHLORIDE 0.5 MG: 1 INJECTION, SOLUTION INTRAMUSCULAR; INTRAVENOUS; SUBCUTANEOUS at 03:07

## 2019-07-03 RX ADMIN — INSULIN ASPART 2 UNITS: 100 INJECTION, SOLUTION INTRAVENOUS; SUBCUTANEOUS at 12:07

## 2019-07-03 RX ADMIN — HYDROMORPHONE HYDROCHLORIDE 2 MG: 2 INJECTION, SOLUTION INTRAMUSCULAR; INTRAVENOUS; SUBCUTANEOUS at 01:07

## 2019-07-03 RX ADMIN — TAMSULOSIN HYDROCHLORIDE 0.4 MG: 0.4 CAPSULE ORAL at 10:07

## 2019-07-03 RX ADMIN — HYDROMORPHONE HYDROCHLORIDE 0.2 MG: 1 INJECTION, SOLUTION INTRAMUSCULAR; INTRAVENOUS; SUBCUTANEOUS at 08:07

## 2019-07-03 RX ADMIN — CEFAZOLIN 2 G: 1 INJECTION, POWDER, FOR SOLUTION INTRAMUSCULAR; INTRAVENOUS at 03:07

## 2019-07-03 NOTE — PROGRESS NOTES
"Pt stated,"He feels like he's drowning." Informed Dr. Bruce of the situation and to come see the pt. Dr. Bruce ordered an ABG and STAT Xray.   "

## 2019-07-03 NOTE — NURSING
Report received from Brittany LOREDO. Pt transported to SICU [address] with portable telemetrysimple face mask 6 L. Pt connected to ICU monitor Wall O2. SICU team called and made aware of patient arrival. New orders received and implemented. Pt assessed, immediate needs met. All questions answered, emotional support provided.     Admit Skin Note: No skin breakdown noted. Foam dressings applied to prevent skin breakdown.

## 2019-07-03 NOTE — ANESTHESIA POSTPROCEDURE EVALUATION
Anesthesia Post Evaluation    Patient: Stu Garcia    Procedure(s) Performed: Procedure(s) (LRB):  XI ROBOTIC ESOPHAGECTOMY (N/A)    Final Anesthesia Type: general  Patient location during evaluation: PACU  Patient participation: Yes- Able to Participate  Level of consciousness: awake and alert  Post-procedure vital signs: reviewed and stable  Pain management: adequate  Airway patency: patent  PONV status at discharge: No PONV  Anesthetic complications: no      Cardiovascular status: blood pressure returned to baseline  Respiratory status: unassisted  Hydration status: euvolemic  Follow-up not needed.          Vitals Value Taken Time   /73 7/2/2019  8:31 PM   Temp 36.2 °C (97.2 °F) 7/2/2019  7:00 PM   Pulse 62 7/2/2019  8:34 PM   Resp 7 7/2/2019  8:34 PM   SpO2 97 % 7/2/2019  8:34 PM   Vitals shown include unvalidated device data.      No case tracking events are documented in the log.      Pain/Sanchez Score: Pain Rating Prior to Med Admin: 7 (7/2/2019  8:03 PM)  Sanchez Score: 8 (7/2/2019  7:00 PM)

## 2019-07-03 NOTE — ASSESSMENT & PLAN NOTE
Stu Garcia is a 73 y.o. male with a history of esophageal cancer s/p esophagectomy on 7/2/19    Pathway:  NPO  IVF  DVT ppx  Keep parker, flomax  Beta blockade (IV conversion from home dose)  Ambulate TID  PT/OT  Chest tube to suction, maybe water seal later today  Stepdown to PCU

## 2019-07-03 NOTE — H&P
"[facility]  Critical Care - Surgery  Progress Note    Patient Name: Stu Garcia  MRN: [id number]  Admission Date: 7/2/2019  Hospital Length of Stay: 1 days  Code Status: Full Code  Attending Provider: Chad Milian MD  Primary Care Provider: Cade Juan MD   Principal Problem: Esophageal cancer    Subjective:     Hospital/ICU Course:  Pt is a 72 yo male w/ and PMH of esophageal cancer and multiple  Other comorbidities. He was admitted on 7/2 for a total thoracic esophagectomy, proximal gastrectomy, and cervical esophagogastrostomy; mediastinal and abdominal lymphadenectomy; Witzel jejunostomy. Postoperatively he presented to the PACU on 2L NC w/ sats of 95%. While in PACU he began to have difficulty breathing and stated to the nurse that it "feels like i'm drowning". MD order stat ABG and CXR. Patient was transferred to the SICU for shortness of breath and evidence of hypoxia on ABG. He was transferred on 6L NC with 98% sats. Pt continued to have increased work of breathing in the SICU and another ABG was obtained and continued to be monitored and was put on 25L, 50% comfort flow. Blood pH increased from 7.22 on arrival to 7.31. PCA was d'cd and PRN dilaudid was ordered due to increased somnolence and apneic episodes on the PCA.  Chest tube output 150c. Urine catheter 40-75cc/hr. GIGI drain output 10cc    Follow-up For: Procedure(s) (LRB):  XI ROBOTIC ESOPHAGECTOMY (N/A)    Post-Operative Day: 1 Day Post-Op    Objective:     Vital Signs (Most Recent):  Temp: 98.4 °F (36.9 °C) (07/03/19 1100)  Pulse: 82 (07/03/19 1100)  Resp: 10 (07/03/19 1100)  BP: 124/64 (07/03/19 0800)  SpO2: 99 % (07/03/19 1100) Vital Signs (24h Range):  Temp:  [97.2 °F (36.2 °C)-98.4 °F (36.9 °C)] 98.4 °F (36.9 °C)  Pulse:  [60-93] 82  Resp:  [6-35] 10  SpO2:  [94 %-100 %] 99 %  BP: (106-156)/(57-81) 124/64  Arterial Line BP: (105-155)/(39-73) 130/41     Weight: 115.7 kg (255 lb)  Body mass index is 31.04 " kg/m².      Intake/Output Summary (Last 24 hours) at 7/3/2019 1138  Last data filed at 7/3/2019 1100  Gross per 24 hour   Intake 7326.97 ml   Output 3160 ml   Net 4166.97 ml       Physical Exam   Constitutional: He is oriented to person, place, and time. He appears well-developed. He appears distressed (increased work of breathing with apneic episodes).   HENT:   Head: Normocephalic and atraumatic.   Neck:   Left GIGI drain in neck   Cardiovascular: Regular rhythm. Tachycardia present.   Pulmonary/Chest: Breath sounds normal. Apnea and tachypnea noted.   In obvious distress with increased work of breathing   Genitourinary:   Genitourinary Comments: Martinez catheter in place   Neurological: He is alert and oriented to person, place, and time.   Skin: Skin is warm and dry.   Psychiatric:   Pt has increased agitation mostly 2/2 to difficulty breathing       Vents:  Oxygen Concentration (%): 40 (07/03/19 1100)    Lines/Drains/Airways     Central Venous Catheter Line                 Port A Cath Single Lumen 04/04/19 right subclavian 90 days          Drain                 Chest Tube 07/02/19 1 Right 24 Fr. 1 day         Closed/Suction Drain 07/02/19 Left Neck Bulb 19 Fr. 1 day         Gastrostomy/Enterostomy 07/02/19 Jejunostomy tube LUQ feeding 1 day         Urethral Catheter 07/02/19 Non-latex 16 Fr. 1 day          Arterial Line                 Arterial Line 07/02/19 0720 Left Radial 1 day          Peripheral Intravenous Line                 Peripheral IV - Single Lumen 07/02/19 0545 18 G Left Forearm 1 day         Peripheral IV - Single Lumen 07/02/19 0730 16 G Right Hand 1 day         Peripheral IV - Single Lumen 07/02/19 0735 18 G Left Hand 1 day                Significant Labs:    CBC/Anemia Profile:  Recent Labs   Lab 07/02/19  1641 07/03/19  0109 07/03/19  0334   WBC  --  6.91 6.86   HGB  --  11.6* 11.3*   HCT 30* 36.5* 34.6*   PLT  --  82* 79*   MCV  --  97 95   RDW  --  20.7* 20.7*        Chemistries:  Recent Labs    Lab 07/03/19  0109 07/03/19  0334    135*   K 5.0 4.7    105   CO2 19* 21*   BUN 17 17   CREATININE 0.8 0.9   CALCIUM 8.4* 8.3*   ALBUMIN 3.4* 3.3*   PROT 5.9* 5.7*   BILITOT 0.9 0.8   ALKPHOS 99 97   ALT 67* 64*   AST 92* 87*   MG  --  1.8   PHOS  --  3.5       ABGs:   Recent Labs   Lab 07/03/19  0508   PH 7.310*   PCO2 45.3*   HCO3 22.8*   POCSATURATED 99   BE -3     Significant Imaging:  I have reviewed all pertinent imaging results/findings within the past 24 hours.    Assessment/Plan:     * Esophageal cancer  Plan:    Neuro: -Pain control dilaudid prn  No sedation    Pulmonary:   -Pt on comfort flow oxygen at 25L 50%  -Wean as tolerated    Cardiac:  -MAP goal >65  -Left radial arterial line  -not requiring any pressors    Renal:   -Martinez catheter in place  -monitor UOP   -Bun/Cr 17/0.9    Fluids/Electrolytes/Nutrition/GI:   -Nutritional status: NPO - inpatient consult to nutrition ordered  -replace lytes PRN  -NS @ 75ml/hr on admission to SICU DC'd  -Bowel regimen  -LUQ gastrostomy jejunostomy tube  -Right chest tube in place (24 Fr.), Left neck GIGI drain (19 Fr.) - monitor outpurts    Hematology/Oncology:  -H/H 11.3/34.6 trending down. Continue to monitor w/ daily CBC  -Anticoagulation: Lovenox 40mg qd    Infectious Disease:   -Afebrile  -WBC WNL  -no scheduled Abx, monitor temp and WBC and order Abx as appropriate  -No pending cultures    Endocrine:  -Glucose goal of 120-180  -Sliding scale incsulin ordered    -PPX  Lovenox 40mg IV  Famotidine IV    Dispo:  -Continue care in the ICU setting  -Monitor ABG and continue to wean support o2 as tolerated      Venita Louise MD  Critical Care - Surgery  [facility]

## 2019-07-03 NOTE — NURSING TRANSFER
Nursing Transfer Note      7/3/2019     Transfer [id number]    Transfer via bed    Transfer with tele, O2, artline, BP    Transported by RN, transporter    Medicines sent: NS, dilaudid    Chart send with patient: yes    Notified: bactrim    Patient reassessed at: upon arrival to the unit

## 2019-07-03 NOTE — SUBJECTIVE & OBJECTIVE
"Pt is a 72 yo male w/ and PMH of esophageal cancer and multiple  Other comorbidities. He was admitted on 7/2 for a total thoracic esophagectomy, proximal gastrectomy, and cervical esophagogastrostomy; mediastinal and abdominal lymphadenectomy; Witzel jejunostomy. Postoperatively he presented to the PACU on 2L NC w/ sats of 95%. While in PACU he began to have difficulty breathing and stated to the nurse that it "feels like i'm drowning". MD order stat ABG and CXR. Patient was transferred to the SICU for shortness of breath and evidence of hypoxia on ABG. He was transferred on 6L NC with 98% sats. Pt continued to have increased work of breathing in the SICU and another ABG was obtained and continued to be monitored and was put on 25L, 50% comfort flow. Blood pH increased from 7.22 on arrival to 7.31. PCA was d'cd and PRN dilaudid was ordered due to increased somnolence and apneic episodes on the PCA.  Chest tube output 150c. Urine catheter 40-75cc/hr. GIGI drain output 10cc    Follow-up For: Procedure(s) (LRB):  XI ROBOTIC ESOPHAGECTOMY (N/A)    Post-Operative Day: 1 Day Post-Op    Objective:     Vital Signs (Most Recent):  Temp: 98.4 °F (36.9 °C) (07/03/19 1100)  Pulse: 82 (07/03/19 1100)  Resp: 10 (07/03/19 1100)  BP: 124/64 (07/03/19 0800)  SpO2: 99 % (07/03/19 1100) Vital Signs (24h Range):  Temp:  [97.2 °F (36.2 °C)-98.4 °F (36.9 °C)] 98.4 °F (36.9 °C)  Pulse:  [60-93] 82  Resp:  [6-35] 10  SpO2:  [94 %-100 %] 99 %  BP: (106-156)/(57-81) 124/64  Arterial Line BP: (105-155)/(39-73) 130/41     Weight: 115.7 kg (255 lb)  Body mass index is 31.04 kg/m².      Intake/Output Summary (Last 24 hours) at 7/3/2019 1138  Last data filed at 7/3/2019 1100  Gross per 24 hour   Intake 7326.97 ml   Output 3160 ml   Net 4166.97 ml       Physical Exam   Constitutional: He is oriented to person, place, and time. He appears well-developed. He appears distressed (increased work of breathing with apneic episodes).   HENT:   Head: " Normocephalic and atraumatic.   Neck:   Left GIGI drain in neck   Cardiovascular: Regular rhythm. Tachycardia present.   Pulmonary/Chest: Breath sounds normal. Apnea and tachypnea noted.   In obvious distress with increased work of breathing   Genitourinary:   Genitourinary Comments: Martinez catheter in place   Neurological: He is alert and oriented to person, place, and time.   Skin: Skin is warm and dry.   Psychiatric:   Pt has increased agitation mostly 2/2 to difficulty breathing       Vents:  Oxygen Concentration (%): 40 (07/03/19 1100)    Lines/Drains/Airways     Central Venous Catheter Line                 Port A Cath Single Lumen 04/04/19 right subclavian 90 days          Drain                 Chest Tube 07/02/19 1 Right 24 Fr. 1 day         Closed/Suction Drain 07/02/19 Left Neck Bulb 19 Fr. 1 day         Gastrostomy/Enterostomy 07/02/19 Jejunostomy tube LUQ feeding 1 day         Urethral Catheter 07/02/19 Non-latex 16 Fr. 1 day          Arterial Line                 Arterial Line 07/02/19 0720 Left Radial 1 day          Peripheral Intravenous Line                 Peripheral IV - Single Lumen 07/02/19 0545 18 G Left Forearm 1 day         Peripheral IV - Single Lumen 07/02/19 0730 16 G Right Hand 1 day         Peripheral IV - Single Lumen 07/02/19 0735 18 G Left Hand 1 day                Significant Labs:    CBC/Anemia Profile:  Recent Labs   Lab 07/02/19  1641 07/03/19  0109 07/03/19  0334   WBC  --  6.91 6.86   HGB  --  11.6* 11.3*   HCT 30* 36.5* 34.6*   PLT  --  82* 79*   MCV  --  97 95   RDW  --  20.7* 20.7*        Chemistries:  Recent Labs   Lab 07/03/19  0109 07/03/19  0334    135*   K 5.0 4.7    105   CO2 19* 21*   BUN 17 17   CREATININE 0.8 0.9   CALCIUM 8.4* 8.3*   ALBUMIN 3.4* 3.3*   PROT 5.9* 5.7*   BILITOT 0.9 0.8   ALKPHOS 99 97   ALT 67* 64*   AST 92* 87*   MG  --  1.8   PHOS  --  3.5       ABGs:   Recent Labs   Lab 07/03/19  0508   PH 7.310*   PCO2 45.3*   HCO3 22.8*   POCSATURATED 99    BE -3     Significant Imaging:  I have reviewed all pertinent imaging results/findings within the past 24 hours.

## 2019-07-03 NOTE — PLAN OF CARE
POC reviewed with pt, acknowledged understanding. Pt AAO x 4. Pt remains free of falls/injuries. Pt on telemetry remains SR. Pt blood glucose being monitored q 4. Pt tolerating NPO diet. Pt pain controlled with prescribed meds, pca pump. Pt wearing SCDs. Pt on 6 L Simple Face Mask. Pt has difficulty breathing using accessory muscles. Respiratory nasal suction pt and gave a breathing treatment per MD order. No acute events throughout shift. No distress noted, will continue to monitor.

## 2019-07-03 NOTE — PLAN OF CARE
Patient is a 73 year old male admitted from home 7/2/2019 and underwent Robot Assisted Total Thoracic Esophagectomy, Proximal Gastrectomy, Cervical Esophagogastrostomy via Right Chest, Abdominal and Left Neck Approach, Mediastinal and Abdominal LND, J tube placement.  Patient is expected to discharge home with home health and tube feeds +/- 7/8/2019.    Patient in room with wife during visit.  Patient lives in a one story home with his wife, Ania, who will drive him home and obtain anything he needs after discharge.  Patient and wife have no preference for any home health or tube feed company.  Education on nutrition, activity, pain control done and [facility] explained to and given to patient and/or family with understanding verbalized.  CM name and number written on white board in patient's room with request to call for any questions and concerns. Will continue to follow for needs.    PCP  Cade Juan MD  [address] / [place] [address] [phone]      [facility] [address] [place] [address] AT SEC OF [facility]ESS [facility]  [address] [place] [address]  Phone: [phone] Fax: [phone]    [facility] - [facility] [address] [facility]  [address] [facility] [address]  Phone: [phone] Fax: [phone]      Extended Emergency Contact Information  Primary Emergency Contact: Ania Garcia  Address: [address] [place] [address] [address] [place] [id number] [place] [address] of [place]  Mobile Phone: [phone]  Relation: Spouse       07/03/19 1424   Discharge Assessment   Assessment Type Discharge Planning Reassessment   Confirmed/corrected address and phone number on facesheet? Yes   Assessment information obtained from? Patient   Expected Length of Stay (days) 6   Communicated expected length of stay with patient/caregiver yes   Prior to hospitilization cognitive status: Alert/Oriented   Prior to  hospitalization functional status: Independent;Assistive Equipment   Current cognitive status: Alert/Oriented   Current Functional Status: Independent;Assistive Equipment;Needs Assistance   Lives With spouse   Able to Return to Prior Arrangements yes   Is patient able to care for self after discharge? Yes   Who are your caregiver(s) and their phone number(s)? wife   Patient's perception of discharge disposition home health   Equipment Currently Used at Home cane, straight;walker, standard;shower chair   Do you have any problems affording any of your prescribed medications? No   Is the patient taking medications as prescribed? yes   Does the patient have transportation home? Yes   Transportation Anticipated family or friend will provide   Discharge Plan A Home with family;Home Health   Discharge Plan B Skilled Nursing Facility   DME Needed Upon Discharge  feeding device   Patient/Family in Agreement with Plan yes

## 2019-07-03 NOTE — PLAN OF CARE
Problem: Adult Inpatient Plan of Care  Goal: Plan of Care Review  Recommendations  Recommendation/Intervention:   1. As medically able, recommend initiating TF of Impact Peptide 1.5 at a goal rate of 65 mL/hr - to provide 2340 kcal/day, 147g protein/day, and 1201mL free fluid/day.   2. When nocturnal TF desired, recommend Impact Peptide 1.5 at 85 mL/hr from 2p-8a - to provide 2295 kcal/day, 144g protein/day, and 1178mL free fluid/day.   RD to monitor.    Goals: Patient to receive nutrition by RD follow-up  Nutrition Goal Status: new    Full assessment completed, see RD Note 7/3/2019.

## 2019-07-03 NOTE — HPI
"Pt is a 72 yo male w/ and PMH of esophageal cancer and multiple  Other comorbidities. He was admitted on 7/2 for a total thoracic esophagectomy, proximal gastrectomy, and cervical esophagogastrostomy; mediastinal and abdominal lymphadenectomy; Witzel jejunostomy. Postoperatively he presented to the PACU on 2L NC w/ sats of 95%. While in PACU he began to have difficulty breathing and stated to the nurse that it "feels like i'm drowning". MD order stat ABG and CXR. Patient was transferred to the SICU for shortness of breath and evidence of hypoxia on ABG. He was transferred on 6L NC with 98% sats. Pt continued to have increased work of breathing in the SICU and another ABG was obtained and continued to be monitored and was put on 25L, 50% comfort flow. Blood pH increased from 7.22 on arrival to 7.31. PCA was d'cd and PRN dilaudid was ordered due to increased somnolence and apneic episodes on the PCA.  Chest tube output 150c. Urine catheter 40-75cc/hr. GIGI drain output 10cc.   "

## 2019-07-03 NOTE — PROGRESS NOTES
Pt coughing, sounds congested. Pt SATS still 95 % on 2 L NC. Informed MD on call. Respiratory at the bedside to nasal suction the pt per MD order. MD on call ordered a breathing treatment.

## 2019-07-03 NOTE — PLAN OF CARE
Problem: Occupational Therapy Goal  Goal: Occupational Therapy Goal  Goals to be met by: 7/13/19     Patient will increase functional independence with ADLs by performing:    Feeding with Modified [place].  UE Dressing with Supervision.  LE Dressing with Supervision.  Grooming while standing at sink with Supervision.  Toileting from toilet with Supervision for hygiene and clothing management.   Toilet transfer to toilet with Supervision.    Outcome: Ongoing (interventions implemented as appropriate)  OT eval completed, and above goals established. LORRIE Hatch  7/3/2019

## 2019-07-03 NOTE — PT/OT/SLP EVAL
Occupational Therapy   Evaluation    Name: Stu Garcia  MRN: [id number]  Admitting Diagnosis:  Esophageal cancer 1 Day Post-Op s/p esophagectomy    Recommendations:     Discharge Recommendations: home health OT  Discharge Equipment Recommendations:  grab bar, tub/shower  Barriers to discharge:  None    Assessment:     Stu Garcia is a 73 y.o. male with a medical diagnosis of Esophageal cancer.  He presents with decreased ADL (I) and endurance. Performance deficits affecting function: weakness, impaired self care skills, impaired balance, impaired functional mobilty, impaired endurance, gait instability, impaired cardiopulmonary response to activity, pain, decreased safety awareness.      Rehab Prognosis: Good; patient would benefit from acute skilled OT services to address these deficits and reach maximum level of function.       Plan:     Patient to be seen 4 x/week to address the above listed problems via self-care/home management, therapeutic activities, therapeutic exercises  · Plan of Care Expires: 08/02/19  · Plan of Care Reviewed with: patient, spouse    Subjective     Chief Complaint: weakness  Patient/Family Comments/goals: to get better and go home    Occupational Profile:  Living Environment: lives with spouse in [facility] with  entrance; tub/shower combo  Previous level of function: (I) with ADL and ambulation  Roles and Routines: retired; drives; works around the house; works out 5x/week  Equipment Used at Home:  shower chair, walker, standard, cane, straight  Assistance upon Discharge: spouse can assist    Pain/Comfort:  · Pain Rating 1: 5/10  · Location - Side 1: Bilateral  · Location - Orientation 1: generalized  · Location 1: abdomen  · Pain Addressed 1: Reposition, Distraction  · Pain Rating Post-Intervention 1: 5/10    Patients cultural, spiritual, Christian conflicts given the current situation: no    Objective:     Communicated with: RN prior to session.  Patient found up in chair with  arterial line, blood pressure cuff, chest tube, central line, GIGI drain, oxygen, pulse ox (continuous), peripheral IV, telemetry( Comfort Flow) upon OT entry to room.    General Precautions: Standard, fall, respiratory   Orthopedic Precautions:    Braces:       Occupational Performance:    Bed Mobility:    · NT    Functional Mobility/Transfers:  · Patient completed Sit <> Stand Transfer with minimum assistance  with  no assistive device from b/s chair  · Functional Mobility: Minimal A x ~6 steps forward and backwards x 2 trials (limited by Comfort Flow)    Activities of Daily Living:  · Grooming: contact guard assistance in standing  · Upper Body Dressing: minimum assistance    · Lower Body Dressing: moderate assistance    · Toileting: moderate assistance      Cognitive/Visual Perceptual:  Oriented to: Person, Place, Time and Situation  Follows Commands/attention: Follows multistep  commands  Communication: clear/fluent  Memory:  No Deficits noted  Safety awareness/insight to disability: intact  Coping skills/emotional control: Appropriate to situation    Physical Exam:  Postural examination/scapula alignment:    -       No postural abnormalities identified  Sensation:    -       Intact  Upper Extremity Range of Motion:     -       Right Upper Extremity: WFL  -       Left Upper Extremity: WFL  Upper Extremity Strength:    -       Right Upper Extremity: WFL  -       Left Upper Extremity: WFL   Strength:    -       Right Upper Extremity: WFL  -       Left Upper Extremity: WFL  Fine Motor Coordination:    -       Intact  Gross motor coordination:   WFL      AMPAC 6 Click ADL:  AMPAC Total Score: 14    Treatment & Education:  Pt ed on OT  POC  Pt stood for self-care task with CGA<>Minimal A for balance 2* occasional posterior lean  Pt ed on safety with transfer and standing activity  Education:    Patient left up in chair with all lines intact, call button in reach, RN notified and spouse present at beginning of  session    GOALS:   Multidisciplinary Problems     Occupational Therapy Goals        Problem: Occupational Therapy Goal    Goal Priority Disciplines Outcome Interventions   Occupational Therapy Goal     OT, PT/OT Ongoing (interventions implemented as appropriate)    Description:  Goals to be met by: 7/13/19     Patient will increase functional independence with ADLs by performing:    Feeding with Modified [place].  UE Dressing with Supervision.  LE Dressing with Supervision.  Grooming while standing at sink with Supervision.  Toileting from toilet with Supervision for hygiene and clothing management.   Toilet transfer to toilet with Supervision.                      History:     Past Medical History:   Diagnosis Date    Anticoagulant long-term use     xarelto,asa    Arthritis     Atrial fibrillation 2013    cardioverted    Bleb, lung     Cataract     OS    Colon polyp     Coronary artery disease     Coronary artery disease involving native coronary artery of native heart with unstable angina pectoris 3/18/2018    Diabetes mellitus     Diabetes mellitus, type 2     Diverticulosis     General anesthetics causing adverse effect in therapeutic use     delayed emergence per patient's wife    GERD (gastroesophageal reflux disease)     HEARING LOSS     bilateral aids    Hemorrhoid     HLD (hyperlipidemia)     Hypertension     Iron deficiency anemia     Neuropathy of leg     Pneumonia due to other staphylococcus     Prostate cancer     prostate    Spontaneous pneumothorax     bilateral       Past Surgical History:   Procedure Laterality Date    ARTHROSCOPY-KNEE W/ CHONDROPLASTY Left 4/7/2017    [facility]    ARTHROSCOPY-MENISCECTOMY Left 4/7/2017    [facility] at [facility] OR    CARDIOVERSION      CATARACT EXTRACTION      bilateral    CERVICAL SPINE FRACTURE SURGERY      c7 t1 ACDF     CHEST TUBE INSERTION Right     COLONOSCOPY       COLONOSCOPY N/A 5/20/2015    Performed by Andrea Kerns Jr., [facility] at [facility] ENDO    CORONARY STENT PLACEMENT      x 2    ESOPHAGOGASTRODUODENOSCOPY (EGD) N/A 2/23/2019    Performed by Jackeline Richards MD at [facility] ENDO    ESOPHAGOGASTRODUODENOSCOPY (EGD) N/A 5/20/2015    Performed by Andrea [facility]erns Jr., MD at [facility] ENDO    HERNIA REPAIR      umbilical    SDPBNXHKX-ASNG-H-CATH Right 4/4/2019    Performed by Nahum Zaidi [facility] at [facility] OR    KNEE SURGERY Left 2017    Left heart cath Right 3/19/2018    Performed by Eduard Cano MD at [facility] CATH    Left heart cath Left 3/17/2018    Performed by Parminder Duenas III, [facility] at [facility]    Left heart cath-RM # [id number] B Right 3/21/2018    Performed by Eduard Cano [facility] at [facility]    open thoracotomy Left 1966    With pleurectomy    Pleurectomy Left 1966    For treatment of left pneumothorax    PROSTATECTOMY  2001    open    TUBE THORACOTOMY  1966    pneumothorax left--open    ULTRASOUND, UPPER GI TRACT, ENDOSCOPIC N/A 3/13/2019    Performed by Andrew Shirley MD at [facility] ENDO (2ND FLR)    XI ROBOTIC ESOPHAGECTOMY N/A 7/2/2019    Performed by Chad Milian MD at [facility] OR 2ND FLR       Time Tracking:     OT Date of Treatment: 07/03/19  OT Start Time: 1006  OT Stop Time: 1025  OT Total Time (min): 19 min    Billable Minutes:Evaluation 9  Self Care/Home Management 10    LORRIE Hatch  7/3/2019

## 2019-07-03 NOTE — ASSESSMENT & PLAN NOTE
Plan:    Neuro: -Pain control dilaudid prn  No sedation    Pulmonary:   -Pt on comfort flow oxygen at 25L 50%  -Wean as tolerated    Cardiac:  -MAP goal >65  -Left radial arterial line  -not requiring any pressors    Renal:   -Martinez catheter in place  -monitor UOP   -Bun/Cr 17/0.9    Fluids/Electrolytes/Nutrition/GI:   -Nutritional status: NPO - inpatient consult to nutrition ordered  -replace lytes PRN  -NS @ 75ml/hr on admission to SICU DC'd  -Bowel regimen  -LUQ gastrostomy jejunostomy tube  -Right chest tube in place (24 Fr.), Left neck GIGI drain (19 Fr.) - monitor outpurts    Hematology/Oncology:  -H/H 11.3/34.6 trending down. Continue to monitor w/ daily CBC  -Anticoagulation: Lovenox 40mg qd    Infectious Disease:   -Afebrile  -WBC WNL  -no scheduled Abx, monitor temp and WBC and order Abx as appropriate  -No pending cultures    Endocrine:  -Glucose goal of 120-180  -Sliding scale incsulin ordered    -PPX  Lovenox 40mg IV  Famotidine IV    Dispo:  -Continue care in the ICU setting  -Monitor ABG and continue to wean support o2 as tolerated

## 2019-07-03 NOTE — PROGRESS NOTES
[facility]  General Surgery  Progress Note    Subjective:     History of Present Illness:  Mr. Garcia is a 73M transferred to the ICU after     Post-Op Info:  Procedure(s) (LRB):  XI ROBOTIC ESOPHAGECTOMY (N/A)   1 Day Post-Op     Interval History: Patient transferred to ICU for patient's subjective feeling of shortness of breath and mild hypoxia. Improved significantly overnight. No other new complaints or concerns. Pain well controlled. Oxygen requirements decreasing.     Medications:  Continuous Infusions:   hydromorphone in 0.9 % NaCl 6 mg/30 ml       Scheduled Meds:   enoxaparin  40 mg Subcutaneous Daily    famotidine (PF)  20 mg Intravenous Q12H    levalbuterol  0.63 mg Nebulization Q6H WAKE    metoprolol  10 mg Intravenous Q6H    tamsulosin  0.4 mg Oral Daily     PRN Meds:calcium gluconate IVPB, calcium gluconate IVPB, calcium gluconate IVPB, Dextrose 10% Bolus, Dextrose 10% Bolus, glucagon (human recombinant), insulin aspart U-100, magnesium sulfate IVPB, magnesium sulfate IVPB, naloxone, naloxone, ondansetron, potassium chloride in water **AND** potassium chloride in water **AND** potassium chloride in water, promethazine (PHENERGAN) IVPB, sodium phosphate IVPB, sodium phosphate IVPB, sodium phosphate IVPB     Review of patient's allergies indicates:   Allergen Reactions    Codeine Nausea And Vomiting     Objective:     Vital Signs (Most Recent):  Temp: 98.1 °F (36.7 °C) (07/03/19 0700)  Pulse: 78 (07/03/19 0904)  Resp: 13 (07/03/19 0904)  BP: (!) 115/59 (07/03/19 0600)  SpO2: 99 % (07/03/19 0904) Vital Signs (24h Range):  Temp:  [97.2 °F (36.2 °C)-98.2 °F (36.8 °C)] 98.1 °F (36.7 °C)  Pulse:  [60-93] 78  Resp:  [6-35] 13  SpO2:  [94 %-100 %] 99 %  BP: (106-156)/(57-81) 115/59  Arterial Line BP: (105-155)/(39-73) 119/48     Weight: 115.7 kg (255 lb)  Body mass index is 31.04 kg/m².    Intake/Output - Last 3 Shifts       07/01 0700 - 07/02 0659 07/02 0700 - 07/03 0659 07/03 0700 -  07/04 0659    I.V. (mL/kg)  8637 (74.6)     Blood  500     IV Piggyback  20     Total Intake(mL/kg)  9157 (79.1)     Urine (mL/kg/hr)  1695 (0.6) 235 (0.8)    Drains  60     Other  200     Blood  400     Chest Tube  660 140    Total Output  3015 375    Net  +6142 -375                 Physical Exam   Constitutional: He is oriented to person, place, and time. No distress.   HENT:   Head: Atraumatic.   Eyes: Conjunctivae are normal.   Cardiovascular: Normal rate.   Pulmonary/Chest: Effort normal. No respiratory distress.   Abdominal: Soft. He exhibits no distension. There is no tenderness.   Neurological: He is alert and oriented to person, place, and time.       Significant Labs:  CBC:   Recent Labs   Lab 07/03/19  0334   WBC 6.86   RBC 3.64*   HGB 11.3*   HCT 34.6*   PLT 79*   MCV 95   MCH 31.0   MCHC 32.7     CMP:   Recent Labs   Lab 07/03/19  0334   *   CALCIUM 8.3*   ALBUMIN 3.3*   PROT 5.7*   *   K 4.7   CO2 21*      BUN 17   CREATININE 0.9   ALKPHOS 97   ALT 64*   AST 87*   BILITOT 0.8     ABGs:   Recent Labs   Lab 07/03/19  0508   PH 7.310*   PCO2 45.3*   PO2 127*   HCO3 22.8*   POCSATURATED 99   BE -3       Significant Diagnostics:  I have reviewed all pertinent imaging results/findings within the past 24 hours.    Assessment/Plan:     * Esophageal cancer  Stu Garcia is a 73 y.o. male with a history of esophageal cancer s/p esophagectomy on 7/2/19    Pathway:  NPO  IVF  DVT ppx  Keep parker, flomax  Beta blockade (IV conversion from home dose)  Ambulate TID  PT/OT  Chest tube to suction, maybe water seal later today  Stepdown to PCU          Clint Charlton MD  General Surgery  [facility]

## 2019-07-03 NOTE — SUBJECTIVE & OBJECTIVE
Interval History: Patient transferred to ICU for patient's subjective feeling of shortness of breath and mild hypoxia. Improved significantly overnight. No other new complaints or concerns. Pain well controlled. Oxygen requirements decreasing.     Medications:  Continuous Infusions:   hydromorphone in 0.9 % NaCl 6 mg/30 ml       Scheduled Meds:   enoxaparin  40 mg Subcutaneous Daily    famotidine (PF)  20 mg Intravenous Q12H    levalbuterol  0.63 mg Nebulization Q6H WAKE    metoprolol  10 mg Intravenous Q6H    tamsulosin  0.4 mg Oral Daily     PRN Meds:calcium gluconate IVPB, calcium gluconate IVPB, calcium gluconate IVPB, Dextrose 10% Bolus, Dextrose 10% Bolus, glucagon (human recombinant), insulin aspart U-100, magnesium sulfate IVPB, magnesium sulfate IVPB, naloxone, naloxone, ondansetron, potassium chloride in water **AND** potassium chloride in water **AND** potassium chloride in water, promethazine (PHENERGAN) IVPB, sodium phosphate IVPB, sodium phosphate IVPB, sodium phosphate IVPB     Review of patient's allergies indicates:   Allergen Reactions    Codeine Nausea And Vomiting     Objective:     Vital Signs (Most Recent):  Temp: 98.1 °F (36.7 °C) (07/03/19 0700)  Pulse: 78 (07/03/19 0904)  Resp: 13 (07/03/19 0904)  BP: (!) 115/59 (07/03/19 0600)  SpO2: 99 % (07/03/19 0904) Vital Signs (24h Range):  Temp:  [97.2 °F (36.2 °C)-98.2 °F (36.8 °C)] 98.1 °F (36.7 °C)  Pulse:  [60-93] 78  Resp:  [6-35] 13  SpO2:  [94 %-100 %] 99 %  BP: (106-156)/(57-81) 115/59  Arterial Line BP: (105-155)/(39-73) 119/48     Weight: 115.7 kg (255 lb)  Body mass index is 31.04 kg/m².    Intake/Output - Last 3 Shifts       07/01 0700 - 07/02 0659 07/02 0700 - 07/03 0659 07/03 0700 - 07/04 0659    I.V. (mL/kg)  8637 (74.6)     Blood  500     IV Piggyback  20     Total Intake(mL/kg)  9157 (79.1)     Urine (mL/kg/hr)  1695 (0.6) 235 (0.8)    Drains  60     Other  200     Blood  400     Chest Tube  660 140    Total Output  3015 375     Net  +6142 -271                 Physical Exam   Constitutional: He is oriented to person, place, and time. No distress.   HENT:   Head: Atraumatic.   Eyes: Conjunctivae are normal.   Cardiovascular: Normal rate.   Pulmonary/Chest: Effort normal. No respiratory distress.   Abdominal: Soft. He exhibits no distension. There is no tenderness.   Neurological: He is alert and oriented to person, place, and time.       Significant Labs:  CBC:   Recent Labs   Lab 07/03/19  0334   WBC 6.86   RBC 3.64*   HGB 11.3*   HCT 34.6*   PLT 79*   MCV 95   MCH 31.0   MCHC 32.7     CMP:   Recent Labs   Lab 07/03/19  0334   *   CALCIUM 8.3*   ALBUMIN 3.3*   PROT 5.7*   *   K 4.7   CO2 21*      BUN 17   CREATININE 0.9   ALKPHOS 97   ALT 64*   AST 87*   BILITOT 0.8     ABGs:   Recent Labs   Lab 07/03/19  0508   PH 7.310*   PCO2 45.3*   PO2 127*   HCO3 22.8*   POCSATURATED 99   BE -3       Significant Diagnostics:  I have reviewed all pertinent imaging results/findings within the past 24 hours.

## 2019-07-03 NOTE — PLAN OF CARE
Problem: Infection  Goal: Infection Symptom Resolution  Outcome: Ongoing (interventions implemented as appropriate)  .    Problem: Diabetes Comorbidity  Goal: Blood Glucose Level Within Desired Range  Outcome: Ongoing (interventions implemented as appropriate)  .    Problem: Fall Injury Risk  Goal: Absence of Fall and Fall-Related Injury  Outcome: Ongoing (interventions implemented as appropriate)  .    Problem: Skin Injury Risk Increased  Goal: Skin Health and Integrity  Outcome: Ongoing (interventions implemented as appropriate)  .

## 2019-07-03 NOTE — ADDENDUM NOTE
Addendum  created 07/03/19 0812 by Tisha Ospina, CRNA    Intraprocedure Flowsheets edited, Intraprocedure LDAs edited, LDA properties accepted

## 2019-07-03 NOTE — OP NOTE
DATE OF PROCEDURE:  07/02/2019    OPERATING SURGEON:  Chad Milian M.D.    ASSISTANT:  Clint Reid M.D. (RES).    PREOPERATIVE DIAGNOSIS:  Carcinoma of the esophagogastric junction.    POSTOPERATIVE DIAGNOSIS:  Carcinoma of the esophagogastric junction.    OPERATIVE PROCEDURES:  Total thoracic esophagectomy, proximal gastrectomy, and   cervical esophagogastrostomy via right chest, abdominal, and left neck approach,   robotic-assisted; mediastinal and abdominal lymphadenectomy; Witzel   jejunostomy.    PROCEDURE IN DETAIL:  The patient is placed in the supine position on the   operating table.  Adequate general endotracheal anesthesia is induced using a   right endobronchial blocker for right lung isolation.  The patient is placed in   the left lateral decubitus position and appropriately padded by the Surgical and   Anesthesiology teams.  The right hemithorax is prepped and draped in sterile   fashion.  A trocar incision is made in the right fifth intercostal space at the   anterior axillary line.  Initially, we do not have a free space here.  I   enlarged the incision slightly and using finger dissection, take down a pleural   adhesion and create a space.  I can then insert an 8 mm port.  A 10 mm of   insufflation pressure is added and we do have at least an initial working space,   although upon placing the thoracoscope through the port, it is clear that the   patient has multiple pleural adhesions.  A second port is placed in the seventh   interspace at the posterior axillary line under direct thoracoscopic vision.    Working through this port, I began to take down pleural adhesions and when   sufficient adhesions have been taken down, a third robotic working port and an   11 mm assistant port are added.  The da Wayne Xi robotic cart is then rolled to   the patient's bedside and docked and the instruments are inserted under direct   vision and I moved to the console.  Multiple additional pleural adhesions are    then taken down using the robot and ultimately we do have the adhesions taken   down and do have an adequate working space, although at times during the   procedure, we are not able to completely control the ventilation of the right   lung and exposure, though adequate is not optimal.  The right lung is gently   retracted anteriorly.  The inferior pulmonary ligament is taken down.  There   appears to be a significant amount of fibrosis in the mediastinum, particularly   along the left side of the esophagus.  Some of this may be radiation related,   but some of it may also be related to the patient's previous left pleurectomy   through the left thoracotomy.  Overall, the mediastinal dissection is   considerably more difficult than normal and requires a significant amount of   additional time.  The mediastinal pleura is opened, taking a wide patch   overlying the esophagus.  The azygos vein is elevated and divided using the   vascular stapler.  Mediastinal lymph node dissection at levels 4, 7, and 8 is   systematically carried out.  A Penrose drain is placed around the esophagus and   used to elevate it and we are able to mobilize the esophagus down to the hiatus.    Superiorly, our mobilization is above the level of the azygos vein, but there   is still some adherence of the esophagus on the left side at the apex of the   thorax and I plan to take this down through our neck incision.  At this point, a   24-K[place] Ab drain is brought through our assistant port site and tacked   into place along the mediastinum.  The robotic instruments are withdrawn.  The   right lung is reexpanded.  The camera is also withdrawn.  The robot is undocked   and moved from the patient's bed and the trocar sites are closed in the usual   fashion.  Exparel is also infiltrated posterior to each port site.  The patient   is then placed in the supine position with a pad beneath the left flank and a   shoulder roll.  His head is supported.   The patient does have some limited range   of motion of the neck and we carefully support his head throughout the   procedure.  The abdomen, anterior chest, and left neck are prepped and draped in   sterile fashion.  The Veress needle is introduced in the supraumbilical   position and intraperitoneal positioning is confirmed.  Insufflation is carried   out without difficulty.  Using the Optiview under direct vision, a 5 mm port is   placed just to the left of and above the umbilicus.  We do have a free space.    Laparoscopy is carried out.  The peritoneal surfaces and omentum are   unremarkable.  There is no ascites.  Visible portions of the liver are normal   without focal lesions.  I do not see any evidence of chronic liver disease and   therefore, I do not think this is the etiology of the patient's   thrombocytopenia.  Under direct laparoscopic vision, three robotic working   ports, a 5 mm liver retractor port, and an 11 mm assistant port are added and   our initial 5 mm port is upsized to an 8 mm robotic working port.  The robotic   cart is again brought to the patient's bedside and docked and the camera and   instruments are inserted under direct vision and again I moved to the console.    The stomach is elevated.  The gastrocolic omentum is opened using the vessel   sealer staying outside of and carefully preserving the right gastroepiploic   arcade.  The entire greater curvature of the stomach is mobilized, taking down   the short gastric vessels.  Inferiorly, the right gastroepiploic pedicle is   mobilized to the base of the mesentery.  The gastrohepatic omentum is widely   taken.  All soft tissue and martín tissue around the crura are mobilized down on   to the specimen.  The left gastric pedicle is elevated beginning our dissection   along the common hepatic artery and proximal splenic artery nodes are mobilized   up to the left gastric pedicle and the pedicle is taken using the vascular   stapler.  A  pyloromyotomy is carried out in the usual fashion.  Dissection   within the right and left judith is next performed and quickly communicates with   our previous thoracic dissection and the Penrose drain, which I had been placed   around the lower thoracic esophagus is drawn into the abdomen and traction on it   is used to complete mobilization of the distal esophagus.  The stomach is now   free.  The stomach is divided using the robotic stapler first with two green   loads and then a total of 8 blue loads including the GE junction, the cardia and   most of the lesser curve along with the abdominal lymph node dissection en bloc   with the esophagus.  This creates a long gastric conduit based on the greater   curvature of the stomach and the right gastroepiploic arcade.  The specimen is   stuffed into the right chest.  Posterior crural repair is performed using   horizontal mattress sutures of 2-0 silk.  The lower aspect of the specimen is   then hitched to the upper aspect of the conduit with a 2-0 silk U stitch.  The   robotic instruments are then withdrawn.  The robot is undocked and moved from   the patient's bedside using our existing ports.  A laparoscopic J-tube is placed   in the usual fashion using a 12-Citizen of [place] red rubber catheter.  The abdomen is   then partially desufflated.  Attention is turned to the left neck.  The left   neck counterincision is made and deepened medial to the sternocleidomastoid and   medial to the carotid entering the prevertebral space.  There are some adhesions   here from the patient's previous anterior spinal fixation.  The esophagus is   mobilized away from the prevertebral fascia.  Careful dissection   circumferentially staying right on the esophagus to protect the recurrent   laryngeal nerves is performed and the esophagus is encircled with a Penrose   drain.  I then begin a gentle blunt dissection into the superior mediastinum.    Unfortunately, I am not able to completely  mobilize the esophagus and free it   completely.  I do not want to disrupt it with traction.  I therefore elect to   divide the esophagus in the lower neck.  A Penrose drain is affixed to the   distal end with a 2-0 silk.  I then made a mini laparotomy just sufficient to   allow me to place my hand into the abdominal cavity, and with traction on the   specimen from below, I am able to extract the esophagus at the same time pulling   the Penrose down into the abdomen.  The specimen is then detached from the   conduit and submitted for frozen section and our proximal and distal margins are   negative.  The gastric conduit is hitched to the Penrose and gently elevated   back through the posterior mediastinum to the neck and we have ample length.  In   the neck, a side-to-side FAB stapled anastomosis is performed using the   [employer] purple load 45 mm.  The transverse enterotomies are closed with   interrupted 3-0 Vicryl.  Gentle traction on the antrum reduces any redundancy   nicely.  The neck is then irrigated with sterile saline and closed over 19-Ab   drainage.  Returning to the abdomen, our two 11 mm port sites are closed at the   fascial level using the Abisai-Mckenzie needle and 0 Vicryl.  The   minilaparotomy is closed with running 0 PDS.  Exparel solution is infiltrated   into the deep muscular layer on either side of the incision.  Skin incisions are   closed using subcuticular technique.  Sterile dressings are applied.  The   patient has tolerated the procedure well.  He is brought to the Recovery Room in   stable condition.      SAYRA/IN  dd: 07/02/2019 17:57:10 (CDT)  td: 07/02/2019 20:13:28 (RIGO)  Doc ID   #[id number]  Job ID #[id number]    CC:

## 2019-07-03 NOTE — HPI
uT2N0 JULISSA of [facility], who finished NACR on 5/17/19. Had treatment related odynophaghia, now resolved, and fatigue, which is improving (says he is about 70% of normal). He has resumed his gym workouts twice per week. No interval new medical problems.

## 2019-07-03 NOTE — HOSPITAL COURSE
"Pt is a 74 yo male w/ and PMH of esophageal cancer and multiple  Other comorbidities. He was admitted on 7/2 for a total thoracic esophagectomy, proximal gastrectomy, and cervical esophagogastrostomy; mediastinal and abdominal lymphadenectomy; Witzel jejunostomy. Postoperatively he presented to the PACU on 2L NC w/ sats of 95%. While in PACU he began to have difficulty breathing and stated to the nurse that it "feels like i'm drowning". MD order stat ABG and CXR. Patient was transferred to the SICU for shortness of breath and evidence of hypoxia on ABG. He was transferred on 6L NC with 98% sats. Pt continued to have increased work of breathing in the SICU and another ABG was obtained and continued to be monitored and was put on 25L, 50% comfort flow. Blood pH increased from 7.22 on arrival to 7.31. PCA was d'cd and q6 PRN dilaudid was ordered due to increased somnolence and apneic episodes on the PCA.  Chest tube output 150c. Urine catheter 40-75cc/hr. GIGI drain output 10cc.   "

## 2019-07-03 NOTE — PLAN OF CARE
[facility]       HOME  HEALTH ORDERS                                    FACE TO FACE ENCOUNTER      Patient Name: Stu Garcia  YOB: 1945    PCP: Cade Juan MD   PCP Address: [address] / [place] [address]  PCP Phone Number: [phone]  PCP Fax: [phone]    Encounter Date: 7/9/2019  Admit to Home Health    Diagnoses:  Active Hospital Problems    Diagnosis  POA    *Esophageal cancer [C15.9]  Yes    Coronary artery disease involving native coronary artery of native heart with unstable angina pectoris [I25.110]  Yes     Multivessel pci 3/2018      Thrombocytopenia [D69.6]  Yes    Type 2 diabetes mellitus treated without insulin [E11.9]  Yes    Essential hypertension [I10]  Yes    Chronic anticoagulation [Z79.01]  Not Applicable      Resolved Hospital Problems   No resolved problems to display.       Future Appointments   Date Time Provider Department Center   7/22/2019  1:00 PM LAB, [facility] DRAW STATION [facility] LAB [facility]   7/22/2019  1:40 PM Clinton Andrews MD [facility] HEMONC [facility]   7/29/2019  9:30 AM INJECTION SCHEDULE, [facility] CHEMOTHERAPY STO CHEMO [facility]   8/7/2019 10:05 AM LAB, [facility] LAB [place]   8/14/2019  1:40 PM Cade Juan MD [facility] MED [place]   10/9/2019 11:30 AM Eduard Cano MD [facility] CARDIO [place]       I have seen and examined this patient face to face today. My clinical findings that support the need for the home health skilled services and home bound status are the following:  Weakness/numbness causing balance and gait disturbance due to Surgery making it taxing to leave home.    Allergies:  Review of patient's allergies indicates:   Allergen Reactions    Codeine Nausea And Vomiting       Diet:  NPO except for medications with a sip of water. (amiodarone, xarelto and lopressor)    Activities: activity as tolerated    Nursing:   SN to complete comprehensive  assessment including routine vital signs. Instruct on disease process and s/s of complications to report to MD. Review/verify medication list sent home with the patient at time of discharge  and instruct patient/caregiver as needed. Frequency may be adjusted depending on start of care date.    Notify MD if SBP > 160 or < 90; DBP > 90 or < 50; HR > 120 or < 50; Temp > 101      CONSULTS:    Physical Therapy to evaluate and treat. Evaluate for home safety and equipment needs; Establish/upgrade home exercise program. Perform / instruct on therapeutic exercises, gait training, transfer training, and Range of Motion.  Occupational Therapy to evaluate and treat. Evaluate home environment for safety and equipment needs. Perform/Instruct on transfers, ADL training, ROM, and therapeutic exercises.   to evaluate for community resources/long-range planning.  Aide to provide assistance with personal care, ADLs, and vital signs.      MISCELLANEOUS CARE:  Diabetic Care:   SN to perform and educate Diabetic management with blood glucose monitoring:, Fingerstick blood sugar AC and HS and Report CBG < 60 or > 350 to physician.    Medications: Review discharge medications with patient and family and provide education.      Current Discharge Medication List      CONTINUE these medications which have NOT CHANGED    Details   atorvastatin (LIPITOR) 80 MG tablet Take 1 tablet (80 mg total) by mouth every evening.  Qty: 90 tablet, Refills: 0    Associated Diagnoses: Coronary artery disease involving native coronary artery of native heart with unstable angina pectoris      glimepiride (AMARYL) 2 MG tablet TAKE 2 TABLETS TWICE DAILY  Qty: 360 tablet, Refills: 3      metoprolol tartrate (LOPRESSOR) 50 MG tablet Take 0.5 tablets (25 mg total) by mouth 2 (two) times daily.  Qty: 30 tablet, Refills: 11      multivitamin capsule Take 1 capsule by mouth once daily.      pantoprazole (PROTONIX) 40 MG tablet Take 1 tablet (40 mg total)  by mouth 2 (two) times daily.  Qty: 60 tablet, Refills: 11      vit C,O-Zp-dafnt-lutein-zeaxan (PRESERVISION AREDS 2) 468-191-06-1 mg-unit-mg-mg Cap Take 1 tablet by mouth once daily.      ACCU-CHEK SHASHANK PLUS TEST STRP Strp TEST ONCE DAILY  Qty: 100 strip, Refills: 2    Associated Diagnoses: Type 2 diabetes mellitus with hyperlipidemia      diphenhydrAMINE-aluminum-magnesium hydroxide-simethicone-lidocaine HCl 2% Swish and spit 15 mLs every 4 (four) hours as needed.  Qty: 250 mL, Refills: 0    Associated Diagnoses: GE junction carcinoma      ferrous gluconate (FERGON) 324 MG tablet Take 1 tablet (324 mg total) by mouth daily with breakfast.      HYDROcodone-acetaminophen (NORCO) 5-325 mg per tablet Take 1 tablet by mouth every 6 (six) hours as needed for Pain.  Qty: 60 tablet, Refills: 0    Associated Diagnoses: Neoplasm related pain      hydrOXYzine HCl (ATARAX) 25 MG tablet Take 1 tablet (25 mg total) by mouth 3 (three) times daily as needed for Anxiety.  Qty: 30 tablet, Refills: 1    Associated Diagnoses: Adjustment reaction with anxiety      lancets (ACCU-CHEK SOFTCLIX LANCETS) Misc 1 strip by Misc.(Non-Drug; Combo Route) route once daily.  Qty: 100 each, Refills: 3      lidocaine-prilocaine (EMLA) cream U UTD  Refills: 0      nitroGLYCERIN (NITROSTAT) 0.4 MG SL tablet DISSOLVE 1 TABLET UNDER THE TONGUE EVERY 5 MINUTES AS NEEDED FOR CHEST PAIN  Qty: 100 tablet, Refills: prn      ondansetron (ZOFRAN) 8 MG tablet TK 1 T PO 1 HOUR B D RADIATION TREATMENT RO PREVENT NAUSEA UTD  Qty: 30 tablet, Refills: 3    Associated Diagnoses: CINV (chemotherapy-induced nausea and vomiting)      prochlorperazine (COMPAZINE) 10 MG tablet TK 1 TABLET PO Q 6 HOURS PRN FOR NAUSEA/VOMITING  Qty: 60 tablet, Refills: 6    Associated Diagnoses: GE junction carcinoma      tiZANidine (ZANAFLEX) 4 MG tablet TAKE 1 TABLET(4 MG) BY MOUTH EVERY 6 HOURS AS NEEDED  Qty: 385 tablet, Refills: 5    Comments: **Patient requests 90 days  supply**  Associated Diagnoses: Lumbar back sprain, sequela             Disciplines requested: Nursing Services to provide:   Other: S/P Esophagectomy   Flush jejunostomy tube with 20cc water Q 8 hours   Empty chano drain to left neck every 12 hours and record output     Free water flushes of 200cc via jejunostomy tube TID while awake and free water infusion @ 30cc/hour while tube feeds are infusing.     Monitor abdomen for redness, oozing from staple site, abdominal distension, or pain not controlled by pain medication.     TUBE FEEDS via jejunostomy tube: Isosource 1.5 (or equivalent) @ 40cc/hour (7/9/2019) from 2p-8a.  Increase by 10cc every day at 2pm to a goal of 85cc/hour.    Vitals signs daily. Call MD with Temperature > 101, SBP > 180, HR < 50.   Physician to follow patient's care (the person listed here will be responsible for signing ongoing orders): Other: Dr. MORENO Milian, Dr. TYRON Sauceda [phone] office [phone] fax Requested Start of Care Date: 7/3/2019    I certify that this patient is confined to his home and needs intermittent skilled nursing care, physical therapy and occupational therapy.      Electronically Signed  _________________________________  Caren Lassiter NP  7/9/2019

## 2019-07-03 NOTE — CONSULTS
"  [facility]  Adult Nutrition  Consult Note    SUMMARY     Recommendations  Recommendation/Intervention:   1. As medically able, recommend initiating TF of Impact Peptide 1.5 at a goal rate of 65 mL/hr - to provide 2340 kcal/day, 147g protein/day, and 1201mL free fluid/day.   2. When nocturnal TF desired, recommend Impact Peptide 1.5 at 85 mL/hr from 2p-8a - to provide 2295 kcal/day, 144g protein/day, and 1178mL free fluid/day.   RD to monitor.    Goals: Patient to receive nutrition by RD follow-up  Nutrition Goal Status: new  Communication of RD Recs: (POC)    Reason for Assessment  Reason For Assessment: consult  Diagnosis: cancer diagnosis/related complications, surgery/postoperative complications(esophageal cancer s/p esophagectomy 7/2)  Relevant Medical History: Afib, CAD, DM2, diverticulosis, GERD, HTN, HLD  Interdisciplinary Rounds: attended  General Information Comments: POD#1 s/p esophagectomy, proximal gastrectomy, and J-tube placement. Extubated. NPO. Patient with age appropriate muscle wasting with no other physical signs of malnutriton and no weight loss PTA.  Nutrition Discharge Planning: Adequate nutrition via TF.    Nutrition Risk Screen  Nutrition Risk Screen: dysphagia or difficulty swallowing    Nutrition/Diet History  Spiritual, Cultural Beliefs, Yazdanism Practices, Values that Affect Care: no  Factors Affecting Nutritional Intake: NPO, altered gastrointestinal function    Anthropometrics  Temp: 98.4 °F (36.9 °C)  Height Method: Stated  Height: 6' 4" (193 cm)  Height (inches): 76 in  Weight Method: Stated  Weight: 115.7 kg (255 lb)  Weight (lb): 255 lb  Ideal Body Weight (IBW), Male: 202 lb  % Ideal Body Weight, Male (lb): 126.24 lb  BMI (Calculated): 31.1  BMI Grade: 30 - 34.9- obesity - grade I    Lab/Procedures/Meds  Pertinent Labs Reviewed: reviewed  Pertinent Labs Comments: Na 135, Glu 241, POCT Glu 203-221, HgbA1c 7.9, Ca 8.3, Alb 3.3  Pertinent Medications Reviewed: " reviewed  Pertinent Medications Comments: famotidine    Estimated/Assessed Needs  Weight Used For Calorie Calculations: 115.7 kg (255 lb 1.2 oz)  Energy Calorie Requirements (kcal): 2314 kcal/day  Energy Need Method: Kcal/kg(20)  Protein Requirements: 139 g/day(1.2 g/kg)  Weight Used For Protein Calculations: 115.7 kg (255 lb 1.2 oz)  Fluid Requirements (mL): 1 mL/kcal or per MD  Estimated Fluid Requirement Method: RDA Method  RDA Method (mL): 2314    Nutrition Prescription Ordered  Current Diet Order: NPO    Evaluation of Received Nutrient/Fluid Intake  I/O: +6.2L x 24hrs  Comments: No BM recorded  % Intake of Estimated Energy Needs: 0 - 25 %  % Meal Intake: NPO    Nutrition Risk  Level of Risk/Frequency of Follow-up: high(2x/week)     Assessment and Plan  Nutrition Problem  Inadequate energy intake    Related to (etiology):   Esophageal cancer s/p esophagectomy 7/2    Signs and Symptoms (as evidenced by):   NPO with no alternative means of nutrition at this time, TF not started yed    Interventions/Recommendations (treatment strategy):  Collaboration of nutrition care with other providers    Nutrition Diagnosis Status:   New    Monitor and Evaluation  Food and Nutrient Intake: energy intake, enteral nutrition intake  Food and Nutrient Adminstration: enteral and parenteral nutrition administration  Physical Activity and Function: nutrition-related ADLs and IADLs  Anthropometric Measurements: weight, weight change  Biochemical Data, Medical Tests and Procedures: electrolyte and renal panel, gastrointestinal profile, glucose/endocrine profile, inflammatory profile  Nutrition-Focused Physical Findings: overall appearance     Nutrition Follow-Up  RD Follow-up?: Yes

## 2019-07-03 NOTE — PT/OT/SLP EVAL
Physical Therapy Evaluation    Patient Name:  Stu Garcia   MRN:  [id number]  Admitting Diagnosis:  Esophageal cancer   Recent Surgery: Procedure(s) (LRB):  XI ROBOTIC ESOPHAGECTOMY (N/A) 1 Day Post-Op    Recommendations:     Discharge Recommendations:  home health PT   Discharge Equipment Recommendations: (TBD based on progress)   Barriers to discharge: Decreased caregiver support    Plan:     During this hospitalization, patient to be seen 4 x/week to address the above listed problems via gait training, therapeutic activities, therapeutic exercises, neuromuscular re-education  · Plan of Care Expires:  08/03/19   Plan of Care Reviewed with: patient, spouse    Assessment:     Stu Garcia is a 73 y.o. male admitted with a medical diagnosis of Esophageal cancer. Pt with impaired balance with dynamic standing activities due to inability to stabilize with lateral weight shift. Pt unable to attempt gait today due to length of lines, however assistance required with dynamic standing activities indicate pt will be limited in ambulation due to decreased balance. Pt with increased respiratory rate with activity, indicating impairments in exercise tolerance. Pt with limited functional mobility and impaired balance indicating pt is at risk for falls. Pt is unable to return to prior roles and responsibilities at this time without assistance requiring additional skilled acute PT services to address impairments for return to PLOF and decrease caregiver burden. When medically stable, pt appropriate for d/c to home with HHPT to further improve pt mobility.     Problem List: weakness, gait instability, impaired cardiopulmonary response to activity, impaired endurance, impaired balance, decreased safety awareness, impaired self care skills, orthopedic precautions, impaired functional mobilty  Rehab Prognosis: Good     GOALS:   Multidisciplinary Problems     Physical Therapy Goals        Problem: Physical Therapy Goal    Goal  Priority Disciplines Outcome Goal Variances Interventions   Physical Therapy Goal     PT, PT/OT Ongoing (interventions implemented as appropriate)     Description:  Goals to be met by: 2019    Patient will increase functional independence with mobility by performin. Supine <> sit with [place].  2. Sit <> stand transfer with [place] using No Assistive Device.  3. Bed <> chair transfer via Step Transfer with Supervision using No Assistive Device.  4. Gait  x 300 feet with Supervision using Straight Cane to prepare for community ambulation and endurance activities.  5. Able to tolerate exercise for 15-20 reps with independence.  6. Ascend/descend 2 stairs with right Handrails Stand-by Assistance using Straight Cane.                        History:   Living Environment:  Patient lives with wife in a single family home with 2 DAMIAN.   Prior Level of Function: Patient reports being modified independent with mobility & with ADLs. Pt reported use of straight cane when ambulating community distances, did not use assistive device throughout the house. Patient uses DME as follows: cane, straight. DME owned (not currently used): rolling walker.  Roles/Repsonsibilities: Hand Dominance: right Work: no. Drive: yes.  Hobbies: did not report hobbies.    Patient reports they will have assistance from wife upon discharge.    Past Medical History:   Diagnosis Date    Anticoagulant long-term use     xarelto,asa    Arthritis     Atrial fibrillation     cardioverted    Bleb, lung     Cataract     OS    Colon polyp     Coronary artery disease     Coronary artery disease involving native coronary artery of native heart with unstable angina pectoris 3/18/2018    Diabetes mellitus     Diabetes mellitus, type 2     Diverticulosis     General anesthetics causing adverse effect in therapeutic use     delayed emergence per patient's wife    GERD (gastroesophageal reflux disease)     HEARING LOSS     bilateral  aids    Hemorrhoid     HLD (hyperlipidemia)     Hypertension     Iron deficiency anemia     Neuropathy of leg     Pneumonia due to other staphylococcus     Prostate cancer     prostate    Spontaneous pneumothorax     bilateral       Past Surgical History:   Procedure Laterality Date    ARTHROSCOPY-KNEE W/ CHONDROPLASTY Left 4/7/2017    [facility] at [facility] OR    ARTHROSCOPY-MENISCECTOMY Left 4/7/2017    Performed by [facility] at [facility] OR    CARDIOVERSION      CATARACT EXTRACTION      bilateral    CERVICAL SPINE FRACTURE SURGERY      c7 t1 ACDF     CHEST TUBE INSERTION Right     COLONOSCOPY      COLONOSCOPY N/A 5/20/2015    Performed by Andrea Kerns Jr., MD at [facility] ENDO    CORONARY STENT PLACEMENT      x 2    ESOPHAGOGASTRODUODENOSCOPY (EGD) N/A 2/23/2019    [facility]eline Richards [facility] ENDO    ESOPHAGOGASTRODUODENOSCOPY (EGD) N/A 5/20/2015    Performed by Andrea Kerns Jr., MD at [facility] ENDO    HERNIA REPAIR      umbilical    GFBCGLEIA-RFRM-P-CATH Right 4/4/2019    Performed by Nahum Zaidi MD at [facility] OR    KNEE SURGERY Left 2017    Left heart cath Right 3/19/2018    [facility]formed [facility] Eduard Cano [facility] at [facility] CATH    Left heart cath Left 3/17/2018    Performed by Parminder Duenas III, MD at [facility] CATH    Left heart cath-RM # [id number] B Right 3/21/2018    [facility]    open thoracotomy Left 1966    With pleurectomy    Pleurectomy Left 1966    For treatment of left pneumothorax    PROSTATECTOMY  2001    open    TUBE THORACOTOMY  1966    pneumothorax left--open    ULTRASOUND, UPPER GI TRACT, ENDOSCOPIC N/A 3/13/2019    Performed by Andrew Shirley MD at [facility] ENDO (2ND FLR)    XI ROBOTIC ESOPHAGECTOMY N/A 7/2/2019    Performed by Chad Milian MD at [facility] OR 2ND FLR       Subjective     Communicated with RN prior to session.  Patient found seated in bedside chair upon PT entry to room,  agreeable to evaluation.  Pt's wife present throughout session.  Pt was willing to participate in session today.     Pain/Comfort:  · Pain Rating 1: (pt did not provide pain rating)    Objective:     Patient found with: arterial line, blood pressure cuff, central line, chest tube, parker catheter, GIGI drain, oxygen, peripheral IV, pulse ox (continuous), SCD, telemetry     General Precautions: Standard, Cardiac fall   Orthopedic Precautions:N/A   Braces: N/A   Body mass index is 31.04 kg/m².  Oxygen Device: Comfort Flow 40% & 20L  Vital Signs:   Heart Rate: 82   SaO2%: 100   Blood Pressure: 138/44 (71)    Exam:  · Mental Status: Patient is AxOx4 and follows multi-step commands. Pt is Alert and Cooperative during session.  · Skin Integrity: Visible skin intact  · Edema: None noted   · Sensation: Intact  · Hearing: Impaired: has hearing aid   · Vision:  Intact  · Postural Assessment: slouched posture, rounded shoulders, forward head and posterior pelvic tilt  · Range of Motion:  · RUE: WFL  · LUE: WFL  · RLE: WFL  · LLE: WFL  · Strength Exam:  · Upper Extremity Strength: grossly WFL  · Lower Extremity Strength  Right LE  Left LE    Knee extension: 5/5 Knee extension: 5/5   Knee flexion: 5/5 Knee flexion: 5/5   Hip flexion: 4+/5 Hip flexion: 4+/5   Ankle dorsiflexion:   5/5 Ankle dorsiflexion:   5/5   Ankle plantarflexion: 5/5 assessed with pt in seated Ankle plantarflexion: 5/5 assessed with pt in seated       Outcome Measures:  AM-PAC 6 CLICK MOBILITY  Turning over in bed (including adjusting bedclothes, sheets and blankets)?: 3  Sitting down on and standing up from a chair with arms (e.g., wheelchair, bedside commode, etc.): 3  Moving from lying on back to sitting on the side of the bed?: 3  Moving to and from a bed to a chair (including a wheelchair)?: 3  Need to walk in hospital room?: 3  Climbing 3-5 steps with a railing?: 2  Basic Mobility Total Score: 17     Functional Mobility:  Additional staff present: PT  Bed  Mobility:   · Pt found/returned to bedside chair    Transfers:   · Sit <> Stand Transfer: minimum assistance with no assistive device   · Stand <> Sit Transfer: minimum assistance with no assistive device   · [id number] from bedside chair      Gait:   · Not performed due to limitation in line length (comfort flow)      Therapeutic Activities & Exercises:   *Standing Marches and Calf Raises: Pt performed 2 sets x 10 repetitions with facilitation for correct performance and sequencing. Exercises performed to develop and maintain pt's  strength, endurance, balance, posture and core stabilization. Pt required min A to maintain balance with activities. Pt required min verbal cues to maintain appropriate speed (to slow down) to improve safety and quality of movement.     Education:  Pt and wife educated on role of acute PT and POC. Pt and wife verbalized understanding.     Patient left up in chair, with head in midline, neutral pelvis & heels floated for skin protection with all lines intact, call button in reach and RN notified.    Time Tracking:     PT Received On: 07/03/19  PT Start Time: 1125     PT Stop Time: 1140  PT Total Time (min): 15 min       Billable Minutes: Evaluation 15    Ariana Field, SPT  7/3/2019

## 2019-07-03 NOTE — NURSING
MD made aware of patient's chest tube output of 150 cc in the last two hours. No new orders given at this time. Will continue to monitor.

## 2019-07-03 NOTE — PLAN OF CARE
"Problem: Adult Inpatient Plan of Care  Goal: Plan of Care Review  Outcome: Ongoing (interventions implemented as appropriate)  Dx: Esophageal cancer    Shift Events: VSS throughout shift. ABGs drawn to monitor oxygenation. Oxygen titrated to 25 L 50% comfort flow. O2 sats greater than 98%. PH increased from 7.22 to 7.31. Continuing to monitor. Encouraged pt to cough up secretions. NS and PCA gtt discontinued. PRN dilaudid given to maintain adequate pain control. Plan of care reviewed with patient. Questions and concerns addressed. Will continue to monitor.     Neuro: AAO x4, Follows Commands and Moves All Extremities    Vital Signs: BP (!) 122/57 (BP Location: Right arm, Patient Position: Lying)   Pulse 81   Temp 98.1 °F (36.7 °C) (Oral)   Resp 12   Ht 6' 4" (1.93 m)   Wt 115.7 kg (255 lb)   SpO2 98%   BMI 31.04 kg/m²     Diet: NPO    Urine Output: Urinary Catheter 40-75 cc/hour    Drains: GIGI Drain, total output 15 cc / shift and Chest Tube, total output 150 cc /  shift    Labs/Accuchecks: Daily labs.    Skin: No skin breakdown noted.          "

## 2019-07-03 NOTE — NURSING
MD made aware of patient's increased work of breathing and use of abdominal muscles to breathe. MD called to bedside. ABGs ordered. Patient switched from simple face mask @ 6 L to comfort flow. Will draw repeat ABG @ 0330. Will continue to monitor.

## 2019-07-04 LAB
ALBUMIN SERPL BCP-MCNC: 2.8 G/DL (ref 3.5–5.2)
ALP SERPL-CCNC: 87 U/L (ref 55–135)
ALT SERPL W/O P-5'-P-CCNC: 60 U/L (ref 10–44)
ANION GAP SERPL CALC-SCNC: 7 MMOL/L (ref 8–16)
AST SERPL-CCNC: 71 U/L (ref 10–40)
BASOPHILS # BLD AUTO: 0.02 K/UL (ref 0–0.2)
BASOPHILS NFR BLD: 0.3 % (ref 0–1.9)
BILIRUB SERPL-MCNC: 1 MG/DL (ref 0.1–1)
BUN SERPL-MCNC: 14 MG/DL (ref 8–23)
CALCIUM SERPL-MCNC: 8.4 MG/DL (ref 8.7–10.5)
CHLORIDE SERPL-SCNC: 106 MMOL/L (ref 95–110)
CO2 SERPL-SCNC: 22 MMOL/L (ref 23–29)
CREAT SERPL-MCNC: 0.8 MG/DL (ref 0.5–1.4)
DIFFERENTIAL METHOD: ABNORMAL
EOSINOPHIL # BLD AUTO: 0 K/UL (ref 0–0.5)
EOSINOPHIL NFR BLD: 0 % (ref 0–8)
ERYTHROCYTE [DISTWIDTH] IN BLOOD BY AUTOMATED COUNT: 21.2 % (ref 11.5–14.5)
EST. GFR  (AFRICAN AMERICAN): >60 ML/MIN/1.73 M^2
EST. GFR  (NON AFRICAN AMERICAN): >60 ML/MIN/1.73 M^2
GLUCOSE SERPL-MCNC: 175 MG/DL (ref 70–110)
HCT VFR BLD AUTO: 34 % (ref 40–54)
HGB BLD-MCNC: 11 G/DL (ref 14–18)
IMM GRANULOCYTES # BLD AUTO: 0.03 K/UL (ref 0–0.04)
IMM GRANULOCYTES NFR BLD AUTO: 0.4 % (ref 0–0.5)
LYMPHOCYTES # BLD AUTO: 0.3 K/UL (ref 1–4.8)
LYMPHOCYTES NFR BLD: 4.2 % (ref 18–48)
MAGNESIUM SERPL-MCNC: 1.7 MG/DL (ref 1.6–2.6)
MAGNESIUM SERPL-MCNC: 2.1 MG/DL (ref 1.6–2.6)
MCH RBC QN AUTO: 31.5 PG (ref 27–31)
MCHC RBC AUTO-ENTMCNC: 32.4 G/DL (ref 32–36)
MCV RBC AUTO: 97 FL (ref 82–98)
MONOCYTES # BLD AUTO: 0.8 K/UL (ref 0.3–1)
MONOCYTES NFR BLD: 10.4 % (ref 4–15)
NEUTROPHILS # BLD AUTO: 6.2 K/UL (ref 1.8–7.7)
NEUTROPHILS NFR BLD: 84.7 % (ref 38–73)
NRBC BLD-RTO: 0 /100 WBC
PHOSPHATE SERPL-MCNC: 1.8 MG/DL (ref 2.7–4.5)
PHOSPHATE SERPL-MCNC: 1.9 MG/DL (ref 2.7–4.5)
PLATELET # BLD AUTO: 76 K/UL (ref 150–350)
PMV BLD AUTO: 11 FL (ref 9.2–12.9)
POCT GLUCOSE: 156 MG/DL (ref 70–110)
POCT GLUCOSE: 166 MG/DL (ref 70–110)
POCT GLUCOSE: 179 MG/DL (ref 70–110)
POCT GLUCOSE: 210 MG/DL (ref 70–110)
POTASSIUM SERPL-SCNC: 4.5 MMOL/L (ref 3.5–5.1)
PROT SERPL-MCNC: 5.3 G/DL (ref 6–8.4)
RBC # BLD AUTO: 3.49 M/UL (ref 4.6–6.2)
SODIUM SERPL-SCNC: 135 MMOL/L (ref 136–145)
WBC # BLD AUTO: 7.31 K/UL (ref 3.9–12.7)

## 2019-07-04 PROCEDURE — S0028 INJECTION, FAMOTIDINE, 20 MG: HCPCS | Mod: HCNC | Performed by: STUDENT IN AN ORGANIZED HEALTH CARE EDUCATION/TRAINING PROGRAM

## 2019-07-04 PROCEDURE — 94770 HC EXHALED C02 TEST: CPT | Mod: HCNC

## 2019-07-04 PROCEDURE — 27000221 HC OXYGEN, UP TO 24 HOURS: Mod: HCNC

## 2019-07-04 PROCEDURE — 84100 ASSAY OF PHOSPHORUS: CPT | Mod: HCNC

## 2019-07-04 PROCEDURE — 25000003 PHARM REV CODE 250: Mod: HCNC | Performed by: STUDENT IN AN ORGANIZED HEALTH CARE EDUCATION/TRAINING PROGRAM

## 2019-07-04 PROCEDURE — 25000242 PHARM REV CODE 250 ALT 637 W/ HCPCS: Mod: HCNC | Performed by: NURSE PRACTITIONER

## 2019-07-04 PROCEDURE — 84100 ASSAY OF PHOSPHORUS: CPT | Mod: 91,HCNC

## 2019-07-04 PROCEDURE — 63600175 PHARM REV CODE 636 W HCPCS: Mod: HCNC | Performed by: STUDENT IN AN ORGANIZED HEALTH CARE EDUCATION/TRAINING PROGRAM

## 2019-07-04 PROCEDURE — 86141 C-REACTIVE PROTEIN HS: CPT | Mod: HCNC

## 2019-07-04 PROCEDURE — 94799 UNLISTED PULMONARY SVC/PX: CPT | Mod: HCNC

## 2019-07-04 PROCEDURE — 20600001 HC STEP DOWN PRIVATE ROOM: Mod: HCNC

## 2019-07-04 PROCEDURE — 85025 COMPLETE CBC W/AUTO DIFF WBC: CPT | Mod: HCNC

## 2019-07-04 PROCEDURE — 80053 COMPREHEN METABOLIC PANEL: CPT | Mod: HCNC

## 2019-07-04 PROCEDURE — 94664 DEMO&/EVAL PT USE INHALER: CPT | Mod: HCNC

## 2019-07-04 PROCEDURE — 94761 N-INVAS EAR/PLS OXIMETRY MLT: CPT | Mod: HCNC

## 2019-07-04 PROCEDURE — 83735 ASSAY OF MAGNESIUM: CPT | Mod: 91,HCNC

## 2019-07-04 PROCEDURE — 99900035 HC TECH TIME PER 15 MIN (STAT): Mod: HCNC

## 2019-07-04 PROCEDURE — 36415 COLL VENOUS BLD VENIPUNCTURE: CPT | Mod: HCNC

## 2019-07-04 PROCEDURE — 94640 AIRWAY INHALATION TREATMENT: CPT | Mod: HCNC

## 2019-07-04 PROCEDURE — 83735 ASSAY OF MAGNESIUM: CPT | Mod: HCNC

## 2019-07-04 PROCEDURE — 99233 PR SUBSEQUENT HOSPITAL CARE,LEVL III: ICD-10-PCS | Mod: 24,HCNC,GC, | Performed by: SURGERY

## 2019-07-04 PROCEDURE — 99233 SBSQ HOSP IP/OBS HIGH 50: CPT | Mod: 24,HCNC,GC, | Performed by: SURGERY

## 2019-07-04 RX ORDER — NALOXONE HCL 0.4 MG/ML
0.02 VIAL (ML) INJECTION
Status: DISCONTINUED | OUTPATIENT
Start: 2019-07-04 | End: 2019-07-05

## 2019-07-04 RX ORDER — HYDROMORPHONE HCL IN 0.9% NACL 6 MG/30 ML
PATIENT CONTROLLED ANALGESIA SYRINGE INTRAVENOUS CONTINUOUS
Status: DISCONTINUED | OUTPATIENT
Start: 2019-07-04 | End: 2019-07-05

## 2019-07-04 RX ORDER — KETOROLAC TROMETHAMINE 30 MG/ML
15 INJECTION, SOLUTION INTRAMUSCULAR; INTRAVENOUS EVERY 8 HOURS
Status: DISPENSED | OUTPATIENT
Start: 2019-07-04 | End: 2019-07-07

## 2019-07-04 RX ORDER — KETOROLAC TROMETHAMINE 30 MG/ML
15 INJECTION, SOLUTION INTRAMUSCULAR; INTRAVENOUS EVERY 6 HOURS
Status: DISCONTINUED | OUTPATIENT
Start: 2019-07-04 | End: 2019-07-04

## 2019-07-04 RX ADMIN — LEVALBUTEROL HYDROCHLORIDE 0.63 MG: 0.63 SOLUTION RESPIRATORY (INHALATION) at 08:07

## 2019-07-04 RX ADMIN — HYDROMORPHONE HYDROCHLORIDE 0.5 MG: 1 INJECTION, SOLUTION INTRAMUSCULAR; INTRAVENOUS; SUBCUTANEOUS at 03:07

## 2019-07-04 RX ADMIN — METOPROLOL TARTRATE 10 MG: 5 INJECTION INTRAVENOUS at 12:07

## 2019-07-04 RX ADMIN — SODIUM PHOSPHATE, MONOBASIC, MONOHYDRATE 20.01 MMOL: 276; 142 INJECTION, SOLUTION INTRAVENOUS at 06:07

## 2019-07-04 RX ADMIN — TAMSULOSIN HYDROCHLORIDE 0.4 MG: 0.4 CAPSULE ORAL at 10:07

## 2019-07-04 RX ADMIN — SODIUM PHOSPHATE, MONOBASIC, MONOHYDRATE 20.01 MMOL: 276; 142 INJECTION, SOLUTION INTRAVENOUS at 07:07

## 2019-07-04 RX ADMIN — INSULIN ASPART 1 UNITS: 100 INJECTION, SOLUTION INTRAVENOUS; SUBCUTANEOUS at 11:07

## 2019-07-04 RX ADMIN — FAMOTIDINE 20 MG: 10 INJECTION INTRAVENOUS at 09:07

## 2019-07-04 RX ADMIN — LEVALBUTEROL HYDROCHLORIDE 0.63 MG: 0.63 SOLUTION RESPIRATORY (INHALATION) at 02:07

## 2019-07-04 RX ADMIN — FAMOTIDINE 20 MG: 10 INJECTION INTRAVENOUS at 08:07

## 2019-07-04 RX ADMIN — METOPROLOL TARTRATE 10 MG: 5 INJECTION INTRAVENOUS at 05:07

## 2019-07-04 RX ADMIN — MAGNESIUM SULFATE IN WATER 2 G: 40 INJECTION, SOLUTION INTRAVENOUS at 06:07

## 2019-07-04 RX ADMIN — KETOROLAC TROMETHAMINE 15 MG: 30 INJECTION, SOLUTION INTRAMUSCULAR; INTRAVENOUS at 09:07

## 2019-07-04 RX ADMIN — ENOXAPARIN SODIUM 40 MG: 100 INJECTION SUBCUTANEOUS at 05:07

## 2019-07-04 RX ADMIN — Medication: at 09:07

## 2019-07-04 NOTE — NURSING
Shift Events: VSS throughout shift. 2 L nasal cannula. O2 sats greater than 98%. Plan to wean O2 today.  Encouraged pt to cough up secretions. PRN dilaudid given to maintain adequate pain control. MD made aware of J tube verification needed. Abdominal xray taken. Awaiting evaluation. Patient up to chair. No insulin coverage required. Plan of care reviewed with patient. Questions and concerns addressed. Will continue to monitor.      Neuro: AAO x4, Follows Commands and Moves All Extremities     Diet: NPO     Urine Output: Urinary Catheter 500 cc/shift     Drains: GIGI Drain, total output 10 cc / shift and Chest Tube, total output 250 cc /  shift     Labs/Accuchecks: Daily labs.     Skin: No skin breakdown noted.

## 2019-07-04 NOTE — CONSULTS
MEDICAL NUTRITION THERAPY  Reason for consult: J-tube feeds    Pt seen and assessed 7/3. RD to continue following per protocol.     Recommendations  Recommendation/Intervention:   1. As medically able, recommend initiating TF of Impact Peptide 1.5 at a goal rate of 65 mL/hr - to provide 2340 kcal/day, 147g protein/day, and 1201mL free fluid/day. No GRV checks via J-tube.  2. When nocturnal TF desired, recommend Impact Peptide 1.5 at 85 mL/hr from 2p-8a - to provide 2295 kcal/day, 144g protein/day, and 1178mL free fluid/day.     Please re-consult as needed.     Jyoti Cabrera RD, LDN

## 2019-07-04 NOTE — ASSESSMENT & PLAN NOTE
Stu Garcia is a 73 y.o. male with a history of esophageal cancer s/p esophagectomy on 7/2/19    Pathway:  NPO  D/c IVF  DVT ppx  D/c parker  PCA  Toradol   Beta blockade (IV conversion from home dose)  Ambulate TID  PT/OT  Chest tube to water seal   Stepdown to PCU

## 2019-07-04 NOTE — PROGRESS NOTES
[facility]  General Surgery  Progress Note    Subjective:     History of Present Illness:  Mr. Garcia is a 73M transferred to the ICU after     Post-Op Info:  Procedure(s) (LRB):  XI ROBOTIC ESOPHAGECTOMY (N/A)   2 Days Post-Op     Interval History: NAEON.  Still with some abdominal pain.  Down to 2L NC.  VSS.       Medications:  Continuous Infusions:   hydromorphone in 0.9 % NaCl 6 mg/30 ml       Scheduled Meds:   enoxaparin  40 mg Subcutaneous Daily    famotidine (PF)  20 mg Intravenous Q12H    ketorolac  15 mg Intravenous Q6H    levalbuterol  0.63 mg Nebulization Q6H WAKE    metoprolol  10 mg Intravenous Q6H    tamsulosin  0.4 mg Oral Daily     PRN Meds:calcium gluconate IVPB, calcium gluconate IVPB, calcium gluconate IVPB, Dextrose 10% Bolus, Dextrose 10% Bolus, glucagon (human recombinant), HYDROmorphone, insulin aspart U-100, magnesium sulfate IVPB, magnesium sulfate IVPB, naloxone, naloxone, ondansetron, potassium chloride in water **AND** potassium chloride in water **AND** potassium chloride in water, promethazine (PHENERGAN) IVPB, sodium phosphate IVPB, sodium phosphate IVPB, sodium phosphate IVPB     Review of patient's allergies indicates:   Allergen Reactions    Codeine Nausea And Vomiting     Objective:     Vital Signs (Most Recent):  Temp: 99 °F (37.2 °C) (07/04/19 0701)  Pulse: 81 (07/04/19 0730)  Resp: 17 (07/04/19 0730)  BP: (!) 141/65 (07/04/19 0701)  SpO2: 99 % (07/04/19 0730) Vital Signs (24h Range):  Temp:  [98.4 °F (36.9 °C)-99.9 °F (37.7 °C)] 99 °F (37.2 °C)  Pulse:  [] 81  Resp:  [10-32] 17  SpO2:  [95 %-100 %] 99 %  BP: (126-165)/() 141/65  Arterial Line BP: (114-179)/(36-62) 145/43     Weight: 115.7 kg (255 lb)  Body mass index is 31.04 kg/m².    Intake/Output - Last 3 Shifts       07/02 0700 - 07/03 0659 07/03 0700 - 07/04 0659 07/04 0700 - 07/05 0659    I.V. (mL/kg) 8637 (74.6) 50 (0.4)     Blood 500      NG/GT  120     IV Piggyback 20      Total  Intake(mL/kg) 9157 (79.1) 170 (1.5)     Urine (mL/kg/hr) 1695 (0.6) 1365 (0.5) 45 (0.3)    Drains 60 10 3    Other 200      Blood 400      Chest Tube 660 480 30    Total Output 3015 1855 78    Net +6142 -1685 -78                 Physical Exam   Constitutional: He is oriented to person, place, and time. No distress.   Eyes: Conjunctivae are normal.   Neck:   GIGI drain intact with serous output.  Incision clean and dry.   Cardiovascular: Normal rate.   Pulmonary/Chest: Effort normal. No respiratory distress.   CT to water seal with serous drainage    Abdominal: Soft. He exhibits no distension. There is no tenderness.   Incisions clean and dry.  J tube in place    Musculoskeletal: He exhibits no edema.   Neurological: He is alert and oriented to person, place, and time.   Psychiatric: He has a normal mood and affect.       Significant Labs:  CBC:   Recent Labs   Lab 07/04/19  0320   WBC 7.31   RBC 3.49*   HGB 11.0*   HCT 34.0*   PLT 76*   MCV 97   MCH 31.5*   MCHC 32.4     CMP:   Recent Labs   Lab 07/04/19  0320   *   CALCIUM 8.4*   ALBUMIN 2.8*   PROT 5.3*   *   K 4.5   CO2 22*      BUN 14   CREATININE 0.8   ALKPHOS 87   ALT 60*   AST 71*   BILITOT 1.0     ABGs:   Recent Labs   Lab 07/03/19  0508   PH 7.310*   PCO2 45.3*   PO2 127*   HCO3 22.8*   POCSATURATED 99   BE -3       Significant Diagnostics:  I have reviewed all pertinent imaging results/findings within the past 24 hours.    Assessment/Plan:     * Esophageal cancer  Stu Garcia is a 73 y.o. male with a history of esophageal cancer s/p esophagectomy on 7/2/19    Pathway:  NPO  D/c IVF  DVT ppx  D/c parker  PCA  Toradol   Beta blockade (IV conversion from home dose)  Ambulate TID  PT/OT  Chest tube to water seal   Stepdown to PCU          Aniceto Russell MD  General Surgery  [facility]

## 2019-07-04 NOTE — PROGRESS NOTES
"[facility]  Critical Care - Surgery  Progress Note    Patient Name: Stu Garcia  MRN: [id number]  Admission Date: 7/2/2019  Hospital Length of Stay: 2 days  Code Status: Full Code  Attending Provider: Chad Milian MD  Primary Care Provider: Cade Juan MD   Principal Problem: Esophageal cancer    Subjective:     Hospital/ICU Course:  Pt is a 72 yo male w/ and PMH of esophageal cancer and multiple  Other comorbidities. He was admitted on 7/2 for a total thoracic esophagectomy, proximal gastrectomy, and cervical esophagogastrostomy; mediastinal and abdominal lymphadenectomy; Witzel jejunostomy. Postoperatively he presented to the PACU on 2L NC w/ sats of 95%. While in PACU he began to have difficulty breathing and stated to the nurse that it "feels like i'm drowning". MD order stat ABG and CXR. Patient was transferred to the SICU for shortness of breath and evidence of hypoxia on ABG. He was transferred on 6L NC with 98% sats. Pt continued to have increased work of breathing in the SICU and another ABG was obtained and continued to be monitored and was put on 25L, 50% comfort flow. Blood pH increased from 7.22 on arrival to 7.31. PCA was d'cd and q6 PRN dilaudid was ordered due to increased somnolence and apneic episodes on the PCA.  Chest tube output 150c. Urine catheter 40-75cc/hr. GIGI drain output 10cc.     Interval History/Significant Events: NAEO. Pt weaned to 3L O2 via NC, 99%. J-tube placed, pending abdominal xray review. Plan to start TF today. Nurse reports when pt is lying down, occasionally will have difficulty breathing and seems to be related to anxiety.     Follow-up For: Procedure(s) (LRB):  XI ROBOTIC ESOPHAGECTOMY (N/A)    Post-Operative Day: 2 Days Post-Op    Objective:     Vital Signs (Most Recent):  Temp: 99.3 °F (37.4 °C) (07/04/19 0300)  Pulse: 76 (07/04/19 0645)  Resp: 11 (07/04/19 0645)  BP: (!) 142/66 (07/04/19 0600)  SpO2: 99 % (07/04/19 0645) Vital Signs (24h " Range):  Temp:  [98.4 °F (36.9 °C)-99.9 °F (37.7 °C)] 99.3 °F (37.4 °C)  Pulse:  [] 76  Resp:  [10-32] 11  SpO2:  [95 %-100 %] 99 %  BP: (124-165)/() 142/66  Arterial Line BP: (114-179)/(36-62) 141/47     Weight: 115.7 kg (255 lb)  Body mass index is 31.04 kg/m².      Intake/Output Summary (Last 24 hours) at 7/4/2019 0715  Last data filed at 7/4/2019 0600  Gross per 24 hour   Intake 120 ml   Output 1630 ml   Net -1510 ml       Physical Exam   Constitutional: He is oriented to person, place, and time. He appears well-developed and well-nourished.   Pt out of bed and sitting in chair   HENT:   Head: Normocephalic and atraumatic.   Neck:   Left GIGI drain c/d/i   Cardiovascular: Normal rate and regular rhythm.   Pulmonary/Chest: Effort normal and breath sounds normal.   Abdominal: Soft. Bowel sounds are normal.   6 laparoscopic incisions on abdomen and 1 midline incision. All c/d/i   Genitourinary:   Genitourinary Comments: Martinez catheter in place   Neurological: He is alert and oriented to person, place, and time.   Skin: Skin is warm and dry.   Psychiatric: He has a normal mood and affect. His behavior is normal.       Vents:  Oxygen Concentration (%): 30 (07/03/19 1500)    Lines/Drains/Airways     Central Venous Catheter Line                 Port A Cath Single Lumen 04/04/19 right subclavian 91 days          Drain                 Chest Tube 07/02/19 1 Right 24 Fr. 2 days         Closed/Suction Drain 07/02/19 Left Neck Bulb 19 Fr. 2 days         Gastrostomy/Enterostomy 07/02/19 Jejunostomy tube LUQ feeding 2 days         Urethral Catheter 07/02/19 Non-latex 16 Fr. 2 days          Arterial Line                 Arterial Line 07/02/19 0720 Left Radial 1 day          Peripheral Intravenous Line                 Peripheral IV - Single Lumen 07/02/19 0545 18 G Left Forearm 2 days         Peripheral IV - Single Lumen 07/02/19 0730 16 G Right Hand 1 day         Peripheral IV - Single Lumen 07/02/19 0735 18 G Left  Hand 1 day                Significant Labs:    CBC/Anemia Profile:  Recent Labs   Lab 07/03/19  0109 07/03/19  0334 07/04/19  0320   WBC 6.91 6.86 7.31   HGB 11.6* 11.3* 11.0*   HCT 36.5* 34.6* 34.0*   PLT 82* 79* 76*   MCV 97 95 97   RDW 20.7* 20.7* 21.2*        Chemistries:  Recent Labs   Lab 07/03/19  0109 07/03/19  0334 07/04/19  0320    135* 135*   K 5.0 4.7 4.5    105 106   CO2 19* 21* 22*   BUN 17 17 14   CREATININE 0.8 0.9 0.8   CALCIUM 8.4* 8.3* 8.4*   ALBUMIN 3.4* 3.3* 2.8*   PROT 5.9* 5.7* 5.3*   BILITOT 0.9 0.8 1.0   ALKPHOS 99 97 87   ALT 67* 64* 60*   AST 92* 87* 71*   MG  --  1.8 1.7   PHOS  --  3.5 1.8*       ABGs:   Recent Labs   Lab 07/03/19  0508   PH 7.310*   PCO2 45.3*   HCO3 22.8*   POCSATURATED 99   BE -3       Significant Imaging:  I have reviewed all pertinent imaging results/findings within the past 24 hours.    Assessment/Plan:     * Esophageal cancer  Plan:    Neuro:  -Pain control dilaudid prn. Restarted dilaudid PCA  -No sedation    Pulmonary:   -Pt weaned from comfort flow to 3L O2 NC  -Continue to wean to room air as tolerated    Cardiac:  -MAP goal >65  -Left radial arterial line  -not requiring any pressors    Renal:   -Martinez catheter in place  -Urine output adequate  -Bun/Cr 14/0.8 (17/0.9)     Fluids/Electrolytes/Nutrition/GI:   -Nutritional status: NPO - plan to start TF today  -replace lytes PRN  -NS @ 75ml/hr on admission to SICU DC'd  -Bowel regimen  -LUQ gastrostomy jejunostomy tube  -Right chest tube in place 250-300ml output  -Left neck GIGI drain 10ml output    Hematology/Oncology:  -H/H 11/34 trending down. Continue to monitor w/ daily CBC    Infectious Disease:   -Afebrile  -WBC WNL  -no scheduled Abx, monitor temp and WBC and order Abx as appropriate  -No pending cultures    Endocrine:  -Glucose goal of 120-180  -Sliding scale incsulin ordered    -PPX  Lovenox 40mg IV  Famotidine IV    Dispo:  -Continue care in the ICU setting  -Monitor ABG and continue to  wean support o2 as tolerated  -Start tube feeds today  -Plan for step-down       Venita Louise MD  Critical Care - Surgery  [facility]

## 2019-07-04 NOTE — SUBJECTIVE & OBJECTIVE
Interval History: NAEON.  Still with some abdominal pain.  Down to 2L NC.  VSS.       Medications:  Continuous Infusions:   hydromorphone in 0.9 % NaCl 6 mg/30 ml       Scheduled Meds:   enoxaparin  40 mg Subcutaneous Daily    famotidine (PF)  20 mg Intravenous Q12H    ketorolac  15 mg Intravenous Q6H    levalbuterol  0.63 mg Nebulization Q6H WAKE    metoprolol  10 mg Intravenous Q6H    tamsulosin  0.4 mg Oral Daily     PRN Meds:calcium gluconate IVPB, calcium gluconate IVPB, calcium gluconate IVPB, Dextrose 10% Bolus, Dextrose 10% Bolus, glucagon (human recombinant), HYDROmorphone, insulin aspart U-100, magnesium sulfate IVPB, magnesium sulfate IVPB, naloxone, naloxone, ondansetron, potassium chloride in water **AND** potassium chloride in water **AND** potassium chloride in water, promethazine (PHENERGAN) IVPB, sodium phosphate IVPB, sodium phosphate IVPB, sodium phosphate IVPB     Review of patient's allergies indicates:   Allergen Reactions    Codeine Nausea And Vomiting     Objective:     Vital Signs (Most Recent):  Temp: 99 °F (37.2 °C) (07/04/19 0701)  Pulse: 81 (07/04/19 0730)  Resp: 17 (07/04/19 0730)  BP: (!) 141/65 (07/04/19 0701)  SpO2: 99 % (07/04/19 0730) Vital Signs (24h Range):  Temp:  [98.4 °F (36.9 °C)-99.9 °F (37.7 °C)] 99 °F (37.2 °C)  Pulse:  [] 81  Resp:  [10-32] 17  SpO2:  [95 %-100 %] 99 %  BP: (126-165)/() 141/65  Arterial Line BP: (114-179)/(36-62) 145/43     Weight: 115.7 kg (255 lb)  Body mass index is 31.04 kg/m².    Intake/Output - Last 3 Shifts       07/02 0700 - 07/03 0659 07/03 0700 - 07/04 0659 07/04 0700 - 07/05 0659    I.V. (mL/kg) 8637 (74.6) 50 (0.4)     Blood 500      NG/GT  120     IV Piggyback 20      Total Intake(mL/kg) 9157 (79.1) 170 (1.5)     Urine (mL/kg/hr) 1695 (0.6) 1365 (0.5) 45 (0.3)    Drains 60 10 3    Other 200      Blood 400      Chest Tube 660 480 30    Total Output 3018 2596 78    Net +6174 -1685 -78                 Physical Exam    Constitutional: He is oriented to person, place, and time. No distress.   Eyes: Conjunctivae are normal.   Neck:   GIGI drain intact with serous output.  Incision clean and dry.   Cardiovascular: Normal rate.   Pulmonary/Chest: Effort normal. No respiratory distress.   CT to water seal with serous drainage    Abdominal: Soft. He exhibits no distension. There is no tenderness.   Incisions clean and dry.  J tube in place    Musculoskeletal: He exhibits no edema.   Neurological: He is alert and oriented to person, place, and time.   Psychiatric: He has a normal mood and affect.       Significant Labs:  CBC:   Recent Labs   Lab 07/04/19  0320   WBC 7.31   RBC 3.49*   HGB 11.0*   HCT 34.0*   PLT 76*   MCV 97   MCH 31.5*   MCHC 32.4     CMP:   Recent Labs   Lab 07/04/19  0320   *   CALCIUM 8.4*   ALBUMIN 2.8*   PROT 5.3*   *   K 4.5   CO2 22*      BUN 14   CREATININE 0.8   ALKPHOS 87   ALT 60*   AST 71*   BILITOT 1.0     ABGs:   Recent Labs   Lab 07/03/19  0508   PH 7.310*   PCO2 45.3*   PO2 127*   HCO3 22.8*   POCSATURATED 99   BE -3       Significant Diagnostics:  I have reviewed all pertinent imaging results/findings within the past 24 hours.

## 2019-07-04 NOTE — ASSESSMENT & PLAN NOTE
Plan:    Neuro:  -Pain control dilaudid prn. Restarted dilaudid PCA  -No sedation    Pulmonary:   -Pt weaned from comfort flow to 4L O2 NC  -Continue to wean to room air as tolerated    Cardiac:  -MAP goal >65  -Left radial arterial line  -not requiring any pressors    Renal:   -Martinez catheter in place  -Urine output adequate  -Bun/Cr 14/0.8 (17/0.9)     Fluids/Electrolytes/Nutrition/GI:   -Nutritional status: NPO - plan to start TF today  -replace lytes PRN  -NS @ 75ml/hr on admission to SICU DC'd  -Bowel regimen  -LUQ gastrostomy jejunostomy tube  -Right chest tube in place 250-300ml output  -Left neck GIGI drain 10ml output    Hematology/Oncology:  -H/H 11/34 trending down. Continue to monitor w/ daily CBC    Infectious Disease:   -Afebrile  -WBC WNL  -no scheduled Abx, monitor temp and WBC and order Abx as appropriate  -No pending cultures    Endocrine:  -Glucose goal of 120-180  -Sliding scale incsulin ordered    -PPX  Lovenox 40mg IV  Famotidine IV    Dispo:  -Continue care in the ICU setting  -Monitor ABG and continue to wean support o2 as tolerated  -Start tube feeds today  -Plan for step-down

## 2019-07-04 NOTE — SUBJECTIVE & OBJECTIVE
Interval History/Significant Events: NAEO. Pt weaned to 4L O2 via NC. J-tube placed, pending abdominal xray review. Plan to start TF today.     Follow-up For: Procedure(s) (LRB):  XI ROBOTIC ESOPHAGECTOMY (N/A)    Post-Operative Day: 2 Days Post-Op    Objective:     Vital Signs (Most Recent):  Temp: 99.3 °F (37.4 °C) (07/04/19 0300)  Pulse: 76 (07/04/19 0645)  Resp: 11 (07/04/19 0645)  BP: (!) 142/66 (07/04/19 0600)  SpO2: 99 % (07/04/19 0645) Vital Signs (24h Range):  Temp:  [98.4 °F (36.9 °C)-99.9 °F (37.7 °C)] 99.3 °F (37.4 °C)  Pulse:  [] 76  Resp:  [10-32] 11  SpO2:  [95 %-100 %] 99 %  BP: (124-165)/() 142/66  Arterial Line BP: (114-179)/(36-62) 141/47     Weight: 115.7 kg (255 lb)  Body mass index is 31.04 kg/m².      Intake/Output Summary (Last 24 hours) at 7/4/2019 0715  Last data filed at 7/4/2019 0600  Gross per 24 hour   Intake 120 ml   Output 1630 ml   Net -1510 ml       Physical Exam   Constitutional: He is oriented to person, place, and time. He appears well-developed and well-nourished.   Pt out of bed and sitting in chair   HENT:   Head: Normocephalic and atraumatic.   Neck:   Left GIGI drain c/d/i   Cardiovascular: Normal rate and regular rhythm.   Pulmonary/Chest: Effort normal and breath sounds normal.   Abdominal: Soft. Bowel sounds are normal.   6 laparoscopic incisions on abdomen and 1 midline incision. All c/d/i   Genitourinary:   Genitourinary Comments: Martinez catheter in place   Neurological: He is alert and oriented to person, place, and time.   Skin: Skin is warm and dry.   Psychiatric: He has a normal mood and affect. His behavior is normal.       Vents:  Oxygen Concentration (%): 30 (07/03/19 1500)    Lines/Drains/Airways     Central Venous Catheter Line                 Port A Cath Single Lumen 04/04/19 right subclavian 91 days          Drain                 Chest Tube 07/02/19 1 Right 24 Fr. 2 days         Closed/Suction Drain 07/02/19 Left Neck Bulb 19 Fr. 2 days          Gastrostomy/Enterostomy 07/02/19 Jejunostomy tube LUQ feeding 2 days         Urethral Catheter 07/02/19 Non-latex 16 Fr. 2 days          Arterial Line                 Arterial Line 07/02/19 0720 Left Radial 1 day          Peripheral Intravenous Line                 Peripheral IV - Single Lumen 07/02/19 0545 18 G Left Forearm 2 days         Peripheral IV - Single Lumen 07/02/19 0730 16 G Right Hand 1 day         Peripheral IV - Single Lumen 07/02/19 0735 18 G Left Hand 1 day                Significant Labs:    CBC/Anemia Profile:  Recent Labs   Lab 07/03/19  0109 07/03/19  0334 07/04/19  0320   WBC 6.91 6.86 7.31   HGB 11.6* 11.3* 11.0*   HCT 36.5* 34.6* 34.0*   PLT 82* 79* 76*   MCV 97 95 97   RDW 20.7* 20.7* 21.2*        Chemistries:  Recent Labs   Lab 07/03/19 0109 07/03/19  0334 07/04/19  0320    135* 135*   K 5.0 4.7 4.5    105 106   CO2 19* 21* 22*   BUN 17 17 14   CREATININE 0.8 0.9 0.8   CALCIUM 8.4* 8.3* 8.4*   ALBUMIN 3.4* 3.3* 2.8*   PROT 5.9* 5.7* 5.3*   BILITOT 0.9 0.8 1.0   ALKPHOS 99 97 87   ALT 67* 64* 60*   AST 92* 87* 71*   MG  --  1.8 1.7   PHOS  --  3.5 1.8*       ABGs:   Recent Labs   Lab 07/03/19  0508   PH 7.310*   PCO2 45.3*   HCO3 22.8*   POCSATURATED 99   BE -3       Significant Imaging:  I have reviewed all pertinent imaging results/findings within the past 24 hours.

## 2019-07-05 LAB
ALBUMIN SERPL BCP-MCNC: 2.7 G/DL (ref 3.5–5.2)
ALP SERPL-CCNC: 89 U/L (ref 55–135)
ALT SERPL W/O P-5'-P-CCNC: 49 U/L (ref 10–44)
ANION GAP SERPL CALC-SCNC: 8 MMOL/L (ref 8–16)
AST SERPL-CCNC: 42 U/L (ref 10–40)
BASOPHILS # BLD AUTO: 0.01 K/UL (ref 0–0.2)
BASOPHILS NFR BLD: 0.2 % (ref 0–1.9)
BILIRUB SERPL-MCNC: 1.1 MG/DL (ref 0.1–1)
BUN SERPL-MCNC: 19 MG/DL (ref 8–23)
CALCIUM SERPL-MCNC: 9.1 MG/DL (ref 8.7–10.5)
CHLORIDE SERPL-SCNC: 101 MMOL/L (ref 95–110)
CO2 SERPL-SCNC: 25 MMOL/L (ref 23–29)
CREAT SERPL-MCNC: 0.9 MG/DL (ref 0.5–1.4)
CRP SERPL-MCNC: 153.3 MG/L (ref 0–3.19)
CRP SERPL-MCNC: 153.51 MG/L (ref 0–3.19)
DIFFERENTIAL METHOD: ABNORMAL
EOSINOPHIL # BLD AUTO: 0.1 K/UL (ref 0–0.5)
EOSINOPHIL NFR BLD: 0.9 % (ref 0–8)
ERYTHROCYTE [DISTWIDTH] IN BLOOD BY AUTOMATED COUNT: 20.6 % (ref 11.5–14.5)
EST. GFR  (AFRICAN AMERICAN): >60 ML/MIN/1.73 M^2
EST. GFR  (NON AFRICAN AMERICAN): >60 ML/MIN/1.73 M^2
GLUCOSE SERPL-MCNC: 136 MG/DL (ref 70–110)
HCT VFR BLD AUTO: 34.8 % (ref 40–54)
HGB BLD-MCNC: 11.1 G/DL (ref 14–18)
IMM GRANULOCYTES # BLD AUTO: 0.02 K/UL (ref 0–0.04)
IMM GRANULOCYTES NFR BLD AUTO: 0.4 % (ref 0–0.5)
LYMPHOCYTES # BLD AUTO: 0.4 K/UL (ref 1–4.8)
LYMPHOCYTES NFR BLD: 7.2 % (ref 18–48)
MAGNESIUM SERPL-MCNC: 2.1 MG/DL (ref 1.6–2.6)
MCH RBC QN AUTO: 31.4 PG (ref 27–31)
MCHC RBC AUTO-ENTMCNC: 31.9 G/DL (ref 32–36)
MCV RBC AUTO: 99 FL (ref 82–98)
MONOCYTES # BLD AUTO: 0.7 K/UL (ref 0.3–1)
MONOCYTES NFR BLD: 12.4 % (ref 4–15)
NEUTROPHILS # BLD AUTO: 4.3 K/UL (ref 1.8–7.7)
NEUTROPHILS NFR BLD: 78.9 % (ref 38–73)
NRBC BLD-RTO: 0 /100 WBC
PHOSPHATE SERPL-MCNC: 3.1 MG/DL (ref 2.7–4.5)
PLATELET # BLD AUTO: 77 K/UL (ref 150–350)
PMV BLD AUTO: 11.3 FL (ref 9.2–12.9)
POCT GLUCOSE: 145 MG/DL (ref 70–110)
POCT GLUCOSE: 164 MG/DL (ref 70–110)
POCT GLUCOSE: 181 MG/DL (ref 70–110)
POCT GLUCOSE: 188 MG/DL (ref 70–110)
POTASSIUM SERPL-SCNC: 3.8 MMOL/L (ref 3.5–5.1)
PROT SERPL-MCNC: 5.8 G/DL (ref 6–8.4)
RBC # BLD AUTO: 3.53 M/UL (ref 4.6–6.2)
SODIUM SERPL-SCNC: 134 MMOL/L (ref 136–145)
WBC # BLD AUTO: 5.39 K/UL (ref 3.9–12.7)

## 2019-07-05 PROCEDURE — 25000242 PHARM REV CODE 250 ALT 637 W/ HCPCS: Mod: HCNC | Performed by: NURSE PRACTITIONER

## 2019-07-05 PROCEDURE — 36415 COLL VENOUS BLD VENIPUNCTURE: CPT | Mod: HCNC

## 2019-07-05 PROCEDURE — 25000003 PHARM REV CODE 250: Mod: HCNC | Performed by: STUDENT IN AN ORGANIZED HEALTH CARE EDUCATION/TRAINING PROGRAM

## 2019-07-05 PROCEDURE — 86141 C-REACTIVE PROTEIN HS: CPT | Mod: HCNC

## 2019-07-05 PROCEDURE — 99222 1ST HOSP IP/OBS MODERATE 55: CPT | Mod: HCNC,25,GC, | Performed by: INTERNAL MEDICINE

## 2019-07-05 PROCEDURE — 93010 ELECTROCARDIOGRAM REPORT: CPT | Mod: HCNC,,, | Performed by: INTERNAL MEDICINE

## 2019-07-05 PROCEDURE — 94664 DEMO&/EVAL PT USE INHALER: CPT | Mod: HCNC

## 2019-07-05 PROCEDURE — 63600175 PHARM REV CODE 636 W HCPCS: Mod: HCNC | Performed by: STUDENT IN AN ORGANIZED HEALTH CARE EDUCATION/TRAINING PROGRAM

## 2019-07-05 PROCEDURE — 25000003 PHARM REV CODE 250: Mod: HCNC | Performed by: NURSE PRACTITIONER

## 2019-07-05 PROCEDURE — 20600001 HC STEP DOWN PRIVATE ROOM: Mod: HCNC

## 2019-07-05 PROCEDURE — 85025 COMPLETE CBC W/AUTO DIFF WBC: CPT | Mod: HCNC

## 2019-07-05 PROCEDURE — 84100 ASSAY OF PHOSPHORUS: CPT | Mod: HCNC

## 2019-07-05 PROCEDURE — 80053 COMPREHEN METABOLIC PANEL: CPT | Mod: HCNC

## 2019-07-05 PROCEDURE — 94640 AIRWAY INHALATION TREATMENT: CPT | Mod: HCNC

## 2019-07-05 PROCEDURE — 94761 N-INVAS EAR/PLS OXIMETRY MLT: CPT | Mod: HCNC

## 2019-07-05 PROCEDURE — 93005 ELECTROCARDIOGRAM TRACING: CPT | Mod: HCNC

## 2019-07-05 PROCEDURE — 93010 EKG 12-LEAD: ICD-10-PCS | Mod: HCNC,76,, | Performed by: INTERNAL MEDICINE

## 2019-07-05 PROCEDURE — 83735 ASSAY OF MAGNESIUM: CPT | Mod: HCNC

## 2019-07-05 PROCEDURE — 99222 PR INITIAL HOSPITAL CARE,LEVL II: ICD-10-PCS | Mod: HCNC,25,GC, | Performed by: INTERNAL MEDICINE

## 2019-07-05 PROCEDURE — 99900035 HC TECH TIME PER 15 MIN (STAT): Mod: HCNC

## 2019-07-05 PROCEDURE — 94799 UNLISTED PULMONARY SVC/PX: CPT | Mod: HCNC

## 2019-07-05 PROCEDURE — S0028 INJECTION, FAMOTIDINE, 20 MG: HCPCS | Mod: HCNC | Performed by: STUDENT IN AN ORGANIZED HEALTH CARE EDUCATION/TRAINING PROGRAM

## 2019-07-05 PROCEDURE — 63600175 PHARM REV CODE 636 W HCPCS: Mod: HCNC | Performed by: NURSE PRACTITIONER

## 2019-07-05 PROCEDURE — 93010 ELECTROCARDIOGRAM REPORT: CPT | Mod: HCNC,76,, | Performed by: INTERNAL MEDICINE

## 2019-07-05 RX ORDER — HYDROCODONE BITARTRATE AND ACETAMINOPHEN 7.5; 325 MG/15ML; MG/15ML
20 SOLUTION ORAL EVERY 4 HOURS PRN
Status: DISCONTINUED | OUTPATIENT
Start: 2019-07-05 | End: 2019-07-09 | Stop reason: HOSPADM

## 2019-07-05 RX ORDER — METOPROLOL TARTRATE 1 MG/ML
5 INJECTION, SOLUTION INTRAVENOUS EVERY 5 MIN PRN
Status: COMPLETED | OUTPATIENT
Start: 2019-07-05 | End: 2019-07-05

## 2019-07-05 RX ORDER — HYDROCODONE BITARTRATE AND ACETAMINOPHEN 7.5; 325 MG/15ML; MG/15ML
10 SOLUTION ORAL EVERY 4 HOURS PRN
Status: DISCONTINUED | OUTPATIENT
Start: 2019-07-05 | End: 2019-07-09 | Stop reason: HOSPADM

## 2019-07-05 RX ORDER — METOPROLOL TARTRATE 1 MG/ML
5 INJECTION, SOLUTION INTRAVENOUS ONCE
Status: COMPLETED | OUTPATIENT
Start: 2019-07-05 | End: 2019-07-05

## 2019-07-05 RX ORDER — ENOXAPARIN SODIUM 150 MG/ML
1 INJECTION SUBCUTANEOUS
Status: DISCONTINUED | OUTPATIENT
Start: 2019-07-05 | End: 2019-07-09 | Stop reason: HOSPADM

## 2019-07-05 RX ADMIN — HYDROCODONE BITARTRATE AND ACETAMINOPHEN 20 ML: 7.5; 325 SOLUTION ORAL at 08:07

## 2019-07-05 RX ADMIN — METOPROLOL TARTRATE 10 MG: 5 INJECTION INTRAVENOUS at 05:07

## 2019-07-05 RX ADMIN — FAMOTIDINE 20 MG: 10 INJECTION INTRAVENOUS at 08:07

## 2019-07-05 RX ADMIN — KETOROLAC TROMETHAMINE 15 MG: 30 INJECTION, SOLUTION INTRAMUSCULAR; INTRAVENOUS at 05:07

## 2019-07-05 RX ADMIN — METOPROLOL TARTRATE 10 MG: 5 INJECTION INTRAVENOUS at 12:07

## 2019-07-05 RX ADMIN — TAMSULOSIN HYDROCHLORIDE 0.4 MG: 0.4 CAPSULE ORAL at 08:07

## 2019-07-05 RX ADMIN — METOPROLOL TARTRATE 5 MG: 5 INJECTION INTRAVENOUS at 01:07

## 2019-07-05 RX ADMIN — Medication 25 MG: at 10:07

## 2019-07-05 RX ADMIN — HYDROCODONE BITARTRATE AND ACETAMINOPHEN 10 ML: 7.5; 325 SOLUTION ORAL at 05:07

## 2019-07-05 RX ADMIN — HYDROCODONE BITARTRATE AND ACETAMINOPHEN 10 ML: 7.5; 325 SOLUTION ORAL at 01:07

## 2019-07-05 RX ADMIN — Medication 25 MG: at 08:07

## 2019-07-05 RX ADMIN — KETOROLAC TROMETHAMINE 15 MG: 30 INJECTION, SOLUTION INTRAMUSCULAR; INTRAVENOUS at 01:07

## 2019-07-05 RX ADMIN — FAMOTIDINE 20 MG: 10 INJECTION INTRAVENOUS at 10:07

## 2019-07-05 RX ADMIN — LEVALBUTEROL HYDROCHLORIDE 0.63 MG: 0.63 SOLUTION RESPIRATORY (INHALATION) at 08:07

## 2019-07-05 RX ADMIN — LEVALBUTEROL HYDROCHLORIDE 0.63 MG: 0.63 SOLUTION RESPIRATORY (INHALATION) at 01:07

## 2019-07-05 RX ADMIN — KETOROLAC TROMETHAMINE 15 MG: 30 INJECTION, SOLUTION INTRAMUSCULAR; INTRAVENOUS at 10:07

## 2019-07-05 RX ADMIN — AMIODARONE HYDROCHLORIDE 150 MG: 1.5 INJECTION, SOLUTION INTRAVENOUS at 02:07

## 2019-07-05 RX ADMIN — METOPROLOL TARTRATE 5 MG: 5 INJECTION INTRAVENOUS at 02:07

## 2019-07-05 RX ADMIN — AMIODARONE HYDROCHLORIDE 0.5 MG/MIN: 1.8 INJECTION, SOLUTION INTRAVENOUS at 10:07

## 2019-07-05 RX ADMIN — AMIODARONE HYDROCHLORIDE 1 MG/MIN: 1.8 INJECTION, SOLUTION INTRAVENOUS at 02:07

## 2019-07-05 NOTE — CONSULTS
[facility]  Cardiology  Consult Note    Patient Name: Stu Garcia  MRN: [id number]  Admission Date: 7/2/2019  Hospital Length of Stay: 3 days  Code Status: Full Code   Attending Provider: Chad Milian MD   Consulting Provider: Nabil Garcia MD  Primary Care Physician: Cade Juan MD  Principal Problem:Esophageal cancer    Patient information was obtained from patient and ER records.     Inpatient consult to Cardiology  Consult performed by: Nabil Garcia MD  Consult ordered by: Clint Reid MD        Subjective:        HPI:   Pt is a 74 yo male w/ and PMH of esophageal cancer, Afib, coronary artery disease, HTN, diabetes and multiple other comorbidities. He was admitted on 7/2 for a total thoracic esophagectomy, proximal gastrectomy, and cervical esophagogastrostomy; mediastinal and abdominal lymphadenectomy; Witzel jejunostomy. Patient has a history of paroxysmal, nonsymptomatic, Afib documented in 2013 for which he received cardioversion. He has been asymptomatic until immediately following esophagectomy on 7/2. He then returned to normal sinus rhythm for 24 hours before converting back to Afib at 1130 AM on 7/5.    Past Medical History:   Diagnosis Date    Anticoagulant long-term use     xarelto,asa    Arthritis     Atrial fibrillation 2013    cardioverted    Bleb, lung     Cataract     OS    Colon polyp     Coronary artery disease     Coronary artery disease involving native coronary artery of native heart with unstable angina pectoris 3/18/2018    Diabetes mellitus     Diabetes mellitus, type 2     Diverticulosis     General anesthetics causing adverse effect in therapeutic use     delayed emergence per patient's wife    GERD (gastroesophageal reflux disease)     HEARING LOSS     bilateral aids    Hemorrhoid     HLD (hyperlipidemia)     Hypertension     Iron deficiency anemia     Neuropathy of leg     Pneumonia due to other staphylococcus     Prostate  cancer     prostate    Spontaneous pneumothorax     bilateral       Past Surgical History:   Procedure Laterality Date    ARTHROSCOPY-KNEE W/ CHONDROPLASTY Left 4/7/2017    [facility]    ARTHROSCOPY-MENISCECTOMY Left 4/7/2017    [facility] at [facility] OR    CARDIOVERSION      CATARACT EXTRACTION      bilateral    CERVICAL SPINE FRACTURE SURGERY      c7 t1 ACDF     CHEST TUBE INSERTION Right     COLONOSCOPY      COLONOSCOPY N/A 5/20/2015    Performed by Andrea Kerns Jr., MD at [facility] ENDO    CORONARY STENT PLACEMENT      x 2    ESOPHAGOGASTRODUODENOSCOPY (EGD) N/A 2/23/2019    [facility] by [facility]eline Richards [facility] at [facility] ENDO    ESOPHAGOGASTRODUODENOSCOPY (EGD) N/A 5/20/2015    Performed by Andrea Kerns Jr., MD at [facility] ENDO    HERNIA REPAIR      umbilical    EURSQREIY-FEWV-W-CATH Right 4/4/2019    Performed by Nahum Zaidi MD at [facility] OR    KNEE SURGERY Left 2017    Left heart cath Right 3/19/2018    Performed by Eduard Cano MD at [facility] CATH    Left heart cath Left 3/17/2018    Performed by Parminder Duenas III, MD at [facility] CATH    Left heart cath-RM # 222 B Right 3/21/2018    [facility] at [facility]    open thoracotomy Left 1966    With pleurectomy    Pleurectomy Left 1966    For treatment of left pneumothorax    PROSTATECTOMY  2001    open    TUBE THORACOTOMY  1966    pneumothorax left--open    ULTRASOUND, UPPER GI TRACT, ENDOSCOPIC N/A 3/13/2019    Performed by Andrew Shirley [facility] at [facility] ENDO (2ND FLR)    XI ROBOTIC ESOPHAGECTOMY N/A 7/2/2019    Performed by Chad Milian MD at [facility] OR 2ND FLR       Review of patient's allergies indicates:   Allergen Reactions    Codeine Nausea And Vomiting       No current facility-administered medications on file prior to encounter.      Current Outpatient Medications on File Prior to Encounter   Medication Sig    atorvastatin (LIPITOR) 80 MG tablet  Take 1 tablet (80 mg total) by mouth every evening.    glimepiride (AMARYL) 2 MG tablet TAKE 2 TABLETS TWICE DAILY    metoprolol tartrate (LOPRESSOR) 50 MG tablet Take 0.5 tablets (25 mg total) by mouth 2 (two) times daily.    multivitamin capsule Take 1 capsule by mouth once daily.    pantoprazole (PROTONIX) 40 MG tablet Take 1 tablet (40 mg total) by mouth 2 (two) times daily.    vit C,J-Vp-wwgew-lutein-zeaxan (PRESERVISION AREDS 2) 732-275-97-1 mg-unit-mg-mg Cap Take 1 tablet by mouth once daily.    ACCU-CHEK SHASHANK PLUS TEST STRP Strp TEST ONCE DAILY    diphenhydrAMINE-aluminum-magnesium hydroxide-simethicone-lidocaine HCl 2% Swish and spit 15 mLs every 4 (four) hours as needed.    ferrous gluconate (FERGON) 324 MG tablet Take 1 tablet (324 mg total) by mouth daily with breakfast.    HYDROcodone-acetaminophen (NORCO) 5-325 mg per tablet Take 1 tablet by mouth every 6 (six) hours as needed for Pain.    lancets (ACCU-CHEK SOFTCLIX LANCETS) Misc 1 strip by Misc.(Non-Drug; Combo Route) route once daily.    lidocaine-prilocaine (EMLA) cream U UTD    nitroGLYCERIN (NITROSTAT) 0.4 MG SL tablet DISSOLVE 1 TABLET UNDER THE TONGUE EVERY 5 MINUTES AS NEEDED FOR CHEST PAIN    ondansetron (ZOFRAN) 8 MG tablet TK 1 T PO 1 HOUR B D RADIATION TREATMENT RO PREVENT NAUSEA UTD    prochlorperazine (COMPAZINE) 10 MG tablet TK 1 TABLET PO Q 6 HOURS PRN FOR NAUSEA/VOMITING    tiZANidine (ZANAFLEX) 4 MG tablet TAKE 1 TABLET(4 MG) BY MOUTH EVERY 6 HOURS AS NEEDED     Family History     Problem Relation (Age of Onset)    Cancer Father    Coronary artery disease Father    Diabetes Brother    Mental illness Mother        Tobacco Use    Smoking status: Former Smoker     Years: 20.00    Smokeless tobacco: Never Used    Tobacco comment: quit smoking in 1980's.   Substance and Sexual Activity    Alcohol use: Yes     Frequency: Monthly or less     Drinks per session: 1 or 2     Binge frequency: Never     Comment: 1 drink every  2 weeks    Drug use: No    Sexual activity: Yes     Review of Systems   Constitution: Negative for decreased appetite and fever.   HENT:        Patient s/p 3 days esophagectomy   Cardiovascular: Negative for chest pain, palpitations and syncope.   Respiratory: Negative for cough and shortness of breath.      Objective:     Vital Signs (Most Recent):  Temp: 97.9 °F (36.6 °C) (07/05/19 1637)  Pulse: 102 (07/05/19 1637)  Resp: 17 (07/05/19 1637)  BP: (!) 102/55 (07/05/19 1637)  SpO2: 98 % (07/05/19 1637) Vital Signs (24h Range):  Temp:  [97.9 °F (36.6 °C)-99 °F (37.2 °C)] 97.9 °F (36.6 °C)  Pulse:  [] 102  Resp:  [13-44] 17  SpO2:  [94 %-98 %] 98 %  BP: (102-152)/(55-76) 102/55  Arterial Line BP: (115-153)/(49-58) 123/50     Weight: 120.2 kg (265 lb)  Body mass index is 32.26 kg/m².    SpO2: 98 %  O2 Device (Oxygen Therapy): room air      Intake/Output Summary (Last 24 hours) at 7/5/2019 1703  Last data filed at 7/5/2019 1300  Gross per 24 hour   Intake --   Output 865 ml   Net -865 ml       Lines/Drains/Airways     Central Venous Catheter Line                 Port A Cath Single Lumen 04/04/19 right subclavian 92 days          Drain                 Closed/Suction Drain 07/02/19 Left Neck Bulb 19 Fr. 3 days         Gastrostomy/Enterostomy 07/02/19 Jejunostomy tube LUQ feeding 3 days          Peripheral Intravenous Line                 Peripheral IV - Single Lumen 07/02/19 0545 18 G Left Forearm 3 days         Peripheral IV - Single Lumen 07/04/19 2112 20 G Right Antecubital less than 1 day                Physical Exam   Constitutional: He is oriented to person, place, and time. He appears well-developed and well-nourished.   Cardiovascular: Normal heart sounds and intact distal pulses.   Pulmonary/Chest: Effort normal. No respiratory distress.   Abdominal: Soft. There is no tenderness. There is no guarding.   Musculoskeletal: He exhibits no edema.   Neurological: He is alert and oriented to person, place, and  time.   Skin: Skin is warm and dry.       Significant Labs:   Recent Lab Results       07/05/19  1222   07/05/19  0540   07/05/19  0335   07/04/19  2349   07/04/19  2336        Albumin     2.7         Alkaline Phosphatase     89         ALT     49         Anion Gap     8         AST     42         Baso #     0.01         Basophil%     0.2         BILIRUBIN TOTAL     1.1  Comment:  For infants and newborns, interpretation of results should be based  on gestational age, weight and in agreement with clinical  observations.  Premature Infant recommended reference ranges:  Up to 24 hours.............<8.0 mg/dL  Up to 48 hours............<12.0 mg/dL  3-5 days..................<15.0 mg/dL  6-29 days.................<15.0 mg/dL           BUN, Bld     19         Calcium     9.1         Chloride     101         CO2     25         Creatinine     0.9         CRP, High Sensitivity     153.30 153.51       Differential Method     Automated         eGFR if      >60.0         eGFR if non      >60.0  Comment:  Calculation used to obtain the estimated glomerular filtration  rate (eGFR) is the CKD-EPI equation.            Eos #     0.1         Eosinophil%     0.9         Glucose     136         Gran # (ANC)     4.3         Gran%     78.9         Hematocrit     34.8         Hemoglobin     11.1         Immature Grans (Abs)     0.02  Comment:  Mild elevation in immature granulocytes is non specific and   can be seen in a variety of conditions including stress response,   acute inflammation, trauma and pregnancy. Correlation with other   laboratory and clinical findings is essential.           Immature Granulocytes     0.4         Lymph #     0.4         Lymph%     7.2         Magnesium     2.1         MCH     31.4         MCHC     31.9         MCV     99         Mono #     0.7         Mono%     12.4         MPV     11.3         nRBC     0         Phosphorus     3.1         Platelets     77         POCT  Glucose 181 145     210     Potassium     3.8         PROTEIN TOTAL     5.8         RBC     3.53         RDW     20.6         Sodium     134         WBC     5.39                          07/04/19  1845        Albumin       Alkaline Phosphatase       ALT       Anion Gap       AST       Baso #       Basophil%       BILIRUBIN TOTAL       BUN, Bld       Calcium       Chloride       CO2       Creatinine       CRP, High Sensitivity       Differential Method       eGFR if        eGFR if non        Eos #       Eosinophil%       Glucose       Gran # (ANC)       Gran%       Hematocrit       Hemoglobin       Immature Grans (Abs)       Immature Granulocytes       Lymph #       Lymph%       Magnesium       MCH       MCHC       MCV       Mono #       Mono%       MPV       nRBC       Phosphorus       Platelets       POCT Glucose 164     Potassium       PROTEIN TOTAL       RBC       RDW       Sodium       WBC             Significant Imaging: EKG: Demonstrated Afib since 1130 AM (7/5)    Assessment and Plan:     PAF (paroxysmal atrial fibrillation)  History of Afib in 2013 which resolved with cardioversion. Patient went into Afib immediately following esophagastrectomy, which then resolved spontaneously for 24 hours, before beginning again at 1130 on 7/5. Our recommendations are as follows:  - ChadsVasc of 6- agree with current regimen of Lovenox 1mg/kg BID   - Agree with current amiodarone loading regimen of 1 mg/kg for 6 hours, followed by .5 mg/kg thereafter  -Patient unable to undergo RIGO due to esophagectomy   -will follow up, cardioversion possible if Afib persists        VTE Risk Mitigation (From admission, onward)        Ordered     enoxaparin injection 120 mg  Every 12 hours (non-standard times)      07/05/19 1718     IP VTE HIGH RISK PATIENT  Once      07/02/19 1817     Place sequential compression device  Until discontinued      07/02/19 0514     Place MAL hose  Until discontinued       07/02/19 0514          Thank you for your consult. I will follow-up with patient. Please contact us if you have any additional questions.    Nabil Garcia MD  Cardiology   [facility]

## 2019-07-05 NOTE — HPI
Pt is a 72 yo male w/ and PMH of esophageal cancer, Afib, coronary artery disease, HTN, diabetes and multiple other comorbidities. He was admitted on 7/2 for a total thoracic esophagectomy, proximal gastrectomy, and cervical esophagogastrostomy; mediastinal and abdominal lymphadenectomy; Witzel jejunostomy. Patient has a history of paroxysmal, nonsymptomatic, Afib documented in 2013 for which he received cardioversion. He has been asymptomatic until immediately following esophagectomy on 7/2. He then returned to normal sinus rhythm for 24 hours before converting back to Afib at 1130 AM on 7/5.

## 2019-07-05 NOTE — NURSING TRANSFER
Nursing Transfer Note      7/4/2019     Transfer To: 1023    Transfer via wheelchair    Transfer with cardiac monitoring    Transported by Roberto Carlos LOREDO    Medicines sent: yes    Chart send with patient: Yes    Notified: spouse    Patient reassessed at: 7/4, 2230    Upon arrival to floor: cardiac monitor applied, patient oriented to room, call bell in reach and bed in lowest position

## 2019-07-05 NOTE — ASSESSMENT & PLAN NOTE
History of Afib in 2013 which resolved with cardioversion. Patient went into Afib immediately following esophagastrectomy, which then resolved spontaneously for 24 hours, before beginning again at 1130 on 7/5. Our recommendations are as follows:  - ChadsVasc of 6- agree with current regimen of Lovenox 1mg/kg BID   - Agree with current amiodarone loading regimen of 1 mg/kg for 6 hours, followed by .5 mg/kg thereafter  -Patient unable to undergo RIGO due to esophagectomy   -will follow up, cardioversion possible if Afib persists

## 2019-07-05 NOTE — PLAN OF CARE
SW was informed in morning huddle that Pt is on pathway to discharge home on Monday with home health and home tube feeds. Per Desirae RIOS's, Admission Assessment note, Pt does not have a preference for home health or home infusion agency. SW sent all necessary referral information to [facility] and [facility] and informed both agencies of Pt's anticipated discharge for Monday. SW to continue to follow.     Tia Ford LCSW        07/05/19 1021   Post-Acute Status   Post-Acute Authorization HME;Home Health/Hospice   HME Status Referrals Sent   Home Health/Hospice Status Referrals Sent

## 2019-07-05 NOTE — SUBJECTIVE & OBJECTIVE
Past Medical History:   Diagnosis Date    Anticoagulant long-term use     xarelto,asa    Arthritis     Atrial fibrillation 2013    cardioverted    Bleb, lung     Cataract     OS    Colon polyp     Coronary artery disease     Coronary artery disease involving native coronary artery of native heart with unstable angina pectoris 3/18/2018    Diabetes mellitus     Diabetes mellitus, type 2     Diverticulosis     General anesthetics causing adverse effect in therapeutic use     delayed emergence per patient's wife    GERD (gastroesophageal reflux disease)     HEARING LOSS     bilateral aids    Hemorrhoid     HLD (hyperlipidemia)     Hypertension     Iron deficiency anemia     Neuropathy of leg     Pneumonia due to other staphylococcus     Prostate cancer     prostate    Spontaneous pneumothorax     bilateral       Past Surgical History:   Procedure Laterality Date    ARTHROSCOPY-KNEE W/ CHONDROPLASTY Left 4/7/2017    Performed by Kale Cleary MD at [facility] OR    ARTHROSCOPY-MENISCECTOMY Left 4/7/2017    Performed by Kale Cleary MD at [facility] OR    CARDIOVERSION      CATARACT EXTRACTION      bilateral    CERVICAL SPINE FRACTURE SURGERY      c7 t1 ACDF     CHEST TUBE INSERTION Right     COLONOSCOPY      COLONOSCOPY N/A 5/20/2015    Performed by Andrea Kerns Jr., MD at [facility] ENDO    CORONARY STENT PLACEMENT      x 2    ESOPHAGOGASTRODUODENOSCOPY (EGD) N/A 2/23/2019    Performed by Jackeline Richards MD at [facility] ENDO    ESOPHAGOGASTRODUODENOSCOPY (EGD) N/A 5/20/2015    Performed by Andrea Kerns Jr., MD at [facility] ENDO    HERNIA REPAIR      umbilical    [id number]-Q-CATH Right 4/4/2019    Performed by Nahum Zaidi MD at [facility] OR    KNEE SURGERY Left 2017    Left heart cath Right 3/19/2018    Performed by Eduard Cano MD at [facility] CATH    Left heart cath Left 3/17/2018    Performed by Parminder Duenas III, MD at [facility] CATH    Left heart cath-RM # 222 B Right  3/21/2018    Performed by [facility]ard [facility] at [facility] CATH    open thoracotomy Left 1966    With pleurectomy    Pleurectomy Left 1966    For treatment of left pneumothorax    PROSTATECTOMY  2001    open    TUBE THORACOTOMY  1966    pneumothorax left--open    ULTRASOUND, UPPER GI TRACT, ENDOSCOPIC N/A 3/13/2019    Performed by Andrew Shirley MD at [facility] ENDO (2ND FLR)    XI ROBOTIC ESOPHAGECTOMY N/A 7/2/2019    Performed by Chad Milian MD at [facility] OR 2ND FLR       Review of patient's allergies indicates:   Allergen Reactions    Codeine Nausea And Vomiting       No current facility-administered medications on file prior to encounter.      Current Outpatient Medications on File Prior to Encounter   Medication Sig    atorvastatin (LIPITOR) 80 MG tablet Take 1 tablet (80 mg total) by mouth every evening.    glimepiride (AMARYL) 2 MG tablet TAKE 2 TABLETS TWICE DAILY    metoprolol tartrate (LOPRESSOR) 50 MG tablet Take 0.5 tablets (25 mg total) by mouth 2 (two) times daily.    multivitamin capsule Take 1 capsule by mouth once daily.    pantoprazole (PROTONIX) 40 MG tablet Take 1 tablet (40 mg total) by mouth 2 (two) times daily.    vit C,V-Av-hojql-lutein-zeaxan (PRESERVISION AREDS 2) 051-187-88-1 mg-unit-mg-mg Cap Take 1 tablet by mouth once daily.    ACCU-CHEK SHASHANK PLUS TEST STRP Strp TEST ONCE DAILY    diphenhydrAMINE-aluminum-magnesium hydroxide-simethicone-lidocaine HCl 2% Swish and spit 15 mLs every 4 (four) hours as needed.    ferrous gluconate (FERGON) 324 MG tablet Take 1 tablet (324 mg total) by mouth daily with breakfast.    HYDROcodone-acetaminophen (NORCO) 5-325 mg per tablet Take 1 tablet by mouth every 6 (six) hours as needed for Pain.    lancets (ACCU-CHEK SOFTCLIX LANCETS) Misc 1 strip by Misc.(Non-Drug; Combo Route) route once daily.    lidocaine-prilocaine (EMLA) cream U UTD    nitroGLYCERIN (NITROSTAT) 0.4 MG SL tablet DISSOLVE 1 TABLET UNDER THE TONGUE EVERY 5 MINUTES AS  NEEDED FOR CHEST PAIN    ondansetron (ZOFRAN) 8 MG tablet TK 1 T PO 1 HOUR B D RADIATION TREATMENT RO PREVENT NAUSEA UTD    prochlorperazine (COMPAZINE) 10 MG tablet TK 1 TABLET PO Q 6 HOURS PRN FOR NAUSEA/VOMITING    tiZANidine (ZANAFLEX) 4 MG tablet TAKE 1 TABLET(4 MG) BY MOUTH EVERY 6 HOURS AS NEEDED     Family History     Problem Relation (Age of Onset)    Cancer Father    Coronary artery disease Father    Diabetes Brother    Mental illness Mother        Tobacco Use    Smoking status: Former Smoker     Years: 20.00    Smokeless tobacco: Never Used    Tobacco comment: quit smoking in 1980's.   Substance and Sexual Activity    Alcohol use: Yes     Frequency: Monthly or less     Drinks per session: 1 or 2     Binge frequency: Never     Comment: 1 drink every 2 weeks    Drug use: No    Sexual activity: Yes     Review of Systems   Constitution: Negative for decreased appetite and fever.   HENT:        Patient s/p 3 days esophagectomy   Cardiovascular: Negative for chest pain, palpitations and syncope.   Respiratory: Negative for cough and shortness of breath.      Objective:     Vital Signs (Most Recent):  Temp: 97.9 °F (36.6 °C) (07/05/19 1637)  Pulse: 102 (07/05/19 1637)  Resp: 17 (07/05/19 1637)  BP: (!) 102/55 (07/05/19 1637)  SpO2: 98 % (07/05/19 1637) Vital Signs (24h Range):  Temp:  [97.9 °F (36.6 °C)-99 °F (37.2 °C)] 97.9 °F (36.6 °C)  Pulse:  [] 102  Resp:  [13-44] 17  SpO2:  [94 %-98 %] 98 %  BP: (102-152)/(55-76) 102/55  Arterial Line BP: (115-153)/(49-58) 123/50     Weight: 120.2 kg (265 lb)  Body mass index is 32.26 kg/m².    SpO2: 98 %  O2 Device (Oxygen Therapy): room air      Intake/Output Summary (Last 24 hours) at 7/5/2019 1703  Last data filed at 7/5/2019 1300  Gross per 24 hour   Intake --   Output 865 ml   Net -865 ml       Lines/Drains/Airways     Central Venous Catheter Line                 Port A Cath Single Lumen 04/04/19 right subclavian 92 days          Drain                  Closed/Suction Drain 07/02/19 Left Neck Bulb 19 Fr. 3 days         Gastrostomy/Enterostomy 07/02/19 Jejunostomy tube LUQ feeding 3 days          Peripheral Intravenous Line                 Peripheral IV - Single Lumen 07/02/19 0545 18 G Left Forearm 3 days         Peripheral IV - Single Lumen 07/04/19 2112 20 G Right Antecubital less than 1 day                Physical Exam   Constitutional: He is oriented to person, place, and time. He appears well-developed and well-nourished.   Cardiovascular: Normal heart sounds and intact distal pulses.   Pulmonary/Chest: Effort normal. No respiratory distress.   Abdominal: Soft. There is no tenderness. There is no guarding.   Musculoskeletal: He exhibits no edema.   Neurological: He is alert and oriented to person, place, and time.   Skin: Skin is warm and dry.       Significant Labs:   Recent Lab Results       07/05/19  1222   07/05/19  0540   07/05/19  0335   07/04/19  2349   07/04/19  2336        Albumin     2.7         Alkaline Phosphatase     89         ALT     49         Anion Gap     8         AST     42         Baso #     0.01         Basophil%     0.2         BILIRUBIN TOTAL     1.1  Comment:  For infants and newborns, interpretation of results should be based  on gestational age, weight and in agreement with clinical  observations.  Premature Infant recommended reference ranges:  Up to 24 hours.............<8.0 mg/dL  Up to 48 hours............<12.0 mg/dL  3-5 days..................<15.0 mg/dL  6-29 days.................<15.0 mg/dL           BUN, Bld     19         Calcium     9.1         Chloride     101         CO2     25         Creatinine     0.9         CRP, High Sensitivity     153.30 153.51       Differential Method     Automated         eGFR if      >60.0         eGFR if non      >60.0  Comment:  Calculation used to obtain the estimated glomerular filtration  rate (eGFR) is the CKD-EPI equation.            Eos #     0.1          Eosinophil%     0.9         Glucose     136         Gran # (ANC)     4.3         Gran%     78.9         Hematocrit     34.8         Hemoglobin     11.1         Immature Grans (Abs)     0.02  Comment:  Mild elevation in immature granulocytes is non specific and   can be seen in a variety of conditions including stress response,   acute inflammation, trauma and pregnancy. Correlation with other   laboratory and clinical findings is essential.           Immature Granulocytes     0.4         Lymph #     0.4         Lymph%     7.2         Magnesium     2.1         MCH     31.4         MCHC     31.9         MCV     99         Mono #     0.7         Mono%     12.4         MPV     11.3         nRBC     0         Phosphorus     3.1         Platelets     77         POCT Glucose 181 145     210     Potassium     3.8         PROTEIN TOTAL     5.8         RBC     3.53         RDW     20.6         Sodium     134         WBC     5.39                          07/04/19  1845        Albumin       Alkaline Phosphatase       ALT       Anion Gap       AST       Baso #       Basophil%       BILIRUBIN TOTAL       BUN, Bld       Calcium       Chloride       CO2       Creatinine       CRP, High Sensitivity       Differential Method       eGFR if        eGFR if non        Eos #       Eosinophil%       Glucose       Gran # (ANC)       Gran%       Hematocrit       Hemoglobin       Immature Grans (Abs)       Immature Granulocytes       Lymph #       Lymph%       Magnesium       MCH       MCHC       MCV       Mono #       Mono%       MPV       nRBC       Phosphorus       Platelets       POCT Glucose 164     Potassium       PROTEIN TOTAL       RBC       RDW       Sodium       WBC             Significant Imaging: EKG: Demonstrated Afib since 1130 AM (7/5)

## 2019-07-05 NOTE — ASSESSMENT & PLAN NOTE
Stu Garcia is a 73 y.o. male with a history of esophageal cancer s/p esophagectomy on 7/2/19    Pathway:  - NPO  - D/c IVF  - J tube feeds at 20cc/hr 2p-8a  - DVT ppx  - d/c PCA  - Toradol   - Hycet via J tube PRN for pain  - Beta blockade via J tube  - Ambulate TID  - Shower  - PT/OT  - d/c Chest tube

## 2019-07-05 NOTE — SUBJECTIVE & OBJECTIVE
Interval History: NAEON.  Still with some abdominal pain.  VSS.  No fevers or chills.  Urinating without difficulty post cath removal. Transferred from ICU to PCU    Medications:  Continuous Infusions:   hydromorphone in 0.9 % NaCl 6 mg/30 ml       Scheduled Meds:   enoxaparin  40 mg Subcutaneous Daily    famotidine (PF)  20 mg Intravenous Q12H    ketorolac  15 mg Intravenous Q8H    levalbuterol  0.63 mg Nebulization Q6H WAKE    metoprolol  10 mg Intravenous Q6H    tamsulosin  0.4 mg Oral Daily     PRN Meds:calcium gluconate IVPB, calcium gluconate IVPB, calcium gluconate IVPB, Dextrose 10% Bolus, Dextrose 10% Bolus, glucagon (human recombinant), insulin aspart U-100, magnesium sulfate IVPB, magnesium sulfate IVPB, naloxone, naloxone, ondansetron, potassium chloride in water **AND** potassium chloride in water **AND** potassium chloride in water, promethazine (PHENERGAN) IVPB, sodium phosphate IVPB, sodium phosphate IVPB, sodium phosphate IVPB     Review of patient's allergies indicates:   Allergen Reactions    Codeine Nausea And Vomiting     Objective:     Vital Signs (Most Recent):  Temp: 98.3 °F (36.8 °C) (07/05/19 0317)  Pulse: 83 (07/05/19 0317)  Resp: 14 (07/05/19 0317)  BP: (!) 152/71 (07/05/19 0317)  SpO2: 95 % (07/05/19 0317) Vital Signs (24h Range):  Temp:  [98.2 °F (36.8 °C)-99.1 °F (37.3 °C)] 98.3 °F (36.8 °C)  Pulse:  [77-93] 83  Resp:  [12-44] 14  SpO2:  [94 %-100 %] 95 %  BP: (120-152)/(56-76) 152/71  Arterial Line BP: (115-165)/(42-61) 123/50     Weight: 120.7 kg (266 lb)  Body mass index is 32.38 kg/m².    Intake/Output - Last 3 Shifts       07/03 0700 - 07/04 0659 07/04 0700 - 07/05 0659    I.V. (mL/kg) 50 (0.4) 182 (1.5)    NG/     IV Piggyback  250    Total Intake(mL/kg) 170 (1.5) 432 (3.6)    Urine (mL/kg/hr) 1365 (0.5) 640 (0.2)    Drains 10 18    Stool  0    Chest Tube 480 290    Total Output 185 948    Net -6947 -779          Urine Occurrence  202 x    Stool Occurrence  0 x           Physical Exam   Constitutional: He is oriented to person, place, and time. No distress.   Eyes: Conjunctivae are normal.   Neck:   GIGI drain intact with serous output.  Incision clean and dry.   Cardiovascular: Normal rate.   Pulmonary/Chest: Effort normal. No respiratory distress.   CT to water seal with serous drainage    Abdominal: Soft. He exhibits no distension.   Incisions clean and dry.  J tube in place.  Mild tenderness around incision sites.   Musculoskeletal: He exhibits no edema.   Neurological: He is alert and oriented to person, place, and time.   Psychiatric: He has a normal mood and affect.       Significant Labs:  CBC:   Recent Labs   Lab 07/05/19  0335   WBC 5.39   RBC 3.53*   HGB 11.1*   HCT 34.8*   PLT 77*   MCV 99*   MCH 31.4*   MCHC 31.9*     CMP:   Recent Labs   Lab 07/05/19  0335   *   CALCIUM 9.1   ALBUMIN 2.7*   PROT 5.8*   *   K 3.8   CO2 25      BUN 19   CREATININE 0.9   ALKPHOS 89   ALT 49*   AST 42*   BILITOT 1.1*     ABGs:   Recent Labs   Lab 07/03/19  0508   PH 7.310*   PCO2 45.3*   PO2 127*   HCO3 22.8*   POCSATURATED 99   BE -3       Significant Diagnostics:  I have reviewed all pertinent imaging results/findings within the past 24 hours.

## 2019-07-05 NOTE — PROGRESS NOTES
[facility]  General Surgery  Progress Note    Subjective:     History of Present Illness:  No notes on file    Post-Op Info:  Procedure(s) (LRB):  XI ROBOTIC ESOPHAGECTOMY (N/A)   3 Days Post-Op     Interval History: NAEON.  Still with some abdominal pain.  VSS.  No fevers or chills.  Urinating without difficulty post cath removal. Transferred from ICU to PCU    Medications:  Continuous Infusions:   hydromorphone in 0.9 % NaCl 6 mg/30 ml       Scheduled Meds:   enoxaparin  40 mg Subcutaneous Daily    famotidine (PF)  20 mg Intravenous Q12H    ketorolac  15 mg Intravenous Q8H    levalbuterol  0.63 mg Nebulization Q6H WAKE    metoprolol  10 mg Intravenous Q6H    tamsulosin  0.4 mg Oral Daily     PRN Meds:calcium gluconate IVPB, calcium gluconate IVPB, calcium gluconate IVPB, Dextrose 10% Bolus, Dextrose 10% Bolus, glucagon (human recombinant), insulin aspart U-100, magnesium sulfate IVPB, magnesium sulfate IVPB, naloxone, naloxone, ondansetron, potassium chloride in water **AND** potassium chloride in water **AND** potassium chloride in water, promethazine (PHENERGAN) IVPB, sodium phosphate IVPB, sodium phosphate IVPB, sodium phosphate IVPB     Review of patient's allergies indicates:   Allergen Reactions    Codeine Nausea And Vomiting     Objective:     Vital Signs (Most Recent):  Temp: 98.3 °F (36.8 °C) (07/05/19 0317)  Pulse: 83 (07/05/19 0317)  Resp: 14 (07/05/19 0317)  BP: (!) 152/71 (07/05/19 0317)  SpO2: 95 % (07/05/19 0317) Vital Signs (24h Range):  Temp:  [98.2 °F (36.8 °C)-99.1 °F (37.3 °C)] 98.3 °F (36.8 °C)  Pulse:  [77-93] 83  Resp:  [12-44] 14  SpO2:  [94 %-100 %] 95 %  BP: (120-152)/(56-76) 152/71  Arterial Line BP: (115-165)/(42-61) 123/50     Weight: 120.7 kg (266 lb)  Body mass index is 32.38 kg/m².    Intake/Output - Last 3 Shifts       07/03 0700 - 07/04 0659 07/04 0700 - 07/05 0659    I.V. (mL/kg) 50 (0.4) 182 (1.5)    NG/     IV Piggyback  250    Total  Intake(mL/kg) 170 (1.5) 432 (3.6)    Urine (mL/kg/hr) 1365 (0.5) 640 (0.2)    Drains 10 18    Stool  0    Chest Tube 480 290    Total Output 1855 948    Net -2015 -516          Urine Occurrence  202 x    Stool Occurrence  0 x          Physical Exam   Constitutional: He is oriented to person, place, and time. No distress.   Eyes: Conjunctivae are normal.   Neck:   GIGI drain intact with serous output.  Incision clean and dry.   Cardiovascular: Normal rate.   Pulmonary/Chest: Effort normal. No respiratory distress.   CT to water seal with serous drainage    Abdominal: Soft. He exhibits no distension.   Incisions clean and dry.  J tube in place.  Mild tenderness around incision sites.   Musculoskeletal: He exhibits no edema.   Neurological: He is alert and oriented to person, place, and time.   Psychiatric: He has a normal mood and affect.       Significant Labs:  CBC:   Recent Labs   Lab 07/05/19  0335   WBC 5.39   RBC 3.53*   HGB 11.1*   HCT 34.8*   PLT 77*   MCV 99*   MCH 31.4*   MCHC 31.9*     CMP:   Recent Labs   Lab 07/05/19  0335   *   CALCIUM 9.1   ALBUMIN 2.7*   PROT 5.8*   *   K 3.8   CO2 25      BUN 19   CREATININE 0.9   ALKPHOS 89   ALT 49*   AST 42*   BILITOT 1.1*     ABGs:   Recent Labs   Lab 07/03/19  0508   PH 7.310*   PCO2 45.3*   PO2 127*   HCO3 22.8*   POCSATURATED 99   BE -3       Significant Diagnostics:  I have reviewed all pertinent imaging results/findings within the past 24 hours.    Assessment/Plan:     * Esophageal cancer  Stu Garcia is a 73 y.o. male with a history of esophageal cancer s/p esophagectomy on 7/2/19    Pathway:  - NPO  - D/c IVF  - J tube feeds at 20cc/hr 2p-8a  - DVT ppx  - d/c PCA  - Toradol   - Hycet via J tube PRN for pain  - Beta blockade via J tube  - Ambulate TID  - Shower  - PT/OT  - d/c Chest tube          Shane Rodriguez MD  General Surgery  [facility]

## 2019-07-05 NOTE — PROGRESS NOTES
Spoke with patients nurse who pointed out the patient had return to sinus rhythm around 5:10pm.      Telemetry appears to show p waves and a regular rate and rhythm.    Ordering EKG to document and check sinus rhythm.    Continuing consult with cardiology for continued recs on management.  Appreciate their assistance with our patient.    Shane Rodriguez, PGY 1  General Surgery/Surgical Oncology  Pager: [phone]

## 2019-07-05 NOTE — PLAN OF CARE
Problem: Adult Inpatient Plan of Care  Goal: Patient-Specific Goal (Individualization)  Outcome: Ongoing (interventions implemented as appropriate)  POC reviewed with Stu Garcia and family at 1400. Pt verbalized understanding. Questions and concerns addressed. No acute events today. Pt progressing toward goals. Pt with transfer orders. Martinez removed. Abdomen appears distended and is painful to palpation. SICU assessed. RN to continue to monitor. PCA pump helpful with abdominal pain. Chest tube output 290 ml for shift. Will continue to monitor. See flowsheets for full assessment and VS info.

## 2019-07-05 NOTE — PLAN OF CARE
Patient is POD#3 from Esophagectomy, transferred to [facility] last night and is expected to go home early next week with home health and tube feeds.  Will continue to follow for needs.       07/05/19 1530   Discharge Reassessment   Assessment Type Discharge Planning Reassessment   Discharge Plan A Home with family;Home Health   Discharge Plan B Skilled Nursing Facility   Post-Acute Status   Post-Acute Authorization Home Health/Hospice   HME Status Referrals Sent   Discharge Delays (!) Diet Not Ready for Discharge

## 2019-07-05 NOTE — PROGRESS NOTES
Plan of care reviewed with patient and spouse, verbalizes understanding. AAOx4, VSS. Lap sites with elias HOLLAND. Patient doing well this shift, off of oxygen, sats remain above 97% on room air, patient reports no SOB. Patient tolerating tube feeds at 20, reports no N/V. Ambulated with patient in hallway x2, tolerated very well. After walking patient at 1400, patient was applied back to tele monitor and was in A-fib, patient is asymptomatic, team made aware, patient currently on Amiodarone drip and is rate controlled.  Patient states no other needs, reports pain controlled with PRN meds. Patient states no other needs at this time, call light in reach, WCTM.

## 2019-07-06 LAB
ALBUMIN SERPL BCP-MCNC: 2.6 G/DL (ref 3.5–5.2)
ALP SERPL-CCNC: 79 U/L (ref 55–135)
ALT SERPL W/O P-5'-P-CCNC: 38 U/L (ref 10–44)
ANION GAP SERPL CALC-SCNC: 10 MMOL/L (ref 8–16)
AST SERPL-CCNC: 29 U/L (ref 10–40)
BASOPHILS # BLD AUTO: 0.01 K/UL (ref 0–0.2)
BASOPHILS NFR BLD: 0.2 % (ref 0–1.9)
BILIRUB SERPL-MCNC: 0.7 MG/DL (ref 0.1–1)
BLD PROD TYP BPU: NORMAL
BLD PROD TYP BPU: NORMAL
BLOOD UNIT EXPIRATION DATE: NORMAL
BLOOD UNIT EXPIRATION DATE: NORMAL
BLOOD UNIT TYPE CODE: 5100
BLOOD UNIT TYPE CODE: 5100
BLOOD UNIT TYPE: NORMAL
BLOOD UNIT TYPE: NORMAL
BUN SERPL-MCNC: 26 MG/DL (ref 8–23)
CALCIUM SERPL-MCNC: 9 MG/DL (ref 8.7–10.5)
CHLORIDE SERPL-SCNC: 103 MMOL/L (ref 95–110)
CO2 SERPL-SCNC: 25 MMOL/L (ref 23–29)
CODING SYSTEM: NORMAL
CODING SYSTEM: NORMAL
CREAT SERPL-MCNC: 0.9 MG/DL (ref 0.5–1.4)
CRP SERPL-MCNC: 122.2 MG/L (ref 0–3.19)
DIFFERENTIAL METHOD: ABNORMAL
DISPENSE STATUS: NORMAL
DISPENSE STATUS: NORMAL
EOSINOPHIL # BLD AUTO: 0.1 K/UL (ref 0–0.5)
EOSINOPHIL NFR BLD: 3 % (ref 0–8)
ERYTHROCYTE [DISTWIDTH] IN BLOOD BY AUTOMATED COUNT: 20 % (ref 11.5–14.5)
EST. GFR  (AFRICAN AMERICAN): >60 ML/MIN/1.73 M^2
EST. GFR  (NON AFRICAN AMERICAN): >60 ML/MIN/1.73 M^2
GLUCOSE SERPL-MCNC: 151 MG/DL (ref 70–110)
HCT VFR BLD AUTO: 33.9 % (ref 40–54)
HGB BLD-MCNC: 10.9 G/DL (ref 14–18)
IMM GRANULOCYTES # BLD AUTO: 0.02 K/UL (ref 0–0.04)
IMM GRANULOCYTES NFR BLD AUTO: 0.5 % (ref 0–0.5)
LYMPHOCYTES # BLD AUTO: 0.3 K/UL (ref 1–4.8)
LYMPHOCYTES NFR BLD: 5.7 % (ref 18–48)
MAGNESIUM SERPL-MCNC: 2.1 MG/DL (ref 1.6–2.6)
MCH RBC QN AUTO: 31.5 PG (ref 27–31)
MCHC RBC AUTO-ENTMCNC: 32.2 G/DL (ref 32–36)
MCV RBC AUTO: 98 FL (ref 82–98)
MONOCYTES # BLD AUTO: 0.5 K/UL (ref 0.3–1)
MONOCYTES NFR BLD: 10.9 % (ref 4–15)
NEUTROPHILS # BLD AUTO: 3.5 K/UL (ref 1.8–7.7)
NEUTROPHILS NFR BLD: 79.7 % (ref 38–73)
NRBC BLD-RTO: 0 /100 WBC
PHOSPHATE SERPL-MCNC: 3.7 MG/DL (ref 2.7–4.5)
PLATELET # BLD AUTO: 93 K/UL (ref 150–350)
PMV BLD AUTO: 11.2 FL (ref 9.2–12.9)
POCT GLUCOSE: 159 MG/DL (ref 70–110)
POCT GLUCOSE: 180 MG/DL (ref 70–110)
POCT GLUCOSE: 199 MG/DL (ref 70–110)
POTASSIUM SERPL-SCNC: 3.7 MMOL/L (ref 3.5–5.1)
PROT SERPL-MCNC: 5.4 G/DL (ref 6–8.4)
RBC # BLD AUTO: 3.46 M/UL (ref 4.6–6.2)
SODIUM SERPL-SCNC: 138 MMOL/L (ref 136–145)
TRANS ERYTHROCYTES VOL PATIENT: NORMAL ML
TRANS ERYTHROCYTES VOL PATIENT: NORMAL ML
WBC # BLD AUTO: 4.39 K/UL (ref 3.9–12.7)

## 2019-07-06 PROCEDURE — 93005 ELECTROCARDIOGRAM TRACING: CPT | Mod: HCNC

## 2019-07-06 PROCEDURE — 94640 AIRWAY INHALATION TREATMENT: CPT | Mod: HCNC

## 2019-07-06 PROCEDURE — S0028 INJECTION, FAMOTIDINE, 20 MG: HCPCS | Mod: HCNC | Performed by: STUDENT IN AN ORGANIZED HEALTH CARE EDUCATION/TRAINING PROGRAM

## 2019-07-06 PROCEDURE — 84100 ASSAY OF PHOSPHORUS: CPT | Mod: HCNC

## 2019-07-06 PROCEDURE — 99232 PR SUBSEQUENT HOSPITAL CARE,LEVL II: ICD-10-PCS | Mod: HCNC,25,GC, | Performed by: INTERNAL MEDICINE

## 2019-07-06 PROCEDURE — 63600175 PHARM REV CODE 636 W HCPCS: Mod: HCNC | Performed by: STUDENT IN AN ORGANIZED HEALTH CARE EDUCATION/TRAINING PROGRAM

## 2019-07-06 PROCEDURE — 63600175 PHARM REV CODE 636 W HCPCS: Mod: HCNC | Performed by: NURSE PRACTITIONER

## 2019-07-06 PROCEDURE — 25000003 PHARM REV CODE 250: Mod: HCNC | Performed by: STUDENT IN AN ORGANIZED HEALTH CARE EDUCATION/TRAINING PROGRAM

## 2019-07-06 PROCEDURE — 99232 SBSQ HOSP IP/OBS MODERATE 35: CPT | Mod: HCNC,25,GC, | Performed by: INTERNAL MEDICINE

## 2019-07-06 PROCEDURE — 83735 ASSAY OF MAGNESIUM: CPT | Mod: HCNC

## 2019-07-06 PROCEDURE — 20600001 HC STEP DOWN PRIVATE ROOM: Mod: HCNC

## 2019-07-06 PROCEDURE — 93010 ELECTROCARDIOGRAM REPORT: CPT | Mod: HCNC,,, | Performed by: INTERNAL MEDICINE

## 2019-07-06 PROCEDURE — 94761 N-INVAS EAR/PLS OXIMETRY MLT: CPT | Mod: HCNC

## 2019-07-06 PROCEDURE — 86141 C-REACTIVE PROTEIN HS: CPT | Mod: HCNC

## 2019-07-06 PROCEDURE — 25000242 PHARM REV CODE 250 ALT 637 W/ HCPCS: Mod: HCNC | Performed by: NURSE PRACTITIONER

## 2019-07-06 PROCEDURE — 99900035 HC TECH TIME PER 15 MIN (STAT): Mod: HCNC

## 2019-07-06 PROCEDURE — 97116 GAIT TRAINING THERAPY: CPT | Mod: HCNC

## 2019-07-06 PROCEDURE — 94664 DEMO&/EVAL PT USE INHALER: CPT | Mod: HCNC

## 2019-07-06 PROCEDURE — 80053 COMPREHEN METABOLIC PANEL: CPT | Mod: HCNC

## 2019-07-06 PROCEDURE — 97110 THERAPEUTIC EXERCISES: CPT | Mod: HCNC

## 2019-07-06 PROCEDURE — 36415 COLL VENOUS BLD VENIPUNCTURE: CPT | Mod: HCNC

## 2019-07-06 PROCEDURE — 97530 THERAPEUTIC ACTIVITIES: CPT | Mod: HCNC

## 2019-07-06 PROCEDURE — 93010 EKG 12-LEAD: ICD-10-PCS | Mod: HCNC,,, | Performed by: INTERNAL MEDICINE

## 2019-07-06 PROCEDURE — 85025 COMPLETE CBC W/AUTO DIFF WBC: CPT | Mod: HCNC

## 2019-07-06 PROCEDURE — 94799 UNLISTED PULMONARY SVC/PX: CPT | Mod: HCNC

## 2019-07-06 RX ORDER — POTASSIUM CHLORIDE 20 MEQ/15ML
40 SOLUTION ORAL ONCE
Status: COMPLETED | OUTPATIENT
Start: 2019-07-06 | End: 2019-07-06

## 2019-07-06 RX ADMIN — ENOXAPARIN SODIUM 120 MG: 120 INJECTION, SOLUTION INTRAVENOUS; SUBCUTANEOUS at 06:07

## 2019-07-06 RX ADMIN — KETOROLAC TROMETHAMINE 15 MG: 30 INJECTION, SOLUTION INTRAMUSCULAR; INTRAVENOUS at 05:07

## 2019-07-06 RX ADMIN — KETOROLAC TROMETHAMINE 15 MG: 30 INJECTION, SOLUTION INTRAMUSCULAR; INTRAVENOUS at 09:07

## 2019-07-06 RX ADMIN — FAMOTIDINE 20 MG: 10 INJECTION INTRAVENOUS at 08:07

## 2019-07-06 RX ADMIN — HYDROCODONE BITARTRATE AND ACETAMINOPHEN 10 ML: 7.5; 325 SOLUTION ORAL at 07:07

## 2019-07-06 RX ADMIN — TAMSULOSIN HYDROCHLORIDE 0.4 MG: 0.4 CAPSULE ORAL at 08:07

## 2019-07-06 RX ADMIN — HYDROCODONE BITARTRATE AND ACETAMINOPHEN 10 ML: 7.5; 325 SOLUTION ORAL at 11:07

## 2019-07-06 RX ADMIN — ENOXAPARIN SODIUM 120 MG: 120 INJECTION, SOLUTION INTRAVENOUS; SUBCUTANEOUS at 05:07

## 2019-07-06 RX ADMIN — Medication 25 MG: at 08:07

## 2019-07-06 RX ADMIN — POTASSIUM CHLORIDE 40 MEQ: 20 SOLUTION ORAL at 08:07

## 2019-07-06 RX ADMIN — HYDROCODONE BITARTRATE AND ACETAMINOPHEN 10 ML: 7.5; 325 SOLUTION ORAL at 05:07

## 2019-07-06 RX ADMIN — AMIODARONE HYDROCHLORIDE 0.5 MG/MIN: 1.8 INJECTION, SOLUTION INTRAVENOUS at 08:07

## 2019-07-06 RX ADMIN — LEVALBUTEROL HYDROCHLORIDE 0.63 MG: 0.63 SOLUTION RESPIRATORY (INHALATION) at 07:07

## 2019-07-06 RX ADMIN — LEVALBUTEROL HYDROCHLORIDE 0.63 MG: 0.63 SOLUTION RESPIRATORY (INHALATION) at 01:07

## 2019-07-06 RX ADMIN — AMIODARONE HYDROCHLORIDE 0.5 MG/MIN: 1.8 INJECTION, SOLUTION INTRAVENOUS at 07:07

## 2019-07-06 RX ADMIN — KETOROLAC TROMETHAMINE 15 MG: 30 INJECTION, SOLUTION INTRAMUSCULAR; INTRAVENOUS at 02:07

## 2019-07-06 NOTE — PROGRESS NOTES
[facility]  Cardiology  Progress Note    Patient Name: Stu Garcia  MRN: [id number]  Admission Date: 7/2/2019  Hospital Length of Stay: 4 days  Code Status: Full Code   Attending Physician: Chad Milian MD   Primary Care Physician: Cade Juan MD  Expected Discharge Date: 7/8/2019  Principal Problem:Esophageal cancer    Subjective:     Hospital Course:   No notes on file    Interval History:   No overnight issues  Telemetry: converted NSR at 1700, patient asymptomatic  Spouse at bedside   Minimal drainage from Haris drain    Review of Systems   Constitution: Negative for chills, decreased appetite, diaphoresis, fever, weight gain and weight loss.   Eyes: Negative for blurred vision.   Cardiovascular: Negative for chest pain, dyspnea on exertion, irregular heartbeat, leg swelling, near-syncope, orthopnea and palpitations.   Respiratory: Negative for cough, shortness of breath, snoring and wheezing.    Gastrointestinal: Negative for abdominal pain, nausea and vomiting.   Genitourinary: Negative for bladder incontinence and urgency.     Objective:     Vital Signs (Most Recent):  Temp: 98.3 °F (36.8 °C) (07/06/19 0841)  Pulse: 76 (07/06/19 0714)  Resp: (!) 22 (07/06/19 0714)  BP: (!) 142/70 (07/06/19 0841)  SpO2: 95 % (07/06/19 0714) Vital Signs (24h Range):  Temp:  [96.6 °F (35.9 °C)-99 °F (37.2 °C)] 98.3 °F (36.8 °C)  Pulse:  [] 76  Resp:  [7-22] 22  SpO2:  [95 %-98 %] 95 %  BP: (102-171)/(55-79) 142/70     Weight: 116.9 kg (257 lb 12.8 oz)  Body mass index is 31.38 kg/m².     SpO2: 95 %  O2 Device (Oxygen Therapy): room air      Intake/Output Summary (Last 24 hours) at 7/6/2019 1107  Last data filed at 7/6/2019 0900  Gross per 24 hour   Intake 887.6 ml   Output 1470 ml   Net -582.4 ml       Lines/Drains/Airways     Central Venous Catheter Line                 Port A Cath Single Lumen 04/04/19 right subclavian 93 days          Drain                 Closed/Suction Drain 07/02/19 Left  Neck Bulb 19 Fr. 4 days         Gastrostomy/Enterostomy 07/02/19 Jejunostomy tube LUQ feeding 4 days          Peripheral Intravenous Line                 Peripheral IV - Single Lumen 07/02/19 0545 18 G Left Forearm 4 days         Peripheral IV - Single Lumen 07/04/19 2112 20 G Right Antecubital 1 day                Physical Exam   Constitutional: He is oriented to person, place, and time. He appears well-developed and well-nourished.       Cardiovascular: Normal heart sounds and intact distal pulses.   Pulmonary/Chest: Effort normal. No respiratory distress.   Abdominal: Soft. There is no tenderness. There is no guarding.   Musculoskeletal: He exhibits no edema.   Neurological: He is alert and oriented to person, place, and time.   Skin: Skin is warm and dry.       Significant Labs:   CMP   Recent Labs   Lab 07/05/19 0335 07/06/19  0533   * 138   K 3.8 3.7    103   CO2 25 25   * 151*   BUN 19 26*   CREATININE 0.9 0.9   CALCIUM 9.1 9.0   PROT 5.8* 5.4*   ALBUMIN 2.7* 2.6*   BILITOT 1.1* 0.7   ALKPHOS 89 79   AST 42* 29   ALT 49* 38   ANIONGAP 8 10   ESTGFRAFRICA >60.0 >60.0   EGFRNONAA >60.0 >60.0   , CBC   Recent Labs   Lab 07/05/19 0335 07/06/19  0528   WBC 5.39 4.39   HGB 11.1* 10.9*   HCT 34.8* 33.9*   PLT 77* 93*   , INR No results for input(s): INR, PROTIME in the last 48 hours. and Lipid Panel No results for input(s): CHOL, HDL, LDLCALC, TRIG, CHOLHDL in the last 48 hours.    Significant Imaging: Echocardiogram: Transthoracic echo (TTE) complete (Cupid Only): No results found for this or any previous visit.    Assessment and Plan:     PAF (paroxysmal atrial fibrillation)  Post operative Afib   - Hx of Afib s/p DCCV (2013)    - Esophageal CA s/p esophagoenterostomy   - HD stable and asymptomatic   - TTE: EF 55%, JAILYN wnl, LA measuring 33cc/ms   - A1c 8.0      - Cardiology consult team will sign off, please call for any further questions or concerns  - Follow up with Outpatient Cardiology (  Pt already has appt)  - Rate control with Metoprolol 25mg BID   - Recommend Rhythm control as follows   - Discontinue Amiodarone 0.5mg/hr ggt, transition to 400mg qday x 2 weeks, followed by 200mg x 3 months   - Amiodarone can be discontinued in 3 months if primary cardiology okay with this  - Needs Life long AC with ChadsVasc of 6   - Recommend NOAC if cleared by surgical team, can transition to Eliquis / Xarelto (PTA)   - Continue Lovenox 1mg/kg BID meanwhile   - Caution with RIGO in the future, if needed for DCCV   - Maintain K > 4, Mag > 2 and Ca/iCal WNL to decrease arrhythmogenic potential            VTE Risk Mitigation (From admission, onward)        Ordered     enoxaparin injection 120 mg  Every 12 hours (non-standard times)      07/05/19 1718     IP VTE HIGH RISK PATIENT  Once      07/02/19 1817     Place sequential compression device  Until discontinued      07/02/19 0514     Place MAL hose  Until discontinued      07/02/19 0514          Zuri Talavera MD  Cardiology  [facility]

## 2019-07-06 NOTE — ASSESSMENT & PLAN NOTE
Post operative Afib   - Hx of Afib s/p DCCV (2013)    - Esophageal CA s/p esophagoenterostomy   - HD stable and asymptomatic   - TTE: EF 55%   - A1c 8.0      - Cardiology consult team will sign off, please call for any further questions or concerns  - Follow up with Outpatient Cardiology ( Pt already has appt)  - Rate control with Metoprolol 25mg BID   - Recommend Rhythm control as follows   - Discontinue Amiodarone 0.5mg/hr ggt, transition to 400mg qday x 2 weeks, followed by 200mg x 3 months   - Amiodarone can be discontinued in 3 months if primary cardiology okay with this  - Needs Life long AC with ChadsVasc of 6   - Recommend NOAC if cleared by surgical team, can transition to Eliquis / Xarelto (PTA)   - Continue Lovenox 1mg/kg BID meanwhile   - Caution with RIGO in the future, if needed for DCCV   - Maintain K > 4, Mag > 2 and Ca/iCal WNL to decrease arrhythmogenic potential

## 2019-07-06 NOTE — ASSESSMENT & PLAN NOTE
Stu Garcia is a 73 y.o. male with a history of esophageal cancer s/p esophagectomy on 7/2/19    Pathway:  - NPO  - J tube feeds at 30cc/hr 2p-8a  - Terapeutic lovenox - see a fib  - J tube pain meds  - Toradol   - Hycet via J tube PRN for pain  - Beta blockade via J tube  - Amio  - Ambulate TID  - Shower  - PT/OT

## 2019-07-06 NOTE — PT/OT/SLP PROGRESS
Physical Therapy Treatment    Patient Name:  Stu Garcia   MRN:  [id number]    Recommendations:     Discharge Recommendations:  home health PT   Discharge Equipment Recommendations: (TBD, pending progress.)   Barriers to discharge: None    Assessment:     Stu Garcia is a 73 y.o. male admitted with a medical diagnosis of Esophageal cancer.  He presents with the following impairments/functional limitations:  weakness, impaired endurance, impaired self care skills, impaired functional mobilty, gait instability, impaired cardiopulmonary response to activity, edema, impaired skin . Patient ambulates with decreased, but steady reddy. No shortness of breath of balance issues were noted. Patient showed excellent disposition and participation with all activities.    Rehab Prognosis: Good; patient would benefit from acute skilled PT services to address these deficits and reach maximum level of function.    Recent Surgery: Procedure(s) (LRB):  XI ROBOTIC ESOPHAGECTOMY (N/A) 4 Days Post-Op    Plan:     During this hospitalization, patient to be seen 4 x/week to address the identified rehab impairments via gait training, therapeutic activities, therapeutic exercises, neuromuscular re-education and progress toward the following goals:    · Plan of Care Expires:  08/03/19    Subjective     Chief Complaint: none stated  Patient/Family Comments/goals: to go home soon.  Pain/Comfort:  · Pain Rating 1: 0/10  · Location 1: abdomen  · Pain Addressed 1: Reposition  · Pain Rating Post-Intervention 1: 2/10      Objective:     Communicated with nsg prior to session.  Patient found up in chair with telemetry, pulse ox (continuous), peripheral IV upon PT entry to room.     General Precautions: Standard, fall   Orthopedic Precautions:N/A   Braces: N/A     Functional Mobility:  · Transfers:     · Sit to Stand:  minimum assistance with rolling walker  · Gait: 54 ft x 2 trials with RW and CGA with chair follow and IV pole in  tow.      AM-PAC 6 CLICK MOBILITY  Turning over in bed (including adjusting bedclothes, sheets and blankets)?: 3  Sitting down on and standing up from a chair with arms (e.g., wheelchair, bedside commode, etc.): 3  Moving from lying on back to sitting on the side of the bed?: 3  Moving to and from a bed to a chair (including a wheelchair)?: 3  Need to walk in hospital room?: 3  Climbing 3-5 steps with a railing?: 2  Basic Mobility Total Score: 17       Therapeutic Activities and Exercises:   Donned a second gown. Assisted to the toilette. There ex in sitting: LAQ, HIP FLEX AND HEEL/TOE RAISES 2X15 REPS B LE.    Patient left up in chair with all lines intact, call button in reach and SPOUSE  present..    GOALS:   Multidisciplinary Problems     Physical Therapy Goals        Problem: Physical Therapy Goal    Goal Priority Disciplines Outcome Goal Variances Interventions   Physical Therapy Goal     PT, PT/OT Ongoing (interventions implemented as appropriate)     Description:  Goals to be met by: 2019    Patient will increase functional independence with mobility by performin. Supine <> sit with [place].  2. Sit <> stand transfer with [place] using No Assistive Device.  3. Bed <> chair transfer via Step Transfer with Supervision using No Assistive Device.  4. Gait  x 300 feet with Supervision using Straight Cane to prepare for community ambulation and endurance activities.  5. Able to tolerate exercise for 15-20 reps with independence.  6. Ascend/descend 2 stairs with right Handrails Stand-by Assistance using Straight Cane.                        Time Tracking:     PT Received On: 19  PT Start Time: 1432     PT Stop Time: 1515  PT Total Time (min): 43 min     Billable Minutes: Gait Training 13, Therapeutic Activity 20 and Therapeutic Exercise 10    Treatment Type: Treatment  PT/PTA: PTA     PTA Visit Number: Kuldeep Cruz PTA  2019

## 2019-07-06 NOTE — SUBJECTIVE & OBJECTIVE
Interval History: A fib with RVR yesterday.  Remained HDS.  Started on amio and anticoagulation. Cards consulted.  Pain controlled.  David TFs at 20.  No flatus/BM yet     Medications:  Continuous Infusions:   amiodarone in dextrose 5% 0.5 mg/min (07/05/19 2236)     Scheduled Meds:   enoxaparin  1 mg/kg Subcutaneous Q12H    famotidine (PF)  20 mg Intravenous Q12H    ketorolac  15 mg Intravenous Q8H    levalbuterol  0.63 mg Nebulization Q6H WAKE    metoprolol  25 mg Per J Tube BID    tamsulosin  0.4 mg Oral Daily     PRN Meds:Dextrose 10% Bolus, Dextrose 10% Bolus, glucagon (human recombinant), hydrocodone-apap 7.5-325 MG/15 ML, hydrocodone-apap 7.5-325 MG/15 ML, insulin aspart U-100, ondansetron, promethazine (PHENERGAN) IVPB     Review of patient's allergies indicates:   Allergen Reactions    Codeine Nausea And Vomiting     Objective:     Vital Signs (Most Recent):  Temp: 96.6 °F (35.9 °C) (07/06/19 0322)  Pulse: 76 (07/06/19 0714)  Resp: (!) 22 (07/06/19 0714)  BP: (!) 171/79 (07/06/19 0322)  SpO2: 95 % (07/06/19 0714) Vital Signs (24h Range):  Temp:  [96.6 °F (35.9 °C)-99 °F (37.2 °C)] 96.6 °F (35.9 °C)  Pulse:  [] 76  Resp:  [7-22] 22  SpO2:  [95 %-98 %] 95 %  BP: (102-171)/(55-79) 171/79     Weight: 116.9 kg (257 lb 12.8 oz)  Body mass index is 31.38 kg/m².    Intake/Output - Last 3 Shifts       07/04 0700 - 07/05 0659 07/05 0700 - 07/06 0659 07/06 0700 - 07/07 0659    I.V. (mL/kg) 182 (1.5) 383.6 (3.3)     NG/GT  340     IV Piggyback 250 100     Total Intake(mL/kg) 432 (3.6) 823.6 (7)     Urine (mL/kg/hr) 640 (0.2) 1400 (0.5)     Drains 18 5     Stool 0 0     Chest Tube 290      Total Output 948 1405     Net -516 -581.4            Urine Occurrence 202 x      Stool Occurrence 0 x 0 x           Physical Exam   Constitutional: He is oriented to person, place, and time. No distress.   Eyes: Conjunctivae are normal.   Neck:   GIGI drain intact with serous output.  Incision clean and dry.   Cardiovascular:  Normal rate.   Pulmonary/Chest: Effort normal. No respiratory distress.   CT site c/d/i   Abdominal: Soft. He exhibits distension (Mild).   Incisions clean and dry.  J tube in place.  Mild tenderness around incision sites.   Musculoskeletal: He exhibits no edema.   Neurological: He is alert and oriented to person, place, and time.   Psychiatric: He has a normal mood and affect.       Significant Labs:  CBC:   Recent Labs   Lab 07/06/19  0528   WBC 4.39   RBC 3.46*   HGB 10.9*   HCT 33.9*   PLT 93*   MCV 98   MCH 31.5*   MCHC 32.2     CMP:   Recent Labs   Lab 07/06/19  0533   *   CALCIUM 9.0   ALBUMIN 2.6*   PROT 5.4*      K 3.7   CO2 25      BUN 26*   CREATININE 0.9   ALKPHOS 79   ALT 38   AST 29   BILITOT 0.7     ABGs:   Recent Labs   Lab 07/03/19  0508   PH 7.310*   PCO2 45.3*   PO2 127*   HCO3 22.8*   POCSATURATED 99   BE -3       Significant Diagnostics:  I have reviewed all pertinent imaging results/findings within the past 24 hours.

## 2019-07-06 NOTE — PLAN OF CARE
Problem: Physical Therapy Goal  Goal: Physical Therapy Goal  Goals to be met by: 2019    Patient will increase functional independence with mobility by performin. Supine <> sit with [place].  2. Sit <> stand transfer with [place] using No Assistive Device.  3. Bed <> chair transfer via Step Transfer with Supervision using No Assistive Device.  4. Gait  x 300 feet with Supervision using Straight Cane to prepare for community ambulation and endurance activities.  5. Able to tolerate exercise for 15-20 reps with independence.  6. Ascend/descend 2 stairs with right Handrails Stand-by Assistance using Straight Cane.       Outcome: Ongoing (interventions implemented as appropriate)  Patient's mobility has significantly improved as noted by walking range.

## 2019-07-06 NOTE — SUBJECTIVE & OBJECTIVE
Interval History:   No overnight issues  Telemetry: converted NSR at 1700, patient asymptomatic  Spouse at bedside   Minimal drainage from Haris drain    Review of Systems   Constitution: Negative for chills, decreased appetite, diaphoresis, fever, weight gain and weight loss.   Eyes: Negative for blurred vision.   Cardiovascular: Negative for chest pain, dyspnea on exertion, irregular heartbeat, leg swelling, near-syncope, orthopnea and palpitations.   Respiratory: Negative for cough, shortness of breath, snoring and wheezing.    Gastrointestinal: Negative for abdominal pain, nausea and vomiting.   Genitourinary: Negative for bladder incontinence and urgency.     Objective:     Vital Signs (Most Recent):  Temp: 98.3 °F (36.8 °C) (07/06/19 0841)  Pulse: 76 (07/06/19 0714)  Resp: (!) 22 (07/06/19 0714)  BP: (!) 142/70 (07/06/19 0841)  SpO2: 95 % (07/06/19 0714) Vital Signs (24h Range):  Temp:  [96.6 °F (35.9 °C)-99 °F (37.2 °C)] 98.3 °F (36.8 °C)  Pulse:  [] 76  Resp:  [7-22] 22  SpO2:  [95 %-98 %] 95 %  BP: (102-171)/(55-79) 142/70     Weight: 116.9 kg (257 lb 12.8 oz)  Body mass index is 31.38 kg/m².     SpO2: 95 %  O2 Device (Oxygen Therapy): room air      Intake/Output Summary (Last 24 hours) at 7/6/2019 1107  Last data filed at 7/6/2019 0900  Gross per 24 hour   Intake 887.6 ml   Output 1470 ml   Net -582.4 ml       Lines/Drains/Airways     Central Venous Catheter Line                 Port A Cath Single Lumen 04/04/19 right subclavian 93 days          Drain                 Closed/Suction Drain 07/02/19 Left Neck Bulb 19 Fr. 4 days         Gastrostomy/Enterostomy 07/02/19 Jejunostomy tube LUQ feeding 4 days          Peripheral Intravenous Line                 Peripheral IV - Single Lumen 07/02/19 0545 18 G Left Forearm 4 days         Peripheral IV - Single Lumen 07/04/19 2112 20 G Right Antecubital 1 day                Physical Exam   Constitutional: He is oriented to person, place, and time. He appears  well-developed and well-nourished.       Cardiovascular: Normal heart sounds and intact distal pulses.   Pulmonary/Chest: Effort normal. No respiratory distress.   Abdominal: Soft. There is no tenderness. There is no guarding.   Musculoskeletal: He exhibits no edema.   Neurological: He is alert and oriented to person, place, and time.   Skin: Skin is warm and dry.       Significant Labs:   CMP   Recent Labs   Lab 07/05/19  0335 07/06/19  0533   * 138   K 3.8 3.7    103   CO2 25 25   * 151*   BUN 19 26*   CREATININE 0.9 0.9   CALCIUM 9.1 9.0   PROT 5.8* 5.4*   ALBUMIN 2.7* 2.6*   BILITOT 1.1* 0.7   ALKPHOS 89 79   AST 42* 29   ALT 49* 38   ANIONGAP 8 10   ESTGFRAFRICA >60.0 >60.0   EGFRNONAA >60.0 >60.0   , CBC   Recent Labs   Lab 07/05/19  0335 07/06/19  0528   WBC 5.39 4.39   HGB 11.1* 10.9*   HCT 34.8* 33.9*   PLT 77* 93*   , INR No results for input(s): INR, PROTIME in the last 48 hours. and Lipid Panel No results for input(s): CHOL, HDL, LDLCALC, TRIG, CHOLHDL in the last 48 hours.    Significant Imaging: Echocardiogram: Transthoracic echo (TTE) complete (Cupid Only): No results found for this or any previous visit.

## 2019-07-07 LAB
ALBUMIN SERPL BCP-MCNC: 2.5 G/DL (ref 3.5–5.2)
ALP SERPL-CCNC: 78 U/L (ref 55–135)
ALT SERPL W/O P-5'-P-CCNC: 28 U/L (ref 10–44)
ANION GAP SERPL CALC-SCNC: 8 MMOL/L (ref 8–16)
AST SERPL-CCNC: 22 U/L (ref 10–40)
BASOPHILS # BLD AUTO: 0.01 K/UL (ref 0–0.2)
BASOPHILS NFR BLD: 0.2 % (ref 0–1.9)
BILIRUB SERPL-MCNC: 0.9 MG/DL (ref 0.1–1)
BUN SERPL-MCNC: 24 MG/DL (ref 8–23)
CALCIUM SERPL-MCNC: 8.9 MG/DL (ref 8.7–10.5)
CHLORIDE SERPL-SCNC: 105 MMOL/L (ref 95–110)
CO2 SERPL-SCNC: 26 MMOL/L (ref 23–29)
CREAT SERPL-MCNC: 0.9 MG/DL (ref 0.5–1.4)
CRP SERPL-MCNC: 91.41 MG/L (ref 0–3.19)
CRP SERPL-MCNC: 93.7 MG/L (ref 0–3.19)
DIFFERENTIAL METHOD: ABNORMAL
EOSINOPHIL # BLD AUTO: 0.2 K/UL (ref 0–0.5)
EOSINOPHIL NFR BLD: 4.2 % (ref 0–8)
ERYTHROCYTE [DISTWIDTH] IN BLOOD BY AUTOMATED COUNT: 19.6 % (ref 11.5–14.5)
EST. GFR  (AFRICAN AMERICAN): >60 ML/MIN/1.73 M^2
EST. GFR  (NON AFRICAN AMERICAN): >60 ML/MIN/1.73 M^2
GLUCOSE SERPL-MCNC: 146 MG/DL (ref 70–110)
HCT VFR BLD AUTO: 33.7 % (ref 40–54)
HGB BLD-MCNC: 11 G/DL (ref 14–18)
IMM GRANULOCYTES # BLD AUTO: 0.01 K/UL (ref 0–0.04)
IMM GRANULOCYTES NFR BLD AUTO: 0.2 % (ref 0–0.5)
LYMPHOCYTES # BLD AUTO: 0.3 K/UL (ref 1–4.8)
LYMPHOCYTES NFR BLD: 6.8 % (ref 18–48)
MAGNESIUM SERPL-MCNC: 2 MG/DL (ref 1.6–2.6)
MCH RBC QN AUTO: 31.7 PG (ref 27–31)
MCHC RBC AUTO-ENTMCNC: 32.6 G/DL (ref 32–36)
MCV RBC AUTO: 97 FL (ref 82–98)
MONOCYTES # BLD AUTO: 0.6 K/UL (ref 0.3–1)
MONOCYTES NFR BLD: 13.9 % (ref 4–15)
NEUTROPHILS # BLD AUTO: 3.1 K/UL (ref 1.8–7.7)
NEUTROPHILS NFR BLD: 74.7 % (ref 38–73)
NRBC BLD-RTO: 0 /100 WBC
PHOSPHATE SERPL-MCNC: 3.6 MG/DL (ref 2.7–4.5)
PLATELET # BLD AUTO: 95 K/UL (ref 150–350)
PMV BLD AUTO: 10.7 FL (ref 9.2–12.9)
POCT GLUCOSE: 152 MG/DL (ref 70–110)
POCT GLUCOSE: 159 MG/DL (ref 70–110)
POCT GLUCOSE: 170 MG/DL (ref 70–110)
POTASSIUM SERPL-SCNC: 4.6 MMOL/L (ref 3.5–5.1)
PROT SERPL-MCNC: 5.4 G/DL (ref 6–8.4)
RBC # BLD AUTO: 3.47 M/UL (ref 4.6–6.2)
SODIUM SERPL-SCNC: 139 MMOL/L (ref 136–145)
WBC # BLD AUTO: 4.09 K/UL (ref 3.9–12.7)

## 2019-07-07 PROCEDURE — 94664 DEMO&/EVAL PT USE INHALER: CPT | Mod: HCNC

## 2019-07-07 PROCEDURE — 86141 C-REACTIVE PROTEIN HS: CPT | Mod: HCNC

## 2019-07-07 PROCEDURE — 36415 COLL VENOUS BLD VENIPUNCTURE: CPT | Mod: HCNC

## 2019-07-07 PROCEDURE — 99900035 HC TECH TIME PER 15 MIN (STAT): Mod: HCNC

## 2019-07-07 PROCEDURE — 86141 C-REACTIVE PROTEIN HS: CPT | Mod: 91,HCNC

## 2019-07-07 PROCEDURE — 83735 ASSAY OF MAGNESIUM: CPT | Mod: HCNC

## 2019-07-07 PROCEDURE — 63600175 PHARM REV CODE 636 W HCPCS: Mod: HCNC | Performed by: STUDENT IN AN ORGANIZED HEALTH CARE EDUCATION/TRAINING PROGRAM

## 2019-07-07 PROCEDURE — 25000003 PHARM REV CODE 250: Mod: HCNC | Performed by: STUDENT IN AN ORGANIZED HEALTH CARE EDUCATION/TRAINING PROGRAM

## 2019-07-07 PROCEDURE — S0028 INJECTION, FAMOTIDINE, 20 MG: HCPCS | Mod: HCNC | Performed by: STUDENT IN AN ORGANIZED HEALTH CARE EDUCATION/TRAINING PROGRAM

## 2019-07-07 PROCEDURE — 94761 N-INVAS EAR/PLS OXIMETRY MLT: CPT | Mod: HCNC

## 2019-07-07 PROCEDURE — 20600001 HC STEP DOWN PRIVATE ROOM: Mod: HCNC

## 2019-07-07 PROCEDURE — 25000242 PHARM REV CODE 250 ALT 637 W/ HCPCS: Mod: HCNC | Performed by: NURSE PRACTITIONER

## 2019-07-07 PROCEDURE — 63600175 PHARM REV CODE 636 W HCPCS: Mod: HCNC | Performed by: NURSE PRACTITIONER

## 2019-07-07 PROCEDURE — 84100 ASSAY OF PHOSPHORUS: CPT | Mod: HCNC

## 2019-07-07 PROCEDURE — 80053 COMPREHEN METABOLIC PANEL: CPT | Mod: HCNC

## 2019-07-07 PROCEDURE — 85025 COMPLETE CBC W/AUTO DIFF WBC: CPT | Mod: HCNC

## 2019-07-07 PROCEDURE — 27000221 HC OXYGEN, UP TO 24 HOURS: Mod: HCNC

## 2019-07-07 PROCEDURE — 94640 AIRWAY INHALATION TREATMENT: CPT | Mod: HCNC

## 2019-07-07 RX ADMIN — LEVALBUTEROL HYDROCHLORIDE 0.63 MG: 0.63 SOLUTION RESPIRATORY (INHALATION) at 07:07

## 2019-07-07 RX ADMIN — AMIODARONE HYDROCHLORIDE 0.5 MG/MIN: 1.8 INJECTION, SOLUTION INTRAVENOUS at 08:07

## 2019-07-07 RX ADMIN — LEVALBUTEROL HYDROCHLORIDE 0.63 MG: 0.63 SOLUTION RESPIRATORY (INHALATION) at 08:07

## 2019-07-07 RX ADMIN — Medication 25 MG: at 09:07

## 2019-07-07 RX ADMIN — ENOXAPARIN SODIUM 120 MG: 120 INJECTION, SOLUTION INTRAVENOUS; SUBCUTANEOUS at 05:07

## 2019-07-07 RX ADMIN — LEVALBUTEROL HYDROCHLORIDE 0.63 MG: 0.63 SOLUTION RESPIRATORY (INHALATION) at 01:07

## 2019-07-07 RX ADMIN — FAMOTIDINE 20 MG: 10 INJECTION INTRAVENOUS at 09:07

## 2019-07-07 RX ADMIN — HYDROCODONE BITARTRATE AND ACETAMINOPHEN 10 ML: 7.5; 325 SOLUTION ORAL at 04:07

## 2019-07-07 RX ADMIN — FAMOTIDINE 20 MG: 10 INJECTION INTRAVENOUS at 08:07

## 2019-07-07 RX ADMIN — Medication 25 MG: at 08:07

## 2019-07-07 RX ADMIN — HYDROCODONE BITARTRATE AND ACETAMINOPHEN 20 ML: 7.5; 325 SOLUTION ORAL at 08:07

## 2019-07-07 RX ADMIN — KETOROLAC TROMETHAMINE 15 MG: 30 INJECTION, SOLUTION INTRAMUSCULAR; INTRAVENOUS at 05:07

## 2019-07-07 RX ADMIN — HYDROCODONE BITARTRATE AND ACETAMINOPHEN 20 ML: 7.5; 325 SOLUTION ORAL at 04:07

## 2019-07-07 NOTE — SUBJECTIVE & OBJECTIVE
Interval History:  NAEON.  Still in NSR.  Passing gas, no BM yet.  Abd fullness improved from yesterday.  Didn't sleep much     Medications:  Continuous Infusions:   amiodarone in dextrose 5% 0.5 mg/min (07/07/19 0819)     Scheduled Meds:   enoxaparin  1 mg/kg Subcutaneous Q12H    famotidine (PF)  20 mg Intravenous Q12H    ketorolac  15 mg Intravenous Q8H    levalbuterol  0.63 mg Nebulization Q6H WAKE    metoprolol  25 mg Per J Tube BID    tamsulosin  0.4 mg Oral Daily     PRN Meds:Dextrose 10% Bolus, Dextrose 10% Bolus, glucagon (human recombinant), hydrocodone-apap 7.5-325 MG/15 ML, hydrocodone-apap 7.5-325 MG/15 ML, insulin aspart U-100, ondansetron, promethazine (PHENERGAN) IVPB     Review of patient's allergies indicates:   Allergen Reactions    Codeine Nausea And Vomiting     Objective:     Vital Signs (Most Recent):  Temp: 97.7 °F (36.5 °C) (07/07/19 0736)  Pulse: 74 (07/07/19 0742)  Resp: 17 (07/07/19 0736)  BP: (!) 158/74 (07/07/19 0736)  SpO2: 97 % (07/07/19 0736) Vital Signs (24h Range):  Temp:  [97.7 °F (36.5 °C)-99.6 °F (37.6 °C)] 97.7 °F (36.5 °C)  Pulse:  [71-92] 74  Resp:  [13-20] 17  SpO2:  [95 %-97 %] 97 %  BP: (127-159)/(62-74) 158/74     Weight: 116.9 kg (257 lb 12.8 oz)  Body mass index is 31.38 kg/m².    Intake/Output - Last 3 Shifts       07/05 0700 - 07/06 0659 07/06 0700 - 07/07 0659 07/07 0700 - 07/08 0659    I.V. (mL/kg) 383.6 (3.3) 211.5 (1.8)     NG/ 124     IV Piggyback 100      Total Intake(mL/kg) 823.6 (7) 335.5 (2.9)     Urine (mL/kg/hr) 1400 (0.5) 390 (0.1)     Drains 5 7.5     Stool 0 0     Chest Tube       Total Output 1405 397.5     Net -581.4 -62            Urine Occurrence  1 x     Stool Occurrence 0 x 1 x           Physical Exam   Constitutional: He is oriented to person, place, and time. No distress.   Eyes: Conjunctivae are normal.   Neck:   GIGI drain intact with serous output.  Incision clean and dry.   Cardiovascular: Normal rate.   Pulmonary/Chest: Effort  normal. No respiratory distress.   CT site c/d/i   Abdominal: Soft. He exhibits distension (Mild).   Incisions clean and dry.  J tube in place.  Mild tenderness around incision sites.   Musculoskeletal: He exhibits no edema.   Neurological: He is alert and oriented to person, place, and time.   Psychiatric: He has a normal mood and affect.       Significant Labs:  CBC:   Recent Labs   Lab 07/07/19  0358   WBC 4.09   RBC 3.47*   HGB 11.0*   HCT 33.7*   PLT 95*   MCV 97   MCH 31.7*   MCHC 32.6     CMP:   Recent Labs   Lab 07/07/19  0358   *   CALCIUM 8.9   ALBUMIN 2.5*   PROT 5.4*      K 4.6   CO2 26      BUN 24*   CREATININE 0.9   ALKPHOS 78   ALT 28   AST 22   BILITOT 0.9     ABGs:   Recent Labs   Lab 07/03/19  0508   PH 7.310*   PCO2 45.3*   PO2 127*   HCO3 22.8*   POCSATURATED 99   BE -3       Significant Diagnostics:  I have reviewed all pertinent imaging results/findings within the past 24 hours.

## 2019-07-07 NOTE — PLAN OF CARE
POC reviewed, stated understanding, AOX4, VSS, PT pain effectively managed this shift.  GIGI to L neck, WDL, minimal output.  Lap sites WDL, J-Tube WDL, ABD distended still, unchanged since beginning of shift. No C/O N/V/D this shift.  PIV infusing per order. NO adverse events this shift, bed low, call light in reach, will cont to manage POC.

## 2019-07-07 NOTE — PLAN OF CARE
Problem: Adult Inpatient Plan of Care  Goal: Patient-Specific Goal (Individualization)  Outcome: Ongoing (interventions implemented as appropriate)  Plan of care discussed with pt. Pt verbalizes understanding. Pt denies the need for pain medication. Pt on telemetry, NSR. Pt ambulated in chaidez , and room. Pt sat up in chair 2x tolerated well. Pt had BM this shift. Pt positions independently. VS stable. Pt remains free of falls and injury. No acute events this shift. Will continue to monitor.

## 2019-07-07 NOTE — ASSESSMENT & PLAN NOTE
Stu Garcia is a 73 y.o. male with a history of esophageal cancer s/p esophagectomy on 7/2/19    Pathway:  - NPO  - J tube feeds at 20cc/hr 2p-8a  - Terapeutic lovenox - see a fib  - J tube pain meds  - Toradol   - Beta blockade via J tube  - Amio  - Ambulate TID  - Shower  - Nutrition consult for TF recs   - PT/OT

## 2019-07-08 DIAGNOSIS — C15.5 MALIGNANT NEOPLASM OF LOWER THIRD OF ESOPHAGUS: Primary | ICD-10-CM

## 2019-07-08 LAB
ALBUMIN SERPL BCP-MCNC: 2.5 G/DL (ref 3.5–5.2)
ALP SERPL-CCNC: 78 U/L (ref 55–135)
ALT SERPL W/O P-5'-P-CCNC: 24 U/L (ref 10–44)
ANION GAP SERPL CALC-SCNC: 10 MMOL/L (ref 8–16)
AST SERPL-CCNC: 18 U/L (ref 10–40)
BASOPHILS # BLD AUTO: 0.02 K/UL (ref 0–0.2)
BASOPHILS NFR BLD: 0.4 % (ref 0–1.9)
BILIRUB SERPL-MCNC: 0.8 MG/DL (ref 0.1–1)
BUN SERPL-MCNC: 17 MG/DL (ref 8–23)
CALCIUM SERPL-MCNC: 8.7 MG/DL (ref 8.7–10.5)
CHLORIDE SERPL-SCNC: 104 MMOL/L (ref 95–110)
CO2 SERPL-SCNC: 24 MMOL/L (ref 23–29)
CREAT SERPL-MCNC: 0.8 MG/DL (ref 0.5–1.4)
CRP SERPL-MCNC: 94.59 MG/L (ref 0–3.19)
DIFFERENTIAL METHOD: ABNORMAL
EOSINOPHIL # BLD AUTO: 0.1 K/UL (ref 0–0.5)
EOSINOPHIL NFR BLD: 1.8 % (ref 0–8)
ERYTHROCYTE [DISTWIDTH] IN BLOOD BY AUTOMATED COUNT: 19.3 % (ref 11.5–14.5)
EST. GFR  (AFRICAN AMERICAN): >60 ML/MIN/1.73 M^2
EST. GFR  (NON AFRICAN AMERICAN): >60 ML/MIN/1.73 M^2
GLUCOSE SERPL-MCNC: 176 MG/DL (ref 70–110)
HCT VFR BLD AUTO: 31.4 % (ref 40–54)
HGB BLD-MCNC: 10.5 G/DL (ref 14–18)
IMM GRANULOCYTES # BLD AUTO: 0.04 K/UL (ref 0–0.04)
IMM GRANULOCYTES NFR BLD AUTO: 0.7 % (ref 0–0.5)
LYMPHOCYTES # BLD AUTO: 0.2 K/UL (ref 1–4.8)
LYMPHOCYTES NFR BLD: 3.8 % (ref 18–48)
MAGNESIUM SERPL-MCNC: 1.7 MG/DL (ref 1.6–2.6)
MCH RBC QN AUTO: 31.4 PG (ref 27–31)
MCHC RBC AUTO-ENTMCNC: 33.4 G/DL (ref 32–36)
MCV RBC AUTO: 94 FL (ref 82–98)
MONOCYTES # BLD AUTO: 0.7 K/UL (ref 0.3–1)
MONOCYTES NFR BLD: 12.8 % (ref 4–15)
NEUTROPHILS # BLD AUTO: 4.5 K/UL (ref 1.8–7.7)
NEUTROPHILS NFR BLD: 80.5 % (ref 38–73)
NRBC BLD-RTO: 0 /100 WBC
PHOSPHATE SERPL-MCNC: 2.7 MG/DL (ref 2.7–4.5)
PLATELET # BLD AUTO: 122 K/UL (ref 150–350)
PMV BLD AUTO: 10.8 FL (ref 9.2–12.9)
POCT GLUCOSE: 166 MG/DL (ref 70–110)
POCT GLUCOSE: 181 MG/DL (ref 70–110)
POCT GLUCOSE: 183 MG/DL (ref 70–110)
POTASSIUM SERPL-SCNC: 4.1 MMOL/L (ref 3.5–5.1)
PROT SERPL-MCNC: 5.4 G/DL (ref 6–8.4)
RBC # BLD AUTO: 3.34 M/UL (ref 4.6–6.2)
SODIUM SERPL-SCNC: 138 MMOL/L (ref 136–145)
WBC # BLD AUTO: 5.54 K/UL (ref 3.9–12.7)

## 2019-07-08 PROCEDURE — 86141 C-REACTIVE PROTEIN HS: CPT | Mod: HCNC

## 2019-07-08 PROCEDURE — 94799 UNLISTED PULMONARY SVC/PX: CPT | Mod: HCNC

## 2019-07-08 PROCEDURE — 63600175 PHARM REV CODE 636 W HCPCS: Mod: HCNC | Performed by: STUDENT IN AN ORGANIZED HEALTH CARE EDUCATION/TRAINING PROGRAM

## 2019-07-08 PROCEDURE — 94640 AIRWAY INHALATION TREATMENT: CPT | Mod: HCNC

## 2019-07-08 PROCEDURE — 25000003 PHARM REV CODE 250: Mod: HCNC | Performed by: STUDENT IN AN ORGANIZED HEALTH CARE EDUCATION/TRAINING PROGRAM

## 2019-07-08 PROCEDURE — S0028 INJECTION, FAMOTIDINE, 20 MG: HCPCS | Mod: HCNC | Performed by: STUDENT IN AN ORGANIZED HEALTH CARE EDUCATION/TRAINING PROGRAM

## 2019-07-08 PROCEDURE — 85025 COMPLETE CBC W/AUTO DIFF WBC: CPT | Mod: HCNC

## 2019-07-08 PROCEDURE — 97116 GAIT TRAINING THERAPY: CPT | Mod: HCNC

## 2019-07-08 PROCEDURE — 99900035 HC TECH TIME PER 15 MIN (STAT): Mod: HCNC

## 2019-07-08 PROCEDURE — 94761 N-INVAS EAR/PLS OXIMETRY MLT: CPT | Mod: HCNC

## 2019-07-08 PROCEDURE — 25000242 PHARM REV CODE 250 ALT 637 W/ HCPCS: Mod: HCNC | Performed by: NURSE PRACTITIONER

## 2019-07-08 PROCEDURE — 80053 COMPREHEN METABOLIC PANEL: CPT | Mod: HCNC

## 2019-07-08 PROCEDURE — 83735 ASSAY OF MAGNESIUM: CPT | Mod: HCNC

## 2019-07-08 PROCEDURE — 94664 DEMO&/EVAL PT USE INHALER: CPT | Mod: HCNC

## 2019-07-08 PROCEDURE — 84100 ASSAY OF PHOSPHORUS: CPT | Mod: HCNC

## 2019-07-08 PROCEDURE — 20600001 HC STEP DOWN PRIVATE ROOM: Mod: HCNC

## 2019-07-08 PROCEDURE — 36415 COLL VENOUS BLD VENIPUNCTURE: CPT | Mod: HCNC

## 2019-07-08 RX ORDER — ENOXAPARIN SODIUM 150 MG/ML
1 INJECTION SUBCUTANEOUS EVERY 12 HOURS
Qty: 22.4 ML | Refills: 0 | Status: SHIPPED | OUTPATIENT
Start: 2019-07-08 | End: 2019-07-08 | Stop reason: HOSPADM

## 2019-07-08 RX ORDER — MAGNESIUM SULFATE HEPTAHYDRATE 40 MG/ML
2 INJECTION, SOLUTION INTRAVENOUS ONCE
Status: COMPLETED | OUTPATIENT
Start: 2019-07-08 | End: 2019-07-08

## 2019-07-08 RX ORDER — AMIODARONE HYDROCHLORIDE 200 MG/1
400 TABLET ORAL 2 TIMES DAILY
Status: DISCONTINUED | OUTPATIENT
Start: 2019-07-08 | End: 2019-07-08

## 2019-07-08 RX ORDER — AMIODARONE HYDROCHLORIDE 100 MG/1
400 TABLET ORAL EVERY 12 HOURS
Status: DISCONTINUED | OUTPATIENT
Start: 2019-07-08 | End: 2019-07-08

## 2019-07-08 RX ADMIN — FAMOTIDINE 20 MG: 10 INJECTION INTRAVENOUS at 09:07

## 2019-07-08 RX ADMIN — POTASSIUM PHOSPHATE, MONOBASIC AND POTASSIUM PHOSPHATE, DIBASIC 15 MMOL: 224; 236 INJECTION, SOLUTION, CONCENTRATE INTRAVENOUS at 11:07

## 2019-07-08 RX ADMIN — FAMOTIDINE 20 MG: 10 INJECTION INTRAVENOUS at 08:07

## 2019-07-08 RX ADMIN — LEVALBUTEROL HYDROCHLORIDE 0.63 MG: 0.63 SOLUTION RESPIRATORY (INHALATION) at 08:07

## 2019-07-08 RX ADMIN — ENOXAPARIN SODIUM 120 MG: 120 INJECTION, SOLUTION INTRAVENOUS; SUBCUTANEOUS at 05:07

## 2019-07-08 RX ADMIN — Medication 25 MG: at 09:07

## 2019-07-08 RX ADMIN — Medication 25 MG: at 08:07

## 2019-07-08 RX ADMIN — LEVALBUTEROL HYDROCHLORIDE 0.63 MG: 0.63 SOLUTION RESPIRATORY (INHALATION) at 12:07

## 2019-07-08 RX ADMIN — HYDROCODONE BITARTRATE AND ACETAMINOPHEN 20 ML: 7.5; 325 SOLUTION ORAL at 03:07

## 2019-07-08 RX ADMIN — ENOXAPARIN SODIUM 120 MG: 120 INJECTION, SOLUTION INTRAVENOUS; SUBCUTANEOUS at 06:07

## 2019-07-08 RX ADMIN — MAGNESIUM SULFATE IN WATER 2 G: 40 INJECTION, SOLUTION INTRAVENOUS at 09:07

## 2019-07-08 RX ADMIN — Medication 400 MG: at 11:07

## 2019-07-08 NOTE — PLAN OF CARE
Problem: Adult Inpatient Plan of Care  Goal: Plan of Care Review  Admit Date: 7/2/19    Admit Dx: esophageal cancer    Past Medical History:  No date: Anticoagulant long-term use      Comment:  xarelto,asa  No date: Arthritis  2013: Atrial fibrillation      Comment:  cardioverted  No date: Bleb, lung  No date: Cataract      Comment:  OS  No date: Colon polyp  No date: Coronary artery disease  3/18/2018: Coronary artery disease involving native coronary artery   of native heart with unstable angina pectoris  No date: Diabetes mellitus  No date: Diabetes mellitus, type 2  No date: Diverticulosis  No date: General anesthetics causing adverse effect in therapeutic use      Comment:  delayed emergence per patient's wife  No date: GERD (gastroesophageal reflux disease)  No date: HEARING LOSS      Comment:  bilateral aids  No date: Hemorrhoid  No date: HLD (hyperlipidemia)  No date: Hypertension  No date: Iron deficiency anemia  No date: Neuropathy of leg  No date: Pneumonia due to other staphylococcus  No date: Prostate cancer      Comment:  prostate  No date: Spontaneous pneumothorax      Comment:  bilateral    Past Surgical History:  4/7/2017: ARTHROSCOPY-KNEE W/ CHONDROPLASTY; Left      Comment:  Performed by Kale Cleary MD at [facility] OR  4/7/2017: ARTHROSCOPY-MENISCECTOMY; Left      Comment:  Performed by Kale Cleary MD at [facility] OR  No date: CARDIOVERSION  No date: CATARACT EXTRACTION      Comment:  bilateral  No date: CERVICAL SPINE FRACTURE SURGERY      Comment:  c7 t1 ACDF   No date: CHEST TUBE INSERTION; Right  No date: COLONOSCOPY  5/20/2015: COLONOSCOPY; N/A      Comment:  Performed by Andrea Kerns Jr., MD at [facility] ENDO  No date: CORONARY STENT PLACEMENT      Comment:  x 2  2/23/2019: ESOPHAGOGASTRODUODENOSCOPY (EGD); N/A      Comment:  Performed by Jackeline Richards MD at [facility] ENDO  5/20/2015: ESOPHAGOGASTRODUODENOSCOPY (EGD); N/A      Comment:  Performed by Andrea Kerns Jr., MD at  [facility] ENDO  No date: HERNIA REPAIR      Comment:  umbilical  4/4/2019: EXOKDLNPB-PVLX-G-CATH; Right      Comment:  Performed by Nahum Zaidi MD at [facility] OR  2017: KNEE SURGERY; Left  3/19/2018: Left heart cath; Right      Comment:  Performed by Eduard Cano MD at [facility] CATH  3/17/2018: Left heart cath; Left      Comment:  Performed by Parminder Duenas III, MD at [facility] CATH  3/21/2018: Left heart cath-RM # 222 B; Right      Comment:  Performed by Eduard Cano MD at [facility] CATH  1966: open thoracotomy; Left      Comment:  With pleurectomy  1966: Pleurectomy; Left      Comment:  For treatment of left pneumothorax  2001: PROSTATECTOMY      Comment:  open  1966: TUBE THORACOTOMY      Comment:  pneumothorax left--open  3/13/2019: ULTRASOUND, UPPER GI TRACT, ENDOSCOPIC; N/A      Comment:  Performed by Andrew Shirley MD at [facility] ENDO (2ND FLR)  7/2/2019: XI ROBOTIC ESOPHAGECTOMY; N/A      Comment:  Performed by Chad Milian MD at [facility] OR 2ND FLR    Individualization:   1.     Restraints: (date/time/why or N/A)       Outcome: Ongoing (interventions implemented as appropriate)  POC reviewed with patient and verbalized understanding, VSS on RA , AAOx4. GIGI drain to left neck intact with minimal output. Lap sites intact and approximated. Tube flushed and is clamped. Bed in lowest position call light in reach bed rails up x2 WCTM

## 2019-07-08 NOTE — PT/OT/SLP PROGRESS
Physical Therapy Treatment    Patient Name:  Stu Garcia   MRN:  [id number]  Admitting Diagnosis: Esophageal cancer  Recent Surgery: Procedure(s) (LRB):  XI ROBOTIC ESOPHAGECTOMY (N/A) 6 Days Post-Op    Recommendations:     Discharge Recommendations:  home health PT   Discharge Equipment Recommendations: walker, rolling   Barriers to discharge: Decreased caregiver support    Plan:     During this hospitalization, patient to be seen 4 x/week to address the listed problems via gait training, therapeutic activities, therapeutic exercises, neuromuscular re-education  · Plan of Care Expires:  19   Plan of Care Reviewed with: patient, spouse    Assessment:     Stu Garcia is a 73 y.o. male admitted with a medical diagnosis of Esophageal cancer. Pt with slight instability during gait due to impairments in balance and impaired sensation in bilateral feet. Due to gait instability and Tinetti performance, pt is at high risk for falls. Pt is unsafe to return to prior roles and responsibilities independently at this time. Pt appropriate for additional skilled acute PT services to address impairments for return to PLOF and decrease caregiver burden.     Problem List: weakness, gait instability, impaired balance, impaired sensation, decreased safety awareness, impaired self care skills, impaired functional mobilty.  Rehab Prognosis: Good     GOALS:   Multidisciplinary Problems     Physical Therapy Goals        Problem: Physical Therapy Goal    Goal Priority Disciplines Outcome Goal Variances Interventions   Physical Therapy Goal     PT, PT/OT Ongoing (interventions implemented as appropriate)     Description:  Goals to be met by: 2019    Patient will increase functional independence with mobility by performin. Supine <> sit with [place].  2. Sit <> stand transfer with [place] using No Assistive Device.  3. Bed <> chair transfer via Step Transfer with Supervision using No Assistive Device.  4.  "Gait  x 300 feet with Supervision using Straight Cane to prepare for community ambulation and endurance activities.  5. Able to tolerate exercise for 15-20 reps with independence.  6. Ascend/descend 2 stairs with right Handrails Stand-by Assistance using Straight Cane.                        Subjective     Communicated with RN prior to session.  Patient found supine with HOB elevated upon PT entry to room. Pt's wife present during session.  "I've been wanting to get up."    Pain/Comfort:  · Pain Rating 1: 1/10(pt reported discomfort in R forearm at IV insertion site)  · Location - Orientation 1: distal  · Location 1: arm  · Pain Addressed 1: Nurse notified, Distraction    Objective:     Patient found with: peripheral IV, telemetry(J-tube)   Mental Status: Patient is oriented to AxOx4 and follows multi-step commands. Patient is Alert and Cooperative during session.    General Precautions: Standard, Cardiac fall   Orthopedic Precautions:N/A   Braces: N/A   Body mass index is 31.38 kg/m².  Oxygen Device: Room Air    LE Sensation:   · RLE: light touch to lower leg intact, R foot light touch absent  · LLE: light touch to lower leg intact, L foot impaired  · Pt reported sensation became impaired following prostate surgery     Outcome Measures:  AM-PAC 6 CLICK MOBILITY  Turning over in bed (including adjusting bedclothes, sheets and blankets)?: 4  Sitting down on and standing up from a chair with arms (e.g., wheelchair, bedside commode, etc.): 4  Moving from lying on back to sitting on the side of the bed?: 4  Moving to and from a bed to a chair (including a wheelchair)?: 3  Need to walk in hospital room?: 3  Climbing 3-5 steps with a railing?: 3  Basic Mobility Total Score: 21     TINETTI BALANCE ASSESSMENT TOOL    Rubioetti ME, Eric TF, Bj R, Fall Risk Index for elderly patients based on number of chronic disabilities.Am J Med [id number]    Assistive Device  Normally Used: Yes  Assistive Device During " Testing: Yes, rolling walker    BALANCE SECTION  Patient is seated in a hard, armless chair.    1. Sitting Balance:   Steady; safe = 1  2 .Rises from chair :  Able, uses arms to help = 1  3. Attempts to arise :  Able to arise, 1 attempt = 2  4. Immediate standing Balance (first 5 seconds) : Steady but uses walker or other support = 1  5. Standing balance: Narrow stance without support = 2  6. Nudged : Staggers, grabs, catches self = 1  7. Eyes closed: Steady = 1  8. Turning 360 degrees:  Discontinuous steps = 0 and Unsteady (grabs, staggers) = 0  9. Sitting Down : Uses arms or not a smooth motion = 1    Balance Score:  10/16    GAIT SECTION  Patient stands with therapist, walks across room (+/- aids), first at usual pace, then at rapid pace, but safe pace.     10. Initiation of Gait (Immediately after told to go.): No hesitancy = 1  11. Step length and height & Foot Clearance: Right swing foot passes left stance foot = 1 and Right foot completely clears floor = 1 and Left swing foot passes right stance foot = 1 and Left foot completely clears floor = 1  12. Step symmetry: Right & Left step length appear equal = 1  13. Step continuity : Steps appear continuous = 1  14. Path: Mild/moderate deviation or uses walking aid = 1  15. Trunk : Marked sway or uses walking aid = 0  16. Walking Time: Heels amost touching while walking = 1    Balance score:10/16  Gait Score: 8/12    Total Score=Balance + Gait Score: 18/28     Risk Indicators:    Tinetti Tool Score   Risk of Falls   ?18    High   19-23   Moderate   ?24   Low      Functional Mobility:  Additional staff present: PT  Bed Mobility:   · Supine to Sit: supervision; from right side of bed  · Scooting anteriorly to EOB to have both feet planted on floor: supervision    Transfers:   · Sit <> Stand Transfer: supervision with rolling walker   · Stand <> Sit Transfer: supervision with rolling walker   · [id number] from EOB and [id number] from bedside chair      Gait:  · Patient  ambulated: 200 feet x 2 trials   · Patient required: standy by assistance  · Patient used:  rolling walker   · Gait Pattern observed: reciprocal gait  · Gait Speed: 0.46m/s at self selected speed  · 0.4m/s - 0.79m/s - Limited Community Ambulator  · Normal gait speed for average healthy adult: 1.2m/s-1.4m/s  · Gait Deviation(s): occasional unsteady gait and flexed posture  · Impairments due to: impaired balance and decreased sensation  · Comments: Pt demonstrated ability to change speed safely. Pt with mild path deviations with head turns.     Therapeutic Activities & Exercises:     Education:  Pt educated on PT POC and to ambulate with nursing assistance throughout the day.     Patient left up in chair, with head in midline, neutral pelvis & heels floated for skin protection with all lines intact, call button in reach and RN notified      Time Tracking:     PT Received On: 07/08/19  PT Start Time: 1324     PT Stop Time: 1346  PT Total Time (min): 22 min       Billable Minutes:   · Gait Training 22    Treatment Type: Treatment  PT/PTA: PT       Ariana Melchor, SPT  7/8/2019

## 2019-07-08 NOTE — PLAN OF CARE
Recommendations    Recommendation/Intervention:     1. Recommend TF of Impact Peptide 1.5 increasing as tolerated to goal rate of 85 mL/hr from 2p-8a - to provide 2295 kcal/day, 144g protein/day, and 1178mL free fluid/day.     2. For discharge, recommend TF of Isosource 1.5 goal rate of 85 mL/hr from 2p-8a - to provide 2430 kcal/day, 110g protein/day, and 1238mL free fluid/day.     3. RD following.     Goals: Patient to receive nutrition by RD follow-up  Nutrition Goal Status: goal met

## 2019-07-08 NOTE — PLAN OF CARE
POC reviewed with PT, stated understanding. AOX4, VSS. PIV infusing cardiac meds per order. Lap sites, L neck IX, GIGI and J-tube all WDL, TF started at 20/hr this shift. PT tolerating, does admit to 6/10 pain, meds given, relief noted per pt statement.  Voids adequate per urinal, no BM reported this shift.  Accuchecks WDL. NO adverse events this shift, bed low, call light in reach, will cont to manage POC.

## 2019-07-08 NOTE — ASSESSMENT & PLAN NOTE
Stu Garcia is a 73 y.o. male with a history of esophageal cancer s/p esophagectomy on 7/2/19    Pathway:  - NPO  - J tube feeds at 30cc 2p-8a  - Terapeutic lovenox - see a fib  - J tube pain meds  - J tube amio  - Toradol   - Beta blockade via J tube  - Ambulate TID  - Shower  - PT/OT

## 2019-07-08 NOTE — PROGRESS NOTES
"[facility]  Adult Nutrition  Progress Note    SUMMARY       Recommendations    Recommendation/Intervention:     1. Recommend TF of Impact Peptide 1.5 increasing as tolerated to goal rate of 85 mL/hr from 2p-8a - to provide 2295 kcal/day, 144g protein/day, and 1178mL free fluid/day.     2. For discharge, recommend TF of Isosource 1.5 goal rate of 85 mL/hr from 2p-8a - to provide 2430 kcal/day, 110g protein/day, and 1238mL free fluid/day.     3. RD following.     Goals: Patient to receive nutrition by RD follow-up  Nutrition Goal Status: goal met  Communication of RD Recs: (POC)    Reason for Assessment    Reason For Assessment: RD follow-up  Diagnosis: cancer diagnosis/related complications, surgery/postoperative complications(esophageal cancer s/p esophagectomy 7/2)  Relevant Medical History: Afib, CAD, DM2, diverticulosis, GERD, HTN, HLD  Interdisciplinary Rounds: attended  General Information Comments: POD6 esophagectomy, proximal gastrectomy, and J-tube placement. NPO, nocturnal TF. Pt reports tolerating TF with no N/V/D/C. Pt remains nourished.  Nutrition Discharge Planning: Adequate nutrition via TF.    Nutrition Risk Screen    Nutrition Risk Screen: dysphagia or difficulty swallowing    Nutrition/Diet History    Spiritual, Cultural Beliefs, Moravian Practices, Values that Affect Care: no  Factors Affecting Nutritional Intake: NPO, altered gastrointestinal function    Anthropometrics    Temp: 98.4 °F (36.9 °C)  Height Method: Stated  Height: 6' 4" (193 cm)  Height (inches): 76 in  Weight Method: Standard Scale  Weight: 116.9 kg (257 lb 12.8 oz)  Weight (lb): 257.8 lb  Ideal Body Weight (IBW), Male: 202 lb  % Ideal Body Weight, Male (lb): 131.19 lb  BMI (Calculated): 32.3  BMI Grade: 30 - 34.9- obesity - grade I    Lab/Procedures/Meds    Pertinent Labs Reviewed: reviewed  Pertinent Labs Comments: glucose 176, CRP 94.59  Pertinent Medications Reviewed: reviewed  Pertinent Medications " Comments: noted    Estimated/Assessed Needs    Weight Used For Calorie Calculations: 115.7 kg (255 lb 1.2 oz)  Energy Calorie Requirements (kcal): 2314 kcal/day  Energy Need Method: Kcal/kg(20)  Protein Requirements: 139 g/day(1.2 g/kg)  Weight Used For Protein Calculations: 115.7 kg (255 lb 1.2 oz)  Fluid Requirements (mL): 1 mL/kcal or per MD  Estimated Fluid Requirement Method: RDA Method  RDA Method (mL): 2314     Nutrition Prescription Ordered    Current Diet Order: NPO  Current Nutrition Support Formula Ordered: Impact Peptide 1.5  Current Nutrition Support Rate Ordered: 30 (ml)  Current Nutrition Support Frequency Ordered: mL/hr(x 18 hrs)    Evaluation of Received Nutrient/Fluid Intake    Enteral Calories (kcal): 810  Enteral Protein (gm): 51  Enteral (Free Water) Fluid (mL): 416  I/O: +3.37L since admit  Energy Calories Required: not meeting needs  Protein Required: not meeting needs  Fluid Required: (per MD)  Comments: LBM 7/7  Tolerance: tolerating  % Intake of Estimated Energy Needs: 25 - 50 %  % Meal Intake: NPO    Nutrition Risk    Level of Risk/Frequency of Follow-up: (f/u 1 x wk)     Assessment and Plan    Nutrition Problem  Inadequate energy intake     Related to (etiology):   Esophageal cancer s/p esophagectomy 7/2     Signs and Symptoms (as evidenced by):   NPO with no alternative means of nutrition at this time, TF not started yed     Interventions:  Collaboration of nutrition care with other providers  Enteral Nutrition     Nutrition Diagnosis Status:   Continues     Monitor and Evaluation    Food and Nutrient Intake: energy intake, enteral nutrition intake  Food and Nutrient Adminstration: enteral and parenteral nutrition administration  Physical Activity and Function: nutrition-related ADLs and IADLs  Anthropometric Measurements: weight, weight change  Biochemical Data, Medical Tests and Procedures: electrolyte and renal panel, gastrointestinal profile, glucose/endocrine profile, inflammatory  profile  Nutrition-Focused Physical Findings: overall appearance     Nutrition Follow-Up    RD Follow-up?: Yes

## 2019-07-08 NOTE — PLAN OF CARE
Problem: Physical Therapy Goal  Goal: Physical Therapy Goal  Goals to be met by: 2019    Patient will increase functional independence with mobility by performin. Supine <> sit with [place].  2. Sit <> stand transfer with [place] using No Assistive Device.  3. Bed <> chair transfer via Step Transfer with Supervision using No Assistive Device.  4. Gait  x 300 feet with Supervision using Straight Cane to prepare for community ambulation and endurance activities.  5. Able to tolerate exercise for 15-20 reps with independence.  6. Ascend/descend 2 stairs with right Handrails Stand-by Assistance using Straight Cane.       Outcome: Ongoing (interventions implemented as appropriate)    Discharge Recommendation: HHPT.  Please continue Progressive Mobility Protocol as appropriate.  Appropriate transfer level with nursing staff: Bed <> Chair:  Step Transfer with minimum assistance with Rolling Walker.    [facility]  2019

## 2019-07-08 NOTE — PROGRESS NOTES
[facility]  General Surgery  Progress Note    Subjective:     History of Present Illness:  uT2N0 JULISSA of EG Kanu ramsay 2, who finished NACR on 5/17/19. Had treatment related odynophaghia, now resolved, and fatigue, which is improving (says he is about 70% of normal). He has resumed his gym workouts twice per week. No interval new medical problems.    Post-Op Info:  Procedure(s) (LRB):  XI ROBOTIC ESOPHAGECTOMY (N/A)   6 Days Post-Op     Interval History:  NAEON.  Still in NSR.  Passing gas and one BM.  Abd fullness improved from yesterday.  Did not ambulate yesterday.  Denies nausea and is feeling less bloated.     Medications:  Continuous Infusions:   amiodarone in dextrose 5% 0.5 mg/min (07/07/19 2013)     Scheduled Meds:   enoxaparin  1 mg/kg Subcutaneous Q12H    famotidine (PF)  20 mg Intravenous Q12H    levalbuterol  0.63 mg Nebulization Q6H WAKE    metoprolol  25 mg Per J Tube BID    tamsulosin  0.4 mg Oral Daily     PRN Meds:Dextrose 10% Bolus, Dextrose 10% Bolus, glucagon (human recombinant), hydrocodone-apap 7.5-325 MG/15 ML, hydrocodone-apap 7.5-325 MG/15 ML, insulin aspart U-100, ondansetron, promethazine (PHENERGAN) IVPB     Review of patient's allergies indicates:   Allergen Reactions    Codeine Nausea And Vomiting     Objective:     Vital Signs (Most Recent):  Temp: 98.4 °F (36.9 °C) (07/08/19 0707)  Pulse: 91 (07/08/19 0707)  Resp: 18 (07/08/19 0707)  BP: (!) 142/70 (07/08/19 0707)  SpO2: 96 % (07/08/19 0707) Vital Signs (24h Range):  Temp:  [97.4 °F (36.3 °C)-99.1 °F (37.3 °C)] 98.4 °F (36.9 °C)  Pulse:  [70-91] 91  Resp:  [16-20] 18  SpO2:  [94 %-99 %] 96 %  BP: (142-170)/(70-82) 142/70     Weight: 116.9 kg (257 lb 12.8 oz)  Body mass index is 31.38 kg/m².    Intake/Output - Last 3 Shifts       07/06 0700 - 07/07 0659 07/07 0700 - 07/08 0659 07/08 0700 - 07/09 0659    I.V. (mL/kg) 211.5 (1.8)      NG/ 262     IV Piggyback       Total Intake(mL/kg) 335.5 (2.9) 262  (2.2)     Urine (mL/kg/hr) 390 (0.1) 175 (0.1)     Drains 7.5 5     Stool 0      Total Output 397.5 180     Net -62 +82            Urine Occurrence 1 x      Stool Occurrence 1 x 1 x           Physical Exam   Constitutional: He is oriented to person, place, and time. No distress.   Eyes: Conjunctivae are normal.   Neck:   GIGI drain intact with serous output.  Incision clean and dry.   Cardiovascular: Normal rate.   Pulmonary/Chest: Effort normal. No respiratory distress.   CT site c/d/i   Abdominal: Soft. He exhibits distension (Mild).   Incisions clean and dry.  J tube in place.  Mild tenderness around incision sites.   Musculoskeletal: He exhibits no edema.   Neurological: He is alert and oriented to person, place, and time.   Psychiatric: He has a normal mood and affect.       Significant Labs:  CBC:   Recent Labs   Lab 07/08/19  0407   WBC 5.54   RBC 3.34*   HGB 10.5*   HCT 31.4*   *   MCV 94   MCH 31.4*   MCHC 33.4     CMP:   Recent Labs   Lab 07/08/19  0407   *   CALCIUM 8.7   ALBUMIN 2.5*   PROT 5.4*      K 4.1   CO2 24      BUN 17   CREATININE 0.8   ALKPHOS 78   ALT 24   AST 18   BILITOT 0.8     ABGs:   Recent Labs   Lab 07/03/19  0508   PH 7.310*   PCO2 45.3*   PO2 127*   HCO3 22.8*   POCSATURATED 99   BE -3       Significant Diagnostics:  I have reviewed all pertinent imaging results/findings within the past 24 hours.    Assessment/Plan:     * Esophageal cancer  Stu Garcia is a 73 y.o. male with a history of esophageal cancer s/p esophagectomy on 7/2/19    Pathway:  - NPO  - J tube feeds at 30cc 2p-8a  - Terapeutic lovenox - see a fib  - J tube pain meds  - J tube amio  - Toradol   - Beta blockade via J tube  - Ambulate TID  - Shower  - PT/OT      PAF (paroxysmal atrial fibrillation)  Appreciate cards recs  In NSR this AM  On Amio  Therapeutic lovenox         Shane Rodriguez MD  General Surgery  [facility]

## 2019-07-08 NOTE — SUBJECTIVE & OBJECTIVE
Interval History:  NAEON.  Still in NSR.  Passing gas and one BM.  Abd fullness improved from yesterday.  Did not ambulate yesterday.  Denies nausea and is feeling less bloated.     Medications:  Continuous Infusions:   amiodarone in dextrose 5% 0.5 mg/min (07/07/19 2013)     Scheduled Meds:   enoxaparin  1 mg/kg Subcutaneous Q12H    famotidine (PF)  20 mg Intravenous Q12H    levalbuterol  0.63 mg Nebulization Q6H WAKE    metoprolol  25 mg Per J Tube BID    tamsulosin  0.4 mg Oral Daily     PRN Meds:Dextrose 10% Bolus, Dextrose 10% Bolus, glucagon (human recombinant), hydrocodone-apap 7.5-325 MG/15 ML, hydrocodone-apap 7.5-325 MG/15 ML, insulin aspart U-100, ondansetron, promethazine (PHENERGAN) IVPB     Review of patient's allergies indicates:   Allergen Reactions    Codeine Nausea And Vomiting     Objective:     Vital Signs (Most Recent):  Temp: 98.4 °F (36.9 °C) (07/08/19 0707)  Pulse: 91 (07/08/19 0707)  Resp: 18 (07/08/19 0707)  BP: (!) 142/70 (07/08/19 0707)  SpO2: 96 % (07/08/19 0707) Vital Signs (24h Range):  Temp:  [97.4 °F (36.3 °C)-99.1 °F (37.3 °C)] 98.4 °F (36.9 °C)  Pulse:  [70-91] 91  Resp:  [16-20] 18  SpO2:  [94 %-99 %] 96 %  BP: (142-170)/(70-82) 142/70     Weight: 116.9 kg (257 lb 12.8 oz)  Body mass index is 31.38 kg/m².    Intake/Output - Last 3 Shifts       07/06 0700 - 07/07 0659 07/07 0700 - 07/08 0659 07/08 0700 - 07/09 0659    I.V. (mL/kg) 211.5 (1.8)      NG/ 262     IV Piggyback       Total Intake(mL/kg) 335.5 (2.9) 262 (2.2)     Urine (mL/kg/hr) 390 (0.1) 175 (0.1)     Drains 7.5 5     Stool 0      Total Output 397.5 180     Net -62 +82            Urine Occurrence 1 x      Stool Occurrence 1 x 1 x           Physical Exam   Constitutional: He is oriented to person, place, and time. No distress.   Eyes: Conjunctivae are normal.   Neck:   GIGI drain intact with serous output.  Incision clean and dry.   Cardiovascular: Normal rate.   Pulmonary/Chest: Effort normal. No respiratory  distress.   CT site c/d/i   Abdominal: Soft. He exhibits distension (Mild).   Incisions clean and dry.  J tube in place.  Mild tenderness around incision sites.   Musculoskeletal: He exhibits no edema.   Neurological: He is alert and oriented to person, place, and time.   Psychiatric: He has a normal mood and affect.       Significant Labs:  CBC:   Recent Labs   Lab 07/08/19  0407   WBC 5.54   RBC 3.34*   HGB 10.5*   HCT 31.4*   *   MCV 94   MCH 31.4*   MCHC 33.4     CMP:   Recent Labs   Lab 07/08/19  0407   *   CALCIUM 8.7   ALBUMIN 2.5*   PROT 5.4*      K 4.1   CO2 24      BUN 17   CREATININE 0.8   ALKPHOS 78   ALT 24   AST 18   BILITOT 0.8     ABGs:   Recent Labs   Lab 07/03/19  0508   PH 7.310*   PCO2 45.3*   PO2 127*   HCO3 22.8*   POCSATURATED 99   BE -3       Significant Diagnostics:  I have reviewed all pertinent imaging results/findings within the past 24 hours.

## 2019-07-09 VITALS
SYSTOLIC BLOOD PRESSURE: 137 MMHG | DIASTOLIC BLOOD PRESSURE: 75 MMHG | HEIGHT: 76 IN | OXYGEN SATURATION: 95 % | RESPIRATION RATE: 16 BRPM | BODY MASS INDEX: 31.39 KG/M2 | WEIGHT: 257.81 LBS | HEART RATE: 83 BPM | TEMPERATURE: 98 F

## 2019-07-09 PROBLEM — Z78.9 ON ENTERAL NUTRITION: Status: ACTIVE | Noted: 2019-07-09

## 2019-07-09 LAB
ALBUMIN SERPL BCP-MCNC: 2.6 G/DL (ref 3.5–5.2)
ALP SERPL-CCNC: 87 U/L (ref 55–135)
ALT SERPL W/O P-5'-P-CCNC: 22 U/L (ref 10–44)
ANION GAP SERPL CALC-SCNC: 8 MMOL/L (ref 8–16)
AST SERPL-CCNC: 21 U/L (ref 10–40)
BASOPHILS # BLD AUTO: 0.02 K/UL (ref 0–0.2)
BASOPHILS NFR BLD: 0.4 % (ref 0–1.9)
BILIRUB SERPL-MCNC: 1 MG/DL (ref 0.1–1)
BUN SERPL-MCNC: 13 MG/DL (ref 8–23)
CALCIUM SERPL-MCNC: 8.8 MG/DL (ref 8.7–10.5)
CHLORIDE SERPL-SCNC: 103 MMOL/L (ref 95–110)
CO2 SERPL-SCNC: 24 MMOL/L (ref 23–29)
CREAT SERPL-MCNC: 0.8 MG/DL (ref 0.5–1.4)
CRP SERPL-MCNC: 102.68 MG/L (ref 0–3.19)
CRP SERPL-MCNC: 97.33 MG/L (ref 0–3.19)
DIFFERENTIAL METHOD: ABNORMAL
EOSINOPHIL # BLD AUTO: 0.1 K/UL (ref 0–0.5)
EOSINOPHIL NFR BLD: 2.3 % (ref 0–8)
ERYTHROCYTE [DISTWIDTH] IN BLOOD BY AUTOMATED COUNT: 19.1 % (ref 11.5–14.5)
EST. GFR  (AFRICAN AMERICAN): >60 ML/MIN/1.73 M^2
EST. GFR  (NON AFRICAN AMERICAN): >60 ML/MIN/1.73 M^2
GLUCOSE SERPL-MCNC: 197 MG/DL (ref 70–110)
HCT VFR BLD AUTO: 33.2 % (ref 40–54)
HGB BLD-MCNC: 10.9 G/DL (ref 14–18)
IMM GRANULOCYTES # BLD AUTO: 0.04 K/UL (ref 0–0.04)
IMM GRANULOCYTES NFR BLD AUTO: 0.7 % (ref 0–0.5)
LYMPHOCYTES # BLD AUTO: 0.3 K/UL (ref 1–4.8)
LYMPHOCYTES NFR BLD: 4.4 % (ref 18–48)
MAGNESIUM SERPL-MCNC: 1.8 MG/DL (ref 1.6–2.6)
MCH RBC QN AUTO: 31.6 PG (ref 27–31)
MCHC RBC AUTO-ENTMCNC: 32.8 G/DL (ref 32–36)
MCV RBC AUTO: 96 FL (ref 82–98)
MONOCYTES # BLD AUTO: 0.7 K/UL (ref 0.3–1)
MONOCYTES NFR BLD: 13.1 % (ref 4–15)
NEUTROPHILS # BLD AUTO: 4.5 K/UL (ref 1.8–7.7)
NEUTROPHILS NFR BLD: 79.1 % (ref 38–73)
NRBC BLD-RTO: 0 /100 WBC
PHOSPHATE SERPL-MCNC: 2.4 MG/DL (ref 2.7–4.5)
PLATELET # BLD AUTO: 122 K/UL (ref 150–350)
PMV BLD AUTO: 10.1 FL (ref 9.2–12.9)
POCT GLUCOSE: 170 MG/DL (ref 70–110)
POCT GLUCOSE: 191 MG/DL (ref 70–110)
POCT GLUCOSE: 206 MG/DL (ref 70–110)
POTASSIUM SERPL-SCNC: 4.4 MMOL/L (ref 3.5–5.1)
PROT SERPL-MCNC: 5.7 G/DL (ref 6–8.4)
RBC # BLD AUTO: 3.45 M/UL (ref 4.6–6.2)
SODIUM SERPL-SCNC: 135 MMOL/L (ref 136–145)
WBC # BLD AUTO: 5.67 K/UL (ref 3.9–12.7)

## 2019-07-09 PROCEDURE — 25000003 PHARM REV CODE 250: Mod: HCNC | Performed by: STUDENT IN AN ORGANIZED HEALTH CARE EDUCATION/TRAINING PROGRAM

## 2019-07-09 PROCEDURE — 86141 C-REACTIVE PROTEIN HS: CPT | Mod: HCNC

## 2019-07-09 PROCEDURE — 94799 UNLISTED PULMONARY SVC/PX: CPT | Mod: HCNC

## 2019-07-09 PROCEDURE — 83735 ASSAY OF MAGNESIUM: CPT | Mod: HCNC

## 2019-07-09 PROCEDURE — 85025 COMPLETE CBC W/AUTO DIFF WBC: CPT | Mod: HCNC

## 2019-07-09 PROCEDURE — 94761 N-INVAS EAR/PLS OXIMETRY MLT: CPT | Mod: HCNC

## 2019-07-09 PROCEDURE — 63600175 PHARM REV CODE 636 W HCPCS: Mod: HCNC | Performed by: STUDENT IN AN ORGANIZED HEALTH CARE EDUCATION/TRAINING PROGRAM

## 2019-07-09 PROCEDURE — 25000242 PHARM REV CODE 250 ALT 637 W/ HCPCS: Mod: HCNC | Performed by: NURSE PRACTITIONER

## 2019-07-09 PROCEDURE — 86141 C-REACTIVE PROTEIN HS: CPT | Mod: 91,HCNC

## 2019-07-09 PROCEDURE — S0028 INJECTION, FAMOTIDINE, 20 MG: HCPCS | Mod: HCNC | Performed by: STUDENT IN AN ORGANIZED HEALTH CARE EDUCATION/TRAINING PROGRAM

## 2019-07-09 PROCEDURE — 99222 1ST HOSP IP/OBS MODERATE 55: CPT | Mod: HCNC,,, | Performed by: NURSE PRACTITIONER

## 2019-07-09 PROCEDURE — 94640 AIRWAY INHALATION TREATMENT: CPT | Mod: HCNC

## 2019-07-09 PROCEDURE — 99222 PR INITIAL HOSPITAL CARE,LEVL II: ICD-10-PCS | Mod: HCNC,,, | Performed by: NURSE PRACTITIONER

## 2019-07-09 PROCEDURE — 94664 DEMO&/EVAL PT USE INHALER: CPT | Mod: HCNC

## 2019-07-09 PROCEDURE — 80053 COMPREHEN METABOLIC PANEL: CPT | Mod: HCNC

## 2019-07-09 PROCEDURE — 25000003 PHARM REV CODE 250: Mod: HCNC | Performed by: NURSE PRACTITIONER

## 2019-07-09 PROCEDURE — 99900035 HC TECH TIME PER 15 MIN (STAT): Mod: HCNC

## 2019-07-09 PROCEDURE — 84100 ASSAY OF PHOSPHORUS: CPT | Mod: HCNC

## 2019-07-09 PROCEDURE — 36415 COLL VENOUS BLD VENIPUNCTURE: CPT | Mod: HCNC

## 2019-07-09 RX ORDER — ASPIRIN 81 MG/1
81 TABLET ORAL DAILY
Qty: 30 TABLET | Refills: 12 | Status: SHIPPED | OUTPATIENT
Start: 2019-07-09 | End: 2022-01-01

## 2019-07-09 RX ORDER — ASPIRIN 81 MG/1
81 TABLET ORAL DAILY
Status: DISCONTINUED | OUTPATIENT
Start: 2019-07-09 | End: 2019-07-09 | Stop reason: HOSPADM

## 2019-07-09 RX ORDER — AMIODARONE HYDROCHLORIDE 400 MG/1
400 TABLET ORAL DAILY
Qty: 14 TABLET | Refills: 0 | Status: SHIPPED | OUTPATIENT
Start: 2019-07-08 | End: 2019-07-23

## 2019-07-09 RX ORDER — AMIODARONE HYDROCHLORIDE 200 MG/1
200 TABLET ORAL DAILY
Qty: 30 TABLET | Refills: 0 | Status: SHIPPED | OUTPATIENT
Start: 2019-07-23 | End: 2019-09-11

## 2019-07-09 RX ORDER — HYDROCODONE BITARTRATE AND ACETAMINOPHEN 7.5; 325 MG/15ML; MG/15ML
10 SOLUTION ORAL EVERY 4 HOURS PRN
Qty: 473 ML | Refills: 0 | Status: SHIPPED | OUTPATIENT
Start: 2019-07-09 | End: 2020-08-18

## 2019-07-09 RX ORDER — AMIODARONE HYDROCHLORIDE 200 MG/1
200 TABLET ORAL DAILY
Qty: 30 TABLET | Refills: 0 | Status: SHIPPED | OUTPATIENT
Start: 2019-07-23 | End: 2019-07-09 | Stop reason: SDUPTHER

## 2019-07-09 RX ORDER — PEN NEEDLE, DIABETIC 30 GX3/16"
NEEDLE, DISPOSABLE MISCELLANEOUS
Qty: 60 EACH | Refills: 15 | Status: SHIPPED | OUTPATIENT
Start: 2019-07-09 | End: 2020-08-18

## 2019-07-09 RX ORDER — HYDROCODONE BITARTRATE AND ACETAMINOPHEN 7.5; 325 MG/15ML; MG/15ML
10 SOLUTION ORAL EVERY 4 HOURS PRN
Qty: 473 ML | Refills: 0 | Status: SHIPPED | OUTPATIENT
Start: 2019-07-09 | End: 2019-07-09

## 2019-07-09 RX ADMIN — LEVALBUTEROL HYDROCHLORIDE 0.63 MG: 0.63 SOLUTION RESPIRATORY (INHALATION) at 01:07

## 2019-07-09 RX ADMIN — ENOXAPARIN SODIUM 120 MG: 120 INJECTION, SOLUTION INTRAVENOUS; SUBCUTANEOUS at 06:07

## 2019-07-09 RX ADMIN — TAMSULOSIN HYDROCHLORIDE 0.4 MG: 0.4 CAPSULE ORAL at 11:07

## 2019-07-09 RX ADMIN — Medication 400 MG: at 01:07

## 2019-07-09 RX ADMIN — LEVALBUTEROL HYDROCHLORIDE 0.63 MG: 0.63 SOLUTION RESPIRATORY (INHALATION) at 07:07

## 2019-07-09 RX ADMIN — ASPIRIN 81 MG: 81 TABLET, COATED ORAL at 11:07

## 2019-07-09 RX ADMIN — FAMOTIDINE 20 MG: 10 INJECTION INTRAVENOUS at 10:07

## 2019-07-09 RX ADMIN — INSULIN ASPART 2 UNITS: 100 INJECTION, SOLUTION INTRAVENOUS; SUBCUTANEOUS at 06:07

## 2019-07-09 RX ADMIN — Medication 25 MG: at 10:07

## 2019-07-09 NOTE — PLAN OF CARE
Problem: Adult Inpatient Plan of Care  Goal: Plan of Care Review  Admit Date: 7/2/19    Admit Dx: esophageal cancer    Past Medical History:  No date: Anticoagulant long-term use      Comment:  xarelto,asa  No date: Arthritis  2013: Atrial fibrillation      Comment:  cardioverted  No date: Bleb, lung  No date: Cataract      Comment:  OS  No date: Colon polyp  No date: Coronary artery disease  3/18/2018: Coronary artery disease involving native coronary artery   of native heart with unstable angina pectoris  No date: Diabetes mellitus  No date: Diabetes mellitus, type 2  No date: Diverticulosis  No date: General anesthetics causing adverse effect in therapeutic use      Comment:  delayed emergence per patient's wife  No date: GERD (gastroesophageal reflux disease)  No date: HEARING LOSS      Comment:  bilateral aids  No date: Hemorrhoid  No date: HLD (hyperlipidemia)  No date: Hypertension  No date: Iron deficiency anemia  No date: Neuropathy of leg  No date: Pneumonia due to other staphylococcus  No date: Prostate cancer      Comment:  prostate  No date: Spontaneous pneumothorax      Comment:  bilateral    Past Surgical History:  4/7/2017: ARTHROSCOPY-KNEE W/ CHONDROPLASTY; Left      Comment:  Performed by Kale Cleary MD at [facility] OR  4/7/2017: ARTHROSCOPY-MENISCECTOMY; Left      Comment:  Performed by Kale Cleary MD at [facility] OR  No date: CARDIOVERSION  No date: CATARACT EXTRACTION      Comment:  bilateral  No date: CERVICAL SPINE FRACTURE SURGERY      Comment:  c7 t1 ACDF   No date: CHEST TUBE INSERTION; Right  No date: COLONOSCOPY  5/20/2015: COLONOSCOPY; N/A      Comment:  Performed by Andrea Kerns Jr., MD at [facility] ENDO  No date: CORONARY STENT PLACEMENT      Comment:  x 2  2/23/2019: ESOPHAGOGASTRODUODENOSCOPY (EGD); N/A      Comment:  Performed by Jackeline Richards MD at [facility] ENDO  5/20/2015: ESOPHAGOGASTRODUODENOSCOPY (EGD); N/A      Comment:  Performed by Andrea Kerns Jr., MD at  [facility] ENDO  No date: HERNIA REPAIR      Comment:  umbilical  4/4/2019: OQPPEKUSU-LXJP-V-CATH; Right      Comment:  Performed by Nahum Zaidi MD at [facility] OR  2017: KNEE SURGERY; Left  3/19/2018: Left heart cath; Right      Comment:  Performed by Eduard Cano MD at [facility] CATH  3/17/2018: Left heart cath; Left      Comment:  Performed by Parminder Duenas III, MD at [facility] CATH  3/21/2018: Left heart cath-RM # 222 B; Right      Comment:  Performed by Eduard Cano MD at [facility] CATH  1966: open thoracotomy; Left      Comment:  With pleurectomy  1966: Pleurectomy; Left      Comment:  For treatment of left pneumothorax  2001: PROSTATECTOMY      Comment:  open  1966: TUBE THORACOTOMY      Comment:  pneumothorax left--open  3/13/2019: ULTRASOUND, UPPER GI TRACT, ENDOSCOPIC; N/A      Comment:  Performed by Andrew Shirley MD at [facility] ENDO (2ND FLR)  7/2/2019: XI ROBOTIC ESOPHAGECTOMY; N/A      Comment:  Performed by Chad Milian MD at [facility] OR 2ND FLR    Individualization:   1.     Restraints: (date/time/why or N/A)       Outcome: Ongoing (interventions implemented as appropriate)  Patient AAOX4. B/l hearing aides in place. Tube feeds going at 30 mL/hr as per order, will inform oncoming RN to stop @ 0800. Will increase today to 40mL per hour for 1400 dose. Tolerating without bloating, cramping, N/V. Abd distended with active BS x 4. Clarified with on call orders for NPO except amiodarone and flomax. Orders given for liquid amiodarone through J tube. Patient admits to difficulty swallowing and states he hasn't been taking meds po. GIGI drain with 5mL of serosanguineous drainage to left neck. DB intact to neck incision. Lap sites intact with DB x 7. No complaints of pain overnight. Erythema to left forearm, surgery notified of same. Warm packs applied for comfort. Will begin discharge planning.

## 2019-07-09 NOTE — CONSULTS
[facility]  Endocrinology  Diabetes Consult Note    Consult Requested by: Chad Milian MD   Reason for admit: Esophageal cancer    HISTORY OF PRESENT ILLNESS:  Reason for Consult: Management of T2DM, Hyperglycemia     Surgical Procedure and Date: Total esophagectomy, proximal gastrectomy, and Witzel jejunostomy 7/2/19    Diabetes diagnosis year: 2000  Lab Results   Component Value Date    HGBA1C 7.9 (H) 05/07/2019       Home Diabetes Medications: Glimperide 2 mg BID with meals (per chart review)    How often checking glucose at home? daily   BG readings on regimen: 110-140  Hypoglycemia on the regimen?  No  Missed doses on regimen?  No    Diabetes Complications include:     Diabetic peripheral neuropathy     Complicating diabetes co morbidities:   Active Cancer s/p esophagectomy      HPI:   Patient is a 73 y.o. male with a diagnosis of DM2, afib, and esophageal cancer, who is s/p esophagectomy on 7/2/19.  Positive family history of DM per chart review (brother).  Endocrinology consulted for DM/BG management.            Interval HPI:   Overnight events: Remains in [facility].  Tolerating TF.  BG slightly above goal requiring correction scale insulin overnight.  Possible discharge today per primary team.  Eating:   NPO  Nausea: No  Hypoglycemia and intervention: No  Fever: No  TPN and/or TF: Yes  TF and rate: Isosource 1.5 at 30 cc/hr (2p-8a)    PMH, PSH, FH, SH reviewed       Review of Systems    Constitutional: Positive for weight loss.  Eyes: Negative for visual disturbance.  Respiratory: Negative for cough.   Cardiovascular: Negative for chest pain.  Gastrointestinal: Negative for nausea.  Endocrine: Negative for polyuria, polydipsia.  Musculoskeletal: Negative for back pain.  Skin: Negative for rash.  Neurological: Negative for syncope.  Psychiatric/Behavioral: Negative for depression.    Current Medications and/or Treatments Impacting Glycemic Control  Immunotherapy:     Immunosuppressants     None        Steroids:   Hormones (From admission, onward)    None        Pressors:    Autonomic Drugs (From admission, onward)    None        Hyperglycemia/Diabetes Medications:   Antihyperglycemics (From admission, onward)    Start     Stop Route Frequency Ordered    07/03/19 1011  insulin aspart U-100 pen 0-5 Units      -- SubQ Every 6 hours PRN 07/03/19 0911             PHYSICAL EXAMINATION:  Vitals:    07/09/19 0815   BP: (!) 140/66   Pulse: 91   Resp: 18   Temp: 98.4 °F (36.9 °C)     Body mass index is 31.38 kg/m².    Physical Exam     Constitutional: Well developed, well nourished, NAD.  ENT: External ears no masses with nose patent; normal hearing.  Neck: Supple; trachea midline.   Cardiovascular: Normal heart sounds, no LE edema. DP +2 bilaterally.  Lungs: Normal effort; lungs anterior bilaterally clear to auscultation.  Abdomen: Soft, no masses, no hernias.  MS: No clubbing or cyanosis of nails noted; unable to assess gait.  Skin: No rashes, lesions, or ulcers; no nodules.   Psychiatric: Good judgement and insight; normal mood and affect.  Neurological: Cranial nerves are grossly intact.   Foot: Nails in good condition, no amputations noted.      Labs Reviewed and Include   Recent Labs   Lab 07/09/19  0459   *   CALCIUM 8.8   ALBUMIN 2.6*   PROT 5.7*   *   K 4.4   CO2 24      BUN 13   CREATININE 0.8   ALKPHOS 87   ALT 22   AST 21   BILITOT 1.0     Lab Results   Component Value Date    WBC 5.67 07/09/2019    HGB 10.9 (L) 07/09/2019    HCT 33.2 (L) 07/09/2019    MCV 96 07/09/2019     (L) 07/09/2019     No results for input(s): TSH, FREET4 in the last 168 hours.  Lab Results   Component Value Date    HGBA1C 7.9 (H) 05/07/2019       Nutritional status:   Body mass index is 31.38 kg/m².  Lab Results   Component Value Date    ALBUMIN 2.6 (L) 07/09/2019    ALBUMIN 2.5 (L) 07/08/2019    ALBUMIN 2.5 (L) 07/07/2019     Lab Results   Component Value Date    PREALBUMIN  15 (L) 06/11/2019       Estimated Creatinine Clearance: 113.2 mL/min (based on SCr of 0.8 mg/dL).    Accu-Checks  Recent Labs     07/06/19  1704 07/06/19  2342 07/07/19  0542 07/07/19  1234 07/08/19  0030 07/08/19  1155 07/08/19  1815 07/09/19  0020 07/09/19  0615 07/09/19  1240   POCTGLUCOSE 180* 152* 170* 159* 166* 183* 181* 191* 206* 170*        ASSESSMENT and PLAN    * Esophageal cancer  S/p esophagectomy on 7/2/19  Managed per primary team  Avoid hypoglycemia  Optimize BG control for surgical wound healing        Type 2 diabetes mellitus treated without insulin  BG goal 140-180    Low dose correction scale  BG monitoring q 6 hrs while NPO    Discharge planning  Recommend discharge on Novolin N NPH 6 units (vial/syringe) with the start of Tube Feedings. Instructed patient to increase by 1 unit every day if BG at am check is greater than 200. Patient will need insulin syringes and glucagon emergency kit. Blood sugar logs provided to patient and instructions to fax logs to endocrine clinic for review in 1 week. Patient would like to follow up with PCP, Dr. Juan.     Discharge Teaching:    Reviewed topics related to DM including: the need for insulin, how insulin works, the importance of immediate follow up with PCP or endocrine provider, importance of and how to check BG, how to record BG on logs, how to administer insulin, appropriate insulin administration sites, importance of rotating injection sites, hyper/hypoglycemia, how and when to treat hypoglycemia, when to hold insulin, how the correction scale works, and when to seek medical attention.  Patient verbalized understanding, answered all questions to patient's satisfaction.        PAF (paroxysmal atrial fibrillation)  May affect BG readings if uncontrolled          On enteral nutrition  May elevate BG readings          Plan discussed with patient, family, and RN at bedside.     Veronique Jane, BRANDON  Endocrinology  [facility]

## 2019-07-09 NOTE — PLAN OF CARE
Patient discharging home today with [facility] and [place] for tube feeds.    Future Appointments   Date Time Provider Department Center   7/15/2019  9:30 AM [facility] [id number] 350 LB LIMIT [facility] XRAY OP [address]   7/15/2019 11:00 AM Chad Milian MD [facility] SURONC [place]   7/17/2019  1:00 PM Ira Pozo PA-C [facility] CARDIO [place]   7/22/2019  1:00 PM LAB, ST [facility] DRAW STATION [facility] LAB [facility]   7/22/2019  1:40 PM Clinton Andrews MD [facility] HEMONC [facility] at [facility]   7/29/2019  9:30 AM INJECTION SCHEDULE, STPH CHEMOTHERAPY [facility] CHEMO [facility] at [facility]   8[facility]7/2019 10:05 [facility] [place]   8/14/2019  1:40 PM Cade Juan MD [facility] FAM MED [place]   10/9/2019 11:30 AM Eduard Cano MD [facility] CARDIO [place]          07/09/19 1327   Final Note   Assessment Type Final Discharge Note   Anticipated Discharge Disposition Home-Health   Hospital Follow Up  Appt(s) scheduled? Yes   Right Care Referral Info   Post Acute Recommendation Home-care

## 2019-07-09 NOTE — ASSESSMENT & PLAN NOTE
S/p esophagectomy on 7/2/19  Managed per primary team  Avoid hypoglycemia  Optimize BG control for surgical wound healing

## 2019-07-09 NOTE — PROGRESS NOTES
[facility]  General Surgery  Progress Note    Subjective:     History of Present Illness:  uT2N0 JULISSA of EG devendra, Kanu Bhagat, who finished NACR on 5/17/19. Had treatment related odynophaghia, now resolved, and fatigue, which is improving (says he is about 70% of normal). He has resumed his gym workouts twice per week. No interval new medical problems.    Post-Op Info:  Procedure(s) (LRB):  XI ROBOTIC ESOPHAGECTOMY (N/A)   7 Days Post-Op     Interval History:  NAEON.  Still in NSR.  Tolerating TFs.  Passing gas.  BM yesterday.     Medications:  Continuous Infusions:    Scheduled Meds:   amiodarone  400 mg Per J Tube Q12H    enoxaparin  1 mg/kg Subcutaneous Q12H    famotidine (PF)  20 mg Intravenous Q12H    levalbuterol  0.63 mg Nebulization Q6H WAKE    metoprolol  25 mg Per J Tube BID    tamsulosin  0.4 mg Oral Daily     PRN Meds:Dextrose 10% Bolus, Dextrose 10% Bolus, glucagon (human recombinant), hydrocodone-apap 7.5-325 MG/15 ML, hydrocodone-apap 7.5-325 MG/15 ML, insulin aspart U-100, ondansetron, promethazine (PHENERGAN) IVPB     Review of patient's allergies indicates:   Allergen Reactions    Codeine Nausea And Vomiting     Objective:     Vital Signs (Most Recent):  Temp: 98.9 °F (37.2 °C) (07/09/19 0507)  Pulse: 82 (07/09/19 0507)  Resp: 17 (07/09/19 0507)  BP: (!) 151/74 (07/09/19 0507)  SpO2: 95 % (07/09/19 0507) Vital Signs (24h Range):  Temp:  [98.2 °F (36.8 °C)-99.3 °F (37.4 °C)] 98.9 °F (37.2 °C)  Pulse:  [79-92] 82  Resp:  [16-18] 17  SpO2:  [95 %-97 %] 95 %  BP: (133-160)/(67-74) 151/74     Weight: 116.9 kg (257 lb 12.8 oz)  Body mass index is 31.38 kg/m².    Intake/Output - Last 3 Shifts       07/07 0700 - 07/08 0659 07/08 0700 - 07/09 0659    NG/ 155    Total Intake(mL/kg) 262 (2.2) 155 (1.3)    Urine (mL/kg/hr) 175 (0.1) 300 (0.1)    Drains 5 5    Total Output 180 305    Net +82 -150          Stool Occurrence 1 x           Physical Exam   Constitutional: He is oriented  to person, place, and time. No distress.   Eyes: Conjunctivae are normal.   Neck:   GIGI drain intact with serous output.  Incision clean and dry.   Cardiovascular: Normal rate.   Pulmonary/Chest: Effort normal. No respiratory distress.   CT site c/d/i   Abdominal: Soft. He exhibits distension (Mild).   Incisions clean and dry.  J tube in place.  Mild tenderness around incision sites.   Musculoskeletal: He exhibits no edema.   Neurological: He is alert and oriented to person, place, and time.   Psychiatric: He has a normal mood and affect.       Significant Labs:  CBC:   Recent Labs   Lab 07/09/19  0459   WBC 5.67   RBC 3.45*   HGB 10.9*   HCT 33.2*   *   MCV 96   MCH 31.6*   MCHC 32.8     CMP:   Recent Labs   Lab 07/09/19  0459   *   CALCIUM 8.8   ALBUMIN 2.6*   PROT 5.7*   *   K 4.4   CO2 24      BUN 13   CREATININE 0.8   ALKPHOS 87   ALT 22   AST 21   BILITOT 1.0     ABGs:   Recent Labs   Lab 07/03/19  0508   PH 7.310*   PCO2 45.3*   PO2 127*   HCO3 22.8*   POCSATURATED 99   BE -3       Significant Diagnostics:  I have reviewed all pertinent imaging results/findings within the past 24 hours.    Assessment/Plan:     * Esophageal cancer  Stu Garcia is a 73 y.o. male with a history of esophageal cancer s/p esophagectomy on 7/2/19    Pathway:  - NPO  - J tube feeds at 30cc 2p-8a  - Terapeutic lovenox - see a fib  - J tube pain meds  - J tube amio  - Toradol   - Beta blockade via J tube  - Ambulate TID  - Shower  - PT/OT      PAF (paroxysmal atrial fibrillation)  Appreciate cards recs  In NSR this AM  PO Amio  Therapeutic lovenox         Aniceto Russell MD  General Surgery  [facility]

## 2019-07-09 NOTE — NURSING
Discharge instructions reviewed with patient and spouse at bedside, all questions answered. Patient leaving unit AAOx4, VSS. PIV d/c per order, tolerated well. Supplies for neck incision site supplied and use explained to patient and spouse. Meds delivered to bedside as well. No falls or injuries sustained this visit. Patient left unit with transport.

## 2019-07-09 NOTE — HPI
Reason for Consult: Management of T2DM, Hyperglycemia     Surgical Procedure and Date: Total esophagectomy, proximal gastrectomy, and Witzel jejunostomy 7/2/19    Diabetes diagnosis year: 2000  Lab Results   Component Value Date    HGBA1C 7.9 (H) 05/07/2019       Home Diabetes Medications: Glimperide 2 mg BID with meals (per chart review)    How often checking glucose at home? daily   BG readings on regimen: 110-140  Hypoglycemia on the regimen?  No  Missed doses on regimen?  No    Diabetes Complications include:     Diabetic peripheral neuropathy     Complicating diabetes co morbidities:   Active Cancer s/p esophagectomy      HPI:   Patient is a 73 y.o. male with a diagnosis of DM2, afib, and esophageal cancer, who is s/p esophagectomy on 7/2/19.  Positive family history of DM per chart review (brother).  Endocrinology consulted for DM/BG management.

## 2019-07-09 NOTE — DISCHARGE SUMMARY
[facility]  General Surgery  Discharge Summary      Patient Name: Stu Garcia  MRN: [id number]  Admission Date: 7/2/2019  Hospital Length of Stay: 7 days  Discharge Date and Time:  07/09/2019   Attending Physician: Chad Milian MD   Discharging Provider: Shane Rodriguez MD  Primary Care Provider: Cade Juan MD     HPI: uT2N0 JULISSA of EG jxn, Susanwart 2, who finished NACR on 5/17/19. Had treatment related odynophaghia, now resolved, and fatigue, which is improving (says he is about 70% of normal). He has resumed his gym workouts twice per week. No interval new medical problems.    Procedure(s) (LRB):  XI ROBOTIC ESOPHAGECTOMY (N/A)     Hospital Course: On 7/2/2019 the patient underwent a robotic assisted total thoracic esophagectomy, proximal gastrectomy, and cervical esophagogastrostomy for adenocarcinoma of the distal esophagus.  The patient tolerated the procedure well and was transferred to the PACU in stable condition.  On POD 0 the patient experienced some SOB and mild hypoxia.  Patient received supplemental oxygen and endorsed feeling better in the morning.  His oxygen requirements were weaned and he was transferred to the PCU.  On POD 3 the patient had a transient episode of A. Fib that started at 1400 and returned to sinus rhythm around 1700.  The patient was started on Amiodarone and returned to sinus rhythm without cardioversion.  No new episodes of A. Fib were recorded during the rest of his stay.  On POD 4 the patient endorsed some bloating and had some bilious output from the J-tube.  The decision was made to hold J tube feeds at that time.  His abdominal bloating reduced and he was restarted on tube feeds on POD 6.  He tolerated the tube feeds well with no new bloating.  He endorses no nausea or vomiting.  His post op pain has been well controlled on medications.  He has been passing gas and had a bowel movement.  Vital signs are stable and he is afebrile.  Up and  ambulating through the halls.    Consults:   Consults (From admission, onward)        Status Ordering Provider     Inpatient consult to Cardiology  Once     Provider:  (Not yet assigned)    Completed [facility]     Inpatient consult to Registered Dietitian/Nutritionist  Once     Provider:  (Not yet assigned)    Completed AAMIR DOMINGUEZ     Inpatient consult to Registered Dietitian/Nutritionist  Once     Provider:  (Not yet assigned)    Completed SHARDA ESTRADA          Significant Diagnostic Studies: Labs:   CMP   Recent Labs   Lab 07/08/19 0407 07/09/19 0459    135*   K 4.1 4.4    103   CO2 24 24   * 197*   BUN 17 13   CREATININE 0.8 0.8   CALCIUM 8.7 8.8   PROT 5.4* 5.7*   ALBUMIN 2.5* 2.6*   BILITOT 0.8 1.0   ALKPHOS 78 87   AST 18 21   ALT 24 22   ANIONGAP 10 8   ESTGFRAFRICA >60.0 >60.0   EGFRNONAA >60.0 >60.0    and CBC   Recent Labs   Lab 07/08/19 0407 07/09/19 0459   WBC 5.54 5.67   HGB 10.5* 10.9*   HCT 31.4* 33.2*   * 122*     Cardiac Graphics: ECG: Normal sinus rhythm on 7/6/2019.    Pending Diagnostic Studies:     Procedure Component Value Units Date/Time    CBC auto differential [[id number]] Collected:  07/03/19 0334    Order Status:  Sent Lab Status:  In process Updated:  07/03/19 0337    Specimen:  Blood     Comprehensive metabolic panel [[id number]] Collected:  07/03/19 0334    Order Status:  Sent Lab Status:  In process Updated:  07/03/19 0337    Specimen:  Blood     Magnesium [[id number]] Collected:  07/03/19 0334    Order Status:  Sent Lab Status:  In process Updated:  07/03/19 0337    Specimen:  Blood     Phosphorus [[id number]] Collected:  07/03/19 0334    Order Status:  Sent Lab Status:  In process Updated:  07/03/19 0337    Specimen:  Blood         Final Active Diagnoses:    Diagnosis Date Noted POA    PRINCIPAL PROBLEM:  Esophageal cancer [C15.9] 03/13/2019 Yes    Coronary artery disease involving native coronary artery of native heart with unstable  angina pectoris [I25.110] 03/18/2018 Yes    Thrombocytopenia [D69.6] 12/07/2017 Yes    Type 2 diabetes mellitus treated without insulin [E11.9] 12/06/2017 Yes    Essential hypertension [I10] 08/04/2016 Yes    PAF (paroxysmal atrial fibrillation) [I48.0]  Yes    Chronic anticoagulation [Z79.01] 06/27/2013 Not Applicable      Problems Resolved During this Admission:      Discharged Condition: good    Disposition:     Follow Up:    Patient Instructions:   No discharge procedures on file.  Medications:  Reconciled Home Medications:      Medication List      START taking these medications    * amiodarone 400 MG tablet  Commonly known as:  PACERONE  Take 1 tablet (400 mg total) by mouth once daily for 14 days     * amiodarone 200 MG Tab  Commonly known as:  PACERONE  Take 1 tablet (200 mg total) by mouth once daily.  Start taking on:  7/23/2019     aspirin 81 MG EC tablet  Commonly known as:  ECOTRIN  Take 1 tablet (81 mg total) by mouth once daily. May take over the counter     hydrocodone-apap 7.5-325 MG/15 ML oral solution  Commonly known as:  HYCET  10 mLs by Per J Tube route every 4 (four) hours as needed.  Replaces:  HYDROcodone-acetaminophen 5-325 mg per tablet     rivaroxaban 20 mg Tab  Commonly known as:  XARELTO  Take 1 tablet (20 mg total) by mouth daily with dinner or evening meal.         * This list has 2 medication(s) that are the same as other medications prescribed for you. Read the directions carefully, and ask your doctor or other care provider to review them with you.            CONTINUE taking these medications    ACCU-CHEK SHASHANK PLUS TEST STRP Strp  Generic drug:  blood sugar diagnostic  TEST ONCE DAILY     atorvastatin 80 MG tablet  Commonly known as:  LIPITOR  Take 1 tablet (80 mg total) by mouth every evening.     diphenhydrAMINE-aluminum-magnesium hydroxide-simethicone-lidocaine HCl 2%  Swish and spit 15 mLs every 4 (four) hours as needed.     ferrous gluconate 324 MG tablet  Commonly known  as:  FERGON  Take 1 tablet (324 mg total) by mouth daily with breakfast.     hydrOXYzine HCl 25 MG tablet  Commonly known as:  ATARAX  Take 1 tablet (25 mg total) by mouth 3 (three) times daily as needed for Anxiety.     lancets Misc  Commonly known as:  ACCU-CHEK SOFTCLIX LANCETS  1 strip by Misc.(Non-Drug; Combo Route) route once daily.     lidocaine-prilocaine cream  Commonly known as:  EMLA  U UTD     metoprolol tartrate 50 MG tablet  Commonly known as:  LOPRESSOR  Take 0.5 tablets (25 mg total) by mouth 2 (two) times daily.     multivitamin capsule  Take 1 capsule by mouth once daily.     nitroGLYCERIN 0.4 MG SL tablet  Commonly known as:  NITROSTAT  DISSOLVE 1 TABLET UNDER THE TONGUE EVERY 5 MINUTES AS NEEDED FOR CHEST PAIN     ondansetron 8 MG tablet  Commonly known as:  ZOFRAN  TK 1 T PO 1 HOUR B D RADIATION TREATMENT RO PREVENT NAUSEA UTD     pantoprazole 40 MG tablet  Commonly known as:  PROTONIX  Take 1 tablet (40 mg total) by mouth 2 (two) times daily.     PRESERVISION AREDS-2 [phone] mg-unit-mg-mg Cap  Generic drug:  vit C,U-Vb-lueky-lutein-zeaxan  Take 1 tablet by mouth once daily.     prochlorperazine 10 MG tablet  Commonly known as:  COMPAZINE  TK 1 TABLET PO Q 6 HOURS PRN FOR NAUSEA/VOMITING        STOP taking these medications    HYDROcodone-acetaminophen 5-325 mg per tablet  Commonly known as:  NORCO  Replaced by:  hydrocodone-apap 7.5-325 MG/15 ML oral solution     tiZANidine 4 MG tablet  Commonly known as:  ZANAFLEX        ASK your doctor about these medications    glimepiride 2 MG tablet  Commonly known as:  AMARYL  TAKE 2 TABLETS TWICE DAILY            Shane Rodriguez MD  General Surgery  [facility]

## 2019-07-09 NOTE — SUBJECTIVE & OBJECTIVE
Interval History:  NAEON.  Still in NSR.  Tolerating TFs.  Passing gas.  BM yesterday.     Medications:  Continuous Infusions:    Scheduled Meds:   amiodarone  400 mg Per J Tube Q12H    enoxaparin  1 mg/kg Subcutaneous Q12H    famotidine (PF)  20 mg Intravenous Q12H    levalbuterol  0.63 mg Nebulization Q6H WAKE    metoprolol  25 mg Per J Tube BID    tamsulosin  0.4 mg Oral Daily     PRN Meds:Dextrose 10% Bolus, Dextrose 10% Bolus, glucagon (human recombinant), hydrocodone-apap 7.5-325 MG/15 ML, hydrocodone-apap 7.5-325 MG/15 ML, insulin aspart U-100, ondansetron, promethazine (PHENERGAN) IVPB     Review of patient's allergies indicates:   Allergen Reactions    Codeine Nausea And Vomiting     Objective:     Vital Signs (Most Recent):  Temp: 98.9 °F (37.2 °C) (07/09/19 0507)  Pulse: 82 (07/09/19 0507)  Resp: 17 (07/09/19 0507)  BP: (!) 151/74 (07/09/19 0507)  SpO2: 95 % (07/09/19 0507) Vital Signs (24h Range):  Temp:  [98.2 °F (36.8 °C)-99.3 °F (37.4 °C)] 98.9 °F (37.2 °C)  Pulse:  [79-92] 82  Resp:  [16-18] 17  SpO2:  [95 %-97 %] 95 %  BP: (133-160)/(67-74) 151/74     Weight: 116.9 kg (257 lb 12.8 oz)  Body mass index is 31.38 kg/m².    Intake/Output - Last 3 Shifts       07/07 0700 - 07/08 0659 07/08 0700 - 07/09 0659    NG/ 155    Total Intake(mL/kg) 262 (2.2) 155 (1.3)    Urine (mL/kg/hr) 175 (0.1) 300 (0.1)    Drains 5 5    Total Output 180 305    Net +82 -150          Stool Occurrence 1 x           Physical Exam   Constitutional: He is oriented to person, place, and time. No distress.   Eyes: Conjunctivae are normal.   Neck:   GIGI drain intact with serous output.  Incision clean and dry.   Cardiovascular: Normal rate.   Pulmonary/Chest: Effort normal. No respiratory distress.   CT site c/d/i   Abdominal: Soft. He exhibits distension (Mild).   Incisions clean and dry.  J tube in place.  Mild tenderness around incision sites.   Musculoskeletal: He exhibits no edema.   Neurological: He is alert and  oriented to person, place, and time.   Psychiatric: He has a normal mood and affect.       Significant Labs:  CBC:   Recent Labs   Lab 07/09/19  0459   WBC 5.67   RBC 3.45*   HGB 10.9*   HCT 33.2*   *   MCV 96   MCH 31.6*   MCHC 32.8     CMP:   Recent Labs   Lab 07/09/19  0459   *   CALCIUM 8.8   ALBUMIN 2.6*   PROT 5.7*   *   K 4.4   CO2 24      BUN 13   CREATININE 0.8   ALKPHOS 87   ALT 22   AST 21   BILITOT 1.0     ABGs:   Recent Labs   Lab 07/03/19  0508   PH 7.310*   PCO2 45.3*   PO2 127*   HCO3 22.8*   POCSATURATED 99   BE -3       Significant Diagnostics:  I have reviewed all pertinent imaging results/findings within the past 24 hours.

## 2019-07-09 NOTE — PT/OT/SLP DISCHARGE
Physical Therapy Discharge Summary    Name: Stu aGrcia  MRN: [id number]   Principal Problem: Esophageal cancer     Patient Discharged from acute Physical Therapy on 19.  Please refer to prior PT noted date on 19 for functional status.     Assessment:     Patient was discharged unexpectedly.  Information required to complete an accurate discharge summary is unknown.  Refer to therapy initial evaluation and last progress note for initial and most recent functional status and goal achievement.  Recommendations made may be found in medical record.    Objective:     GOALS:   Multidisciplinary Problems     Physical Therapy Goals        Problem: Physical Therapy Goal    Goal Priority Disciplines Outcome Goal Variances Interventions   Physical Therapy Goal     PT, PT/OT Ongoing (interventions implemented as appropriate)     Description:  Goals to be met by: 2019    Patient will increase functional independence with mobility by performin. Supine <> sit with [place].  2. Sit <> stand transfer with [place] using No Assistive Device.  3. Bed <> chair transfer via Step Transfer with [place] using No Assistive Device.  4. Gait  x 300 feet with [place] using Straight Cane to prepare for community ambulation and endurance activities.  5. Able to tolerate exercise for 15-20 reps with independence.  6. Ascend/descend 2 stairs with right Handrails Stand-by Assistance using Straight Cane.                        Reasons for Discontinuation of Therapy Services  Transfer to alternate level of care.      Plan:     Patient Discharged to: Home with Home Health Service.    Ania Birch, PT  2019

## 2019-07-09 NOTE — PT/OT/SLP PROGRESS
Occupational Therapy      Patient Name:  Stu Garcia   MRN:  [id number]    Patient not seen today secondary to patient in the process of discharging from the hospital. Patient and spouse with no concerns or questions upon returning home following discharge.    Adriana Mccormick OT  7/9/2019

## 2019-07-09 NOTE — ASSESSMENT & PLAN NOTE
BG goal 140-180    Low dose correction scale  BG monitoring q 6 hrs while NPO    Discharge planning  Recommend discharge on Humulin N pen 6 units with the start of Tube Feedings. Instructed patient to increase by 1 unit every day if BG at am check is greater than 200. Patient will need insulin pen needles and glucagon emergency kit. Blood sugar logs provided to patient and instructions to fax logs to endocrine clinic for review in 1 week. Patient would like to follow up with PCP, Dr. Juan.     Discharge Teaching:    Reviewed topics related to DM including: the need for insulin, how insulin works, the importance of immediate follow up with PCP or endocrine provider, importance of and how to check BG, how to record BG on logs, how to administer insulin, appropriate insulin administration sites, importance of rotating injection sites, hyper/hypoglycemia, how and when to treat hypoglycemia, when to hold insulin, how the correction scale works, and when to seek medical attention.  Patient verbalized understanding, answered all questions to patient's satisfaction.

## 2019-07-10 PROCEDURE — G0180 MD CERTIFICATION HHA PATIENT: HCPCS | Mod: ,,, | Performed by: SURGERY

## 2019-07-10 PROCEDURE — G0180 PR HOME HEALTH MD CERTIFICATION: ICD-10-PCS | Mod: ,,, | Performed by: SURGERY

## 2019-07-11 ENCOUNTER — TELEPHONE (OUTPATIENT)
Dept: FAMILY MEDICINE | Facility: CLINIC | Age: 74
End: 2019-07-11

## 2019-07-11 NOTE — TELEPHONE ENCOUNTER
----- Message from Stephanie Pressley sent at 7/11/2019 10:05 AM CDT -----  Contact: Anaid with Physical Therapy[facility]  Type: Needs Medical Advice    Who Called:Anaid with [facility] Therapy[facility]     Symptoms (please be specific):  na  How long has patient had these symptoms:  cassie  Pharmacy name and phone #:  [facility]  Best Call Back Number: [phone]   Additional Information: patient requesting to have evaluation moved to Tuesday, after he sees the doctor. Please call with any  Questions. Thank you!

## 2019-07-11 NOTE — TELEPHONE ENCOUNTER
Unable to reach nurse. Spoke with pt and confirmed that they are going to the appt on Monday and will decide from there if they want HH PT

## 2019-07-12 ENCOUNTER — TELEPHONE (OUTPATIENT)
Dept: RADIOLOGY | Facility: HOSPITAL | Age: 74
End: 2019-07-12

## 2019-07-12 NOTE — TELEPHONE ENCOUNTER
FYI-Pt is running a fever 102 redness warm to touch on left arm, iv was removed Monday and has these symptoms since. Redness around feeding site, first noticed today by nurse laneshia. Nurse advised to take pt to ER. She stated that she would.

## 2019-07-15 ENCOUNTER — HOSPITAL ENCOUNTER (OUTPATIENT)
Dept: RADIOLOGY | Facility: HOSPITAL | Age: 74
Discharge: HOME | End: 2019-07-15
Attending: SURGERY
Payer: MEDICARE

## 2019-07-15 ENCOUNTER — OFFICE VISIT (OUTPATIENT)
Dept: SURGERY | Facility: CLINIC | Age: 74
End: 2019-07-15
Payer: MEDICARE

## 2019-07-15 VITALS
SYSTOLIC BLOOD PRESSURE: 131 MMHG | WEIGHT: 244.69 LBS | TEMPERATURE: 98 F | DIASTOLIC BLOOD PRESSURE: 69 MMHG | HEART RATE: 85 BPM | BODY MASS INDEX: 28.89 KG/M2 | HEIGHT: 77 IN

## 2019-07-15 DIAGNOSIS — Z09 POSTOP CHECK: Primary | ICD-10-CM

## 2019-07-15 DIAGNOSIS — C15.5 MALIGNANT NEOPLASM OF LOWER THIRD OF ESOPHAGUS: ICD-10-CM

## 2019-07-15 PROCEDURE — 74220 X-RAY XM ESOPHAGUS 1CNTRST: CPT | Mod: TC,HCNC

## 2019-07-15 PROCEDURE — 74220 FL ESOPHAGRAM COMPLETE: ICD-10-PCS | Mod: 26,HCNC,, | Performed by: RADIOLOGY

## 2019-07-15 PROCEDURE — 25500020 PHARM REV CODE 255: Mod: HCNC | Performed by: SURGERY

## 2019-07-15 PROCEDURE — 99999 PR PBB SHADOW E&M-EST. PATIENT-LVL III: CPT | Mod: PBBFAC,HCNC,, | Performed by: SURGERY

## 2019-07-15 PROCEDURE — 99024 PR POST-OP FOLLOW-UP VISIT: ICD-10-PCS | Mod: HCNC,S$GLB,, | Performed by: SURGERY

## 2019-07-15 PROCEDURE — 74220 X-RAY XM ESOPHAGUS 1CNTRST: CPT | Mod: 26,HCNC,, | Performed by: RADIOLOGY

## 2019-07-15 PROCEDURE — 99024 POSTOP FOLLOW-UP VISIT: CPT | Mod: HCNC,S$GLB,, | Performed by: SURGERY

## 2019-07-15 PROCEDURE — 99999 PR PBB SHADOW E&M-EST. PATIENT-LVL III: ICD-10-PCS | Mod: PBBFAC,HCNC,, | Performed by: SURGERY

## 2019-07-15 RX ADMIN — IOHEXOL 100 ML: 350 INJECTION, SOLUTION INTRAVENOUS at 09:07

## 2019-07-15 NOTE — PROGRESS NOTES
"Subjective:       Stu Garcia presents to the clinic 2 weeks following esophagectomy. He is tolerating his tube feeding well. Bowel movements are Normal.  Pain is controlled with current analgesics. Medications being used: hycet. ..      Objective:      /69   Pulse 85   Temp 97.6 °F (36.4 °C) (Oral)   Ht 6' 4.5" (1.943 m)   Wt 111 kg (244 lb 11.4 oz)   BMI 29.40 kg/m²     General:  alert, appears stated age and cooperative   Abdomen: soft, bowel sounds active, non-tender   Incision:   healing well, no drainage, no erythema, no hernia, no seroma, no swelling, no dehiscence, incision well approximated          Imaging: FL esophagram without evidence of a leak    Assessment:      Doing well postoperatively.      Plan:      1. Continue any current medications.  2. Wound care discussed.  3. Begin clear liquid diet, then advance to fulls  4. Follow up in 7-10 days for evaluation      Clint Reid MD PGY V  [phone]    I have personally taken the history and examined this patient and agree with the resident's note as stated above.  Contrast swallow reviewed and report noted.   Path discussed with patient: yTisN2 with 3/39 nodes positive. Good partial treatment response.  Rec:  Start full liquids and advance to soft diet over the next 3-5 days as tolerated. Phone in to review in 5-7 days and will begin to wean Jfeeds if tolerating diet well.   RTC two weeks  Needs to see Dr Andrews back in the next 2-3 weeks.     "

## 2019-07-15 NOTE — LETTER
July 15, 2019        Clinton Andrews MD  [address] [facility] [address]  [facility] [address]             [place] [facility]  [address] [facility] [address]  Phone: [phone]   Patient: Stu Garcia   MR Number: [id number]   YOB: 1945   Date of Visit: 7/15/2019       Dear Dr. Andrews:    Thank you for referring Stu Garcia to me for evaluation. Attached you will find relevant portions of my assessment and plan of care.    If you have questions, please do not hesitate to call me. I look forward to following Stu Garcia along with you.    Sincerely,      Chad Milian MD            CC  No Recipients    Enclosure

## 2019-07-17 ENCOUNTER — OFFICE VISIT (OUTPATIENT)
Dept: CARDIOLOGY | Facility: CLINIC | Age: 74
End: 2019-07-17
Payer: MEDICARE

## 2019-07-17 VITALS
WEIGHT: 240.75 LBS | SYSTOLIC BLOOD PRESSURE: 121 MMHG | HEART RATE: 86 BPM | BODY MASS INDEX: 28.43 KG/M2 | DIASTOLIC BLOOD PRESSURE: 65 MMHG | HEIGHT: 77 IN

## 2019-07-17 DIAGNOSIS — C15.9 MALIGNANT NEOPLASM OF ESOPHAGUS, UNSPECIFIED LOCATION: ICD-10-CM

## 2019-07-17 DIAGNOSIS — I48.0 PAF (PAROXYSMAL ATRIAL FIBRILLATION): ICD-10-CM

## 2019-07-17 DIAGNOSIS — I10 ESSENTIAL HYPERTENSION: ICD-10-CM

## 2019-07-17 DIAGNOSIS — E78.5 HYPERLIPIDEMIA, UNSPECIFIED HYPERLIPIDEMIA TYPE: ICD-10-CM

## 2019-07-17 DIAGNOSIS — I25.110 CORONARY ARTERY DISEASE INVOLVING NATIVE CORONARY ARTERY OF NATIVE HEART WITH UNSTABLE ANGINA PECTORIS: ICD-10-CM

## 2019-07-17 PROCEDURE — 99999 PR PBB SHADOW E&M-EST. PATIENT-LVL IV: ICD-10-PCS | Mod: PBBFAC,HCNC,, | Performed by: PHYSICIAN ASSISTANT

## 2019-07-17 PROCEDURE — 1101F PT FALLS ASSESS-DOCD LE1/YR: CPT | Mod: HCNC,CPTII,S$GLB, | Performed by: PHYSICIAN ASSISTANT

## 2019-07-17 PROCEDURE — 3078F PR MOST RECENT DIASTOLIC BLOOD PRESSURE < 80 MM HG: ICD-10-PCS | Mod: HCNC,CPTII,S$GLB, | Performed by: PHYSICIAN ASSISTANT

## 2019-07-17 PROCEDURE — 1101F PR PT FALLS ASSESS DOC 0-1 FALLS W/OUT INJ PAST YR: ICD-10-PCS | Mod: HCNC,CPTII,S$GLB, | Performed by: PHYSICIAN ASSISTANT

## 2019-07-17 PROCEDURE — 99499 UNLISTED E&M SERVICE: CPT | Mod: HCNC,S$GLB,, | Performed by: PHYSICIAN ASSISTANT

## 2019-07-17 PROCEDURE — 99214 PR OFFICE/OUTPT VISIT, EST, LEVL IV, 30-39 MIN: ICD-10-PCS | Mod: HCNC,S$GLB,, | Performed by: PHYSICIAN ASSISTANT

## 2019-07-17 PROCEDURE — 99999 PR PBB SHADOW E&M-EST. PATIENT-LVL IV: CPT | Mod: PBBFAC,HCNC,, | Performed by: PHYSICIAN ASSISTANT

## 2019-07-17 PROCEDURE — 99499 RISK ADDL DX/OHS AUDIT: ICD-10-PCS | Mod: HCNC,S$GLB,, | Performed by: PHYSICIAN ASSISTANT

## 2019-07-17 PROCEDURE — 3074F SYST BP LT 130 MM HG: CPT | Mod: HCNC,CPTII,S$GLB, | Performed by: PHYSICIAN ASSISTANT

## 2019-07-17 PROCEDURE — 3078F DIAST BP <80 MM HG: CPT | Mod: HCNC,CPTII,S$GLB, | Performed by: PHYSICIAN ASSISTANT

## 2019-07-17 PROCEDURE — 3074F PR MOST RECENT SYSTOLIC BLOOD PRESSURE < 130 MM HG: ICD-10-PCS | Mod: HCNC,CPTII,S$GLB, | Performed by: PHYSICIAN ASSISTANT

## 2019-07-17 PROCEDURE — 99214 OFFICE O/P EST MOD 30 MIN: CPT | Mod: HCNC,S$GLB,, | Performed by: PHYSICIAN ASSISTANT

## 2019-07-17 NOTE — PROGRESS NOTES
Subjective:    Patient ID:  Stu Garcia is a 73 y.o. male who presents for follow-up of Hospital Follow Up (hospital follow up )      HPI  Mr. Garcia is a very pleasant gentleman who follows with Dr. Cano. He recently underwent a robotic assisted total thoracic esophagectomy, proximal gastrectomy, and cervical esophagogastrostomy on 7/2/19 for adenocarcinoma of the distal esophagus . He did well post-procedure, though he had a very brief episode of a-fib (about 3 hours, converted with IV amiodarone). To his knowledge, he has had no further a-fib since his discharge. He denies palpitations, lightheadedness, dizziness, syncope, or near syncope. No CP, CHAUDHARI, PND, orthopnea, or LE edema. He is recovering well from his procedure. He saw Dr. Milian on Monday. He plans to start exercising again and he has been cleared for advancing to soft diet.     Review of Systems   Constitution: Positive for decreased appetite, malaise/fatigue and weight loss. Negative for chills, diaphoresis, fever and weight gain.   HENT: Negative for sore throat.    Eyes: Negative for blurred vision, vision loss in left eye, vision loss in right eye and visual disturbance.   Cardiovascular: Negative for chest pain, claudication, dyspnea on exertion, leg swelling, near-syncope, orthopnea, palpitations, paroxysmal nocturnal dyspnea and syncope.   Respiratory: Negative for cough, hemoptysis, shortness of breath, sputum production and wheezing.    Endocrine: Negative for cold intolerance and heat intolerance.   Hematologic/Lymphatic: Negative for adenopathy. Does not bruise/bleed easily.   Skin: Negative for rash.   Musculoskeletal: Negative for falls, muscle weakness and myalgias.   Gastrointestinal: Negative for abdominal pain, change in bowel habit, constipation, diarrhea, melena and nausea.   Genitourinary: Negative for bladder incontinence.   Neurological: Positive for weakness. Negative for dizziness, focal weakness, headaches,  "light-headedness and numbness.   Psychiatric/Behavioral: Negative for altered mental status.         Vitals:    07/17/19 1306   BP: 121/65   BP Location: Right arm   Patient Position: Sitting   BP Method: Large (Automatic)   Pulse: 86   Weight: 109.2 kg (240 lb 11.9 oz)   Height: 6' 4.5" (1.943 m)   Body mass index is 28.92 kg/m².    Objective:    Physical Exam   Constitutional: He is oriented to person, place, and time. He appears well-developed and well-nourished.   HENT:   Head: Normocephalic and atraumatic.   Eyes: Pupils are equal, round, and reactive to light. EOM are normal.   Neck: Neck supple. No JVD present. No tracheal deviation present. No thyromegaly present.   Cardiovascular: Normal rate, regular rhythm, S1 normal, S2 normal, intact distal pulses and normal pulses. PMI is not displaced. Exam reveals no gallop and no friction rub.   No murmur heard.  Pulmonary/Chest: Effort normal and breath sounds normal. No respiratory distress. He has no wheezes. He has no rales. He exhibits no tenderness.   Abdominal: Soft. Bowel sounds are normal. He exhibits no distension and no mass. There is no tenderness.   Musculoskeletal: Normal range of motion. He exhibits no edema or tenderness.   Neurological: He is alert and oriented to person, place, and time.   Skin: Skin is warm and dry. No rash noted.   Psychiatric: He has a normal mood and affect. His behavior is normal.         Assessment:         PAF (paroxysmal atrial fibrillation)  Remains in SR on amio.   On Xarelto for AC.     HLD (hyperlipidemia)  Continue statin.     Coronary artery disease involving native coronary artery of native heart with unstable angina pectoris  S/p Multivessel PCI in March 2018.  On ASA, statin, and b-blocker.     Essential hypertension  Controlled on current regimen.     Esophageal cancer  Stage IIB.  S/p esophagectomy on 7/2/19.   Follows with Dr. Andrews.     Plan:       Continue current cardiac medications.   Start low impact " cardiovascular exercise.   Discussed diet.   F/U with Dr. Cano as scheduled in October.

## 2019-07-22 ENCOUNTER — LAB VISIT (OUTPATIENT)
Dept: LAB | Facility: HOSPITAL | Age: 74
End: 2019-07-22
Attending: INTERNAL MEDICINE
Payer: MEDICARE

## 2019-07-22 ENCOUNTER — OFFICE VISIT (OUTPATIENT)
Dept: HEMATOLOGY/ONCOLOGY | Facility: CLINIC | Age: 74
End: 2019-07-22
Payer: MEDICARE

## 2019-07-22 VITALS
HEIGHT: 77 IN | OXYGEN SATURATION: 97 % | BODY MASS INDEX: 27.88 KG/M2 | HEART RATE: 83 BPM | SYSTOLIC BLOOD PRESSURE: 111 MMHG | WEIGHT: 236.13 LBS | RESPIRATION RATE: 16 BRPM | DIASTOLIC BLOOD PRESSURE: 65 MMHG | TEMPERATURE: 98 F

## 2019-07-22 DIAGNOSIS — R50.9 FEVER, UNSPECIFIED FEVER CAUSE: ICD-10-CM

## 2019-07-22 DIAGNOSIS — L03.818 CELLULITIS OF OTHER SPECIFIED SITE: ICD-10-CM

## 2019-07-22 DIAGNOSIS — C16.0 GE JUNCTION CARCINOMA: ICD-10-CM

## 2019-07-22 DIAGNOSIS — D70.1 LEUKOPENIA DUE TO ANTINEOPLASTIC CHEMOTHERAPY: ICD-10-CM

## 2019-07-22 DIAGNOSIS — D64.81 ANEMIA ASSOCIATED WITH CHEMOTHERAPY: ICD-10-CM

## 2019-07-22 DIAGNOSIS — C16.0 GE JUNCTION CARCINOMA: Primary | ICD-10-CM

## 2019-07-22 DIAGNOSIS — T45.1X5A ANEMIA ASSOCIATED WITH CHEMOTHERAPY: ICD-10-CM

## 2019-07-22 DIAGNOSIS — D69.59 CHEMOTHERAPY-INDUCED THROMBOCYTOPENIA: ICD-10-CM

## 2019-07-22 DIAGNOSIS — T45.1X5A CHEMOTHERAPY-INDUCED THROMBOCYTOPENIA: ICD-10-CM

## 2019-07-22 DIAGNOSIS — T45.1X5A LEUKOPENIA DUE TO ANTINEOPLASTIC CHEMOTHERAPY: ICD-10-CM

## 2019-07-22 PROBLEM — C15.9 ESOPHAGEAL CANCER: Status: RESOLVED | Noted: 2019-03-13 | Resolved: 2019-07-22

## 2019-07-22 LAB
ALBUMIN SERPL BCP-MCNC: 3.7 G/DL (ref 3.5–5.2)
ALP SERPL-CCNC: 122 U/L (ref 38–145)
ALT SERPL W/O P-5'-P-CCNC: 32 U/L (ref 10–44)
ANION GAP SERPL CALC-SCNC: 10 MMOL/L (ref 8–16)
AST SERPL-CCNC: 32 U/L (ref 17–59)
BASOPHILS # BLD AUTO: 0.04 K/UL (ref 0–0.2)
BASOPHILS NFR BLD: 0.4 % (ref 0–1.9)
BILIRUB SERPL-MCNC: 0.7 MG/DL (ref 0.2–1.3)
BUN SERPL-MCNC: 17 MG/DL (ref 9–21)
CALCIUM SERPL-MCNC: 9.5 MG/DL (ref 8.4–10.2)
CHLORIDE SERPL-SCNC: 101 MMOL/L (ref 95–110)
CO2 SERPL-SCNC: 25 MMOL/L (ref 22–31)
CREAT SERPL-MCNC: 1.1 MG/DL (ref 0.5–1.4)
DIFFERENTIAL METHOD: ABNORMAL
EOSINOPHIL # BLD AUTO: 0.2 K/UL (ref 0–0.5)
EOSINOPHIL NFR BLD: 2.1 % (ref 0–8)
ERYTHROCYTE [DISTWIDTH] IN BLOOD BY AUTOMATED COUNT: 15.4 % (ref 11.5–14.5)
EST. GFR  (AFRICAN AMERICAN): >60 ML/MIN/1.73 M^2
EST. GFR  (NON AFRICAN AMERICAN): >60 ML/MIN/1.73 M^2
GLUCOSE SERPL-MCNC: 215 MG/DL (ref 70–110)
HCT VFR BLD AUTO: 35.4 % (ref 40–54)
HGB BLD-MCNC: 11.9 G/DL (ref 14–18)
IMM GRANULOCYTES # BLD AUTO: 0.09 K/UL (ref 0–0.04)
IMM GRANULOCYTES NFR BLD AUTO: 0.9 % (ref 0–0.5)
LDH SERPL L TO P-CCNC: 391 U/L (ref 313–618)
LYMPHOCYTES # BLD AUTO: 0.4 K/UL (ref 1–4.8)
LYMPHOCYTES NFR BLD: 3.8 % (ref 18–48)
MAGNESIUM SERPL-MCNC: 1.6 MG/DL (ref 1.6–2.6)
MCH RBC QN AUTO: 32.4 PG (ref 27–31)
MCHC RBC AUTO-ENTMCNC: 33.6 G/DL (ref 32–36)
MCV RBC AUTO: 97 FL (ref 82–98)
MONOCYTES # BLD AUTO: 1.3 K/UL (ref 0.3–1)
MONOCYTES NFR BLD: 12.7 % (ref 4–15)
NEUTROPHILS # BLD AUTO: 8 K/UL (ref 1.8–7.7)
NEUTROPHILS NFR BLD: 80.1 % (ref 38–73)
NRBC BLD-RTO: 0 /100 WBC
PLATELET # BLD AUTO: 241 K/UL (ref 150–350)
PMV BLD AUTO: 10.4 FL (ref 9.2–12.9)
POTASSIUM SERPL-SCNC: 4.6 MMOL/L (ref 3.5–5.1)
PROT SERPL-MCNC: 7.1 G/DL (ref 6–8.4)
RBC # BLD AUTO: 3.67 M/UL (ref 4.6–6.2)
SODIUM SERPL-SCNC: 136 MMOL/L (ref 136–145)
WBC # BLD AUTO: 10.02 K/UL (ref 3.9–12.7)

## 2019-07-22 PROCEDURE — 85025 COMPLETE CBC W/AUTO DIFF WBC: CPT

## 2019-07-22 PROCEDURE — 3078F PR MOST RECENT DIASTOLIC BLOOD PRESSURE < 80 MM HG: ICD-10-PCS | Mod: HCNC,CPTII,S$GLB, | Performed by: INTERNAL MEDICINE

## 2019-07-22 PROCEDURE — 80053 COMPREHEN METABOLIC PANEL: CPT

## 2019-07-22 PROCEDURE — 83735 ASSAY OF MAGNESIUM: CPT

## 2019-07-22 PROCEDURE — 83735 ASSAY OF MAGNESIUM: CPT | Mod: PN

## 2019-07-22 PROCEDURE — 36415 COLL VENOUS BLD VENIPUNCTURE: CPT | Mod: HCNC,PN

## 2019-07-22 PROCEDURE — 99499 UNLISTED E&M SERVICE: CPT | Mod: HCNC,S$GLB,, | Performed by: INTERNAL MEDICINE

## 2019-07-22 PROCEDURE — 85025 COMPLETE CBC W/AUTO DIFF WBC: CPT | Mod: PN

## 2019-07-22 PROCEDURE — 1101F PT FALLS ASSESS-DOCD LE1/YR: CPT | Mod: HCNC,CPTII,S$GLB, | Performed by: INTERNAL MEDICINE

## 2019-07-22 PROCEDURE — 3074F SYST BP LT 130 MM HG: CPT | Mod: HCNC,CPTII,S$GLB, | Performed by: INTERNAL MEDICINE

## 2019-07-22 PROCEDURE — 99999 PR PBB SHADOW E&M-EST. PATIENT-LVL III: CPT | Mod: PBBFAC,HCNC,, | Performed by: INTERNAL MEDICINE

## 2019-07-22 PROCEDURE — 99214 OFFICE O/P EST MOD 30 MIN: CPT | Mod: HCNC,S$GLB,, | Performed by: INTERNAL MEDICINE

## 2019-07-22 PROCEDURE — 3078F DIAST BP <80 MM HG: CPT | Mod: HCNC,CPTII,S$GLB, | Performed by: INTERNAL MEDICINE

## 2019-07-22 PROCEDURE — 1101F PR PT FALLS ASSESS DOC 0-1 FALLS W/OUT INJ PAST YR: ICD-10-PCS | Mod: HCNC,CPTII,S$GLB, | Performed by: INTERNAL MEDICINE

## 2019-07-22 PROCEDURE — 3074F PR MOST RECENT SYSTOLIC BLOOD PRESSURE < 130 MM HG: ICD-10-PCS | Mod: HCNC,CPTII,S$GLB, | Performed by: INTERNAL MEDICINE

## 2019-07-22 PROCEDURE — 83615 LACTATE (LD) (LDH) ENZYME: CPT | Mod: PN

## 2019-07-22 PROCEDURE — 99214 PR OFFICE/OUTPT VISIT, EST, LEVL IV, 30-39 MIN: ICD-10-PCS | Mod: HCNC,S$GLB,, | Performed by: INTERNAL MEDICINE

## 2019-07-22 PROCEDURE — 99499 RISK ADDL DX/OHS AUDIT: ICD-10-PCS | Mod: HCNC,S$GLB,, | Performed by: INTERNAL MEDICINE

## 2019-07-22 PROCEDURE — 80053 COMPREHEN METABOLIC PANEL: CPT | Mod: PN

## 2019-07-22 PROCEDURE — 99999 PR PBB SHADOW E&M-EST. PATIENT-LVL III: ICD-10-PCS | Mod: PBBFAC,HCNC,, | Performed by: INTERNAL MEDICINE

## 2019-07-22 PROCEDURE — 83615 LACTATE (LD) (LDH) ENZYME: CPT

## 2019-07-22 RX ORDER — CEFADROXIL 500 MG/5ML
500 POWDER, FOR SUSPENSION ORAL EVERY 12 HOURS
Qty: 75 ML | Refills: 0 | Status: SHIPPED | OUTPATIENT
Start: 2019-07-22 | End: 2019-07-29

## 2019-07-22 NOTE — PROGRESS NOTES
History of present illness:  The patient is a 73-year-old white gentleman well known to me for GE junction carcinoma who completed neoadjuvant chemo/XRT, went on to have surgical resection and primary anastomosis, and returns to clinic to review results of surgical pathology.  Postoperatively, the patient is recovering well but complains of some reddening at the insertion site of his feeding jejunostomy.  He denies fevers and chills.  Patient has begun to eat soft solids and no longer uses his feeding tube.  He denies uncontrolled pain, nausea, and vomiting.  No other pertinent findings on a 14 point review of systems.    Physical examination:  Well-developed, obese, white gentleman, in no acute distress, with a weight of 236 lb (decreased by 22 lb).  VITAL SIGNS: Documented  and reviewed this visit.  HEENT: Normocephalic, atraumatic. Oral mucosa pink and moist. Lips without   lesions. Tongue midline. Oropharynx clear. Nonicteric sclerae.   NECK: Supple, no adenopathy. No carotid bruits, thyromegaly or thyroid nodule.   HEART: Regular rate and rhythm without murmur, gallop or rub.   LUNGS: Clear to auscultation bilaterally. Normal respiratory effort.   ABDOMEN: Soft, nontender, nondistended with positive normoactive bowel sounds, some erythema and crusty secretion about the insertion site of patient's feeding tube noted,  no hepatosplenomegaly.   EXTREMITIES: No cyanosis, clubbing or edema. Distal pulses are intact.   AXILLAE AND GROIN: No palpable pathologic lymphadenopathy is appreciated.   SKIN: Intact/turgor normal   NEUROLOGIC: Cranial nerves II-XII grossly intact. Motor: Good muscle bulk and   tone. Strength/sensory 5/5 throughout. Gait stable.     Laboratory:  White count 10, hemoglobin 11.9, hematocrit 35.4, platelets 241, absolute neutrophil count is 8000.  Sodium 136, potassium 4.6, chloride 101, CO2 25, BUN 17, creatinine 1.1, glucose 215, calcium 9.5, liver function test within normal limits, ,  GFR is greater than 60.    Surgical pathology:  Only residual in situ carcinoma noted in the distal esophagus.  Three of 39 resected lymph nodes were positive for metastatic carcinoma.  Margins of resection are negative.    Impression:  1.  Adenocarcinoma of the GE junction (T2, N0, M0).  2.  Chemotherapy associated anemia.  3.  Feeding tube cellulitis.    Plan:  1.  Continue ongoing postoperative care.  2.  Duricef 500 mg p.o. twice daily for treatment of number 3.  3.  As patient is eating on his own, feeding tube could be removed.  4.  Return to clinic 3 months from now with interval CBC, CMP, LDH.    This note was created using voice recognition software and may contain grammatical errors.

## 2019-07-23 ENCOUNTER — DOCUMENTATION ONLY (OUTPATIENT)
Dept: INFUSION THERAPY | Facility: HOSPITAL | Age: 74
End: 2019-07-23

## 2019-07-23 NOTE — PROGRESS NOTES
Nutrition: Met with patient and patients wife on 7/22/19 while patient was here for follow up. Patient is no longer using PEG tube and was encouraged to advance to mechanical soft diet. Educated patient and patients wife on mechanical soft diet. Offered support and encouragement. Will assist if and when needed. Ai Rush, MS, RD, LDN

## 2019-07-24 DIAGNOSIS — I25.110 CORONARY ARTERY DISEASE INVOLVING NATIVE CORONARY ARTERY OF NATIVE HEART WITH UNSTABLE ANGINA PECTORIS: ICD-10-CM

## 2019-07-24 RX ORDER — ATORVASTATIN CALCIUM 80 MG/1
80 TABLET, FILM COATED ORAL NIGHTLY
Qty: 90 TABLET | Refills: 0 | Status: SHIPPED | OUTPATIENT
Start: 2019-07-24 | End: 2019-10-22 | Stop reason: SDUPTHER

## 2019-07-29 ENCOUNTER — INFUSION (OUTPATIENT)
Dept: INFUSION THERAPY | Facility: HOSPITAL | Age: 74
End: 2019-07-29
Attending: INTERNAL MEDICINE
Payer: MEDICARE

## 2019-07-29 DIAGNOSIS — C16.0 GE JUNCTION CARCINOMA: Primary | ICD-10-CM

## 2019-07-29 PROCEDURE — A4216 STERILE WATER/SALINE, 10 ML: HCPCS | Mod: HCNC,PN | Performed by: INTERNAL MEDICINE

## 2019-07-29 PROCEDURE — 96523 IRRIG DRUG DELIVERY DEVICE: CPT | Mod: HCNC,PN

## 2019-07-29 PROCEDURE — 63600175 PHARM REV CODE 636 W HCPCS: Mod: HCNC,PN | Performed by: INTERNAL MEDICINE

## 2019-07-29 PROCEDURE — 25000003 PHARM REV CODE 250: Mod: HCNC,PN | Performed by: INTERNAL MEDICINE

## 2019-07-29 RX ORDER — HEPARIN 100 UNIT/ML
500 SYRINGE INTRAVENOUS
Status: CANCELLED | OUTPATIENT
Start: 2019-07-29

## 2019-07-29 RX ORDER — SODIUM CHLORIDE 0.9 % (FLUSH) 0.9 %
10 SYRINGE (ML) INJECTION
Status: COMPLETED | OUTPATIENT
Start: 2019-07-29 | End: 2019-07-29

## 2019-07-29 RX ORDER — HEPARIN 100 UNIT/ML
500 SYRINGE INTRAVENOUS
Status: COMPLETED | OUTPATIENT
Start: 2019-07-29 | End: 2019-07-29

## 2019-07-29 RX ORDER — SODIUM CHLORIDE 0.9 % (FLUSH) 0.9 %
10 SYRINGE (ML) INJECTION
Status: CANCELLED | OUTPATIENT
Start: 2019-07-29

## 2019-07-29 RX ADMIN — HEPARIN 500 UNITS: 100 SYRINGE at 09:07

## 2019-07-29 RX ADMIN — Medication 10 ML: at 09:07

## 2019-07-31 ENCOUNTER — PATIENT OUTREACH (OUTPATIENT)
Dept: ADMINISTRATIVE | Facility: HOSPITAL | Age: 74
End: 2019-07-31

## 2019-07-31 NOTE — PROGRESS NOTES
Health Maintenance Due   Topic Date Due    Shingles Vaccine (2 of 3) 10/14/2014     Chart review completed 07/31/2019  Future Appointments   Date Time Provider Department Center   8[facility]5/2019  1:30 [facility]   8/7/2019 10:05 AM LAB, [facility] LAB [place]   8/14/2019  1:40 PM Cade Juan MD [facility] FAM MED [place]   9/9/2019  9:45 AM INJECTION SCHEDULE, [facility] CHEMOTHERAPY [facility] CHEMO [facility]   10/9/2019 11:30 AM Eduard Cano MD [facility] CARDIO [place]   10/21/2019 12:00 PM LAB, [facility]   10/21/2019  1:00 PM [facility]

## 2019-08-05 ENCOUNTER — OFFICE VISIT (OUTPATIENT)
Dept: SURGICAL ONCOLOGY | Facility: CLINIC | Age: 74
End: 2019-08-05
Payer: MEDICARE

## 2019-08-05 VITALS
TEMPERATURE: 99 F | HEIGHT: 77 IN | DIASTOLIC BLOOD PRESSURE: 64 MMHG | WEIGHT: 234.38 LBS | BODY MASS INDEX: 27.67 KG/M2 | SYSTOLIC BLOOD PRESSURE: 132 MMHG | HEART RATE: 99 BPM

## 2019-08-05 DIAGNOSIS — Z09 POSTOP CHECK: Primary | ICD-10-CM

## 2019-08-05 PROCEDURE — 99499 UNLISTED E&M SERVICE: CPT | Mod: HCNC,S$GLB,, | Performed by: SURGERY

## 2019-08-05 PROCEDURE — 99024 PR POST-OP FOLLOW-UP VISIT: ICD-10-PCS | Mod: HCNC,S$GLB,, | Performed by: SURGERY

## 2019-08-05 PROCEDURE — 99024 POSTOP FOLLOW-UP VISIT: CPT | Mod: HCNC,S$GLB,, | Performed by: SURGERY

## 2019-08-05 PROCEDURE — 99999 PR PBB SHADOW E&M-EST. PATIENT-LVL IV: CPT | Mod: PBBFAC,HCNC,, | Performed by: SURGERY

## 2019-08-05 PROCEDURE — 99999 PR PBB SHADOW E&M-EST. PATIENT-LVL IV: ICD-10-PCS | Mod: PBBFAC,HCNC,, | Performed by: SURGERY

## 2019-08-05 PROCEDURE — 99499 RISK ADDL DX/OHS AUDIT: ICD-10-PCS | Mod: HCNC,S$GLB,, | Performed by: SURGERY

## 2019-08-05 NOTE — PROGRESS NOTES
"S/p RATLE 7/2/19 after NACR; final path yTisN2 with 3/39 nodes involved.   Last seen 7/15, doing well. Jfeeds stopped  Saw Dr Andrews 7/22/19; not planning any more adjuvant therapy    Today: He is a bit depressed: "I'm not feeling as well as I would like". Appetite poor. No energy. He's not exercising or walking, except for one day at the [facility]. Some mild swallowing restriction, but he is not wearing his dentures, so is probably not chewing his food well enough. Was having some diarrhea while taking the antibiotics Dr Andrews prescribed, but this resolved two days ago.     Vitals:    08/05/19 1330   BP: 132/64   Pulse: 99   Temp: 98.6 °F (37 °C)     Wt Readings from Last 2 Encounters:   08/05/19 106.3 kg (234 lb 5.6 oz)   07/22/19 107.1 kg (236 lb 1.8 oz)     Neck healing well  Chest clear  Heart: RR&R with no m  Abd: soft; incisions healing well  Affect depressed    Rec:   I encouraged a daily exercise period  Wear his dentures and chew food well  Will leave Jtube in until weight stabilizes and he feels better, but no need to start using it now.   Discussed anti-depressants; he wants to hold off for now  I expressed optimism that he will start feeling better soon.           "

## 2019-08-07 ENCOUNTER — LAB VISIT (OUTPATIENT)
Dept: LAB | Facility: HOSPITAL | Age: 74
End: 2019-08-07
Attending: FAMILY MEDICINE
Payer: MEDICARE

## 2019-08-07 DIAGNOSIS — E11.9 TYPE 2 DIABETES MELLITUS TREATED WITHOUT INSULIN: ICD-10-CM

## 2019-08-07 LAB
ALBUMIN SERPL BCP-MCNC: 3.2 G/DL (ref 3.5–5.2)
ALP SERPL-CCNC: 119 U/L (ref 55–135)
ALT SERPL W/O P-5'-P-CCNC: 13 U/L (ref 10–44)
ANION GAP SERPL CALC-SCNC: 10 MMOL/L (ref 8–16)
AST SERPL-CCNC: 20 U/L (ref 10–40)
BILIRUB SERPL-MCNC: 0.7 MG/DL (ref 0.1–1)
BUN SERPL-MCNC: 10 MG/DL (ref 8–23)
CALCIUM SERPL-MCNC: 9.6 MG/DL (ref 8.7–10.5)
CHLORIDE SERPL-SCNC: 103 MMOL/L (ref 95–110)
CO2 SERPL-SCNC: 26 MMOL/L (ref 23–29)
CREAT SERPL-MCNC: 0.9 MG/DL (ref 0.5–1.4)
EST. GFR  (AFRICAN AMERICAN): >60 ML/MIN/1.73 M^2
EST. GFR  (NON AFRICAN AMERICAN): >60 ML/MIN/1.73 M^2
ESTIMATED AVG GLUCOSE: 177 MG/DL (ref 68–131)
GLUCOSE SERPL-MCNC: 118 MG/DL (ref 70–110)
HBA1C MFR BLD HPLC: 7.8 % (ref 4–5.6)
POTASSIUM SERPL-SCNC: 3.8 MMOL/L (ref 3.5–5.1)
PROT SERPL-MCNC: 6.7 G/DL (ref 6–8.4)
SODIUM SERPL-SCNC: 139 MMOL/L (ref 136–145)

## 2019-08-07 PROCEDURE — 83036 HEMOGLOBIN GLYCOSYLATED A1C: CPT | Mod: HCNC

## 2019-08-07 PROCEDURE — 80053 COMPREHEN METABOLIC PANEL: CPT | Mod: HCNC

## 2019-08-07 PROCEDURE — 36415 COLL VENOUS BLD VENIPUNCTURE: CPT | Mod: HCNC,PO

## 2019-08-14 ENCOUNTER — OFFICE VISIT (OUTPATIENT)
Dept: FAMILY MEDICINE | Facility: CLINIC | Age: 74
End: 2019-08-14
Payer: MEDICARE

## 2019-08-14 VITALS
SYSTOLIC BLOOD PRESSURE: 132 MMHG | HEIGHT: 77 IN | BODY MASS INDEX: 27.07 KG/M2 | TEMPERATURE: 98 F | OXYGEN SATURATION: 99 % | HEART RATE: 70 BPM | DIASTOLIC BLOOD PRESSURE: 64 MMHG | WEIGHT: 229.25 LBS

## 2019-08-14 DIAGNOSIS — F43.22 ADJUSTMENT REACTION WITH ANXIETY: ICD-10-CM

## 2019-08-14 DIAGNOSIS — I10 ESSENTIAL HYPERTENSION: ICD-10-CM

## 2019-08-14 DIAGNOSIS — E78.5 HYPERLIPIDEMIA, UNSPECIFIED HYPERLIPIDEMIA TYPE: ICD-10-CM

## 2019-08-14 DIAGNOSIS — E11.9 TYPE 2 DIABETES MELLITUS TREATED WITHOUT INSULIN: Primary | ICD-10-CM

## 2019-08-14 DIAGNOSIS — I48.0 PAF (PAROXYSMAL ATRIAL FIBRILLATION): ICD-10-CM

## 2019-08-14 DIAGNOSIS — C15.5 MALIGNANT NEOPLASM OF LOWER THIRD OF ESOPHAGUS: ICD-10-CM

## 2019-08-14 DIAGNOSIS — E78.5 DYSLIPIDEMIA: ICD-10-CM

## 2019-08-14 PROCEDURE — 99214 PR OFFICE/OUTPT VISIT, EST, LEVL IV, 30-39 MIN: ICD-10-PCS | Mod: HCNC,S$GLB,, | Performed by: FAMILY MEDICINE

## 2019-08-14 PROCEDURE — 1101F PT FALLS ASSESS-DOCD LE1/YR: CPT | Mod: HCNC,CPTII,S$GLB, | Performed by: FAMILY MEDICINE

## 2019-08-14 PROCEDURE — 3078F DIAST BP <80 MM HG: CPT | Mod: HCNC,CPTII,S$GLB, | Performed by: FAMILY MEDICINE

## 2019-08-14 PROCEDURE — 99999 PR PBB SHADOW E&M-EST. PATIENT-LVL IV: ICD-10-PCS | Mod: PBBFAC,HCNC,, | Performed by: FAMILY MEDICINE

## 2019-08-14 PROCEDURE — 99214 OFFICE O/P EST MOD 30 MIN: CPT | Mod: HCNC,S$GLB,, | Performed by: FAMILY MEDICINE

## 2019-08-14 PROCEDURE — 1101F PR PT FALLS ASSESS DOC 0-1 FALLS W/OUT INJ PAST YR: ICD-10-PCS | Mod: HCNC,CPTII,S$GLB, | Performed by: FAMILY MEDICINE

## 2019-08-14 PROCEDURE — 3045F PR MOST RECENT HEMOGLOBIN A1C LEVEL 7.0-9.0%: ICD-10-PCS | Mod: HCNC,CPTII,S$GLB, | Performed by: FAMILY MEDICINE

## 2019-08-14 PROCEDURE — 3075F SYST BP GE 130 - 139MM HG: CPT | Mod: HCNC,CPTII,S$GLB, | Performed by: FAMILY MEDICINE

## 2019-08-14 PROCEDURE — 3078F PR MOST RECENT DIASTOLIC BLOOD PRESSURE < 80 MM HG: ICD-10-PCS | Mod: HCNC,CPTII,S$GLB, | Performed by: FAMILY MEDICINE

## 2019-08-14 PROCEDURE — 3075F PR MOST RECENT SYSTOLIC BLOOD PRESS GE 130-139MM HG: ICD-10-PCS | Mod: HCNC,CPTII,S$GLB, | Performed by: FAMILY MEDICINE

## 2019-08-14 PROCEDURE — 99999 PR PBB SHADOW E&M-EST. PATIENT-LVL IV: CPT | Mod: PBBFAC,HCNC,, | Performed by: FAMILY MEDICINE

## 2019-08-14 PROCEDURE — 3045F PR MOST RECENT HEMOGLOBIN A1C LEVEL 7.0-9.0%: CPT | Mod: HCNC,CPTII,S$GLB, | Performed by: FAMILY MEDICINE

## 2019-08-14 RX ORDER — HYDROXYZINE HYDROCHLORIDE 25 MG/1
25 TABLET, FILM COATED ORAL 3 TIMES DAILY PRN
Qty: 30 TABLET | Refills: 5 | Status: SHIPPED | OUTPATIENT
Start: 2019-08-14 | End: 2020-02-05

## 2019-08-14 NOTE — PROGRESS NOTES
Subjective:       Patient ID: Stu Garcia is a 73 y.o. male.    Chief Complaint: Follow-up      Here for 3 month f/u on chronic health issues    INVASIVE MODERATELY DIFFERENTIATED ADENOCARCINOMA - esophagectomy 7/2/2019 uncomplicated; feeding tube will be removed next week  Lost 15 pounds since the surgery  S/p Anterior cervical decompression fusion done on 7/6/2017 by Dr. Jeter - neck stable; swallowing improved  Lumbar fracture - back pain only occurring at night periodically  Diabetes - following diabetic diet diet; He is taking glimepiride 4mg BID. Metformin reportedly makes him feel bad. BGs 130s. No hypoglycemia.   CAD - s/p 4 stents; following with Dr. Maldonado  Paroxysmal afib, HTN - taking amiodarone, xarelto; metoprolol 50mg 1/2 tab BID  HLD - taking Lipitor 80mg daily  Anxiety - some intermittent panic attacks; hydroxizine has been helpful for this.  Here today with wife             Diabetes   Pertinent negatives for hypoglycemia include no confusion, dizziness or headaches. Pertinent negatives for diabetes include no chest pain, no polydipsia, no polyuria and no weakness.   Coronary Artery Disease   Symptoms include chest tightness. Pertinent negatives include no chest pain, dizziness, leg swelling, palpitations or shortness of breath.   Atrial Fibrillation   Symptoms are negative for chest pain, dizziness, palpitations, shortness of breath and weakness. Past medical history includes atrial fibrillation and CAD.       Past Medical History:   Diagnosis Date    Anticoagulant long-term use     xarelto,asa    Arthritis     Atrial fibrillation 2013    cardioverted    Bleb, lung     Cataract     OS    Colon polyp     Coronary artery disease     Coronary artery disease involving native coronary artery of native heart with unstable angina pectoris 3/18/2018    Diabetes mellitus     Diabetes mellitus, type 2     Diverticulosis     General anesthetics causing adverse effect in therapeutic use      delayed emergence per patient's wife    GERD (gastroesophageal reflux disease)     HEARING LOSS     bilateral aids    Hemorrhoid     HLD (hyperlipidemia)     Hypertension     Iron deficiency anemia     Neuropathy of leg     Pneumonia due to other staphylococcus     Prostate cancer     prostate    Spontaneous pneumothorax     bilateral       Past Surgical History:   Procedure Laterality Date    ARTHROSCOPY-KNEE W/ CHONDROPLASTY Left 4/7/2017    [facility]    ARTHROSCOPY-MENISCECTOMY Left 4/7/2017    [facility]    CARDIOVERSION      CATARACT EXTRACTION      bilateral    CERVICAL SPINE FRACTURE SURGERY      c7 t1 ACDF     CHEST TUBE INSERTION Right     COLONOSCOPY      COLONOSCOPY N/A 5/20/2015    [facility]formed by Andrea Kerns Jr., [facility] at [facility] ENDO    CORONARY STENT PLACEMENT      x 2    ESOPHAGOGASTRODUODENOSCOPY (EGD) N/A 2/23/2019    [facility]    ESOPHAGOGASTRODUODENOSCOPY (EGD) N/A 5/20/2015    [facility] Jr[facility] at [facility] ENDO    HERNIA REPAIR      umbilical    [id number]-B-CATH Right 4/4/2019    [facility]um [facility]idi [facility] at [facility]    KNEE SURGERY Left 2017    Left heart cath Right 3/19/2018    [facility] at [facility] CATH    Left heart cath Left 3/17/2018    [facility]    Left heart cath-RM # 222 B Right 3/21/2018    [facility] at [facility]    open thoracotomy Left 1966    With pleurectomy    Pleurectomy Left 1966    For treatment of left pneumothorax    PROSTATECTOMY  2001    open    TUBE THORACOTOMY  1966    pneumothorax left--open    ULTRASOUND, UPPER GI TRACT, ENDOSCOPIC N/A 3/13/2019    Performed by Andrew [facility] at [facility] ENDO (2ND FLR)    XI ROBOTIC ESOPHAGECTOMY N/A 7/2/2019    Performed by Chad Milian MD at [facility] OR 2ND FLR       Review  of patient's allergies indicates:   Allergen Reactions    Codeine Nausea And Vomiting       Social History     Socioeconomic History    Marital status:      Spouse name: Not on file    Number of children: 2    Years of education: Not on file    Highest education level: Not on file   Occupational History    Occupation:     Occupation: prior [employer]    Social Needs    Financial resource strain: Not hard at all    Food insecurity:     Worry: Never true     Inability: Never true    Transportation needs:     Medical: No     Non-medical: No   Tobacco Use    Smoking status: Former Smoker     Years: 20.00    Smokeless tobacco: Never Used    Tobacco comment: quit smoking in 1980's.   Substance and Sexual Activity    Alcohol use: Yes     Frequency: Monthly or less     Drinks per session: 1 or 2     Binge frequency: Never     Comment: 1 drink every 2 weeks    Drug use: No    Sexual activity: Yes   Lifestyle    Physical activity:     Days per week: 0 days     Minutes per session: Not on file    Stress: Very much   Relationships    Social connections:     Talks on phone: More than three times a week     Gets together: Once a week     Attends [employer] service: Not on file     Active member of club or organization: No     Attends meetings of clubs or organizations: Patient refused     Relationship status:    Other Topics Concern    Not on file   Social History Narrative    From [place]       Current Outpatient Medications on File Prior to Visit   Medication Sig Dispense Refill    ACCU-CHEK SHASHANK PLUS TEST STRP Strp TEST ONCE DAILY 100 strip 2    amiodarone (PACERONE) 200 MG Tab Take 1 tablet (200 mg total) by mouth once daily. 30 tablet 0    aspirin (ECOTRIN) 81 MG EC tablet Take 1 tablet (81 mg total) by mouth once daily. May take over the counter 30 tablet 12    atorvastatin (LIPITOR) 80 MG tablet Take 1 tablet (80 mg total) by mouth every evening. 90 tablet 0     "diphenhydrAMINE-aluminum-magnesium hydroxide-simethicone-lidocaine HCl 2% Swish and spit 15 mLs every 4 (four) hours as needed. 250 mL 0    ferrous gluconate (FERGON) 324 MG tablet Take 1 tablet (324 mg total) by mouth daily with breakfast.      glimepiride (AMARYL) 2 MG tablet TAKE 2 TABLETS TWICE DAILY 360 tablet 3    glucagon, human recombinant, (GLUCAGON EMERGENCY KIT, HUMAN,) 1 mg SolR Inject 1 mg into the muscle as needed. 1 each 2    hydrocodone-acetaminophen (HYCET) solution 7.5-325 mg/15mL 10 mLs by Per J Tube route every 4 (four) hours as needed. 473 mL 0    insulin  unit/mL injection Inject 6 Units into the skin once daily. Inject with that start of Tube Feedings. Hold if Tube Feedings are not given. Increase by 1 unit daily if Blood glucose at am check is greater than 200. 10 mL 11    insulin syr/ndl U100 half karina (BD INSULIN SYRINGE HALF UNIT) 0.3 mL 31 gauge x 15/64" Syrg Use once daily 30 Syringe 11    lancets (ACCU-CHEK SOFTCLIX LANCETS) Misc 1 strip by Misc.(Non-Drug; Combo Route) route once daily. 100 each 3    lidocaine-prilocaine (EMLA) cream U UTD  0    metoprolol tartrate (LOPRESSOR) 50 MG tablet Take 0.5 tablets (25 mg total) by mouth 2 (two) times daily. 30 tablet 11    multivitamin capsule Take 1 capsule by mouth once daily.      nitroGLYCERIN (NITROSTAT) 0.4 MG SL tablet DISSOLVE 1 TABLET UNDER THE TONGUE EVERY 5 MINUTES AS NEEDED FOR CHEST PAIN 100 tablet prn    ondansetron (ZOFRAN) 8 MG tablet TK 1 T PO 1 HOUR B D RADIATION TREATMENT RO PREVENT NAUSEA UTD 30 tablet 3    pen needle, diabetic (BD ULTRA-FINE ELSA PEN NEEDLE) 32 gauge x 5/32" Ndle To use with insulin injection 1x daily. 60 each 15    prochlorperazine (COMPAZINE) 10 MG tablet TK 1 TABLET PO Q 6 HOURS PRN FOR NAUSEA/VOMITING 60 tablet 6    rivaroxaban (XARELTO) 20 mg Tab Take 1 tablet (20 mg total) by mouth daily with dinner or evening meal. 30 tablet 1    vit C,V-Lw-ebzbm-lutein-zeaxan (PRESERVISION " "AREDS 2) 575-326-12-1 mg-unit-mg-mg Cap Take 1 tablet by mouth once daily.       No current facility-administered medications on file prior to visit.        Family History   Problem Relation Age of Onset    Mental illness Mother         dementia    Coronary artery disease Father     Cancer Father         stomach with mets    Diabetes Brother        Review of Systems   Constitutional: Negative for activity change, appetite change, chills, fever and unexpected weight change.   HENT: Positive for hearing loss, rhinorrhea and trouble swallowing. Negative for sore throat.    Eyes: Negative for pain, discharge and visual disturbance.   Respiratory: Positive for chest tightness. Negative for cough, shortness of breath and wheezing.    Cardiovascular: Negative for chest pain, palpitations and leg swelling.   Gastrointestinal: Negative for abdominal pain, blood in stool, constipation, diarrhea, nausea and vomiting.   Endocrine: Negative for polydipsia and polyuria.   Genitourinary: Negative for difficulty urinating, dysuria, hematuria and urgency.   Musculoskeletal: Positive for back pain and neck pain. Negative for arthralgias, gait problem and joint swelling.   Skin: Negative for rash and wound.   Neurological: Negative for dizziness, weakness, light-headedness and headaches.   Hematological: Negative for adenopathy.   Psychiatric/Behavioral: Negative for confusion and dysphoric mood.       Objective:      /64   Pulse 70   Temp 98.4 °F (36.9 °C) (Oral)   Ht 6' 4.5" (1.943 m)   Wt 104 kg (229 lb 4.5 oz)   SpO2 99%   BMI 27.54 kg/m²   Physical Exam   Constitutional: He is oriented to person, place, and time. He appears well-developed and well-nourished. He is active. No distress.   HENT:   Head: Normocephalic and atraumatic.   Right Ear: External ear normal.   Left Ear: External ear normal.   Mouth/Throat: Uvula is midline, oropharynx is clear and moist and mucous membranes are normal. No oropharyngeal " exudate.   Eyes: Pupils are equal, round, and reactive to light. Conjunctivae, EOM and lids are normal.   Neck: Normal range of motion, full passive range of motion without pain and phonation normal. Neck supple. No thyroid mass and no thyromegaly present.   Cardiovascular: Normal rate, regular rhythm, normal heart sounds and intact distal pulses. Exam reveals no gallop and no friction rub.   No murmur heard.  Pulmonary/Chest: Effort normal and breath sounds normal. No respiratory distress. He has no wheezes. He has no rales.   Abdominal: Soft. Normal appearance and bowel sounds are normal. There is no tenderness.   Musculoskeletal: Normal range of motion.   Lymphadenopathy:     He has no cervical adenopathy.   Neurological: He is alert and oriented to person, place, and time. No cranial nerve deficit. Coordination normal.   Skin: Skin is warm and dry.   Psychiatric: He has a normal mood and affect. His speech is normal and behavior is normal. Judgment and thought content normal.     feeding tube in place with no signs of infection    Results for orders placed or performed in visit on 08/07/19   Hemoglobin A1c   Result Value Ref Range    Hemoglobin A1C 7.8 (H) 4.0 - 5.6 %    Estimated Avg Glucose 177 (H) 68 - 131 mg/dL   Comprehensive metabolic panel   Result Value Ref Range    Sodium 139 136 - 145 mmol/L    Potassium 3.8 3.5 - 5.1 mmol/L    Chloride 103 95 - 110 mmol/L    CO2 26 23 - 29 mmol/L    Glucose 118 (H) 70 - 110 mg/dL    BUN, Bld 10 8 - 23 mg/dL    Creatinine 0.9 0.5 - 1.4 mg/dL    Calcium 9.6 8.7 - 10.5 mg/dL    Total Protein 6.7 6.0 - 8.4 g/dL    Albumin 3.2 (L) 3.5 - 5.2 g/dL    Total Bilirubin 0.7 0.1 - 1.0 mg/dL    Alkaline Phosphatase 119 55 - 135 U/L    AST 20 10 - 40 U/L    ALT 13 10 - 44 U/L    Anion Gap 10 8 - 16 mmol/L    eGFR if African American >60.0 >60 mL/min/1.73 m^2    eGFR if non African American >60.0 >60 mL/min/1.73 m^2         Assessment:       1. Type 2 diabetes mellitus treated  without insulin    2. Dyslipidemia    3. Hyperlipidemia, unspecified hyperlipidemia type    4. Adjustment reaction with anxiety    5. Malignant neoplasm of lower third of esophagus    6. PAF (paroxysmal atrial fibrillation)    7. Essential hypertension        Plan:       Type 2 diabetes mellitus treated without insulin  -     Hemoglobin A1c; Future; Expected date: 11/12/2019  -     Comprehensive metabolic panel; Future; Expected date: 11/12/2019    Dyslipidemia    Hyperlipidemia, unspecified hyperlipidemia type    Adjustment reaction with anxiety  -     hydrOXYzine HCl (ATARAX) 25 MG tablet; Take 1 tablet (25 mg total) by mouth 3 (three) times daily as needed for Anxiety.  Dispense: 30 tablet; Refill: 5    Malignant neoplasm of lower third of esophagus    PAF (paroxysmal atrial fibrillation)    Essential hypertension      diabetes improving  continue oncology follow-up  Continue protonix BID  F/u with cardiology as planned   Continue Glimepiride 4mg BID  Continue other present meds  Counseled on regular exercise, maintenance of a healthy weight, balanced diet rich in fruits/vegetables and lean protein, and avoidance of unhealthy habits like smoking and excessive alcohol intake.    F/u 3 months with labs

## 2019-08-15 PROBLEM — S32.009A LUMBAR VERTEBRAL FRACTURE: Status: RESOLVED | Noted: 2017-07-11 | Resolved: 2019-08-15

## 2019-08-21 ENCOUNTER — OFFICE VISIT (OUTPATIENT)
Dept: SURGICAL ONCOLOGY | Facility: CLINIC | Age: 74
End: 2019-08-21
Payer: MEDICARE

## 2019-08-21 VITALS
HEART RATE: 75 BPM | BODY MASS INDEX: 26.66 KG/M2 | WEIGHT: 225.75 LBS | DIASTOLIC BLOOD PRESSURE: 78 MMHG | SYSTOLIC BLOOD PRESSURE: 144 MMHG | HEIGHT: 77 IN

## 2019-08-21 DIAGNOSIS — Z09 POSTOP CHECK: Primary | ICD-10-CM

## 2019-08-21 PROCEDURE — 99999 PR PBB SHADOW E&M-EST. PATIENT-LVL IV: CPT | Mod: PBBFAC,HCNC,, | Performed by: SURGERY

## 2019-08-21 PROCEDURE — 99024 POSTOP FOLLOW-UP VISIT: CPT | Mod: HCNC,S$GLB,, | Performed by: SURGERY

## 2019-08-21 PROCEDURE — 99999 PR PBB SHADOW E&M-EST. PATIENT-LVL IV: ICD-10-PCS | Mod: PBBFAC,HCNC,, | Performed by: SURGERY

## 2019-08-21 PROCEDURE — 99024 PR POST-OP FOLLOW-UP VISIT: ICD-10-PCS | Mod: HCNC,S$GLB,, | Performed by: SURGERY

## 2019-08-21 NOTE — PROGRESS NOTES
S/p RATLE after NACR for JULISSA of the distal esophagus. Final path [id number] with 3/39 nodes involved  Last seen 8/5/19, doing ok, but a bit depressed. Jfeeds stopped  Today, he feels better. He tells me he still has good days and bad days, but that the good days have become more frequent. Appetite a bit better. He wants his Jtube out.     Wt Readings from Last 3 Encounters:   08/21/19 102.4 kg (225 lb 12 oz)   08/14/19 104 kg (229 lb 4.5 oz)   08/05/19 106.3 kg (234 lb 5.6 oz)     Vitals:    08/21/19 1306   BP: (!) 144/78   Pulse: 75     Neck incision well healed  Chest clear to ausc  Abd: soft, non-distended. Jtube removed    Rec: RTC two months

## 2019-09-09 ENCOUNTER — INFUSION (OUTPATIENT)
Dept: INFUSION THERAPY | Facility: HOSPITAL | Age: 74
End: 2019-09-09
Attending: INTERNAL MEDICINE
Payer: MEDICARE

## 2019-09-09 DIAGNOSIS — C16.0 GE JUNCTION CARCINOMA: Primary | ICD-10-CM

## 2019-09-09 PROCEDURE — 63600175 PHARM REV CODE 636 W HCPCS: Mod: HCNC,PN | Performed by: INTERNAL MEDICINE

## 2019-09-09 PROCEDURE — A4216 STERILE WATER/SALINE, 10 ML: HCPCS | Mod: HCNC,PN | Performed by: INTERNAL MEDICINE

## 2019-09-09 PROCEDURE — 25000003 PHARM REV CODE 250: Mod: HCNC,PN | Performed by: INTERNAL MEDICINE

## 2019-09-09 PROCEDURE — 96523 IRRIG DRUG DELIVERY DEVICE: CPT | Mod: HCNC,PN

## 2019-09-09 RX ORDER — HEPARIN 100 UNIT/ML
500 SYRINGE INTRAVENOUS
Status: CANCELLED | OUTPATIENT
Start: 2019-09-09

## 2019-09-09 RX ORDER — SODIUM CHLORIDE 0.9 % (FLUSH) 0.9 %
10 SYRINGE (ML) INJECTION
Status: CANCELLED | OUTPATIENT
Start: 2019-09-09

## 2019-09-09 RX ORDER — HEPARIN 100 UNIT/ML
500 SYRINGE INTRAVENOUS
Status: COMPLETED | OUTPATIENT
Start: 2019-09-09 | End: 2019-09-09

## 2019-09-09 RX ORDER — SODIUM CHLORIDE 0.9 % (FLUSH) 0.9 %
10 SYRINGE (ML) INJECTION
Status: COMPLETED | OUTPATIENT
Start: 2019-09-09 | End: 2019-09-09

## 2019-09-09 RX ADMIN — HEPARIN 500 UNITS: 100 SYRINGE at 09:09

## 2019-09-09 RX ADMIN — Medication 10 ML: at 09:09

## 2019-09-11 ENCOUNTER — OFFICE VISIT (OUTPATIENT)
Dept: FAMILY MEDICINE | Facility: CLINIC | Age: 74
End: 2019-09-11
Payer: MEDICARE

## 2019-09-11 VITALS
SYSTOLIC BLOOD PRESSURE: 110 MMHG | HEIGHT: 77 IN | BODY MASS INDEX: 26.71 KG/M2 | HEART RATE: 84 BPM | WEIGHT: 226.19 LBS | DIASTOLIC BLOOD PRESSURE: 64 MMHG

## 2019-09-11 DIAGNOSIS — E04.1 LEFT THYROID NODULE: ICD-10-CM

## 2019-09-11 DIAGNOSIS — L72.3 INFECTED SEBACEOUS CYST: Primary | ICD-10-CM

## 2019-09-11 DIAGNOSIS — L08.9 INFECTED SEBACEOUS CYST: Primary | ICD-10-CM

## 2019-09-11 PROCEDURE — 99213 PR OFFICE/OUTPT VISIT, EST, LEVL III, 20-29 MIN: ICD-10-PCS | Mod: HCNC,S$GLB,, | Performed by: FAMILY MEDICINE

## 2019-09-11 PROCEDURE — 3078F PR MOST RECENT DIASTOLIC BLOOD PRESSURE < 80 MM HG: ICD-10-PCS | Mod: HCNC,CPTII,S$GLB, | Performed by: FAMILY MEDICINE

## 2019-09-11 PROCEDURE — 99999 PR PBB SHADOW E&M-EST. PATIENT-LVL V: ICD-10-PCS | Mod: PBBFAC,HCNC,, | Performed by: FAMILY MEDICINE

## 2019-09-11 PROCEDURE — 99999 PR PBB SHADOW E&M-EST. PATIENT-LVL V: CPT | Mod: PBBFAC,HCNC,, | Performed by: FAMILY MEDICINE

## 2019-09-11 PROCEDURE — 3074F PR MOST RECENT SYSTOLIC BLOOD PRESSURE < 130 MM HG: ICD-10-PCS | Mod: HCNC,CPTII,S$GLB, | Performed by: FAMILY MEDICINE

## 2019-09-11 PROCEDURE — 3074F SYST BP LT 130 MM HG: CPT | Mod: HCNC,CPTII,S$GLB, | Performed by: FAMILY MEDICINE

## 2019-09-11 PROCEDURE — 1101F PT FALLS ASSESS-DOCD LE1/YR: CPT | Mod: HCNC,CPTII,S$GLB, | Performed by: FAMILY MEDICINE

## 2019-09-11 PROCEDURE — 1101F PR PT FALLS ASSESS DOC 0-1 FALLS W/OUT INJ PAST YR: ICD-10-PCS | Mod: HCNC,CPTII,S$GLB, | Performed by: FAMILY MEDICINE

## 2019-09-11 PROCEDURE — 3078F DIAST BP <80 MM HG: CPT | Mod: HCNC,CPTII,S$GLB, | Performed by: FAMILY MEDICINE

## 2019-09-11 PROCEDURE — 99213 OFFICE O/P EST LOW 20 MIN: CPT | Mod: HCNC,S$GLB,, | Performed by: FAMILY MEDICINE

## 2019-09-11 RX ORDER — LEVOFLOXACIN 25 MG/ML
500 SOLUTION ORAL DAILY
Qty: 140 ML | Refills: 0 | Status: SHIPPED | OUTPATIENT
Start: 2019-09-11 | End: 2019-09-18

## 2019-09-11 NOTE — PROGRESS NOTES
Subjective:       Patient ID: Stu Garcia is a 73 y.o. male.    Chief Complaint: Cyst (on back)    C/o enlarging and painful cyst on the back for the last 2 months    Recent PET scan showed: 2.5 cm nodule arising from the lower pole of the left thyroid lobe.   Further evaluation was suggested.      Past Medical History:   Diagnosis Date    Anticoagulant long-term use     xarelto,asa    Arthritis     Atrial fibrillation 2013    cardioverted    Bleb, lung     Cataract     OS    Colon polyp     Coronary artery disease     Coronary artery disease involving native coronary artery of native heart with unstable angina pectoris 3/18/2018    Diabetes mellitus     Diabetes mellitus, type 2     Diverticulosis     General anesthetics causing adverse effect in therapeutic use     delayed emergence per patient's wife    GERD (gastroesophageal reflux disease)     HEARING LOSS     bilateral aids    Hemorrhoid     HLD (hyperlipidemia)     Hypertension     Iron deficiency anemia     Neuropathy of leg     Pneumonia due to other staphylococcus     Prostate cancer     prostate    Spontaneous pneumothorax     bilateral       Past Surgical History:   Procedure Laterality Date    ARTHROSCOPY-KNEE W/ CHONDROPLASTY Left 4/7/2017    [facility]    ARTHROSCOPY-MENISCECTOMY Left 4/7/2017    [facility]    CARDIOVERSION      CATARACT EXTRACTION      bilateral    CERVICAL SPINE FRACTURE SURGERY      c7 t1 ACDF     CHEST TUBE INSERTION Right     COLONOSCOPY      COLONOSCOPY N/A 5/20/2015    Performed by [facility]    CORONARY STENT PLACEMENT      x 2    ESOPHAGOGASTRODUODENOSCOPY (EGD) N/A 2/23/2019    [facility] END[facility]    ESOPHAGOGASTRODUODENOSCOPY (EGD) N/A 5/20/2015    [facility]    HERNIA REPAIR      umbilical    [id number]  Right 4/4/2019    Performed by Nahum Zaidi [facility] at [facility] OR    KNEE SURGERY Left 2017    Left heart cath Right 3/19/2018    [facility] C[facility]    Left heart cath Left 3/17/2018    [facility]    Left heart cath-RM # 222 B Right 3/21/2018    [facility]    open thoracotomy Left 1966    With pleurectomy    Pleurectomy Left 1966    For treatment of left pneumothorax    PROSTATECTOMY  2001    open    TUBE THORACOTOMY  1966    pneumothorax left--open    ULTRASOUND, UPPER GI TRACT, ENDOSCOPIC N/A 3/13/2019    Performed by [facility] at [facility] ENDO (2ND FLR)    XI ROBOTIC ESOPHAGECTOMY N/A 7/2/2019    Performed by Chad Milian MD at [facility] OR 2ND FLR       Review of patient's allergies indicates:   Allergen Reactions    Codeine Nausea And Vomiting       Social History     Socioeconomic History    Marital status:      Spouse name: Not on file    Number of children: 2    Years of education: Not on file    Highest education level: Not on file   Occupational History    Occupation:     Occupation: prior [employer]    Social Needs    Financial resource strain: Not hard at all    Food insecurity:     Worry: Never true     Inability: Never true    Transportation needs:     Medical: No     Non-medical: No   Tobacco Use    Smoking status: Former Smoker     Years: 20.00    Smokeless tobacco: Never Used    Tobacco comment: quit smoking in 1980's.   Substance and Sexual Activity    Alcohol use: Yes     Frequency: Monthly or less     Drinks per session: 1 or 2     Binge frequency: Never     Comment: 1 drink every 2 weeks    Drug use: No    Sexual activity: Yes   Lifestyle    Physical activity:     Days per week: 0 days     Minutes per session: Not on file    Stress: Very much   Relationships    Social connections:     Talks on phone: More than three times a week      "Gets together: Once a week     Attends [employer] service: Not on file     Active member of club or organization: No     Attends meetings of clubs or organizations: Patient refused     Relationship status:    Other Topics Concern    Not on file   Social History Narrative    From [place]       Current Outpatient Medications on File Prior to Visit   Medication Sig Dispense Refill    ACCU-CHEK SHASHANK PLUS TEST STRP Strp TEST ONCE DAILY 100 strip 2    aspirin (ECOTRIN) 81 MG EC tablet Take 1 tablet (81 mg total) by mouth once daily. May take over the counter 30 tablet 12    atorvastatin (LIPITOR) 80 MG tablet Take 1 tablet (80 mg total) by mouth every evening. 90 tablet 0    ferrous gluconate (FERGON) 324 MG tablet Take 1 tablet (324 mg total) by mouth daily with breakfast.      glimepiride (AMARYL) 2 MG tablet TAKE 2 TABLETS TWICE DAILY 360 tablet 3    glucagon, human recombinant, (GLUCAGON EMERGENCY KIT, HUMAN,) 1 mg SolR Inject 1 mg into the muscle as needed. 1 each 2    hydrocodone-acetaminophen (HYCET) solution 7.5-325 mg/15mL 10 mLs by Per J Tube route every 4 (four) hours as needed. 473 mL 0    hydrOXYzine HCl (ATARAX) 25 MG tablet Take 1 tablet (25 mg total) by mouth 3 (three) times daily as needed for Anxiety. 30 tablet 5    insulin syr/ndl U100 half karina (BD INSULIN SYRINGE HALF UNIT) 0.3 mL 31 gauge x 15/64" Syrg Use once daily 30 Syringe 11    lancets (ACCU-CHEK SOFTCLIX LANCETS) Misc 1 strip by Misc.(Non-Drug; Combo Route) route once daily. 100 each 3    lidocaine-prilocaine (EMLA) cream U UTD  0    metoprolol tartrate (LOPRESSOR) 50 MG tablet Take 0.5 tablets (25 mg total) by mouth 2 (two) times daily. 30 tablet 11    multivitamin capsule Take 1 capsule by mouth once daily.      nitroGLYCERIN (NITROSTAT) 0.4 MG SL tablet DISSOLVE 1 TABLET UNDER THE TONGUE EVERY 5 MINUTES AS NEEDED FOR CHEST PAIN 100 tablet prn    ondansetron (ZOFRAN) 8 MG tablet TK 1 T PO 1 HOUR B D RADIATION TREATMENT " "RO PREVENT NAUSEA UTD 30 tablet 3    pen needle, diabetic (BD ULTRA-FINE ELSA PEN NEEDLE) 32 gauge x 5/32" Ndle To use with insulin injection 1x daily. 60 each 15    prochlorperazine (COMPAZINE) 10 MG tablet TK 1 TABLET PO Q 6 HOURS PRN FOR NAUSEA/VOMITING 60 tablet 6    rivaroxaban (XARELTO) 20 mg Tab Take 1 tablet (20 mg total) by mouth daily with dinner or evening meal. 30 tablet 1    vit C,Y-Fs-dvzur-lutein-zeaxan (PRESERVISION AREDS 2) 998-156-66-1 mg-unit-mg-mg Cap Take 1 tablet by mouth once daily.       No current facility-administered medications on file prior to visit.        Family History   Problem Relation Age of Onset    Mental illness Mother         dementia    Coronary artery disease Father     Cancer Father         stomach with mets    Diabetes Brother        Review of Systems   Constitutional: Negative for appetite change, chills, fever and unexpected weight change.   HENT: Negative for sore throat and trouble swallowing.    Eyes: Negative for pain and visual disturbance.   Respiratory: Negative for cough, chest tightness, shortness of breath and wheezing.    Cardiovascular: Negative for chest pain, palpitations and leg swelling.   Gastrointestinal: Negative for abdominal pain, blood in stool, constipation, diarrhea and nausea.   Genitourinary: Negative for difficulty urinating, dysuria and hematuria.   Musculoskeletal: Negative for arthralgias, gait problem and neck pain.   Skin: Positive for rash. Negative for wound.   Neurological: Negative for dizziness, weakness, light-headedness and headaches.   Hematological: Negative for adenopathy.   Psychiatric/Behavioral: Negative for dysphoric mood.       Objective:      /64 (BP Location: Left arm, Patient Position: Sitting, BP Method: Medium (Manual))   Pulse 84   Ht 6' 4.5" (1.943 m)   Wt 102.6 kg (226 lb 3.1 oz)   BMI 27.17 kg/m²   Physical Exam   Constitutional: He is oriented to person, place, and time. He appears well-developed " and well-nourished. He is active. No distress.   HENT:   Head: Normocephalic and atraumatic.   Right Ear: External ear normal.   Left Ear: External ear normal.   Mouth/Throat: Uvula is midline, oropharynx is clear and moist and mucous membranes are normal. No oropharyngeal exudate.   Eyes: Pupils are equal, round, and reactive to light. Conjunctivae, EOM and lids are normal.   Neck: Normal range of motion, full passive range of motion without pain and phonation normal. Neck supple. No thyroid mass and no thyromegaly present.       Cardiovascular: Normal rate, regular rhythm, normal heart sounds and intact distal pulses. Exam reveals no gallop and no friction rub.   No murmur heard.  Pulmonary/Chest: Effort normal and breath sounds normal. No respiratory distress. He has no wheezes. He has no rales.   Musculoskeletal: Normal range of motion.   Lymphadenopathy:     He has no cervical adenopathy.   Neurological: He is alert and oriented to person, place, and time. No cranial nerve deficit. Coordination normal.   Skin: Skin is warm and dry.   Psychiatric: He has a normal mood and affect. His speech is normal and behavior is normal. Judgment and thought content normal.                   Assessment:       1. Infected sebaceous cyst    2. Left thyroid nodule        Plan:       Infected sebaceous cyst  -     levoFLOXacin (LEVAQUIN) 250 mg/10 mL Soln; Take 20 mLs (500 mg total) by mouth once daily. for 7 days  Dispense: 140 mL; Refill: 0  -     Ambulatory referral to General Surgery    Left thyroid nodule  -     Ambulatory referral/consult to Endocrinology; Future; Expected date: 09/11/2019        Will treat with antibiotic and setup consultation with general surgery in 1-2 weeks  Continue other present meds  Will arrange  thyroid ultrasound

## 2019-09-16 ENCOUNTER — EXTERNAL HOME HEALTH (OUTPATIENT)
Dept: HOME HEALTH SERVICES | Facility: HOSPITAL | Age: 74
End: 2019-09-16
Payer: MEDICARE

## 2019-09-26 ENCOUNTER — OFFICE VISIT (OUTPATIENT)
Dept: SURGERY | Facility: CLINIC | Age: 74
End: 2019-09-26
Payer: MEDICARE

## 2019-09-26 VITALS
TEMPERATURE: 98 F | WEIGHT: 226.63 LBS | DIASTOLIC BLOOD PRESSURE: 66 MMHG | HEIGHT: 76 IN | SYSTOLIC BLOOD PRESSURE: 131 MMHG | BODY MASS INDEX: 27.6 KG/M2 | HEART RATE: 71 BPM

## 2019-09-26 DIAGNOSIS — L08.9 INFECTED CYST OF SKIN: Primary | ICD-10-CM

## 2019-09-26 DIAGNOSIS — L72.9 INFECTED CYST OF SKIN: Primary | ICD-10-CM

## 2019-09-26 PROCEDURE — 3075F PR MOST RECENT SYSTOLIC BLOOD PRESS GE 130-139MM HG: ICD-10-PCS | Mod: HCNC,CPTII,S$GLB, | Performed by: SURGERY

## 2019-09-26 PROCEDURE — 99211 OFF/OP EST MAY X REQ PHY/QHP: CPT | Mod: HCNC,S$GLB,, | Performed by: SURGERY

## 2019-09-26 PROCEDURE — 3075F SYST BP GE 130 - 139MM HG: CPT | Mod: HCNC,CPTII,S$GLB, | Performed by: SURGERY

## 2019-09-26 PROCEDURE — 3078F DIAST BP <80 MM HG: CPT | Mod: HCNC,CPTII,S$GLB, | Performed by: SURGERY

## 2019-09-26 PROCEDURE — 99999 PR PBB SHADOW E&M-EST. PATIENT-LVL III: ICD-10-PCS | Mod: PBBFAC,HCNC,, | Performed by: SURGERY

## 2019-09-26 PROCEDURE — 99211 PR OFFICE/OUTPT VISIT, EST, LEVL I: ICD-10-PCS | Mod: HCNC,S$GLB,, | Performed by: SURGERY

## 2019-09-26 PROCEDURE — 99999 PR PBB SHADOW E&M-EST. PATIENT-LVL III: CPT | Mod: PBBFAC,HCNC,, | Performed by: SURGERY

## 2019-09-26 PROCEDURE — 3078F PR MOST RECENT DIASTOLIC BLOOD PRESSURE < 80 MM HG: ICD-10-PCS | Mod: HCNC,CPTII,S$GLB, | Performed by: SURGERY

## 2019-09-26 NOTE — LETTER
September 26, 2019      Cade Juan MD  [address] [facility] [address]  [facility] [address]           [facility]  [address] [place] [address]  Phone: [phone]          Patient: Stu Garcia   MR Number: [id number]   YOB: 1945   Date of Visit: 9/26/2019       Dear Dr. Cade Juan:    Thank you for referring Stu Garcia to me for evaluation. Attached you will find relevant portions of my assessment and plan of care.    If you have questions, please do not hesitate to call me. I look forward to following Stu Garcia along with you.    Sincerely,    Nahum Zaidi MD    Enclosure  CC:  No Recipients    If you would like to receive this communication electronically, please contact [email or web address] or [phone] to request more information on [employer] access.    For providers and/or their staff who would like to refer a patient to [facility], please contact us through our one-stop-shop provider referral line, [facility] , at [phone].    If you feel you have received this communication in error or would no longer like to receive these types of communications, please e-mail [email or web address]

## 2019-09-26 NOTE — PROGRESS NOTES
72 yo M whom I familiar with.  Pt had port placed by me in 3/19 for chemo for esophageal cancer.  He has since had a pull through procedure. Pt presents with a wound on his upper back.  He notes that he has had a cyst there for years.  Pt states about 3 weeks ago it began draining.  It was painful but now is not painful.  He has no other complaints.  No fever/chills.       AFVSS  AAox3  Softr/nt/nd  Back: cyst, infected inupper back.  Well drained.  No signs of fluctuance or abscess.      A/P: infected cyst.    Cyst adequately drained at present.  Recommend observation and continued wound care.  Pt will f/u with me prn

## 2019-10-07 ENCOUNTER — OFFICE VISIT (OUTPATIENT)
Dept: SURGICAL ONCOLOGY | Facility: CLINIC | Age: 74
End: 2019-10-07
Payer: MEDICARE

## 2019-10-07 VITALS
SYSTOLIC BLOOD PRESSURE: 134 MMHG | TEMPERATURE: 98 F | HEIGHT: 76 IN | HEART RATE: 75 BPM | DIASTOLIC BLOOD PRESSURE: 69 MMHG | BODY MASS INDEX: 27.09 KG/M2 | WEIGHT: 222.44 LBS

## 2019-10-07 DIAGNOSIS — C16.0 GE JUNCTION CARCINOMA: Primary | ICD-10-CM

## 2019-10-07 PROCEDURE — 3078F DIAST BP <80 MM HG: CPT | Mod: HCNC,CPTII,S$GLB, | Performed by: SURGERY

## 2019-10-07 PROCEDURE — 99999 PR PBB SHADOW E&M-EST. PATIENT-LVL III: CPT | Mod: PBBFAC,HCNC,, | Performed by: SURGERY

## 2019-10-07 PROCEDURE — 99999 PR PBB SHADOW E&M-EST. PATIENT-LVL III: ICD-10-PCS | Mod: PBBFAC,HCNC,, | Performed by: SURGERY

## 2019-10-07 PROCEDURE — 3075F SYST BP GE 130 - 139MM HG: CPT | Mod: HCNC,CPTII,S$GLB, | Performed by: SURGERY

## 2019-10-07 PROCEDURE — 1101F PT FALLS ASSESS-DOCD LE1/YR: CPT | Mod: HCNC,CPTII,S$GLB, | Performed by: SURGERY

## 2019-10-07 PROCEDURE — 3075F PR MOST RECENT SYSTOLIC BLOOD PRESS GE 130-139MM HG: ICD-10-PCS | Mod: HCNC,CPTII,S$GLB, | Performed by: SURGERY

## 2019-10-07 PROCEDURE — 99213 OFFICE O/P EST LOW 20 MIN: CPT | Mod: HCNC,S$GLB,, | Performed by: SURGERY

## 2019-10-07 PROCEDURE — 99213 PR OFFICE/OUTPT VISIT, EST, LEVL III, 20-29 MIN: ICD-10-PCS | Mod: HCNC,S$GLB,, | Performed by: SURGERY

## 2019-10-07 PROCEDURE — 1101F PR PT FALLS ASSESS DOC 0-1 FALLS W/OUT INJ PAST YR: ICD-10-PCS | Mod: HCNC,CPTII,S$GLB, | Performed by: SURGERY

## 2019-10-07 PROCEDURE — 3078F PR MOST RECENT DIASTOLIC BLOOD PRESSURE < 80 MM HG: ICD-10-PCS | Mod: HCNC,CPTII,S$GLB, | Performed by: SURGERY

## 2019-10-07 NOTE — LETTER
October 7, 2019        Clinton Andrews MD  [address] [facility] [address] [place] [address]             [facility]  [address] [facility] [facility] [address]  [facility] [address]  Phone: [phone]  Fax: [phone]   Patient: Stu Garcia   MR Number: [id number]   YOB: 1945   Date of Visit: 10/7/2019       Dear Dr. Andrews:    Thank you for referring Stu Garcia to me for evaluation. Attached you will find relevant portions of my assessment and plan of care.    If you have questions, please do not hesitate to call me. I look forward to following Stu Garcia along with you.    Sincerely,      Chad Milian MD            CC  No Recipients    Enclosure

## 2019-10-07 NOTE — PROGRESS NOTES
"S/p RATLE 7/2/19 after NACR; path: yTisN2 with 3/39 nodes positive    Overall, he is doing well. No swallowing restriction; he reports that is swallowing is getting better with time. Gets full quickly. BMs are "a little sluggish"      Wt Readings from Last 4 Encounters:   10/07/19 100.9 kg (222 lb 7.1 oz)   09/26/19 102.8 kg (226 lb 10.1 oz)   09/11/19 102.6 kg (226 lb 3.1 oz)   08/21/19 102.4 kg (225 lb 12 oz)     Vitals:    10/07/19 1305   BP: 134/69   Pulse: 75   Temp: 98.3 °F (36.8 °C)     WD WN man in NAD  Neck supple  Chest clear  Heart: RR&R with no m, r, g  Abd: soft, well-healed incisions    Imp:  Doing well    Rec:  RTC prn  F/u with oncology (he sees Dr Andrews later this month)  "

## 2019-10-08 ENCOUNTER — PES CALL (OUTPATIENT)
Dept: ADMINISTRATIVE | Facility: CLINIC | Age: 74
End: 2019-10-08

## 2019-10-09 ENCOUNTER — OFFICE VISIT (OUTPATIENT)
Dept: CARDIOLOGY | Facility: CLINIC | Age: 74
End: 2019-10-09
Payer: MEDICARE

## 2019-10-09 VITALS
BODY MASS INDEX: 27.38 KG/M2 | HEART RATE: 78 BPM | HEIGHT: 76 IN | WEIGHT: 224.88 LBS | DIASTOLIC BLOOD PRESSURE: 65 MMHG | SYSTOLIC BLOOD PRESSURE: 105 MMHG

## 2019-10-09 DIAGNOSIS — I25.110 CORONARY ARTERY DISEASE INVOLVING NATIVE CORONARY ARTERY OF NATIVE HEART WITH UNSTABLE ANGINA PECTORIS: ICD-10-CM

## 2019-10-09 DIAGNOSIS — E78.5 DYSLIPIDEMIA: ICD-10-CM

## 2019-10-09 DIAGNOSIS — I10 ESSENTIAL HYPERTENSION: ICD-10-CM

## 2019-10-09 DIAGNOSIS — I48.0 PAF (PAROXYSMAL ATRIAL FIBRILLATION): Primary | ICD-10-CM

## 2019-10-09 PROCEDURE — 99214 PR OFFICE/OUTPT VISIT, EST, LEVL IV, 30-39 MIN: ICD-10-PCS | Mod: HCNC,S$GLB,, | Performed by: INTERNAL MEDICINE

## 2019-10-09 PROCEDURE — 99499 UNLISTED E&M SERVICE: CPT | Mod: HCNC,S$GLB,, | Performed by: INTERNAL MEDICINE

## 2019-10-09 PROCEDURE — 3074F SYST BP LT 130 MM HG: CPT | Mod: HCNC,CPTII,S$GLB, | Performed by: INTERNAL MEDICINE

## 2019-10-09 PROCEDURE — 99999 PR PBB SHADOW E&M-EST. PATIENT-LVL III: ICD-10-PCS | Mod: PBBFAC,HCNC,, | Performed by: INTERNAL MEDICINE

## 2019-10-09 PROCEDURE — 99499 RISK ADDL DX/OHS AUDIT: ICD-10-PCS | Mod: HCNC,S$GLB,, | Performed by: INTERNAL MEDICINE

## 2019-10-09 PROCEDURE — 99999 PR PBB SHADOW E&M-EST. PATIENT-LVL III: CPT | Mod: PBBFAC,HCNC,, | Performed by: INTERNAL MEDICINE

## 2019-10-09 PROCEDURE — 1101F PR PT FALLS ASSESS DOC 0-1 FALLS W/OUT INJ PAST YR: ICD-10-PCS | Mod: HCNC,CPTII,S$GLB, | Performed by: INTERNAL MEDICINE

## 2019-10-09 PROCEDURE — 3078F DIAST BP <80 MM HG: CPT | Mod: HCNC,CPTII,S$GLB, | Performed by: INTERNAL MEDICINE

## 2019-10-09 PROCEDURE — 1101F PT FALLS ASSESS-DOCD LE1/YR: CPT | Mod: HCNC,CPTII,S$GLB, | Performed by: INTERNAL MEDICINE

## 2019-10-09 PROCEDURE — 3078F PR MOST RECENT DIASTOLIC BLOOD PRESSURE < 80 MM HG: ICD-10-PCS | Mod: HCNC,CPTII,S$GLB, | Performed by: INTERNAL MEDICINE

## 2019-10-09 PROCEDURE — 99214 OFFICE O/P EST MOD 30 MIN: CPT | Mod: HCNC,S$GLB,, | Performed by: INTERNAL MEDICINE

## 2019-10-09 PROCEDURE — 3074F PR MOST RECENT SYSTOLIC BLOOD PRESSURE < 130 MM HG: ICD-10-PCS | Mod: HCNC,CPTII,S$GLB, | Performed by: INTERNAL MEDICINE

## 2019-10-09 NOTE — PROGRESS NOTES
Subjective:    Patient ID:  Stu Garcia is a 74 y.o. male who presents for follow-up of cad    HPI  He comes for follow up with no major problems, no chest pain, no shortness of breath.  FC II    Review of Systems   Constitution: Negative for decreased appetite, malaise/fatigue, weight gain and weight loss.   Cardiovascular: Negative for chest pain, dyspnea on exertion, leg swelling, palpitations and syncope.   Respiratory: Negative for cough and shortness of breath.    Gastrointestinal: Negative.    Neurological: Negative for weakness.   All other systems reviewed and are negative.       Objective:      Physical Exam   Constitutional: He is oriented to person, place, and time. He appears well-developed and well-nourished.   HENT:   Head: Normocephalic.   Eyes: Pupils are equal, round, and reactive to light.   Neck: Normal range of motion. Neck supple. No JVD present. Carotid bruit is not present. No thyromegaly present.   Cardiovascular: Normal rate, regular rhythm, normal heart sounds, intact distal pulses and normal pulses. PMI is not displaced. Exam reveals no gallop.   No murmur heard.  Pulmonary/Chest: Effort normal and breath sounds normal.   Abdominal: Soft. Normal appearance. He exhibits no mass. There is no hepatosplenomegaly. There is no tenderness.   Musculoskeletal: Normal range of motion. He exhibits no edema.   Neurological: He is alert and oriented to person, place, and time. He has normal strength and normal reflexes. No sensory deficit.   Skin: Skin is warm and intact.   Psychiatric: He has a normal mood and affect.   Nursing note and vitals reviewed.        Assessment:       1. PAF (paroxysmal atrial fibrillation)    2. Essential hypertension    3. Dyslipidemia    4. Coronary artery disease involving native coronary artery of native heart with unstable angina pectoris         Plan:     Continue all cardiac medications  Regular exercise program  Weight loss  9 m f/u

## 2019-10-21 ENCOUNTER — INFUSION (OUTPATIENT)
Dept: INFUSION THERAPY | Facility: HOSPITAL | Age: 74
End: 2019-10-21
Attending: INTERNAL MEDICINE
Payer: MEDICARE

## 2019-10-21 ENCOUNTER — OFFICE VISIT (OUTPATIENT)
Dept: HEMATOLOGY/ONCOLOGY | Facility: CLINIC | Age: 74
End: 2019-10-21
Payer: MEDICARE

## 2019-10-21 ENCOUNTER — LAB VISIT (OUTPATIENT)
Dept: LAB | Facility: HOSPITAL | Age: 74
End: 2019-10-21
Attending: INTERNAL MEDICINE
Payer: MEDICARE

## 2019-10-21 VITALS
RESPIRATION RATE: 18 BRPM | SYSTOLIC BLOOD PRESSURE: 115 MMHG | OXYGEN SATURATION: 98 % | DIASTOLIC BLOOD PRESSURE: 63 MMHG | BODY MASS INDEX: 26.32 KG/M2 | WEIGHT: 222.88 LBS | TEMPERATURE: 99 F | HEIGHT: 77 IN | HEART RATE: 81 BPM

## 2019-10-21 DIAGNOSIS — C16.0 GE JUNCTION CARCINOMA: Primary | ICD-10-CM

## 2019-10-21 DIAGNOSIS — C16.0 GE JUNCTION CARCINOMA: ICD-10-CM

## 2019-10-21 DIAGNOSIS — D64.81 ANEMIA ASSOCIATED WITH CHEMOTHERAPY: ICD-10-CM

## 2019-10-21 DIAGNOSIS — L03.818 CELLULITIS OF OTHER SPECIFIED SITE: ICD-10-CM

## 2019-10-21 DIAGNOSIS — T45.1X5A ANEMIA ASSOCIATED WITH CHEMOTHERAPY: ICD-10-CM

## 2019-10-21 LAB
ALBUMIN SERPL BCP-MCNC: 3.7 G/DL (ref 3.5–5.2)
ALP SERPL-CCNC: 137 U/L (ref 38–145)
ALT SERPL W/O P-5'-P-CCNC: 16 U/L (ref 10–44)
ANION GAP SERPL CALC-SCNC: 5 MMOL/L (ref 8–16)
AST SERPL-CCNC: 22 U/L (ref 17–59)
BASOPHILS # BLD AUTO: 0.03 K/UL (ref 0–0.2)
BASOPHILS NFR BLD: 0.6 % (ref 0–1.9)
BILIRUB SERPL-MCNC: 0.7 MG/DL (ref 0.2–1.3)
BUN SERPL-MCNC: 19 MG/DL (ref 9–21)
CALCIUM SERPL-MCNC: 9.4 MG/DL (ref 8.4–10.2)
CHLORIDE SERPL-SCNC: 107 MMOL/L (ref 95–110)
CO2 SERPL-SCNC: 29 MMOL/L (ref 22–31)
CREAT SERPL-MCNC: 0.94 MG/DL (ref 0.5–1.4)
DIFFERENTIAL METHOD: ABNORMAL
EOSINOPHIL # BLD AUTO: 0.2 K/UL (ref 0–0.5)
EOSINOPHIL NFR BLD: 4.3 % (ref 0–8)
ERYTHROCYTE [DISTWIDTH] IN BLOOD BY AUTOMATED COUNT: 13.6 % (ref 11.5–14.5)
EST. GFR  (AFRICAN AMERICAN): >60 ML/MIN/1.73 M^2
EST. GFR  (NON AFRICAN AMERICAN): >60 ML/MIN/1.73 M^2
GLUCOSE SERPL-MCNC: 171 MG/DL (ref 70–110)
HCT VFR BLD AUTO: 40.1 % (ref 40–54)
HGB BLD-MCNC: 13 G/DL (ref 14–18)
IMM GRANULOCYTES # BLD AUTO: 0.01 K/UL (ref 0–0.04)
IMM GRANULOCYTES NFR BLD AUTO: 0.2 % (ref 0–0.5)
LDH SERPL L TO P-CCNC: 308 U/L (ref 313–618)
LYMPHOCYTES # BLD AUTO: 0.4 K/UL (ref 1–4.8)
LYMPHOCYTES NFR BLD: 7.7 % (ref 18–48)
MCH RBC QN AUTO: 32.2 PG (ref 27–31)
MCHC RBC AUTO-ENTMCNC: 32.4 G/DL (ref 32–36)
MCV RBC AUTO: 99 FL (ref 82–98)
MONOCYTES # BLD AUTO: 0.4 K/UL (ref 0.3–1)
MONOCYTES NFR BLD: 7.9 % (ref 4–15)
NEUTROPHILS # BLD AUTO: 4.2 K/UL (ref 1.8–7.7)
NEUTROPHILS NFR BLD: 79.3 % (ref 38–73)
NRBC BLD-RTO: 0 /100 WBC
PLATELET # BLD AUTO: 118 K/UL (ref 150–350)
PMV BLD AUTO: 10.7 FL (ref 9.2–12.9)
POTASSIUM SERPL-SCNC: 4.5 MMOL/L (ref 3.5–5.1)
PROT SERPL-MCNC: 6.7 G/DL (ref 6–8.4)
RBC # BLD AUTO: 4.04 M/UL (ref 4.6–6.2)
SODIUM SERPL-SCNC: 141 MMOL/L (ref 136–145)
WBC # BLD AUTO: 5.3 K/UL (ref 3.9–12.7)

## 2019-10-21 PROCEDURE — 1101F PR PT FALLS ASSESS DOC 0-1 FALLS W/OUT INJ PAST YR: ICD-10-PCS | Mod: HCNC,CPTII,S$GLB, | Performed by: INTERNAL MEDICINE

## 2019-10-21 PROCEDURE — A4216 STERILE WATER/SALINE, 10 ML: HCPCS | Mod: HCNC,PN | Performed by: INTERNAL MEDICINE

## 2019-10-21 PROCEDURE — 99999 PR PBB SHADOW E&M-EST. PATIENT-LVL V: ICD-10-PCS | Mod: PBBFAC,HCNC,, | Performed by: INTERNAL MEDICINE

## 2019-10-21 PROCEDURE — 99999 PR PBB SHADOW E&M-EST. PATIENT-LVL V: CPT | Mod: PBBFAC,HCNC,, | Performed by: INTERNAL MEDICINE

## 2019-10-21 PROCEDURE — 96523 IRRIG DRUG DELIVERY DEVICE: CPT | Mod: HCNC,PN

## 2019-10-21 PROCEDURE — 85025 COMPLETE CBC W/AUTO DIFF WBC: CPT | Mod: PN

## 2019-10-21 PROCEDURE — 83615 LACTATE (LD) (LDH) ENZYME: CPT | Mod: PN

## 2019-10-21 PROCEDURE — 25000003 PHARM REV CODE 250: Mod: HCNC,PN | Performed by: INTERNAL MEDICINE

## 2019-10-21 PROCEDURE — 3074F SYST BP LT 130 MM HG: CPT | Mod: HCNC,CPTII,S$GLB, | Performed by: INTERNAL MEDICINE

## 2019-10-21 PROCEDURE — 80053 COMPREHEN METABOLIC PANEL: CPT

## 2019-10-21 PROCEDURE — 99213 PR OFFICE/OUTPT VISIT, EST, LEVL III, 20-29 MIN: ICD-10-PCS | Mod: HCNC,S$GLB,, | Performed by: INTERNAL MEDICINE

## 2019-10-21 PROCEDURE — 3078F DIAST BP <80 MM HG: CPT | Mod: HCNC,CPTII,S$GLB, | Performed by: INTERNAL MEDICINE

## 2019-10-21 PROCEDURE — 99213 OFFICE O/P EST LOW 20 MIN: CPT | Mod: HCNC,S$GLB,, | Performed by: INTERNAL MEDICINE

## 2019-10-21 PROCEDURE — 85025 COMPLETE CBC W/AUTO DIFF WBC: CPT

## 2019-10-21 PROCEDURE — 3078F PR MOST RECENT DIASTOLIC BLOOD PRESSURE < 80 MM HG: ICD-10-PCS | Mod: HCNC,CPTII,S$GLB, | Performed by: INTERNAL MEDICINE

## 2019-10-21 PROCEDURE — 36415 COLL VENOUS BLD VENIPUNCTURE: CPT | Mod: HCNC,PN

## 2019-10-21 PROCEDURE — 83615 LACTATE (LD) (LDH) ENZYME: CPT

## 2019-10-21 PROCEDURE — 3074F PR MOST RECENT SYSTOLIC BLOOD PRESSURE < 130 MM HG: ICD-10-PCS | Mod: HCNC,CPTII,S$GLB, | Performed by: INTERNAL MEDICINE

## 2019-10-21 PROCEDURE — 80053 COMPREHEN METABOLIC PANEL: CPT | Mod: PN

## 2019-10-21 PROCEDURE — 1101F PT FALLS ASSESS-DOCD LE1/YR: CPT | Mod: HCNC,CPTII,S$GLB, | Performed by: INTERNAL MEDICINE

## 2019-10-21 PROCEDURE — 63600175 PHARM REV CODE 636 W HCPCS: Mod: HCNC,PN | Performed by: INTERNAL MEDICINE

## 2019-10-21 RX ORDER — SODIUM CHLORIDE 0.9 % (FLUSH) 0.9 %
10 SYRINGE (ML) INJECTION
Status: CANCELLED | OUTPATIENT
Start: 2019-10-21

## 2019-10-21 RX ORDER — HEPARIN 100 UNIT/ML
500 SYRINGE INTRAVENOUS
Status: CANCELLED | OUTPATIENT
Start: 2019-10-21

## 2019-10-21 RX ORDER — OMEPRAZOLE 40 MG/1
40 CAPSULE, DELAYED RELEASE ORAL DAILY
Qty: 90 CAPSULE | Refills: 3 | Status: SHIPPED | OUTPATIENT
Start: 2019-10-21 | End: 2020-10-05

## 2019-10-21 RX ORDER — SODIUM CHLORIDE 0.9 % (FLUSH) 0.9 %
10 SYRINGE (ML) INJECTION
Status: COMPLETED | OUTPATIENT
Start: 2019-10-21 | End: 2019-10-21

## 2019-10-21 RX ORDER — HEPARIN 100 UNIT/ML
500 SYRINGE INTRAVENOUS
Status: COMPLETED | OUTPATIENT
Start: 2019-10-21 | End: 2019-10-21

## 2019-10-21 RX ADMIN — HEPARIN SODIUM (PORCINE) LOCK FLUSH IV SOLN 100 UNIT/ML 500 UNITS: 100 SOLUTION at 02:10

## 2019-10-21 RX ADMIN — Medication 10 ML: at 02:10

## 2019-10-21 NOTE — PROGRESS NOTES
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  The patient is 3 months postop, recovering well, has had feeding tube removed, and is actively exercising.  Weight is diminishing due to patient's reduced stomach capacity. He feels that he eats approximately 50% of his preoperative volumes.  Patient denies abdominal pain, nausea, vomiting, constipation, etc.  No other pertinent findings on a 14 point review of systems.    Physical examination:  Well-developed, obese, white gentleman, in no acute distress, with a weight of 222.5 lb (decreased by 13 lb).  VITAL SIGNS: Documented  and reviewed this visit.  HEENT: Normocephalic, atraumatic. Oral mucosa pink and moist. Lips without   lesions. Tongue midline. Oropharynx clear. Nonicteric sclerae.   NECK: Supple, no adenopathy. No carotid bruits, thyromegaly or thyroid nodule.   HEART: Regular rate and rhythm without murmur, gallop or rub.   LUNGS: Clear to auscultation bilaterally. Normal respiratory effort.   ABDOMEN: Soft, nontender, nondistended with positive normoactive bowel sounds, some erythema and crusty secretion about the insertion site of patient's feeding tube noted,  no hepatosplenomegaly.   EXTREMITIES: No cyanosis, clubbing or edema. Distal pulses are intact.   AXILLAE AND GROIN: No palpable pathologic lymphadenopathy is appreciated.   SKIN: Intact/turgor normal   NEUROLOGIC: Cranial nerves II-XII grossly intact. Motor: Good muscle bulk and   tone. Strength/sensory 5/5 throughout. Gait stable.     Laboratory:  White count 5.3, hemoglobin 13, hematocrit 40.1, platelets 118, absolute neutrophil count 4200.  Sodium 141, potassium 4.5, chloride 107, CO2 29, BUN 19, creatinine 0.9,  glucose 171, calcium 9.4, liver function test within normal limits, , GFR is greater than 60.    Impression:  1.  Adenocarcinoma of the GE junction (T2, N0, M0).  2.  Chemotherapy associated anemia-improved.  3.  Weight loss.    Plan:  1.  Continue observation.  2.  Should patient continued to experience weight loss, I will reimage to assess for the possibility of occult metastatic disease.    3.  Return to clinic 3 months from now with interval CBC, CMP, LDH.  4.  Survivorship clinic referral.    This note was created using voice recognition software and may contain grammatical errors.

## 2019-10-22 DIAGNOSIS — I25.110 CORONARY ARTERY DISEASE INVOLVING NATIVE CORONARY ARTERY OF NATIVE HEART WITH UNSTABLE ANGINA PECTORIS: ICD-10-CM

## 2019-10-23 RX ORDER — ATORVASTATIN CALCIUM 80 MG/1
TABLET, FILM COATED ORAL
Qty: 90 TABLET | Refills: 0 | Status: SHIPPED | OUTPATIENT
Start: 2019-10-23 | End: 2020-01-09

## 2019-10-26 NOTE — PROGRESS NOTES
DATE OF CONSULTATION:  10/25/2019    REFERRING Physician:  Mariely Huddleston DO    REASON FOR CONSULTATION:  Acute kidney injury with electrolyte disturbance.    HISTORY OF PRESENT ILLNESS:  This is a 67-year-old white male with history of hypertension, diabetes, depression, degenerative joint disease, who is admitted with abnormal outpatient labs.  The patient apparently started to have slowly progressive confusion over the last year or so, more so over the last several months.  On Monday of this week, the patient started to have multiple episodes of emesis after working outside for a while.  This  seemed to have improved to some degree, but he started having incessant hiccups.  He went to see his primary care physician and had some workup done.  EKG was apparently negative.  Outpatient labs were ordered.  The patient was started on Zofran and chlorpromazine for the hiccups.  Subsequently, PCPs office called the patient and told him that the sodium level was 119 and that he should go to the emergency room.  It is unclear if even received  any doses of chlorpromazine and Zofran prior to this admission.  In the emergency room, sodium was found to be 123 and he did have some worsened kidney function.  He was started on some IV fluids and admitted to the floor.  Overnight, the patient has been hemodynamically stable.  At the time of evaluation, the patient is awake and alert.  He is pleasant.  He is resting in bed.  He does not remember his entire medical history.  His wife is  present at bedside and provides much of the remaining history.  The patient currently does not appear to be in distress.  He states that the nausea has improved.  He actually tolerated food this morning for breakfast.  He denies any dizziness currently, although he may have had a little bit of dizziness at home.  He denies other complaints currently.    REVIEW OF SYSTEMS:  The patient denies any nausea or vomiting.  No headaches or blurred  Subjective:    Patient ID:  Stu Garcia is a 73 y.o. male who presents for follow-up of cad    HPI  He comes for follow up with no major problems, no chest pain, no shortness of breath.  Currently dealing with esophageal cancer, getting radiation and chemo, potential resection later this summer  Not taking plavix, xarelto, asa    Review of Systems   Constitution: Negative for decreased appetite, malaise/fatigue, weight gain and weight loss.   Cardiovascular: Negative for chest pain, dyspnea on exertion, leg swelling, palpitations and syncope.   Respiratory: Negative for cough and shortness of breath.    Gastrointestinal: Negative.    Neurological: Negative for weakness.   All other systems reviewed and are negative.       Objective:      Physical Exam   Constitutional: He is oriented to person, place, and time. He appears well-developed and well-nourished.   HENT:   Head: Normocephalic.   Eyes: Pupils are equal, round, and reactive to light.   Neck: Normal range of motion. Neck supple. No JVD present. Carotid bruit is not present. No thyromegaly present.   Cardiovascular: Normal rate, regular rhythm, normal heart sounds, intact distal pulses and normal pulses. PMI is not displaced. Exam reveals no gallop.   No murmur heard.  Pulmonary/Chest: Effort normal and breath sounds normal.   Abdominal: Soft. Normal appearance. He exhibits no mass. There is no hepatosplenomegaly. There is no tenderness.   Musculoskeletal: Normal range of motion. He exhibits no edema.   Neurological: He is alert and oriented to person, place, and time. He has normal strength and normal reflexes. No sensory deficit.   Skin: Skin is warm and intact.   Psychiatric: He has a normal mood and affect.   Nursing note and vitals reviewed.        Assessment:       1. PAF (paroxysmal atrial fibrillation)    2. Hyperlipidemia, unspecified hyperlipidemia type    3. Essential hypertension    4. Coronary artery disease involving native coronary artery of  native heart with unstable angina pectoris         Plan:     Continue all cardiac medications  Regular exercise program  6 m f/u         vision.  No dizziness.  No chest pain.  His breathing is comfortable.  He denies any abdominal pain or diarrhea.  No dysuria or hematuria.  His appetite had been somewhat low.  Seems to be getting better.  He denies any new rash.  He denies any new joint pains, but he does have some mild chronic joint discomfort.    PAST MEDICAL HISTORY:  Significant for the above.    PAST SURGICAL HISTORY:  Significant for cholecystectomy and retinal tear.    SOCIAL HISTORY:  Negative for alcohol, tobacco, illicit drug use.    FAMILY HISTORY:  Negative.    ALLERGIES:  HE IS NOT KNOWN TO HAVE ANY DRUG ALLERGIES.    MEDICATIONS:  At home include atorvastatin, escitalopram, hydrochlorothiazide with lisinopril, meloxicam, metformin, ondansetron and chlorpromazine which was started 1 day prior to admission.  He does take Aleve intermittently.    PHYSICAL EXAMINATION:    VITAL SIGNS:  He is afebrile with heart rate in the 80s, respiratory rate is 18, blood pressure is 121/70.  I's and O's yesterday, 1800 in, 800 out.     HEENT:  No frontomaxillary sinus tenderness.  No nasal congestion.  No posterior pharyngeal wall erythema or exudate.     NECK:  Supple.  No JVP elevation.  Good carotid upstroke and volume.     HEART:  S1, S2.  No gallops or rubs.     LUNGS:  Clear to auscultation.  No rales or wheezes.  Good inspiratory effort.     ABDOMEN:  Soft, nontender, nondistended.  Bowel sounds are present.  No bruits in all quadrants.  No hepatosplenomegaly.  No bladder distention per percussion.     EXTREMITIES:  No edema x4.     SKIN:  No rash.     JOINT:  No active synovitis.     NEUROLOGICAL:  Nonfocal.     GENERAL:  The patient is resting comfortably in bed.  He does not appear to be in acute distress at this time.    LABORATORY DATA:  At admission yesterday; sodium 123, potassium 3.7, chloride 86, bicarbonate 26, BUN 34, creatinine 1.57.  This morning, creatinine is 1.27 with a sodium of 131.  White count 10.1, H and H 12.5 and 36,  platelet count is 309.  There is no peripheral eosinophilia.  Chest x-ray yesterday was negative.  CT brain yesterday was negative.    IMPRESSION:  This is a 67-year-old white male with history of hypertension, diabetes, depression, degenerative joint disease, who is admitted with abnormal labs.  1. He has acute kidney injury.  This is most likely secondary to volume contraction while on ACE inhibitor with diuretics and taking multiple nonsteroidals.  This is improving at this point.  2. Hyponatremia.  This is most likely acute and likely secondary to volume contraction versus hydrochlorothiazide versus multiple nonsteroidals versus escitalopram.  This is also improving.  3. Hypertension.  Seems to be under reasonable control.  4. Volume status.  The patient appeared to be volume contracted.  At the time of admission, he appears to be approaching euvolemia at this time.    PLAN:  At this time;  1. Check urinalysis and electrolytes.  2. Check serum and urine osmolality.  3. Check serum uric acid level.  4. We will discontinue hydrochlorothiazide.  5. We will discontinue meloxicam and other nonsteroidals.  The patient advised to take Tylenol.  6. We will stop IV fluids at this time as the patient is taking oral diet and sodium has improved.  7. We will continue with escitalopram for now.  If hyponatremia recurs after above changes, would then consider alternative to escitalopram.  8. Serial chemistries.  9. Avoid nephrotoxins.  10. Disposition.  Would plan for discharge tomorrow if the patient able to keep food down and chemistries continue to improve without IV fluids.  This was discussed with the patient and family at length.  All questions answered in detail.  Thank you very much for the consultation.        ______________________________  Adrián Luna MD  [id number]      D:  10/25/2019 12:28:45  T:  10/25/2019 19:10:15   /[facility]   Job:  [id number]

## 2019-10-31 ENCOUNTER — PATIENT OUTREACH (OUTPATIENT)
Dept: ADMINISTRATIVE | Facility: HOSPITAL | Age: 74
End: 2019-10-31

## 2019-10-31 NOTE — PROGRESS NOTES
Health Maintenance Due   Topic Date Due    Shingles Vaccine (2 of 3) 10/14/2014    Influenza Vaccine (1) 09/01/2019     Future Appointments   Date Time Provider Department Center   11/7/2019 10:15 AM LAB, [facility] LAB [place]   11/14/2019  1:00 PM ANNUAL WELLNESS VISIT-NURSE PRACTITIONER, [place] [facility]   11/14/2019  2:40 PM Cade Juan MD [facility]   12/2/2019  2:00 PM INJECTION SCHEDULE, [facility] CHEMOTHERAPY [facility] INFUS [facility]   12/23/2019  9:00 AM Omari Rose DO [facility] ENDOCRN [place]   1/21/2020  1:40 PM Clinton Andrews MD [facility] HEMONC [facility]   7/29/2020  8:45 AM Eduard Cano MD [facility] CARDIO [place]

## 2019-11-04 DIAGNOSIS — E11.69 TYPE 2 DIABETES MELLITUS WITH HYPERLIPIDEMIA: ICD-10-CM

## 2019-11-04 DIAGNOSIS — E78.5 TYPE 2 DIABETES MELLITUS WITH HYPERLIPIDEMIA: ICD-10-CM

## 2019-11-05 NOTE — PROGRESS NOTES
Refill Authorization Note     is requesting a refill authorization.    Brief assessment and rationale for refill: APPROVE: [id number]               Comments:   Requested Prescriptions   Pending Prescriptions Disp Refills    ACCU-CHEK SHASHANK PLUS TEST STRP Strp [Pharmacy Med Name: ACCU-CHEK SHASHANK PLUS STRIPS 100'S] 100 strip 3     Sig: TEST ONCE DAILY       Endocrinology: Diabetes - Supplies Passed - 11/4/2019  4:32 PM        Passed - Patient is at least 18 years old        Passed - Office visit in past 12 months or future 90 days     Recent Outpatient Visits            2 weeks ago GE junction carcinoma    [facility] Clinton Andrews MD    3 weeks ago PAF (paroxysmal atrial fibrillation)    [facility] Cardiology Eduard Cano MD    4 weeks ago GE junction carcinoma    [facility] Chad Milian MD    1 month ago Infected cyst of skin    [facility] General Surgery Nahum Zaidi MD    1 month ago Infected sebaceous cyst    [facility] Cade Juan MD          Future Appointments              In 2 days LAB, [place] [facility]    In 1 week ANNUAL WELLNESS VISIT-NURSE PRACTITIONER, [place] [facility]    In 1 week Cade Juan MD [facility]    In 3 weeks INJECTION SCHEDULE, [facility] CHEMOTHERAPY [facility], [facility]    In 1 month Omari Rose DO [facility]    In 2 months Clinton Andrews MD [facility], [facility]    In 8 months Eduard Cano MD [facility]                Passed - HBA1C is 7.9 or below and within 180 days     Hemoglobin A1C   Date Value Ref Range Status   08/07/2019 7.8 (H) 4.0 - 5.6 % Final     Comment:     ADA Screening Guidelines:  5.7-6.4%  Consistent with prediabetes  >or=6.5%  Consistent with  diabetes  High levels of fetal hemoglobin interfere with the HbA1C  assay. Heterozygous hemoglobin variants (HbS, HgC, etc)do  not significantly interfere with this assay.   However, presence of multiple variants may affect accuracy.     2019 7.9 (H) 4.0 - 5.6 % Final     Comment:     ADA Screening Guidelines:  5.7-6.4%  Consistent with prediabetes  >or=6.5%  Consistent with diabetes  High levels of fetal hemoglobin interfere with the HbA1C  assay. Heterozygous hemoglobin variants (HbS, HgC, etc)do  not significantly interfere with this assay.   However, presence of multiple variants may affect accuracy.     2019 7.9 (H) 4.0 - 5.6 % Final     Comment:     ADA Screening Guidelines:  5.7-6.4%  Consistent with prediabetes  >or=6.5%  Consistent with diabetes  High levels of fetal hemoglobin interfere with the HbA1C  assay. Heterozygous hemoglobin variants (HbS, HgC, etc)do  not significantly interfere with this assay.   However, presence of multiple variants may affect accuracy.               lancets (ACCU-CHEK SOFTCLIX LANCETS) Misc 100 each 3     Si strip by Misc.(Non-Drug; Combo Route) route once daily.       There is no refill protocol information for this order

## 2019-11-05 NOTE — TELEPHONE ENCOUNTER
Call Documentation    Person Called: Stu Garcia Call Time: 12:08 PM   Spoke with: Stu and Ania(wife) Jose        Reason for call: Clarification- On whether the patient needs his Accu-Chek Sabrina Plus TS and Softclix Lancets  Patient stated: He needs refills on both.      Clarification details and Actions Taken: We will send refills on both (TS and lancets) to Mr. Garcia requested pharmacy.         Current Medication Requested:   Requested Prescriptions     Pending Prescriptions Disp Refills    ACCU-CHEK SABRINA PLUS TEST STRP Strp [Pharmacy Med Name: ACCU-CHEK SABRINA PLUS STRIPS 100'S] 100 strip 3     Sig: TEST ONCE DAILY    lancets (ACCU-CHEK SOFTCLIX LANCETS) Misc 100 each 3     Si strip by Misc.(Non-Drug; Combo Route) route once daily.

## 2019-11-06 ENCOUNTER — DOCUMENTATION ONLY (OUTPATIENT)
Dept: HEMATOLOGY/ONCOLOGY | Facility: CLINIC | Age: 74
End: 2019-11-06

## 2019-11-06 RX ORDER — LANCETS
1 EACH MISCELLANEOUS DAILY
Qty: 100 EACH | Refills: 3 | Status: SHIPPED | OUTPATIENT
Start: 2019-11-06 | End: 2021-03-02 | Stop reason: SDUPTHER

## 2019-11-06 RX ORDER — BLOOD SUGAR DIAGNOSTIC
STRIP MISCELLANEOUS
Qty: 100 STRIP | Refills: 3 | Status: SHIPPED | OUTPATIENT
Start: 2019-11-06 | End: 2021-03-02 | Stop reason: SDUPTHER

## 2019-11-07 ENCOUNTER — LAB VISIT (OUTPATIENT)
Dept: LAB | Facility: HOSPITAL | Age: 74
End: 2019-11-07
Attending: FAMILY MEDICINE
Payer: MEDICARE

## 2019-11-07 DIAGNOSIS — E11.9 TYPE 2 DIABETES MELLITUS TREATED WITHOUT INSULIN: ICD-10-CM

## 2019-11-07 LAB
ALBUMIN SERPL BCP-MCNC: 3.4 G/DL (ref 3.5–5.2)
ALP SERPL-CCNC: 155 U/L (ref 55–135)
ALT SERPL W/O P-5'-P-CCNC: 16 U/L (ref 10–44)
ANION GAP SERPL CALC-SCNC: 7 MMOL/L (ref 8–16)
AST SERPL-CCNC: 17 U/L (ref 10–40)
BILIRUB SERPL-MCNC: 0.7 MG/DL (ref 0.1–1)
BUN SERPL-MCNC: 12 MG/DL (ref 8–23)
CALCIUM SERPL-MCNC: 9.5 MG/DL (ref 8.7–10.5)
CHLORIDE SERPL-SCNC: 103 MMOL/L (ref 95–110)
CO2 SERPL-SCNC: 28 MMOL/L (ref 23–29)
CREAT SERPL-MCNC: 1.1 MG/DL (ref 0.5–1.4)
EST. GFR  (AFRICAN AMERICAN): >60 ML/MIN/1.73 M^2
EST. GFR  (NON AFRICAN AMERICAN): >60 ML/MIN/1.73 M^2
ESTIMATED AVG GLUCOSE: 180 MG/DL (ref 68–131)
GLUCOSE SERPL-MCNC: 193 MG/DL (ref 70–110)
HBA1C MFR BLD HPLC: 7.9 % (ref 4–5.6)
POTASSIUM SERPL-SCNC: 4.5 MMOL/L (ref 3.5–5.1)
PROT SERPL-MCNC: 6.5 G/DL (ref 6–8.4)
SODIUM SERPL-SCNC: 138 MMOL/L (ref 136–145)

## 2019-11-07 PROCEDURE — 80053 COMPREHEN METABOLIC PANEL: CPT | Mod: HCNC

## 2019-11-07 PROCEDURE — 83036 HEMOGLOBIN GLYCOSYLATED A1C: CPT | Mod: HCNC

## 2019-11-07 PROCEDURE — 36415 COLL VENOUS BLD VENIPUNCTURE: CPT | Mod: HCNC,PO

## 2019-11-13 NOTE — TELEPHONE ENCOUNTER
Refill Routing Note    Medication(s) are appropriate for refill Outside of protocol      Requested Prescriptions   Pending Prescriptions Disp Refills    rivaroxaban (XARELTO) 20 mg Tab 30 tablet 1     Sig: Take 1 tablet (20 mg total) by mouth daily with dinner or evening meal.       There is no refill protocol information for this order

## 2019-11-14 ENCOUNTER — OFFICE VISIT (OUTPATIENT)
Dept: FAMILY MEDICINE | Facility: CLINIC | Age: 74
End: 2019-11-14
Payer: MEDICARE

## 2019-11-14 ENCOUNTER — IMMUNIZATION (OUTPATIENT)
Dept: PHARMACY | Facility: CLINIC | Age: 74
End: 2019-11-14
Payer: MEDICARE

## 2019-11-14 VITALS
WEIGHT: 222 LBS | HEIGHT: 77 IN | BODY MASS INDEX: 26.21 KG/M2 | HEART RATE: 84 BPM | OXYGEN SATURATION: 97 % | DIASTOLIC BLOOD PRESSURE: 64 MMHG | SYSTOLIC BLOOD PRESSURE: 122 MMHG

## 2019-11-14 VITALS
SYSTOLIC BLOOD PRESSURE: 122 MMHG | BODY MASS INDEX: 26.21 KG/M2 | TEMPERATURE: 99 F | HEIGHT: 77 IN | HEART RATE: 84 BPM | OXYGEN SATURATION: 97 % | WEIGHT: 222 LBS | DIASTOLIC BLOOD PRESSURE: 64 MMHG

## 2019-11-14 DIAGNOSIS — D69.6 THROMBOCYTOPENIA: ICD-10-CM

## 2019-11-14 DIAGNOSIS — I10 ESSENTIAL HYPERTENSION: ICD-10-CM

## 2019-11-14 DIAGNOSIS — I48.0 PAF (PAROXYSMAL ATRIAL FIBRILLATION): ICD-10-CM

## 2019-11-14 DIAGNOSIS — R07.9 CHEST PAIN, UNSPECIFIED TYPE: ICD-10-CM

## 2019-11-14 DIAGNOSIS — E11.9 TYPE 2 DIABETES MELLITUS TREATED WITHOUT INSULIN: ICD-10-CM

## 2019-11-14 DIAGNOSIS — E11.9 TYPE 2 DIABETES MELLITUS TREATED WITHOUT INSULIN: Primary | ICD-10-CM

## 2019-11-14 DIAGNOSIS — C16.0 GE JUNCTION CARCINOMA: ICD-10-CM

## 2019-11-14 DIAGNOSIS — Z00.00 ENCOUNTER FOR PREVENTIVE HEALTH EXAMINATION: Primary | ICD-10-CM

## 2019-11-14 DIAGNOSIS — D69.59 CHEMOTHERAPY-INDUCED THROMBOCYTOPENIA: ICD-10-CM

## 2019-11-14 DIAGNOSIS — E78.5 DYSLIPIDEMIA: ICD-10-CM

## 2019-11-14 DIAGNOSIS — E78.5 HYPERLIPIDEMIA, UNSPECIFIED HYPERLIPIDEMIA TYPE: ICD-10-CM

## 2019-11-14 DIAGNOSIS — T45.1X5A CHEMOTHERAPY-INDUCED THROMBOCYTOPENIA: ICD-10-CM

## 2019-11-14 DIAGNOSIS — I25.110 CORONARY ARTERY DISEASE INVOLVING NATIVE CORONARY ARTERY OF NATIVE HEART WITH UNSTABLE ANGINA PECTORIS: ICD-10-CM

## 2019-11-14 PROBLEM — Z78.9 ON ENTERAL NUTRITION: Status: RESOLVED | Noted: 2019-07-09 | Resolved: 2019-11-14

## 2019-11-14 PROCEDURE — 3074F SYST BP LT 130 MM HG: CPT | Mod: HCNC,CPTII,S$GLB, | Performed by: FAMILY MEDICINE

## 2019-11-14 PROCEDURE — 99499 UNLISTED E&M SERVICE: CPT | Mod: HCNC,S$GLB,, | Performed by: NURSE PRACTITIONER

## 2019-11-14 PROCEDURE — 93005 EKG 12-LEAD: ICD-10-PCS | Mod: HCNC,S$GLB,, | Performed by: NURSE PRACTITIONER

## 2019-11-14 PROCEDURE — G0439 PPPS, SUBSEQ VISIT: HCPCS | Mod: HCNC,S$GLB,, | Performed by: NURSE PRACTITIONER

## 2019-11-14 PROCEDURE — 99999 PR PBB SHADOW E&M-EST. PATIENT-LVL V: CPT | Mod: PBBFAC,HCNC,, | Performed by: FAMILY MEDICINE

## 2019-11-14 PROCEDURE — 3078F DIAST BP <80 MM HG: CPT | Mod: HCNC,CPTII,S$GLB, | Performed by: NURSE PRACTITIONER

## 2019-11-14 PROCEDURE — 99999 PR PBB SHADOW E&M-EST. PATIENT-LVL V: CPT | Mod: PBBFAC,HCNC,, | Performed by: NURSE PRACTITIONER

## 2019-11-14 PROCEDURE — 99999 PR PBB SHADOW E&M-EST. PATIENT-LVL V: ICD-10-PCS | Mod: PBBFAC,HCNC,, | Performed by: NURSE PRACTITIONER

## 2019-11-14 PROCEDURE — 99214 OFFICE O/P EST MOD 30 MIN: CPT | Mod: HCNC,S$GLB,, | Performed by: FAMILY MEDICINE

## 2019-11-14 PROCEDURE — 93010 ELECTROCARDIOGRAM REPORT: CPT | Mod: HCNC,S$GLB,, | Performed by: INTERNAL MEDICINE

## 2019-11-14 PROCEDURE — 93010 EKG 12-LEAD: ICD-10-PCS | Mod: HCNC,S$GLB,, | Performed by: INTERNAL MEDICINE

## 2019-11-14 PROCEDURE — 3078F PR MOST RECENT DIASTOLIC BLOOD PRESSURE < 80 MM HG: ICD-10-PCS | Mod: HCNC,CPTII,S$GLB, | Performed by: FAMILY MEDICINE

## 2019-11-14 PROCEDURE — 3078F PR MOST RECENT DIASTOLIC BLOOD PRESSURE < 80 MM HG: ICD-10-PCS | Mod: HCNC,CPTII,S$GLB, | Performed by: NURSE PRACTITIONER

## 2019-11-14 PROCEDURE — 1101F PR PT FALLS ASSESS DOC 0-1 FALLS W/OUT INJ PAST YR: ICD-10-PCS | Mod: HCNC,CPTII,S$GLB, | Performed by: FAMILY MEDICINE

## 2019-11-14 PROCEDURE — 93005 ELECTROCARDIOGRAM TRACING: CPT | Mod: HCNC,S$GLB,, | Performed by: NURSE PRACTITIONER

## 2019-11-14 PROCEDURE — 99214 PR OFFICE/OUTPT VISIT, EST, LEVL IV, 30-39 MIN: ICD-10-PCS | Mod: HCNC,S$GLB,, | Performed by: FAMILY MEDICINE

## 2019-11-14 PROCEDURE — 99999 PR PBB SHADOW E&M-EST. PATIENT-LVL V: ICD-10-PCS | Mod: PBBFAC,HCNC,, | Performed by: FAMILY MEDICINE

## 2019-11-14 PROCEDURE — 99499 RISK ADDL DX/OHS AUDIT: ICD-10-PCS | Mod: HCNC,S$GLB,, | Performed by: NURSE PRACTITIONER

## 2019-11-14 PROCEDURE — 3078F DIAST BP <80 MM HG: CPT | Mod: HCNC,CPTII,S$GLB, | Performed by: FAMILY MEDICINE

## 2019-11-14 PROCEDURE — 3074F PR MOST RECENT SYSTOLIC BLOOD PRESSURE < 130 MM HG: ICD-10-PCS | Mod: HCNC,CPTII,S$GLB, | Performed by: NURSE PRACTITIONER

## 2019-11-14 PROCEDURE — 3074F PR MOST RECENT SYSTOLIC BLOOD PRESSURE < 130 MM HG: ICD-10-PCS | Mod: HCNC,CPTII,S$GLB, | Performed by: FAMILY MEDICINE

## 2019-11-14 PROCEDURE — 1101F PT FALLS ASSESS-DOCD LE1/YR: CPT | Mod: HCNC,CPTII,S$GLB, | Performed by: FAMILY MEDICINE

## 2019-11-14 PROCEDURE — 3074F SYST BP LT 130 MM HG: CPT | Mod: HCNC,CPTII,S$GLB, | Performed by: NURSE PRACTITIONER

## 2019-11-14 PROCEDURE — G0439 PR MEDICARE ANNUAL WELLNESS SUBSEQUENT VISIT: ICD-10-PCS | Mod: HCNC,S$GLB,, | Performed by: NURSE PRACTITIONER

## 2019-11-14 RX ORDER — METFORMIN HYDROCHLORIDE 500 MG/1
500 TABLET, EXTENDED RELEASE ORAL
Qty: 90 TABLET | Refills: 3 | Status: SHIPPED | OUTPATIENT
Start: 2019-11-14 | End: 2020-08-18

## 2019-11-14 NOTE — PROGRESS NOTES
"Stu Garcia presented for a  Medicare AWV and comprehensive Health Risk Assessment today. The following components were reviewed and updated:    · Medical history  · Family History  · Social history  · Allergies and Current Medications  · Health Risk Assessment  · Health Maintenance  · Care Team     ** See Completed Assessments for Annual Wellness Visit within the encounter summary.**       The following assessments were completed:  · Living Situation  · CAGE  · Depression Screening  · Timed Get Up and Go  · Whisper Test  · Cognitive Function Screening          · Nutrition Screening  · ADL Screening  · PAQ Screening    Vitals:    11/14/19 1304   BP: 122/64   BP Location: Left arm   Patient Position: Sitting   BP Method: Medium (Manual)   Pulse: 84   SpO2: 97%   Weight: 100.7 kg (222 lb 0.1 oz)   Height: 6' 4.5" (1.943 m)     Body mass index is 26.67 kg/m².  Physical Exam   Constitutional: He appears well-nourished. No distress.   Ambulates with cane   HENT:   Head: Normocephalic.   Cardiovascular: Normal rate and regular rhythm.   Pulmonary/Chest: Effort normal and breath sounds normal. He has no wheezes.   Neurological: He is alert.   Skin: Skin is warm.   Psychiatric: He has a normal mood and affect. His behavior is normal.   Vitals reviewed.        Diagnoses and health risks identified today and associated recommendations/orders:    1. Encounter for preventive health examination  Reviewed health maintenance and provided recommendations   Will receive flu vaccine today     2. Chest pain, unspecified type  No active chest pain  - IN OFFICE EKG 12-LEAD (to Muse)    3. PAF (paroxysmal atrial fibrillation)  Continue to monitor  Followed by Rachael.      4. Hyperlipidemia, unspecified hyperlipidemia type  Continue to monitor  Followed by Cade Juan MD   Taking statin.      5. Essential hypertension  Continue to monitor  Followed by Cade Juan MD .      7. Coronary artery disease involving native " coronary artery of native heart with unstable angina pectoris  Continue to monitor  Followed by Rachael.    Had CP episode 2 days ago, was relieved with 1 dose of NTG    8. Thrombocytopenia  Continue to monitor  Followed by Juliana.      9. Chemotherapy-induced thrombocytopenia  Continue to monitor  Followed by Juliana.      10. GE junction carcinoma  Continue to monitor  Followed by Juliana.      11. Type 2 diabetes mellitus treated without insulin  Continue to monitor  Followed by Cade Juan MD   Last a1c 7.9.      Reports episode of chest/upper abd pain 2 days ago.  Was at  rest when sudden sharp, constant  pain began.  Denies sob, nausea, palpitations.  Pain was relieved with 1 NTG.  Completed EKG today and will discuss plan with pcp.  Pt is scheduled to see pcp today as well.      Provided Stu with a 5-10 year written screening schedule and personal prevention plan. Recommendations were developed using the USPSTF age appropriate recommendations. Education, counseling, and referrals were provided as needed. After Visit Summary printed and given to patient which includes a list of additional screenings\tests needed.    Follow up in about 1 year (around 11/14/2020).    Pilar Shields NP  I offered to discuss end of life issues, including information on how to make advance directives that the patient could use to name someone who would make medical decisions on their behalf if they became too ill to make themselves.    ___Patient declined  _X_Patient is interested, I provided paper work and offered to discuss.

## 2019-11-14 NOTE — PROGRESS NOTES
Subjective:       Patient ID: Stu Garcia is a 74 y.o. male.    Chief Complaint: Follow-up (3 month)      Here for 3 month f/u on chronic health issues    INVASIVE MODERATELY DIFFERENTIATED ADENOCARCINOMA - esophagectomy 7/2/2019 uncomplicated  Lost 5 pounds in the last 3 months  S/p Anterior cervical decompression fusion done on 7/6/2017 by Dr. Jeter - neck stable; swallowing improved  Lumbar fracture - back pain only occurring at night periodically  Diabetes - following diabetic diet diet; He is taking glimepiride 4mg BID. Metformin reportedly makes him feel bad. BGs 130s. No hypoglycemia.   CAD - s/p 4 stents; following with Dr. Maldonado  Paroxysmal afib, HTN - taking amiodarone, xarelto; metoprolol 50mg 1/2 tab BID  HLD - taking Lipitor 80mg daily  Anxiety - some intermittent panic attacks; hydroxizine has been helpful for this.            Diabetes   Pertinent negatives for hypoglycemia include no confusion, dizziness or headaches. Associated symptoms include fatigue. Pertinent negatives for diabetes include no chest pain, no polydipsia, no polyuria and no weakness.   Coronary Artery Disease   Symptoms include chest tightness. Pertinent negatives include no chest pain, dizziness, leg swelling, palpitations or shortness of breath.   Atrial Fibrillation   Symptoms are negative for chest pain, dizziness, palpitations, shortness of breath and weakness. Past medical history includes atrial fibrillation and CAD.       Past Medical History:   Diagnosis Date    Anticoagulant long-term use     xarelto,asa    Arthritis     Atrial fibrillation 2013    cardioverted    Bleb, lung     Cataract     OS    Colon polyp     Coronary artery disease     Coronary artery disease involving native coronary artery of native heart with unstable angina pectoris 3/18/2018    Diabetes mellitus     Diabetes mellitus, type 2     Diverticulosis     General anesthetics causing adverse effect in therapeutic use     delayed emergence  per patient's wife    GERD (gastroesophageal reflux disease)     HEARING LOSS     bilateral aids    Hemorrhoid     HLD (hyperlipidemia)     Hypertension     Iron deficiency anemia     Neuropathy of leg     Pneumonia due to other staphylococcus     Prostate cancer     prostate    Spontaneous pneumothorax     bilateral       Past Surgical History:   Procedure Laterality Date    CARDIOVERSION      CATARACT EXTRACTION      bilateral    CERVICAL SPINE FRACTURE SURGERY      c7 t1 ACDF     CHEST TUBE INSERTION Right     COLONOSCOPY      CORONARY STENT PLACEMENT      x 2    ENDOSCOPIC ULTRASOUND OF UPPER GASTROINTESTINAL TRACT N/A 3/13/2019    Procedure: ULTRASOUND, UPPER GI TRACT, ENDOSCOPIC;  Surgeon: Andrew Shirley MD;  [facility] (2ND FLR);  Service: Endoscopy;  Laterality: N/A;    ESOPHAGOGASTRODUODENOSCOPY N/A 2/23/2019    Procedure: ESOPHAGOGASTRODUODENOSCOPY (EGD);  Surgeon: Jackeline Richards MD;  [facility];  Service: Endoscopy;  Laterality: N/A;    HERNIA REPAIR      umbilical    INSERTION OF TUNNELED CENTRAL VENOUS CATHETER (CVC) WITH SUBCUTANEOUS PORT Right 4/4/2019    Procedure: LZLFLURYW-ZISL-Y-CATH (POWER PORT);  Surgeon: Nahum Zaidi MD;  Location: [facility];  Service: General;  Laterality: Right;    KNEE SURGERY Left 2017    open thoracotomy Left 1966    With pleurectomy    Pleurectomy Left 1966    For treatment of left pneumothorax    PROSTATECTOMY  2001    open    ROBOT-ASSISTED SURGICAL REMOVAL OF ESOPHAGUS USING DA ROSALIA XI N/A 7/2/2019    Procedure: XI ROBOTIC ESOPHAGECTOMY;  Surgeon: Chad Milian MD;  Location: [address];  Service: General;  Laterality: N/A;    TUBE THORACOTOMY  1966    pneumothorax left--open       Review of patient's allergies indicates:   Allergen Reactions    Codeine Nausea And Vomiting       Social History     Socioeconomic History    Marital status:      Spouse name: Not on file    Number of children: 2    Years  of education: Not on file    Highest education level: Not on file   Occupational History    Occupation:     Occupation: prior [employer]    Social Needs    Financial resource strain: Not hard at all    Food insecurity:     Worry: Never true     Inability: Never true    Transportation needs:     Medical: No     Non-medical: No   Tobacco Use    Smoking status: Former Smoker     Years: 20.00    Smokeless tobacco: Never Used    Tobacco comment: quit smoking in 1980's.   Substance and Sexual Activity    Alcohol use: Yes     Frequency: Monthly or less     Drinks per session: 1 or 2     Binge frequency: Never     Comment: 1 drink every 2 weeks    Drug use: No    Sexual activity: Yes   Lifestyle    Physical activity:     Days per week: 0 days     Minutes per session: Not on file    Stress: Very much   Relationships    Social connections:     Talks on phone: More than three times a week     Gets together: Once a week     Attends Orthodox service: Not on file     Active member of club or organization: No     Attends meetings of clubs or organizations: Patient refused     Relationship status:    Other Topics Concern    Not on file   Social History Narrative    From [place]       Current Outpatient Medications on File Prior to Visit   Medication Sig Dispense Refill    ACCU-CHEK SHASHANK PLUS TEST STRP Strp TEST ONCE DAILY 100 strip 3    aspirin (ECOTRIN) 81 MG EC tablet Take 1 tablet (81 mg total) by mouth once daily. May take over the counter 30 tablet 12    atorvastatin (LIPITOR) 80 MG tablet TAKE 1 TABLET(80 MG) BY MOUTH EVERY EVENING 90 tablet 0    ferrous gluconate (FERGON) 324 MG tablet Take 1 tablet (324 mg total) by mouth daily with breakfast.      glimepiride (AMARYL) 2 MG tablet TAKE 2 TABLETS TWICE DAILY 360 tablet 3    glucagon, human recombinant, (GLUCAGON EMERGENCY KIT, HUMAN,) 1 mg SolR Inject 1 mg into the muscle as needed. 1 each 2    hydrocodone-acetaminophen  "(HYCET) solution 7.5-325 mg/15mL 10 mLs by Per J Tube route every 4 (four) hours as needed. 473 mL 0    hydrOXYzine HCl (ATARAX) 25 MG tablet Take 1 tablet (25 mg total) by mouth 3 (three) times daily as needed for Anxiety. 30 tablet 5    insulin syr/ndl U100 half karina (BD INSULIN SYRINGE HALF UNIT) 0.3 mL 31 gauge x 15/64" Syrg Use once daily 30 Syringe 11    lancets (ACCU-CHEK SOFTCLIX LANCETS) Misc 1 strip by Misc.(Non-Drug; Combo Route) route once daily. 100 each 3    lidocaine-prilocaine (EMLA) cream U UTD  0    metoprolol tartrate (LOPRESSOR) 50 MG tablet Take 0.5 tablets (25 mg total) by mouth 2 (two) times daily. 30 tablet 11    multivitamin capsule Take 1 capsule by mouth once daily.      nitroGLYCERIN (NITROSTAT) 0.4 MG SL tablet DISSOLVE 1 TABLET UNDER THE TONGUE EVERY 5 MINUTES AS NEEDED FOR CHEST PAIN 100 tablet prn    omeprazole (PRILOSEC) 40 MG capsule Take 1 capsule (40 mg total) by mouth once daily. 90 capsule 3    ondansetron (ZOFRAN) 8 MG tablet TK 1 T PO 1 HOUR B D RADIATION TREATMENT RO PREVENT NAUSEA UTD 30 tablet 3    pen needle, diabetic (BD ULTRA-FINE ELSA PEN NEEDLE) 32 gauge x 5/32" Ndle To use with insulin injection 1x daily. 60 each 15    prochlorperazine (COMPAZINE) 10 MG tablet TK 1 TABLET PO Q 6 HOURS PRN FOR NAUSEA/VOMITING 60 tablet 6    rivaroxaban (XARELTO) 20 mg Tab Take 1 tablet (20 mg total) by mouth daily with dinner or evening meal. 30 tablet 11    vit C,W-Ar-xakuo-lutein-zeaxan (PRESERVISION AREDS 2) 898-072-90-1 mg-unit-mg-mg Cap Take 1 tablet by mouth once daily.       No current facility-administered medications on file prior to visit.        Family History   Problem Relation Age of Onset    Mental illness Mother         dementia    Coronary artery disease Father     Cancer Father         stomach with mets    Diabetes Brother        Review of Systems   Constitutional: Positive for fatigue. Negative for activity change, appetite change, chills, fever and " "unexpected weight change.   HENT: Positive for hearing loss, rhinorrhea and trouble swallowing. Negative for sore throat.    Eyes: Negative for pain, discharge and visual disturbance.   Respiratory: Positive for chest tightness. Negative for cough, shortness of breath and wheezing.    Cardiovascular: Negative for chest pain, palpitations and leg swelling.   Gastrointestinal: Negative for abdominal pain, blood in stool, constipation, diarrhea, nausea and vomiting.   Endocrine: Negative for polydipsia and polyuria.   Genitourinary: Negative for difficulty urinating, dysuria, hematuria and urgency.   Musculoskeletal: Positive for back pain and neck pain. Negative for arthralgias, gait problem and joint swelling.   Skin: Negative for rash and wound.   Neurological: Negative for dizziness, weakness, light-headedness and headaches.   Hematological: Negative for adenopathy.   Psychiatric/Behavioral: Negative for confusion and dysphoric mood.       Objective:      /64 (BP Location: Left arm, Patient Position: Sitting)   Pulse 84   Temp 98.5 °F (36.9 °C) (Oral)   Ht 6' 4.5" (1.943 m)   Wt 100.7 kg (222 lb 0.1 oz)   SpO2 97%   BMI 26.67 kg/m²   Physical Exam   Constitutional: He is oriented to person, place, and time. He appears well-developed and well-nourished. He is active. No distress.   HENT:   Head: Normocephalic and atraumatic.   Right Ear: External ear normal.   Left Ear: External ear normal.   Mouth/Throat: Uvula is midline, oropharynx is clear and moist and mucous membranes are normal. No oropharyngeal exudate.   Eyes: Pupils are equal, round, and reactive to light. Conjunctivae, EOM and lids are normal.   Neck: Normal range of motion, full passive range of motion without pain and phonation normal. Neck supple. No thyroid mass and no thyromegaly present.   Cardiovascular: Normal rate, regular rhythm, normal heart sounds and intact distal pulses. Exam reveals no gallop and no friction rub.   No murmur " heard.  Pulmonary/Chest: Effort normal and breath sounds normal. No respiratory distress. He has no wheezes. He has no rales.   Abdominal: Soft. Normal appearance and bowel sounds are normal. There is no tenderness.   Musculoskeletal: Normal range of motion.   Lymphadenopathy:     He has no cervical adenopathy.   Neurological: He is alert and oriented to person, place, and time. No cranial nerve deficit. Coordination normal.   Skin: Skin is warm and dry.   Psychiatric: He has a normal mood and affect. His speech is normal and behavior is normal. Judgment and thought content normal.         Results for orders placed or performed in visit on 11/07/19   Hemoglobin A1c   Result Value Ref Range    Hemoglobin A1C 7.9 (H) 4.0 - 5.6 %    Estimated Avg Glucose 180 (H) 68 - 131 mg/dL   Comprehensive metabolic panel   Result Value Ref Range    Sodium 138 136 - 145 mmol/L    Potassium 4.5 3.5 - 5.1 mmol/L    Chloride 103 95 - 110 mmol/L    CO2 28 23 - 29 mmol/L    Glucose 193 (H) 70 - 110 mg/dL    BUN, Bld 12 8 - 23 mg/dL    Creatinine 1.1 0.5 - 1.4 mg/dL    Calcium 9.5 8.7 - 10.5 mg/dL    Total Protein 6.5 6.0 - 8.4 g/dL    Albumin 3.4 (L) 3.5 - 5.2 g/dL    Total Bilirubin 0.7 0.1 - 1.0 mg/dL    Alkaline Phosphatase 155 (H) 55 - 135 U/L    AST 17 10 - 40 U/L    ALT 16 10 - 44 U/L    Anion Gap 7 (L) 8 - 16 mmol/L    eGFR if African American >60.0 >60 mL/min/1.73 m^2    eGFR if non African American >60.0 >60 mL/min/1.73 m^2     EKG: normal    Assessment:       1. Type 2 diabetes mellitus treated without insulin    2. Essential hypertension    3. Dyslipidemia    4. GE junction carcinoma    5. PAF (paroxysmal atrial fibrillation)        Plan:       Type 2 diabetes mellitus treated without insulin  -     metFORMIN (GLUCOPHAGE-XR) 500 MG 24 hr tablet; Take 1 tablet (500 mg total) by mouth daily with breakfast.  Dispense: 90 tablet; Refill: 3  -     Comprehensive metabolic panel; Future; Expected date: 02/12/2020  -     Hemoglobin  A1c; Future; Expected date: 02/12/2020    Essential hypertension    Dyslipidemia    GE junction carcinoma    PAF (paroxysmal atrial fibrillation)          continue oncology follow-up  Continue protonix BID  F/u with cardiology as planned   Continue Glimepiride 4mg BID; add metformin XR 500mg QAM  Continue other present meds  Counseled on regular exercise, maintenance of a healthy weight, balanced diet rich in fruits/vegetables and lean protein, and avoidance of unhealthy habits like smoking and excessive alcohol intake.    F/u 3 months with labs

## 2019-11-14 NOTE — PATIENT INSTRUCTIONS
Counseling and Referral of Other Preventative  (Italic type indicates deductible and co-insurance are waived)    Patient Name: Stu Garcia  Today's Date: 11/14/2019    Health Maintenance       Date Due Completion Date    Shingles Vaccine (2 of 3) 10/14/2014 8/19/2014    Influenza Vaccine (1) 09/01/2019 11/14/2018    Eye Exam 12/07/2019 12/7/2018    Override on 5/27/2015: Done    Override on 8/1/2008: Done (Scout Dennison OD)    Lipid Panel 02/07/2020 2/7/2019    Hemoglobin A1c 05/07/2020 11/7/2019    Urine Microalbumin 05/07/2020 5/7/2019    Foot Exam 05/14/2020 5/14/2019 (Done)    Override on 5/14/2019: Done (Drake)    Override on 4/24/2018: Done (per Yessenia)    Override on 6/20/2016: Done    Override on 6/10/2015: Done    High Dose Statin 10/23/2020 10/23/2019    Colonoscopy 05/20/2025 5/20/2015    TETANUS VACCINE 08/04/2026 8/4/2016        No orders of the defined types were placed in this encounter.    The following information is provided to all patients.  This information is to help you find resources for any of the problems found today that may be affecting your health:                Living healthy guide: www.[facility].[place].gov      Understanding Diabetes: www.diabetes.org      Eating healthy: www.cdc.gov/healthyweight      CDC home safety checklist: www.cdc.gov/steadi/patient.html      Agency on Aging: www.goea.[place].gov      Alcoholics anonymous (AA): www.aa.org      Physical Activity: www.ron.nih.gov/kg4pvrv      Tobacco use: www.quitwithusla.org

## 2019-12-02 ENCOUNTER — INFUSION (OUTPATIENT)
Dept: INFUSION THERAPY | Facility: HOSPITAL | Age: 74
End: 2019-12-02
Attending: INTERNAL MEDICINE
Payer: MEDICARE

## 2019-12-02 DIAGNOSIS — C16.0 GE JUNCTION CARCINOMA: Primary | ICD-10-CM

## 2019-12-02 PROCEDURE — 96523 IRRIG DRUG DELIVERY DEVICE: CPT | Mod: HCNC,PN

## 2019-12-02 PROCEDURE — 25000003 PHARM REV CODE 250: Mod: HCNC,PN | Performed by: INTERNAL MEDICINE

## 2019-12-02 PROCEDURE — A4216 STERILE WATER/SALINE, 10 ML: HCPCS | Mod: HCNC,PN | Performed by: INTERNAL MEDICINE

## 2019-12-02 PROCEDURE — 63600175 PHARM REV CODE 636 W HCPCS: Mod: HCNC,PN | Performed by: INTERNAL MEDICINE

## 2019-12-02 RX ORDER — SODIUM CHLORIDE 0.9 % (FLUSH) 0.9 %
10 SYRINGE (ML) INJECTION
Status: COMPLETED | OUTPATIENT
Start: 2019-12-02 | End: 2019-12-02

## 2019-12-02 RX ORDER — HEPARIN 100 UNIT/ML
500 SYRINGE INTRAVENOUS
Status: COMPLETED | OUTPATIENT
Start: 2019-12-02 | End: 2019-12-02

## 2019-12-02 RX ORDER — HEPARIN 100 UNIT/ML
500 SYRINGE INTRAVENOUS
Status: CANCELLED | OUTPATIENT
Start: 2019-12-02

## 2019-12-02 RX ORDER — SODIUM CHLORIDE 0.9 % (FLUSH) 0.9 %
10 SYRINGE (ML) INJECTION
Status: CANCELLED | OUTPATIENT
Start: 2019-12-02

## 2019-12-02 RX ADMIN — HEPARIN 500 UNITS: 100 SYRINGE at 02:12

## 2019-12-02 RX ADMIN — Medication 10 ML: at 02:12

## 2019-12-10 DIAGNOSIS — C16.0 GE JUNCTION CARCINOMA: ICD-10-CM

## 2019-12-10 RX ORDER — PROCHLORPERAZINE MALEATE 10 MG
TABLET ORAL
Qty: 60 TABLET | Refills: 0 | Status: SHIPPED | OUTPATIENT
Start: 2019-12-10 | End: 2021-01-01

## 2019-12-23 ENCOUNTER — OFFICE VISIT (OUTPATIENT)
Dept: ENDOCRINOLOGY | Facility: CLINIC | Age: 74
End: 2019-12-23
Payer: MEDICARE

## 2019-12-23 VITALS
OXYGEN SATURATION: 98 % | BODY MASS INDEX: 24.91 KG/M2 | SYSTOLIC BLOOD PRESSURE: 130 MMHG | HEART RATE: 76 BPM | DIASTOLIC BLOOD PRESSURE: 64 MMHG | HEIGHT: 77 IN | WEIGHT: 211 LBS

## 2019-12-23 DIAGNOSIS — I10 BENIGN ESSENTIAL HTN: Primary | ICD-10-CM

## 2019-12-23 DIAGNOSIS — E04.1 LEFT THYROID NODULE: ICD-10-CM

## 2019-12-23 PROCEDURE — 99999 PR PBB SHADOW E&M-EST. PATIENT-LVL IV: ICD-10-PCS | Mod: PBBFAC,HCNC,, | Performed by: INTERNAL MEDICINE

## 2019-12-23 PROCEDURE — 3078F PR MOST RECENT DIASTOLIC BLOOD PRESSURE < 80 MM HG: ICD-10-PCS | Mod: HCNC,CPTII,S$GLB, | Performed by: INTERNAL MEDICINE

## 2019-12-23 PROCEDURE — 99214 PR OFFICE/OUTPT VISIT, EST, LEVL IV, 30-39 MIN: ICD-10-PCS | Mod: HCNC,S$GLB,, | Performed by: INTERNAL MEDICINE

## 2019-12-23 PROCEDURE — 99999 PR PBB SHADOW E&M-EST. PATIENT-LVL IV: CPT | Mod: PBBFAC,HCNC,, | Performed by: INTERNAL MEDICINE

## 2019-12-23 PROCEDURE — 1101F PR PT FALLS ASSESS DOC 0-1 FALLS W/OUT INJ PAST YR: ICD-10-PCS | Mod: HCNC,CPTII,S$GLB, | Performed by: INTERNAL MEDICINE

## 2019-12-23 PROCEDURE — 3078F DIAST BP <80 MM HG: CPT | Mod: HCNC,CPTII,S$GLB, | Performed by: INTERNAL MEDICINE

## 2019-12-23 PROCEDURE — 1159F MED LIST DOCD IN RCRD: CPT | Mod: HCNC,S$GLB,, | Performed by: INTERNAL MEDICINE

## 2019-12-23 PROCEDURE — 1126F PR PAIN SEVERITY QUANTIFIED, NO PAIN PRESENT: ICD-10-PCS | Mod: HCNC,S$GLB,, | Performed by: INTERNAL MEDICINE

## 2019-12-23 PROCEDURE — 1126F AMNT PAIN NOTED NONE PRSNT: CPT | Mod: HCNC,S$GLB,, | Performed by: INTERNAL MEDICINE

## 2019-12-23 PROCEDURE — 3075F PR MOST RECENT SYSTOLIC BLOOD PRESS GE 130-139MM HG: ICD-10-PCS | Mod: HCNC,CPTII,S$GLB, | Performed by: INTERNAL MEDICINE

## 2019-12-23 PROCEDURE — 99214 OFFICE O/P EST MOD 30 MIN: CPT | Mod: HCNC,S$GLB,, | Performed by: INTERNAL MEDICINE

## 2019-12-23 PROCEDURE — 1101F PT FALLS ASSESS-DOCD LE1/YR: CPT | Mod: HCNC,CPTII,S$GLB, | Performed by: INTERNAL MEDICINE

## 2019-12-23 PROCEDURE — 3075F SYST BP GE 130 - 139MM HG: CPT | Mod: HCNC,CPTII,S$GLB, | Performed by: INTERNAL MEDICINE

## 2019-12-23 PROCEDURE — 1159F PR MEDICATION LIST DOCUMENTED IN MEDICAL RECORD: ICD-10-PCS | Mod: HCNC,S$GLB,, | Performed by: INTERNAL MEDICINE

## 2019-12-23 RX ORDER — METOPROLOL TARTRATE 50 MG/1
TABLET ORAL
Qty: 60 TABLET | Refills: 11 | Status: SHIPPED | OUTPATIENT
Start: 2019-12-23 | End: 2020-12-08

## 2019-12-23 NOTE — PROGRESS NOTES
CHIEF COMPLAINT: Thyroid Nodule  74 year old being seen as a new patient. Had a PET CT showing non metabolic thyroid nodule 6/11/19. Seeing Hem/Onc for Adenocarcinoma of GE junction. He did have XRT for esophageal Ca. Has some dysphagia since surgery      PAST MEDICAL HISTORY/PAST SURGICAL HISTORY:  Reviewed in [facility]    SOCIAL HISTORY: Reviewed in [facility]    FAMILY HISTORY:  No known thyroid disease or thyroid Ca.     MEDICATIONS/ALLERGIES: The patient's MedCard has been updated and reviewed.      ROS:   Constitutional: Weight decreased  Eyes: No recent visual changes  ENT: + dysphagia since esophagectomy  Cardiovascular: Has used nitro for angina. States cardiology aware. No increase from before.   Respiratory: No SOB  Gastrointestinal: has some chronic nausea. Also has some constipation.    GenitoUrinary - No dysuria, No polyuria  Skin: No new skin rash  Neurologic: No HA  Remainder ROS negative        PE:    GENERAL: Well developed, well nourished.  PSYCH:  appropriate mood and affect  EYES:  PERRL, EOM intact.  ENT: Nares patent, oropharynx clear, mucosa pink,   NECK: Supple, trachea midline, left nodule palpable.   CHEST: Resp even and unlabored, CTA bilateral.  CARDIAC: RRR, S1, S2 heard, no murmurs, rubs, S3, or S4  ABDOMEN: Soft, non-tender, non-distended;  No organomegaly  VASCULAR:  DP pulses +2/4 bilaterally, no edema  NEURO: Gait steady, CN II-VII grossly intact  SKIN: No areas of breakdown, no acanthosis nigracans.      LABS/Radiology  NM PET CT ROUTINE    CLINICAL HISTORY:  View of esophageal cancer post portia;  Malignant neoplasm of cardia    73-year-old male with gastroesophageal junction carcinoma.  The patient is on chemotherapy and radiation therapy.    TECHNIQUE:  Following IV injection of 12.68 mCi F-18 FDG, 3D  PET imaging was performed from the skull base to the mid thighs.  A noncontrast non breath hold multidetector CT was obtained in conjunction with the PET scan for attenuation correction and  anatomic correlation.  PET-CT fusion images were generated.    COMPARISON:  None    FINDINGS:  Head/neck:    There is an essentially non metabolic 2.5 cm nodule arising from the lower pole of the left thyroid lobe.  No significant calcifications are evident.  Further evaluation with thyroid ultrasound is recommended.  No significant abnormal hypermetabolic foci are identified within the head or neck region and no lymphadenopathy is present.    Chest:    No significant abnormal hypermetabolic foci are identified within the chest.  No hypermetabolic pulmonary nodules, lymphadenopathy, pleural effusion, or pericardial effusion are present.    Abdomen/pelvis:    No significant abnormal hypermetabolic foci are identified within the abdomen or pelvis.  No lymphadenopathy or ascites are present.    Skeleton:    No significant abnormal hypermetabolic foci are identified within the skeleton.  There are no findings to suggest osseous neoplastic disease.      Impression       1. Essentially non metabolic left thyroid lobe nodule.  Further evaluation with ultrasound is recommended for further characterization.  2. No PET-CT imaging findings to suggest significant residual or recurrent neoplasm at the gastroesophageal junction.  There are no findings to suggest metastatic disease.         ASSESSMENT/PLAN:  1. Thyroid Nodule- Non metabolic nodule seen on PET CT. Will need to get US to get better imaging characteristics. Discussed incidence of thyroid nodules and chances of thyroid cancer. Discussed possibility of FNA based on thyroid US. Will also check TSH to verify that it is not suppressed.     2. HTN- controlled. Continue current Tx.       FOLLOWUP  Thyroid US- wants to do after New Years  Add TSh to next labs

## 2019-12-23 NOTE — LETTER
December 27, 2019      Cade Juan MD  [address] [facility] [address] [place] [address]           [facility]  [address] [place] [address]  Phone: [phone]  Fax: [phone]          Patient: Stu Garcia   MR Number: [id number]   YOB: 1945   Date of Visit: 12/23/2019       Dear Dr. Cade Juan:    Thank you for referring Stu Garcia to me for evaluation. Attached you will find relevant portions of my assessment and plan of care.    If you have questions, please do not hesitate to call me. I look forward to following Stu Garcia along with you.    Sincerely,    Omari Rose, DO [place]  CC:  No Recipients    If you would like to receive this communication electronically, please contact [email or web address] or [phone] to request more information on [employer] Link access.    For providers and/or their staff who would like to refer a patient to [facility], please contact us through our one-stop-shop provider referral line, [facility] , at [phone].    If you feel you have received this communication in error or would no longer like to receive these types of communications, please e-mail [email or web address]

## 2019-12-27 ENCOUNTER — TELEPHONE (OUTPATIENT)
Dept: HEMATOLOGY/ONCOLOGY | Facility: CLINIC | Age: 74
End: 2019-12-27

## 2019-12-27 NOTE — TELEPHONE ENCOUNTER
Attempted to call pt to schedule survivorship clinic.  No answer.  LM for rtn call w office number provided.

## 2020-01-03 ENCOUNTER — HOSPITAL ENCOUNTER (OUTPATIENT)
Dept: RADIOLOGY | Facility: HOSPITAL | Age: 75
Discharge: HOME | End: 2020-01-03
Attending: INTERNAL MEDICINE
Payer: MEDICARE

## 2020-01-03 DIAGNOSIS — E04.1 LEFT THYROID NODULE: ICD-10-CM

## 2020-01-03 PROCEDURE — 76536 US EXAM OF HEAD AND NECK: CPT | Mod: TC,HCNC,PO

## 2020-01-03 PROCEDURE — 76536 US SOFT TISSUE HEAD NECK THYROID: ICD-10-PCS | Mod: 26,HCNC,, | Performed by: RADIOLOGY

## 2020-01-03 PROCEDURE — 76536 US EXAM OF HEAD AND NECK: CPT | Mod: 26,HCNC,, | Performed by: RADIOLOGY

## 2020-01-09 DIAGNOSIS — I25.110 CORONARY ARTERY DISEASE INVOLVING NATIVE CORONARY ARTERY OF NATIVE HEART WITH UNSTABLE ANGINA PECTORIS: ICD-10-CM

## 2020-01-09 RX ORDER — ATORVASTATIN CALCIUM 80 MG/1
TABLET, FILM COATED ORAL
Qty: 90 TABLET | Refills: 0 | Status: SHIPPED | OUTPATIENT
Start: 2020-01-09 | End: 2020-04-08

## 2020-01-12 NOTE — DISCHARGE INSTRUCTIONS
Endoscopic Ultrasound (EUS)    An endoscopic ultrasound (EUS) is a test to look at the inside of your gastrointestinal (GI) tract. It's commonly used to look for cancers or growths in the esophagus, stomach, pancreas, liver, and rectum. It can help to stage cancer (see how advanced a cancer is). EUS may also be used to help diagnose certain diseases or to drain cysts or abscesses.  What is EUS?  EUS shows both ultrasound images and live video of the GI tract. During the test, a flexible tube called an endoscope (scope) is used. At the end of the scope is a tiny video camera and light. The video camera sends live images to a monitor. The scope also contains a very small ultrasound device. This uses sound waves to create images and send them to a monitor.  A needle is passed through the scope. The needle can be used take a small sample of tissue for testing. This is called a biopsy. The needle can be used to take a sample of fluid. This is called fine-needle aspiration (FNA).  Risks and possible complications of EUS  Risks and possible complications include the following:  · Bleeding  · Infection  · A perforation (hole) in the digestive tract   · Risks of sedation or anesthesia   Before the test  Be prepared prior to the test:  · Tell your healthcare provider what medicine you take. This includes vitamins, herbs, and over-the-counter medicine. It also includes any blood thinners, such as warfarin, clopidogrel, ibuprofen, or daily aspirin. Ask your healthcare provider if you need to stop taking some or all of them before the test.  · You may be prescribed antibiotics to take before or after the test. This depends on the area being studied and what is done during the test. These medicines help prevent infection.  · Carefully follow the instructions for preparing for the test to make sure results are accurate. Instructions may include:  ¨ If youre having an EUS of the upper GI tract (esophagus, stomach, duodenum,  pancreas, liver):  § Do not eat or drink for 6 hours before the test.  ¨ If youre having an EUS of the lower GI tract (rectum):  § Before the test, do bowel prep as instructed to clean your rectum of stool. This may involve a clear liquid diet and using a laxative (liquid or pills) the night before the test. Or it may mean doing one or more enemas the morning of the test.  § Do not eat or drink for 6 hours before the test.  · Be sure to arrive on time at the facility. Bring your identification and health insurance card. Leave valuables at home. If you have them, bring X-rays or other test results with you.  Let the healthcare provider know  For your safety, tell the healthcare provider if you:  · Take insulin. Your dose may need to be changed on the day of your test.  · Are allergic to latex.  · Have any other allergies.  · Are taking blood thinners.   During the test  An endoscopic ultrasound usually takes place in a hospital. The procedure itself may take 1 to 2 hours. You will likely go home soon afterward. During the test:  · You lie on your left side on an exam table.  · An intravenous (IV) line will be put into a vein in your arm or hand. This line supplies fluids and medicines. To keep you comfortable during the test, you will be given a sedative medicine. This medicine prevents discomfort and will make you sleepy.  · If you are having an EUS of the upper GI tract, local anesthetic may be sprayed in your throat. This will help you be more comfortable as the healthcare provider inserts the scope. The healthcare provider then gently puts the flexible scope into your mouth or nose and down your throat.  · If youre having an EUS of the lower GI tract, the healthcare provider gently puts the flexible scope into your anus.  · During the test, the scope sends live video and ultrasound images from inside your body to nearby monitors. These are used to examine your GI tract. Specialized procedures, such as drainage,  are done as needed.  · The healthcare provider may discuss the results with you soon after the test. Biopsy results take several  days.  · In most cases, you can go home within a few hours of the test. When you leave the facility, have an adult family member or friend drive you, even if you don't feel that sleepy.  After the test  Here is what to expect after the test:  · You may feel tired from the sedative. This should wear off by the end of the day.  · If you had an upper digestive endoscopy, your throat may feel sore for a day or two. Over-the-counter sore throat lozenges and spray should help.  · You can eat and drink normally as soon as the test is done.  When to call the healthcare provider  Call your healthcare provider if you notice any of the following:  · Fever of 100.4°F (38.0°C) or higher, or as advised by your healthcare provider  · Shortness of breath  · Vomiting blood, blood in stool, or black stools  · Coughing or hoarse voice that wont go away   Date Last Reviewed: 7/1/2016  © 6118-6755 [employer]. [address] [place] [address]. All rights reserved. This information is not intended as a substitute for professional medical care. Always follow your healthcare professional's instructions.         No

## 2020-01-13 ENCOUNTER — PATIENT MESSAGE (OUTPATIENT)
Dept: HEMATOLOGY/ONCOLOGY | Facility: CLINIC | Age: 75
End: 2020-01-13

## 2020-01-13 ENCOUNTER — PATIENT MESSAGE (OUTPATIENT)
Dept: ENDOCRINOLOGY | Facility: CLINIC | Age: 75
End: 2020-01-13

## 2020-01-13 ENCOUNTER — PATIENT MESSAGE (OUTPATIENT)
Dept: CARDIOLOGY | Facility: CLINIC | Age: 75
End: 2020-01-13

## 2020-01-13 ENCOUNTER — INFUSION (OUTPATIENT)
Dept: INFUSION THERAPY | Facility: HOSPITAL | Age: 75
End: 2020-01-13
Attending: INTERNAL MEDICINE
Payer: MEDICARE

## 2020-01-13 ENCOUNTER — TELEPHONE (OUTPATIENT)
Dept: ENDOCRINOLOGY | Facility: CLINIC | Age: 75
End: 2020-01-13

## 2020-01-13 DIAGNOSIS — C16.0 GE JUNCTION CARCINOMA: Primary | ICD-10-CM

## 2020-01-13 PROCEDURE — 63600175 PHARM REV CODE 636 W HCPCS: Mod: HCNC,PN | Performed by: INTERNAL MEDICINE

## 2020-01-13 PROCEDURE — 96523 IRRIG DRUG DELIVERY DEVICE: CPT | Mod: HCNC,PN

## 2020-01-13 PROCEDURE — A4216 STERILE WATER/SALINE, 10 ML: HCPCS | Mod: HCNC,PN | Performed by: INTERNAL MEDICINE

## 2020-01-13 PROCEDURE — 25000003 PHARM REV CODE 250: Mod: HCNC,PN | Performed by: INTERNAL MEDICINE

## 2020-01-13 RX ORDER — SODIUM CHLORIDE 0.9 % (FLUSH) 0.9 %
10 SYRINGE (ML) INJECTION
Status: COMPLETED | OUTPATIENT
Start: 2020-01-13 | End: 2020-01-13

## 2020-01-13 RX ORDER — HEPARIN 100 UNIT/ML
500 SYRINGE INTRAVENOUS
Status: CANCELLED | OUTPATIENT
Start: 2020-01-13

## 2020-01-13 RX ORDER — HEPARIN 100 UNIT/ML
500 SYRINGE INTRAVENOUS
Status: COMPLETED | OUTPATIENT
Start: 2020-01-13 | End: 2020-01-13

## 2020-01-13 RX ORDER — SODIUM CHLORIDE 0.9 % (FLUSH) 0.9 %
10 SYRINGE (ML) INJECTION
Status: CANCELLED | OUTPATIENT
Start: 2020-01-13

## 2020-01-13 RX ADMIN — HEPARIN 500 UNITS: 100 SYRINGE at 01:01

## 2020-01-13 RX ADMIN — Medication 10 ML: at 01:01

## 2020-01-13 NOTE — TELEPHONE ENCOUNTER
----- Message from Nhan Locke sent at 1/13/2020  9:56 AM CST -----  Contact: pt   Pt need to know how long to stop taking medication (blood Thinners)      Pt spouse can be reached [phone]

## 2020-01-13 NOTE — TELEPHONE ENCOUNTER
Let him know that his US shows that he has a nodule on the right that meet criteria for FNA  Has some other smaller nodules but at this time just needs right FNA

## 2020-01-13 NOTE — TELEPHONE ENCOUNTER
Spoke to pt and adv of Dr Rose's previous message. Pt is calling  to see how long needs to be off blood thinner and then calling back to schedule.

## 2020-01-14 ENCOUNTER — PATIENT MESSAGE (OUTPATIENT)
Dept: ENDOCRINOLOGY | Facility: CLINIC | Age: 75
End: 2020-01-14

## 2020-01-17 ENCOUNTER — TELEPHONE (OUTPATIENT)
Dept: HEMATOLOGY/ONCOLOGY | Facility: CLINIC | Age: 75
End: 2020-01-17

## 2020-01-17 NOTE — TELEPHONE ENCOUNTER
Attempted to call pt to schedule survivor ship appt.  No answer, LM for rtn call w office number provided

## 2020-01-20 ENCOUNTER — OFFICE VISIT (OUTPATIENT)
Dept: ENDOCRINOLOGY | Facility: CLINIC | Age: 75
End: 2020-01-20
Payer: MEDICARE

## 2020-01-20 DIAGNOSIS — E04.2 MULTINODULAR GOITER: Primary | ICD-10-CM

## 2020-01-20 PROCEDURE — 99499 NO LOS: ICD-10-PCS | Mod: HCNC,S$GLB,, | Performed by: INTERNAL MEDICINE

## 2020-01-20 PROCEDURE — 99999 PR PBB SHADOW E&M-EST. PATIENT-LVL II: ICD-10-PCS | Mod: PBBFAC,HCNC,, | Performed by: INTERNAL MEDICINE

## 2020-01-20 PROCEDURE — 88173 CYTOPATH EVAL FNA REPORT: CPT | Mod: 26,HCNC,, | Performed by: PATHOLOGY

## 2020-01-20 PROCEDURE — 88173 CYTOPATH EVAL FNA REPORT: CPT | Mod: HCNC | Performed by: PATHOLOGY

## 2020-01-20 PROCEDURE — 10005 FNA BX W/US GDN 1ST LES: CPT | Mod: HCNC,S$GLB,, | Performed by: INTERNAL MEDICINE

## 2020-01-20 PROCEDURE — 10005 PR FINE NEEDLE ASP BIOPSY, W/US GUIDANCE, 1ST LESION: ICD-10-PCS | Mod: HCNC,S$GLB,, | Performed by: INTERNAL MEDICINE

## 2020-01-20 PROCEDURE — 99999 PR PBB SHADOW E&M-EST. PATIENT-LVL II: CPT | Mod: PBBFAC,HCNC,, | Performed by: INTERNAL MEDICINE

## 2020-01-20 PROCEDURE — 99499 UNLISTED E&M SERVICE: CPT | Mod: HCNC,S$GLB,, | Performed by: INTERNAL MEDICINE

## 2020-01-20 PROCEDURE — 88173 PR  INTERPRETATION OF FNA SMEAR: ICD-10-PCS | Mod: 26,HCNC,, | Performed by: PATHOLOGY

## 2020-01-20 NOTE — PROGRESS NOTES
Thyroid FNA under US Guidance    Indication:  Ultrasound guidance for fine needle aspiration biopsy right nodule  Procedure time out noted. Patient's name, , and location of biopsy were verified with patient. Discussed risks of procedure and risks of a non diagnostic/Indeterminate/FLUS biopsy. Discussed that molecular markers will only be sent after approval from the patient. Advised calling to determine out of pocket costs for molecular markers    Real time ultrasound was performed in 2 planes using a [employer] ultrasound machine and 12 MHz probe.   The right nodule was identified and described in report.  Informed consent obtained and questions answered. FNA guidance was performed using direct u/s guidance to confirm accurate needle placement.  5 aspirations were made using 25 gauge needles. Images taken of FNA as seen below. 4 Samples were submitted for cytology.  The patient tolerated the procedure well without complication.  After care instructions were provided.        Impression:  Uncomplicated FNA biopsy of right thyroid nodule under U/S guidance. 1 sample saved for potential molecular markers

## 2020-01-21 ENCOUNTER — OFFICE VISIT (OUTPATIENT)
Dept: HEMATOLOGY/ONCOLOGY | Facility: CLINIC | Age: 75
End: 2020-01-21
Payer: MEDICARE

## 2020-01-21 VITALS
RESPIRATION RATE: 16 BRPM | DIASTOLIC BLOOD PRESSURE: 62 MMHG | WEIGHT: 206.81 LBS | OXYGEN SATURATION: 99 % | SYSTOLIC BLOOD PRESSURE: 115 MMHG | HEART RATE: 77 BPM | TEMPERATURE: 98 F | BODY MASS INDEX: 24.42 KG/M2 | HEIGHT: 77 IN

## 2020-01-21 DIAGNOSIS — C16.0 GE JUNCTION CARCINOMA: Primary | ICD-10-CM

## 2020-01-21 DIAGNOSIS — R63.4 WEIGHT LOSS: ICD-10-CM

## 2020-01-21 DIAGNOSIS — D69.6 THROMBOCYTOPENIA: ICD-10-CM

## 2020-01-21 DIAGNOSIS — E04.1 THYROID NODULE: ICD-10-CM

## 2020-01-21 PROCEDURE — 3074F PR MOST RECENT SYSTOLIC BLOOD PRESSURE < 130 MM HG: ICD-10-PCS | Mod: HCNC,CPTII,S$GLB, | Performed by: INTERNAL MEDICINE

## 2020-01-21 PROCEDURE — 99999 PR PBB SHADOW E&M-EST. PATIENT-LVL V: ICD-10-PCS | Mod: PBBFAC,HCNC,, | Performed by: INTERNAL MEDICINE

## 2020-01-21 PROCEDURE — 3078F DIAST BP <80 MM HG: CPT | Mod: HCNC,CPTII,S$GLB, | Performed by: INTERNAL MEDICINE

## 2020-01-21 PROCEDURE — 99214 OFFICE O/P EST MOD 30 MIN: CPT | Mod: HCNC,S$GLB,, | Performed by: INTERNAL MEDICINE

## 2020-01-21 PROCEDURE — 3074F SYST BP LT 130 MM HG: CPT | Mod: HCNC,CPTII,S$GLB, | Performed by: INTERNAL MEDICINE

## 2020-01-21 PROCEDURE — 99499 RISK ADDL DX/OHS AUDIT: ICD-10-PCS | Mod: HCNC,S$GLB,, | Performed by: INTERNAL MEDICINE

## 2020-01-21 PROCEDURE — 99999 PR PBB SHADOW E&M-EST. PATIENT-LVL V: CPT | Mod: PBBFAC,HCNC,, | Performed by: INTERNAL MEDICINE

## 2020-01-21 PROCEDURE — 99214 PR OFFICE/OUTPT VISIT, EST, LEVL IV, 30-39 MIN: ICD-10-PCS | Mod: HCNC,S$GLB,, | Performed by: INTERNAL MEDICINE

## 2020-01-21 PROCEDURE — 1101F PR PT FALLS ASSESS DOC 0-1 FALLS W/OUT INJ PAST YR: ICD-10-PCS | Mod: HCNC,CPTII,S$GLB, | Performed by: INTERNAL MEDICINE

## 2020-01-21 PROCEDURE — 1126F AMNT PAIN NOTED NONE PRSNT: CPT | Mod: HCNC,S$GLB,, | Performed by: INTERNAL MEDICINE

## 2020-01-21 PROCEDURE — 1159F MED LIST DOCD IN RCRD: CPT | Mod: HCNC,S$GLB,, | Performed by: INTERNAL MEDICINE

## 2020-01-21 PROCEDURE — 99499 UNLISTED E&M SERVICE: CPT | Mod: HCNC,S$GLB,, | Performed by: INTERNAL MEDICINE

## 2020-01-21 PROCEDURE — 1101F PT FALLS ASSESS-DOCD LE1/YR: CPT | Mod: HCNC,CPTII,S$GLB, | Performed by: INTERNAL MEDICINE

## 2020-01-21 PROCEDURE — 1159F PR MEDICATION LIST DOCUMENTED IN MEDICAL RECORD: ICD-10-PCS | Mod: HCNC,S$GLB,, | Performed by: INTERNAL MEDICINE

## 2020-01-21 PROCEDURE — 3078F PR MOST RECENT DIASTOLIC BLOOD PRESSURE < 80 MM HG: ICD-10-PCS | Mod: HCNC,CPTII,S$GLB, | Performed by: INTERNAL MEDICINE

## 2020-01-21 PROCEDURE — 1126F PR PAIN SEVERITY QUANTIFIED, NO PAIN PRESENT: ICD-10-PCS | Mod: HCNC,S$GLB,, | Performed by: INTERNAL MEDICINE

## 2020-01-21 RX ORDER — MEGESTROL ACETATE 40 MG/ML
400 SUSPENSION ORAL 2 TIMES DAILY
Qty: 240 ML | Refills: 3 | Status: SHIPPED | OUTPATIENT
Start: 2020-01-21 | End: 2021-01-01

## 2020-01-21 NOTE — PROGRESS NOTES
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  The patient is 6 months postop and continues to lose weight without adequate explanation.  Appetite remains poor.  Patient denies pain.  Patient has undergone biopsy of thyroid nodule.  Pathology from this procedure is pending.  No other pertinent findings on a 14 point review of systems.    Physical examination:  Well-developed, obese, white gentleman, in no acute distress, with a weight of 206.5 lb (decreased by 16 lb).  VITAL SIGNS: Documented  and reviewed this visit.  HEENT: Normocephalic, atraumatic. Oral mucosa pink and moist. Lips without   lesions. Tongue midline. Oropharynx clear. Nonicteric sclerae.   NECK: Supple, no adenopathy. No carotid bruits, thyromegaly or thyroid nodule.   HEART: Regular rate and rhythm without murmur, gallop or rub.   LUNGS: Clear to auscultation bilaterally. Normal respiratory effort.   ABDOMEN: Soft, nontender, nondistended with positive normoactive bowel sounds, some erythema and crusty secretion about the insertion site of patient's feeding tube noted,  no hepatosplenomegaly.   EXTREMITIES: No cyanosis, clubbing or edema. Distal pulses are intact.   AXILLAE AND GROIN: No palpable pathologic lymphadenopathy is appreciated.   SKIN: Intact/turgor normal   NEUROLOGIC: Cranial nerves II-XII grossly intact. Motor: Good muscle bulk and   tone. Strength/sensory 5/5 throughout. Gait stable.     Laboratory:  White count 5.4, hemoglobin 14, hematocrit 43.1, platelets 124, absolute neutrophil count is 3800.  Sodium 141, potassium 4.7, chloride 105, CO2 27, BUN 15, creatinine 0.9, glucose 126, calcium 9.5, alkaline phosphatase 159, through base liver function  test within normal limits, , GFR greater than 60.    Impression:  1.  Adenocarcinoma of the GE junction (T2, N0, M0).  2.  Mild thrombocytopenia.  3.  Weight loss.  4.  Thyroid nodule-status post biopsy.    Plan:  1.  Obtain CT PET scan for further investigation of uncontrolled weight loss.  2.  Return to clinic to review results at earliest convenience.  3.  Trial of Megace syrup 400 mg twice daily for stimulation of appetite.  4.  Review results of thyroid biopsy at earliest convenience.    This note was created using voice recognition software and may contain grammatical errors.

## 2020-01-22 ENCOUNTER — TELEPHONE (OUTPATIENT)
Dept: HEMATOLOGY/ONCOLOGY | Facility: CLINIC | Age: 75
End: 2020-01-22

## 2020-01-24 ENCOUNTER — TELEPHONE (OUTPATIENT)
Dept: ENDOCRINOLOGY | Facility: CLINIC | Age: 75
End: 2020-01-24

## 2020-01-24 DIAGNOSIS — E04.2 MULTINODULAR GOITER: Primary | ICD-10-CM

## 2020-01-24 LAB — FINAL PATHOLOGIC DIAGNOSIS: NORMAL

## 2020-01-28 ENCOUNTER — HOSPITAL ENCOUNTER (OUTPATIENT)
Dept: RADIOLOGY | Facility: HOSPITAL | Age: 75
Discharge: HOME | End: 2020-01-28
Attending: INTERNAL MEDICINE
Payer: MEDICARE

## 2020-01-28 DIAGNOSIS — R63.4 WEIGHT LOSS: ICD-10-CM

## 2020-01-28 DIAGNOSIS — D69.6 THROMBOCYTOPENIA: ICD-10-CM

## 2020-01-28 DIAGNOSIS — C16.0 GE JUNCTION CARCINOMA: ICD-10-CM

## 2020-01-28 PROCEDURE — 78815 PET IMAGE W/CT SKULL-THIGH: CPT | Mod: TC,HCNC,PO

## 2020-01-28 PROCEDURE — A9552 F18 FDG: HCPCS | Mod: HCNC,PO

## 2020-01-28 PROCEDURE — 78815 NM PET CT ROUTINE: ICD-10-PCS | Mod: 26,HCNC,PS, | Performed by: RADIOLOGY

## 2020-01-28 PROCEDURE — 78815 PET IMAGE W/CT SKULL-THIGH: CPT | Mod: 26,HCNC,PS, | Performed by: RADIOLOGY

## 2020-01-29 ENCOUNTER — TELEPHONE (OUTPATIENT)
Dept: GASTROENTEROLOGY | Facility: CLINIC | Age: 75
End: 2020-01-29

## 2020-01-29 ENCOUNTER — OFFICE VISIT (OUTPATIENT)
Dept: HEMATOLOGY/ONCOLOGY | Facility: CLINIC | Age: 75
End: 2020-01-29
Payer: MEDICARE

## 2020-01-29 ENCOUNTER — PATIENT MESSAGE (OUTPATIENT)
Dept: HEMATOLOGY/ONCOLOGY | Facility: CLINIC | Age: 75
End: 2020-01-29

## 2020-01-29 ENCOUNTER — TELEPHONE (OUTPATIENT)
Dept: HEMATOLOGY/ONCOLOGY | Facility: CLINIC | Age: 75
End: 2020-01-29

## 2020-01-29 VITALS
SYSTOLIC BLOOD PRESSURE: 118 MMHG | DIASTOLIC BLOOD PRESSURE: 60 MMHG | WEIGHT: 206.38 LBS | TEMPERATURE: 98 F | OXYGEN SATURATION: 99 % | HEIGHT: 77 IN | RESPIRATION RATE: 18 BRPM | HEART RATE: 90 BPM | BODY MASS INDEX: 24.37 KG/M2

## 2020-01-29 DIAGNOSIS — E04.1 THYROID NODULE: ICD-10-CM

## 2020-01-29 DIAGNOSIS — C16.0 GE JUNCTION CARCINOMA: Primary | ICD-10-CM

## 2020-01-29 DIAGNOSIS — R10.10 PAIN OF UPPER ABDOMEN: ICD-10-CM

## 2020-01-29 DIAGNOSIS — D69.6 THROMBOCYTOPENIA: ICD-10-CM

## 2020-01-29 DIAGNOSIS — R63.4 UNEXPLAINED WEIGHT LOSS: Primary | ICD-10-CM

## 2020-01-29 DIAGNOSIS — R68.81 EARLY SATIETY: ICD-10-CM

## 2020-01-29 DIAGNOSIS — R63.4 WEIGHT LOSS: ICD-10-CM

## 2020-01-29 PROCEDURE — 99499 RISK ADDL DX/OHS AUDIT: ICD-10-PCS | Mod: HCNC,S$GLB,, | Performed by: INTERNAL MEDICINE

## 2020-01-29 PROCEDURE — 1159F PR MEDICATION LIST DOCUMENTED IN MEDICAL RECORD: ICD-10-PCS | Mod: HCNC,S$GLB,, | Performed by: INTERNAL MEDICINE

## 2020-01-29 PROCEDURE — 99999 PR PBB SHADOW E&M-EST. PATIENT-LVL V: ICD-10-PCS | Mod: PBBFAC,HCNC,, | Performed by: INTERNAL MEDICINE

## 2020-01-29 PROCEDURE — 1101F PR PT FALLS ASSESS DOC 0-1 FALLS W/OUT INJ PAST YR: ICD-10-PCS | Mod: HCNC,CPTII,S$GLB, | Performed by: INTERNAL MEDICINE

## 2020-01-29 PROCEDURE — 1125F AMNT PAIN NOTED PAIN PRSNT: CPT | Mod: HCNC,S$GLB,, | Performed by: INTERNAL MEDICINE

## 2020-01-29 PROCEDURE — 3074F SYST BP LT 130 MM HG: CPT | Mod: HCNC,CPTII,S$GLB, | Performed by: INTERNAL MEDICINE

## 2020-01-29 PROCEDURE — 3078F DIAST BP <80 MM HG: CPT | Mod: HCNC,CPTII,S$GLB, | Performed by: INTERNAL MEDICINE

## 2020-01-29 PROCEDURE — 1159F MED LIST DOCD IN RCRD: CPT | Mod: HCNC,S$GLB,, | Performed by: INTERNAL MEDICINE

## 2020-01-29 PROCEDURE — 3078F PR MOST RECENT DIASTOLIC BLOOD PRESSURE < 80 MM HG: ICD-10-PCS | Mod: HCNC,CPTII,S$GLB, | Performed by: INTERNAL MEDICINE

## 2020-01-29 PROCEDURE — 99999 PR PBB SHADOW E&M-EST. PATIENT-LVL V: CPT | Mod: PBBFAC,HCNC,, | Performed by: INTERNAL MEDICINE

## 2020-01-29 PROCEDURE — 1101F PT FALLS ASSESS-DOCD LE1/YR: CPT | Mod: HCNC,CPTII,S$GLB, | Performed by: INTERNAL MEDICINE

## 2020-01-29 PROCEDURE — 3074F PR MOST RECENT SYSTOLIC BLOOD PRESSURE < 130 MM HG: ICD-10-PCS | Mod: HCNC,CPTII,S$GLB, | Performed by: INTERNAL MEDICINE

## 2020-01-29 PROCEDURE — 99214 OFFICE O/P EST MOD 30 MIN: CPT | Mod: HCNC,S$GLB,, | Performed by: INTERNAL MEDICINE

## 2020-01-29 PROCEDURE — 1125F PR PAIN SEVERITY QUANTIFIED, PAIN PRESENT: ICD-10-PCS | Mod: HCNC,S$GLB,, | Performed by: INTERNAL MEDICINE

## 2020-01-29 PROCEDURE — 99499 UNLISTED E&M SERVICE: CPT | Mod: HCNC,S$GLB,, | Performed by: INTERNAL MEDICINE

## 2020-01-29 PROCEDURE — 99214 PR OFFICE/OUTPT VISIT, EST, LEVL IV, 30-39 MIN: ICD-10-PCS | Mod: HCNC,S$GLB,, | Performed by: INTERNAL MEDICINE

## 2020-01-29 NOTE — TELEPHONE ENCOUNTER
Attempted to schedule pt for EGD, pt's wife states she will call back to schedule after talking to 's office.

## 2020-01-29 NOTE — TELEPHONE ENCOUNTER
Returned call to patient's wife who state GI called and said the first available for EGD is April 1 so she did not book the appt as Dr. Andrews wanted this done as soon as possible.  I will contact GI tomorrow and get a sooner EGD and wife back.     Wife voiced understanding and appreciation

## 2020-01-29 NOTE — PROGRESS NOTES
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  The patient is 6 months postop and continues to lose weight without adequate explanation.  Appetite remains poor.  Patient denies pain.  Patient has undergone biopsy of thyroid nodule.  Patient returns to clinic for interval follow-up.  He has not tried Megace syrup prescription that he was provided at last office.  Appetite remains poor.  Oral intake is small.  Weight is relatively stable but there has been no regain of weight.  Patient continues to experience abdominal discomfort and early satiety.  No other pertinent findings on a 14 point review of systems.    Physical examination:  Well-developed, obese, white gentleman, in no acute distress, with a weight of 206.5 lb (stable).  VITAL SIGNS: Documented  and reviewed this visit.  HEENT: Normocephalic, atraumatic. Oral mucosa pink and moist. Lips without   lesions. Tongue midline. Oropharynx clear. Nonicteric sclerae.   NECK: Supple, no adenopathy. No carotid bruits, thyromegaly or thyroid nodule.   HEART: Regular rate and rhythm without murmur, gallop or rub.   LUNGS: Clear to auscultation bilaterally. Normal respiratory effort.   ABDOMEN: Soft, nontender, nondistended with positive normoactive bowel sounds, some erythema and crusty secretion about the insertion site of patient's feeding tube noted,  no hepatosplenomegaly.   EXTREMITIES: No cyanosis, clubbing or edema. Distal pulses are intact.   AXILLAE AND GROIN: No palpable pathologic lymphadenopathy is appreciated.   SKIN: Intact/turgor normal   NEUROLOGIC: Cranial nerves II-XII grossly intact. Motor: Good muscle bulk and   tone. Strength/sensory 5/5 throughout. Gait stable.      Thyroid biopsy:  Negative for malignancy.    CT PET:  Negative for any evidence of recurrence.    Impression:  1.  Adenocarcinoma of the GE junction (T2, N0, M0).  2.  Mild thrombocytopenia.  3.  Weight loss.  4.  Thyroid nodule-benign.  5.  Elevated alkaline phosphatase  6.  Abdominal pain/early satiety.    Plan:  1.  Counseled patient regarding compliance with Megace served for stimulation of appetite.  2.  Consult Dr. Cruz for repeat EGD to further assess patient's abdominal complaints.  3.  Return to clinic in earliest convenience to review results of this procedure.    This note was created using voice recognition software and may contain grammatical errors.

## 2020-01-29 NOTE — TELEPHONE ENCOUNTER
----- Message from Cheryl  sent at 1/29/2020  4:11 PM CST -----  Contact: Wife bharat   Type:  Patient Returning Call    Who Called:  wife  Who Left Message for Patient:  Loida  Does the patient know what this is regarding?:  yes  Best Call Back Number:    Additional Information:  Wife called back

## 2020-01-29 NOTE — TELEPHONE ENCOUNTER
----- Message from Tonya Paulino sent at 1/29/2020  4:32 PM CST -----  Contact: Ania  Patient's wife requesting a call back from Emilie.     Please call at: [phone]

## 2020-01-30 ENCOUNTER — TELEPHONE (OUTPATIENT)
Dept: HEMATOLOGY/ONCOLOGY | Facility: CLINIC | Age: 75
End: 2020-01-30

## 2020-01-30 ENCOUNTER — TELEPHONE (OUTPATIENT)
Dept: GASTROENTEROLOGY | Facility: CLINIC | Age: 75
End: 2020-01-30

## 2020-01-30 ENCOUNTER — PATIENT MESSAGE (OUTPATIENT)
Dept: HEMATOLOGY/ONCOLOGY | Facility: CLINIC | Age: 75
End: 2020-01-30

## 2020-01-30 NOTE — TELEPHONE ENCOUNTER
Spoke with pt and scheduled ordered EGD to be done tomorrow with Dr. Kerns. Per Dr. Andrews, pt was instructed to hold his Aspirin and Xarelto today for his procedure tomorrow. Pt as given all prep instructions for this procedure and verbalized understanding by reading back all instructions. He was given phone number to call us back if he has any questions.

## 2020-01-30 NOTE — TELEPHONE ENCOUNTER
----- Message from Emilie Sen LPN sent at 1/30/2020  9:13 AM CST -----  Regarding: HOLD MEDS FOR EGD  Per Dr. Andrews - ok to hold ASA and Xarelto today for EGD tomorrow.    Emilie YOUSIF LPN  Hem/Onc [facility]

## 2020-01-30 NOTE — TELEPHONE ENCOUNTER
Informed pt's wife that the surgery center would be callinng her sometime today with an arrival time for her 's procedure. Pt's wife verbalized understanding.

## 2020-01-30 NOTE — TELEPHONE ENCOUNTER
I am trying to add pt on schedule to have EGD done on tomorrow. He is on Xarelto which needs to be held today. Is this OK?

## 2020-01-30 NOTE — TELEPHONE ENCOUNTER
----- Message from Arpit Reyes sent at 1/30/2020  2:31 PM CST -----  Type: Needs Medical Advice    Who Called: wife -Ania Garcia  Symptoms (please be specific):   How long has patient had these symptoms:   Pharmacy name and phone #:    Best Call Back Number: [phone]  Additional Information: Patient's wife asking for the arrival time for schedule procedure tomorrow.

## 2020-01-30 NOTE — TELEPHONE ENCOUNTER
Returned call to patient, explained to patient to patient that he IS having an EGD and that [facility] scheduling will call him today with the exact time of arrival, prep, etc.  Patient voiced understanding and appreciation

## 2020-01-30 NOTE — TELEPHONE ENCOUNTER
----- Message from Lisa Beaulieu sent at 1/30/2020  8:33 AM CST -----  Contact: patient  Type: Needs Medical Advice    Who Called:  Patient  Best Call Back Number: [phone] (home)   Additional Information: the patient said he needs a call back about the colonoscopy and he thinks someone has it confused and needs a call back to clarify please call to advise

## 2020-01-31 ENCOUNTER — OFFICE VISIT (OUTPATIENT)
Dept: HEMATOLOGY/ONCOLOGY | Facility: CLINIC | Age: 75
End: 2020-01-31
Payer: MEDICARE

## 2020-01-31 ENCOUNTER — TELEPHONE (OUTPATIENT)
Dept: HEMATOLOGY/ONCOLOGY | Facility: CLINIC | Age: 75
End: 2020-01-31

## 2020-01-31 ENCOUNTER — TELEPHONE (OUTPATIENT)
Dept: GASTROENTEROLOGY | Facility: CLINIC | Age: 75
End: 2020-01-31

## 2020-01-31 VITALS
BODY MASS INDEX: 24.18 KG/M2 | SYSTOLIC BLOOD PRESSURE: 144 MMHG | HEIGHT: 77 IN | WEIGHT: 204.81 LBS | OXYGEN SATURATION: 99 % | TEMPERATURE: 98 F | RESPIRATION RATE: 18 BRPM | HEART RATE: 98 BPM | DIASTOLIC BLOOD PRESSURE: 80 MMHG

## 2020-01-31 DIAGNOSIS — I10 ESSENTIAL HYPERTENSION: ICD-10-CM

## 2020-01-31 DIAGNOSIS — C16.0 GE JUNCTION CARCINOMA: Primary | ICD-10-CM

## 2020-01-31 PROCEDURE — 1101F PT FALLS ASSESS-DOCD LE1/YR: CPT | Mod: HCNC,CPTII,S$GLB, | Performed by: NURSE PRACTITIONER

## 2020-01-31 PROCEDURE — 99499 RISK ADDL DX/OHS AUDIT: ICD-10-PCS | Mod: HCNC,S$GLB,, | Performed by: NURSE PRACTITIONER

## 2020-01-31 PROCEDURE — 99999 PR PBB SHADOW E&M-EST. PATIENT-LVL V: CPT | Mod: PBBFAC,HCNC,, | Performed by: NURSE PRACTITIONER

## 2020-01-31 PROCEDURE — 99214 PR OFFICE/OUTPT VISIT, EST, LEVL IV, 30-39 MIN: ICD-10-PCS | Mod: HCNC,S$GLB,, | Performed by: NURSE PRACTITIONER

## 2020-01-31 PROCEDURE — 3079F DIAST BP 80-89 MM HG: CPT | Mod: HCNC,CPTII,S$GLB, | Performed by: NURSE PRACTITIONER

## 2020-01-31 PROCEDURE — 99214 OFFICE O/P EST MOD 30 MIN: CPT | Mod: HCNC,S$GLB,, | Performed by: NURSE PRACTITIONER

## 2020-01-31 PROCEDURE — 99499 UNLISTED E&M SERVICE: CPT | Mod: HCNC,S$GLB,, | Performed by: NURSE PRACTITIONER

## 2020-01-31 PROCEDURE — 1125F PR PAIN SEVERITY QUANTIFIED, PAIN PRESENT: ICD-10-PCS | Mod: HCNC,S$GLB,, | Performed by: NURSE PRACTITIONER

## 2020-01-31 PROCEDURE — 1101F PR PT FALLS ASSESS DOC 0-1 FALLS W/OUT INJ PAST YR: ICD-10-PCS | Mod: HCNC,CPTII,S$GLB, | Performed by: NURSE PRACTITIONER

## 2020-01-31 PROCEDURE — 3077F PR MOST RECENT SYSTOLIC BLOOD PRESSURE >= 140 MM HG: ICD-10-PCS | Mod: HCNC,CPTII,S$GLB, | Performed by: NURSE PRACTITIONER

## 2020-01-31 PROCEDURE — 99999 PR PBB SHADOW E&M-EST. PATIENT-LVL V: ICD-10-PCS | Mod: PBBFAC,HCNC,, | Performed by: NURSE PRACTITIONER

## 2020-01-31 PROCEDURE — 3079F PR MOST RECENT DIASTOLIC BLOOD PRESSURE 80-89 MM HG: ICD-10-PCS | Mod: HCNC,CPTII,S$GLB, | Performed by: NURSE PRACTITIONER

## 2020-01-31 PROCEDURE — 1159F PR MEDICATION LIST DOCUMENTED IN MEDICAL RECORD: ICD-10-PCS | Mod: HCNC,S$GLB,, | Performed by: NURSE PRACTITIONER

## 2020-01-31 PROCEDURE — 1125F AMNT PAIN NOTED PAIN PRSNT: CPT | Mod: HCNC,S$GLB,, | Performed by: NURSE PRACTITIONER

## 2020-01-31 PROCEDURE — 1159F MED LIST DOCD IN RCRD: CPT | Mod: HCNC,S$GLB,, | Performed by: NURSE PRACTITIONER

## 2020-01-31 PROCEDURE — 3077F SYST BP >= 140 MM HG: CPT | Mod: HCNC,CPTII,S$GLB, | Performed by: NURSE PRACTITIONER

## 2020-01-31 NOTE — TELEPHONE ENCOUNTER
R/s pt procedure to 2/7/2020. Pt stated that he still had his instructions. Informed pt that the surgery center would be calling him with his arrival time. Pt verbalized understanding.

## 2020-01-31 NOTE — TELEPHONE ENCOUNTER
Attempted to r/s pt. Pt's wife states that she would like to hold off on r/s the procedure. Pt's wife states that they would like to wait and see if the procedure gets approved by today. Pt's wife will call back and notify me if the insurance approves them.

## 2020-01-31 NOTE — PROGRESS NOTES
"Subjective:       Patient ID: Stu Garcia is a 74 y.o. male.    Chief Complaint: Other (Survivorship)    HPI  This is a 74 year old white gentleman known to Dr. Andrews for a diagnosis of GE junction carcinoma who completed neoadjuvant chemo/XRT (05/13/2019), went on to have surgical resection (07/02/2019) and primary anastomosis.  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  He has had difficulty with gaining weight.  His appetite is down and experiences early satiety.  He is scheduled for an EGD later today.    He presents to the clinic today for Surveillance Planning.  He denies any complaints other than weight loss.  No other new complaints or pertinent findings on a 14 point review of systems.    Review of Systems   Constitutional: Positive for appetite change.       Objective:       Vitals:    01/31/20 0909   BP: (!) 144/80   BP Location: Left arm   Patient Position: Sitting   Pulse: 98   Resp: 18   Temp: 98.3 °F (36.8 °C)   TempSrc: Oral   SpO2: 99%   Weight: 92.9 kg (204 lb 12.9 oz)   Height: 6' 4.5" (1.943 m)     Physical Exam   Constitutional: He is oriented to person, place, and time. He appears well-developed and well-nourished.   HENT:   Head: Normocephalic and atraumatic.   Mouth/Throat: Oropharynx is clear and moist.   Wearing hearing aids   Eyes: Pupils are equal, round, and reactive to light. Conjunctivae and EOM are normal.   Neck: Normal range of motion. Neck supple.   Cardiovascular: Normal rate and regular rhythm.   Pulmonary/Chest: Effort normal and breath sounds normal.   Abdominal: Soft. Bowel sounds are normal.   Musculoskeletal: Normal range of motion.   Lymphadenopathy:     He has no cervical adenopathy.   Neurological: He is alert and oriented to person, place, and time.   Skin: Skin is warm and dry. Capillary refill takes less than 2 seconds.   Psychiatric: He has a normal mood and affect. His behavior is " normal.   Vitals reviewed.      Assessment:       1. GE junction carcinoma        2.  Weight loss/poor appetite/early satiety - EGD today  3.  HTN - follows with PCP     Plan:       Survivorship care plan reviewed with patient.    All questions answered.  Copy given to patient.  Emailed copy to Dr. Juan.    Assessment/plan reviewed and approved by Dr. Andrews.

## 2020-01-31 NOTE — TELEPHONE ENCOUNTER
----- Message from Latoya Alexander sent at 1/31/2020  8:01 AM CST -----  Contact: Ania wife  Type: Needs Medical Advice    Who Called:  Ania Marsh Call Back Number: [phone]  Additional Information: Pls call Ania regarding pt's procedure today. [employer] will not approve until the office calls them and tells them it's urgent

## 2020-01-31 NOTE — TELEPHONE ENCOUNTER
Patient came into the office for another appt, stating that we needed to call his insurance because his EGD is scheduled for today and his insurance won't approve it.    I spoke with Selin Jones in Pre Service who stated it is not that his insurance has denied coverage, but that they can take up to 14 days to approve it. The procedure is marked Medically Urgent, therefore the procedure will be done as long as the patient understands that his insurance has not yet approved authorization.    I explained that d/t unexplained weight loss in a patient with PET negative findings, Dr. Andrews ordered an EGD to investigate more.    I explained to patient that it is not that his insurance denied the procedure, but that his insurance has up to 14 days to approve it.

## 2020-02-05 ENCOUNTER — TELEPHONE (OUTPATIENT)
Dept: HEMATOLOGY/ONCOLOGY | Facility: CLINIC | Age: 75
End: 2020-02-05

## 2020-02-05 NOTE — TELEPHONE ENCOUNTER
----- Message from Nicci Malin sent at 2/5/2020  2:07 PM CST -----  Contact: patient  Type: Needs Medical Advice    Who Called:  Patient  Symptoms (please be specific):    How long has patient had these symptoms:    Pharmacy name and phone #:    Best Call Back Number: [phone]  Additional Information: requesting a call back regarding procedure that is schedule for tomorrow?

## 2020-02-05 NOTE — TELEPHONE ENCOUNTER
Returned call to patient, patient asked if his procedure with Dr. Kerns has been authorized for tomorrow?  I reached out to Selin Jones, patient access , who was contacting [facility] as the case remains pending.

## 2020-02-06 ENCOUNTER — ANESTHESIA (OUTPATIENT)
Dept: ENDOSCOPY | Facility: HOSPITAL | Age: 75
End: 2020-02-06
Payer: MEDICARE

## 2020-02-06 ENCOUNTER — ANESTHESIA EVENT (OUTPATIENT)
Dept: ENDOSCOPY | Facility: HOSPITAL | Age: 75
End: 2020-02-06
Payer: MEDICARE

## 2020-02-06 ENCOUNTER — HOSPITAL ENCOUNTER (OUTPATIENT)
Facility: HOSPITAL | Age: 75
Discharge: HOME | End: 2020-02-06
Attending: INTERNAL MEDICINE | Admitting: INTERNAL MEDICINE
Payer: MEDICARE

## 2020-02-06 VITALS
SYSTOLIC BLOOD PRESSURE: 122 MMHG | HEIGHT: 76 IN | RESPIRATION RATE: 12 BRPM | WEIGHT: 206 LBS | DIASTOLIC BLOOD PRESSURE: 66 MMHG | OXYGEN SATURATION: 100 % | HEART RATE: 72 BPM | TEMPERATURE: 97 F | BODY MASS INDEX: 25.09 KG/M2

## 2020-02-06 DIAGNOSIS — R63.4 WEIGHT LOSS: Primary | ICD-10-CM

## 2020-02-06 LAB
H PYLORI INDEX VALUE: NEGATIVE
KOH PREP SPEC: NORMAL
SPECIMEN SOURCE: NORMAL

## 2020-02-06 PROCEDURE — 27201012 HC FORCEPS, HOT/COLD, DISP: Mod: HCNC,PO | Performed by: INTERNAL MEDICINE

## 2020-02-06 PROCEDURE — 63600175 PHARM REV CODE 636 W HCPCS: Mod: HCNC,PO | Performed by: NURSE ANESTHETIST, CERTIFIED REGISTERED

## 2020-02-06 PROCEDURE — 87449 NOS EACH ORGANISM AG IA: CPT | Mod: HCNC,PO | Performed by: INTERNAL MEDICINE

## 2020-02-06 PROCEDURE — 87210 SMEAR WET MOUNT SALINE/INK: CPT | Mod: HCNC,PO

## 2020-02-06 PROCEDURE — D9220A PRA ANESTHESIA: Mod: HCNC,CRNA,, | Performed by: NURSE ANESTHETIST, CERTIFIED REGISTERED

## 2020-02-06 PROCEDURE — 63600175 PHARM REV CODE 636 W HCPCS: Mod: HCNC,PO | Performed by: INTERNAL MEDICINE

## 2020-02-06 PROCEDURE — 37000009 HC ANESTHESIA EA ADD 15 MINS: Mod: HCNC,PO | Performed by: INTERNAL MEDICINE

## 2020-02-06 PROCEDURE — 88305 TISSUE EXAM BY PATHOLOGIST: CPT | Mod: HCNC | Performed by: PATHOLOGY

## 2020-02-06 PROCEDURE — 43249 ESOPH EGD DILATION <30 MM: CPT | Mod: HCNC,,, | Performed by: INTERNAL MEDICINE

## 2020-02-06 PROCEDURE — 88305 TISSUE EXAM BY PATHOLOGIST: ICD-10-PCS | Mod: 26,HCNC,, | Performed by: PATHOLOGY

## 2020-02-06 PROCEDURE — D9220A PRA ANESTHESIA: ICD-10-PCS | Mod: HCNC,ANES,, | Performed by: ANESTHESIOLOGY

## 2020-02-06 PROCEDURE — 88305 TISSUE EXAM BY PATHOLOGIST: CPT | Mod: 26,HCNC,, | Performed by: PATHOLOGY

## 2020-02-06 PROCEDURE — 43249 ESOPH EGD DILATION <30 MM: CPT | Mod: HCNC,PO | Performed by: INTERNAL MEDICINE

## 2020-02-06 PROCEDURE — D9220A PRA ANESTHESIA: Mod: HCNC,ANES,, | Performed by: ANESTHESIOLOGY

## 2020-02-06 PROCEDURE — 37000008 HC ANESTHESIA 1ST 15 MINUTES: Mod: HCNC,PO | Performed by: INTERNAL MEDICINE

## 2020-02-06 PROCEDURE — D9220A PRA ANESTHESIA: ICD-10-PCS | Mod: HCNC,CRNA,, | Performed by: NURSE ANESTHETIST, CERTIFIED REGISTERED

## 2020-02-06 PROCEDURE — 43239 EGD BIOPSY SINGLE/MULTIPLE: CPT | Mod: 59,HCNC,, | Performed by: INTERNAL MEDICINE

## 2020-02-06 PROCEDURE — 43249 PR EGD, FLEX, W/BALL DILATION, < 30MM: ICD-10-PCS | Mod: HCNC,,, | Performed by: INTERNAL MEDICINE

## 2020-02-06 PROCEDURE — 43239 EGD BIOPSY SINGLE/MULTIPLE: CPT | Mod: HCNC,PO | Performed by: INTERNAL MEDICINE

## 2020-02-06 PROCEDURE — 43239 PR EGD, FLEX, W/BIOPSY, SGL/MULTI: ICD-10-PCS | Mod: 59,HCNC,, | Performed by: INTERNAL MEDICINE

## 2020-02-06 RX ORDER — SODIUM CHLORIDE, SODIUM LACTATE, POTASSIUM CHLORIDE, CALCIUM CHLORIDE 600; 310; 30; 20 MG/100ML; MG/100ML; MG/100ML; MG/100ML
INJECTION, SOLUTION INTRAVENOUS CONTINUOUS
Status: DISCONTINUED | OUTPATIENT
Start: 2020-02-06 | End: 2020-02-06 | Stop reason: HOSPADM

## 2020-02-06 RX ORDER — PROPOFOL 10 MG/ML
VIAL (ML) INTRAVENOUS
Status: DISCONTINUED | OUTPATIENT
Start: 2020-02-06 | End: 2020-02-06

## 2020-02-06 RX ORDER — FENTANYL CITRATE 50 UG/ML
INJECTION, SOLUTION INTRAMUSCULAR; INTRAVENOUS
Status: DISCONTINUED | OUTPATIENT
Start: 2020-02-06 | End: 2020-02-06

## 2020-02-06 RX ORDER — NYSTATIN 100000 [USP'U]/ML
5 SUSPENSION ORAL
Qty: 240 ML | Refills: 0 | Status: SHIPPED | OUTPATIENT
Start: 2020-02-06 | End: 2020-02-16

## 2020-02-06 RX ORDER — SODIUM CHLORIDE 0.9 % (FLUSH) 0.9 %
10 SYRINGE (ML) INJECTION
Status: DISCONTINUED | OUTPATIENT
Start: 2020-02-06 | End: 2020-02-06 | Stop reason: HOSPADM

## 2020-02-06 RX ORDER — LIDOCAINE HYDROCHLORIDE 20 MG/ML
INJECTION INTRAVENOUS
Status: DISCONTINUED | OUTPATIENT
Start: 2020-02-06 | End: 2020-02-06

## 2020-02-06 RX ADMIN — SODIUM CHLORIDE, SODIUM LACTATE, POTASSIUM CHLORIDE, AND CALCIUM CHLORIDE: .6; .31; .03; .02 INJECTION, SOLUTION INTRAVENOUS at 09:02

## 2020-02-06 RX ADMIN — PROPOFOL 25 MG: 10 INJECTION, EMULSION INTRAVENOUS at 09:02

## 2020-02-06 RX ADMIN — PROPOFOL 25 MG: 10 INJECTION, EMULSION INTRAVENOUS at 10:02

## 2020-02-06 RX ADMIN — FENTANYL CITRATE 50 MCG: 50 INJECTION, SOLUTION INTRAMUSCULAR; INTRAVENOUS at 09:02

## 2020-02-06 RX ADMIN — LIDOCAINE HYDROCHLORIDE 100 MG: 20 INJECTION, SOLUTION INTRAVENOUS at 09:02

## 2020-02-06 RX ADMIN — PROPOFOL 50 MG: 10 INJECTION, EMULSION INTRAVENOUS at 09:02

## 2020-02-06 NOTE — TRANSFER OF CARE
"Anesthesia Transfer of Care Note    Patient: Stu Garcia    Procedure(s) Performed: Procedure(s) (LRB):  EGD (ESOPHAGOGASTRODUODENOSCOPY) (N/A)    Patient location: PACU    Anesthesia Type: general    Transport from OR: Transported from OR on room air with adequate spontaneous ventilation    Post pain: adequate analgesia    Post assessment: no apparent anesthetic complications    Post vital signs: stable    Level of consciousness: responds to stimulation    Nausea/Vomiting: no nausea/vomiting    Complications: none    Transfer of care protocol was followed      Last vitals:   Visit Vitals  /62   Pulse 65   Temp 36.1 °C (97 °F) (Skin)   Resp 16   Ht 6' 4" (1.93 m)   Wt 93.4 kg (206 lb)   SpO2 100%   BMI 25.08 kg/m²     "

## 2020-02-06 NOTE — ANESTHESIA POSTPROCEDURE EVALUATION
Anesthesia Post Evaluation    Patient: Stu Garcia    Procedure(s) Performed: Procedure(s) (LRB):  EGD (ESOPHAGOGASTRODUODENOSCOPY) (N/A)    Final Anesthesia Type: general    Patient location during evaluation: PACU  Patient participation: Yes- Able to Participate  Level of consciousness: awake and alert  Post-procedure vital signs: reviewed and stable  Pain management: adequate  Airway patency: patent    PONV status at discharge: No PONV  Anesthetic complications: no      Cardiovascular status: hemodynamically stable  Respiratory status: unassisted and room air  Hydration status: euvolemic  Follow-up not needed.          Vitals Value Taken Time   /66 2/6/2020 10:23 AM   Temp 36.1 °C (97 °F) 2/6/2020  9:28 AM   Pulse 72 2/6/2020 10:23 AM   Resp 12 2/6/2020 10:23 AM   SpO2 100 % 2/6/2020 10:23 AM         Event Time     Out of Recovery 11:00:00          Pain/Sanchez Score: Sanchez Score: 10 (2/6/2020 11:00 AM)

## 2020-02-06 NOTE — H&P
History & Physical - Short Stay  Gastroenterology      SUBJECTIVE:     Procedure: Gastroscopy    Chief Complaint/Indication for Procedure:  Dysphagia (since neck surgery 2 yrs ago).  Weight loss.  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    History of Present Illness:  Office Visit     1/29/2020  [facility]      Clinton Andrews MD   Hematology and Oncology   GE junction carcinoma +5 more   Dx   Other     Reason for Visit    Progress Notes        History of present illness:  The patient is a 73-year-old white gentleman well known to me for GE junction carcinoma who completed neoadjuvant chemo/XRT, went on to have surgical resection and primary anastomosis.  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  The patient is 6 months postop and continues to lose weight without adequate explanation.  Appetite remains poor.  Patient denies pain.  Patient has undergone biopsy of thyroid nodule.  Patient returns to clinic for interval follow-up.  He has not tried Megace syrup prescription that he was provided at last office.  Appetite remains poor.  Oral intake is small.  Weight is relatively stable but there has been no regain of weight.  Patient continues to experience abdominal discomfort and early satiety.  No other pertinent findings on a 14 point review of systems.     Thyroid biopsy:  Negative for malignancy.     CT PET:  Negative for any evidence of recurrence.     Impression:  1.  Adenocarcinoma of the GE junction (T2, N0, M0).  2.  Mild thrombocytopenia.  3.  Weight loss.  4.  Thyroid nodule-benign.  5.  Elevated alkaline phosphatase  6.  Abdominal pain/early satiety.     Plan:  1.  Counseled patient regarding compliance with Megace served for stimulation of appetite.  2.  Consult Dr. Cruz for repeat EGD to further assess patient's abdominal complaints.  3.  Return to clinic in earliest convenience to  review results of this procedure.          Esophagram 7/15/2019:  Swallowing: Normal.    Esophagus: Status post total esophagectomy with gastric pull-through.  Water-soluble contrast passed through the anastomosis site which is in the upper chest with no evidence of leak or stenosis.    The contrast passed to the proximal small bowel with no evidence of obstruction.    Other findings: None.      Impression       In this patient status post total esophagectomy with gastric pull-through, the anastomosis site was visualized in the upper chest with no evidence of leak or obstruction.    Electronically signed by resident: Trenton Bosch MD  Date: 07/15/2019       Pathology 7/2/2019  SPECIMEN  1) Level 7 mediastinal lymph nodes.  2) Level 8 mediastinal lymph nodes.  3) Level 4 mediastinal lymph nodes.  4) Total thoracic esophagus, proximal stomach and lymph nodes, mediastinal and abdominal lymph  nodes.  FINAL PATHOLOGIC DIAGNOSIS  1. LYMPH NODES (13): NO EVIDENCE OF MALIGNANCY.  2. LYMPH NODES (9): NO EVIDENCE OF MALIGNANCY.  3. LYMPH NODES (6): NO EVIDENCE OF MALIGNANCY.  4. ESOPHAGUS AND STOMACH: SMALL FOCUS OF MARKED GLANDULAR DYSPLASIA; RADIATION  ATYPIA; NO RESIDUAL INVASIVE TUMOR PRESENT; PROXIMAL AND DISTAL RESECTION MARGINS FREE  OF MALIGNANCY; FOCAL INTESTINAL METAPLASIA; METASTATIC CARCINOMA TO THREE (3) OF ELEVEN  (11) LYMPH NODES.  Note: Representative sections reviewed independently by a second pathologist who concurs with the diagnosis.        Wt Readings from Last 20 Encounters:   02/06/20 93.4 kg (206 lb)   01/31/20 92.9 kg (204 lb 12.9 oz)   01/29/20 93.6 kg (206 lb 5.6 oz)   01/21/20 93.8 kg (206 lb 12.7 oz)   12/23/19 95.7 kg (211 lb)   11/14/19 100.7 kg (222 lb 0.1 oz)   11/14/19 100.7 kg (222 lb 0.1 oz)   10/21/19 101.1 kg (222 lb 14.2 oz)   10/09/19 102 kg (224 lb 13.9 oz)   10/07/19 100.9 kg (222 lb 7.1 oz)   09/26/19 102.8 kg (226 lb 10.1 oz)   09/11/19 102.6 kg (226 lb 3.1 oz)   08/21/19 102.4 kg  "(225 lb 12 oz)   08/14/19 104 kg (229 lb 4.5 oz)   08/05/19 106.3 kg (234 lb 5.6 oz)   07/22/19 107.1 kg (236 lb 1.8 oz)   07/17/19 109.2 kg (240 lb 11.9 oz)   07/15/19 111 kg (244 lb 11.4 oz)   07/06/19 116.9 kg (257 lb 12.8 oz)   07/01/19 115.7 kg (255 lb)         PTA Medications   Medication Sig    ACCU-CHEK SHASHANK PLUS TEST STRP Strp TEST ONCE DAILY    atorvastatin (LIPITOR) 80 MG tablet TAKE 1 TABLET(80 MG) BY MOUTH EVERY EVENING    ferrous gluconate (FERGON) 324 MG tablet Take 1 tablet (324 mg total) by mouth daily with breakfast.    glimepiride (AMARYL) 2 MG tablet TAKE 2 TABLETS TWICE DAILY    glucagon, human recombinant, (GLUCAGON EMERGENCY KIT, HUMAN,) 1 mg SolR Inject 1 mg into the muscle as needed.    insulin syr/ndl U100 half karina (BD INSULIN SYRINGE HALF UNIT) 0.3 mL 31 gauge x 15/64" Syrg Use once daily    lancets (ACCU-CHEK SOFTCLIX LANCETS) Misc 1 strip by Misc.(Non-Drug; Combo Route) route once daily.    megestrol (MEGACE) 400 mg/10 mL (40 mg/mL) Susp Take 10 mLs (400 mg total) by mouth 2 (two) times daily.    metFORMIN (GLUCOPHAGE-XR) 500 MG 24 hr tablet Take 1 tablet (500 mg total) by mouth daily with breakfast.    metoprolol tartrate (LOPRESSOR) 50 MG tablet TAKE ONE TABLET BY MOUTH TWICE DAILY    multivitamin capsule Take 1 capsule by mouth once daily.    omeprazole (PRILOSEC) 40 MG capsule Take 1 capsule (40 mg total) by mouth once daily.    rivaroxaban (XARELTO) 20 mg Tab Take 1 tablet (20 mg total) by mouth daily with dinner or evening meal.    vit C,E-Ye-llsqn-lutein-zeaxan (PRESERVISION AREDS 2) 177-910-83-1 mg-unit-mg-mg Cap Take 1 tablet by mouth once daily.    aspirin (ECOTRIN) 81 MG EC tablet Take 1 tablet (81 mg total) by mouth once daily. May take over the counter    hydrocodone-acetaminophen (HYCET) solution 7.5-325 mg/15mL 10 mLs by Per J Tube route every 4 (four) hours as needed. (Patient not taking: Reported on 1/30/2020)    lidocaine-prilocaine (EMLA) cream U UTD " "   nitroGLYCERIN (NITROSTAT) 0.4 MG SL tablet DISSOLVE 1 TABLET UNDER THE TONGUE EVERY 5 MINUTES AS NEEDED FOR CHEST PAIN    ondansetron (ZOFRAN) 8 MG tablet TK 1 T PO 1 HOUR B D RADIATION TREATMENT RO PREVENT NAUSEA UTD    pen needle, diabetic (BD ULTRA-FINE ELSA PEN NEEDLE) 32 gauge x 5/32" Ndle To use with insulin injection 1x daily.    prochlorperazine (COMPAZINE) 10 MG tablet TAKE 1 TABLET BY MOUTH EVERY 6 HOURS AS NEEDED FOR NAUSEA OR VOMITING       Review of patient's allergies indicates:   Allergen Reactions    Codeine Nausea And Vomiting        Past Medical History:   Diagnosis Date    Anticoagulant long-term use     xarelto,asa    Arthritis     Atrial fibrillation 2013    cardioverted    Bleb, lung     Cataract     OS    Colon polyp     Coronary artery disease     Coronary artery disease involving native coronary artery of native heart with unstable angina pectoris 3/18/2018    Diabetes mellitus     Diabetes mellitus, type 2     Diverticulosis     Encounter for blood transfusion     General anesthetics causing adverse effect in therapeutic use     delayed emergence per patient's wife    GERD (gastroesophageal reflux disease)     HEARING LOSS     bilateral aids    Hemorrhoid     HLD (hyperlipidemia)     Hypertension     Iron deficiency anemia     Neuropathy of leg     Pneumonia due to other staphylococcus     Prostate cancer     prostate    Spontaneous pneumothorax     bilateral    Thyroid disease     tumors, non cancerous     Past Surgical History:   Procedure Laterality Date    CARDIOVERSION      CATARACT EXTRACTION      bilateral    CERVICAL SPINE FRACTURE SURGERY      c7 t1 ACDF     CHEST TUBE INSERTION Right     COLONOSCOPY      CORONARY STENT PLACEMENT      x 2    ENDOSCOPIC ULTRASOUND OF UPPER GASTROINTESTINAL TRACT N/A 3/13/2019    Procedure: ULTRASOUND, UPPER GI TRACT, ENDOSCOPIC;  Surgeon: Andrew Shirley MD;  Location: [facility] ([address] [place] [address]);  Service: Endoscopy;  " "Laterality: N/A;    ESOPHAGOGASTRODUODENOSCOPY N/A 2/23/2019    Procedure: ESOPHAGOGASTRODUODENOSCOPY (EGD);  Surgeon: Jackeline Richards MD;  Location: [facility];  Service: Endoscopy;  Laterality: N/A;    HERNIA REPAIR      umbilical    INSERTION OF TUNNELED CENTRAL VENOUS CATHETER (CVC) WITH SUBCUTANEOUS PORT Right 4/4/2019    Procedure: KYOTCVXTE-RSEE-B-CATH (POWER PORT);  Surgeon: Nahum Zaidi MD;  Location: [facility];  Service: General;  Laterality: Right;    KNEE SURGERY Left 2017    open thoracotomy Left 1966    With pleurectomy    Pleurectomy Left 1966    For treatment of left pneumothorax    PROSTATECTOMY  2001    open    ROBOT-ASSISTED SURGICAL REMOVAL OF ESOPHAGUS USING DA ROSALIA XI N/A 7/2/2019    Procedure: XI ROBOTIC ESOPHAGECTOMY;  Surgeon: Chad Milian MD;  Location: [address];  Service: General;  Laterality: N/A;    TUBE THORACOTOMY  1966    pneumothorax left--open     Family History   Problem Relation Age of Onset    Mental illness Mother         dementia    Coronary artery disease Father     Cancer Father         stomach with mets    Diabetes Brother      Social History     Tobacco Use    Smoking status: Former Smoker     Years: 20.00    Smokeless tobacco: Never Used    Tobacco comment: quit smoking in 1980's.   Substance Use Topics    Alcohol use: Yes     Frequency: Monthly or less     Drinks per session: 1 or 2     Binge frequency: Never     Comment: 1 drink every 2 weeks    Drug use: No         OBJECTIVE:     Vital Signs (Most Recent)  Temp: 97 °F (36.1 °C) (02/06/20 0928)  Pulse: 70 (02/06/20 0928)  Resp: 20 (02/06/20 0928)  BP: 120/66 (02/06/20 0928)  SpO2: 99 % (02/06/20 0928)    Physical Exam:  :Ht 6' 4.5" (1.943 m)   Wt 93.6 kg (206 lb 5.6 oz)   BMI 24.79 kg/m²                                                           GENERAL:  Comfortable, in no acute distress.                                 HEENT EXAM:  Nonicteric.  No adenopathy.  Oropharynx is clear.        "        NECK:  Supple.                                                               LUNGS:  Clear.                                                               CARDIAC:  Regular rate and rhythm.  S1, S2.  No murmur.                      ABDOMEN:  Soft, positive bowel sounds, nontender.  No hepatosplenomegaly or masses.  No rebound or guarding.                                             EXTREMITIES:  No edema.     MENTAL STATUS:  Alert and oriented.    ASSESSMENT/PLAN:     Assessment: Dysphagia (since neck surgery 2 yrs ago).  Weight loss.  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    Plan: Gastroscopy    Anesthesia Plan:   MAC / General Anaesthesia    ASA Grade: ASA 2 - Patient with mild systemic disease with no functional limitations    MALLAMPATI SCORE: I (soft palate, uvula, fauces, and tonsillar pillars visible)

## 2020-02-06 NOTE — DISCHARGE INSTRUCTIONS
Recovery After Procedural Sedation (Adult)  You have been given medicine by vein to make you sleep during your surgery. This may have included both a pain medicine and sleeping medicine. Most of the effects have worn off. But you may still have some drowsiness for the next 6 to 8 hours.  Home care  Follow these guidelines when you get home:  · For the next 8 hours, you should be watched by a responsible adult. This person should make sure your condition is not getting worse.  · Don't drink any alcohol for the next 24 hours.  · Don't drive, operate dangerous machinery, or make important business or personal decisions during the next 24 hours.  Note: Your healthcare provider may tell you not to take any medicine by mouth for pain or sleep in the next 4 hours. These medicines may react with the medicines you were given in the hospital. This could cause a much stronger response than usual.  Follow-up care  Follow up with your healthcare provider if you are not alert and back to your usual level of activity within 12 hours.  When to seek medical advice  Call your healthcare provider right away if any of these occur:  · Drowsiness gets worse  · Weakness or dizziness gets worse  · Repeated vomiting  · You can't be awakened   Date Last Reviewed: 10/18/2016  © 7287-3912 [employer]. [address] [place] [address], [place] [address]. All rights reserved. This information is not intended as a substitute for professional medical care. Always follow your healthcare professional's instructions.        Tips to Control Acid Reflux    To control acid reflux, youll need to make some basic diet and lifestyle changes. The simple steps outlined below may be all youll need to ease discomfort.  Watch what you eat  · Avoid fatty foods and spicy foods.  · Eat fewer acidic foods, such as citrus and tomato-based foods. These can increase symptoms.  · Limit drinking alcohol, caffeine, and fizzy beverages. All increase acid  reflux.  · Try limiting chocolate, peppermint, and spearmint. These can worsen acid reflux in some people.  Watch when you eat  · Avoid lying down for 3 hours after eating.  · Do not snack before going to bed.  Raise your head  Raising your head and upper body by 4 to 6 inches helps limit reflux when youre lying down. Put blocks under the head of your bed frame to raise it.  Other changes  · Lose weight, if you need to  · Dont exercise near bedtime  · Avoid tight-fitting clothes  · Limit aspirin and ibuprofen  · Stop smoking   Date Last Reviewed: 7/1/2016  © 0681-1794 [employer]. [address] [place] [address], [place] [address]. All rights reserved. This information is not intended as a substitute for professional medical care. Always follow your healthcare professional's instructions.

## 2020-02-06 NOTE — BRIEF OP NOTE
Discharge Note  Short Stay      SUMMARY     Admit Date: 2/6/2020    Attending Physician: Andrea Kerns Jr., MD     Discharge Physician: Andrea Kerns Jr., MD    Discharge Date: 2/6/2020 10:39 AM    Final Diagnosis: Unexplained weight loss [R63.4]  Impression:          - Normal oropharynx.                       - Normal cricopharyngeus.                       - Esophageal plaques were found, suspicious for                        candidiasis. Brushings performed. CONSUELO Prep -- >                        Yeast and hyphae                       - Normal upper third of esophagus otherwise.                       - An esophago-gastric anastomosis was found, 20 cm                        from the incisors, characterized by healthy                        appearing mucosa and an intact staple line. Biopsied.                       - No endoscopic esophageal abnormality to explain                        patient's dysphagia. Esophagus dilated with a                        15-16.5-18 mm balloon dilator was performed to 18 mm                        - Erythematous mucosa in the gastric body and                        antrum. Biopsied.                       - Diaphragm level noted 42-43 cm from the incisors.                       - Antrum mucosal atrophy.                       - Slight active antritis. Biopsied.                       - Normal pylorus.                       - Normal examined duodenum.                       - Normal examined jejunum.                       - Normal major papilla.  Recommendation:      - Discharge patient to home.                       - Observe patient's clinical course following                        today's procedure with therapeutic intervention.                       - Perform a modified barium swallow with Speech                        therapist.                       - Repeat upper endoscopy PRN for retreatment.                       - Return to GI clinic in 6-8 weeks.  Andrea Kerns  "MD  2/6/2020   Disposition: HOME OR SELF CARE    Patient Instructions:   Current Discharge Medication List      CONTINUE these medications which have NOT CHANGED    Details   ACCU-CHEK SHASHANK PLUS TEST STRP Strp TEST ONCE DAILY  Qty: 100 strip, Refills: 3    Comments: Substitute to insurance-preferred brand if necessary  Associated Diagnoses: Type 2 diabetes mellitus with hyperlipidemia      atorvastatin (LIPITOR) 80 MG tablet TAKE 1 TABLET(80 MG) BY MOUTH EVERY EVENING  Qty: 90 tablet, Refills: 0    Associated Diagnoses: Coronary artery disease involving native coronary artery of native heart with unstable angina pectoris      ferrous gluconate (FERGON) 324 MG tablet Take 1 tablet (324 mg total) by mouth daily with breakfast.      glimepiride (AMARYL) 2 MG tablet TAKE 2 TABLETS TWICE DAILY  Qty: 360 tablet, Refills: 3      glucagon, human recombinant, (GLUCAGON EMERGENCY KIT, HUMAN,) 1 mg SolR Inject 1 mg into the muscle as needed.  Qty: 1 each, Refills: 2      insulin syr/ndl U100 half karina (BD INSULIN SYRINGE HALF UNIT) 0.3 mL 31 gauge x 15/64" Syrg Use once daily  Qty: 30 Syringe, Refills: 11      lancets (ACCU-CHEK SOFTCLIX LANCETS) Misc 1 strip by Misc.(Non-Drug; Combo Route) route once daily.  Qty: 100 each, Refills: 3    Comments: Substitute to preferred brand if needed      megestrol (MEGACE) 400 mg/10 mL (40 mg/mL) Susp Take 10 mLs (400 mg total) by mouth 2 (two) times daily.  Qty: 240 mL, Refills: 3    Associated Diagnoses: GE junction carcinoma; Thrombocytopenia; Weight loss      metFORMIN (GLUCOPHAGE-XR) 500 MG 24 hr tablet Take 1 tablet (500 mg total) by mouth daily with breakfast.  Qty: 90 tablet, Refills: 3    Associated Diagnoses: Type 2 diabetes mellitus treated without insulin      metoprolol tartrate (LOPRESSOR) 50 MG tablet TAKE ONE TABLET BY MOUTH TWICE DAILY  Qty: 60 tablet, Refills: 11      multivitamin capsule Take 1 capsule by mouth once daily.      omeprazole (PRILOSEC) 40 MG capsule Take 1 " "capsule (40 mg total) by mouth once daily.  Qty: 90 capsule, Refills: 3    Associated Diagnoses: GE junction carcinoma      rivaroxaban (XARELTO) 20 mg Tab Take 1 tablet (20 mg total) by mouth daily with dinner or evening meal.  Qty: 30 tablet, Refills: 11    Comments: Please price. Refills per Dr. Eduard Cano [phone] phone      vit C,R-Dg-xauep-lutein-zeaxan (PRESERVISION AREDS 2) 683-407-95-1 mg-unit-mg-mg Cap Take 1 tablet by mouth once daily.      aspirin (ECOTRIN) 81 MG EC tablet Take 1 tablet (81 mg total) by mouth once daily. May take over the counter  Qty: 30 tablet, Refills: 12      hydrocodone-acetaminophen (HYCET) solution 7.5-325 mg/15mL 10 mLs by Per J Tube route every 4 (four) hours as needed.  Qty: 473 mL, Refills: 0      lidocaine-prilocaine (EMLA) cream U UTD  Refills: 0      nitroGLYCERIN (NITROSTAT) 0.4 MG SL tablet DISSOLVE 1 TABLET UNDER THE TONGUE EVERY 5 MINUTES AS NEEDED FOR CHEST PAIN  Qty: 100 tablet, Refills: prn      ondansetron (ZOFRAN) 8 MG tablet TK 1 T PO 1 HOUR B D RADIATION TREATMENT RO PREVENT NAUSEA UTD  Qty: 30 tablet, Refills: 3    Associated Diagnoses: CINV (chemotherapy-induced nausea and vomiting)      pen needle, diabetic (BD ULTRA-FINE ELSA PEN NEEDLE) 32 gauge x 5/32" Ndle To use with insulin injection 1x daily.  Qty: 60 each, Refills: 15      prochlorperazine (COMPAZINE) 10 MG tablet TAKE 1 TABLET BY MOUTH EVERY 6 HOURS AS NEEDED FOR NAUSEA OR VOMITING  Qty: 60 tablet, Refills: 0    Associated Diagnoses: GE junction carcinoma             Discharge Procedure Orders (must include Diet, Follow-up, Activity)    Follow Up:  Follow up with PCP as per your routine.  Please follow an anti reflux diet.  Activity as tolerated.    No driving day of procedure.  "

## 2020-02-06 NOTE — [employer] PATIENT INSTRUCTIONS
Discharge Summary/Instructions after an Endoscopic Procedure  Patient Name: Stu Garcia  Patient MRN: [id number]  Patient YOB: 1945  Thursday, February 06, 2020  Andrea Kerns MD  RESTRICTIONS:  During your procedure today, you received medications for sedation.  These   medications may affect your judgment, balance and coordination.  Therefore,   for 24 hours, you have the following restrictions:   - DO NOT drive a car, operate machinery, make legal/financial decisions,   sign important papers or drink alcohol.    ACTIVITY:  Today: no heavy lifting, straining or running due to procedural   sedation/anesthesia.  The following day: return to full activity including work.  DIET:  Eat and drink normally unless instructed otherwise.     TREATMENT FOR COMMON SIDE EFFECTS:  - Mild abdominal pain, nausea, belching, bloating or excessive gas:  rest,   eat lightly and use a heating pad.  - Sore Throat: treat with throat lozenges and/or gargle with warm salt   water.  - Because air was used during the procedure, expelling large amounts of air   from your rectum or belching is normal.  - If a bowel prep was taken, you may not have a bowel movement for 1-3 days.    This is normal.  SYMPTOMS TO WATCH FOR AND REPORT TO YOUR PHYSICIAN:  1. Abdominal pain or bloating, other than gas cramps.  2. Chest pain.  3. Back pain.  4. Signs of infection such as: chills or fever occurring within 24 hours   after the procedure.  5. Rectal bleeding, which would show as bright red, maroon, or black stools.   (A tablespoon of blood from the rectum is not serious, especially if   hemorrhoids are present.)  6. Vomiting.  7. Weakness or dizziness.  GO DIRECTLY TO THE NEAREST EMERGENCY ROOM IF YOU HAVE ANY OF THE FOLLOWING:      Difficulty breathing              Chills and/or fever over 101 F   Persistent vomiting and/or vomiting blood   Severe abdominal pain   Severe chest pain   Black, tarry stools   Bleeding- more than one  tablespoon   Any other symptom or condition that you feel may need urgent attention  Your doctor recommends these additional instructions:  If any biopsies were taken, your doctors clinic will contact you in 1 to 2   weeks with any results.  Your physician has recommended a consult with the speech therapist and a   modified barium swallow.   Return to your GI clinic in 6-8 weeks.  For questions, problems or results please call your physician - Andrea Kerns MD at Work:  [phone].  EMERGENCY PHONE NUMBER: [phone], LAB RESULTS: [phone]  IF A COMPLICATION OR EMERGENCY SITUATION ARISES AND YOU ARE UNABLE TO REACH   YOUR PHYSICIAN - GO DIRECTLY TO THE EMERGENCY ROOM.  ___________________________________________  Nurse Signature  ___________________________________________  Patient/Designated Responsible Party Signature  Andrea Kerns MD  2/6/2020 10:37:38 AM  This report has been verified and signed electronically.  [employer]

## 2020-02-06 NOTE — ANESTHESIA PREPROCEDURE EVALUATION
02/06/2020  Stu Garcia is a 74 y.o., male.    Anesthesia Evaluation    I have reviewed the Patient Summary Reports.    I have reviewed the Nursing Notes.   I have reviewed the Medications.     Review of Systems  Anesthesia Hx:  Hx of Anesthetic complications    Social:  Former Smoker    Hematology/Oncology:         -- Anemia: --  Cancer in past history (prostate CA):    Cardiovascular:   Hypertension, well controlled CAD asymptomatic Dysrhythmias atrial fibrillation Angina hyperlipidemia    Pulmonary:   Pneumonia    Renal/:  Renal/ Normal     Hepatic/GI:   GERD, well controlled    Musculoskeletal:   Arthritis     Neurological:   Peripheral Neuropathy    Endocrine:   Diabetes, well controlled, type 2        Physical Exam  General:  Well nourished    Airway/Jaw/Neck:  Airway Findings: Mouth Opening: Normal Tongue: Normal  General Airway Assessment: Adult  Oropharynx Findings:  Mallampati: II  Jaw/Neck Findings:  Neck ROM: Normal ROM     Eyes/Ears/Nose:  Eyes/Ears/Nose Findings:    Dental:  Dental Findings:   Chest/Lungs:  Chest/Lungs Findings: Normal Respiratory Rate     Heart/Vascular:  Heart Findings: Rate: Normal  Rhythm: Regular Rhythm        Mental Status:  Mental Status Findings:  Cooperative, Alert and Oriented         Anesthesia Plan  Type of Anesthesia, risks & benefits discussed:  Anesthesia Type:  general  Patient's Preference:   Intra-op Monitoring Plan: standard ASA monitors  Intra-op Monitoring Plan Comments:   Post Op Pain Control Plan: multimodal analgesia  Post Op Pain Control Plan Comments:   Induction:   IV  Beta Blocker:  Patient is on a Beta-Blocker and has received one dose within the past 24 hours (No further documentation required).       Informed Consent: Patient understands risks and agrees with Anesthesia plan.  Questions answered. Anesthesia consent signed with patient.  ASA  Score: 3     Day of Surgery Review of History & Physical:  There are no significant changes.   H&P completed by Anesthesiologist.       Ready For Surgery From Anesthesia Perspective.

## 2020-02-11 ENCOUNTER — TELEPHONE (OUTPATIENT)
Dept: FAMILY MEDICINE | Facility: CLINIC | Age: 75
End: 2020-02-11

## 2020-02-11 LAB
FINAL PATHOLOGIC DIAGNOSIS: NORMAL
GROSS: NORMAL

## 2020-02-18 ENCOUNTER — OFFICE VISIT (OUTPATIENT)
Dept: FAMILY MEDICINE | Facility: CLINIC | Age: 75
End: 2020-02-18
Payer: MEDICARE

## 2020-02-18 VITALS
DIASTOLIC BLOOD PRESSURE: 66 MMHG | OXYGEN SATURATION: 98 % | WEIGHT: 201.75 LBS | BODY MASS INDEX: 24.55 KG/M2 | HEART RATE: 83 BPM | TEMPERATURE: 98 F | SYSTOLIC BLOOD PRESSURE: 128 MMHG

## 2020-02-18 DIAGNOSIS — I48.0 PAF (PAROXYSMAL ATRIAL FIBRILLATION): ICD-10-CM

## 2020-02-18 DIAGNOSIS — E78.5 HYPERLIPIDEMIA, UNSPECIFIED HYPERLIPIDEMIA TYPE: ICD-10-CM

## 2020-02-18 DIAGNOSIS — I10 ESSENTIAL HYPERTENSION: ICD-10-CM

## 2020-02-18 DIAGNOSIS — C16.0 GE JUNCTION CARCINOMA: ICD-10-CM

## 2020-02-18 DIAGNOSIS — E11.9 TYPE 2 DIABETES MELLITUS TREATED WITHOUT INSULIN: Primary | ICD-10-CM

## 2020-02-18 PROCEDURE — 3074F PR MOST RECENT SYSTOLIC BLOOD PRESSURE < 130 MM HG: ICD-10-PCS | Mod: HCNC,CPTII,S$GLB, | Performed by: FAMILY MEDICINE

## 2020-02-18 PROCEDURE — 1101F PT FALLS ASSESS-DOCD LE1/YR: CPT | Mod: HCNC,CPTII,S$GLB, | Performed by: FAMILY MEDICINE

## 2020-02-18 PROCEDURE — 3051F HG A1C>EQUAL 7.0%<8.0%: CPT | Mod: HCNC,CPTII,S$GLB, | Performed by: FAMILY MEDICINE

## 2020-02-18 PROCEDURE — 99999 PR PBB SHADOW E&M-EST. PATIENT-LVL V: CPT | Mod: PBBFAC,HCNC,, | Performed by: FAMILY MEDICINE

## 2020-02-18 PROCEDURE — 99214 PR OFFICE/OUTPT VISIT, EST, LEVL IV, 30-39 MIN: ICD-10-PCS | Mod: HCNC,S$GLB,, | Performed by: FAMILY MEDICINE

## 2020-02-18 PROCEDURE — 1125F AMNT PAIN NOTED PAIN PRSNT: CPT | Mod: HCNC,S$GLB,, | Performed by: FAMILY MEDICINE

## 2020-02-18 PROCEDURE — 99999 PR PBB SHADOW E&M-EST. PATIENT-LVL V: ICD-10-PCS | Mod: PBBFAC,HCNC,, | Performed by: FAMILY MEDICINE

## 2020-02-18 PROCEDURE — 3078F PR MOST RECENT DIASTOLIC BLOOD PRESSURE < 80 MM HG: ICD-10-PCS | Mod: HCNC,CPTII,S$GLB, | Performed by: FAMILY MEDICINE

## 2020-02-18 PROCEDURE — 1125F PR PAIN SEVERITY QUANTIFIED, PAIN PRESENT: ICD-10-PCS | Mod: HCNC,S$GLB,, | Performed by: FAMILY MEDICINE

## 2020-02-18 PROCEDURE — 1101F PR PT FALLS ASSESS DOC 0-1 FALLS W/OUT INJ PAST YR: ICD-10-PCS | Mod: HCNC,CPTII,S$GLB, | Performed by: FAMILY MEDICINE

## 2020-02-18 PROCEDURE — 99499 RISK ADDL DX/OHS AUDIT: ICD-10-PCS | Mod: HCNC,S$GLB,, | Performed by: FAMILY MEDICINE

## 2020-02-18 PROCEDURE — 3051F PR MOST RECENT HEMOGLOBIN A1C LEVEL 7.0 - < 8.0%: ICD-10-PCS | Mod: HCNC,CPTII,S$GLB, | Performed by: FAMILY MEDICINE

## 2020-02-18 PROCEDURE — 3074F SYST BP LT 130 MM HG: CPT | Mod: HCNC,CPTII,S$GLB, | Performed by: FAMILY MEDICINE

## 2020-02-18 PROCEDURE — 3078F DIAST BP <80 MM HG: CPT | Mod: HCNC,CPTII,S$GLB, | Performed by: FAMILY MEDICINE

## 2020-02-18 PROCEDURE — 99499 UNLISTED E&M SERVICE: CPT | Mod: HCNC,S$GLB,, | Performed by: FAMILY MEDICINE

## 2020-02-18 PROCEDURE — 99214 OFFICE O/P EST MOD 30 MIN: CPT | Mod: HCNC,S$GLB,, | Performed by: FAMILY MEDICINE

## 2020-02-18 PROCEDURE — 1159F MED LIST DOCD IN RCRD: CPT | Mod: HCNC,S$GLB,, | Performed by: FAMILY MEDICINE

## 2020-02-18 PROCEDURE — 1159F PR MEDICATION LIST DOCUMENTED IN MEDICAL RECORD: ICD-10-PCS | Mod: HCNC,S$GLB,, | Performed by: FAMILY MEDICINE

## 2020-02-18 NOTE — PROGRESS NOTES
Subjective:       Patient ID: Stu Garcia is a 74 y.o. male.    Chief Complaint: Follow-up (Type 2 DM)      Here for 3 month f/u on chronic health issues    INVASIVE MODERATELY DIFFERENTIATED ADENOCARCINOMA - esophagectomy 7/2/2019 uncomplicated  S/p Anterior cervical decompression fusion done on 7/6/2017 by Dr. Jeter - neck stable; swallowing stable  Lumbar fracture - back pain only occurring at night periodically  Diabetes - following diabetic diet; He is taking glimepiride 4mg BID. Metformin XR 500mg daily. BGs 130s. No hypoglycemia.   CAD - s/p 4 stents; following with Dr. Maldonado  Paroxysmal afib, HTN - taking amiodarone, xarelto; metoprolol 50mg 1/2 tab BID  HLD - taking Lipitor 80mg daily  Anxiety - some intermittent panic attacks; hydroxizine has been helpful for this.            Diabetes   Pertinent negatives for hypoglycemia include no confusion, dizziness or headaches. Associated symptoms include fatigue. Pertinent negatives for diabetes include no chest pain, no polydipsia, no polyuria and no weakness.   Coronary Artery Disease   Symptoms include chest tightness. Pertinent negatives include no chest pain, dizziness, leg swelling, palpitations or shortness of breath.   Atrial Fibrillation   Symptoms are negative for chest pain, dizziness, palpitations, shortness of breath and weakness. Past medical history includes atrial fibrillation and CAD.   Follow-up   Associated symptoms include fatigue and neck pain. Pertinent negatives include no abdominal pain, arthralgias, chest pain, chills, coughing, fever, headaches, joint swelling, nausea, rash, sore throat, vomiting or weakness.       Past Medical History:   Diagnosis Date    Anticoagulant long-term use     xarelto,asa    Arthritis     Atrial fibrillation 2013    cardioverted    Bleb, lung     Cataract     OS    Colon polyp     Coronary artery disease     Coronary artery disease involving native coronary artery of native heart with unstable angina  pectoris 3/18/2018    Diabetes mellitus     Diabetes mellitus, type 2     Diverticulosis     Encounter for blood transfusion     General anesthetics causing adverse effect in therapeutic use     delayed emergence per patient's wife    GERD (gastroesophageal reflux disease)     HEARING LOSS     bilateral aids    Hemorrhoid     HLD (hyperlipidemia)     Hypertension     Iron deficiency anemia     Neuropathy of leg     Pneumonia due to other staphylococcus     Prostate cancer     prostate    Spontaneous pneumothorax     bilateral    Thyroid disease     tumors, non cancerous       Past Surgical History:   Procedure Laterality Date    CARDIOVERSION      CATARACT EXTRACTION      bilateral    CERVICAL SPINE FRACTURE SURGERY      c7 t1 ACDF     CHEST TUBE INSERTION Right     COLONOSCOPY      CORONARY STENT PLACEMENT      x 2    ENDOSCOPIC ULTRASOUND OF UPPER GASTROINTESTINAL TRACT N/A 3/13/2019    Procedure: ULTRASOUND, UPPER GI TRACT, ENDOSCOPIC;  Surgeon: Andrew Shirley MD;  Location: [address]);  Service: Endoscopy;  Laterality: N/A;    ESOPHAGOGASTRODUODENOSCOPY N/A 2/23/2019    Procedure: ESOPHAGOGASTRODUODENOSCOPY (EGD);  Surgeon: Jackeline Richards MD;  Location: [facility];  Service: Endoscopy;  Laterality: N/A;    ESOPHAGOGASTRODUODENOSCOPY N/A 2/6/2020    Procedure: EGD (ESOPHAGOGASTRODUODENOSCOPY);  Surgeon: Andrea Kerns Jr., MD;  Location: [facility];  Service: Endoscopy;  Laterality: N/A;    HERNIA REPAIR      umbilical    INSERTION OF TUNNELED CENTRAL VENOUS CATHETER (CVC) WITH SUBCUTANEOUS PORT Right 4/4/2019    Procedure: OOTRTFYAQ-[id number]-S-CATH (POWER PORT);  Surgeon: Nahum Zaidi MD;  Location: [facility];  Service: General;  Laterality: Right;    KNEE SURGERY Left 2017    open thoracotomy Left 1966    With pleurectomy    Pleurectomy Left 1966    For treatment of left pneumothorax    PROSTATECTOMY  2001    open    ROBOT-ASSISTED SURGICAL REMOVAL OF ESOPHAGUS USING  SAYRA LINDSEY XI N/A 7/2/2019    Procedure: XI ROBOTIC ESOPHAGECTOMY;  Surgeon: Chad Milian MD;  Location: [facility] OR [address] [place] [address];  Service: General;  Laterality: N/A;    TUBE THORACOTOMY  1966    pneumothorax left--open       Review of patient's allergies indicates:   Allergen Reactions    Codeine Nausea And Vomiting       Social History     Socioeconomic History    Marital status:      Spouse name: Not on file    Number of children: 2    Years of education: Not on file    Highest education level: Not on file   Occupational History    Occupation:     Occupation: prior [employer]    Social Needs    Financial resource strain: Not hard at all    Food insecurity:     Worry: Never true     Inability: Never true    Transportation needs:     Medical: No     Non-medical: No   Tobacco Use    Smoking status: Former Smoker     Years: 20.00    Smokeless tobacco: Never Used    Tobacco comment: quit smoking in 1980's.   Substance and Sexual Activity    Alcohol use: Yes     Frequency: Monthly or less     Drinks per session: 1 or 2     Binge frequency: Never     Comment: 1 drink every 2 weeks    Drug use: No    Sexual activity: Yes   Lifestyle    Physical activity:     Days per week: 0 days     Minutes per session: Not on file    Stress: Very much   Relationships    Social connections:     Talks on phone: More than three times a week     Gets together: Once a week     Attends Mu-ism service: Not on file     Active member of club or organization: No     Attends meetings of clubs or organizations: Patient refused     Relationship status:    Other Topics Concern    Not on file   Social History Narrative    From [place]       Current Outpatient Medications on File Prior to Visit   Medication Sig Dispense Refill    ACCU-CHEK SHASHANK PLUS TEST STRP Strp TEST ONCE DAILY 100 strip 3    aspirin (ECOTRIN) 81 MG EC tablet Take 1 tablet (81 mg total) by mouth once daily. May take over the  "counter 30 tablet 12    atorvastatin (LIPITOR) 80 MG tablet TAKE 1 TABLET(80 MG) BY MOUTH EVERY EVENING 90 tablet 0    ferrous gluconate (FERGON) 324 MG tablet Take 1 tablet (324 mg total) by mouth daily with breakfast.      glimepiride (AMARYL) 2 MG tablet TAKE 2 TABLETS TWICE DAILY 360 tablet 3    glucagon, human recombinant, (GLUCAGON EMERGENCY KIT, HUMAN,) 1 mg SolR Inject 1 mg into the muscle as needed. 1 each 2    hydrocodone-acetaminophen (HYCET) solution 7.5-325 mg/15mL 10 mLs by Per J Tube route every 4 (four) hours as needed. 473 mL 0    insulin syr/ndl U100 half karina (BD INSULIN SYRINGE HALF UNIT) 0.3 mL 31 gauge x 15/64" Syrg Use once daily 30 Syringe 11    lancets (ACCU-CHEK SOFTCLIX LANCETS) Misc 1 strip by Misc.(Non-Drug; Combo Route) route once daily. 100 each 3    lidocaine-prilocaine (EMLA) cream U UTD  0    megestrol (MEGACE) 400 mg/10 mL (40 mg/mL) Susp Take 10 mLs (400 mg total) by mouth 2 (two) times daily. 240 mL 3    metFORMIN (GLUCOPHAGE-XR) 500 MG 24 hr tablet Take 1 tablet (500 mg total) by mouth daily with breakfast. 90 tablet 3    metoprolol tartrate (LOPRESSOR) 50 MG tablet TAKE ONE TABLET BY MOUTH TWICE DAILY 60 tablet 11    multivitamin capsule Take 1 capsule by mouth once daily.      nitroGLYCERIN (NITROSTAT) 0.4 MG SL tablet DISSOLVE 1 TABLET UNDER THE TONGUE EVERY 5 MINUTES AS NEEDED FOR CHEST PAIN 100 tablet prn    omeprazole (PRILOSEC) 40 MG capsule Take 1 capsule (40 mg total) by mouth once daily. 90 capsule 3    ondansetron (ZOFRAN) 8 MG tablet TK 1 T PO 1 HOUR B D RADIATION TREATMENT RO PREVENT NAUSEA UTD 30 tablet 3    pen needle, diabetic (BD ULTRA-FINE ELSA PEN NEEDLE) 32 gauge x 5/32" Ndle To use with insulin injection 1x daily. 60 each 15    prochlorperazine (COMPAZINE) 10 MG tablet TAKE 1 TABLET BY MOUTH EVERY 6 HOURS AS NEEDED FOR NAUSEA OR VOMITING 60 tablet 0    rivaroxaban (XARELTO) 20 mg Tab Take 1 tablet (20 mg total) by mouth daily with dinner or " evening meal. 30 tablet 11    vit C,T-Vz-jxnab-lutein-zeaxan (PRESERVISION AREDS 2) 177-174-39-1 mg-unit-mg-mg Cap Take 1 tablet by mouth once daily.       No current facility-administered medications on file prior to visit.        Family History   Problem Relation Age of Onset    Mental illness Mother         dementia    Coronary artery disease Father     Cancer Father         stomach with mets    Diabetes Brother        Review of Systems   Constitutional: Positive for fatigue. Negative for activity change, appetite change, chills, fever and unexpected weight change.   HENT: Positive for hearing loss, rhinorrhea and trouble swallowing. Negative for sore throat.    Eyes: Negative for pain, discharge and visual disturbance.   Respiratory: Positive for chest tightness. Negative for cough, shortness of breath and wheezing.    Cardiovascular: Negative for chest pain, palpitations and leg swelling.   Gastrointestinal: Negative for abdominal pain, blood in stool, constipation, diarrhea, nausea and vomiting.   Endocrine: Negative for polydipsia and polyuria.   Genitourinary: Negative for difficulty urinating, dysuria, hematuria and urgency.   Musculoskeletal: Positive for back pain and neck pain. Negative for arthralgias, gait problem and joint swelling.   Skin: Negative for rash and wound.   Neurological: Negative for dizziness, weakness, light-headedness and headaches.   Hematological: Negative for adenopathy.   Psychiatric/Behavioral: Negative for confusion and dysphoric mood.       Objective:      /66 (BP Location: Right arm, Patient Position: Sitting)   Pulse 83   Temp 98.3 °F (36.8 °C) (Oral)   Wt 91.5 kg (201 lb 11.5 oz)   SpO2 98%   BMI 24.55 kg/m²   Physical Exam   Constitutional: He is oriented to person, place, and time. He appears well-developed and well-nourished. He is active. No distress.   HENT:   Head: Normocephalic and atraumatic.   Right Ear: External ear normal.   Left Ear: External ear  normal.   Mouth/Throat: Uvula is midline, oropharynx is clear and moist and mucous membranes are normal. No oropharyngeal exudate.   Eyes: Pupils are equal, round, and reactive to light. Conjunctivae, EOM and lids are normal.   Neck: Normal range of motion, full passive range of motion without pain and phonation normal. Neck supple. No thyroid mass and no thyromegaly present.   Cardiovascular: Normal rate, regular rhythm, normal heart sounds and intact distal pulses. Exam reveals no gallop and no friction rub.   No murmur heard.  Pulmonary/Chest: Effort normal and breath sounds normal. No respiratory distress. He has no wheezes. He has no rales.   Abdominal: Soft. Normal appearance and bowel sounds are normal. There is no tenderness.   Musculoskeletal: Normal range of motion.   Lymphadenopathy:     He has no cervical adenopathy.   Neurological: He is alert and oriented to person, place, and time. No cranial nerve deficit. Coordination normal.   Skin: Skin is warm and dry.   Psychiatric: He has a normal mood and affect. His speech is normal and behavior is normal. Judgment and thought content normal.         Results for orders placed or performed in visit on 02/07/20   Comprehensive metabolic panel   Result Value Ref Range    Sodium 141 136 - 145 mmol/L    Potassium 4.4 3.5 - 5.1 mmol/L    Chloride 108 95 - 110 mmol/L    CO2 22 (L) 23 - 29 mmol/L    Glucose 120 (H) 70 - 110 mg/dL    BUN, Bld 17 8 - 23 mg/dL    Creatinine 1.1 0.5 - 1.4 mg/dL    Calcium 10.0 8.7 - 10.5 mg/dL    Total Protein 7.3 6.0 - 8.4 g/dL    Albumin 3.9 3.5 - 5.2 g/dL    Total Bilirubin 0.8 0.1 - 1.0 mg/dL    Alkaline Phosphatase 141 (H) 55 - 135 U/L    AST 16 10 - 40 U/L    ALT 13 10 - 44 U/L    Anion Gap 11 8 - 16 mmol/L    eGFR if African American >60.0 >60 mL/min/1.73 m^2    eGFR if non African American >60.0 >60 mL/min/1.73 m^2   Hemoglobin A1c   Result Value Ref Range    Hemoglobin A1C 7.2 (H) 4.0 - 5.6 %    Estimated Avg Glucose 160 (H) 68  - 131 mg/dL   TSH   Result Value Ref Range    TSH 0.847 0.400 - 4.000 uIU/mL     EKG: normal    Assessment:       1. Type 2 diabetes mellitus treated without insulin    2. Hyperlipidemia, unspecified hyperlipidemia type    3. Essential hypertension    4. PAF (paroxysmal atrial fibrillation)    5. GE junction carcinoma        Plan:       Type 2 diabetes mellitus treated without insulin  -     Hemoglobin A1c; Future; Expected date: 08/16/2020  -     Comprehensive metabolic panel; Future; Expected date: 08/16/2020  -     Lipid panel; Future; Expected date: 08/16/2020    Hyperlipidemia, unspecified hyperlipidemia type    Essential hypertension    PAF (paroxysmal atrial fibrillation)    GE junction carcinoma        Diabetes stable  continue oncology follow-up  Continue protonix BID  F/u with cardiology as planned   Continue Glimepiride 4mg BID, metformin XR 500mg QAM  Continue other present meds  Counseled on regular exercise, maintenance of a healthy weight, balanced diet rich in fruits/vegetables and lean protein, and avoidance of unhealthy habits like smoking and excessive alcohol intake.    F/u 6 months with labs

## 2020-02-24 ENCOUNTER — INFUSION (OUTPATIENT)
Dept: INFUSION THERAPY | Facility: HOSPITAL | Age: 75
End: 2020-02-24
Attending: INTERNAL MEDICINE
Payer: MEDICARE

## 2020-02-24 DIAGNOSIS — C16.0 GE JUNCTION CARCINOMA: Primary | ICD-10-CM

## 2020-02-24 PROCEDURE — 63600175 PHARM REV CODE 636 W HCPCS: Mod: HCNC,PN | Performed by: INTERNAL MEDICINE

## 2020-02-24 PROCEDURE — 25000003 PHARM REV CODE 250: Mod: HCNC,PN | Performed by: INTERNAL MEDICINE

## 2020-02-24 PROCEDURE — 96523 IRRIG DRUG DELIVERY DEVICE: CPT | Mod: HCNC,PN

## 2020-02-24 PROCEDURE — A4216 STERILE WATER/SALINE, 10 ML: HCPCS | Mod: HCNC,PN | Performed by: INTERNAL MEDICINE

## 2020-02-24 RX ORDER — HEPARIN 100 UNIT/ML
500 SYRINGE INTRAVENOUS
Status: CANCELLED | OUTPATIENT
Start: 2020-02-24

## 2020-02-24 RX ORDER — SODIUM CHLORIDE 0.9 % (FLUSH) 0.9 %
10 SYRINGE (ML) INJECTION
Status: CANCELLED | OUTPATIENT
Start: 2020-02-24

## 2020-02-24 RX ORDER — SODIUM CHLORIDE 0.9 % (FLUSH) 0.9 %
10 SYRINGE (ML) INJECTION
Status: COMPLETED | OUTPATIENT
Start: 2020-02-24 | End: 2020-02-24

## 2020-02-24 RX ORDER — HEPARIN 100 UNIT/ML
500 SYRINGE INTRAVENOUS
Status: COMPLETED | OUTPATIENT
Start: 2020-02-24 | End: 2020-02-24

## 2020-02-24 RX ADMIN — HEPARIN 500 UNITS: 100 SYRINGE at 01:02

## 2020-02-24 RX ADMIN — Medication 10 ML: at 01:02

## 2020-02-28 ENCOUNTER — TELEPHONE (OUTPATIENT)
Dept: HEMATOLOGY/ONCOLOGY | Facility: CLINIC | Age: 75
End: 2020-02-28

## 2020-02-28 NOTE — TELEPHONE ENCOUNTER
Per Dr. Andrews - called patient to scheduled post EGD f/u with labs, no answer, left detailed message on vm to please call back.  Patient will need cbc, cmp, and ldh.

## 2020-03-12 ENCOUNTER — TELEPHONE (OUTPATIENT)
Dept: HEMATOLOGY/ONCOLOGY | Facility: CLINIC | Age: 75
End: 2020-03-12

## 2020-03-12 DIAGNOSIS — C16.0 GE JUNCTION CARCINOMA: Primary | ICD-10-CM

## 2020-03-12 NOTE — TELEPHONE ENCOUNTER
Called patient to schedule f/u with Dr. Andrews with labs post egd, no answer, left detailed message on the [phone] phone. Called the [phone] number , no answer, left vm on that number as well.      Made lab and f/u appts on 4/6/20 as pt has port flush on that date and mailed out

## 2020-04-02 ENCOUNTER — PATIENT MESSAGE (OUTPATIENT)
Dept: HEMATOLOGY/ONCOLOGY | Facility: CLINIC | Age: 75
End: 2020-04-02

## 2020-04-06 ENCOUNTER — OFFICE VISIT (OUTPATIENT)
Dept: HEMATOLOGY/ONCOLOGY | Facility: CLINIC | Age: 75
End: 2020-04-06
Payer: MEDICARE

## 2020-04-06 ENCOUNTER — INFUSION (OUTPATIENT)
Dept: INFUSION THERAPY | Facility: HOSPITAL | Age: 75
End: 2020-04-06
Attending: INTERNAL MEDICINE
Payer: MEDICARE

## 2020-04-06 DIAGNOSIS — D69.6 THROMBOCYTOPENIA: ICD-10-CM

## 2020-04-06 DIAGNOSIS — C16.0 GE JUNCTION CARCINOMA: Primary | ICD-10-CM

## 2020-04-06 DIAGNOSIS — R79.89 ELEVATED LFTS: ICD-10-CM

## 2020-04-06 DIAGNOSIS — R63.4 UNEXPLAINED WEIGHT LOSS: ICD-10-CM

## 2020-04-06 PROCEDURE — 25000003 PHARM REV CODE 250: Mod: HCNC,PN | Performed by: INTERNAL MEDICINE

## 2020-04-06 PROCEDURE — 99999 PR PBB SHADOW E&M-EST. PATIENT-LVL II: ICD-10-PCS | Mod: PBBFAC,HCNC,, | Performed by: INTERNAL MEDICINE

## 2020-04-06 PROCEDURE — 99999 PR PBB SHADOW E&M-EST. PATIENT-LVL II: CPT | Mod: PBBFAC,HCNC,, | Performed by: INTERNAL MEDICINE

## 2020-04-06 PROCEDURE — 96523 IRRIG DRUG DELIVERY DEVICE: CPT | Mod: HCNC,PN

## 2020-04-06 PROCEDURE — 63600175 PHARM REV CODE 636 W HCPCS: Mod: HCNC,PN | Performed by: INTERNAL MEDICINE

## 2020-04-06 PROCEDURE — A4216 STERILE WATER/SALINE, 10 ML: HCPCS | Mod: HCNC,PN | Performed by: INTERNAL MEDICINE

## 2020-04-06 RX ORDER — SODIUM CHLORIDE 0.9 % (FLUSH) 0.9 %
10 SYRINGE (ML) INJECTION
Status: CANCELLED | OUTPATIENT
Start: 2020-04-06

## 2020-04-06 RX ORDER — SODIUM CHLORIDE 0.9 % (FLUSH) 0.9 %
10 SYRINGE (ML) INJECTION
Status: COMPLETED | OUTPATIENT
Start: 2020-04-06 | End: 2020-04-06

## 2020-04-06 RX ORDER — HEPARIN 100 UNIT/ML
500 SYRINGE INTRAVENOUS
Status: CANCELLED | OUTPATIENT
Start: 2020-04-06

## 2020-04-06 RX ORDER — HEPARIN 100 UNIT/ML
500 SYRINGE INTRAVENOUS
Status: COMPLETED | OUTPATIENT
Start: 2020-04-06 | End: 2020-04-06

## 2020-04-06 RX ADMIN — HEPARIN 500 UNITS: 100 SYRINGE at 12:04

## 2020-04-06 RX ADMIN — Medication 10 ML: at 12:04

## 2020-04-06 NOTE — Clinical Note
Laboratory revealed abnormal liver function tests.  I have asked the patient to obtain acute hepatitis panel and CT scanning of the abdomen with contrast for further evaluation.  We will review results at earliest convenience.

## 2020-04-06 NOTE — PROGRESS NOTES
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  The patient is approximately 9 months postop and returns to clinic via telephone encounter due to ongoing difficulties with the COVID 19 pandemic.  Patient underwent EGD as instructed after last office evaluation.  He was found to have Candida esophagitis and was treated.  Patient reports regain of approximately 11 lb since his last evaluation.  He is not having difficulty with pain, nausea, vomiting, fevers, chills, etc.  No other pertinent findings on a 14 point review of systems.    Physical examination:  Deferred as this is a telephone encounter.    Laboratory:  White count 5.3, hemoglobin 14.1, hematocrit 43.7, platelets 115, absolute neutrophil count is 3700.  Sodium 139, potassium 5, chloride 104, CO2 29, BUN 14, creatinine 0.9, glucose 119, calcium 9.5, alkaline phosphatase 188, AST 83, ALT 86, , GFR is greater than 60.    Impression:  1.  Adenocarcinoma of the GE junction (T2, N0, M0).  2.  Mild thrombocytopenia-stable.  3.  Weight loss-improved since last evaluation per patient report.  4.  Thyroid nodule-benign.  5.  Elevated liver function tests.    Plan:  1.  Obtain acute hepatitis panel.  2.  Obtain CT scanning of the abdomen with contrast for further evaluation of the hepatic parenchyma in light of patient's elevated liver function tests.  3.  Patient was counseled regarding his test results and need for additional workup in relation to number 5.  He voices understanding wishes to proceed.  We will review results of these test with him at earliest convenience.    This note was created using voice recognition software and may contain grammatical errors.

## 2020-04-08 DIAGNOSIS — I25.110 CORONARY ARTERY DISEASE INVOLVING NATIVE CORONARY ARTERY OF NATIVE HEART WITH UNSTABLE ANGINA PECTORIS: ICD-10-CM

## 2020-04-08 RX ORDER — ATORVASTATIN CALCIUM 80 MG/1
TABLET, FILM COATED ORAL
Qty: 90 TABLET | Refills: 0 | Status: SHIPPED | OUTPATIENT
Start: 2020-04-08 | End: 2020-07-07

## 2020-05-05 ENCOUNTER — PATIENT MESSAGE (OUTPATIENT)
Dept: ADMINISTRATIVE | Facility: HOSPITAL | Age: 75
End: 2020-05-05

## 2020-05-06 ENCOUNTER — PATIENT MESSAGE (OUTPATIENT)
Dept: HEMATOLOGY/ONCOLOGY | Facility: CLINIC | Age: 75
End: 2020-05-06

## 2020-05-12 DIAGNOSIS — Z03.818 ENCNTR FOR OBS FOR SUSP EXPSR TO OTH BIOLG AGENTS RULED OUT: Primary | ICD-10-CM

## 2020-05-14 ENCOUNTER — HOSPITAL ENCOUNTER (OUTPATIENT)
Dept: RADIOLOGY | Facility: HOSPITAL | Age: 75
Discharge: HOME | End: 2020-05-14
Attending: INTERNAL MEDICINE
Payer: MEDICARE

## 2020-05-14 DIAGNOSIS — R79.89 ELEVATED LFTS: ICD-10-CM

## 2020-05-14 DIAGNOSIS — R63.4 UNEXPLAINED WEIGHT LOSS: ICD-10-CM

## 2020-05-14 DIAGNOSIS — C16.0 GE JUNCTION CARCINOMA: ICD-10-CM

## 2020-05-14 DIAGNOSIS — D69.6 THROMBOCYTOPENIA: ICD-10-CM

## 2020-05-14 PROCEDURE — 74160 CT ABDOMEN W/CONTRAST: CPT | Mod: 26,HCNC,, | Performed by: RADIOLOGY

## 2020-05-14 PROCEDURE — 74160 CT ABDOMEN W/CONTRAST: CPT | Mod: TC,HCNC,PO

## 2020-05-14 PROCEDURE — 25500020 PHARM REV CODE 255: Mod: HCNC,PO | Performed by: INTERNAL MEDICINE

## 2020-05-14 PROCEDURE — 74160 CT ABDOMEN WITH CONTRAST: ICD-10-PCS | Mod: 26,HCNC,, | Performed by: RADIOLOGY

## 2020-05-14 RX ADMIN — IOHEXOL 75 ML: 350 INJECTION, SOLUTION INTRAVENOUS at 08:05

## 2020-05-15 ENCOUNTER — OFFICE VISIT (OUTPATIENT)
Dept: HEMATOLOGY/ONCOLOGY | Facility: CLINIC | Age: 75
End: 2020-05-15
Payer: MEDICARE

## 2020-05-15 VITALS
HEART RATE: 78 BPM | DIASTOLIC BLOOD PRESSURE: 80 MMHG | HEIGHT: 77 IN | BODY MASS INDEX: 24.15 KG/M2 | WEIGHT: 204.56 LBS | TEMPERATURE: 99 F | SYSTOLIC BLOOD PRESSURE: 142 MMHG | RESPIRATION RATE: 50 BRPM

## 2020-05-15 DIAGNOSIS — R93.5 ABNORMAL CT OF THE ABDOMEN: ICD-10-CM

## 2020-05-15 DIAGNOSIS — R79.89 ELEVATED LFTS: ICD-10-CM

## 2020-05-15 DIAGNOSIS — R63.4 UNEXPLAINED WEIGHT LOSS: ICD-10-CM

## 2020-05-15 DIAGNOSIS — C16.0 GE JUNCTION CARCINOMA: Primary | ICD-10-CM

## 2020-05-15 PROCEDURE — 3288F FALL RISK ASSESSMENT DOCD: CPT | Mod: HCNC,CPTII,S$GLB, | Performed by: INTERNAL MEDICINE

## 2020-05-15 PROCEDURE — 3079F DIAST BP 80-89 MM HG: CPT | Mod: HCNC,CPTII,S$GLB, | Performed by: INTERNAL MEDICINE

## 2020-05-15 PROCEDURE — 1126F PR PAIN SEVERITY QUANTIFIED, NO PAIN PRESENT: ICD-10-PCS | Mod: HCNC,S$GLB,, | Performed by: INTERNAL MEDICINE

## 2020-05-15 PROCEDURE — 99499 UNLISTED E&M SERVICE: CPT | Mod: HCNC,S$GLB,, | Performed by: INTERNAL MEDICINE

## 2020-05-15 PROCEDURE — 3079F PR MOST RECENT DIASTOLIC BLOOD PRESSURE 80-89 MM HG: ICD-10-PCS | Mod: HCNC,CPTII,S$GLB, | Performed by: INTERNAL MEDICINE

## 2020-05-15 PROCEDURE — 99214 OFFICE O/P EST MOD 30 MIN: CPT | Mod: HCNC,S$GLB,, | Performed by: INTERNAL MEDICINE

## 2020-05-15 PROCEDURE — 3077F PR MOST RECENT SYSTOLIC BLOOD PRESSURE >= 140 MM HG: ICD-10-PCS | Mod: HCNC,CPTII,S$GLB, | Performed by: INTERNAL MEDICINE

## 2020-05-15 PROCEDURE — 1100F PTFALLS ASSESS-DOCD GE2>/YR: CPT | Mod: HCNC,CPTII,S$GLB, | Performed by: INTERNAL MEDICINE

## 2020-05-15 PROCEDURE — 99499 RISK ADDL DX/OHS AUDIT: ICD-10-PCS | Mod: HCNC,S$GLB,, | Performed by: INTERNAL MEDICINE

## 2020-05-15 PROCEDURE — 1159F MED LIST DOCD IN RCRD: CPT | Mod: HCNC,S$GLB,, | Performed by: INTERNAL MEDICINE

## 2020-05-15 PROCEDURE — 99214 PR OFFICE/OUTPT VISIT, EST, LEVL IV, 30-39 MIN: ICD-10-PCS | Mod: HCNC,S$GLB,, | Performed by: INTERNAL MEDICINE

## 2020-05-15 PROCEDURE — 1126F AMNT PAIN NOTED NONE PRSNT: CPT | Mod: HCNC,S$GLB,, | Performed by: INTERNAL MEDICINE

## 2020-05-15 PROCEDURE — 3077F SYST BP >= 140 MM HG: CPT | Mod: HCNC,CPTII,S$GLB, | Performed by: INTERNAL MEDICINE

## 2020-05-15 PROCEDURE — 1100F PR PT FALLS ASSESS DOC 2+ FALLS/FALL W/INJURY/YR: ICD-10-PCS | Mod: HCNC,CPTII,S$GLB, | Performed by: INTERNAL MEDICINE

## 2020-05-15 PROCEDURE — 99999 PR PBB SHADOW E&M-EST. PATIENT-LVL III: ICD-10-PCS | Mod: PBBFAC,HCNC,, | Performed by: INTERNAL MEDICINE

## 2020-05-15 PROCEDURE — 3288F PR FALLS RISK ASSESSMENT DOCUMENTED: ICD-10-PCS | Mod: HCNC,CPTII,S$GLB, | Performed by: INTERNAL MEDICINE

## 2020-05-15 PROCEDURE — 99999 PR PBB SHADOW E&M-EST. PATIENT-LVL III: CPT | Mod: PBBFAC,HCNC,, | Performed by: INTERNAL MEDICINE

## 2020-05-15 PROCEDURE — 1159F PR MEDICATION LIST DOCUMENTED IN MEDICAL RECORD: ICD-10-PCS | Mod: HCNC,S$GLB,, | Performed by: INTERNAL MEDICINE

## 2020-05-15 NOTE — PROGRESS NOTES
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  The patient is approximately 9 months postop and returns to clinic via telephone encounter due to ongoing difficulties with the COVID 19 pandemic.  Patient underwent EGD as instructed after last office evaluation.  He was found to have Candida esophagitis and was treated.  Patient reports regain of approximately 11 lb since his last evaluation.  He is not having difficulty with pain, nausea, vomiting, fevers, chills, etc.      During last office evaluation, patient was discovered to have elevated liver function test and was sent for hepatitis panel and abdominal imaging.  Patient returns to clinic to review results.  He is physically present in clinic for today's visit.  Patient denies abdominal pain, nausea, vomiting, and continued difficulties with weight loss.  He finds regain of weight slow and cannot quantify weight since last virtual evaluation.  No other pertinent findings on a 14 point review of systems.    Physical examination:  Well-developed, thin appearing, elderly, white gentleman, in no acute distress, who has a weight of  VITAL SIGNS: Documented  and reviewed this visit.  HEENT: Normocephalic, atraumatic. Oral mucosa pink and moist. Lips without   lesions. Tongue midline. Oropharynx clear. Nonicteric sclerae.   NECK: Supple, no adenopathy. No carotid bruits, thyromegaly or thyroid nodule.   HEART: Regular rate and rhythm without murmur, gallop or rub.   LUNGS: Clear to auscultation bilaterally. Normal respiratory effort.   ABDOMEN: Soft, nontender, nondistended with positive normoactive bowel sounds,   no hepatosplenomegaly.   EXTREMITIES: No cyanosis, clubbing or edema.  Distal pulses are intact.   AXILLAE AND GROIN: No palpable pathologic lymphadenopathy is appreciated.   SKIN: Intact/turgor normal   NEUROLOGIC: Cranial nerves II-XII grossly intact. Motor: Good muscle bulk and   tone. Strength/sensory 5/5 throughout. Gait stable.     Laboratory:  Acute hepatitis panel is negative.    CT abdomen/pelvis:    1. Evidence of gastrostomy and gastric pull-through for a history of a esophageal cancer appears to be present.  2. The examination is moderately hindered by weight loss, the patient's arms being down at their side, and suboptimal arterial contrast phase.  3. Mesenteric fat haziness diffusely is noted that likely is related to weight loss with a loss of intra-abdominal fat combined with and beam hardening from the patient's arms being at their side.  A diffuse mesenteric process such as sclerosing mesenteritis, inflammation, or neoplastic involvement is not entirely excluded.  No obvious lymphadenopathy is noted.  Clinical correlation and follow-up for size stability or resolution is suggested.  4. No obvious hepatic mass lesions are noted contrast phase is suboptimal as described above  5. Density layering dependently within the gallbladder similar to on 02/23/2019 suggestive of cholelithiasis.  6. Pancreatic atrophy  7. New peripheral band like pulmonary density favored relate to atelectasis.    Impression:  1.  Adenocarcinoma of the GE junction (T2, N0, M0).  2.  Mild thrombocytopenia-stable.  3.  Weight loss-improved since last evaluation per patient report.  4.  Thyroid nodule-benign.  5.  Elevated liver function tests of uncertain etiology-consider secondary to recent treatment of Candida esophagitis.  6.  Nonspecific mesenteric fat stranding.    Plan:  1.  Continue observation and re-evaluate patient 3 months from now with interval CBC, CMP, LDH, CT abdomen/pelvis, and chest x-ray, at which time patient will be 12 months out.    This note was created using voice recognition  software and may contain grammatical errors.

## 2020-05-18 ENCOUNTER — INFUSION (OUTPATIENT)
Dept: INFUSION THERAPY | Facility: HOSPITAL | Age: 75
End: 2020-05-18
Attending: INTERNAL MEDICINE
Payer: MEDICARE

## 2020-05-18 VITALS — TEMPERATURE: 98 F

## 2020-05-18 DIAGNOSIS — C16.0 GE JUNCTION CARCINOMA: Primary | ICD-10-CM

## 2020-05-18 PROCEDURE — 25000003 PHARM REV CODE 250: Mod: HCNC,PN | Performed by: INTERNAL MEDICINE

## 2020-05-18 PROCEDURE — 96523 IRRIG DRUG DELIVERY DEVICE: CPT | Mod: HCNC,PN

## 2020-05-18 PROCEDURE — 63600175 PHARM REV CODE 636 W HCPCS: Mod: HCNC,PN | Performed by: INTERNAL MEDICINE

## 2020-05-18 PROCEDURE — A4216 STERILE WATER/SALINE, 10 ML: HCPCS | Mod: HCNC,PN | Performed by: INTERNAL MEDICINE

## 2020-05-18 RX ORDER — HEPARIN 100 UNIT/ML
500 SYRINGE INTRAVENOUS
Status: COMPLETED | OUTPATIENT
Start: 2020-05-18 | End: 2020-05-18

## 2020-05-18 RX ORDER — SODIUM CHLORIDE 0.9 % (FLUSH) 0.9 %
10 SYRINGE (ML) INJECTION
Status: COMPLETED | OUTPATIENT
Start: 2020-05-18 | End: 2020-05-18

## 2020-05-18 RX ADMIN — Medication 10 ML: at 01:05

## 2020-05-18 RX ADMIN — HEPARIN SODIUM (PORCINE) LOCK FLUSH IV SOLN 100 UNIT/ML 500 UNITS: 100 SOLUTION at 01:05

## 2020-05-19 RX ORDER — GLIMEPIRIDE 2 MG/1
4 TABLET ORAL 2 TIMES DAILY
Qty: 360 TABLET | Refills: 0 | Status: SHIPPED | OUTPATIENT
Start: 2020-05-19 | End: 2020-11-06

## 2020-05-19 NOTE — PROGRESS NOTES
Refill Authorization Note    is requesting a refill authorization.    Brief assessment and rationale for refill: APPROVE: prr               Medication reconciliation completed: No                         Comments:      Requested Prescriptions   Signed Prescriptions Disp Refills    glimepiride (AMARYL) 2 MG tablet 360 tablet 0     Sig: Take 2 tablets (4 mg total) by mouth 2 (two) times daily.       Endocrinology:  Diabetes - Sulfonylureas Passed - 5/18/2020  6:18 AM        Passed - Patient is at least 18 years old        Passed - Office visit in past 12 months or future 90 days.     Recent Outpatient Visits            4 days ago GE junction carcinoma    [facility] Clinton Andrews MD    1 month ago GE junction carcinoma    [facility] Clinton Andrews MD    3 months ago Type 2 diabetes mellitus treated without insulin    [facility] Cade Juan MD    3 months ago GE junction carcinoma    [facility] Tyson Vidal NP    3 months ago GE junction carcinoma    [facility] Clinton Andrews MD          Future Appointments              In 1 month INJECTION SCHEDULE, [facility]    In 2 months [facility] Can[facility]    In 2 months LAB, [place] [facility] [place]    In 3 months [facility] PORTXR3 [facility]    In 3 months LAB, [place] [facility] [place]    In 3 months [facility] CT1 LIMIT 500 LBS [facility]    In 3 months Cade Juan MD [facility]    In 3 months Clinton Andrews MD [facility]                Passed - HBA1C is 7.9 or below and within 180 days     Hemoglobin A1C   Date Value Ref Range Status    02/07/2020 7.2 (H) 4.0 - 5.6 % Final     Comment:     ADA Screening Guidelines:  5.7-6.4%  Consistent with prediabetes  >or=6.5%  Consistent with diabetes  High levels of fetal hemoglobin interfere with the HbA1C  assay. Heterozygous hemoglobin variants (HbS, HgC, etc)do  not significantly interfere with this assay.   However, presence of multiple variants may affect accuracy.     11/07/2019 7.9 (H) 4.0 - 5.6 % Final     Comment:     ADA Screening Guidelines:  5.7-6.4%  Consistent with prediabetes  >or=6.5%  Consistent with diabetes  High levels of fetal hemoglobin interfere with the HbA1C  assay. Heterozygous hemoglobin variants (HbS, HgC, etc)do  not significantly interfere with this assay.   However, presence of multiple variants may affect accuracy.     08/07/2019 7.8 (H) 4.0 - 5.6 % Final     Comment:     ADA Screening Guidelines:  5.7-6.4%  Consistent with prediabetes  >or=6.5%  Consistent with diabetes  High levels of fetal hemoglobin interfere with the HbA1C  assay. Heterozygous hemoglobin variants (HbS, HgC, etc)do  not significantly interfere with this assay.   However, presence of multiple variants may affect accuracy.                Passed - Cr is 1.3 or below and within 360 days     Creatinine   Date Value Ref Range Status   05/14/2020 1.0 0.5 - 1.4 mg/dL Final   04/06/2020 0.86 0.50 - 1.40 mg/dL Final   02/07/2020 1.1 0.5 - 1.4 mg/dL Final              Passed - eGFR is 30 or above and within 360 days     eGFR if non    Date Value Ref Range Status   05/14/2020 >60 >60 mL/min/1.73 m^2 Final     Comment:     Calculation used to obtain the estimated glomerular filtration  rate (eGFR) is the CKD-EPI equation.      04/06/2020 >60 >60 mL/min/1.73 m^2 Final     Comment:     Calculation used to obtain the estimated glomerular filtration  rate (eGFR) is the CKD-EPI equation.      02/07/2020 >60.0 >60 mL/min/1.73 m^2 Final     Comment:     Calculation used to obtain the estimated glomerular  filtration  rate (eGFR) is the CKD-EPI equation.        eGFR if    Date Value Ref Range Status   05/14/2020 >60 >60 mL/min/1.73 m^2 Final   04/06/2020 >60 >60 mL/min/1.73 m^2 Final   02/07/2020 >60.0 >60 mL/min/1.73 m^2 Final               Appointments  past 12m or future 3m with PCP    Date Provider   Last Visit   2/18/2020 Cade Juan MD   Next Visit   8/18/2020 Cade Juan MD   ED visits in past 90 days: 0     Note composed:1:44 PM 05/19/2020

## 2020-06-09 ENCOUNTER — OFFICE VISIT (OUTPATIENT)
Dept: FAMILY MEDICINE | Facility: CLINIC | Age: 75
End: 2020-06-09
Payer: MEDICARE

## 2020-06-09 VITALS
SYSTOLIC BLOOD PRESSURE: 122 MMHG | HEIGHT: 77 IN | DIASTOLIC BLOOD PRESSURE: 62 MMHG | WEIGHT: 208.75 LBS | BODY MASS INDEX: 24.65 KG/M2 | HEART RATE: 78 BPM | OXYGEN SATURATION: 98 %

## 2020-06-09 DIAGNOSIS — E11.9 TYPE 2 DIABETES MELLITUS TREATED WITHOUT INSULIN: ICD-10-CM

## 2020-06-09 DIAGNOSIS — C16.0 GE JUNCTION CARCINOMA: ICD-10-CM

## 2020-06-09 DIAGNOSIS — D70.1 LEUKOPENIA DUE TO ANTINEOPLASTIC CHEMOTHERAPY: ICD-10-CM

## 2020-06-09 DIAGNOSIS — D69.59 CHEMOTHERAPY-INDUCED THROMBOCYTOPENIA: ICD-10-CM

## 2020-06-09 DIAGNOSIS — B35.1 ONYCHOMYCOSIS OF MULTIPLE TOENAILS WITH TYPE 2 DIABETES MELLITUS: ICD-10-CM

## 2020-06-09 DIAGNOSIS — I10 ESSENTIAL HYPERTENSION: ICD-10-CM

## 2020-06-09 DIAGNOSIS — T45.1X5A LEUKOPENIA DUE TO ANTINEOPLASTIC CHEMOTHERAPY: ICD-10-CM

## 2020-06-09 DIAGNOSIS — R26.9 ABNORMALITY OF GAIT AND MOBILITY: ICD-10-CM

## 2020-06-09 DIAGNOSIS — I25.110 CORONARY ARTERY DISEASE INVOLVING NATIVE CORONARY ARTERY OF NATIVE HEART WITH UNSTABLE ANGINA PECTORIS: ICD-10-CM

## 2020-06-09 DIAGNOSIS — I48.0 PAF (PAROXYSMAL ATRIAL FIBRILLATION): ICD-10-CM

## 2020-06-09 DIAGNOSIS — Z00.00 ENCOUNTER FOR PREVENTIVE HEALTH EXAMINATION: Primary | ICD-10-CM

## 2020-06-09 DIAGNOSIS — E11.69 ONYCHOMYCOSIS OF MULTIPLE TOENAILS WITH TYPE 2 DIABETES MELLITUS: ICD-10-CM

## 2020-06-09 DIAGNOSIS — Z99.89 DEPENDENCE ON OTHER ENABLING MACHINES AND DEVICES: ICD-10-CM

## 2020-06-09 DIAGNOSIS — T45.1X5A CHEMOTHERAPY-INDUCED THROMBOCYTOPENIA: ICD-10-CM

## 2020-06-09 PROCEDURE — 3051F HG A1C>EQUAL 7.0%<8.0%: CPT | Mod: HCNC,CPTII,S$GLB, | Performed by: NURSE PRACTITIONER

## 2020-06-09 PROCEDURE — 99999 PR PBB SHADOW E&M-EST. PATIENT-LVL V: ICD-10-PCS | Mod: PBBFAC,HCNC,, | Performed by: NURSE PRACTITIONER

## 2020-06-09 PROCEDURE — 99499 UNLISTED E&M SERVICE: CPT | Mod: HCNC,S$GLB,, | Performed by: NURSE PRACTITIONER

## 2020-06-09 PROCEDURE — 3078F DIAST BP <80 MM HG: CPT | Mod: HCNC,CPTII,S$GLB, | Performed by: NURSE PRACTITIONER

## 2020-06-09 PROCEDURE — G0439 PPPS, SUBSEQ VISIT: HCPCS | Mod: HCNC,S$GLB,, | Performed by: NURSE PRACTITIONER

## 2020-06-09 PROCEDURE — 3078F PR MOST RECENT DIASTOLIC BLOOD PRESSURE < 80 MM HG: ICD-10-PCS | Mod: HCNC,CPTII,S$GLB, | Performed by: NURSE PRACTITIONER

## 2020-06-09 PROCEDURE — 99999 PR PBB SHADOW E&M-EST. PATIENT-LVL V: CPT | Mod: PBBFAC,HCNC,, | Performed by: NURSE PRACTITIONER

## 2020-06-09 PROCEDURE — 99499 RISK ADDL DX/OHS AUDIT: ICD-10-PCS | Mod: HCNC,S$GLB,, | Performed by: NURSE PRACTITIONER

## 2020-06-09 PROCEDURE — G0439 PR MEDICARE ANNUAL WELLNESS SUBSEQUENT VISIT: ICD-10-PCS | Mod: HCNC,S$GLB,, | Performed by: NURSE PRACTITIONER

## 2020-06-09 PROCEDURE — 3074F SYST BP LT 130 MM HG: CPT | Mod: HCNC,CPTII,S$GLB, | Performed by: NURSE PRACTITIONER

## 2020-06-09 PROCEDURE — 3074F PR MOST RECENT SYSTOLIC BLOOD PRESSURE < 130 MM HG: ICD-10-PCS | Mod: HCNC,CPTII,S$GLB, | Performed by: NURSE PRACTITIONER

## 2020-06-09 PROCEDURE — 3051F PR MOST RECENT HEMOGLOBIN A1C LEVEL 7.0 - < 8.0%: ICD-10-PCS | Mod: HCNC,CPTII,S$GLB, | Performed by: NURSE PRACTITIONER

## 2020-06-09 NOTE — PROGRESS NOTES
"Stu Garcia presented for a  Medicare AWV and comprehensive Health Risk Assessment today. The following components were reviewed and updated:    · Medical history  · Family History  · Social history  · Allergies and Current Medications  · Health Risk Assessment  · Health Maintenance  · Care Team     ** See Completed Assessments for Annual Wellness Visit within the encounter summary.**       The following assessments were completed:  · Living Situation  · CAGE  · Depression Screening  · Timed Get Up and Go  · Whisper Test  · Cognitive Function Screening          · Nutrition Screening  · ADL Screening  · PAQ Screening    Vitals:    06/09/20 1256   BP: 122/62   BP Location: Left arm   Patient Position: Sitting   BP Method: Medium (Manual)   Pulse: 78   SpO2: 98%   Weight: 94.7 kg (208 lb 12.4 oz)   Height: 6' 4.5" (1.943 m)     Body mass index is 25.08 kg/m².  Physical Exam   Constitutional: He appears well-developed and well-nourished. No distress.   Eyes: Conjunctivae are normal.   Cardiovascular: Normal rate.   No murmur heard.  Pulses:       Dorsalis pedis pulses are 2+ on the right side, and 2+ on the left side.        Posterior tibial pulses are 2+ on the right side, and 2+ on the left side.   Pulmonary/Chest: Effort normal. No respiratory distress.   Musculoskeletal:        Left foot: There is no deformity.   Feet:   Right Foot:   Protective Sensation: 6 sites tested. 0 sites sensed.   Skin Integrity: Negative for ulcer, blister, skin breakdown or erythema.   Left Foot:   Protective Sensation: 6 sites tested. 0 sites sensed.   Skin Integrity: Negative for ulcer, blister, skin breakdown or erythema.   Neurological: He is alert.   Skin: Skin is warm.   Psychiatric: He has a normal mood and affect. His behavior is normal. Judgment and thought content normal.   Vitals reviewed.        Diagnoses and health risks identified today and associated recommendations/orders:    1. Encounter for preventive health " examination  Reviewed health maintenance and provided recommendations   Educated on shingrix vaccine    2. Type 2 diabetes mellitus treated without insulin  Continue to monitor  Followed by Cade Juan MD   Last a1c 7.2.    - Microalbumin/creatinine urine ratio; Future    3. 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      4. Abnormality of gait and mobility  Continue to monitor  Followed by Cade Juan MD .      5. Leukopenia due to antineoplastic chemotherapy  Continue to monitor  Followed by Juliana.      6. GE junction carcinoma  Continue to monitor  Followed by Juliana.      7. Chemotherapy-induced thrombocytopenia  Continue to monitor  Followed by Juliana.      8. Coronary artery disease involving native coronary artery of native heart with unstable angina pectoris  Continue to monitor  Followed by Cade Juan MD   Has NTG.      9. Essential hypertension  Continue to monitor  Followed by Cade Juan MD .      10. PAF (paroxysmal atrial fibrillation)  Continue to monitor  Followed by Cade Juan MD .      11.  Onychomycosis of multiple toenails with type 2 diabetes mellitus  Continue to monitor  Followed by Cade Juan MD .        Provided Stu with a 5-10 year written screening schedule and personal prevention plan. Recommendations were developed using the USPSTF age appropriate recommendations. Education, counseling, and referrals were provided as needed. After Visit Summary printed and given to patient which includes a list of additional screenings\tests needed.    Follow up in about 1 year (around 6/9/2021).    Pilar Shields NP  I offered to discuss end of life issues, including information on how to make advance directives that the patient could use to name someone who would make medical decisions on their behalf if they became too ill to make themselves.    ___Patient  declined  _X_Patient is interested, I provided paper work and offered to discuss.

## 2020-06-09 NOTE — PATIENT INSTRUCTIONS
Counseling and Referral of Other Preventative  (Italic type indicates deductible and co-insurance are waived)    Patient Name: Stu Garcia  Today's Date: 6/9/2020    Health Maintenance       Date Due Completion Date    Shingles Vaccine (2 of 3) 10/14/2014 8/19/2014    Lipid Panel 02/07/2020 2/7/2019    Foot Exam 05/14/2020 5/14/2019 (Done)    Override on 5/14/2019: Done ([place])    Override on 4/24/2018: Done (per LUper)    Override on 6/20/2016: Done    Override on 6/10/2015: Done    Urine Microalbumin 05/07/2020 5/7/2019    Hemoglobin A1c 08/07/2020 2/7/2020    Eye Exam 12/06/2020 12/6/2019    Override on 5/27/2015: Done    Override on 8/1/2008: Done (Scout Dennison, OD)    High Dose Statin 06/09/2021 6/9/2020    Colonoscopy 05/20/2025 5/20/2015    TETANUS VACCINE 08/04/2026 8/4/2016        Orders Placed This Encounter   Procedures    Microalbumin/creatinine urine ratio     The following information is provided to all patients.  This information is to help you find resources for any of the problems found today that may be affecting your health:                Living healthy guide: www.[facility].[place].gov      Understanding Diabetes: www.diabetes.org      Eating healthy: www.cdc.gov/healthyweight      [facility] home safety checklist: www.cdc.gov/steadi/patient.html      Agency on Aging: www.goea.[place].[facility]      Alcoholics anonymous (AA): www.aa.org      Physical Activity: www.ron.nih.gov/mk9sfwv      Tobacco use: www.quitwithusla.org

## 2020-06-22 ENCOUNTER — TELEPHONE (OUTPATIENT)
Dept: HEMATOLOGY/ONCOLOGY | Facility: CLINIC | Age: 75
End: 2020-06-22

## 2020-06-22 NOTE — TELEPHONE ENCOUNTER
Returned call to Ania, who asked if Covid testing was still required, discussed that it is no longer required, only in  Certain cases and Mr. Garcia's is not that case

## 2020-06-22 NOTE — TELEPHONE ENCOUNTER
----- Message from Jaziel Peña sent at 6/22/2020 12:42 PM CDT -----  Type: Needs Medical Advice  Who Called:  Ania    Best Call Back Number: [phone]  Additional Information: Ania has questions about the port flush apt and if the covid test is needed. Please call to advise

## 2020-06-29 ENCOUNTER — INFUSION (OUTPATIENT)
Dept: INFUSION THERAPY | Facility: HOSPITAL | Age: 75
End: 2020-06-29
Attending: INTERNAL MEDICINE
Payer: MEDICARE

## 2020-06-29 DIAGNOSIS — C16.0 GE JUNCTION CARCINOMA: Primary | ICD-10-CM

## 2020-06-29 PROCEDURE — 25000003 PHARM REV CODE 250: Mod: HCNC,PN | Performed by: INTERNAL MEDICINE

## 2020-06-29 PROCEDURE — A4216 STERILE WATER/SALINE, 10 ML: HCPCS | Mod: HCNC,PN | Performed by: INTERNAL MEDICINE

## 2020-06-29 PROCEDURE — 63600175 PHARM REV CODE 636 W HCPCS: Mod: HCNC,PN | Performed by: INTERNAL MEDICINE

## 2020-06-29 PROCEDURE — 96523 IRRIG DRUG DELIVERY DEVICE: CPT | Mod: HCNC,PN

## 2020-06-29 RX ORDER — HEPARIN 100 UNIT/ML
500 SYRINGE INTRAVENOUS
Status: COMPLETED | OUTPATIENT
Start: 2020-06-29 | End: 2020-06-29

## 2020-06-29 RX ORDER — SODIUM CHLORIDE 0.9 % (FLUSH) 0.9 %
10 SYRINGE (ML) INJECTION
Status: COMPLETED | OUTPATIENT
Start: 2020-06-29 | End: 2020-06-29

## 2020-06-29 RX ADMIN — HEPARIN 500 UNITS: 100 SYRINGE at 01:06

## 2020-06-29 RX ADMIN — Medication 10 ML: at 01:06

## 2020-07-29 ENCOUNTER — OFFICE VISIT (OUTPATIENT)
Dept: CARDIOLOGY | Facility: CLINIC | Age: 75
End: 2020-07-29
Payer: MEDICARE

## 2020-07-29 VITALS
WEIGHT: 205.69 LBS | HEART RATE: 71 BPM | SYSTOLIC BLOOD PRESSURE: 152 MMHG | DIASTOLIC BLOOD PRESSURE: 71 MMHG | BODY MASS INDEX: 24.29 KG/M2 | HEIGHT: 77 IN

## 2020-07-29 DIAGNOSIS — E11.9 TYPE 2 DIABETES MELLITUS TREATED WITHOUT INSULIN: ICD-10-CM

## 2020-07-29 DIAGNOSIS — E78.2 MIXED HYPERLIPIDEMIA: ICD-10-CM

## 2020-07-29 DIAGNOSIS — R07.9 CHEST PAIN, UNSPECIFIED TYPE: ICD-10-CM

## 2020-07-29 DIAGNOSIS — I48.0 PAF (PAROXYSMAL ATRIAL FIBRILLATION): ICD-10-CM

## 2020-07-29 DIAGNOSIS — I10 ESSENTIAL HYPERTENSION: ICD-10-CM

## 2020-07-29 DIAGNOSIS — I25.110 CORONARY ARTERY DISEASE INVOLVING NATIVE CORONARY ARTERY OF NATIVE HEART WITH UNSTABLE ANGINA PECTORIS: Primary | ICD-10-CM

## 2020-07-29 PROCEDURE — 1126F AMNT PAIN NOTED NONE PRSNT: CPT | Mod: HCNC,S$GLB,, | Performed by: INTERNAL MEDICINE

## 2020-07-29 PROCEDURE — 99999 PR PBB SHADOW E&M-EST. PATIENT-LVL IV: ICD-10-PCS | Mod: PBBFAC,HCNC,, | Performed by: INTERNAL MEDICINE

## 2020-07-29 PROCEDURE — 99214 PR OFFICE/OUTPT VISIT, EST, LEVL IV, 30-39 MIN: ICD-10-PCS | Mod: HCNC,S$GLB,, | Performed by: INTERNAL MEDICINE

## 2020-07-29 PROCEDURE — 3078F DIAST BP <80 MM HG: CPT | Mod: HCNC,CPTII,S$GLB, | Performed by: INTERNAL MEDICINE

## 2020-07-29 PROCEDURE — 99999 PR PBB SHADOW E&M-EST. PATIENT-LVL IV: CPT | Mod: PBBFAC,HCNC,, | Performed by: INTERNAL MEDICINE

## 2020-07-29 PROCEDURE — 1159F MED LIST DOCD IN RCRD: CPT | Mod: HCNC,S$GLB,, | Performed by: INTERNAL MEDICINE

## 2020-07-29 PROCEDURE — 1101F PT FALLS ASSESS-DOCD LE1/YR: CPT | Mod: HCNC,CPTII,S$GLB, | Performed by: INTERNAL MEDICINE

## 2020-07-29 PROCEDURE — 1159F PR MEDICATION LIST DOCUMENTED IN MEDICAL RECORD: ICD-10-PCS | Mod: HCNC,S$GLB,, | Performed by: INTERNAL MEDICINE

## 2020-07-29 PROCEDURE — 3077F PR MOST RECENT SYSTOLIC BLOOD PRESSURE >= 140 MM HG: ICD-10-PCS | Mod: HCNC,CPTII,S$GLB, | Performed by: INTERNAL MEDICINE

## 2020-07-29 PROCEDURE — 3051F PR MOST RECENT HEMOGLOBIN A1C LEVEL 7.0 - < 8.0%: ICD-10-PCS | Mod: HCNC,CPTII,S$GLB, | Performed by: INTERNAL MEDICINE

## 2020-07-29 PROCEDURE — 99499 RISK ADDL DX/OHS AUDIT: ICD-10-PCS | Mod: HCNC,S$GLB,, | Performed by: INTERNAL MEDICINE

## 2020-07-29 PROCEDURE — 99214 OFFICE O/P EST MOD 30 MIN: CPT | Mod: HCNC,S$GLB,, | Performed by: INTERNAL MEDICINE

## 2020-07-29 PROCEDURE — 1101F PR PT FALLS ASSESS DOC 0-1 FALLS W/OUT INJ PAST YR: ICD-10-PCS | Mod: HCNC,CPTII,S$GLB, | Performed by: INTERNAL MEDICINE

## 2020-07-29 PROCEDURE — 3078F PR MOST RECENT DIASTOLIC BLOOD PRESSURE < 80 MM HG: ICD-10-PCS | Mod: HCNC,CPTII,S$GLB, | Performed by: INTERNAL MEDICINE

## 2020-07-29 PROCEDURE — 99499 UNLISTED E&M SERVICE: CPT | Mod: HCNC,S$GLB,, | Performed by: INTERNAL MEDICINE

## 2020-07-29 PROCEDURE — 3077F SYST BP >= 140 MM HG: CPT | Mod: HCNC,CPTII,S$GLB, | Performed by: INTERNAL MEDICINE

## 2020-07-29 PROCEDURE — 1126F PR PAIN SEVERITY QUANTIFIED, NO PAIN PRESENT: ICD-10-PCS | Mod: HCNC,S$GLB,, | Performed by: INTERNAL MEDICINE

## 2020-07-29 PROCEDURE — 3051F HG A1C>EQUAL 7.0%<8.0%: CPT | Mod: HCNC,CPTII,S$GLB, | Performed by: INTERNAL MEDICINE

## 2020-07-29 NOTE — PROGRESS NOTES
Subjective:    Patient ID:  Stu Garcia is a 74 y.o. male who presents for follow-up of cad    HPI  He comes for follow up with no major problems, no chest pain, no shortness of breath.  He does report an episode of epigastric pain 3 weeks ago, relieved by SL NTG      Review of Systems   Constitution: Negative for decreased appetite, malaise/fatigue, weight gain and weight loss.   Cardiovascular: Positive for chest pain. Negative for dyspnea on exertion, leg swelling, palpitations and syncope.   Respiratory: Negative for cough and shortness of breath.    Gastrointestinal: Negative.    Neurological: Negative for weakness.   All other systems reviewed and are negative.       Objective:      Physical Exam   Constitutional: He is oriented to person, place, and time. He appears well-developed and well-nourished.   HENT:   Head: Normocephalic.   Eyes: Pupils are equal, round, and reactive to light.   Neck: Normal range of motion. Neck supple. No JVD present. Carotid bruit is not present. No thyromegaly present.   Cardiovascular: Normal rate, regular rhythm, normal heart sounds, intact distal pulses and normal pulses. PMI is not displaced. Exam reveals no gallop.   No murmur heard.  Pulmonary/Chest: Effort normal and breath sounds normal.   Abdominal: Soft. Normal appearance. He exhibits no mass. There is no hepatosplenomegaly. There is no abdominal tenderness.   Musculoskeletal: Normal range of motion.         General: No edema.   Neurological: He is alert and oriented to person, place, and time. He has normal strength and normal reflexes. No sensory deficit.   Skin: Skin is warm and intact.   Psychiatric: He has a normal mood and affect.   Nursing note and vitals reviewed.        Assessment:       1. Coronary artery disease involving native coronary artery of native heart with unstable angina pectoris    2. Chest pain, unspecified type    3. PAF (paroxysmal atrial fibrillation)    4. Essential hypertension    5.  Mixed hyperlipidemia    6. Type 2 diabetes mellitus treated without insulin         Plan:   1,2. Will get stress echocardiogram; call with results  3. Stable  4. Controlled  5. Stable, following diet  Continue all cardiac medications  Regular exercise program  6 m f/u if test ok

## 2020-08-04 ENCOUNTER — CLINICAL SUPPORT (OUTPATIENT)
Dept: CARDIOLOGY | Facility: CLINIC | Age: 75
End: 2020-08-04
Attending: INTERNAL MEDICINE
Payer: MEDICARE

## 2020-08-04 ENCOUNTER — PATIENT OUTREACH (OUTPATIENT)
Dept: ADMINISTRATIVE | Facility: HOSPITAL | Age: 75
End: 2020-08-04

## 2020-08-04 DIAGNOSIS — I25.110 CORONARY ARTERY DISEASE INVOLVING NATIVE CORONARY ARTERY OF NATIVE HEART WITH UNSTABLE ANGINA PECTORIS: ICD-10-CM

## 2020-08-04 DIAGNOSIS — R07.9 CHEST PAIN, UNSPECIFIED TYPE: ICD-10-CM

## 2020-08-04 DIAGNOSIS — E11.9 TYPE 2 DIABETES MELLITUS TREATED WITHOUT INSULIN: ICD-10-CM

## 2020-08-04 DIAGNOSIS — E78.2 MIXED HYPERLIPIDEMIA: ICD-10-CM

## 2020-08-04 DIAGNOSIS — I10 ESSENTIAL HYPERTENSION: ICD-10-CM

## 2020-08-04 DIAGNOSIS — I48.0 PAF (PAROXYSMAL ATRIAL FIBRILLATION): ICD-10-CM

## 2020-08-04 NOTE — PROGRESS NOTES
Chart review completed 2020.  Care Everywhere updates requested and reviewed.  Immunizations reconciled. Media reports reviewed.  Duplicate HM overrides and  orders removed.  Overdue HM topic chart audit and/or requested.  Overdue lab testing linked to upcoming lab appointments if applies.      Requested eye exam  records     Health Maintenance Due   Topic Date Due    Shingles Vaccine (2 of 3) 10/14/2014    Lipid Panel  2020    Urine Microalbumin  2020    Hemoglobin A1c  2020

## 2020-08-04 NOTE — LETTER
AUTHORIZATION FOR RELEASE OF   CONFIDENTIAL INFORMATION    Dear Gabino Abreu MD,    We are seeing Stu Garcia, date of birth 1945, in the clinic at [facility]. Cade Juan MD is the patient's PCP. Stu Garcia has an outstanding lab/procedure at the time we reviewed his chart. In order to help keep his health information updated, he has authorized us to request the following medical record(s):                                        EYE EXAM                    Please fax records to [facility], Kevin C. Plaisance, [place], [phone].     If you have any questions, please contact   Jazmin Lara, Care Coordinator  [facility]  Phone: [phone]  FAX: [phone]          Patient Name: Stu Garcia  : 1945  Patient Phone #: [phone]

## 2020-08-10 ENCOUNTER — INFUSION (OUTPATIENT)
Dept: INFUSION THERAPY | Facility: HOSPITAL | Age: 75
End: 2020-08-10
Attending: INTERNAL MEDICINE
Payer: MEDICARE

## 2020-08-10 DIAGNOSIS — C16.0 GE JUNCTION CARCINOMA: Primary | ICD-10-CM

## 2020-08-10 PROCEDURE — 96523 IRRIG DRUG DELIVERY DEVICE: CPT | Mod: HCNC,PN

## 2020-08-10 PROCEDURE — 25000003 PHARM REV CODE 250: Mod: HCNC,PN | Performed by: INTERNAL MEDICINE

## 2020-08-10 PROCEDURE — A4216 STERILE WATER/SALINE, 10 ML: HCPCS | Mod: HCNC,PN | Performed by: INTERNAL MEDICINE

## 2020-08-10 PROCEDURE — 63600175 PHARM REV CODE 636 W HCPCS: Mod: HCNC,PN | Performed by: INTERNAL MEDICINE

## 2020-08-10 RX ORDER — SODIUM CHLORIDE 0.9 % (FLUSH) 0.9 %
10 SYRINGE (ML) INJECTION
Status: COMPLETED | OUTPATIENT
Start: 2020-08-10 | End: 2020-08-10

## 2020-08-10 RX ORDER — HEPARIN 100 UNIT/ML
500 SYRINGE INTRAVENOUS
Status: COMPLETED | OUTPATIENT
Start: 2020-08-10 | End: 2020-08-10

## 2020-08-10 RX ADMIN — HEPARIN 500 UNITS: 100 SYRINGE at 01:08

## 2020-08-10 RX ADMIN — Medication 10 ML: at 01:08

## 2020-08-11 ENCOUNTER — LAB VISIT (OUTPATIENT)
Dept: LAB | Facility: HOSPITAL | Age: 75
End: 2020-08-11
Attending: INTERNAL MEDICINE
Payer: MEDICARE

## 2020-08-11 DIAGNOSIS — E11.9 TYPE 2 DIABETES MELLITUS TREATED WITHOUT INSULIN: ICD-10-CM

## 2020-08-11 DIAGNOSIS — E04.2 MULTINODULAR GOITER: ICD-10-CM

## 2020-08-11 LAB
ALBUMIN SERPL BCP-MCNC: 3.7 G/DL (ref 3.5–5.2)
ALBUMIN/CREAT UR: 19.4 UG/MG (ref 0–30)
ALP SERPL-CCNC: 132 U/L (ref 55–135)
ALT SERPL W/O P-5'-P-CCNC: 19 U/L (ref 10–44)
ANION GAP SERPL CALC-SCNC: 7 MMOL/L (ref 8–16)
AST SERPL-CCNC: 19 U/L (ref 10–40)
BILIRUB SERPL-MCNC: 0.7 MG/DL (ref 0.1–1)
BUN SERPL-MCNC: 18 MG/DL (ref 8–23)
CALCIUM SERPL-MCNC: 9.7 MG/DL (ref 8.7–10.5)
CHLORIDE SERPL-SCNC: 104 MMOL/L (ref 95–110)
CHOLEST SERPL-MCNC: 94 MG/DL (ref 120–199)
CHOLEST/HDLC SERPL: 2.2 {RATIO} (ref 2–5)
CO2 SERPL-SCNC: 29 MMOL/L (ref 23–29)
CREAT SERPL-MCNC: 1 MG/DL (ref 0.5–1.4)
CREAT UR-MCNC: 108 MG/DL (ref 23–375)
EST. GFR  (AFRICAN AMERICAN): >60 ML/MIN/1.73 M^2
EST. GFR  (NON AFRICAN AMERICAN): >60 ML/MIN/1.73 M^2
ESTIMATED AVG GLUCOSE: 183 MG/DL (ref 68–131)
GLUCOSE SERPL-MCNC: 167 MG/DL (ref 70–110)
HBA1C MFR BLD HPLC: 8 % (ref 4–5.6)
HDLC SERPL-MCNC: 43 MG/DL (ref 40–75)
HDLC SERPL: 45.7 % (ref 20–50)
LDLC SERPL CALC-MCNC: 38 MG/DL (ref 63–159)
MICROALBUMIN UR DL<=1MG/L-MCNC: 21 UG/ML
NONHDLC SERPL-MCNC: 51 MG/DL
POTASSIUM SERPL-SCNC: 4.2 MMOL/L (ref 3.5–5.1)
PROT SERPL-MCNC: 6.7 G/DL (ref 6–8.4)
SODIUM SERPL-SCNC: 140 MMOL/L (ref 136–145)
TRIGL SERPL-MCNC: 65 MG/DL (ref 30–150)
TSH SERPL DL<=0.005 MIU/L-ACNC: 0.98 UIU/ML (ref 0.4–4)

## 2020-08-11 PROCEDURE — 36415 COLL VENOUS BLD VENIPUNCTURE: CPT | Mod: HCNC,PO

## 2020-08-11 PROCEDURE — 80053 COMPREHEN METABOLIC PANEL: CPT | Mod: HCNC

## 2020-08-11 PROCEDURE — 82043 UR ALBUMIN QUANTITATIVE: CPT | Mod: HCNC

## 2020-08-11 PROCEDURE — 84443 ASSAY THYROID STIM HORMONE: CPT | Mod: HCNC

## 2020-08-11 PROCEDURE — 80061 LIPID PANEL: CPT | Mod: HCNC

## 2020-08-11 PROCEDURE — 83036 HEMOGLOBIN GLYCOSYLATED A1C: CPT | Mod: HCNC

## 2020-08-14 DIAGNOSIS — E11.9 TYPE 2 DIABETES MELLITUS TREATED WITHOUT INSULIN: ICD-10-CM

## 2020-08-18 ENCOUNTER — OFFICE VISIT (OUTPATIENT)
Dept: FAMILY MEDICINE | Facility: CLINIC | Age: 75
End: 2020-08-18
Payer: MEDICARE

## 2020-08-18 ENCOUNTER — HOSPITAL ENCOUNTER (OUTPATIENT)
Dept: RADIOLOGY | Facility: HOSPITAL | Age: 75
Discharge: HOME | End: 2020-08-18
Attending: INTERNAL MEDICINE
Payer: MEDICARE

## 2020-08-18 VITALS
HEART RATE: 71 BPM | WEIGHT: 209 LBS | TEMPERATURE: 99 F | DIASTOLIC BLOOD PRESSURE: 68 MMHG | SYSTOLIC BLOOD PRESSURE: 132 MMHG | OXYGEN SATURATION: 98 % | BODY MASS INDEX: 24.68 KG/M2 | HEIGHT: 77 IN

## 2020-08-18 DIAGNOSIS — I48.0 PAF (PAROXYSMAL ATRIAL FIBRILLATION): ICD-10-CM

## 2020-08-18 DIAGNOSIS — R63.4 UNEXPLAINED WEIGHT LOSS: ICD-10-CM

## 2020-08-18 DIAGNOSIS — C16.0 GE JUNCTION CARCINOMA: ICD-10-CM

## 2020-08-18 DIAGNOSIS — R93.5 ABNORMAL CT OF THE ABDOMEN: ICD-10-CM

## 2020-08-18 DIAGNOSIS — R79.89 ELEVATED LFTS: ICD-10-CM

## 2020-08-18 DIAGNOSIS — E11.9 TYPE 2 DIABETES MELLITUS TREATED WITHOUT INSULIN: Primary | ICD-10-CM

## 2020-08-18 DIAGNOSIS — E78.2 MIXED HYPERLIPIDEMIA: ICD-10-CM

## 2020-08-18 DIAGNOSIS — I25.110 CORONARY ARTERY DISEASE INVOLVING NATIVE CORONARY ARTERY OF NATIVE HEART WITH UNSTABLE ANGINA PECTORIS: ICD-10-CM

## 2020-08-18 PROCEDURE — 3075F PR MOST RECENT SYSTOLIC BLOOD PRESS GE 130-139MM HG: ICD-10-PCS | Mod: HCNC,CPTII,S$GLB, | Performed by: FAMILY MEDICINE

## 2020-08-18 PROCEDURE — 99214 OFFICE O/P EST MOD 30 MIN: CPT | Mod: HCNC,S$GLB,, | Performed by: FAMILY MEDICINE

## 2020-08-18 PROCEDURE — 99499 RISK ADDL DX/OHS AUDIT: ICD-10-PCS | Mod: HCNC,S$GLB,, | Performed by: FAMILY MEDICINE

## 2020-08-18 PROCEDURE — 1101F PT FALLS ASSESS-DOCD LE1/YR: CPT | Mod: HCNC,CPTII,S$GLB, | Performed by: FAMILY MEDICINE

## 2020-08-18 PROCEDURE — 99214 PR OFFICE/OUTPT VISIT, EST, LEVL IV, 30-39 MIN: ICD-10-PCS | Mod: HCNC,S$GLB,, | Performed by: FAMILY MEDICINE

## 2020-08-18 PROCEDURE — 71046 X-RAY EXAM CHEST 2 VIEWS: CPT | Mod: TC,HCNC,FY,PO

## 2020-08-18 PROCEDURE — 1159F MED LIST DOCD IN RCRD: CPT | Mod: HCNC,S$GLB,, | Performed by: FAMILY MEDICINE

## 2020-08-18 PROCEDURE — 1126F PR PAIN SEVERITY QUANTIFIED, NO PAIN PRESENT: ICD-10-PCS | Mod: HCNC,S$GLB,, | Performed by: FAMILY MEDICINE

## 2020-08-18 PROCEDURE — 74177 CT ABDOMEN PELVIS WITH CONTRAST: ICD-10-PCS | Mod: 26,HCNC,, | Performed by: RADIOLOGY

## 2020-08-18 PROCEDURE — 71046 XR CHEST PA AND LATERAL: ICD-10-PCS | Mod: 26,HCNC,, | Performed by: RADIOLOGY

## 2020-08-18 PROCEDURE — 99999 PR PBB SHADOW E&M-EST. PATIENT-LVL V: ICD-10-PCS | Mod: PBBFAC,HCNC,, | Performed by: FAMILY MEDICINE

## 2020-08-18 PROCEDURE — 1159F PR MEDICATION LIST DOCUMENTED IN MEDICAL RECORD: ICD-10-PCS | Mod: HCNC,S$GLB,, | Performed by: FAMILY MEDICINE

## 2020-08-18 PROCEDURE — 99499 UNLISTED E&M SERVICE: CPT | Mod: HCNC,S$GLB,, | Performed by: FAMILY MEDICINE

## 2020-08-18 PROCEDURE — 1101F PR PT FALLS ASSESS DOC 0-1 FALLS W/OUT INJ PAST YR: ICD-10-PCS | Mod: HCNC,CPTII,S$GLB, | Performed by: FAMILY MEDICINE

## 2020-08-18 PROCEDURE — 74177 CT ABD & PELVIS W/CONTRAST: CPT | Mod: TC,HCNC,PO

## 2020-08-18 PROCEDURE — 74177 CT ABD & PELVIS W/CONTRAST: CPT | Mod: 26,HCNC,, | Performed by: RADIOLOGY

## 2020-08-18 PROCEDURE — 25500020 PHARM REV CODE 255: Mod: HCNC,PO | Performed by: INTERNAL MEDICINE

## 2020-08-18 PROCEDURE — 99999 PR PBB SHADOW E&M-EST. PATIENT-LVL V: CPT | Mod: PBBFAC,HCNC,, | Performed by: FAMILY MEDICINE

## 2020-08-18 PROCEDURE — 3078F PR MOST RECENT DIASTOLIC BLOOD PRESSURE < 80 MM HG: ICD-10-PCS | Mod: HCNC,CPTII,S$GLB, | Performed by: FAMILY MEDICINE

## 2020-08-18 PROCEDURE — 1126F AMNT PAIN NOTED NONE PRSNT: CPT | Mod: HCNC,S$GLB,, | Performed by: FAMILY MEDICINE

## 2020-08-18 PROCEDURE — 3052F HG A1C>EQUAL 8.0%<EQUAL 9.0%: CPT | Mod: HCNC,CPTII,S$GLB, | Performed by: FAMILY MEDICINE

## 2020-08-18 PROCEDURE — 3052F PR MOST RECENT HEMOGLOBIN A1C LEVEL 8.0 - < 9.0%: ICD-10-PCS | Mod: HCNC,CPTII,S$GLB, | Performed by: FAMILY MEDICINE

## 2020-08-18 PROCEDURE — 71046 X-RAY EXAM CHEST 2 VIEWS: CPT | Mod: 26,HCNC,, | Performed by: RADIOLOGY

## 2020-08-18 PROCEDURE — 3078F DIAST BP <80 MM HG: CPT | Mod: HCNC,CPTII,S$GLB, | Performed by: FAMILY MEDICINE

## 2020-08-18 PROCEDURE — 3075F SYST BP GE 130 - 139MM HG: CPT | Mod: HCNC,CPTII,S$GLB, | Performed by: FAMILY MEDICINE

## 2020-08-18 RX ORDER — METFORMIN HYDROCHLORIDE 500 MG/1
500 TABLET, EXTENDED RELEASE ORAL 2 TIMES DAILY WITH MEALS
Qty: 180 TABLET | Refills: 3 | Status: SHIPPED | OUTPATIENT
Start: 2020-08-18 | End: 2021-01-01

## 2020-08-18 RX ADMIN — IOHEXOL 100 ML: 350 INJECTION, SOLUTION INTRAVENOUS at 08:08

## 2020-08-18 RX ADMIN — IOHEXOL 1000 ML: 12 SOLUTION ORAL at 08:08

## 2020-08-18 NOTE — PROGRESS NOTES
Subjective:       Patient ID: Stu Garcia is a 74 y.o. male.    Chief Complaint: 6 month f/u      Here for 6 month f/u on chronic health issues    INVASIVE MODERATELY DIFFERENTIATED ADENOCARCINOMA - esophagectomy 7/2/2019 uncomplicated' following with oncology tomramakrishna  S/p Anterior cervical decompression fusion done on 7/6/2017 by Dr. Jeter - neck stable; swallowing stable  Lumbar fracture - back pain only occurring at night periodically  Diabetes - following diabetic diet; He is taking glimepiride 4mg BID. Metformin XR 500mg daily. BGs 120-130s. No hypoglycemia.   CAD - s/p 4 stents; following with Dr. Maldonado  Paroxysmal afib, HTN - taking amiodarone, xarelto; metoprolol 50mg 1/2 tab BID  HLD - taking Lipitor 80mg daily  Anxiety - some intermittent panic attacks; hydroxizine has been helpful for this.            Follow-up  Associated symptoms include fatigue and neck pain. Pertinent negatives include no abdominal pain, arthralgias, chest pain, chills, coughing, fever, headaches, joint swelling, nausea, rash, sore throat, vomiting or weakness.   Diabetes  Pertinent negatives for hypoglycemia include no confusion, dizziness or headaches. Associated symptoms include fatigue. Pertinent negatives for diabetes include no chest pain, no polydipsia, no polyuria and no weakness.   Coronary Artery Disease  Pertinent negatives include no chest pain, chest tightness, dizziness, leg swelling, palpitations or shortness of breath.   Atrial Fibrillation  Symptoms are negative for chest pain, dizziness, palpitations, shortness of breath and weakness. Past medical history includes atrial fibrillation and CAD.       Past Medical History:   Diagnosis Date    Anticoagulant long-term use     xarelto,asa    Arthritis     Atrial fibrillation 2013    cardioverted    Bleb, lung     Cataract     OS    Colon polyp     Coronary artery disease     Coronary artery disease involving native coronary artery of native heart with unstable  angina pectoris 3/18/2018    Diabetes mellitus     Diabetes mellitus, type 2     Diverticulosis     Encounter for blood transfusion     General anesthetics causing adverse effect in therapeutic use     delayed emergence per patient's wife    GERD (gastroesophageal reflux disease)     HEARING LOSS     bilateral aids    Hemorrhoid     HLD (hyperlipidemia)     Hypertension     Iron deficiency anemia     Neuropathy of leg     Pneumonia due to other staphylococcus     Prostate cancer     prostate    Spontaneous pneumothorax     bilateral    Thyroid disease     tumors, non cancerous       Past Surgical History:   Procedure Laterality Date    CARDIOVERSION      CATARACT EXTRACTION      bilateral    CERVICAL SPINE FRACTURE SURGERY      c7 t1 ACDF     CHEST TUBE INSERTION Right     COLONOSCOPY      CORONARY STENT PLACEMENT      x 2    ENDOSCOPIC ULTRASOUND OF UPPER GASTROINTESTINAL TRACT N/A 3/13/2019    Procedure: ULTRASOUND, UPPER GI TRACT, ENDOSCOPIC;  Surgeon: Andrew Shirley MD;  Location: [address]);  Service: Endoscopy;  Laterality: N/A;    ESOPHAGOGASTRODUODENOSCOPY N/A 2/23/2019    Procedure: ESOPHAGOGASTRODUODENOSCOPY (EGD);  Surgeon: Jackeline Richards MD;  Location[facility];  Service: Endoscopy;  Laterality: N/A;    ESOPHAGOGASTRODUODENOSCOPY N/A 2/6/2020    Procedure: EGD (ESOPHAGOGASTRODUODENOSCOPY);  Surgeon: Andrea Kerns Jr., MD;  Location: [facility];  Service: Endoscopy;  Laterality: N/A;    HERNIA REPAIR      umbilical    INSERTION OF TUNNELED CENTRAL VENOUS CATHETER (CVC) WITH SUBCUTANEOUS PORT Right 4/4/2019    Procedure: XESIBOHED-MNYN-R-CATH (POWER PORT);  Surgeon: Nahum Zaidi MD;  Location[facility];  Service: General;  Laterality: Right;    KNEE SURGERY Left 2017    open thoracotomy Left 1966    With pleurectomy    Pleurectomy Left 1966    For treatment of left pneumothorax    PROSTATECTOMY  2001    open    ROBOT-ASSISTED SURGICAL REMOVAL OF ESOPHAGUS  USING DA ROSALIA XI N/A 7/2/2019    Procedure: XI ROBOTIC ESOPHAGECTOMY;  Surgeon: Chad Milain MD;  Location: [facility] OR [address] [place] [address];  Service: General;  Laterality: N/A;    TUBE THORACOTOMY  1966    pneumothorax left--open       Review of patient's allergies indicates:   Allergen Reactions    Codeine Nausea And Vomiting       Social History     Socioeconomic History    Marital status:      Spouse name: Not on file    Number of children: 2    Years of education: Not on file    Highest education level: Not on file   Occupational History    Occupation:     Occupation: prior [employer]    Social Needs    Financial resource strain: Not hard at all    Food insecurity     Worry: Never true     Inability: Never true    Transportation needs     Medical: No     Non-medical: No   Tobacco Use    Smoking status: Former Smoker     Years: 20.00    Smokeless tobacco: Never Used    Tobacco comment: quit smoking in 1980's.   Substance and Sexual Activity    Alcohol use: Yes     Frequency: Monthly or less     Drinks per session: 1 or 2     Binge frequency: Never     Comment: 1 drink /month    Drug use: No    Sexual activity: Yes   Lifestyle    Physical activity     Days per week: 0 days     Minutes per session: Not on file    Stress: Very much   Relationships    Social connections     Talks on phone: More than three times a week     Gets together: Once a week     Attends Sikh service: Not on file     Active member of club or organization: No     Attends meetings of clubs or organizations: Patient refused     Relationship status:    Other Topics Concern    Not on file   Social History Narrative    From [place]       Current Outpatient Medications on File Prior to Visit   Medication Sig Dispense Refill    ACCU-CHEK SHASHANK PLUS TEST STRP Strp TEST ONCE DAILY 100 strip 3    atorvastatin (LIPITOR) 80 MG tablet TAKE 1 TABLET(80 MG) BY MOUTH EVERY EVENING 90 tablet 0    ferrous  gluconate (FERGON) 324 MG tablet Take 1 tablet (324 mg total) by mouth daily with breakfast.      glimepiride (AMARYL) 2 MG tablet Take 2 tablets (4 mg total) by mouth 2 (two) times daily. 360 tablet 0    lancets (ACCU-CHEK SOFTCLIX LANCETS) Misc 1 strip by Misc.(Non-Drug; Combo Route) route once daily. 100 each 3    lidocaine-prilocaine (EMLA) cream U UTD  0    megestrol (MEGACE) 400 mg/10 mL (40 mg/mL) Susp Take 10 mLs (400 mg total) by mouth 2 (two) times daily. 240 mL 3    metoprolol tartrate (LOPRESSOR) 50 MG tablet TAKE ONE TABLET BY MOUTH TWICE DAILY 60 tablet 11    multivitamin capsule Take 1 capsule by mouth once daily.      nitroGLYCERIN (NITROSTAT) 0.4 MG SL tablet DISSOLVE 1 TABLET UNDER THE TONGUE EVERY 5 MINUTES AS NEEDED FOR CHEST PAIN 100 tablet prn    omeprazole (PRILOSEC) 40 MG capsule Take 1 capsule (40 mg total) by mouth once daily. 90 capsule 3    prochlorperazine (COMPAZINE) 10 MG tablet TAKE 1 TABLET BY MOUTH EVERY 6 HOURS AS NEEDED FOR NAUSEA OR VOMITING 60 tablet 0    rivaroxaban (XARELTO) 20 mg Tab Take 1 tablet (20 mg total) by mouth daily with dinner or evening meal. 30 tablet 11    vit C,U-Xi-vidrp-lutein-zeaxan (PRESERVISION AREDS 2) 434-698-85-1 mg-unit-mg-mg Cap Take 1 tablet by mouth once daily.      aspirin (ECOTRIN) 81 MG EC tablet Take 1 tablet (81 mg total) by mouth once daily. May take over the counter 30 tablet 12    glucagon, human recombinant, (GLUCAGON EMERGENCY KIT, HUMAN,) 1 mg SolR Inject 1 mg into the muscle as needed. 1 each 2     No current facility-administered medications on file prior to visit.        Family History   Problem Relation Age of Onset    Mental illness Mother         dementia    Coronary artery disease Father     Cancer Father         stomach with mets    Diabetes Brother        Review of Systems   Constitutional: Positive for fatigue. Negative for activity change, appetite change, chills, fever and unexpected weight change.   HENT:  "Positive for hearing loss, rhinorrhea and trouble swallowing. Negative for sore throat.    Eyes: Negative for pain, discharge and visual disturbance.   Respiratory: Negative for cough, chest tightness, shortness of breath and wheezing.    Cardiovascular: Negative for chest pain, palpitations and leg swelling.   Gastrointestinal: Negative for abdominal pain, blood in stool, constipation, diarrhea, nausea and vomiting.   Endocrine: Negative for polydipsia and polyuria.   Genitourinary: Negative for difficulty urinating, dysuria, hematuria and urgency.   Musculoskeletal: Positive for back pain and neck pain. Negative for arthralgias, gait problem and joint swelling.   Skin: Negative for rash and wound.   Neurological: Negative for dizziness, weakness, light-headedness and headaches.   Hematological: Negative for adenopathy.   Psychiatric/Behavioral: Negative for confusion and dysphoric mood.       Objective:      /68 (BP Location: Left arm, Patient Position: Sitting)   Pulse 71   Temp 98.6 °F (37 °C) (Oral)   Ht 6' 4.5" (1.943 m)   Wt 94.8 kg (208 lb 15.9 oz)   SpO2 98%   BMI 25.11 kg/m²   Physical Exam  Constitutional:       General: He is not in acute distress.     Appearance: Normal appearance. He is well-developed.   HENT:      Head: Normocephalic and atraumatic.      Right Ear: External ear normal.      Left Ear: External ear normal.      Mouth/Throat:      Pharynx: Uvula midline. No oropharyngeal exudate.   Eyes:      General: Lids are normal.      Conjunctiva/sclera: Conjunctivae normal.      Pupils: Pupils are equal, round, and reactive to light.   Neck:      Musculoskeletal: Full passive range of motion without pain, normal range of motion and neck supple.      Thyroid: No thyroid mass or thyromegaly.      Trachea: Phonation normal.   Cardiovascular:      Rate and Rhythm: Normal rate and regular rhythm.      Heart sounds: Normal heart sounds. No murmur. No friction rub. No gallop.    Pulmonary:     "  Effort: Pulmonary effort is normal. No respiratory distress.      Breath sounds: Normal breath sounds. No wheezing or rales.   Abdominal:      General: Bowel sounds are normal.      Palpations: Abdomen is soft.      Tenderness: There is no abdominal tenderness.   Musculoskeletal: Normal range of motion.   Lymphadenopathy:      Cervical: No cervical adenopathy.   Skin:     General: Skin is warm and dry.   Neurological:      Mental Status: He is alert and oriented to person, place, and time.      Cranial Nerves: No cranial nerve deficit.      Coordination: Coordination normal.   Psychiatric:         Speech: Speech normal.         Behavior: Behavior normal.         Thought Content: Thought content normal.         Judgment: Judgment normal.           Results for orders placed or performed in visit on 08/18/20   CBC w/ DIFF   Result Value Ref Range    WBC 5.13 3.90 - 12.70 K/uL    RBC 4.33 (L) 4.60 - 6.20 M/uL    Hemoglobin 13.3 (L) 14.0 - 18.0 g/dL    Hematocrit 41.2 40.0 - 54.0 %    Mean Corpuscular Volume 95 82 - 98 fL    Mean Corpuscular Hemoglobin 30.7 27.0 - 31.0 pg    Mean Corpuscular Hemoglobin Conc 32.3 32.0 - 36.0 g/dL    RDW 13.1 11.5 - 14.5 %    Platelets 131 (L) 150 - 350 K/uL    MPV 11.4 9.2 - 12.9 fL    Gran # (ANC) 3.5 1.8 - 7.7 K/uL    Lymph # 0.7 (L) 1.0 - 4.8 K/uL    Mono # 0.5 0.3 - 1.0 K/uL    Eos # 0.4 0.0 - 0.5 K/uL    Baso # 0.04 0.00 - 0.20 K/uL    Gran% 68.1 38.0 - 73.0 %    Lymph% 14.0 (L) 18.0 - 48.0 %    Mono% 10.1 4.0 - 15.0 %    Eosinophil% 7.0 0.0 - 8.0 %    Basophil% 0.8 0.0 - 1.9 %    Differential Method Automated    CMP   Result Value Ref Range    Sodium 142 136 - 145 mmol/L    Potassium 4.2 3.5 - 5.1 mmol/L    Chloride 106 95 - 110 mmol/L    CO2 27 23 - 29 mmol/L    Glucose 189 (H) 70 - 110 mg/dL    BUN, Bld 16 8 - 23 mg/dL    Creatinine 1.1 0.5 - 1.4 mg/dL    Calcium 9.4 8.7 - 10.5 mg/dL    Total Protein 6.7 6.0 - 8.4 g/dL    Albumin 3.8 3.5 - 5.2 g/dL    Total Bilirubin 0.5 0.1 -  1.0 mg/dL    Alkaline Phosphatase 136 (H) 55 - 135 U/L    AST 18 10 - 40 U/L    ALT 18 10 - 44 U/L    Anion Gap 9 8 - 16 mmol/L    eGFR if African American >60 >60 mL/min/1.73 m^2    eGFR if non African American >60 >60 mL/min/1.73 m^2   LDH   Result Value Ref Range     110 - 260 U/L     EKG: normal    Assessment:       1. Type 2 diabetes mellitus treated without insulin    2. Mixed hyperlipidemia    3. Coronary artery disease involving native coronary artery of native heart with unstable angina pectoris    4. PAF (paroxysmal atrial fibrillation)    5. GE junction carcinoma        Plan:       Type 2 diabetes mellitus treated without insulin  -     Comprehensive metabolic panel; Future; Expected date: 11/16/2020  -     Hemoglobin A1C; Future; Expected date: 11/16/2020  -     metFORMIN (GLUCOPHAGE-XR) 500 MG ER 24hr tablet; Take 1 tablet (500 mg total) by mouth 2 (two) times daily with meals.  Dispense: 180 tablet; Refill: 3    Mixed hyperlipidemia    Coronary artery disease involving native coronary artery of native heart with unstable angina pectoris    PAF (paroxysmal atrial fibrillation)    GE junction carcinoma        Diabetes stable  continue oncology follow-up  Continue protonix BID  F/u with cardiology as planned   Continue Glimepiride 4mg BID, increase to metformin XR 500mg BID  Continue other present meds  Counseled on regular exercise, maintenance of a healthy weight, balanced diet rich in fruits/vegetables and lean protein, and avoidance of unhealthy habits like smoking and excessive alcohol intake.    F/u 3 months with labs

## 2020-08-19 ENCOUNTER — OFFICE VISIT (OUTPATIENT)
Dept: HEMATOLOGY/ONCOLOGY | Facility: CLINIC | Age: 75
End: 2020-08-19
Payer: MEDICARE

## 2020-08-19 VITALS
SYSTOLIC BLOOD PRESSURE: 144 MMHG | WEIGHT: 208.56 LBS | HEART RATE: 84 BPM | TEMPERATURE: 97 F | OXYGEN SATURATION: 97 % | HEIGHT: 76 IN | BODY MASS INDEX: 25.4 KG/M2 | DIASTOLIC BLOOD PRESSURE: 69 MMHG | RESPIRATION RATE: 20 BRPM

## 2020-08-19 DIAGNOSIS — G63 POLYNEUROPATHY ASSOCIATED WITH UNDERLYING DISEASE: ICD-10-CM

## 2020-08-19 DIAGNOSIS — R26.9 GAIT DISTURBANCE: ICD-10-CM

## 2020-08-19 DIAGNOSIS — D69.6 THROMBOCYTOPENIA: ICD-10-CM

## 2020-08-19 DIAGNOSIS — C16.0 GE JUNCTION CARCINOMA: Primary | ICD-10-CM

## 2020-08-19 DIAGNOSIS — E11.9 TYPE 2 DIABETES MELLITUS TREATED WITHOUT INSULIN: ICD-10-CM

## 2020-08-19 DIAGNOSIS — R63.4 UNEXPLAINED WEIGHT LOSS: ICD-10-CM

## 2020-08-19 PROCEDURE — 3052F HG A1C>EQUAL 8.0%<EQUAL 9.0%: CPT | Mod: HCNC,CPTII,S$GLB, | Performed by: INTERNAL MEDICINE

## 2020-08-19 PROCEDURE — 1126F AMNT PAIN NOTED NONE PRSNT: CPT | Mod: HCNC,S$GLB,, | Performed by: INTERNAL MEDICINE

## 2020-08-19 PROCEDURE — 1159F PR MEDICATION LIST DOCUMENTED IN MEDICAL RECORD: ICD-10-PCS | Mod: HCNC,S$GLB,, | Performed by: INTERNAL MEDICINE

## 2020-08-19 PROCEDURE — 99214 PR OFFICE/OUTPT VISIT, EST, LEVL IV, 30-39 MIN: ICD-10-PCS | Mod: HCNC,S$GLB,, | Performed by: INTERNAL MEDICINE

## 2020-08-19 PROCEDURE — 1101F PR PT FALLS ASSESS DOC 0-1 FALLS W/OUT INJ PAST YR: ICD-10-PCS | Mod: HCNC,CPTII,S$GLB, | Performed by: INTERNAL MEDICINE

## 2020-08-19 PROCEDURE — 99999 PR PBB SHADOW E&M-EST. PATIENT-LVL V: ICD-10-PCS | Mod: PBBFAC,HCNC,, | Performed by: INTERNAL MEDICINE

## 2020-08-19 PROCEDURE — 3077F SYST BP >= 140 MM HG: CPT | Mod: HCNC,CPTII,S$GLB, | Performed by: INTERNAL MEDICINE

## 2020-08-19 PROCEDURE — 99499 RISK ADDL DX/OHS AUDIT: ICD-10-PCS | Mod: HCNC,S$GLB,, | Performed by: INTERNAL MEDICINE

## 2020-08-19 PROCEDURE — 1159F MED LIST DOCD IN RCRD: CPT | Mod: HCNC,S$GLB,, | Performed by: INTERNAL MEDICINE

## 2020-08-19 PROCEDURE — 99999 PR PBB SHADOW E&M-EST. PATIENT-LVL V: CPT | Mod: PBBFAC,HCNC,, | Performed by: INTERNAL MEDICINE

## 2020-08-19 PROCEDURE — 3052F PR MOST RECENT HEMOGLOBIN A1C LEVEL 8.0 - < 9.0%: ICD-10-PCS | Mod: HCNC,CPTII,S$GLB, | Performed by: INTERNAL MEDICINE

## 2020-08-19 PROCEDURE — 1101F PT FALLS ASSESS-DOCD LE1/YR: CPT | Mod: HCNC,CPTII,S$GLB, | Performed by: INTERNAL MEDICINE

## 2020-08-19 PROCEDURE — 3078F PR MOST RECENT DIASTOLIC BLOOD PRESSURE < 80 MM HG: ICD-10-PCS | Mod: HCNC,CPTII,S$GLB, | Performed by: INTERNAL MEDICINE

## 2020-08-19 PROCEDURE — 1126F PR PAIN SEVERITY QUANTIFIED, NO PAIN PRESENT: ICD-10-PCS | Mod: HCNC,S$GLB,, | Performed by: INTERNAL MEDICINE

## 2020-08-19 PROCEDURE — 99214 OFFICE O/P EST MOD 30 MIN: CPT | Mod: HCNC,S$GLB,, | Performed by: INTERNAL MEDICINE

## 2020-08-19 PROCEDURE — 3078F DIAST BP <80 MM HG: CPT | Mod: HCNC,CPTII,S$GLB, | Performed by: INTERNAL MEDICINE

## 2020-08-19 PROCEDURE — 99499 UNLISTED E&M SERVICE: CPT | Mod: HCNC,S$GLB,, | Performed by: INTERNAL MEDICINE

## 2020-08-19 PROCEDURE — 3077F PR MOST RECENT SYSTOLIC BLOOD PRESSURE >= 140 MM HG: ICD-10-PCS | Mod: HCNC,CPTII,S$GLB, | Performed by: INTERNAL MEDICINE

## 2020-08-19 NOTE — PROGRESS NOTES
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  The patient is approximately 12 months postop and returns to clinic for interval surveillance examination.  Patient has undergone interval laboratory and imaging in anticipation of today's appointment.  Patient's difficulties with nutritional intake and weight loss appear to have stabilized.  Patient is having some difficulty ambulating due to peripheral neuropathy.  He uses a cane for support.  His sugars have been poorly controlled of late and he is initiating therapy with metformin.  No other complaints for pertinent findings on a 14 point review systems.    Physical examination:  Well-developed, thin appearing, elderly, white gentleman, in no acute distress, who has a weight of 208.5 lb.  VITAL SIGNS: Documented  and reviewed this visit.  HEENT: Normocephalic, atraumatic. Oral mucosa pink and moist. Lips without   lesions. Tongue midline. Oropharynx clear. Nonicteric sclerae.   NECK: Supple, no adenopathy. No carotid bruits, thyromegaly or thyroid nodule.   HEART: Regular rate and rhythm without murmur, gallop or rub.   LUNGS: Clear to auscultation bilaterally. Normal respiratory effort.   ABDOMEN: Soft, nontender, nondistended with positive normoactive bowel sounds,   no hepatosplenomegaly.   EXTREMITIES: No cyanosis, clubbing or edema. Distal pulses are intact.   AXILLAE AND GROIN: No palpable pathologic lymphadenopathy is appreciated.   SKIN: Intact/turgor normal   NEUROLOGIC: Cranial nerves II-XII grossly intact. Motor: Good muscle bulk and   tone. Strength/sensory 5/5 throughout. Gait stable.     Laboratory:  White count 5.1, hemoglobin 13.3, hematocrit 41.2, platelets 131,  absolute neutrophil count is 3500.  Sodium 142, potassium 4.2, chloride 106, CO2 27, BUN 16, creatinine 1.1, glucose 189, calcium 9.4, alkaline phosphatase 136, liver function test within normal limits, LDH is 124, GFR is greater than 60.    CT abdomen/pelvis:  No acute findings and no findings suggesting metastatic disease.  Findings not significantly changed compared to the prior exam and include  Postoperative changes of esophagectomy with gastric pull-up, prostatectomy,  Cholelithiasis without CT findings of acute cholecystitis  Old compression of T10  Trace right pleural effusion or pleural thickening mild atelectatic basilar stranding unchanged.    Impression:  1.  Adenocarcinoma of the GE junction (T2, N0, M0).  2.  Mild thrombocytopenia-stable.  3.  Weight loss-stabilized.  4.  Poorly controlled diabetes mellitus.  5.  Peripheral neuropathy.  6.  Gait disturbance.    Plan:  1.  Continue observation and re-evaluate patient 6 months from now with interval CBC, CMP, LDH.    This note was created using voice recognition software and may contain grammatical errors.

## 2020-09-18 RX ORDER — SODIUM CHLORIDE 0.9 % (FLUSH) 0.9 %
10 SYRINGE (ML) INJECTION
Status: CANCELLED | OUTPATIENT
Start: 2020-09-18

## 2020-09-18 RX ORDER — HEPARIN 100 UNIT/ML
500 SYRINGE INTRAVENOUS
Status: CANCELLED | OUTPATIENT
Start: 2020-09-18

## 2020-09-21 ENCOUNTER — INFUSION (OUTPATIENT)
Dept: INFUSION THERAPY | Facility: HOSPITAL | Age: 75
End: 2020-09-21
Attending: INTERNAL MEDICINE
Payer: MEDICARE

## 2020-09-21 VITALS — TEMPERATURE: 99 F

## 2020-09-21 DIAGNOSIS — C16.0 GE JUNCTION CARCINOMA: Primary | ICD-10-CM

## 2020-09-21 PROCEDURE — 63600175 PHARM REV CODE 636 W HCPCS: Mod: HCNC,PN | Performed by: INTERNAL MEDICINE

## 2020-09-21 PROCEDURE — 96523 IRRIG DRUG DELIVERY DEVICE: CPT | Mod: HCNC,PN

## 2020-09-21 PROCEDURE — A4216 STERILE WATER/SALINE, 10 ML: HCPCS | Mod: HCNC,PN | Performed by: INTERNAL MEDICINE

## 2020-09-21 PROCEDURE — 25000003 PHARM REV CODE 250: Mod: HCNC,PN | Performed by: INTERNAL MEDICINE

## 2020-09-21 RX ORDER — SODIUM CHLORIDE 0.9 % (FLUSH) 0.9 %
10 SYRINGE (ML) INJECTION
Status: CANCELLED | OUTPATIENT
Start: 2020-09-21

## 2020-09-21 RX ORDER — HEPARIN 100 UNIT/ML
500 SYRINGE INTRAVENOUS
Status: CANCELLED | OUTPATIENT
Start: 2020-09-21

## 2020-09-21 RX ORDER — SODIUM CHLORIDE 0.9 % (FLUSH) 0.9 %
10 SYRINGE (ML) INJECTION
Status: COMPLETED | OUTPATIENT
Start: 2020-09-21 | End: 2020-09-21

## 2020-09-21 RX ORDER — HEPARIN 100 UNIT/ML
500 SYRINGE INTRAVENOUS
Status: COMPLETED | OUTPATIENT
Start: 2020-09-21 | End: 2020-09-21

## 2020-09-21 RX ADMIN — Medication 10 ML: at 01:09

## 2020-09-21 RX ADMIN — HEPARIN 500 UNITS: 100 SYRINGE at 01:09

## 2020-09-23 ENCOUNTER — CLINICAL SUPPORT (OUTPATIENT)
Dept: CARDIOLOGY | Facility: CLINIC | Age: 75
End: 2020-09-23
Attending: INTERNAL MEDICINE
Payer: MEDICARE

## 2020-09-23 VITALS — WEIGHT: 208 LBS | BODY MASS INDEX: 25.33 KG/M2 | HEIGHT: 76 IN

## 2020-09-23 PROCEDURE — 99999 PR PBB SHADOW E&M-EST. PATIENT-LVL I: CPT | Mod: PBBFAC,HCNC,,

## 2020-09-23 PROCEDURE — 99999 PR PBB SHADOW E&M-EST. PATIENT-LVL I: ICD-10-PCS | Mod: PBBFAC,HCNC,,

## 2020-09-23 PROCEDURE — 93351 STRESS TTE COMPLETE: CPT | Mod: HCNC,S$GLB,, | Performed by: INTERNAL MEDICINE

## 2020-09-23 PROCEDURE — 93320 DOPPLER ECHO COMPLETE: CPT | Mod: HCNC,S$GLB,, | Performed by: INTERNAL MEDICINE

## 2020-09-23 PROCEDURE — 93351 STRESS ECHO (CUPID ONLY): ICD-10-PCS | Mod: HCNC,S$GLB,, | Performed by: INTERNAL MEDICINE

## 2020-09-23 PROCEDURE — 93325 DOPPLER ECHO COLOR FLOW MAPG: CPT | Mod: HCNC,S$GLB,, | Performed by: INTERNAL MEDICINE

## 2020-09-23 PROCEDURE — 93320 STRESS ECHO (CUPID ONLY): ICD-10-PCS | Mod: HCNC,S$GLB,, | Performed by: INTERNAL MEDICINE

## 2020-09-23 PROCEDURE — 93325 STRESS ECHO (CUPID ONLY): ICD-10-PCS | Mod: HCNC,S$GLB,, | Performed by: INTERNAL MEDICINE

## 2020-09-24 LAB
ASCENDING AORTA: 3.53 CM
AV INDEX (PROSTH): 0.65
AV MEAN GRADIENT: 3 MMHG
AV PEAK GRADIENT: 5 MMHG
AV VALVE AREA: 2.81 CM2
AV VELOCITY RATIO: 0.69
BSA FOR ECHO PROCEDURE: 2.25 M2
CV ECHO LV RWT: 0.4 CM
CV STRESS BASE HR: 79 BPM
DIASTOLIC BLOOD PRESSURE: 61 MMHG
DOP CALC AO PEAK VEL: 1.16 M/S
DOP CALC AO VTI: 24.53 CM
DOP CALC LVOT AREA: 4.3 CM2
DOP CALC LVOT DIAMETER: 2.35 CM
DOP CALC LVOT PEAK VEL: 0.8 M/S
DOP CALC LVOT STROKE VOLUME: 68.89 CM3
DOP CALCLVOT PEAK VEL VTI: 15.89 CM
E WAVE DECELERATION TIME: 207.8 MSEC
E/A RATIO: 0.77
E/E' RATIO: 5.88 M/S
ECHO LV POSTERIOR WALL: 0.96 CM (ref 0.6–1.1)
FRACTIONAL SHORTENING: 44 % (ref 28–44)
INTERVENTRICULAR SEPTUM: 1.15 CM (ref 0.6–1.1)
LA MAJOR: 4.43 CM
LA MINOR: 4.33 CM
LA WIDTH: 4.51 CM
LEFT ATRIUM SIZE: 3.06 CM
LEFT ATRIUM VOLUME INDEX: 22.8 ML/M2
LEFT ATRIUM VOLUME: 51.37 CM3
LEFT INTERNAL DIMENSION IN SYSTOLE: 2.72 CM (ref 2.1–4)
LEFT VENTRICLE DIASTOLIC VOLUME INDEX: 48.93 ML/M2
LEFT VENTRICLE DIASTOLIC VOLUME: 110.23 ML
LEFT VENTRICLE MASS INDEX: 83 G/M2
LEFT VENTRICLE SYSTOLIC VOLUME INDEX: 12.2 ML/M2
LEFT VENTRICLE SYSTOLIC VOLUME: 27.52 ML
LEFT VENTRICULAR INTERNAL DIMENSION IN DIASTOLE: 4.85 CM (ref 3.5–6)
LEFT VENTRICULAR MASS: 186.19 G
LV LATERAL E/E' RATIO: 5.22 M/S
LV SEPTAL E/E' RATIO: 6.71 M/S
MV PEAK A VEL: 0.61 M/S
MV PEAK E VEL: 0.47 M/S
MV STENOSIS PRESSURE HALF TIME: 60.26 MS
MV VALVE AREA P 1/2 METHOD: 3.65 CM2
OHS CV CPX 1 MINUTE RECOVERY HEART RATE: 126 BPM
OHS CV CPX 85 PERCENT MAX PREDICTED HEART RATE MALE: 123
OHS CV CPX MAX PREDICTED HEART RATE: 145
OHS CV CPX PATIENT IS FEMALE: 0
OHS CV CPX PATIENT IS MALE: 1
OHS CV CPX PEAK DIASTOLIC BLOOD PRESSURE: 29 MMHG
OHS CV CPX PEAK HEAR RATE: 133 BPM
OHS CV CPX PEAK RATE PRESSURE PRODUCT: ABNORMAL
OHS CV CPX PEAK SYSTOLIC BLOOD PRESSURE: 210 MMHG
OHS CV CPX PERCENT MAX PREDICTED HEART RATE ACHIEVED: 92
OHS CV CPX RATE PRESSURE PRODUCT PRESENTING: ABNORMAL
PISA TR MAX VEL: 2.5 M/S
PULM VEIN S/D RATIO: 1.41
PV PEAK D VEL: 0.39 M/S
PV PEAK S VEL: 0.55 M/S
RA MAJOR: 4.91 CM
RA PRESSURE: 3 MMHG
RA WIDTH: 5.11 CM
RIGHT VENTRICULAR END-DIASTOLIC DIMENSION: 4.71 CM
SINUS: 3.56 CM
STJ: 3.23 CM
SYSTOLIC BLOOD PRESSURE: 142 MMHG
TDI LATERAL: 0.09 M/S
TDI SEPTAL: 0.07 M/S
TDI: 0.08 M/S
TR MAX PG: 25 MMHG
TRICUSPID ANNULAR PLANE SYSTOLIC EXCURSION: 2.98 CM
TV REST PULMONARY ARTERY PRESSURE: 28 MMHG

## 2020-09-29 ENCOUNTER — PATIENT MESSAGE (OUTPATIENT)
Dept: OTHER | Facility: OTHER | Age: 75
End: 2020-09-29

## 2020-11-02 ENCOUNTER — INFUSION (OUTPATIENT)
Dept: INFUSION THERAPY | Facility: HOSPITAL | Age: 75
End: 2020-11-02
Attending: INTERNAL MEDICINE
Payer: MEDICARE

## 2020-11-02 DIAGNOSIS — C16.0 GE JUNCTION CARCINOMA: Primary | ICD-10-CM

## 2020-11-02 PROCEDURE — 25000003 PHARM REV CODE 250: Mod: HCNC,PN | Performed by: INTERNAL MEDICINE

## 2020-11-02 PROCEDURE — 96523 IRRIG DRUG DELIVERY DEVICE: CPT | Mod: HCNC,PN

## 2020-11-02 PROCEDURE — A4216 STERILE WATER/SALINE, 10 ML: HCPCS | Mod: HCNC,PN | Performed by: INTERNAL MEDICINE

## 2020-11-02 RX ORDER — SODIUM CHLORIDE 0.9 % (FLUSH) 0.9 %
10 SYRINGE (ML) INJECTION
Status: CANCELLED | OUTPATIENT
Start: 2020-11-02

## 2020-11-02 RX ORDER — HEPARIN 100 UNIT/ML
500 SYRINGE INTRAVENOUS
Status: DISCONTINUED | OUTPATIENT
Start: 2020-11-02 | End: 2020-11-02 | Stop reason: HOSPADM

## 2020-11-02 RX ORDER — SODIUM CHLORIDE 0.9 % (FLUSH) 0.9 %
10 SYRINGE (ML) INJECTION
Status: COMPLETED | OUTPATIENT
Start: 2020-11-02 | End: 2020-11-02

## 2020-11-02 RX ORDER — HEPARIN 100 UNIT/ML
500 SYRINGE INTRAVENOUS
Status: CANCELLED | OUTPATIENT
Start: 2020-11-02

## 2020-11-02 RX ADMIN — Medication 10 ML: at 12:11

## 2020-11-06 RX ORDER — GLIMEPIRIDE 2 MG/1
TABLET ORAL
Qty: 360 TABLET | Refills: 1 | Status: SHIPPED | OUTPATIENT
Start: 2020-11-06 | End: 2021-01-01 | Stop reason: SDUPTHER

## 2020-11-06 NOTE — PROGRESS NOTES
Refill Authorization Note   Stu Garcia is requesting a refill authorization.  Brief assessment and rationale for refill: Approve     Medication Therapy Plan: CDMR.     Medication reconciliation completed: No   Comments:   Orders Placed This Encounter    glimepiride (AMARYL) 2 MG tablet      Requested Prescriptions   Signed Prescriptions Disp Refills    glimepiride (AMARYL) 2 MG tablet 360 tablet 1     Sig: TAKE 2 TABLETS TWICE DAILY       Antihyperglycemics Protocol Passed - 11/6/2020  1:38 PM        Passed - Recent visit in the past year        Passed - Normal creatinine in past 6 months     Lab Results   Component Value Date    CREATININE 1.1 08/18/2020              Passed - Had HBA1C in past 6 months     Hemoglobin A1C   Date Value Ref Range Status   08/11/2020 8.0 (H) 4.0 - 5.6 % Final     Comment:     ADA Screening Guidelines:  5.7-6.4%  Consistent with prediabetes  >or=6.5%  Consistent with diabetes  High levels of fetal hemoglobin interfere with the HbA1C  assay. Heterozygous hemoglobin variants (HbS, HgC, etc)do  not significantly interfere with this assay.   However, presence of multiple variants may affect accuracy.     02/07/2020 7.2 (H) 4.0 - 5.6 % Final     Comment:     ADA Screening Guidelines:  5.7-6.4%  Consistent with prediabetes  >or=6.5%  Consistent with diabetes  High levels of fetal hemoglobin interfere with the HbA1C  assay. Heterozygous hemoglobin variants (HbS, HgC, etc)do  not significantly interfere with this assay.   However, presence of multiple variants may affect accuracy.     11/07/2019 7.9 (H) 4.0 - 5.6 % Final     Comment:     ADA Screening Guidelines:  5.7-6.4%  Consistent with prediabetes  >or=6.5%  Consistent with diabetes  High levels of fetal hemoglobin interfere with the HbA1C  assay. Heterozygous hemoglobin variants (HbS, HgC, etc)do  not significantly interfere with this assay.   However, presence of multiple variants may affect accuracy.              Endocrinology:   Diabetes - Sulfonylureas Passed - 11/6/2020  1:38 PM        Passed - Patient is at least 18 years old        Passed - Office visit in past 12 months or future 90 days     Recent Outpatient Visits            2 months ago GE junction carcinoma    [facility] Clinton Andrews MD    2 months ago Type 2 diabetes mellitus treated without insulin    [facility] Cade Juan MD    3 months ago Coronary artery disease involving native coronary artery of native heart with unstable angina pectoris    [facility] Eduard Cano MD    5 months ago Encounter for preventive health examination    [facility] Pilar Shields NP    5 months ago GE junction carcinoma    [facility] Clinton Andrews MD          Future Appointments              [place] 1 [place] [facility] [place]    In 2 weeks Cade Juan MD [facility]    In 1 month INJECTION SCHEDULE, [facility]    In 2 months [facility]    In 3 months LAB, [place] [facility]    In 3 months Clinton Andrews MD [facility], [facility]                Passed - HBA1C is 8 or below and within 180 days     Hemoglobin A1C   Date Value Ref Range Status   08/11/2020 8.0 (H) 4.0 - 5.6 % Final     Comment:     ADA Screening Guidelines:  5.7-6.4%  Consistent with prediabetes  >or=6.5%  Consistent with diabetes  High levels of fetal hemoglobin interfere with the HbA1C  assay. Heterozygous hemoglobin variants (HbS, HgC, etc)do  not significantly interfere with this assay.   However, presence of multiple variants may affect accuracy.     02/07/2020 7.2 (H) 4.0 - 5.6 % Final     Comment:     ADA Screening Guidelines:  5.7-6.4%  Consistent with  prediabetes  >or=6.5%  Consistent with diabetes  High levels of fetal hemoglobin interfere with the HbA1C  assay. Heterozygous hemoglobin variants (HbS, HgC, etc)do  not significantly interfere with this assay.   However, presence of multiple variants may affect accuracy.     11/07/2019 7.9 (H) 4.0 - 5.6 % Final     Comment:     ADA Screening Guidelines:  5.7-6.4%  Consistent with prediabetes  >or=6.5%  Consistent with diabetes  High levels of fetal hemoglobin interfere with the HbA1C  assay. Heterozygous hemoglobin variants (HbS, HgC, etc)do  not significantly interfere with this assay.   However, presence of multiple variants may affect accuracy.                Passed - Cr is 1.4 or below and within 360 days     Creatinine   Date Value Ref Range Status   08/18/2020 1.1 0.5 - 1.4 mg/dL Final   08/11/2020 1.0 0.5 - 1.4 mg/dL Final   05/14/2020 1.0 0.5 - 1.4 mg/dL Final              Passed - eGFR is 30 or above and within 360 days     eGFR if non    Date Value Ref Range Status   08/18/2020 >60 >60 mL/min/1.73 m^2 Final     Comment:     Calculation used to obtain the estimated glomerular filtration  rate (eGFR) is the CKD-EPI equation.      08/11/2020 >60.0 >60 mL/min/1.73 m^2 Final     Comment:     Calculation used to obtain the estimated glomerular filtration  rate (eGFR) is the CKD-EPI equation.      05/14/2020 >60 >60 mL/min/1.73 m^2 Final     Comment:     Calculation used to obtain the estimated glomerular filtration  rate (eGFR) is the CKD-EPI equation.        eGFR if    Date Value Ref Range Status   08/18/2020 >60 >60 mL/min/1.73 m^2 Final   08/11/2020 >60.0 >60 mL/min/1.73 m^2 Final   05/14/2020 >60 >60 mL/min/1.73 m^2 Final                  Appointments  past 12m or future 3m with PCP    Date Provider   Last Visit   8/18/2020 Cade Juan MD   Next Visit   11/24/2020 Cade Juan MD   ED visits in past 90 days: 0     Note composed:1:52 PM 11/06/2020

## 2020-11-06 NOTE — TELEPHONE ENCOUNTER
No new care gaps identified.  Powered by [employer]. Reference number: [id number]. 11/06/2020 1:39:04 PM CST

## 2020-11-17 ENCOUNTER — LAB VISIT (OUTPATIENT)
Dept: LAB | Facility: HOSPITAL | Age: 75
End: 2020-11-17
Attending: FAMILY MEDICINE
Payer: MEDICARE

## 2020-11-17 DIAGNOSIS — E11.9 TYPE 2 DIABETES MELLITUS TREATED WITHOUT INSULIN: ICD-10-CM

## 2020-11-17 LAB
ALBUMIN SERPL BCP-MCNC: 3.6 G/DL (ref 3.5–5.2)
ALP SERPL-CCNC: 122 U/L (ref 55–135)
ALT SERPL W/O P-5'-P-CCNC: 12 U/L (ref 10–44)
ANION GAP SERPL CALC-SCNC: 6 MMOL/L (ref 8–16)
AST SERPL-CCNC: 14 U/L (ref 10–40)
BILIRUB SERPL-MCNC: 0.6 MG/DL (ref 0.1–1)
BUN SERPL-MCNC: 15 MG/DL (ref 8–23)
CALCIUM SERPL-MCNC: 9.3 MG/DL (ref 8.7–10.5)
CHLORIDE SERPL-SCNC: 105 MMOL/L (ref 95–110)
CO2 SERPL-SCNC: 28 MMOL/L (ref 23–29)
CREAT SERPL-MCNC: 1 MG/DL (ref 0.5–1.4)
EST. GFR  (AFRICAN AMERICAN): >60 ML/MIN/1.73 M^2
EST. GFR  (NON AFRICAN AMERICAN): >60 ML/MIN/1.73 M^2
ESTIMATED AVG GLUCOSE: 197 MG/DL (ref 68–131)
GLUCOSE SERPL-MCNC: 177 MG/DL (ref 70–110)
HBA1C MFR BLD HPLC: 8.5 % (ref 4–5.6)
POTASSIUM SERPL-SCNC: 4.9 MMOL/L (ref 3.5–5.1)
PROT SERPL-MCNC: 6.7 G/DL (ref 6–8.4)
SODIUM SERPL-SCNC: 139 MMOL/L (ref 136–145)

## 2020-11-17 PROCEDURE — 36415 COLL VENOUS BLD VENIPUNCTURE: CPT | Mod: HCNC,PO

## 2020-11-17 PROCEDURE — 83036 HEMOGLOBIN GLYCOSYLATED A1C: CPT | Mod: HCNC

## 2020-11-17 PROCEDURE — 80053 COMPREHEN METABOLIC PANEL: CPT | Mod: HCNC

## 2020-11-24 ENCOUNTER — OFFICE VISIT (OUTPATIENT)
Dept: FAMILY MEDICINE | Facility: CLINIC | Age: 75
End: 2020-11-24
Payer: MEDICARE

## 2020-11-24 VITALS
SYSTOLIC BLOOD PRESSURE: 130 MMHG | HEIGHT: 76 IN | TEMPERATURE: 98 F | HEART RATE: 77 BPM | BODY MASS INDEX: 25.4 KG/M2 | DIASTOLIC BLOOD PRESSURE: 72 MMHG | OXYGEN SATURATION: 98 % | WEIGHT: 208.56 LBS

## 2020-11-24 DIAGNOSIS — C16.0 GE JUNCTION CARCINOMA: ICD-10-CM

## 2020-11-24 DIAGNOSIS — E11.9 TYPE 2 DIABETES MELLITUS TREATED WITHOUT INSULIN: Primary | ICD-10-CM

## 2020-11-24 DIAGNOSIS — I25.110 CORONARY ARTERY DISEASE INVOLVING NATIVE CORONARY ARTERY OF NATIVE HEART WITH UNSTABLE ANGINA PECTORIS: ICD-10-CM

## 2020-11-24 DIAGNOSIS — E78.2 MIXED HYPERLIPIDEMIA: ICD-10-CM

## 2020-11-24 DIAGNOSIS — I48.0 PAF (PAROXYSMAL ATRIAL FIBRILLATION): ICD-10-CM

## 2020-11-24 PROCEDURE — 99214 OFFICE O/P EST MOD 30 MIN: CPT | Mod: HCNC,25,S$GLB, | Performed by: FAMILY MEDICINE

## 2020-11-24 PROCEDURE — 3052F HG A1C>EQUAL 8.0%<EQUAL 9.0%: CPT | Mod: HCNC,CPTII,S$GLB, | Performed by: FAMILY MEDICINE

## 2020-11-24 PROCEDURE — 1101F PR PT FALLS ASSESS DOC 0-1 FALLS W/OUT INJ PAST YR: ICD-10-PCS | Mod: HCNC,CPTII,S$GLB, | Performed by: FAMILY MEDICINE

## 2020-11-24 PROCEDURE — 99214 PR OFFICE/OUTPT VISIT, EST, LEVL IV, 30-39 MIN: ICD-10-PCS | Mod: HCNC,25,S$GLB, | Performed by: FAMILY MEDICINE

## 2020-11-24 PROCEDURE — 3075F SYST BP GE 130 - 139MM HG: CPT | Mod: HCNC,CPTII,S$GLB, | Performed by: FAMILY MEDICINE

## 2020-11-24 PROCEDURE — 1126F PR PAIN SEVERITY QUANTIFIED, NO PAIN PRESENT: ICD-10-PCS | Mod: HCNC,S$GLB,, | Performed by: FAMILY MEDICINE

## 2020-11-24 PROCEDURE — 99499 UNLISTED E&M SERVICE: CPT | Mod: S$GLB,,, | Performed by: FAMILY MEDICINE

## 2020-11-24 PROCEDURE — 3288F PR FALLS RISK ASSESSMENT DOCUMENTED: ICD-10-PCS | Mod: HCNC,CPTII,S$GLB, | Performed by: FAMILY MEDICINE

## 2020-11-24 PROCEDURE — 3288F FALL RISK ASSESSMENT DOCD: CPT | Mod: HCNC,CPTII,S$GLB, | Performed by: FAMILY MEDICINE

## 2020-11-24 PROCEDURE — 1159F MED LIST DOCD IN RCRD: CPT | Mod: HCNC,S$GLB,, | Performed by: FAMILY MEDICINE

## 2020-11-24 PROCEDURE — G0008 ADMIN INFLUENZA VIRUS VAC: HCPCS | Mod: HCNC,S$GLB,, | Performed by: FAMILY MEDICINE

## 2020-11-24 PROCEDURE — 1126F AMNT PAIN NOTED NONE PRSNT: CPT | Mod: HCNC,S$GLB,, | Performed by: FAMILY MEDICINE

## 2020-11-24 PROCEDURE — 99999 PR PBB SHADOW E&M-EST. PATIENT-LVL V: CPT | Mod: PBBFAC,HCNC,, | Performed by: FAMILY MEDICINE

## 2020-11-24 PROCEDURE — 1159F PR MEDICATION LIST DOCUMENTED IN MEDICAL RECORD: ICD-10-PCS | Mod: HCNC,S$GLB,, | Performed by: FAMILY MEDICINE

## 2020-11-24 PROCEDURE — 99999 PR PBB SHADOW E&M-EST. PATIENT-LVL V: ICD-10-PCS | Mod: PBBFAC,HCNC,, | Performed by: FAMILY MEDICINE

## 2020-11-24 PROCEDURE — 3052F PR MOST RECENT HEMOGLOBIN A1C LEVEL 8.0 - < 9.0%: ICD-10-PCS | Mod: HCNC,CPTII,S$GLB, | Performed by: FAMILY MEDICINE

## 2020-11-24 PROCEDURE — 3078F PR MOST RECENT DIASTOLIC BLOOD PRESSURE < 80 MM HG: ICD-10-PCS | Mod: HCNC,CPTII,S$GLB, | Performed by: FAMILY MEDICINE

## 2020-11-24 PROCEDURE — 90694 FLU VACCINE - QUADRIVALENT - ADJUVANTED: ICD-10-PCS | Mod: HCNC,S$GLB,, | Performed by: FAMILY MEDICINE

## 2020-11-24 PROCEDURE — 3078F DIAST BP <80 MM HG: CPT | Mod: HCNC,CPTII,S$GLB, | Performed by: FAMILY MEDICINE

## 2020-11-24 PROCEDURE — 1101F PT FALLS ASSESS-DOCD LE1/YR: CPT | Mod: HCNC,CPTII,S$GLB, | Performed by: FAMILY MEDICINE

## 2020-11-24 PROCEDURE — 90694 VACC AIIV4 NO PRSRV 0.5ML IM: CPT | Mod: HCNC,S$GLB,, | Performed by: FAMILY MEDICINE

## 2020-11-24 PROCEDURE — 3075F PR MOST RECENT SYSTOLIC BLOOD PRESS GE 130-139MM HG: ICD-10-PCS | Mod: HCNC,CPTII,S$GLB, | Performed by: FAMILY MEDICINE

## 2020-11-24 PROCEDURE — 99499 RISK ADDL DX/OHS AUDIT: ICD-10-PCS | Mod: S$GLB,,, | Performed by: FAMILY MEDICINE

## 2020-11-24 PROCEDURE — G0008 FLU VACCINE - QUADRIVALENT - ADJUVANTED: ICD-10-PCS | Mod: HCNC,S$GLB,, | Performed by: FAMILY MEDICINE

## 2020-11-24 NOTE — PROGRESS NOTES
Subjective:       Patient ID: Stu Garcia is a 75 y.o. male.    Chief Complaint: 3 month f/u      Here for 6 month f/u on chronic health issues    INVASIVE MODERATELY DIFFERENTIATED ADENOCARCINOMA - esophagectomy 7/2/2019 uncomplicated' following with oncology tomorow  S/p Anterior cervical decompression fusion done on 7/6/2017 by Dr. Jeter - neck stable; swallowing stable  Lumbar fracture - back pain only occurring at night periodically; this is table  Diabetes with neuropathy- following diabetic diet; He is taking glimepiride 4mg BID. Metformin XR 500mg QD (insurance would not authorize fill eventhough we increased the dose). BGs 120-130s. No hypoglycemia.   CAD - s/p 4 stents; following with Dr. Maldonado  Paroxysmal afib, HTN - taking amiodarone, xarelto; metoprolol 50mg BID  HLD - taking Lipitor 80mg daily            Follow-up  Associated symptoms include fatigue and neck pain. Pertinent negatives include no abdominal pain, arthralgias, chest pain, chills, coughing, fever, headaches, joint swelling, nausea, rash, sore throat, vomiting or weakness.   Diabetes  Pertinent negatives for hypoglycemia include no confusion, dizziness or headaches. Associated symptoms include fatigue. Pertinent negatives for diabetes include no chest pain, no polydipsia, no polyuria and no weakness.   Coronary Artery Disease  Pertinent negatives include no chest pain, chest tightness, dizziness, leg swelling, palpitations or shortness of breath.   Atrial Fibrillation  Symptoms are negative for chest pain, dizziness, palpitations, shortness of breath and weakness. Past medical history includes atrial fibrillation and CAD.       Past Medical History:   Diagnosis Date    Anticoagulant long-term use     xarelto,asa    Arthritis     Atrial fibrillation 2013    cardioverted    Bleb, lung     Cataract     OS    Colon polyp     Coronary artery disease     Coronary artery disease involving native coronary artery of native heart with  unstable angina pectoris 3/18/2018    Diabetes mellitus     Diabetes mellitus, type 2     Diverticulosis     Encounter for blood transfusion     General anesthetics causing adverse effect in therapeutic use     delayed emergence per patient's wife    GERD (gastroesophageal reflux disease)     HEARING LOSS     bilateral aids    Hemorrhoid     HLD (hyperlipidemia)     Hypertension     Iron deficiency anemia     Neuropathy of leg     Pneumonia due to other staphylococcus     Prostate cancer     prostate    Spontaneous pneumothorax     bilateral    Thyroid disease     tumors, non cancerous       Past Surgical History:   Procedure Laterality Date    CARDIOVERSION      CATARACT EXTRACTION      bilateral    CERVICAL SPINE FRACTURE SURGERY      c7 t1 ACDF     CHEST TUBE INSERTION Right     COLONOSCOPY      CORONARY STENT PLACEMENT      x 2    ENDOSCOPIC ULTRASOUND OF UPPER GASTROINTESTINAL TRACT N/A 3/13/2019    Procedure: ULTRASOUND, UPPER GI TRACT, ENDOSCOPIC;  Surgeon: Andrew Shirley MD;  Location: [address]);  Service: Endoscopy;  Laterality: N/A;    ESOPHAGOGASTRODUODENOSCOPY N/A 2/23/2019    Procedure: ESOPHAGOGASTRODUODENOSCOPY (EGD);  Surgeon: Jackeline Richards MD;  [facility];  Service: Endoscopy;  Laterality: N/A;    ESOPHAGOGASTRODUODENOSCOPY N/A 2/6/2020    Procedure: EGD (ESOPHAGOGASTRODUODENOSCOPY);  Surgeon: Andrea Kerns Jr., MD;  Location: [facility];  Service: Endoscopy;  Laterality: N/A;    HERNIA REPAIR      umbilical    INSERTION OF TUNNELED CENTRAL VENOUS CATHETER (CVC) WITH SUBCUTANEOUS PORT Right 4/4/2019    Procedure: TMAOVYSNV-NEQP-U-CATH (POWER PORT);  Surgeon: Nahum Zaidi MD;  Location: [facility];  Service: General;  Laterality: Right;    KNEE SURGERY Left 2017    open thoracotomy Left 1966    With pleurectomy    Pleurectomy Left 1966    For treatment of left pneumothorax    PROSTATECTOMY  2001    open    ROBOT-ASSISTED SURGICAL REMOVAL OF  ESOPHAGUS USING DA ROSALIA XI N/A 7/2/2019    Procedure: XI ROBOTIC ESOPHAGECTOMY;  Surgeon: Chad Milian MD;  Location: [facility] [address] [place] [address];  Service: General;  Laterality: N/A;    TUBE THORACOTOMY  1966    pneumothorax left--open       Review of patient's allergies indicates:   Allergen Reactions    Codeine Nausea And Vomiting       Social History     Socioeconomic History    Marital status:      Spouse name: Not on file    Number of children: 2    Years of education: Not on file    Highest education level: Not on file   Occupational History    Occupation:     Occupation: prior [employer]    Social Needs    Financial resource strain: Not hard at all    Food insecurity     Worry: Never true     Inability: Never true    Transportation needs     Medical: No     Non-medical: No   Tobacco Use    Smoking status: Former Smoker     Years: 20.00    Smokeless tobacco: Never Used    Tobacco comment: quit smoking in 1980's.   Substance and Sexual Activity    Alcohol use: Yes     Frequency: Monthly or less     Drinks per session: 1 or 2     Binge frequency: Never     Comment: 1 drink /month    Drug use: No    Sexual activity: Yes   Lifestyle    Physical activity     Days per week: 0 days     Minutes per session: Not on file    Stress: Very much   Relationships    Social connections     Talks on phone: More than three times a week     Gets together: Once a week     Attends Mu-ism service: Not on file     Active member of club or organization: No     Attends meetings of clubs or organizations: Patient refused     Relationship status:    Other Topics Concern    Not on file   Social History Narrative    From [place]       Current Outpatient Medications on File Prior to Visit   Medication Sig Dispense Refill    ACCU-CHEK SHASHANK PLUS TEST STRP Strp TEST ONCE DAILY 100 strip 3    atorvastatin (LIPITOR) 80 MG tablet TAKE 1 TABLET(80 MG) BY MOUTH EVERY EVENING 90 tablet 0     ferrous gluconate (FERGON) 324 MG tablet Take 1 tablet (324 mg total) by mouth daily with breakfast.      glimepiride (AMARYL) 2 MG tablet TAKE 2 TABLETS TWICE DAILY 360 tablet 1    lancets (ACCU-CHEK SOFTCLIX LANCETS) Misc 1 strip by Misc.(Non-Drug; Combo Route) route once daily. 100 each 3    lidocaine-prilocaine (EMLA) cream U UTD  0    megestrol (MEGACE) 400 mg/10 mL (40 mg/mL) Susp Take 10 mLs (400 mg total) by mouth 2 (two) times daily. 240 mL 3    metFORMIN (GLUCOPHAGE-XR) 500 MG ER 24hr tablet Take 1 tablet (500 mg total) by mouth 2 (two) times daily with meals. 180 tablet 3    metoprolol tartrate (LOPRESSOR) 50 MG tablet TAKE ONE TABLET BY MOUTH TWICE DAILY 60 tablet 11    multivitamin capsule Take 1 capsule by mouth once daily.      nitroGLYCERIN (NITROSTAT) 0.4 MG SL tablet DISSOLVE 1 TABLET UNDER THE TONGUE EVERY 5 MINUTES AS NEEDED FOR CHEST PAIN 100 tablet prn    omeprazole (PRILOSEC) 40 MG capsule TAKE 1 CAPSULE(40 MG) BY MOUTH EVERY DAY 90 capsule 3    prochlorperazine (COMPAZINE) 10 MG tablet TAKE 1 TABLET BY MOUTH EVERY 6 HOURS AS NEEDED FOR NAUSEA OR VOMITING 60 tablet 0    rivaroxaban (XARELTO) 20 mg Tab Take 1 tablet (20 mg total) by mouth daily with dinner or evening meal. 30 tablet 11    vit C,Y-Ka-bmymr-lutein-zeaxan (PRESERVISION AREDS 2) 336-965-57-1 mg-unit-mg-mg Cap Take 1 tablet by mouth once daily.      aspirin (ECOTRIN) 81 MG EC tablet Take 1 tablet (81 mg total) by mouth once daily. May take over the counter 30 tablet 12    glucagon, human recombinant, (GLUCAGON EMERGENCY KIT, HUMAN,) 1 mg SolR Inject 1 mg into the muscle as needed. 1 each 2     No current facility-administered medications on file prior to visit.        Family History   Problem Relation Age of Onset    Mental illness Mother         dementia    Coronary artery disease Father     Cancer Father         stomach with mets    Diabetes Brother        Review of Systems   Constitutional: Positive for fatigue.  "Negative for activity change, appetite change, chills, fever and unexpected weight change.   HENT: Positive for hearing loss, rhinorrhea and trouble swallowing. Negative for sore throat.    Eyes: Negative for pain, discharge and visual disturbance.   Respiratory: Negative for cough, chest tightness, shortness of breath and wheezing.    Cardiovascular: Negative for chest pain, palpitations and leg swelling.   Gastrointestinal: Negative for abdominal pain, blood in stool, constipation, diarrhea, nausea and vomiting.   Endocrine: Negative for polydipsia and polyuria.   Genitourinary: Negative for difficulty urinating, dysuria, hematuria and urgency.   Musculoskeletal: Positive for back pain and neck pain. Negative for arthralgias, gait problem and joint swelling.   Skin: Negative for rash and wound.   Neurological: Negative for dizziness, weakness, light-headedness and headaches.   Hematological: Negative for adenopathy.   Psychiatric/Behavioral: Negative for confusion and dysphoric mood.       Objective:      /72 (BP Location: Right arm, Patient Position: Sitting)   Pulse 77   Temp 98.1 °F (36.7 °C) (Oral)   Ht 6' 4" (1.93 m)   Wt 94.6 kg (208 lb 8.9 oz)   SpO2 98%   BMI 25.39 kg/m²   Physical Exam  Constitutional:       General: He is not in acute distress.     Appearance: Normal appearance. He is well-developed.   HENT:      Head: Normocephalic and atraumatic.      Right Ear: External ear normal.      Left Ear: External ear normal.      Mouth/Throat:      Pharynx: Uvula midline. No oropharyngeal exudate.   Eyes:      General: Lids are normal.      Conjunctiva/sclera: Conjunctivae normal.      Pupils: Pupils are equal, round, and reactive to light.   Neck:      Musculoskeletal: Full passive range of motion without pain, normal range of motion and neck supple.      Thyroid: No thyroid mass or thyromegaly.      Trachea: Phonation normal.   Cardiovascular:      Rate and Rhythm: Normal rate and regular " rhythm.      Heart sounds: Normal heart sounds. No murmur. No friction rub. No gallop.    Pulmonary:      Effort: Pulmonary effort is normal. No respiratory distress.      Breath sounds: Normal breath sounds. No wheezing or rales.   Abdominal:      General: Bowel sounds are normal.      Palpations: Abdomen is soft.      Tenderness: There is no abdominal tenderness.   Musculoskeletal: Normal range of motion.   Lymphadenopathy:      Cervical: No cervical adenopathy.   Skin:     General: Skin is warm and dry.   Neurological:      Mental Status: He is alert and oriented to person, place, and time.      Cranial Nerves: No cranial nerve deficit.      Coordination: Coordination normal.   Psychiatric:         Speech: Speech normal.         Behavior: Behavior normal.         Thought Content: Thought content normal.         Judgment: Judgment normal.           Results for orders placed or performed in visit on 11/17/20   Comprehensive metabolic panel   Result Value Ref Range    Sodium 139 136 - 145 mmol/L    Potassium 4.9 3.5 - 5.1 mmol/L    Chloride 105 95 - 110 mmol/L    CO2 28 23 - 29 mmol/L    Glucose 177 (H) 70 - 110 mg/dL    BUN 15 8 - 23 mg/dL    Creatinine 1.0 0.5 - 1.4 mg/dL    Calcium 9.3 8.7 - 10.5 mg/dL    Total Protein 6.7 6.0 - 8.4 g/dL    Albumin 3.6 3.5 - 5.2 g/dL    Total Bilirubin 0.6 0.1 - 1.0 mg/dL    Alkaline Phosphatase 122 55 - 135 U/L    AST 14 10 - 40 U/L    ALT 12 10 - 44 U/L    Anion Gap 6 (L) 8 - 16 mmol/L    eGFR if African American >60.0 >60 mL/min/1.73 m^2    eGFR if non African American >60.0 >60 mL/min/1.73 m^2   Hemoglobin A1C   Result Value Ref Range    Hemoglobin A1C 8.5 (H) 4.0 - 5.6 %    Estimated Avg Glucose 197 (H) 68 - 131 mg/dL       Assessment:       1. Type 2 diabetes mellitus treated without insulin    2. Mixed hyperlipidemia    3. Coronary artery disease involving native coronary artery of native heart with unstable angina pectoris    4. PAF (paroxysmal atrial fibrillation)    5.  GE junction carcinoma        Plan:       Type 2 diabetes mellitus treated without insulin  -     Comprehensive Metabolic Panel; Future; Expected date: 02/22/2021  -     Hemoglobin A1C; Future; Expected date: 02/22/2021    Mixed hyperlipidemia    Coronary artery disease involving native coronary artery of native heart with unstable angina pectoris    PAF (paroxysmal atrial fibrillation)    GE junction carcinoma    Other orders  -     Influenza - Quadrivalent (Adjuvanted)        Diabetes stable but worsened since insurance would not let him fill the metformin at the increased dose  continue oncology follow-up  Continue protonix BID  F/u with cardiology as planned   Continue Glimepiride 4mg BID, increase to metformin XR 500mg BID as previously advised  Continue other present meds  Counseled on regular exercise, maintenance of a healthy weight, balanced diet rich in fruits/vegetables and lean protein, and avoidance of unhealthy habits like smoking and excessive alcohol intake.  Flu shot  shingrix in 3 months  F/u 3 months with labs

## 2020-12-11 ENCOUNTER — PATIENT MESSAGE (OUTPATIENT)
Dept: OTHER | Facility: OTHER | Age: 75
End: 2020-12-11

## 2020-12-14 ENCOUNTER — INFUSION (OUTPATIENT)
Dept: INFUSION THERAPY | Facility: HOSPITAL | Age: 75
End: 2020-12-14
Attending: INTERNAL MEDICINE
Payer: MEDICARE

## 2020-12-14 DIAGNOSIS — C16.0 GE JUNCTION CARCINOMA: Primary | ICD-10-CM

## 2020-12-14 PROCEDURE — 63600175 PHARM REV CODE 636 W HCPCS: Mod: HCNC,PN | Performed by: INTERNAL MEDICINE

## 2020-12-14 PROCEDURE — A4216 STERILE WATER/SALINE, 10 ML: HCPCS | Mod: HCNC,PN | Performed by: INTERNAL MEDICINE

## 2020-12-14 PROCEDURE — 96523 IRRIG DRUG DELIVERY DEVICE: CPT | Mod: HCNC,PN

## 2020-12-14 PROCEDURE — 25000003 PHARM REV CODE 250: Mod: HCNC,PN | Performed by: INTERNAL MEDICINE

## 2020-12-14 RX ORDER — HEPARIN 100 UNIT/ML
500 SYRINGE INTRAVENOUS
Status: COMPLETED | OUTPATIENT
Start: 2020-12-14 | End: 2020-12-14

## 2020-12-14 RX ORDER — HEPARIN 100 UNIT/ML
500 SYRINGE INTRAVENOUS
Status: CANCELLED | OUTPATIENT
Start: 2020-12-14

## 2020-12-14 RX ORDER — SODIUM CHLORIDE 0.9 % (FLUSH) 0.9 %
10 SYRINGE (ML) INJECTION
Status: COMPLETED | OUTPATIENT
Start: 2020-12-14 | End: 2020-12-14

## 2020-12-14 RX ORDER — SODIUM CHLORIDE 0.9 % (FLUSH) 0.9 %
10 SYRINGE (ML) INJECTION
Status: CANCELLED | OUTPATIENT
Start: 2020-12-14

## 2020-12-14 RX ADMIN — Medication 10 ML: at 12:12

## 2020-12-14 RX ADMIN — HEPARIN 500 UNITS: 100 SYRINGE at 12:12

## 2021-01-01 ENCOUNTER — HOSPITAL ENCOUNTER (OUTPATIENT)
Dept: RADIOLOGY | Facility: HOSPITAL | Age: 76
Discharge: HOME | End: 2021-09-30
Attending: NURSE PRACTITIONER
Payer: MEDICARE

## 2021-01-01 ENCOUNTER — OFFICE VISIT (OUTPATIENT)
Dept: FAMILY MEDICINE | Facility: CLINIC | Age: 76
End: 2021-01-01
Payer: MEDICARE

## 2021-01-01 ENCOUNTER — PATIENT MESSAGE (OUTPATIENT)
Dept: FAMILY MEDICINE | Facility: CLINIC | Age: 76
End: 2021-01-01
Payer: MEDICARE

## 2021-01-01 ENCOUNTER — PATIENT MESSAGE (OUTPATIENT)
Dept: FAMILY MEDICINE | Facility: CLINIC | Age: 76
End: 2021-01-01

## 2021-01-01 ENCOUNTER — INFUSION (OUTPATIENT)
Dept: INFUSION THERAPY | Facility: HOSPITAL | Age: 76
End: 2021-01-01
Attending: INTERNAL MEDICINE
Payer: MEDICARE

## 2021-01-01 ENCOUNTER — HOSPITAL ENCOUNTER (OUTPATIENT)
Dept: RADIOLOGY | Facility: HOSPITAL | Age: 76
Discharge: HOME | End: 2021-11-19
Attending: FAMILY MEDICINE
Payer: MEDICARE

## 2021-01-01 ENCOUNTER — TELEPHONE (OUTPATIENT)
Dept: FAMILY MEDICINE | Facility: CLINIC | Age: 76
End: 2021-01-01

## 2021-01-01 ENCOUNTER — TELEPHONE (OUTPATIENT)
Dept: HEMATOLOGY/ONCOLOGY | Facility: CLINIC | Age: 76
End: 2021-01-01
Payer: MEDICARE

## 2021-01-01 ENCOUNTER — HOSPITAL ENCOUNTER (OUTPATIENT)
Dept: RADIOLOGY | Facility: HOSPITAL | Age: 76
Discharge: HOME | End: 2021-09-02
Attending: PHYSICIAN ASSISTANT
Payer: MEDICARE

## 2021-01-01 ENCOUNTER — LAB VISIT (OUTPATIENT)
Dept: LAB | Facility: HOSPITAL | Age: 76
End: 2021-01-01
Attending: FAMILY MEDICINE
Payer: MEDICARE

## 2021-01-01 ENCOUNTER — OFFICE VISIT (OUTPATIENT)
Dept: UROLOGY | Facility: CLINIC | Age: 76
End: 2021-01-01
Payer: MEDICARE

## 2021-01-01 ENCOUNTER — NURSE TRIAGE (OUTPATIENT)
Dept: ADMINISTRATIVE | Facility: CLINIC | Age: 76
End: 2021-01-01

## 2021-01-01 ENCOUNTER — PATIENT MESSAGE (OUTPATIENT)
Dept: INFUSION THERAPY | Facility: HOSPITAL | Age: 76
End: 2021-01-01

## 2021-01-01 ENCOUNTER — TELEPHONE (OUTPATIENT)
Dept: FAMILY MEDICINE | Facility: CLINIC | Age: 76
End: 2021-01-01
Payer: MEDICARE

## 2021-01-01 VITALS
SYSTOLIC BLOOD PRESSURE: 122 MMHG | OXYGEN SATURATION: 99 % | HEART RATE: 71 BPM | DIASTOLIC BLOOD PRESSURE: 60 MMHG | BODY MASS INDEX: 23.17 KG/M2 | WEIGHT: 190.25 LBS | HEIGHT: 76 IN | TEMPERATURE: 99 F

## 2021-01-01 VITALS
RESPIRATION RATE: 18 BRPM | HEIGHT: 76 IN | TEMPERATURE: 98 F | OXYGEN SATURATION: 97 % | RESPIRATION RATE: 18 BRPM | DIASTOLIC BLOOD PRESSURE: 70 MMHG | BODY MASS INDEX: 21.45 KG/M2 | DIASTOLIC BLOOD PRESSURE: 70 MMHG | HEART RATE: 75 BPM | SYSTOLIC BLOOD PRESSURE: 124 MMHG | BODY MASS INDEX: 21.53 KG/M2 | TEMPERATURE: 98 F | WEIGHT: 176.13 LBS | WEIGHT: 176.81 LBS | OXYGEN SATURATION: 99 % | HEART RATE: 74 BPM | HEIGHT: 76 IN | SYSTOLIC BLOOD PRESSURE: 124 MMHG

## 2021-01-01 VITALS
BODY MASS INDEX: 23.49 KG/M2 | HEART RATE: 78 BPM | WEIGHT: 192.88 LBS | DIASTOLIC BLOOD PRESSURE: 62 MMHG | SYSTOLIC BLOOD PRESSURE: 126 MMHG | OXYGEN SATURATION: 98 % | HEIGHT: 76 IN

## 2021-01-01 VITALS — DIASTOLIC BLOOD PRESSURE: 78 MMHG | SYSTOLIC BLOOD PRESSURE: 138 MMHG | HEART RATE: 90 BPM | OXYGEN SATURATION: 96 %

## 2021-01-01 VITALS — BODY MASS INDEX: 21.45 KG/M2 | WEIGHT: 176.13 LBS | HEIGHT: 76 IN

## 2021-01-01 VITALS — TEMPERATURE: 98 F

## 2021-01-01 DIAGNOSIS — R10.9 BILATERAL FLANK PAIN: ICD-10-CM

## 2021-01-01 DIAGNOSIS — S33.5XXD LUMBAR SPRAIN, SUBSEQUENT ENCOUNTER: ICD-10-CM

## 2021-01-01 DIAGNOSIS — R09.A2 GLOBUS SENSATION: ICD-10-CM

## 2021-01-01 DIAGNOSIS — T45.1X5A CHEMOTHERAPY-INDUCED NEUROPATHY: Primary | ICD-10-CM

## 2021-01-01 DIAGNOSIS — C16.0 GE JUNCTION CARCINOMA: ICD-10-CM

## 2021-01-01 DIAGNOSIS — C16.0 GE JUNCTION CARCINOMA: Primary | ICD-10-CM

## 2021-01-01 DIAGNOSIS — R31.9 HEMATURIA OF UNKNOWN CAUSE: ICD-10-CM

## 2021-01-01 DIAGNOSIS — E11.9 TYPE 2 DIABETES MELLITUS TREATED WITHOUT INSULIN: ICD-10-CM

## 2021-01-01 DIAGNOSIS — F32.A DEPRESSION, UNSPECIFIED DEPRESSION TYPE: Primary | ICD-10-CM

## 2021-01-01 DIAGNOSIS — M54.9 DORSALGIA, UNSPECIFIED: ICD-10-CM

## 2021-01-01 DIAGNOSIS — Z85.46 HISTORY OF PROSTATE CANCER: ICD-10-CM

## 2021-01-01 DIAGNOSIS — R31.9 HEMATURIA, UNSPECIFIED TYPE: Primary | ICD-10-CM

## 2021-01-01 DIAGNOSIS — I25.110 CORONARY ARTERY DISEASE INVOLVING NATIVE CORONARY ARTERY OF NATIVE HEART WITH UNSTABLE ANGINA PECTORIS: ICD-10-CM

## 2021-01-01 DIAGNOSIS — T45.1X5A LEUKOPENIA DUE TO ANTINEOPLASTIC CHEMOTHERAPY: ICD-10-CM

## 2021-01-01 DIAGNOSIS — E78.2 MIXED HYPERLIPIDEMIA: ICD-10-CM

## 2021-01-01 DIAGNOSIS — I48.0 PAF (PAROXYSMAL ATRIAL FIBRILLATION): ICD-10-CM

## 2021-01-01 DIAGNOSIS — F32.A DEPRESSION, UNSPECIFIED DEPRESSION TYPE: ICD-10-CM

## 2021-01-01 DIAGNOSIS — R31.29 MICROSCOPIC HEMATURIA: Primary | ICD-10-CM

## 2021-01-01 DIAGNOSIS — G62.0 CHEMOTHERAPY-INDUCED NEUROPATHY: Primary | ICD-10-CM

## 2021-01-01 DIAGNOSIS — E11.9 TYPE 2 DIABETES MELLITUS TREATED WITHOUT INSULIN: Primary | ICD-10-CM

## 2021-01-01 DIAGNOSIS — D70.1 LEUKOPENIA DUE TO ANTINEOPLASTIC CHEMOTHERAPY: ICD-10-CM

## 2021-01-01 DIAGNOSIS — M54.50 MIDLINE LOW BACK PAIN WITHOUT SCIATICA, UNSPECIFIED CHRONICITY: Primary | ICD-10-CM

## 2021-01-01 DIAGNOSIS — R31.9 HEMATURIA, UNSPECIFIED TYPE: ICD-10-CM

## 2021-01-01 DIAGNOSIS — M47.816 LUMBAR SPONDYLOSIS: ICD-10-CM

## 2021-01-01 DIAGNOSIS — M54.50 ACUTE MIDLINE LOW BACK PAIN WITHOUT SCIATICA: ICD-10-CM

## 2021-01-01 DIAGNOSIS — R63.0 LOSS OF APPETITE: ICD-10-CM

## 2021-01-01 LAB
ALBUMIN SERPL BCP-MCNC: 3.9 G/DL (ref 3.5–5.2)
ALBUMIN/CREAT UR: 54.5 UG/MG (ref 0–30)
ALP SERPL-CCNC: 108 U/L (ref 55–135)
ALT SERPL W/O P-5'-P-CCNC: 8 U/L (ref 10–44)
ANION GAP SERPL CALC-SCNC: 5 MMOL/L (ref 8–16)
AST SERPL-CCNC: 17 U/L (ref 10–40)
BACTERIA #/AREA URNS HPF: NORMAL /HPF
BACTERIA UR CULT: NO GROWTH
BASOPHILS # BLD AUTO: 0.05 K/UL (ref 0–0.2)
BASOPHILS NFR BLD: 1 % (ref 0–1.9)
BILIRUB SERPL-MCNC: 0.8 MG/DL (ref 0.1–1)
BILIRUB SERPL-MCNC: ABNORMAL MG/DL
BILIRUB UR QL STRIP: ABNORMAL
BLOOD URINE, POC: ABNORMAL
BUN SERPL-MCNC: 20 MG/DL (ref 8–23)
CALCIUM SERPL-MCNC: 9.9 MG/DL (ref 8.7–10.5)
CHLORIDE SERPL-SCNC: 106 MMOL/L (ref 95–110)
CLARITY UR: ABNORMAL
CLARITY, POC UA: ABNORMAL
CO2 SERPL-SCNC: 29 MMOL/L (ref 23–29)
COLOR UR: ABNORMAL
COLOR, POC UA: YELLOW
CREAT SERPL-MCNC: 1.1 MG/DL (ref 0.5–1.4)
CREAT UR-MCNC: 134 MG/DL (ref 23–375)
DIFFERENTIAL METHOD: ABNORMAL
EOSINOPHIL # BLD AUTO: 0.2 K/UL (ref 0–0.5)
EOSINOPHIL NFR BLD: 3.8 % (ref 0–8)
ERYTHROCYTE [DISTWIDTH] IN BLOOD BY AUTOMATED COUNT: 14.7 % (ref 11.5–14.5)
EST. GFR  (AFRICAN AMERICAN): >60 ML/MIN/1.73 M^2
EST. GFR  (NON AFRICAN AMERICAN): >60 ML/MIN/1.73 M^2
ESTIMATED AVG GLUCOSE: 146 MG/DL (ref 68–131)
GLUCOSE SERPL-MCNC: 145 MG/DL (ref 70–110)
GLUCOSE UR QL STRIP: ABNORMAL
GLUCOSE UR QL STRIP: NEGATIVE
HBA1C MFR BLD: 6.7 % (ref 4–5.6)
HCT VFR BLD AUTO: 38.6 % (ref 40–54)
HGB BLD-MCNC: 12 G/DL (ref 14–18)
HGB UR QL STRIP: ABNORMAL
HYALINE CASTS #/AREA URNS LPF: 1 /LPF
IMM GRANULOCYTES # BLD AUTO: 0.01 K/UL (ref 0–0.04)
IMM GRANULOCYTES NFR BLD AUTO: 0.2 % (ref 0–0.5)
KETONES UR QL STRIP: ABNORMAL
KETONES UR QL STRIP: ABNORMAL
LDH SERPL L TO P-CCNC: 159 U/L (ref 110–260)
LEUKOCYTE ESTERASE UR QL STRIP: NEGATIVE
LEUKOCYTE ESTERASE URINE, POC: ABNORMAL
LYMPHOCYTES # BLD AUTO: 0.6 K/UL (ref 1–4.8)
LYMPHOCYTES NFR BLD: 12.4 % (ref 18–48)
MCH RBC QN AUTO: 29.9 PG (ref 27–31)
MCHC RBC AUTO-ENTMCNC: 31.1 G/DL (ref 32–36)
MCV RBC AUTO: 96 FL (ref 82–98)
MICROALBUMIN UR DL<=1MG/L-MCNC: 73 UG/ML
MICROSCOPIC COMMENT: NORMAL
MONOCYTES # BLD AUTO: 0.4 K/UL (ref 0.3–1)
MONOCYTES NFR BLD: 8.2 % (ref 4–15)
NEUTROPHILS # BLD AUTO: 3.7 K/UL (ref 1.8–7.7)
NEUTROPHILS NFR BLD: 74.4 % (ref 38–73)
NITRITE UR QL STRIP: NEGATIVE
NITRITE, POC UA: ABNORMAL
NRBC BLD-RTO: 0 /100 WBC
PH UR STRIP: 6 [PH] (ref 5–8)
PH, POC UA: 7
PLATELET # BLD AUTO: 130 K/UL (ref 150–450)
PMV BLD AUTO: 11.4 FL (ref 9.2–12.9)
POTASSIUM SERPL-SCNC: 4.9 MMOL/L (ref 3.5–5.1)
PROT SERPL-MCNC: 7 G/DL (ref 6–8.4)
PROT UR QL STRIP: ABNORMAL
PROTEIN, POC: 30
RBC # BLD AUTO: 4.01 M/UL (ref 4.6–6.2)
RBC #/AREA URNS HPF: NORMAL /HPF (ref 0–4)
SODIUM SERPL-SCNC: 140 MMOL/L (ref 136–145)
SP GR UR STRIP: >=1.03 (ref 1–1.03)
SPECIFIC GRAVITY, POC UA: 1.01
SQUAMOUS #/AREA URNS HPF: 1 /HPF
URN SPEC COLLECT METH UR: ABNORMAL
UROBILINOGEN, POC UA: 0.2
WBC # BLD AUTO: 4.99 K/UL (ref 3.9–12.7)
WBC #/AREA URNS HPF: 0 /HPF (ref 0–5)

## 2021-01-01 PROCEDURE — 99999 PR PBB SHADOW E&M-EST. PATIENT-LVL IV: ICD-10-PCS | Mod: PBBFAC,HCNC,, | Performed by: NURSE PRACTITIONER

## 2021-01-01 PROCEDURE — 71260 CT THORAX DX C+: CPT | Mod: TC,PO

## 2021-01-01 PROCEDURE — 3078F DIAST BP <80 MM HG: CPT | Mod: HCNC,CPTII,S$GLB, | Performed by: NURSE PRACTITIONER

## 2021-01-01 PROCEDURE — 99214 PR OFFICE/OUTPT VISIT, EST, LEVL IV, 30-39 MIN: ICD-10-PCS | Mod: S$GLB,,, | Performed by: FAMILY MEDICINE

## 2021-01-01 PROCEDURE — 3074F SYST BP LT 130 MM HG: CPT | Mod: HCNC,CPTII,S$GLB, | Performed by: FAMILY MEDICINE

## 2021-01-01 PROCEDURE — 99999 PR PBB SHADOW E&M-EST. PATIENT-LVL IV: ICD-10-PCS | Mod: PBBFAC,,, | Performed by: FAMILY MEDICINE

## 2021-01-01 PROCEDURE — 3288F FALL RISK ASSESSMENT DOCD: CPT | Mod: HCNC,CPTII,S$GLB, | Performed by: NURSE PRACTITIONER

## 2021-01-01 PROCEDURE — 74176 CT ABD & PELVIS W/O CONTRAST: CPT | Mod: TC,HCNC,PO

## 2021-01-01 PROCEDURE — 99214 OFFICE O/P EST MOD 30 MIN: CPT | Mod: S$GLB,,, | Performed by: PHYSICIAN ASSISTANT

## 2021-01-01 PROCEDURE — 74177 CT ABD & PELVIS W/CONTRAST: CPT | Mod: 26,,, | Performed by: RADIOLOGY

## 2021-01-01 PROCEDURE — 99214 PR OFFICE/OUTPT VISIT, EST, LEVL IV, 30-39 MIN: ICD-10-PCS | Mod: HCNC,S$GLB,, | Performed by: FAMILY MEDICINE

## 2021-01-01 PROCEDURE — 70491 CT SOFT TISSUE NECK W/DYE: CPT | Mod: 26,,, | Performed by: RADIOLOGY

## 2021-01-01 PROCEDURE — 3078F PR MOST RECENT DIASTOLIC BLOOD PRESSURE < 80 MM HG: ICD-10-PCS | Mod: HCNC,CPTII,S$GLB, | Performed by: FAMILY MEDICINE

## 2021-01-01 PROCEDURE — 74176 CT ABD & PELVIS W/O CONTRAST: CPT | Mod: 26,HCNC,, | Performed by: RADIOLOGY

## 2021-01-01 PROCEDURE — 3288F FALL RISK ASSESSMENT DOCD: CPT | Mod: CPTII,S$GLB,, | Performed by: FAMILY MEDICINE

## 2021-01-01 PROCEDURE — G0008 FLU VACCINE - QUADRIVALENT - ADJUVANTED: ICD-10-PCS | Mod: S$GLB,,, | Performed by: FAMILY MEDICINE

## 2021-01-01 PROCEDURE — 99214 PR OFFICE/OUTPT VISIT, EST, LEVL IV, 30-39 MIN: ICD-10-PCS | Mod: S$GLB,,, | Performed by: PHYSICIAN ASSISTANT

## 2021-01-01 PROCEDURE — 99999 PR PBB SHADOW E&M-EST. PATIENT-LVL IV: ICD-10-PCS | Mod: PBBFAC,HCNC,, | Performed by: UROLOGY

## 2021-01-01 PROCEDURE — 82570 ASSAY OF URINE CREATININE: CPT | Mod: HCNC | Performed by: FAMILY MEDICINE

## 2021-01-01 PROCEDURE — 87086 URINE CULTURE/COLONY COUNT: CPT | Mod: HCNC | Performed by: NURSE PRACTITIONER

## 2021-01-01 PROCEDURE — 96523 IRRIG DRUG DELIVERY DEVICE: CPT | Mod: HCNC,PN

## 2021-01-01 PROCEDURE — 74177 CT CHEST ABDOMEN PELVIS WITH CONTRAST (XPD): ICD-10-PCS | Mod: 26,,, | Performed by: RADIOLOGY

## 2021-01-01 PROCEDURE — 99999 PR PBB SHADOW E&M-EST. PATIENT-LVL III: CPT | Mod: PBBFAC,,, | Performed by: PHYSICIAN ASSISTANT

## 2021-01-01 PROCEDURE — 99203 PR OFFICE/OUTPT VISIT, NEW, LEVL III, 30-44 MIN: ICD-10-PCS | Mod: HCNC,S$GLB,, | Performed by: UROLOGY

## 2021-01-01 PROCEDURE — 83036 HEMOGLOBIN GLYCOSYLATED A1C: CPT | Performed by: FAMILY MEDICINE

## 2021-01-01 PROCEDURE — 99214 OFFICE O/P EST MOD 30 MIN: CPT | Mod: HCNC,S$GLB,, | Performed by: NURSE PRACTITIONER

## 2021-01-01 PROCEDURE — 1125F PR PAIN SEVERITY QUANTIFIED, PAIN PRESENT: ICD-10-PCS | Mod: HCNC,CPTII,S$GLB, | Performed by: FAMILY MEDICINE

## 2021-01-01 PROCEDURE — 81000 URINALYSIS NONAUTO W/SCOPE: CPT | Mod: HCNC,PO | Performed by: NURSE PRACTITIONER

## 2021-01-01 PROCEDURE — 3075F SYST BP GE 130 - 139MM HG: CPT | Mod: CPTII,S$GLB,, | Performed by: PHYSICIAN ASSISTANT

## 2021-01-01 PROCEDURE — 99999 PR PBB SHADOW E&M-EST. PATIENT-LVL III: ICD-10-PCS | Mod: PBBFAC,,, | Performed by: FAMILY MEDICINE

## 2021-01-01 PROCEDURE — 1159F MED LIST DOCD IN RCRD: CPT | Mod: HCNC,CPTII,S$GLB, | Performed by: NURSE PRACTITIONER

## 2021-01-01 PROCEDURE — 85025 COMPLETE CBC W/AUTO DIFF WBC: CPT | Performed by: FAMILY MEDICINE

## 2021-01-01 PROCEDURE — 3052F HG A1C>EQUAL 8.0%<EQUAL 9.0%: CPT | Mod: HCNC,CPTII,S$GLB, | Performed by: FAMILY MEDICINE

## 2021-01-01 PROCEDURE — 1160F PR REVIEW ALL MEDS BY PRESCRIBER/CLIN PHARMACIST DOCUMENTED: ICD-10-PCS | Mod: CPTII,S$GLB,, | Performed by: PHYSICIAN ASSISTANT

## 2021-01-01 PROCEDURE — 99999 PR PBB SHADOW E&M-EST. PATIENT-LVL IV: CPT | Mod: PBBFAC,HCNC,, | Performed by: FAMILY MEDICINE

## 2021-01-01 PROCEDURE — 1159F MED LIST DOCD IN RCRD: CPT | Mod: HCNC,CPTII,S$GLB, | Performed by: FAMILY MEDICINE

## 2021-01-01 PROCEDURE — 63600175 PHARM REV CODE 636 W HCPCS: Mod: HCNC,PN | Performed by: INTERNAL MEDICINE

## 2021-01-01 PROCEDURE — 1159F MED LIST DOCD IN RCRD: CPT | Mod: CPTII,S$GLB,, | Performed by: PHYSICIAN ASSISTANT

## 2021-01-01 PROCEDURE — 1159F PR MEDICATION LIST DOCUMENTED IN MEDICAL RECORD: ICD-10-PCS | Mod: CPTII,S$GLB,, | Performed by: FAMILY MEDICINE

## 2021-01-01 PROCEDURE — 99499 UNLISTED E&M SERVICE: CPT | Mod: HCNC,S$GLB,, | Performed by: FAMILY MEDICINE

## 2021-01-01 PROCEDURE — 74176 CT RENAL STONE STUDY ABD PELVIS WO: ICD-10-PCS | Mod: 26,HCNC,, | Performed by: RADIOLOGY

## 2021-01-01 PROCEDURE — 71260 CT THORAX DX C+: CPT | Mod: 26,,, | Performed by: RADIOLOGY

## 2021-01-01 PROCEDURE — 83615 LACTATE (LD) (LDH) ENZYME: CPT | Performed by: FAMILY MEDICINE

## 2021-01-01 PROCEDURE — 25000003 PHARM REV CODE 250: Mod: HCNC,PN | Performed by: INTERNAL MEDICINE

## 2021-01-01 PROCEDURE — 3078F DIAST BP <80 MM HG: CPT | Mod: HCNC,CPTII,S$GLB, | Performed by: FAMILY MEDICINE

## 2021-01-01 PROCEDURE — 1125F PR PAIN SEVERITY QUANTIFIED, PAIN PRESENT: ICD-10-PCS | Mod: HCNC,CPTII,S$GLB, | Performed by: NURSE PRACTITIONER

## 2021-01-01 PROCEDURE — 70491 CT SOFT TISSUE NECK WITH CONTRAST: ICD-10-PCS | Mod: 26,,, | Performed by: RADIOLOGY

## 2021-01-01 PROCEDURE — 3074F PR MOST RECENT SYSTOLIC BLOOD PRESSURE < 130 MM HG: ICD-10-PCS | Mod: HCNC,CPTII,S$GLB, | Performed by: NURSE PRACTITIONER

## 2021-01-01 PROCEDURE — 3074F SYST BP LT 130 MM HG: CPT | Mod: CPTII,S$GLB,, | Performed by: FAMILY MEDICINE

## 2021-01-01 PROCEDURE — 1125F PR PAIN SEVERITY QUANTIFIED, PAIN PRESENT: ICD-10-PCS | Mod: CPTII,S$GLB,, | Performed by: PHYSICIAN ASSISTANT

## 2021-01-01 PROCEDURE — 1101F PR PT FALLS ASSESS DOC 0-1 FALLS W/OUT INJ PAST YR: ICD-10-PCS | Mod: HCNC,CPTII,S$GLB, | Performed by: NURSE PRACTITIONER

## 2021-01-01 PROCEDURE — 99214 OFFICE O/P EST MOD 30 MIN: CPT | Mod: S$GLB,,, | Performed by: FAMILY MEDICINE

## 2021-01-01 PROCEDURE — 74177 CT ABD & PELVIS W/CONTRAST: CPT | Mod: TC,PO

## 2021-01-01 PROCEDURE — A9698 NON-RAD CONTRAST MATERIALNOC: HCPCS | Mod: PO | Performed by: FAMILY MEDICINE

## 2021-01-01 PROCEDURE — 1125F AMNT PAIN NOTED PAIN PRSNT: CPT | Mod: HCNC,CPTII,S$GLB, | Performed by: NURSE PRACTITIONER

## 2021-01-01 PROCEDURE — G0008 ADMIN INFLUENZA VIRUS VAC: HCPCS | Mod: S$GLB,,, | Performed by: FAMILY MEDICINE

## 2021-01-01 PROCEDURE — 3078F DIAST BP <80 MM HG: CPT | Mod: CPTII,S$GLB,, | Performed by: FAMILY MEDICINE

## 2021-01-01 PROCEDURE — 1125F AMNT PAIN NOTED PAIN PRSNT: CPT | Mod: CPTII,S$GLB,, | Performed by: FAMILY MEDICINE

## 2021-01-01 PROCEDURE — 1159F PR MEDICATION LIST DOCUMENTED IN MEDICAL RECORD: ICD-10-PCS | Mod: CPTII,S$GLB,, | Performed by: PHYSICIAN ASSISTANT

## 2021-01-01 PROCEDURE — 72100 XR LUMBAR SPINE AP AND LATERAL: ICD-10-PCS | Mod: 26,,, | Performed by: RADIOLOGY

## 2021-01-01 PROCEDURE — 3052F PR MOST RECENT HEMOGLOBIN A1C LEVEL 8.0 - < 9.0%: ICD-10-PCS | Mod: HCNC,CPTII,S$GLB, | Performed by: FAMILY MEDICINE

## 2021-01-01 PROCEDURE — 1160F RVW MEDS BY RX/DR IN RCRD: CPT | Mod: HCNC,CPTII,S$GLB, | Performed by: NURSE PRACTITIONER

## 2021-01-01 PROCEDURE — 36415 COLL VENOUS BLD VENIPUNCTURE: CPT | Mod: PO | Performed by: FAMILY MEDICINE

## 2021-01-01 PROCEDURE — 1101F PR PT FALLS ASSESS DOC 0-1 FALLS W/OUT INJ PAST YR: ICD-10-PCS | Mod: CPTII,S$GLB,, | Performed by: FAMILY MEDICINE

## 2021-01-01 PROCEDURE — 1101F PT FALLS ASSESS-DOCD LE1/YR: CPT | Mod: HCNC,CPTII,S$GLB, | Performed by: NURSE PRACTITIONER

## 2021-01-01 PROCEDURE — 99499 RISK ADDL DX/OHS AUDIT: ICD-10-PCS | Mod: HCNC,S$GLB,, | Performed by: FAMILY MEDICINE

## 2021-01-01 PROCEDURE — 90694 VACC AIIV4 NO PRSRV 0.5ML IM: CPT | Mod: S$GLB,,, | Performed by: FAMILY MEDICINE

## 2021-01-01 PROCEDURE — 99213 PR OFFICE/OUTPT VISIT, EST, LEVL III, 20-29 MIN: ICD-10-PCS | Mod: HCNC,95,, | Performed by: FAMILY MEDICINE

## 2021-01-01 PROCEDURE — 1159F PR MEDICATION LIST DOCUMENTED IN MEDICAL RECORD: ICD-10-PCS | Mod: HCNC,CPTII,S$GLB, | Performed by: NURSE PRACTITIONER

## 2021-01-01 PROCEDURE — 72100 X-RAY EXAM L-S SPINE 2/3 VWS: CPT | Mod: TC,FY,PO

## 2021-01-01 PROCEDURE — 3078F PR MOST RECENT DIASTOLIC BLOOD PRESSURE < 80 MM HG: ICD-10-PCS | Mod: CPTII,S$GLB,, | Performed by: PHYSICIAN ASSISTANT

## 2021-01-01 PROCEDURE — 1101F PT FALLS ASSESS-DOCD LE1/YR: CPT | Mod: CPTII,S$GLB,, | Performed by: PHYSICIAN ASSISTANT

## 2021-01-01 PROCEDURE — 1125F PR PAIN SEVERITY QUANTIFIED, PAIN PRESENT: ICD-10-PCS | Mod: CPTII,S$GLB,, | Performed by: FAMILY MEDICINE

## 2021-01-01 PROCEDURE — 72100 X-RAY EXAM L-S SPINE 2/3 VWS: CPT | Mod: 26,,, | Performed by: RADIOLOGY

## 2021-01-01 PROCEDURE — 1125F AMNT PAIN NOTED PAIN PRSNT: CPT | Mod: HCNC,CPTII,S$GLB, | Performed by: FAMILY MEDICINE

## 2021-01-01 PROCEDURE — 80053 COMPREHEN METABOLIC PANEL: CPT | Performed by: FAMILY MEDICINE

## 2021-01-01 PROCEDURE — 3288F FALL RISK ASSESSMENT DOCD: CPT | Mod: CPTII,S$GLB,, | Performed by: PHYSICIAN ASSISTANT

## 2021-01-01 PROCEDURE — 25500020 PHARM REV CODE 255: Mod: PO | Performed by: FAMILY MEDICINE

## 2021-01-01 PROCEDURE — 3078F PR MOST RECENT DIASTOLIC BLOOD PRESSURE < 80 MM HG: ICD-10-PCS | Mod: HCNC,CPTII,S$GLB, | Performed by: NURSE PRACTITIONER

## 2021-01-01 PROCEDURE — 3074F SYST BP LT 130 MM HG: CPT | Mod: HCNC,CPTII,S$GLB, | Performed by: NURSE PRACTITIONER

## 2021-01-01 PROCEDURE — 99214 OFFICE O/P EST MOD 30 MIN: CPT | Mod: HCNC,S$GLB,, | Performed by: FAMILY MEDICINE

## 2021-01-01 PROCEDURE — 71260 CT CHEST ABDOMEN PELVIS WITH CONTRAST (XPD): ICD-10-PCS | Mod: 26,,, | Performed by: RADIOLOGY

## 2021-01-01 PROCEDURE — 70491 CT SOFT TISSUE NECK W/DYE: CPT | Mod: TC,PO

## 2021-01-01 PROCEDURE — 3078F PR MOST RECENT DIASTOLIC BLOOD PRESSURE < 80 MM HG: ICD-10-PCS | Mod: CPTII,S$GLB,, | Performed by: FAMILY MEDICINE

## 2021-01-01 PROCEDURE — A4216 STERILE WATER/SALINE, 10 ML: HCPCS | Mod: HCNC,PN | Performed by: INTERNAL MEDICINE

## 2021-01-01 PROCEDURE — 3074F PR MOST RECENT SYSTOLIC BLOOD PRESSURE < 130 MM HG: ICD-10-PCS | Mod: CPTII,S$GLB,, | Performed by: FAMILY MEDICINE

## 2021-01-01 PROCEDURE — 99999 PR PBB SHADOW E&M-EST. PATIENT-LVL IV: ICD-10-PCS | Mod: PBBFAC,HCNC,, | Performed by: FAMILY MEDICINE

## 2021-01-01 PROCEDURE — 1101F PT FALLS ASSESS-DOCD LE1/YR: CPT | Mod: CPTII,S$GLB,, | Performed by: FAMILY MEDICINE

## 2021-01-01 PROCEDURE — 99203 OFFICE O/P NEW LOW 30 MIN: CPT | Mod: HCNC,S$GLB,, | Performed by: UROLOGY

## 2021-01-01 PROCEDURE — 81002 URINALYSIS NONAUTO W/O SCOPE: CPT | Mod: HCNC,S$GLB,, | Performed by: UROLOGY

## 2021-01-01 PROCEDURE — 1160F PR REVIEW ALL MEDS BY PRESCRIBER/CLIN PHARMACIST DOCUMENTED: ICD-10-PCS | Mod: HCNC,CPTII,S$GLB, | Performed by: NURSE PRACTITIONER

## 2021-01-01 PROCEDURE — 99999 PR PBB SHADOW E&M-EST. PATIENT-LVL III: CPT | Mod: PBBFAC,,, | Performed by: FAMILY MEDICINE

## 2021-01-01 PROCEDURE — 1160F RVW MEDS BY RX/DR IN RCRD: CPT | Mod: CPTII,S$GLB,, | Performed by: PHYSICIAN ASSISTANT

## 2021-01-01 PROCEDURE — 3288F PR FALLS RISK ASSESSMENT DOCUMENTED: ICD-10-PCS | Mod: CPTII,S$GLB,, | Performed by: FAMILY MEDICINE

## 2021-01-01 PROCEDURE — 3288F PR FALLS RISK ASSESSMENT DOCUMENTED: ICD-10-PCS | Mod: HCNC,CPTII,S$GLB, | Performed by: NURSE PRACTITIONER

## 2021-01-01 PROCEDURE — 1159F MED LIST DOCD IN RCRD: CPT | Mod: CPTII,S$GLB,, | Performed by: FAMILY MEDICINE

## 2021-01-01 PROCEDURE — 1159F PR MEDICATION LIST DOCUMENTED IN MEDICAL RECORD: ICD-10-PCS | Mod: HCNC,CPTII,S$GLB, | Performed by: FAMILY MEDICINE

## 2021-01-01 PROCEDURE — 99999 PR PBB SHADOW E&M-EST. PATIENT-LVL III: ICD-10-PCS | Mod: PBBFAC,,, | Performed by: PHYSICIAN ASSISTANT

## 2021-01-01 PROCEDURE — 99213 OFFICE O/P EST LOW 20 MIN: CPT | Mod: HCNC,95,, | Performed by: FAMILY MEDICINE

## 2021-01-01 PROCEDURE — 3078F DIAST BP <80 MM HG: CPT | Mod: CPTII,S$GLB,, | Performed by: PHYSICIAN ASSISTANT

## 2021-01-01 PROCEDURE — 3074F PR MOST RECENT SYSTOLIC BLOOD PRESSURE < 130 MM HG: ICD-10-PCS | Mod: HCNC,CPTII,S$GLB, | Performed by: FAMILY MEDICINE

## 2021-01-01 PROCEDURE — 99999 PR PBB SHADOW E&M-EST. PATIENT-LVL IV: CPT | Mod: PBBFAC,HCNC,, | Performed by: UROLOGY

## 2021-01-01 PROCEDURE — 81002 POCT URINE DIPSTICK WITHOUT MICROSCOPE: ICD-10-PCS | Mod: HCNC,S$GLB,, | Performed by: UROLOGY

## 2021-01-01 PROCEDURE — 99999 PR PBB SHADOW E&M-EST. PATIENT-LVL IV: CPT | Mod: PBBFAC,HCNC,, | Performed by: NURSE PRACTITIONER

## 2021-01-01 PROCEDURE — 99214 PR OFFICE/OUTPT VISIT, EST, LEVL IV, 30-39 MIN: ICD-10-PCS | Mod: HCNC,S$GLB,, | Performed by: NURSE PRACTITIONER

## 2021-01-01 PROCEDURE — 3075F PR MOST RECENT SYSTOLIC BLOOD PRESS GE 130-139MM HG: ICD-10-PCS | Mod: CPTII,S$GLB,, | Performed by: PHYSICIAN ASSISTANT

## 2021-01-01 PROCEDURE — 1101F PR PT FALLS ASSESS DOC 0-1 FALLS W/OUT INJ PAST YR: ICD-10-PCS | Mod: CPTII,S$GLB,, | Performed by: PHYSICIAN ASSISTANT

## 2021-01-01 PROCEDURE — 3288F PR FALLS RISK ASSESSMENT DOCUMENTED: ICD-10-PCS | Mod: CPTII,S$GLB,, | Performed by: PHYSICIAN ASSISTANT

## 2021-01-01 PROCEDURE — 90694 FLU VACCINE - QUADRIVALENT - ADJUVANTED: ICD-10-PCS | Mod: S$GLB,,, | Performed by: FAMILY MEDICINE

## 2021-01-01 PROCEDURE — 1125F AMNT PAIN NOTED PAIN PRSNT: CPT | Mod: CPTII,S$GLB,, | Performed by: PHYSICIAN ASSISTANT

## 2021-01-01 PROCEDURE — 99999 PR PBB SHADOW E&M-EST. PATIENT-LVL IV: CPT | Mod: PBBFAC,,, | Performed by: FAMILY MEDICINE

## 2021-01-01 RX ORDER — HYDROCODONE BITARTRATE AND ACETAMINOPHEN 10; 325 MG/1; MG/1
1 TABLET ORAL EVERY 6 HOURS PRN
Qty: 30 TABLET | Refills: 0 | Status: SHIPPED | OUTPATIENT
Start: 2021-01-01 | End: 2021-01-01 | Stop reason: SDUPTHER

## 2021-01-01 RX ORDER — METFORMIN HYDROCHLORIDE 500 MG/1
TABLET, EXTENDED RELEASE ORAL
Qty: 180 TABLET | Refills: 1 | Status: SHIPPED | OUTPATIENT
Start: 2021-01-01 | End: 2022-01-01

## 2021-01-01 RX ORDER — PREDNISONE 20 MG/1
TABLET ORAL
Qty: 18 TABLET | Refills: 0 | Status: SHIPPED | OUTPATIENT
Start: 2021-01-01 | End: 2021-01-01

## 2021-01-01 RX ORDER — SODIUM CHLORIDE 0.9 % (FLUSH) 0.9 %
10 SYRINGE (ML) INJECTION
Status: CANCELLED | OUTPATIENT
Start: 2021-01-01

## 2021-01-01 RX ORDER — SODIUM CHLORIDE 0.9 % (FLUSH) 0.9 %
10 SYRINGE (ML) INJECTION
Status: COMPLETED | OUTPATIENT
Start: 2021-01-01 | End: 2021-01-01

## 2021-01-01 RX ORDER — HEPARIN 100 UNIT/ML
500 SYRINGE INTRAVENOUS
Status: CANCELLED | OUTPATIENT
Start: 2021-01-01

## 2021-01-01 RX ORDER — MIRTAZAPINE 15 MG/1
15 TABLET, FILM COATED ORAL NIGHTLY
Qty: 30 TABLET | Refills: 11 | Status: SHIPPED | OUTPATIENT
Start: 2021-01-01 | End: 2022-01-01

## 2021-01-01 RX ORDER — VITAMIN A 3000 MCG
10000 CAPSULE ORAL DAILY
Status: ON HOLD | COMMUNITY
End: 2022-01-01

## 2021-01-01 RX ORDER — HYDROCODONE BITARTRATE AND ACETAMINOPHEN 7.5; 325 MG/15ML; MG/15ML
15 SOLUTION ORAL 3 TIMES DAILY PRN
Qty: 300 ML | Refills: 0 | Status: SHIPPED | OUTPATIENT
Start: 2021-01-01 | End: 2021-01-01

## 2021-01-01 RX ORDER — HEPARIN 100 UNIT/ML
500 SYRINGE INTRAVENOUS
Status: COMPLETED | OUTPATIENT
Start: 2021-01-01 | End: 2021-01-01

## 2021-01-01 RX ORDER — CLONAZEPAM 0.5 MG/1
0.5 TABLET ORAL NIGHTLY
Qty: 30 TABLET | Refills: 0 | Status: SHIPPED | OUTPATIENT
Start: 2021-01-01 | End: 2022-01-01

## 2021-01-01 RX ORDER — VITAMIN B COMPLEX
1 CAPSULE ORAL DAILY
Status: ON HOLD | COMMUNITY
End: 2022-01-01

## 2021-01-01 RX ADMIN — Medication 10 ML: at 11:10

## 2021-01-01 RX ADMIN — Medication 10 ML: at 11:12

## 2021-01-01 RX ADMIN — IOHEXOL 75 ML: 350 INJECTION, SOLUTION INTRAVENOUS at 12:11

## 2021-01-01 RX ADMIN — HEPARIN 500 UNITS: 100 SYRINGE at 11:12

## 2021-01-01 RX ADMIN — Medication 10 ML: at 11:11

## 2021-01-01 RX ADMIN — HEPARIN 500 UNITS: 100 SYRINGE at 11:11

## 2021-01-01 RX ADMIN — IOHEXOL 1000 ML: 9 SOLUTION ORAL at 12:11

## 2021-01-01 RX ADMIN — HEPARIN 500 UNITS: 100 SYRINGE at 11:10

## 2021-01-13 ENCOUNTER — HOSPITAL ENCOUNTER (OUTPATIENT)
Dept: RADIOLOGY | Facility: HOSPITAL | Age: 76
Discharge: HOME | End: 2021-01-13
Attending: INTERNAL MEDICINE
Payer: MEDICARE

## 2021-01-13 DIAGNOSIS — E04.2 MULTINODULAR GOITER: ICD-10-CM

## 2021-01-13 PROCEDURE — 76536 US EXAM OF HEAD AND NECK: CPT | Mod: 26,HCNC,, | Performed by: RADIOLOGY

## 2021-01-13 PROCEDURE — 76536 US SOFT TISSUE HEAD NECK THYROID: ICD-10-PCS | Mod: 26,HCNC,, | Performed by: RADIOLOGY

## 2021-01-13 PROCEDURE — 76536 US EXAM OF HEAD AND NECK: CPT | Mod: TC,HCNC,PO

## 2021-01-25 ENCOUNTER — INFUSION (OUTPATIENT)
Dept: INFUSION THERAPY | Facility: HOSPITAL | Age: 76
End: 2021-01-25
Attending: INTERNAL MEDICINE
Payer: MEDICARE

## 2021-01-25 VITALS — TEMPERATURE: 99 F

## 2021-01-25 DIAGNOSIS — C16.0 GE JUNCTION CARCINOMA: Primary | ICD-10-CM

## 2021-01-25 PROCEDURE — 63600175 PHARM REV CODE 636 W HCPCS: Mod: PN | Performed by: INTERNAL MEDICINE

## 2021-01-25 PROCEDURE — A4216 STERILE WATER/SALINE, 10 ML: HCPCS | Mod: PN | Performed by: INTERNAL MEDICINE

## 2021-01-25 PROCEDURE — 25000003 PHARM REV CODE 250: Mod: PN | Performed by: INTERNAL MEDICINE

## 2021-01-25 PROCEDURE — 96523 IRRIG DRUG DELIVERY DEVICE: CPT | Mod: PN

## 2021-01-25 RX ORDER — SODIUM CHLORIDE 0.9 % (FLUSH) 0.9 %
10 SYRINGE (ML) INJECTION
Status: COMPLETED | OUTPATIENT
Start: 2021-01-25 | End: 2021-01-25

## 2021-01-25 RX ORDER — SODIUM CHLORIDE 0.9 % (FLUSH) 0.9 %
10 SYRINGE (ML) INJECTION
Status: CANCELLED | OUTPATIENT
Start: 2021-01-25

## 2021-01-25 RX ORDER — HEPARIN 100 UNIT/ML
500 SYRINGE INTRAVENOUS
Status: COMPLETED | OUTPATIENT
Start: 2021-01-25 | End: 2021-01-25

## 2021-01-25 RX ORDER — HEPARIN 100 UNIT/ML
500 SYRINGE INTRAVENOUS
Status: CANCELLED | OUTPATIENT
Start: 2021-01-25

## 2021-01-25 RX ADMIN — HEPARIN SODIUM (PORCINE) LOCK FLUSH IV SOLN 100 UNIT/ML 500 UNITS: 100 SOLUTION at 12:01

## 2021-01-25 RX ADMIN — Medication 10 ML: at 12:01

## 2021-02-02 ENCOUNTER — TELEPHONE (OUTPATIENT)
Dept: FAMILY MEDICINE | Facility: CLINIC | Age: 76
End: 2021-02-02

## 2021-02-17 ENCOUNTER — LAB VISIT (OUTPATIENT)
Dept: LAB | Facility: HOSPITAL | Age: 76
End: 2021-02-17
Attending: INTERNAL MEDICINE
Payer: MEDICARE

## 2021-02-17 DIAGNOSIS — C16.0 GE JUNCTION CARCINOMA: ICD-10-CM

## 2021-02-17 LAB
ALBUMIN SERPL BCP-MCNC: 3.9 G/DL (ref 3.5–5.2)
ALP SERPL-CCNC: 121 U/L (ref 55–135)
ALT SERPL W/O P-5'-P-CCNC: 13 U/L (ref 10–44)
ANION GAP SERPL CALC-SCNC: 8 MMOL/L (ref 8–16)
AST SERPL-CCNC: 15 U/L (ref 10–40)
BASOPHILS # BLD AUTO: 0.03 K/UL (ref 0–0.2)
BASOPHILS NFR BLD: 0.6 % (ref 0–1.9)
BILIRUB SERPL-MCNC: 0.7 MG/DL (ref 0.1–1)
BUN SERPL-MCNC: 16 MG/DL (ref 8–23)
CALCIUM SERPL-MCNC: 9.1 MG/DL (ref 8.7–10.5)
CHLORIDE SERPL-SCNC: 102 MMOL/L (ref 95–110)
CO2 SERPL-SCNC: 28 MMOL/L (ref 23–29)
CREAT SERPL-MCNC: 1 MG/DL (ref 0.5–1.4)
DIFFERENTIAL METHOD: ABNORMAL
EOSINOPHIL # BLD AUTO: 0.3 K/UL (ref 0–0.5)
EOSINOPHIL NFR BLD: 7 % (ref 0–8)
ERYTHROCYTE [DISTWIDTH] IN BLOOD BY AUTOMATED COUNT: 14.3 % (ref 11.5–14.5)
EST. GFR  (AFRICAN AMERICAN): >60 ML/MIN/1.73 M^2
EST. GFR  (NON AFRICAN AMERICAN): >60 ML/MIN/1.73 M^2
GLUCOSE SERPL-MCNC: 137 MG/DL (ref 70–110)
HCT VFR BLD AUTO: 39.7 % (ref 40–54)
HGB BLD-MCNC: 12.4 G/DL (ref 14–18)
IMM GRANULOCYTES # BLD AUTO: 0.01 K/UL (ref 0–0.04)
IMM GRANULOCYTES NFR BLD AUTO: 0.2 % (ref 0–0.5)
LDH SERPL L TO P-CCNC: 134 U/L (ref 110–260)
LYMPHOCYTES # BLD AUTO: 0.7 K/UL (ref 1–4.8)
LYMPHOCYTES NFR BLD: 13.8 % (ref 18–48)
MCH RBC QN AUTO: 29.4 PG (ref 27–31)
MCHC RBC AUTO-ENTMCNC: 31.2 G/DL (ref 32–36)
MCV RBC AUTO: 94 FL (ref 82–98)
MONOCYTES # BLD AUTO: 0.5 K/UL (ref 0.3–1)
MONOCYTES NFR BLD: 9.8 % (ref 4–15)
NEUTROPHILS # BLD AUTO: 3.2 K/UL (ref 1.8–7.7)
NEUTROPHILS NFR BLD: 68.6 % (ref 38–73)
NRBC BLD-RTO: 0 /100 WBC
PLATELET # BLD AUTO: 121 K/UL (ref 150–350)
PMV BLD AUTO: 10.4 FL (ref 9.2–12.9)
POTASSIUM SERPL-SCNC: 4.4 MMOL/L (ref 3.5–5.1)
PROT SERPL-MCNC: 6.8 G/DL (ref 6–8.4)
RBC # BLD AUTO: 4.22 M/UL (ref 4.6–6.2)
SODIUM SERPL-SCNC: 138 MMOL/L (ref 136–145)
WBC # BLD AUTO: 4.7 K/UL (ref 3.9–12.7)

## 2021-02-17 PROCEDURE — 80053 COMPREHEN METABOLIC PANEL: CPT | Mod: PO

## 2021-02-17 PROCEDURE — 83615 LACTATE (LD) (LDH) ENZYME: CPT | Mod: PO

## 2021-02-17 PROCEDURE — 85025 COMPLETE CBC W/AUTO DIFF WBC: CPT | Mod: PO

## 2021-02-17 PROCEDURE — 36415 COLL VENOUS BLD VENIPUNCTURE: CPT | Mod: PO

## 2021-02-19 ENCOUNTER — OFFICE VISIT (OUTPATIENT)
Dept: HEMATOLOGY/ONCOLOGY | Facility: CLINIC | Age: 76
End: 2021-02-19
Payer: MEDICARE

## 2021-02-19 VITALS
SYSTOLIC BLOOD PRESSURE: 142 MMHG | RESPIRATION RATE: 18 BRPM | HEART RATE: 76 BPM | TEMPERATURE: 98 F | OXYGEN SATURATION: 100 % | WEIGHT: 199.94 LBS | HEIGHT: 76 IN | BODY MASS INDEX: 24.35 KG/M2 | DIASTOLIC BLOOD PRESSURE: 75 MMHG

## 2021-02-19 DIAGNOSIS — D69.6 THROMBOCYTOPENIA: ICD-10-CM

## 2021-02-19 DIAGNOSIS — R26.9 GAIT DISTURBANCE: ICD-10-CM

## 2021-02-19 DIAGNOSIS — C16.0 GE JUNCTION CARCINOMA: Primary | ICD-10-CM

## 2021-02-19 DIAGNOSIS — R63.4 UNEXPLAINED WEIGHT LOSS: ICD-10-CM

## 2021-02-19 DIAGNOSIS — G63 POLYNEUROPATHY ASSOCIATED WITH UNDERLYING DISEASE: ICD-10-CM

## 2021-02-19 DIAGNOSIS — E11.9 TYPE 2 DIABETES MELLITUS TREATED WITHOUT INSULIN: ICD-10-CM

## 2021-02-19 PROCEDURE — 3077F PR MOST RECENT SYSTOLIC BLOOD PRESSURE >= 140 MM HG: ICD-10-PCS | Mod: CPTII,S$GLB,, | Performed by: INTERNAL MEDICINE

## 2021-02-19 PROCEDURE — 1101F PR PT FALLS ASSESS DOC 0-1 FALLS W/OUT INJ PAST YR: ICD-10-PCS | Mod: CPTII,S$GLB,, | Performed by: INTERNAL MEDICINE

## 2021-02-19 PROCEDURE — 3078F DIAST BP <80 MM HG: CPT | Mod: CPTII,S$GLB,, | Performed by: INTERNAL MEDICINE

## 2021-02-19 PROCEDURE — 1101F PT FALLS ASSESS-DOCD LE1/YR: CPT | Mod: CPTII,S$GLB,, | Performed by: INTERNAL MEDICINE

## 2021-02-19 PROCEDURE — 3288F PR FALLS RISK ASSESSMENT DOCUMENTED: ICD-10-PCS | Mod: CPTII,S$GLB,, | Performed by: INTERNAL MEDICINE

## 2021-02-19 PROCEDURE — 3052F HG A1C>EQUAL 8.0%<EQUAL 9.0%: CPT | Mod: CPTII,S$GLB,, | Performed by: INTERNAL MEDICINE

## 2021-02-19 PROCEDURE — 3078F PR MOST RECENT DIASTOLIC BLOOD PRESSURE < 80 MM HG: ICD-10-PCS | Mod: CPTII,S$GLB,, | Performed by: INTERNAL MEDICINE

## 2021-02-19 PROCEDURE — 1159F MED LIST DOCD IN RCRD: CPT | Mod: S$GLB,,, | Performed by: INTERNAL MEDICINE

## 2021-02-19 PROCEDURE — 99499 UNLISTED E&M SERVICE: CPT | Mod: S$GLB,,, | Performed by: INTERNAL MEDICINE

## 2021-02-19 PROCEDURE — 1159F PR MEDICATION LIST DOCUMENTED IN MEDICAL RECORD: ICD-10-PCS | Mod: S$GLB,,, | Performed by: INTERNAL MEDICINE

## 2021-02-19 PROCEDURE — 99999 PR PBB SHADOW E&M-EST. PATIENT-LVL V: CPT | Mod: PBBFAC,,, | Performed by: INTERNAL MEDICINE

## 2021-02-19 PROCEDURE — 99213 OFFICE O/P EST LOW 20 MIN: CPT | Mod: S$GLB,,, | Performed by: INTERNAL MEDICINE

## 2021-02-19 PROCEDURE — 1126F AMNT PAIN NOTED NONE PRSNT: CPT | Mod: S$GLB,,, | Performed by: INTERNAL MEDICINE

## 2021-02-19 PROCEDURE — 99499 RISK ADDL DX/OHS AUDIT: ICD-10-PCS | Mod: S$GLB,,, | Performed by: INTERNAL MEDICINE

## 2021-02-19 PROCEDURE — 99999 PR PBB SHADOW E&M-EST. PATIENT-LVL V: ICD-10-PCS | Mod: PBBFAC,,, | Performed by: INTERNAL MEDICINE

## 2021-02-19 PROCEDURE — 99213 PR OFFICE/OUTPT VISIT, EST, LEVL III, 20-29 MIN: ICD-10-PCS | Mod: S$GLB,,, | Performed by: INTERNAL MEDICINE

## 2021-02-19 PROCEDURE — 1126F PR PAIN SEVERITY QUANTIFIED, NO PAIN PRESENT: ICD-10-PCS | Mod: S$GLB,,, | Performed by: INTERNAL MEDICINE

## 2021-02-19 PROCEDURE — 3052F PR MOST RECENT HEMOGLOBIN A1C LEVEL 8.0 - < 9.0%: ICD-10-PCS | Mod: CPTII,S$GLB,, | Performed by: INTERNAL MEDICINE

## 2021-02-19 PROCEDURE — 3077F SYST BP >= 140 MM HG: CPT | Mod: CPTII,S$GLB,, | Performed by: INTERNAL MEDICINE

## 2021-02-19 PROCEDURE — 3288F FALL RISK ASSESSMENT DOCD: CPT | Mod: CPTII,S$GLB,, | Performed by: INTERNAL MEDICINE

## 2021-02-23 ENCOUNTER — LAB VISIT (OUTPATIENT)
Dept: LAB | Facility: HOSPITAL | Age: 76
End: 2021-02-23
Attending: FAMILY MEDICINE
Payer: MEDICARE

## 2021-02-23 DIAGNOSIS — E11.9 TYPE 2 DIABETES MELLITUS TREATED WITHOUT INSULIN: ICD-10-CM

## 2021-02-23 LAB
ALBUMIN SERPL BCP-MCNC: 3.6 G/DL (ref 3.5–5.2)
ALP SERPL-CCNC: 105 U/L (ref 55–135)
ALT SERPL W/O P-5'-P-CCNC: 10 U/L (ref 10–44)
ANION GAP SERPL CALC-SCNC: 5 MMOL/L (ref 8–16)
AST SERPL-CCNC: 13 U/L (ref 10–40)
BILIRUB SERPL-MCNC: 0.6 MG/DL (ref 0.1–1)
BUN SERPL-MCNC: 16 MG/DL (ref 8–23)
CALCIUM SERPL-MCNC: 9.1 MG/DL (ref 8.7–10.5)
CHLORIDE SERPL-SCNC: 105 MMOL/L (ref 95–110)
CO2 SERPL-SCNC: 29 MMOL/L (ref 23–29)
CREAT SERPL-MCNC: 1 MG/DL (ref 0.5–1.4)
EST. GFR  (AFRICAN AMERICAN): >60 ML/MIN/1.73 M^2
EST. GFR  (NON AFRICAN AMERICAN): >60 ML/MIN/1.73 M^2
ESTIMATED AVG GLUCOSE: 177 MG/DL (ref 68–131)
GLUCOSE SERPL-MCNC: 146 MG/DL (ref 70–110)
HBA1C MFR BLD: 7.8 % (ref 4–5.6)
POTASSIUM SERPL-SCNC: 4.4 MMOL/L (ref 3.5–5.1)
PROT SERPL-MCNC: 6.4 G/DL (ref 6–8.4)
SODIUM SERPL-SCNC: 139 MMOL/L (ref 136–145)

## 2021-02-23 PROCEDURE — 80053 COMPREHEN METABOLIC PANEL: CPT

## 2021-02-23 PROCEDURE — 83036 HEMOGLOBIN GLYCOSYLATED A1C: CPT

## 2021-02-23 PROCEDURE — 36415 COLL VENOUS BLD VENIPUNCTURE: CPT | Mod: PO

## 2021-03-02 ENCOUNTER — OFFICE VISIT (OUTPATIENT)
Dept: FAMILY MEDICINE | Facility: CLINIC | Age: 76
End: 2021-03-02
Payer: MEDICARE

## 2021-03-02 VITALS
RESPIRATION RATE: 18 BRPM | DIASTOLIC BLOOD PRESSURE: 70 MMHG | TEMPERATURE: 99 F | HEART RATE: 70 BPM | WEIGHT: 198.63 LBS | BODY MASS INDEX: 24.19 KG/M2 | SYSTOLIC BLOOD PRESSURE: 134 MMHG | HEIGHT: 76 IN

## 2021-03-02 DIAGNOSIS — E11.9 TYPE 2 DIABETES MELLITUS TREATED WITHOUT INSULIN: Primary | ICD-10-CM

## 2021-03-02 DIAGNOSIS — I25.110 CORONARY ARTERY DISEASE INVOLVING NATIVE CORONARY ARTERY OF NATIVE HEART WITH UNSTABLE ANGINA PECTORIS: ICD-10-CM

## 2021-03-02 DIAGNOSIS — C16.0 GE JUNCTION CARCINOMA: ICD-10-CM

## 2021-03-02 DIAGNOSIS — E78.2 MIXED HYPERLIPIDEMIA: ICD-10-CM

## 2021-03-02 DIAGNOSIS — I48.0 PAF (PAROXYSMAL ATRIAL FIBRILLATION): ICD-10-CM

## 2021-03-02 DIAGNOSIS — E78.5 TYPE 2 DIABETES MELLITUS WITH HYPERLIPIDEMIA: ICD-10-CM

## 2021-03-02 DIAGNOSIS — E11.69 TYPE 2 DIABETES MELLITUS WITH HYPERLIPIDEMIA: ICD-10-CM

## 2021-03-02 PROCEDURE — 99999 PR PBB SHADOW E&M-EST. PATIENT-LVL III: ICD-10-PCS | Mod: PBBFAC,,, | Performed by: FAMILY MEDICINE

## 2021-03-02 PROCEDURE — 3075F SYST BP GE 130 - 139MM HG: CPT | Mod: CPTII,S$GLB,, | Performed by: FAMILY MEDICINE

## 2021-03-02 PROCEDURE — 1126F PR PAIN SEVERITY QUANTIFIED, NO PAIN PRESENT: ICD-10-PCS | Mod: S$GLB,,, | Performed by: FAMILY MEDICINE

## 2021-03-02 PROCEDURE — 99499 RISK ADDL DX/OHS AUDIT: ICD-10-PCS | Mod: S$GLB,,, | Performed by: FAMILY MEDICINE

## 2021-03-02 PROCEDURE — 3078F DIAST BP <80 MM HG: CPT | Mod: CPTII,S$GLB,, | Performed by: FAMILY MEDICINE

## 2021-03-02 PROCEDURE — 1101F PT FALLS ASSESS-DOCD LE1/YR: CPT | Mod: CPTII,S$GLB,, | Performed by: FAMILY MEDICINE

## 2021-03-02 PROCEDURE — 99214 OFFICE O/P EST MOD 30 MIN: CPT | Mod: S$GLB,,, | Performed by: FAMILY MEDICINE

## 2021-03-02 PROCEDURE — 3051F HG A1C>EQUAL 7.0%<8.0%: CPT | Mod: CPTII,S$GLB,, | Performed by: FAMILY MEDICINE

## 2021-03-02 PROCEDURE — 3051F PR MOST RECENT HEMOGLOBIN A1C LEVEL 7.0 - < 8.0%: ICD-10-PCS | Mod: CPTII,S$GLB,, | Performed by: FAMILY MEDICINE

## 2021-03-02 PROCEDURE — 1159F PR MEDICATION LIST DOCUMENTED IN MEDICAL RECORD: ICD-10-PCS | Mod: S$GLB,,, | Performed by: FAMILY MEDICINE

## 2021-03-02 PROCEDURE — 1159F MED LIST DOCD IN RCRD: CPT | Mod: S$GLB,,, | Performed by: FAMILY MEDICINE

## 2021-03-02 PROCEDURE — 3075F PR MOST RECENT SYSTOLIC BLOOD PRESS GE 130-139MM HG: ICD-10-PCS | Mod: CPTII,S$GLB,, | Performed by: FAMILY MEDICINE

## 2021-03-02 PROCEDURE — 99499 UNLISTED E&M SERVICE: CPT | Mod: S$GLB,,, | Performed by: FAMILY MEDICINE

## 2021-03-02 PROCEDURE — 1126F AMNT PAIN NOTED NONE PRSNT: CPT | Mod: S$GLB,,, | Performed by: FAMILY MEDICINE

## 2021-03-02 PROCEDURE — 3288F FALL RISK ASSESSMENT DOCD: CPT | Mod: CPTII,S$GLB,, | Performed by: FAMILY MEDICINE

## 2021-03-02 PROCEDURE — 99999 PR PBB SHADOW E&M-EST. PATIENT-LVL III: CPT | Mod: PBBFAC,,, | Performed by: FAMILY MEDICINE

## 2021-03-02 PROCEDURE — 3288F PR FALLS RISK ASSESSMENT DOCUMENTED: ICD-10-PCS | Mod: CPTII,S$GLB,, | Performed by: FAMILY MEDICINE

## 2021-03-02 PROCEDURE — 99214 PR OFFICE/OUTPT VISIT, EST, LEVL IV, 30-39 MIN: ICD-10-PCS | Mod: S$GLB,,, | Performed by: FAMILY MEDICINE

## 2021-03-02 PROCEDURE — 1101F PR PT FALLS ASSESS DOC 0-1 FALLS W/OUT INJ PAST YR: ICD-10-PCS | Mod: CPTII,S$GLB,, | Performed by: FAMILY MEDICINE

## 2021-03-02 PROCEDURE — 3078F PR MOST RECENT DIASTOLIC BLOOD PRESSURE < 80 MM HG: ICD-10-PCS | Mod: CPTII,S$GLB,, | Performed by: FAMILY MEDICINE

## 2021-03-02 RX ORDER — BLOOD SUGAR DIAGNOSTIC
STRIP MISCELLANEOUS
Qty: 100 STRIP | Refills: 3 | Status: SHIPPED | OUTPATIENT
Start: 2021-03-02 | End: 2022-01-01

## 2021-03-02 RX ORDER — LANCETS
1 EACH MISCELLANEOUS DAILY
Qty: 100 EACH | Refills: 3 | Status: SHIPPED | OUTPATIENT
Start: 2021-03-02 | End: 2022-01-01

## 2021-03-05 ENCOUNTER — IMMUNIZATION (OUTPATIENT)
Dept: FAMILY MEDICINE | Facility: CLINIC | Age: 76
End: 2021-03-05
Payer: MEDICARE

## 2021-03-05 DIAGNOSIS — Z23 NEED FOR VACCINATION: Primary | ICD-10-CM

## 2021-03-05 PROCEDURE — 0001A COVID-19, MRNA, LNP-S, PF, 30 MCG/0.3 ML DOSE VACCINE: ICD-10-PCS | Mod: CV19,,, | Performed by: FAMILY MEDICINE

## 2021-03-05 PROCEDURE — 91300 COVID-19, MRNA, LNP-S, PF, 30 MCG/0.3 ML DOSE VACCINE: ICD-10-PCS | Mod: ,,, | Performed by: FAMILY MEDICINE

## 2021-03-05 PROCEDURE — 0001A COVID-19, MRNA, LNP-S, PF, 30 MCG/0.3 ML DOSE VACCINE: CPT | Mod: CV19,,, | Performed by: FAMILY MEDICINE

## 2021-03-05 PROCEDURE — 91300 COVID-19, MRNA, LNP-S, PF, 30 MCG/0.3 ML DOSE VACCINE: CPT | Mod: ,,, | Performed by: FAMILY MEDICINE

## 2021-03-08 ENCOUNTER — INFUSION (OUTPATIENT)
Dept: INFUSION THERAPY | Facility: HOSPITAL | Age: 76
End: 2021-03-08
Attending: INTERNAL MEDICINE
Payer: MEDICARE

## 2021-03-08 DIAGNOSIS — C16.0 GE JUNCTION CARCINOMA: Primary | ICD-10-CM

## 2021-03-08 PROCEDURE — 63600175 PHARM REV CODE 636 W HCPCS: Mod: PN | Performed by: INTERNAL MEDICINE

## 2021-03-08 PROCEDURE — 96523 IRRIG DRUG DELIVERY DEVICE: CPT | Mod: PN

## 2021-03-08 PROCEDURE — 25000003 PHARM REV CODE 250: Mod: PN | Performed by: INTERNAL MEDICINE

## 2021-03-08 PROCEDURE — A4216 STERILE WATER/SALINE, 10 ML: HCPCS | Mod: PN | Performed by: INTERNAL MEDICINE

## 2021-03-08 RX ORDER — SODIUM CHLORIDE 0.9 % (FLUSH) 0.9 %
10 SYRINGE (ML) INJECTION
Status: COMPLETED | OUTPATIENT
Start: 2021-03-08 | End: 2021-03-08

## 2021-03-08 RX ORDER — SODIUM CHLORIDE 0.9 % (FLUSH) 0.9 %
10 SYRINGE (ML) INJECTION
Status: CANCELLED | OUTPATIENT
Start: 2021-03-08

## 2021-03-08 RX ORDER — HEPARIN 100 UNIT/ML
500 SYRINGE INTRAVENOUS
Status: COMPLETED | OUTPATIENT
Start: 2021-03-08 | End: 2021-03-08

## 2021-03-08 RX ORDER — HEPARIN 100 UNIT/ML
500 SYRINGE INTRAVENOUS
Status: CANCELLED | OUTPATIENT
Start: 2021-03-08

## 2021-03-08 RX ADMIN — HEPARIN 500 UNITS: 100 SYRINGE at 12:03

## 2021-03-08 RX ADMIN — Medication 10 ML: at 12:03

## 2021-03-15 ENCOUNTER — PES CALL (OUTPATIENT)
Dept: ADMINISTRATIVE | Facility: CLINIC | Age: 76
End: 2021-03-15

## 2021-03-26 ENCOUNTER — IMMUNIZATION (OUTPATIENT)
Dept: FAMILY MEDICINE | Facility: CLINIC | Age: 76
End: 2021-03-26
Payer: MEDICARE

## 2021-03-26 DIAGNOSIS — Z23 NEED FOR VACCINATION: Primary | ICD-10-CM

## 2021-03-26 PROCEDURE — 91300 COVID-19, MRNA, LNP-S, PF, 30 MCG/0.3 ML DOSE VACCINE: ICD-10-PCS | Mod: ,,, | Performed by: FAMILY MEDICINE

## 2021-03-26 PROCEDURE — 0002A COVID-19, MRNA, LNP-S, PF, 30 MCG/0.3 ML DOSE VACCINE: ICD-10-PCS | Mod: CV19,,, | Performed by: FAMILY MEDICINE

## 2021-03-26 PROCEDURE — 91300 COVID-19, MRNA, LNP-S, PF, 30 MCG/0.3 ML DOSE VACCINE: CPT | Mod: ,,, | Performed by: FAMILY MEDICINE

## 2021-03-26 PROCEDURE — 0002A COVID-19, MRNA, LNP-S, PF, 30 MCG/0.3 ML DOSE VACCINE: CPT | Mod: CV19,,, | Performed by: FAMILY MEDICINE

## 2021-04-19 ENCOUNTER — INFUSION (OUTPATIENT)
Dept: INFUSION THERAPY | Facility: HOSPITAL | Age: 76
End: 2021-04-19
Attending: INTERNAL MEDICINE
Payer: MEDICARE

## 2021-04-19 DIAGNOSIS — C16.0 GE JUNCTION CARCINOMA: Primary | ICD-10-CM

## 2021-04-19 PROCEDURE — A4216 STERILE WATER/SALINE, 10 ML: HCPCS | Mod: PN | Performed by: INTERNAL MEDICINE

## 2021-04-19 PROCEDURE — 96523 IRRIG DRUG DELIVERY DEVICE: CPT | Mod: PN

## 2021-04-19 PROCEDURE — 63600175 PHARM REV CODE 636 W HCPCS: Mod: PN | Performed by: INTERNAL MEDICINE

## 2021-04-19 PROCEDURE — 25000003 PHARM REV CODE 250: Mod: PN | Performed by: INTERNAL MEDICINE

## 2021-04-19 RX ORDER — SODIUM CHLORIDE 0.9 % (FLUSH) 0.9 %
10 SYRINGE (ML) INJECTION
Status: COMPLETED | OUTPATIENT
Start: 2021-04-19 | End: 2021-04-19

## 2021-04-19 RX ORDER — SODIUM CHLORIDE 0.9 % (FLUSH) 0.9 %
10 SYRINGE (ML) INJECTION
Status: CANCELLED | OUTPATIENT
Start: 2021-04-19

## 2021-04-19 RX ORDER — HEPARIN 100 UNIT/ML
500 SYRINGE INTRAVENOUS
Status: CANCELLED | OUTPATIENT
Start: 2021-04-19

## 2021-04-19 RX ORDER — HEPARIN 100 UNIT/ML
500 SYRINGE INTRAVENOUS
Status: COMPLETED | OUTPATIENT
Start: 2021-04-19 | End: 2021-04-19

## 2021-04-19 RX ADMIN — HEPARIN 500 UNITS: 100 SYRINGE at 12:04

## 2021-04-19 RX ADMIN — Medication 10 ML: at 12:04

## 2021-05-06 ENCOUNTER — PES CALL (OUTPATIENT)
Dept: ADMINISTRATIVE | Facility: CLINIC | Age: 76
End: 2021-05-06

## 2021-05-18 ENCOUNTER — OFFICE VISIT (OUTPATIENT)
Dept: OTOLARYNGOLOGY | Facility: CLINIC | Age: 76
End: 2021-05-18
Payer: MEDICARE

## 2021-05-18 VITALS — WEIGHT: 204.81 LBS | HEIGHT: 76 IN | BODY MASS INDEX: 24.94 KG/M2

## 2021-05-18 DIAGNOSIS — H60.392 OTHER INFECTIVE CHRONIC OTITIS EXTERNA OF LEFT EAR: Primary | ICD-10-CM

## 2021-05-18 PROCEDURE — 1159F MED LIST DOCD IN RCRD: CPT | Mod: S$GLB,,, | Performed by: OTOLARYNGOLOGY

## 2021-05-18 PROCEDURE — 99999 PR PBB SHADOW E&M-EST. PATIENT-LVL III: ICD-10-PCS | Mod: PBBFAC,,, | Performed by: OTOLARYNGOLOGY

## 2021-05-18 PROCEDURE — 3288F PR FALLS RISK ASSESSMENT DOCUMENTED: ICD-10-PCS | Mod: CPTII,S$GLB,, | Performed by: OTOLARYNGOLOGY

## 2021-05-18 PROCEDURE — 99214 PR OFFICE/OUTPT VISIT, EST, LEVL IV, 30-39 MIN: ICD-10-PCS | Mod: S$GLB,,, | Performed by: OTOLARYNGOLOGY

## 2021-05-18 PROCEDURE — 1125F AMNT PAIN NOTED PAIN PRSNT: CPT | Mod: S$GLB,,, | Performed by: OTOLARYNGOLOGY

## 2021-05-18 PROCEDURE — 1159F PR MEDICATION LIST DOCUMENTED IN MEDICAL RECORD: ICD-10-PCS | Mod: S$GLB,,, | Performed by: OTOLARYNGOLOGY

## 2021-05-18 PROCEDURE — 1125F PR PAIN SEVERITY QUANTIFIED, PAIN PRESENT: ICD-10-PCS | Mod: S$GLB,,, | Performed by: OTOLARYNGOLOGY

## 2021-05-18 PROCEDURE — 99999 PR PBB SHADOW E&M-EST. PATIENT-LVL III: CPT | Mod: PBBFAC,,, | Performed by: OTOLARYNGOLOGY

## 2021-05-18 PROCEDURE — 1101F PT FALLS ASSESS-DOCD LE1/YR: CPT | Mod: CPTII,S$GLB,, | Performed by: OTOLARYNGOLOGY

## 2021-05-18 PROCEDURE — 99214 OFFICE O/P EST MOD 30 MIN: CPT | Mod: S$GLB,,, | Performed by: OTOLARYNGOLOGY

## 2021-05-18 PROCEDURE — 1101F PR PT FALLS ASSESS DOC 0-1 FALLS W/OUT INJ PAST YR: ICD-10-PCS | Mod: CPTII,S$GLB,, | Performed by: OTOLARYNGOLOGY

## 2021-05-18 PROCEDURE — 3288F FALL RISK ASSESSMENT DOCD: CPT | Mod: CPTII,S$GLB,, | Performed by: OTOLARYNGOLOGY

## 2021-05-31 ENCOUNTER — INFUSION (OUTPATIENT)
Dept: INFUSION THERAPY | Facility: HOSPITAL | Age: 76
End: 2021-05-31
Attending: INTERNAL MEDICINE
Payer: MEDICARE

## 2021-05-31 DIAGNOSIS — C16.0 GE JUNCTION CARCINOMA: Primary | ICD-10-CM

## 2021-05-31 PROCEDURE — 96523 IRRIG DRUG DELIVERY DEVICE: CPT | Mod: PN

## 2021-05-31 PROCEDURE — 25000003 PHARM REV CODE 250: Mod: PN | Performed by: INTERNAL MEDICINE

## 2021-05-31 PROCEDURE — A4216 STERILE WATER/SALINE, 10 ML: HCPCS | Mod: PN | Performed by: INTERNAL MEDICINE

## 2021-05-31 RX ORDER — SODIUM CHLORIDE 0.9 % (FLUSH) 0.9 %
10 SYRINGE (ML) INJECTION
Status: CANCELLED | OUTPATIENT
Start: 2021-05-31

## 2021-05-31 RX ORDER — HEPARIN 100 UNIT/ML
500 SYRINGE INTRAVENOUS
Status: DISCONTINUED | OUTPATIENT
Start: 2021-05-31 | End: 2021-05-31 | Stop reason: HOSPADM

## 2021-05-31 RX ORDER — SODIUM CHLORIDE 0.9 % (FLUSH) 0.9 %
10 SYRINGE (ML) INJECTION
Status: COMPLETED | OUTPATIENT
Start: 2021-05-31 | End: 2021-05-31

## 2021-05-31 RX ORDER — HEPARIN 100 UNIT/ML
500 SYRINGE INTRAVENOUS
Status: CANCELLED | OUTPATIENT
Start: 2021-05-31

## 2021-05-31 RX ADMIN — Medication 10 ML: at 01:05

## 2021-06-08 ENCOUNTER — OFFICE VISIT (OUTPATIENT)
Dept: OTOLARYNGOLOGY | Facility: CLINIC | Age: 76
End: 2021-06-08
Payer: MEDICARE

## 2021-06-08 VITALS — TEMPERATURE: 99 F | HEIGHT: 76 IN | BODY MASS INDEX: 24.96 KG/M2 | WEIGHT: 205 LBS

## 2021-06-08 DIAGNOSIS — H60.392 OTHER INFECTIVE CHRONIC OTITIS EXTERNA OF LEFT EAR: Primary | ICD-10-CM

## 2021-06-08 PROCEDURE — 1101F PR PT FALLS ASSESS DOC 0-1 FALLS W/OUT INJ PAST YR: ICD-10-PCS | Mod: CPTII,S$GLB,, | Performed by: OTOLARYNGOLOGY

## 2021-06-08 PROCEDURE — 3288F PR FALLS RISK ASSESSMENT DOCUMENTED: ICD-10-PCS | Mod: CPTII,S$GLB,, | Performed by: OTOLARYNGOLOGY

## 2021-06-08 PROCEDURE — 99999 PR PBB SHADOW E&M-EST. PATIENT-LVL III: ICD-10-PCS | Mod: PBBFAC,,, | Performed by: OTOLARYNGOLOGY

## 2021-06-08 PROCEDURE — 99212 PR OFFICE/OUTPT VISIT, EST, LEVL II, 10-19 MIN: ICD-10-PCS | Mod: S$GLB,,, | Performed by: OTOLARYNGOLOGY

## 2021-06-08 PROCEDURE — 99212 OFFICE O/P EST SF 10 MIN: CPT | Mod: S$GLB,,, | Performed by: OTOLARYNGOLOGY

## 2021-06-08 PROCEDURE — 1101F PT FALLS ASSESS-DOCD LE1/YR: CPT | Mod: CPTII,S$GLB,, | Performed by: OTOLARYNGOLOGY

## 2021-06-08 PROCEDURE — 1159F MED LIST DOCD IN RCRD: CPT | Mod: S$GLB,,, | Performed by: OTOLARYNGOLOGY

## 2021-06-08 PROCEDURE — 3288F FALL RISK ASSESSMENT DOCD: CPT | Mod: CPTII,S$GLB,, | Performed by: OTOLARYNGOLOGY

## 2021-06-08 PROCEDURE — 1126F AMNT PAIN NOTED NONE PRSNT: CPT | Mod: S$GLB,,, | Performed by: OTOLARYNGOLOGY

## 2021-06-08 PROCEDURE — 1126F PR PAIN SEVERITY QUANTIFIED, NO PAIN PRESENT: ICD-10-PCS | Mod: S$GLB,,, | Performed by: OTOLARYNGOLOGY

## 2021-06-08 PROCEDURE — 99999 PR PBB SHADOW E&M-EST. PATIENT-LVL III: CPT | Mod: PBBFAC,,, | Performed by: OTOLARYNGOLOGY

## 2021-06-08 PROCEDURE — 1159F PR MEDICATION LIST DOCUMENTED IN MEDICAL RECORD: ICD-10-PCS | Mod: S$GLB,,, | Performed by: OTOLARYNGOLOGY

## 2021-06-23 ENCOUNTER — TELEPHONE (OUTPATIENT)
Dept: HEMATOLOGY/ONCOLOGY | Facility: CLINIC | Age: 76
End: 2021-06-23

## 2021-06-23 ENCOUNTER — PATIENT MESSAGE (OUTPATIENT)
Dept: HEMATOLOGY/ONCOLOGY | Facility: CLINIC | Age: 76
End: 2021-06-23

## 2021-06-24 ENCOUNTER — TELEPHONE (OUTPATIENT)
Dept: HEMATOLOGY/ONCOLOGY | Facility: CLINIC | Age: 76
End: 2021-06-24

## 2021-07-06 ENCOUNTER — HOSPITAL ENCOUNTER (OUTPATIENT)
Dept: RADIOLOGY | Facility: HOSPITAL | Age: 76
Discharge: HOME | End: 2021-07-06
Attending: INTERNAL MEDICINE
Payer: MEDICARE

## 2021-07-06 DIAGNOSIS — R63.4 UNEXPLAINED WEIGHT LOSS: ICD-10-CM

## 2021-07-06 DIAGNOSIS — C16.0 GE JUNCTION CARCINOMA: ICD-10-CM

## 2021-07-06 PROCEDURE — 25500020 PHARM REV CODE 255: Mod: PO | Performed by: INTERNAL MEDICINE

## 2021-07-06 PROCEDURE — 71260 CT CHEST ABDOMEN WITH CONTRAST (XPD): ICD-10-PCS | Mod: 26,,, | Performed by: RADIOLOGY

## 2021-07-06 PROCEDURE — 74160 CT CHEST ABDOMEN WITH CONTRAST (XPD): ICD-10-PCS | Mod: 26,,, | Performed by: RADIOLOGY

## 2021-07-06 PROCEDURE — A9698 NON-RAD CONTRAST MATERIALNOC: HCPCS | Mod: PO | Performed by: INTERNAL MEDICINE

## 2021-07-06 PROCEDURE — 71260 CT THORAX DX C+: CPT | Mod: 26,,, | Performed by: RADIOLOGY

## 2021-07-06 PROCEDURE — 71260 CT THORAX DX C+: CPT | Mod: TC,PO

## 2021-07-06 PROCEDURE — 74160 CT ABDOMEN W/CONTRAST: CPT | Mod: 26,,, | Performed by: RADIOLOGY

## 2021-07-06 RX ADMIN — IOHEXOL 100 ML: 350 INJECTION, SOLUTION INTRAVENOUS at 10:07

## 2021-07-06 RX ADMIN — IOHEXOL 500 ML: 12 SOLUTION ORAL at 10:07

## 2021-07-08 ENCOUNTER — PES CALL (OUTPATIENT)
Dept: ADMINISTRATIVE | Facility: CLINIC | Age: 76
End: 2021-07-08

## 2021-07-12 ENCOUNTER — INFUSION (OUTPATIENT)
Dept: INFUSION THERAPY | Facility: HOSPITAL | Age: 76
End: 2021-07-12
Attending: INTERNAL MEDICINE
Payer: MEDICARE

## 2021-07-12 VITALS — TEMPERATURE: 98 F

## 2021-07-12 DIAGNOSIS — C16.0 GE JUNCTION CARCINOMA: Primary | ICD-10-CM

## 2021-07-12 PROCEDURE — 96523 IRRIG DRUG DELIVERY DEVICE: CPT | Mod: PN

## 2021-07-12 PROCEDURE — A4216 STERILE WATER/SALINE, 10 ML: HCPCS | Mod: PN | Performed by: INTERNAL MEDICINE

## 2021-07-12 PROCEDURE — 63600175 PHARM REV CODE 636 W HCPCS: Mod: PN | Performed by: INTERNAL MEDICINE

## 2021-07-12 PROCEDURE — 25000003 PHARM REV CODE 250: Mod: PN | Performed by: INTERNAL MEDICINE

## 2021-07-12 RX ORDER — SODIUM CHLORIDE 0.9 % (FLUSH) 0.9 %
10 SYRINGE (ML) INJECTION
Status: CANCELLED | OUTPATIENT
Start: 2021-07-12

## 2021-07-12 RX ORDER — SODIUM CHLORIDE 0.9 % (FLUSH) 0.9 %
10 SYRINGE (ML) INJECTION
Status: COMPLETED | OUTPATIENT
Start: 2021-07-12 | End: 2021-07-12

## 2021-07-12 RX ORDER — HEPARIN 100 UNIT/ML
500 SYRINGE INTRAVENOUS
Status: CANCELLED | OUTPATIENT
Start: 2021-07-12

## 2021-07-12 RX ORDER — HEPARIN 100 UNIT/ML
500 SYRINGE INTRAVENOUS
Status: COMPLETED | OUTPATIENT
Start: 2021-07-12 | End: 2021-07-12

## 2021-07-12 RX ADMIN — Medication 10 ML: at 10:07

## 2021-07-12 RX ADMIN — HEPARIN 500 UNITS: 100 SYRINGE at 11:07

## 2021-07-19 ENCOUNTER — PATIENT OUTREACH (OUTPATIENT)
Dept: ADMINISTRATIVE | Facility: OTHER | Age: 76
End: 2021-07-19

## 2021-07-21 ENCOUNTER — OFFICE VISIT (OUTPATIENT)
Dept: PODIATRY | Facility: CLINIC | Age: 76
End: 2021-07-21
Payer: MEDICARE

## 2021-07-21 VITALS
SYSTOLIC BLOOD PRESSURE: 134 MMHG | DIASTOLIC BLOOD PRESSURE: 61 MMHG | HEIGHT: 76 IN | WEIGHT: 205 LBS | BODY MASS INDEX: 24.96 KG/M2

## 2021-07-21 DIAGNOSIS — E11.51 TYPE II DIABETES MELLITUS WITH PERIPHERAL CIRCULATORY DISORDER: ICD-10-CM

## 2021-07-21 DIAGNOSIS — M20.41 HAMMER TOES OF BOTH FEET: ICD-10-CM

## 2021-07-21 DIAGNOSIS — M20.42 HAMMER TOES OF BOTH FEET: ICD-10-CM

## 2021-07-21 DIAGNOSIS — E11.49 TYPE II DIABETES MELLITUS WITH NEUROLOGICAL MANIFESTATIONS: Primary | ICD-10-CM

## 2021-07-21 PROCEDURE — 3078F DIAST BP <80 MM HG: CPT | Mod: CPTII,S$GLB,, | Performed by: PODIATRIST

## 2021-07-21 PROCEDURE — 3288F FALL RISK ASSESSMENT DOCD: CPT | Mod: CPTII,S$GLB,, | Performed by: PODIATRIST

## 2021-07-21 PROCEDURE — 1159F PR MEDICATION LIST DOCUMENTED IN MEDICAL RECORD: ICD-10-PCS | Mod: CPTII,S$GLB,, | Performed by: PODIATRIST

## 2021-07-21 PROCEDURE — 1101F PR PT FALLS ASSESS DOC 0-1 FALLS W/OUT INJ PAST YR: ICD-10-PCS | Mod: CPTII,S$GLB,, | Performed by: PODIATRIST

## 2021-07-21 PROCEDURE — 1159F MED LIST DOCD IN RCRD: CPT | Mod: CPTII,S$GLB,, | Performed by: PODIATRIST

## 2021-07-21 PROCEDURE — 1125F PR PAIN SEVERITY QUANTIFIED, PAIN PRESENT: ICD-10-PCS | Mod: CPTII,S$GLB,, | Performed by: PODIATRIST

## 2021-07-21 PROCEDURE — 99999 PR PBB SHADOW E&M-EST. PATIENT-LVL IV: CPT | Mod: PBBFAC,,, | Performed by: PODIATRIST

## 2021-07-21 PROCEDURE — 3075F SYST BP GE 130 - 139MM HG: CPT | Mod: CPTII,S$GLB,, | Performed by: PODIATRIST

## 2021-07-21 PROCEDURE — 3075F PR MOST RECENT SYSTOLIC BLOOD PRESS GE 130-139MM HG: ICD-10-PCS | Mod: CPTII,S$GLB,, | Performed by: PODIATRIST

## 2021-07-21 PROCEDURE — 3078F PR MOST RECENT DIASTOLIC BLOOD PRESSURE < 80 MM HG: ICD-10-PCS | Mod: CPTII,S$GLB,, | Performed by: PODIATRIST

## 2021-07-21 PROCEDURE — 99999 PR PBB SHADOW E&M-EST. PATIENT-LVL IV: ICD-10-PCS | Mod: PBBFAC,,, | Performed by: PODIATRIST

## 2021-07-21 PROCEDURE — 99499 RISK ADDL DX/OHS AUDIT: ICD-10-PCS | Mod: S$GLB,,, | Performed by: PODIATRIST

## 2021-07-21 PROCEDURE — 3051F HG A1C>EQUAL 7.0%<8.0%: CPT | Mod: CPTII,S$GLB,, | Performed by: PODIATRIST

## 2021-07-21 PROCEDURE — 99499 UNLISTED E&M SERVICE: CPT | Mod: S$GLB,,, | Performed by: PODIATRIST

## 2021-07-21 PROCEDURE — 1101F PT FALLS ASSESS-DOCD LE1/YR: CPT | Mod: CPTII,S$GLB,, | Performed by: PODIATRIST

## 2021-07-21 PROCEDURE — 99203 OFFICE O/P NEW LOW 30 MIN: CPT | Mod: S$GLB,,, | Performed by: PODIATRIST

## 2021-07-21 PROCEDURE — 1125F AMNT PAIN NOTED PAIN PRSNT: CPT | Mod: CPTII,S$GLB,, | Performed by: PODIATRIST

## 2021-07-21 PROCEDURE — 99203 PR OFFICE/OUTPT VISIT, NEW, LEVL III, 30-44 MIN: ICD-10-PCS | Mod: S$GLB,,, | Performed by: PODIATRIST

## 2021-07-21 PROCEDURE — 3288F PR FALLS RISK ASSESSMENT DOCUMENTED: ICD-10-PCS | Mod: CPTII,S$GLB,, | Performed by: PODIATRIST

## 2021-07-21 PROCEDURE — 3051F PR MOST RECENT HEMOGLOBIN A1C LEVEL 7.0 - < 8.0%: ICD-10-PCS | Mod: CPTII,S$GLB,, | Performed by: PODIATRIST

## 2021-08-11 ENCOUNTER — PES CALL (OUTPATIENT)
Dept: ADMINISTRATIVE | Facility: CLINIC | Age: 76
End: 2021-08-11

## 2021-08-12 ENCOUNTER — PATIENT MESSAGE (OUTPATIENT)
Dept: HEMATOLOGY/ONCOLOGY | Facility: CLINIC | Age: 76
End: 2021-08-12

## 2021-08-12 ENCOUNTER — TELEPHONE (OUTPATIENT)
Dept: HEMATOLOGY/ONCOLOGY | Facility: CLINIC | Age: 76
End: 2021-08-12

## 2021-08-23 ENCOUNTER — INFUSION (OUTPATIENT)
Dept: INFUSION THERAPY | Facility: HOSPITAL | Age: 76
End: 2021-08-23
Attending: INTERNAL MEDICINE
Payer: MEDICARE

## 2021-08-23 DIAGNOSIS — C16.0 GE JUNCTION CARCINOMA: Primary | ICD-10-CM

## 2021-08-23 PROCEDURE — 25000003 PHARM REV CODE 250: Mod: PN | Performed by: INTERNAL MEDICINE

## 2021-08-23 PROCEDURE — A4216 STERILE WATER/SALINE, 10 ML: HCPCS | Mod: PN | Performed by: INTERNAL MEDICINE

## 2021-08-23 PROCEDURE — 96523 IRRIG DRUG DELIVERY DEVICE: CPT | Mod: PN

## 2021-08-23 PROCEDURE — 63600175 PHARM REV CODE 636 W HCPCS: Mod: PN | Performed by: INTERNAL MEDICINE

## 2021-08-23 RX ORDER — SODIUM CHLORIDE 0.9 % (FLUSH) 0.9 %
10 SYRINGE (ML) INJECTION
Status: COMPLETED | OUTPATIENT
Start: 2021-08-23 | End: 2021-08-23

## 2021-08-23 RX ORDER — SODIUM CHLORIDE 0.9 % (FLUSH) 0.9 %
10 SYRINGE (ML) INJECTION
Status: CANCELLED | OUTPATIENT
Start: 2021-08-23

## 2021-08-23 RX ORDER — HEPARIN 100 UNIT/ML
500 SYRINGE INTRAVENOUS
Status: COMPLETED | OUTPATIENT
Start: 2021-08-23 | End: 2021-08-23

## 2021-08-23 RX ORDER — HEPARIN 100 UNIT/ML
500 SYRINGE INTRAVENOUS
Status: CANCELLED | OUTPATIENT
Start: 2021-08-23

## 2021-08-23 RX ADMIN — HEPARIN 500 UNITS: 100 SYRINGE at 11:08

## 2021-08-23 RX ADMIN — Medication 10 ML: at 11:08

## 2021-08-25 ENCOUNTER — PES CALL (OUTPATIENT)
Dept: ADMINISTRATIVE | Facility: CLINIC | Age: 76
End: 2021-08-25

## 2021-08-25 ENCOUNTER — LAB VISIT (OUTPATIENT)
Dept: LAB | Facility: HOSPITAL | Age: 76
End: 2021-08-25
Attending: FAMILY MEDICINE
Payer: MEDICARE

## 2021-08-25 DIAGNOSIS — I25.110 CORONARY ARTERY DISEASE INVOLVING NATIVE CORONARY ARTERY OF NATIVE HEART WITH UNSTABLE ANGINA PECTORIS: ICD-10-CM

## 2021-08-25 DIAGNOSIS — E11.9 TYPE 2 DIABETES MELLITUS TREATED WITHOUT INSULIN: ICD-10-CM

## 2021-08-25 LAB
ALBUMIN SERPL BCP-MCNC: 3.8 G/DL (ref 3.5–5.2)
ALP SERPL-CCNC: 118 U/L (ref 55–135)
ALT SERPL W/O P-5'-P-CCNC: 14 U/L (ref 10–44)
ANION GAP SERPL CALC-SCNC: 6 MMOL/L (ref 8–16)
AST SERPL-CCNC: 17 U/L (ref 10–40)
BASOPHILS # BLD AUTO: 0.03 K/UL (ref 0–0.2)
BASOPHILS NFR BLD: 0.6 % (ref 0–1.9)
BILIRUB SERPL-MCNC: 0.7 MG/DL (ref 0.1–1)
BUN SERPL-MCNC: 19 MG/DL (ref 8–23)
CALCIUM SERPL-MCNC: 9.8 MG/DL (ref 8.7–10.5)
CHLORIDE SERPL-SCNC: 103 MMOL/L (ref 95–110)
CHOLEST SERPL-MCNC: 98 MG/DL (ref 120–199)
CHOLEST/HDLC SERPL: 2.4 {RATIO} (ref 2–5)
CO2 SERPL-SCNC: 29 MMOL/L (ref 23–29)
CREAT SERPL-MCNC: 1 MG/DL (ref 0.5–1.4)
DIFFERENTIAL METHOD: ABNORMAL
EOSINOPHIL # BLD AUTO: 0.5 K/UL (ref 0–0.5)
EOSINOPHIL NFR BLD: 9.6 % (ref 0–8)
ERYTHROCYTE [DISTWIDTH] IN BLOOD BY AUTOMATED COUNT: 13.8 % (ref 11.5–14.5)
EST. GFR  (AFRICAN AMERICAN): >60 ML/MIN/1.73 M^2
EST. GFR  (NON AFRICAN AMERICAN): >60 ML/MIN/1.73 M^2
ESTIMATED AVG GLUCOSE: 183 MG/DL (ref 68–131)
GLUCOSE SERPL-MCNC: 168 MG/DL (ref 70–110)
HBA1C MFR BLD: 8 % (ref 4–5.6)
HCT VFR BLD AUTO: 36.7 % (ref 40–54)
HDLC SERPL-MCNC: 41 MG/DL (ref 40–75)
HDLC SERPL: 41.8 % (ref 20–50)
HGB BLD-MCNC: 11.3 G/DL (ref 14–18)
IMM GRANULOCYTES # BLD AUTO: 0.02 K/UL (ref 0–0.04)
IMM GRANULOCYTES NFR BLD AUTO: 0.4 % (ref 0–0.5)
LDLC SERPL CALC-MCNC: 44 MG/DL (ref 63–159)
LYMPHOCYTES # BLD AUTO: 0.6 K/UL (ref 1–4.8)
LYMPHOCYTES NFR BLD: 12 % (ref 18–48)
MCH RBC QN AUTO: 29 PG (ref 27–31)
MCHC RBC AUTO-ENTMCNC: 30.8 G/DL (ref 32–36)
MCV RBC AUTO: 94 FL (ref 82–98)
MONOCYTES # BLD AUTO: 0.5 K/UL (ref 0.3–1)
MONOCYTES NFR BLD: 10.8 % (ref 4–15)
NEUTROPHILS # BLD AUTO: 3.3 K/UL (ref 1.8–7.7)
NEUTROPHILS NFR BLD: 66.6 % (ref 38–73)
NONHDLC SERPL-MCNC: 57 MG/DL
NRBC BLD-RTO: 0 /100 WBC
PLATELET # BLD AUTO: 132 K/UL (ref 150–450)
PMV BLD AUTO: 11.7 FL (ref 9.2–12.9)
POTASSIUM SERPL-SCNC: 4.5 MMOL/L (ref 3.5–5.1)
PROT SERPL-MCNC: 7 G/DL (ref 6–8.4)
RBC # BLD AUTO: 3.89 M/UL (ref 4.6–6.2)
SODIUM SERPL-SCNC: 138 MMOL/L (ref 136–145)
TRIGL SERPL-MCNC: 65 MG/DL (ref 30–150)
WBC # BLD AUTO: 5 K/UL (ref 3.9–12.7)

## 2021-08-25 PROCEDURE — 83036 HEMOGLOBIN GLYCOSYLATED A1C: CPT | Performed by: FAMILY MEDICINE

## 2021-08-25 PROCEDURE — 80061 LIPID PANEL: CPT | Performed by: FAMILY MEDICINE

## 2021-08-25 PROCEDURE — 80053 COMPREHEN METABOLIC PANEL: CPT | Performed by: FAMILY MEDICINE

## 2021-08-25 PROCEDURE — 36415 COLL VENOUS BLD VENIPUNCTURE: CPT | Mod: PO | Performed by: FAMILY MEDICINE

## 2021-08-25 PROCEDURE — 85025 COMPLETE CBC W/AUTO DIFF WBC: CPT | Performed by: FAMILY MEDICINE

## 2021-08-27 ENCOUNTER — OFFICE VISIT (OUTPATIENT)
Dept: HEMATOLOGY/ONCOLOGY | Facility: CLINIC | Age: 76
End: 2021-08-27
Payer: MEDICARE

## 2021-08-27 VITALS
SYSTOLIC BLOOD PRESSURE: 133 MMHG | TEMPERATURE: 98 F | HEART RATE: 67 BPM | WEIGHT: 203.69 LBS | OXYGEN SATURATION: 98 % | RESPIRATION RATE: 18 BRPM | HEIGHT: 76 IN | DIASTOLIC BLOOD PRESSURE: 67 MMHG | BODY MASS INDEX: 24.8 KG/M2

## 2021-08-27 DIAGNOSIS — G63 POLYNEUROPATHY ASSOCIATED WITH UNDERLYING DISEASE: ICD-10-CM

## 2021-08-27 DIAGNOSIS — C16.0 GE JUNCTION CARCINOMA: Primary | ICD-10-CM

## 2021-08-27 DIAGNOSIS — R91.1 PULMONARY NODULE: ICD-10-CM

## 2021-08-27 DIAGNOSIS — R26.9 GAIT DISTURBANCE: ICD-10-CM

## 2021-08-27 PROCEDURE — 3078F PR MOST RECENT DIASTOLIC BLOOD PRESSURE < 80 MM HG: ICD-10-PCS | Mod: CPTII,S$GLB,, | Performed by: INTERNAL MEDICINE

## 2021-08-27 PROCEDURE — 3288F FALL RISK ASSESSMENT DOCD: CPT | Mod: CPTII,S$GLB,, | Performed by: INTERNAL MEDICINE

## 2021-08-27 PROCEDURE — 3075F PR MOST RECENT SYSTOLIC BLOOD PRESS GE 130-139MM HG: ICD-10-PCS | Mod: CPTII,S$GLB,, | Performed by: INTERNAL MEDICINE

## 2021-08-27 PROCEDURE — 1125F AMNT PAIN NOTED PAIN PRSNT: CPT | Mod: CPTII,S$GLB,, | Performed by: INTERNAL MEDICINE

## 2021-08-27 PROCEDURE — 3288F PR FALLS RISK ASSESSMENT DOCUMENTED: ICD-10-PCS | Mod: CPTII,S$GLB,, | Performed by: INTERNAL MEDICINE

## 2021-08-27 PROCEDURE — 1101F PR PT FALLS ASSESS DOC 0-1 FALLS W/OUT INJ PAST YR: ICD-10-PCS | Mod: CPTII,S$GLB,, | Performed by: INTERNAL MEDICINE

## 2021-08-27 PROCEDURE — 99214 OFFICE O/P EST MOD 30 MIN: CPT | Mod: S$GLB,,, | Performed by: INTERNAL MEDICINE

## 2021-08-27 PROCEDURE — 99214 PR OFFICE/OUTPT VISIT, EST, LEVL IV, 30-39 MIN: ICD-10-PCS | Mod: S$GLB,,, | Performed by: INTERNAL MEDICINE

## 2021-08-27 PROCEDURE — 3078F DIAST BP <80 MM HG: CPT | Mod: CPTII,S$GLB,, | Performed by: INTERNAL MEDICINE

## 2021-08-27 PROCEDURE — 1160F PR REVIEW ALL MEDS BY PRESCRIBER/CLIN PHARMACIST DOCUMENTED: ICD-10-PCS | Mod: CPTII,S$GLB,, | Performed by: INTERNAL MEDICINE

## 2021-08-27 PROCEDURE — 1159F MED LIST DOCD IN RCRD: CPT | Mod: CPTII,S$GLB,, | Performed by: INTERNAL MEDICINE

## 2021-08-27 PROCEDURE — 99999 PR PBB SHADOW E&M-EST. PATIENT-LVL IV: ICD-10-PCS | Mod: PBBFAC,,, | Performed by: INTERNAL MEDICINE

## 2021-08-27 PROCEDURE — 1160F RVW MEDS BY RX/DR IN RCRD: CPT | Mod: CPTII,S$GLB,, | Performed by: INTERNAL MEDICINE

## 2021-08-27 PROCEDURE — 1125F PR PAIN SEVERITY QUANTIFIED, PAIN PRESENT: ICD-10-PCS | Mod: CPTII,S$GLB,, | Performed by: INTERNAL MEDICINE

## 2021-08-27 PROCEDURE — 1159F PR MEDICATION LIST DOCUMENTED IN MEDICAL RECORD: ICD-10-PCS | Mod: CPTII,S$GLB,, | Performed by: INTERNAL MEDICINE

## 2021-08-27 PROCEDURE — 3075F SYST BP GE 130 - 139MM HG: CPT | Mod: CPTII,S$GLB,, | Performed by: INTERNAL MEDICINE

## 2021-08-27 PROCEDURE — 99999 PR PBB SHADOW E&M-EST. PATIENT-LVL IV: CPT | Mod: PBBFAC,,, | Performed by: INTERNAL MEDICINE

## 2021-08-27 PROCEDURE — 1101F PT FALLS ASSESS-DOCD LE1/YR: CPT | Mod: CPTII,S$GLB,, | Performed by: INTERNAL MEDICINE

## 2022-01-01 ENCOUNTER — CLINICAL SUPPORT (OUTPATIENT)
Dept: HEMATOLOGY/ONCOLOGY | Facility: CLINIC | Age: 77
End: 2022-01-01
Payer: MEDICARE

## 2022-01-01 ENCOUNTER — TELEPHONE (OUTPATIENT)
Dept: CARDIOLOGY | Facility: CLINIC | Age: 77
End: 2022-01-01
Payer: MEDICARE

## 2022-01-01 ENCOUNTER — HOSPITAL ENCOUNTER (OUTPATIENT)
Dept: RADIOLOGY | Facility: HOSPITAL | Age: 77
Discharge: HOME | End: 2022-06-21
Attending: FAMILY MEDICINE
Payer: MEDICARE

## 2022-01-01 ENCOUNTER — PATIENT MESSAGE (OUTPATIENT)
Dept: FAMILY MEDICINE | Facility: CLINIC | Age: 77
End: 2022-01-01
Payer: MEDICARE

## 2022-01-01 ENCOUNTER — TELEPHONE (OUTPATIENT)
Dept: FAMILY MEDICINE | Facility: CLINIC | Age: 77
End: 2022-01-01
Payer: MEDICARE

## 2022-01-01 ENCOUNTER — PATIENT MESSAGE (OUTPATIENT)
Dept: HEMATOLOGY/ONCOLOGY | Facility: CLINIC | Age: 77
End: 2022-01-01
Payer: MEDICARE

## 2022-01-01 ENCOUNTER — OFFICE VISIT (OUTPATIENT)
Dept: PULMONOLOGY | Facility: CLINIC | Age: 77
End: 2022-01-01
Payer: MEDICARE

## 2022-01-01 ENCOUNTER — TELEPHONE (OUTPATIENT)
Dept: HEMATOLOGY/ONCOLOGY | Facility: CLINIC | Age: 77
End: 2022-01-01
Payer: MEDICARE

## 2022-01-01 ENCOUNTER — OFFICE VISIT (OUTPATIENT)
Dept: HEMATOLOGY/ONCOLOGY | Facility: CLINIC | Age: 77
End: 2022-01-01
Payer: MEDICARE

## 2022-01-01 ENCOUNTER — TELEPHONE (OUTPATIENT)
Dept: SURGERY | Facility: HOSPITAL | Age: 77
End: 2022-01-01
Payer: MEDICARE

## 2022-01-01 ENCOUNTER — TELEPHONE (OUTPATIENT)
Dept: PSYCHIATRY | Facility: CLINIC | Age: 77
End: 2022-01-01
Payer: MEDICARE

## 2022-01-01 ENCOUNTER — CLINICAL SUPPORT (OUTPATIENT)
Dept: REHABILITATION | Facility: HOSPITAL | Age: 77
End: 2022-01-01
Payer: MEDICARE

## 2022-01-01 ENCOUNTER — NURSE TRIAGE (OUTPATIENT)
Dept: ADMINISTRATIVE | Facility: CLINIC | Age: 77
End: 2022-01-01
Payer: MEDICARE

## 2022-01-01 ENCOUNTER — LAB VISIT (OUTPATIENT)
Dept: LAB | Facility: HOSPITAL | Age: 77
End: 2022-01-01
Attending: FAMILY MEDICINE
Payer: MEDICARE

## 2022-01-01 ENCOUNTER — TELEPHONE (OUTPATIENT)
Dept: HEMATOLOGY/ONCOLOGY | Facility: CLINIC | Age: 77
End: 2022-01-01

## 2022-01-01 ENCOUNTER — INFUSION (OUTPATIENT)
Dept: INFUSION THERAPY | Facility: HOSPITAL | Age: 77
End: 2022-01-01
Attending: INTERNAL MEDICINE
Payer: MEDICARE

## 2022-01-01 ENCOUNTER — PATIENT MESSAGE (OUTPATIENT)
Dept: GASTROENTEROLOGY | Facility: CLINIC | Age: 77
End: 2022-01-01
Payer: MEDICARE

## 2022-01-01 ENCOUNTER — LAB VISIT (OUTPATIENT)
Dept: LAB | Facility: HOSPITAL | Age: 77
End: 2022-01-01
Attending: INTERNAL MEDICINE
Payer: MEDICARE

## 2022-01-01 ENCOUNTER — PATIENT OUTREACH (OUTPATIENT)
Dept: ADMINISTRATIVE | Facility: OTHER | Age: 77
End: 2022-01-01
Payer: MEDICARE

## 2022-01-01 ENCOUNTER — TELEPHONE (OUTPATIENT)
Dept: GASTROENTEROLOGY | Facility: CLINIC | Age: 77
End: 2022-01-01
Payer: MEDICARE

## 2022-01-01 ENCOUNTER — IMMUNIZATION (OUTPATIENT)
Dept: FAMILY MEDICINE | Facility: CLINIC | Age: 77
End: 2022-01-01
Payer: MEDICARE

## 2022-01-01 ENCOUNTER — HOSPITAL ENCOUNTER (OUTPATIENT)
Dept: RADIOLOGY | Facility: HOSPITAL | Age: 77
Discharge: HOME | End: 2022-07-27
Attending: INTERNAL MEDICINE
Payer: MEDICARE

## 2022-01-01 ENCOUNTER — PES CALL (OUTPATIENT)
Dept: ADMINISTRATIVE | Facility: CLINIC | Age: 77
End: 2022-01-01
Payer: MEDICARE

## 2022-01-01 ENCOUNTER — PATIENT MESSAGE (OUTPATIENT)
Dept: SMOKING CESSATION | Facility: CLINIC | Age: 77
End: 2022-01-01
Payer: MEDICARE

## 2022-01-01 ENCOUNTER — OFFICE VISIT (OUTPATIENT)
Dept: FAMILY MEDICINE | Facility: CLINIC | Age: 77
End: 2022-01-01
Payer: MEDICARE

## 2022-01-01 ENCOUNTER — OFFICE VISIT (OUTPATIENT)
Dept: PSYCHIATRY | Facility: CLINIC | Age: 77
End: 2022-01-01
Payer: MEDICARE

## 2022-01-01 ENCOUNTER — CLINICAL SUPPORT (OUTPATIENT)
Dept: HEMATOLOGY/ONCOLOGY | Facility: CLINIC | Age: 77
End: 2022-01-01
Attending: FAMILY MEDICINE
Payer: MEDICARE

## 2022-01-01 ENCOUNTER — PATIENT MESSAGE (OUTPATIENT)
Dept: ADMINISTRATIVE | Facility: OTHER | Age: 77
End: 2022-01-01
Payer: MEDICARE

## 2022-01-01 ENCOUNTER — PROCEDURE VISIT (OUTPATIENT)
Dept: UROLOGY | Facility: CLINIC | Age: 77
End: 2022-01-01
Payer: MEDICARE

## 2022-01-01 ENCOUNTER — TELEPHONE (OUTPATIENT)
Dept: GASTROENTEROLOGY | Facility: CLINIC | Age: 77
End: 2022-01-01

## 2022-01-01 ENCOUNTER — CLINICAL SUPPORT (OUTPATIENT)
Dept: HEMATOLOGY/ONCOLOGY | Facility: CLINIC | Age: 77
End: 2022-01-01
Attending: INTERNAL MEDICINE
Payer: MEDICARE

## 2022-01-01 ENCOUNTER — DOCUMENTATION ONLY (OUTPATIENT)
Dept: HEMATOLOGY/ONCOLOGY | Facility: CLINIC | Age: 77
End: 2022-01-01
Payer: MEDICARE

## 2022-01-01 ENCOUNTER — HOSPITAL ENCOUNTER (OUTPATIENT)
Dept: RADIOLOGY | Facility: HOSPITAL | Age: 77
Discharge: HOME | End: 2022-02-14
Attending: INTERNAL MEDICINE
Payer: MEDICARE

## 2022-01-01 ENCOUNTER — OUTPATIENT CASE MANAGEMENT (OUTPATIENT)
Dept: ADMINISTRATIVE | Facility: OTHER | Age: 77
End: 2022-01-01
Payer: MEDICARE

## 2022-01-01 ENCOUNTER — HOSPITAL ENCOUNTER (OUTPATIENT)
Dept: RADIOLOGY | Facility: HOSPITAL | Age: 77
Discharge: HOME | End: 2022-03-03
Attending: INTERNAL MEDICINE
Payer: MEDICARE

## 2022-01-01 VITALS
BODY MASS INDEX: 16.84 KG/M2 | RESPIRATION RATE: 16 BRPM | SYSTOLIC BLOOD PRESSURE: 114 MMHG | WEIGHT: 138.25 LBS | DIASTOLIC BLOOD PRESSURE: 73 MMHG | HEIGHT: 76 IN | TEMPERATURE: 97 F | HEART RATE: 73 BPM | OXYGEN SATURATION: 97 %

## 2022-01-01 VITALS
RESPIRATION RATE: 18 BRPM | BODY MASS INDEX: 20.22 KG/M2 | SYSTOLIC BLOOD PRESSURE: 133 MMHG | DIASTOLIC BLOOD PRESSURE: 69 MMHG | TEMPERATURE: 97 F | OXYGEN SATURATION: 100 % | WEIGHT: 166 LBS | HEART RATE: 70 BPM | HEIGHT: 76 IN

## 2022-01-01 VITALS
DIASTOLIC BLOOD PRESSURE: 68 MMHG | HEART RATE: 89 BPM | BODY MASS INDEX: 16.44 KG/M2 | WEIGHT: 135 LBS | SYSTOLIC BLOOD PRESSURE: 97 MMHG | HEIGHT: 76 IN

## 2022-01-01 VITALS
BODY MASS INDEX: 16.86 KG/M2 | DIASTOLIC BLOOD PRESSURE: 66 MMHG | TEMPERATURE: 96 F | SYSTOLIC BLOOD PRESSURE: 103 MMHG | HEIGHT: 76 IN | RESPIRATION RATE: 16 BRPM | WEIGHT: 138.44 LBS | OXYGEN SATURATION: 93 % | HEART RATE: 89 BPM

## 2022-01-01 VITALS
SYSTOLIC BLOOD PRESSURE: 124 MMHG | HEART RATE: 74 BPM | HEIGHT: 76 IN | OXYGEN SATURATION: 98 % | RESPIRATION RATE: 16 BRPM | WEIGHT: 167.56 LBS | DIASTOLIC BLOOD PRESSURE: 69 MMHG | TEMPERATURE: 97 F | BODY MASS INDEX: 20.4 KG/M2

## 2022-01-01 VITALS
HEART RATE: 68 BPM | SYSTOLIC BLOOD PRESSURE: 114 MMHG | HEIGHT: 76 IN | WEIGHT: 172.38 LBS | OXYGEN SATURATION: 97 % | BODY MASS INDEX: 20.99 KG/M2 | TEMPERATURE: 98 F | DIASTOLIC BLOOD PRESSURE: 70 MMHG | RESPIRATION RATE: 18 BRPM

## 2022-01-01 VITALS
DIASTOLIC BLOOD PRESSURE: 60 MMHG | HEART RATE: 99 BPM | BODY MASS INDEX: 19.14 KG/M2 | SYSTOLIC BLOOD PRESSURE: 110 MMHG | HEIGHT: 72 IN | WEIGHT: 141.31 LBS | RESPIRATION RATE: 18 BRPM | OXYGEN SATURATION: 97 % | TEMPERATURE: 98 F

## 2022-01-01 VITALS
HEART RATE: 75 BPM | SYSTOLIC BLOOD PRESSURE: 103 MMHG | BODY MASS INDEX: 20.7 KG/M2 | OXYGEN SATURATION: 100 % | HEIGHT: 76 IN | WEIGHT: 170 LBS | DIASTOLIC BLOOD PRESSURE: 56 MMHG | TEMPERATURE: 98 F

## 2022-01-01 VITALS — BODY MASS INDEX: 20.24 KG/M2 | HEIGHT: 76 IN | WEIGHT: 166.25 LBS

## 2022-01-01 VITALS
WEIGHT: 172.38 LBS | TEMPERATURE: 97 F | OXYGEN SATURATION: 100 % | SYSTOLIC BLOOD PRESSURE: 133 MMHG | DIASTOLIC BLOOD PRESSURE: 82 MMHG | HEART RATE: 61 BPM | RESPIRATION RATE: 18 BRPM | BODY MASS INDEX: 20.99 KG/M2 | HEIGHT: 76 IN

## 2022-01-01 VITALS
HEIGHT: 72 IN | WEIGHT: 152.31 LBS | TEMPERATURE: 99 F | HEART RATE: 70 BPM | SYSTOLIC BLOOD PRESSURE: 110 MMHG | DIASTOLIC BLOOD PRESSURE: 60 MMHG | BODY MASS INDEX: 20.63 KG/M2 | OXYGEN SATURATION: 98 % | RESPIRATION RATE: 18 BRPM

## 2022-01-01 VITALS
TEMPERATURE: 98 F | HEIGHT: 76 IN | RESPIRATION RATE: 18 BRPM | SYSTOLIC BLOOD PRESSURE: 110 MMHG | BODY MASS INDEX: 20.22 KG/M2 | HEART RATE: 71 BPM | WEIGHT: 166 LBS | OXYGEN SATURATION: 98 % | DIASTOLIC BLOOD PRESSURE: 50 MMHG

## 2022-01-01 DIAGNOSIS — I25.110 CORONARY ARTERY DISEASE INVOLVING NATIVE CORONARY ARTERY OF NATIVE HEART WITH UNSTABLE ANGINA PECTORIS: ICD-10-CM

## 2022-01-01 DIAGNOSIS — T45.1X5A CHEMOTHERAPY-INDUCED NEUROPATHY: ICD-10-CM

## 2022-01-01 DIAGNOSIS — C16.0 GE JUNCTION CARCINOMA: Primary | ICD-10-CM

## 2022-01-01 DIAGNOSIS — G89.29 CHRONIC RIGHT-SIDED LOW BACK PAIN WITH RIGHT-SIDED SCIATICA: Primary | ICD-10-CM

## 2022-01-01 DIAGNOSIS — M54.50 CHRONIC BILATERAL LOW BACK PAIN WITHOUT SCIATICA: Primary | ICD-10-CM

## 2022-01-01 DIAGNOSIS — R26.9 GAIT DISTURBANCE: ICD-10-CM

## 2022-01-01 DIAGNOSIS — R26.9 GAIT DISTURBANCE: Primary | ICD-10-CM

## 2022-01-01 DIAGNOSIS — C16.0 GE JUNCTION CARCINOMA: ICD-10-CM

## 2022-01-01 DIAGNOSIS — R63.4 WEIGHT DECREASING: ICD-10-CM

## 2022-01-01 DIAGNOSIS — G63 POLYNEUROPATHY ASSOCIATED WITH UNDERLYING DISEASE: ICD-10-CM

## 2022-01-01 DIAGNOSIS — G62.9 POLYNEUROPATHY: ICD-10-CM

## 2022-01-01 DIAGNOSIS — R53.1 WEAKNESS GENERALIZED: ICD-10-CM

## 2022-01-01 DIAGNOSIS — F32.A DEPRESSION, UNSPECIFIED DEPRESSION TYPE: ICD-10-CM

## 2022-01-01 DIAGNOSIS — F43.21 ADJUSTMENT DISORDER WITH DEPRESSED MOOD: Primary | ICD-10-CM

## 2022-01-01 DIAGNOSIS — R91.8 PULMONARY NODULES/LESIONS, MULTIPLE: ICD-10-CM

## 2022-01-01 DIAGNOSIS — E11.49 TYPE II DIABETES MELLITUS WITH NEUROLOGICAL MANIFESTATIONS: ICD-10-CM

## 2022-01-01 DIAGNOSIS — R63.4 WEIGHT LOSS: ICD-10-CM

## 2022-01-01 DIAGNOSIS — G62.0 CHEMOTHERAPY-INDUCED NEUROPATHY: ICD-10-CM

## 2022-01-01 DIAGNOSIS — R63.4 ABNORMAL WEIGHT LOSS: ICD-10-CM

## 2022-01-01 DIAGNOSIS — Z85.46 HISTORY OF PROSTATE CANCER: ICD-10-CM

## 2022-01-01 DIAGNOSIS — R11.0 CHRONIC NAUSEA: ICD-10-CM

## 2022-01-01 DIAGNOSIS — F32.0 CURRENT MILD EPISODE OF MAJOR DEPRESSIVE DISORDER WITHOUT PRIOR EPISODE: ICD-10-CM

## 2022-01-01 DIAGNOSIS — T45.1X5A LEUKOPENIA DUE TO ANTINEOPLASTIC CHEMOTHERAPY: ICD-10-CM

## 2022-01-01 DIAGNOSIS — D69.59 CHEMOTHERAPY-INDUCED THROMBOCYTOPENIA: Primary | ICD-10-CM

## 2022-01-01 DIAGNOSIS — D69.6 THROMBOCYTOPENIA: ICD-10-CM

## 2022-01-01 DIAGNOSIS — R63.0 LOSS OF APPETITE: ICD-10-CM

## 2022-01-01 DIAGNOSIS — M54.41 CHRONIC BILATERAL LOW BACK PAIN WITH BILATERAL SCIATICA: Primary | ICD-10-CM

## 2022-01-01 DIAGNOSIS — G89.29 CHRONIC BILATERAL LOW BACK PAIN WITHOUT SCIATICA: Primary | ICD-10-CM

## 2022-01-01 DIAGNOSIS — Z23 NEED FOR VACCINATION: Primary | ICD-10-CM

## 2022-01-01 DIAGNOSIS — G89.29 CHRONIC LOW BACK PAIN, UNSPECIFIED BACK PAIN LATERALITY, UNSPECIFIED WHETHER SCIATICA PRESENT: Primary | ICD-10-CM

## 2022-01-01 DIAGNOSIS — I10 ESSENTIAL HYPERTENSION: ICD-10-CM

## 2022-01-01 DIAGNOSIS — M47.816 LUMBAR SPONDYLOSIS: ICD-10-CM

## 2022-01-01 DIAGNOSIS — D13.0 BENIGN NEOPLASM OF ESOPHAGUS: ICD-10-CM

## 2022-01-01 DIAGNOSIS — M54.9 CHRONIC BACK PAIN, UNSPECIFIED BACK LOCATION, UNSPECIFIED BACK PAIN LATERALITY: ICD-10-CM

## 2022-01-01 DIAGNOSIS — G62.9 NEUROPATHY: ICD-10-CM

## 2022-01-01 DIAGNOSIS — D69.6 THROMBOCYTOPENIA, UNSPECIFIED: ICD-10-CM

## 2022-01-01 DIAGNOSIS — K59.09 OTHER CONSTIPATION: ICD-10-CM

## 2022-01-01 DIAGNOSIS — R10.10 PAIN OF UPPER ABDOMEN: ICD-10-CM

## 2022-01-01 DIAGNOSIS — G89.29 CHRONIC BACK PAIN, UNSPECIFIED BACK LOCATION, UNSPECIFIED BACK PAIN LATERALITY: ICD-10-CM

## 2022-01-01 DIAGNOSIS — R91.8 PULMONARY NODULES: ICD-10-CM

## 2022-01-01 DIAGNOSIS — R06.02 SHORTNESS OF BREATH: ICD-10-CM

## 2022-01-01 DIAGNOSIS — S33.5XXD LUMBAR SPRAIN, SUBSEQUENT ENCOUNTER: ICD-10-CM

## 2022-01-01 DIAGNOSIS — H01.002 BLEPHARITIS OF LOWER EYELIDS OF BOTH EYES, UNSPECIFIED TYPE: ICD-10-CM

## 2022-01-01 DIAGNOSIS — M54.42 CHRONIC BILATERAL LOW BACK PAIN WITH BILATERAL SCIATICA: ICD-10-CM

## 2022-01-01 DIAGNOSIS — T45.1X5A CHEMOTHERAPY-INDUCED THROMBOCYTOPENIA: Primary | ICD-10-CM

## 2022-01-01 DIAGNOSIS — M54.50 CHRONIC LOW BACK PAIN WITHOUT SCIATICA, UNSPECIFIED BACK PAIN LATERALITY: Primary | ICD-10-CM

## 2022-01-01 DIAGNOSIS — R53.83 FATIGUE, UNSPECIFIED TYPE: ICD-10-CM

## 2022-01-01 DIAGNOSIS — E11.9 TYPE 2 DIABETES MELLITUS TREATED WITHOUT INSULIN: ICD-10-CM

## 2022-01-01 DIAGNOSIS — R53.81 DEBILITY: ICD-10-CM

## 2022-01-01 DIAGNOSIS — R11.2 CINV (CHEMOTHERAPY-INDUCED NAUSEA AND VOMITING): ICD-10-CM

## 2022-01-01 DIAGNOSIS — G57.93 NEUROPATHY OF BOTH FEET: ICD-10-CM

## 2022-01-01 DIAGNOSIS — M54.50 CHRONIC LOW BACK PAIN, UNSPECIFIED BACK PAIN LATERALITY, UNSPECIFIED WHETHER SCIATICA PRESENT: ICD-10-CM

## 2022-01-01 DIAGNOSIS — R31.29 MICROSCOPIC HEMATURIA: ICD-10-CM

## 2022-01-01 DIAGNOSIS — G89.29 CHRONIC LOW BACK PAIN, UNSPECIFIED BACK PAIN LATERALITY, UNSPECIFIED WHETHER SCIATICA PRESENT: ICD-10-CM

## 2022-01-01 DIAGNOSIS — M54.50 CHRONIC LOW BACK PAIN, UNSPECIFIED BACK PAIN LATERALITY, UNSPECIFIED WHETHER SCIATICA PRESENT: Primary | ICD-10-CM

## 2022-01-01 DIAGNOSIS — E43 SEVERE PROTEIN-CALORIE MALNUTRITION: ICD-10-CM

## 2022-01-01 DIAGNOSIS — G89.29 CHRONIC LOW BACK PAIN WITHOUT SCIATICA, UNSPECIFIED BACK PAIN LATERALITY: Primary | ICD-10-CM

## 2022-01-01 DIAGNOSIS — R91.1 PULMONARY NODULE: ICD-10-CM

## 2022-01-01 DIAGNOSIS — M54.41 CHRONIC BILATERAL LOW BACK PAIN WITH BILATERAL SCIATICA: ICD-10-CM

## 2022-01-01 DIAGNOSIS — M54.42 CHRONIC BILATERAL LOW BACK PAIN WITH BILATERAL SCIATICA: Primary | ICD-10-CM

## 2022-01-01 DIAGNOSIS — E46 MALNUTRITION, UNSPECIFIED TYPE: Primary | ICD-10-CM

## 2022-01-01 DIAGNOSIS — M54.41 CHRONIC RIGHT-SIDED LOW BACK PAIN WITH RIGHT-SIDED SCIATICA: Primary | ICD-10-CM

## 2022-01-01 DIAGNOSIS — R63.4 UNEXPLAINED WEIGHT LOSS: ICD-10-CM

## 2022-01-01 DIAGNOSIS — G89.29 CHRONIC BACK PAIN, UNSPECIFIED BACK LOCATION, UNSPECIFIED BACK PAIN LATERALITY: Primary | ICD-10-CM

## 2022-01-01 DIAGNOSIS — D50.8 OTHER IRON DEFICIENCY ANEMIA: Primary | ICD-10-CM

## 2022-01-01 DIAGNOSIS — G89.29 CHRONIC BILATERAL LOW BACK PAIN WITH BILATERAL SCIATICA: Primary | ICD-10-CM

## 2022-01-01 DIAGNOSIS — G89.29 CHRONIC BILATERAL LOW BACK PAIN WITH BILATERAL SCIATICA: ICD-10-CM

## 2022-01-01 DIAGNOSIS — T45.1X5A CINV (CHEMOTHERAPY-INDUCED NAUSEA AND VOMITING): Primary | ICD-10-CM

## 2022-01-01 DIAGNOSIS — G63 POLYNEUROPATHY ASSOCIATED WITH UNDERLYING DISEASE: Primary | ICD-10-CM

## 2022-01-01 DIAGNOSIS — F43.23 ADJUSTMENT DISORDER WITH MIXED ANXIETY AND DEPRESSED MOOD: Primary | ICD-10-CM

## 2022-01-01 DIAGNOSIS — T45.1X5A CINV (CHEMOTHERAPY-INDUCED NAUSEA AND VOMITING): ICD-10-CM

## 2022-01-01 DIAGNOSIS — E11.9 TYPE 2 DIABETES MELLITUS TREATED WITHOUT INSULIN: Primary | ICD-10-CM

## 2022-01-01 DIAGNOSIS — R11.2 CINV (CHEMOTHERAPY-INDUCED NAUSEA AND VOMITING): Primary | ICD-10-CM

## 2022-01-01 DIAGNOSIS — R13.10 DYSPHAGIA, UNSPECIFIED TYPE: Primary | ICD-10-CM

## 2022-01-01 DIAGNOSIS — G47.00 INSOMNIA, UNSPECIFIED TYPE: ICD-10-CM

## 2022-01-01 DIAGNOSIS — D70.1 LEUKOPENIA DUE TO ANTINEOPLASTIC CHEMOTHERAPY: ICD-10-CM

## 2022-01-01 DIAGNOSIS — S33.5XXS LUMBAR BACK SPRAIN, SEQUELA: ICD-10-CM

## 2022-01-01 DIAGNOSIS — R53.81 DEBILITY: Primary | ICD-10-CM

## 2022-01-01 DIAGNOSIS — H01.005 BLEPHARITIS OF LOWER EYELIDS OF BOTH EYES, UNSPECIFIED TYPE: ICD-10-CM

## 2022-01-01 DIAGNOSIS — R53.1 WEAKNESS GENERALIZED: Primary | ICD-10-CM

## 2022-01-01 DIAGNOSIS — M54.9 CHRONIC BACK PAIN, UNSPECIFIED BACK LOCATION, UNSPECIFIED BACK PAIN LATERALITY: Primary | ICD-10-CM

## 2022-01-01 DIAGNOSIS — E11.51 TYPE II DIABETES MELLITUS WITH PERIPHERAL CIRCULATORY DISORDER: ICD-10-CM

## 2022-01-01 DIAGNOSIS — I48.0 PAF (PAROXYSMAL ATRIAL FIBRILLATION): ICD-10-CM

## 2022-01-01 DIAGNOSIS — Z71.3 NUTRITIONAL COUNSELING: ICD-10-CM

## 2022-01-01 DIAGNOSIS — D64.9 NORMOCYTIC ANEMIA: ICD-10-CM

## 2022-01-01 DIAGNOSIS — R06.02 SOB (SHORTNESS OF BREATH): ICD-10-CM

## 2022-01-01 DIAGNOSIS — E43 SEVERE MALNUTRITION: ICD-10-CM

## 2022-01-01 DIAGNOSIS — Z91.81 HISTORY OF FALL: ICD-10-CM

## 2022-01-01 LAB
ALBUMIN SERPL BCP-MCNC: 3.9 G/DL (ref 3.5–5.2)
ALP SERPL-CCNC: 76 U/L (ref 55–135)
ALT SERPL W/O P-5'-P-CCNC: 11 U/L (ref 10–44)
ANION GAP SERPL CALC-SCNC: 6 MMOL/L (ref 8–16)
AST SERPL-CCNC: 15 U/L (ref 10–40)
BILIRUB SERPL-MCNC: 0.6 MG/DL (ref 0.1–1)
BUN SERPL-MCNC: 27 MG/DL (ref 8–23)
CALCIUM SERPL-MCNC: 9.8 MG/DL (ref 8.7–10.5)
CHLORIDE SERPL-SCNC: 105 MMOL/L (ref 95–110)
CO2 SERPL-SCNC: 27 MMOL/L (ref 23–29)
COMPLEXED PSA SERPL-MCNC: 0.05 NG/ML (ref 0–4)
CORTIS SERPL-MCNC: 18.3 UG/DL (ref 4.3–22.4)
CREAT SERPL-MCNC: 1 MG/DL (ref 0.5–1.4)
EST. GFR  (AFRICAN AMERICAN): >60 ML/MIN/1.73 M^2
EST. GFR  (NON AFRICAN AMERICAN): >60 ML/MIN/1.73 M^2
ESTIMATED AVG GLUCOSE: 128 MG/DL (ref 68–131)
FERRITIN SERPL-MCNC: 15 NG/ML (ref 20–300)
FOLATE SERPL-MCNC: 8.8 NG/ML (ref 4–24)
GLUCOSE SERPL-MCNC: 147 MG/DL (ref 70–110)
GLUCOSE SERPL-MCNC: 182 MG/DL (ref 70–110)
HBA1C MFR BLD: 6.1 % (ref 4–5.6)
IRON SERPL-MCNC: 52 UG/DL (ref 45–160)
OB PNL STL: NEGATIVE
POTASSIUM SERPL-SCNC: 5.2 MMOL/L (ref 3.5–5.1)
PROT SERPL-MCNC: 6.8 G/DL (ref 6–8.4)
SATURATED IRON: 13 % (ref 20–50)
SODIUM SERPL-SCNC: 138 MMOL/L (ref 136–145)
STFR SERPL-MCNC: 3.3 MG/L (ref 1.8–4.6)
T4 FREE SERPL-MCNC: 1.24 NG/DL (ref 0.71–1.51)
TOTAL IRON BINDING CAPACITY: 397 UG/DL (ref 250–450)
TRANSFERRIN SERPL-MCNC: 268 MG/DL (ref 200–375)
TSH SERPL DL<=0.005 MIU/L-ACNC: 0.74 UIU/ML (ref 0.4–4)
VIT B12 SERPL-MCNC: 258 PG/ML (ref 210–950)

## 2022-01-01 PROCEDURE — 99499 RISK ADDL DX/OHS AUDIT: ICD-10-PCS | Mod: S$GLB,,, | Performed by: FAMILY MEDICINE

## 2022-01-01 PROCEDURE — 1125F AMNT PAIN NOTED PAIN PRSNT: CPT | Mod: CPTII,S$GLB,, | Performed by: INTERNAL MEDICINE

## 2022-01-01 PROCEDURE — 99999 PR PBB SHADOW E&M-EST. PATIENT-LVL V: CPT | Mod: PBBFAC,,, | Performed by: INTERNAL MEDICINE

## 2022-01-01 PROCEDURE — 99999 PR PBB SHADOW E&M-EST. PATIENT-LVL III: ICD-10-PCS | Mod: PBBFAC,HCNC,, | Performed by: FAMILY MEDICINE

## 2022-01-01 PROCEDURE — 99999 PR PBB SHADOW E&M-EST. PATIENT-LVL III: ICD-10-PCS | Mod: PBBFAC,HCNC,, | Performed by: ACUPUNCTURIST

## 2022-01-01 PROCEDURE — 99999 PR PBB SHADOW E&M-EST. PATIENT-LVL V: CPT | Mod: PBBFAC,,, | Performed by: NURSE PRACTITIONER

## 2022-01-01 PROCEDURE — 3074F PR MOST RECENT SYSTOLIC BLOOD PRESSURE < 130 MM HG: ICD-10-PCS | Mod: CPTII,S$GLB,, | Performed by: FAMILY MEDICINE

## 2022-01-01 PROCEDURE — 99999 PR PBB SHADOW E&M-EST. PATIENT-LVL V: ICD-10-PCS | Mod: PBBFAC,HCNC,, | Performed by: INTERNAL MEDICINE

## 2022-01-01 PROCEDURE — 97811 ACUP 1/> W/O ESTIM EA ADD 15: CPT | Mod: S$GLB,,, | Performed by: ACUPUNCTURIST

## 2022-01-01 PROCEDURE — 97140 MANUAL THERAPY 1/> REGIONS: CPT | Mod: PN

## 2022-01-01 PROCEDURE — 1125F AMNT PAIN NOTED PAIN PRSNT: CPT | Mod: HCNC,CPTII,S$GLB, | Performed by: NURSE PRACTITIONER

## 2022-01-01 PROCEDURE — 1126F PR PAIN SEVERITY QUANTIFIED, NO PAIN PRESENT: ICD-10-PCS | Mod: CPTII,S$GLB,, | Performed by: FAMILY MEDICINE

## 2022-01-01 PROCEDURE — 1160F RVW MEDS BY RX/DR IN RCRD: CPT | Mod: CPTII,S$GLB,, | Performed by: INTERNAL MEDICINE

## 2022-01-01 PROCEDURE — 99499 UNLISTED E&M SERVICE: CPT | Mod: S$GLB,,, | Performed by: INTERNAL MEDICINE

## 2022-01-01 PROCEDURE — 84466 ASSAY OF TRANSFERRIN: CPT | Mod: HCNC | Performed by: INTERNAL MEDICINE

## 2022-01-01 PROCEDURE — 3288F FALL RISK ASSESSMENT DOCD: CPT | Mod: HCNC,CPTII,S$GLB, | Performed by: NURSE PRACTITIONER

## 2022-01-01 PROCEDURE — 99999 PR PBB SHADOW E&M-EST. PATIENT-LVL III: ICD-10-PCS | Mod: PBBFAC,,, | Performed by: ACUPUNCTURIST

## 2022-01-01 PROCEDURE — 99215 PR OFFICE/OUTPT VISIT, EST, LEVL V, 40-54 MIN: ICD-10-PCS | Mod: S$GLB,,, | Performed by: INTERNAL MEDICINE

## 2022-01-01 PROCEDURE — 25000003 PHARM REV CODE 250: Mod: HCNC,PN | Performed by: INTERNAL MEDICINE

## 2022-01-01 PROCEDURE — 97811 ACUP 1/> W/O ESTIM EA ADD 15: CPT | Mod: HCNC,S$GLB,, | Performed by: ACUPUNCTURIST

## 2022-01-01 PROCEDURE — 97811 PR ACUPUNCT W/O ELEC STIMUL ADDL 15M: ICD-10-PCS | Mod: S$GLB,,, | Performed by: ACUPUNCTURIST

## 2022-01-01 PROCEDURE — 3288F FALL RISK ASSESSMENT DOCD: CPT | Mod: CPTII,S$GLB,, | Performed by: FAMILY MEDICINE

## 2022-01-01 PROCEDURE — 99215 OFFICE O/P EST HI 40 MIN: CPT | Mod: S$GLB,,, | Performed by: INTERNAL MEDICINE

## 2022-01-01 PROCEDURE — 97110 THERAPEUTIC EXERCISES: CPT | Mod: PN,CQ

## 2022-01-01 PROCEDURE — 1160F PR REVIEW ALL MEDS BY PRESCRIBER/CLIN PHARMACIST DOCUMENTED: ICD-10-PCS | Mod: CPTII,S$GLB,, | Performed by: THORACIC SURGERY (CARDIOTHORACIC VASCULAR SURGERY)

## 2022-01-01 PROCEDURE — 3079F PR MOST RECENT DIASTOLIC BLOOD PRESSURE 80-89 MM HG: ICD-10-PCS | Mod: HCNC,CPTII,S$GLB, | Performed by: NURSE PRACTITIONER

## 2022-01-01 PROCEDURE — 3074F SYST BP LT 130 MM HG: CPT | Mod: CPTII,S$GLB,, | Performed by: THORACIC SURGERY (CARDIOTHORACIC VASCULAR SURGERY)

## 2022-01-01 PROCEDURE — 97810 ACUP 1/> WO ESTIM 1ST 15 MIN: CPT | Mod: S$GLB,,, | Performed by: ACUPUNCTURIST

## 2022-01-01 PROCEDURE — 99999 PR PBB SHADOW E&M-EST. PATIENT-LVL III: ICD-10-PCS | Mod: PBBFAC,,, | Performed by: FAMILY MEDICINE

## 2022-01-01 PROCEDURE — 3288F PR FALLS RISK ASSESSMENT DOCUMENTED: ICD-10-PCS | Mod: CPTII,S$GLB,, | Performed by: THORACIC SURGERY (CARDIOTHORACIC VASCULAR SURGERY)

## 2022-01-01 PROCEDURE — 97810 ACUP 1/> WO ESTIM 1ST 15 MIN: CPT | Mod: HCNC,S$GLB,, | Performed by: ACUPUNCTURIST

## 2022-01-01 PROCEDURE — 1100F PTFALLS ASSESS-DOCD GE2>/YR: CPT | Mod: HCNC,CPTII,S$GLB, | Performed by: INTERNAL MEDICINE

## 2022-01-01 PROCEDURE — 1160F RVW MEDS BY RX/DR IN RCRD: CPT | Mod: CPTII,S$GLB,, | Performed by: NURSE PRACTITIONER

## 2022-01-01 PROCEDURE — 90837 PR PSYCHOTHERAPY W/PATIENT, 60 MIN: ICD-10-PCS | Mod: S$GLB,,, | Performed by: CASE MANAGER/CARE COORDINATOR

## 2022-01-01 PROCEDURE — 1159F MED LIST DOCD IN RCRD: CPT | Mod: HCNC,CPTII,S$GLB, | Performed by: INTERNAL MEDICINE

## 2022-01-01 PROCEDURE — 82272 OCCULT BLD FECES 1-3 TESTS: CPT | Mod: 91,HCNC,PN | Performed by: INTERNAL MEDICINE

## 2022-01-01 PROCEDURE — 3078F DIAST BP <80 MM HG: CPT | Mod: CPTII,S$GLB,, | Performed by: FAMILY MEDICINE

## 2022-01-01 PROCEDURE — 97110 THERAPEUTIC EXERCISES: CPT | Mod: HCNC,PN

## 2022-01-01 PROCEDURE — 97810 PR ACUPUNCT W/O ELEC STIMUL 15 MIN: ICD-10-PCS | Mod: HCNC,S$GLB,, | Performed by: ACUPUNCTURIST

## 2022-01-01 PROCEDURE — 1159F MED LIST DOCD IN RCRD: CPT | Mod: CPTII,S$GLB,, | Performed by: NURSE PRACTITIONER

## 2022-01-01 PROCEDURE — 74150 CT ABDOMEN W/O CONTRAST: CPT | Mod: TC,PO

## 2022-01-01 PROCEDURE — 1125F PR PAIN SEVERITY QUANTIFIED, PAIN PRESENT: ICD-10-PCS | Mod: HCNC,CPTII,S$GLB, | Performed by: INTERNAL MEDICINE

## 2022-01-01 PROCEDURE — 99204 OFFICE O/P NEW MOD 45 MIN: CPT | Mod: S$GLB,,, | Performed by: THORACIC SURGERY (CARDIOTHORACIC VASCULAR SURGERY)

## 2022-01-01 PROCEDURE — 71250 CT THORAX DX C-: CPT | Mod: 26,HCNC,, | Performed by: RADIOLOGY

## 2022-01-01 PROCEDURE — 3288F FALL RISK ASSESSMENT DOCD: CPT | Mod: HCNC,CPTII,S$GLB, | Performed by: INTERNAL MEDICINE

## 2022-01-01 PROCEDURE — 99215 OFFICE O/P EST HI 40 MIN: CPT | Mod: S$GLB,,, | Performed by: FAMILY MEDICINE

## 2022-01-01 PROCEDURE — 99215 PR OFFICE/OUTPT VISIT, EST, LEVL V, 40-54 MIN: ICD-10-PCS | Mod: S$GLB,,, | Performed by: NURSE PRACTITIONER

## 2022-01-01 PROCEDURE — 3078F PR MOST RECENT DIASTOLIC BLOOD PRESSURE < 80 MM HG: ICD-10-PCS | Mod: CPTII,S$GLB,, | Performed by: INTERNAL MEDICINE

## 2022-01-01 PROCEDURE — 99999 PR PBB SHADOW E&M-EST. PATIENT-LVL III: CPT | Mod: PBBFAC,HCNC,, | Performed by: FAMILY MEDICINE

## 2022-01-01 PROCEDURE — 3074F SYST BP LT 130 MM HG: CPT | Mod: CPTII,S$GLB,, | Performed by: FAMILY MEDICINE

## 2022-01-01 PROCEDURE — 97810 PR ACUPUNCT W/O ELEC STIMUL 15 MIN: ICD-10-PCS | Mod: S$GLB,,, | Performed by: ACUPUNCTURIST

## 2022-01-01 PROCEDURE — 25500020 PHARM REV CODE 255: Mod: PO | Performed by: FAMILY MEDICINE

## 2022-01-01 PROCEDURE — 99499 UNLISTED E&M SERVICE: CPT | Mod: S$GLB,,, | Performed by: FAMILY MEDICINE

## 2022-01-01 PROCEDURE — 3288F PR FALLS RISK ASSESSMENT DOCUMENTED: ICD-10-PCS | Mod: HCNC,CPTII,S$GLB, | Performed by: FAMILY MEDICINE

## 2022-01-01 PROCEDURE — 1101F PR PT FALLS ASSESS DOC 0-1 FALLS W/OUT INJ PAST YR: ICD-10-PCS | Mod: CPTII,S$GLB,, | Performed by: THORACIC SURGERY (CARDIOTHORACIC VASCULAR SURGERY)

## 2022-01-01 PROCEDURE — A9698 NON-RAD CONTRAST MATERIALNOC: HCPCS | Mod: PO | Performed by: FAMILY MEDICINE

## 2022-01-01 PROCEDURE — 3288F FALL RISK ASSESSMENT DOCD: CPT | Mod: CPTII,S$GLB,, | Performed by: THORACIC SURGERY (CARDIOTHORACIC VASCULAR SURGERY)

## 2022-01-01 PROCEDURE — 1125F PR PAIN SEVERITY QUANTIFIED, PAIN PRESENT: ICD-10-PCS | Mod: CPTII,S$GLB,, | Performed by: NURSE PRACTITIONER

## 2022-01-01 PROCEDURE — 84439 ASSAY OF FREE THYROXINE: CPT | Performed by: INTERNAL MEDICINE

## 2022-01-01 PROCEDURE — 3074F SYST BP LT 130 MM HG: CPT | Mod: HCNC,CPTII,S$GLB, | Performed by: INTERNAL MEDICINE

## 2022-01-01 PROCEDURE — 1101F PT FALLS ASSESS-DOCD LE1/YR: CPT | Mod: CPTII,S$GLB,, | Performed by: FAMILY MEDICINE

## 2022-01-01 PROCEDURE — 71250 CT THORAX DX C-: CPT | Mod: 26,,, | Performed by: RADIOLOGY

## 2022-01-01 PROCEDURE — 90791 PR PSYCHIATRIC DIAGNOSTIC EVALUATION: ICD-10-PCS | Mod: HCNC,S$GLB,, | Performed by: CASE MANAGER/CARE COORDINATOR

## 2022-01-01 PROCEDURE — 1101F PR PT FALLS ASSESS DOC 0-1 FALLS W/OUT INJ PAST YR: ICD-10-PCS | Mod: CPTII,S$GLB,, | Performed by: INTERNAL MEDICINE

## 2022-01-01 PROCEDURE — 3074F PR MOST RECENT SYSTOLIC BLOOD PRESSURE < 130 MM HG: ICD-10-PCS | Mod: CPTII,S$GLB,, | Performed by: INTERNAL MEDICINE

## 2022-01-01 PROCEDURE — 3078F DIAST BP <80 MM HG: CPT | Mod: CPTII,S$GLB,, | Performed by: INTERNAL MEDICINE

## 2022-01-01 PROCEDURE — 3288F PR FALLS RISK ASSESSMENT DOCUMENTED: ICD-10-PCS | Mod: HCNC,CPTII,S$GLB, | Performed by: NURSE PRACTITIONER

## 2022-01-01 PROCEDURE — 25500020 PHARM REV CODE 255: Mod: HCNC,PO | Performed by: INTERNAL MEDICINE

## 2022-01-01 PROCEDURE — 3078F PR MOST RECENT DIASTOLIC BLOOD PRESSURE < 80 MM HG: ICD-10-PCS | Mod: HCNC,CPTII,S$GLB, | Performed by: FAMILY MEDICINE

## 2022-01-01 PROCEDURE — 71250 CT THORAX DX C-: CPT | Mod: TC,HCNC,PO

## 2022-01-01 PROCEDURE — 1101F PR PT FALLS ASSESS DOC 0-1 FALLS W/OUT INJ PAST YR: ICD-10-PCS | Mod: CPTII,S$GLB,, | Performed by: FAMILY MEDICINE

## 2022-01-01 PROCEDURE — 3288F FALL RISK ASSESSMENT DOCD: CPT | Mod: HCNC,CPTII,S$GLB, | Performed by: FAMILY MEDICINE

## 2022-01-01 PROCEDURE — 1159F MED LIST DOCD IN RCRD: CPT | Mod: HCNC,CPTII,S$GLB, | Performed by: FAMILY MEDICINE

## 2022-01-01 PROCEDURE — 97112 NEUROMUSCULAR REEDUCATION: CPT | Mod: HCNC,PN

## 2022-01-01 PROCEDURE — 1160F PR REVIEW ALL MEDS BY PRESCRIBER/CLIN PHARMACIST DOCUMENTED: ICD-10-PCS | Mod: CPTII,S$GLB,, | Performed by: INTERNAL MEDICINE

## 2022-01-01 PROCEDURE — 99999 PR PBB SHADOW E&M-EST. PATIENT-LVL IV: CPT | Mod: PBBFAC,,, | Performed by: INTERNAL MEDICINE

## 2022-01-01 PROCEDURE — 3074F SYST BP LT 130 MM HG: CPT | Mod: CPTII,S$GLB,, | Performed by: INTERNAL MEDICINE

## 2022-01-01 PROCEDURE — A4216 STERILE WATER/SALINE, 10 ML: HCPCS | Mod: HCNC,PN | Performed by: INTERNAL MEDICINE

## 2022-01-01 PROCEDURE — 97811 PR ACUPUNCT W/O ELEC STIMUL ADDL 15M: ICD-10-PCS | Mod: HCNC,S$GLB,, | Performed by: ACUPUNCTURIST

## 2022-01-01 PROCEDURE — 97530 THERAPEUTIC ACTIVITIES: CPT | Mod: HCNC,PN

## 2022-01-01 PROCEDURE — 1101F PT FALLS ASSESS-DOCD LE1/YR: CPT | Mod: CPTII,S$GLB,, | Performed by: INTERNAL MEDICINE

## 2022-01-01 PROCEDURE — 78815 PET IMAGE W/CT SKULL-THIGH: CPT | Mod: 26,PS,, | Performed by: RADIOLOGY

## 2022-01-01 PROCEDURE — 3075F SYST BP GE 130 - 139MM HG: CPT | Mod: CPTII,S$GLB,, | Performed by: NURSE PRACTITIONER

## 2022-01-01 PROCEDURE — 1125F AMNT PAIN NOTED PAIN PRSNT: CPT | Mod: HCNC,CPTII,S$GLB, | Performed by: FAMILY MEDICINE

## 2022-01-01 PROCEDURE — 1160F PR REVIEW ALL MEDS BY PRESCRIBER/CLIN PHARMACIST DOCUMENTED: ICD-10-PCS | Mod: HCNC,CPTII,S$GLB, | Performed by: NURSE PRACTITIONER

## 2022-01-01 PROCEDURE — 3288F FALL RISK ASSESSMENT DOCD: CPT | Mod: CPTII,S$GLB,, | Performed by: INTERNAL MEDICINE

## 2022-01-01 PROCEDURE — 3074F SYST BP LT 130 MM HG: CPT | Mod: HCNC,CPTII,S$GLB, | Performed by: FAMILY MEDICINE

## 2022-01-01 PROCEDURE — 3074F PR MOST RECENT SYSTOLIC BLOOD PRESSURE < 130 MM HG: ICD-10-PCS | Mod: CPTII,S$GLB,, | Performed by: THORACIC SURGERY (CARDIOTHORACIC VASCULAR SURGERY)

## 2022-01-01 PROCEDURE — 3078F PR MOST RECENT DIASTOLIC BLOOD PRESSURE < 80 MM HG: ICD-10-PCS | Mod: CPTII,S$GLB,, | Performed by: NURSE PRACTITIONER

## 2022-01-01 PROCEDURE — 99214 PR OFFICE/OUTPT VISIT, EST, LEVL IV, 30-39 MIN: ICD-10-PCS | Mod: HCNC,S$GLB,, | Performed by: FAMILY MEDICINE

## 2022-01-01 PROCEDURE — 74177 CT ABD & PELVIS W/CONTRAST: CPT | Mod: TC,HCNC,PO

## 2022-01-01 PROCEDURE — 1100F PR PT FALLS ASSESS DOC 2+ FALLS/FALL W/INJURY/YR: ICD-10-PCS | Mod: CPTII,S$GLB,, | Performed by: FAMILY MEDICINE

## 2022-01-01 PROCEDURE — 99999 PR PBB SHADOW E&M-EST. PATIENT-LVL III: CPT | Mod: PBBFAC,,, | Performed by: FAMILY MEDICINE

## 2022-01-01 PROCEDURE — 52000 CYSTOURETHROSCOPY: CPT | Mod: HCNC,S$GLB,, | Performed by: UROLOGY

## 2022-01-01 PROCEDURE — 99999 PR PBB SHADOW E&M-EST. PATIENT-LVL IV: ICD-10-PCS | Mod: PBBFAC,,, | Performed by: INTERNAL MEDICINE

## 2022-01-01 PROCEDURE — 3074F PR MOST RECENT SYSTOLIC BLOOD PRESSURE < 130 MM HG: ICD-10-PCS | Mod: HCNC,CPTII,S$GLB, | Performed by: INTERNAL MEDICINE

## 2022-01-01 PROCEDURE — 3288F PR FALLS RISK ASSESSMENT DOCUMENTED: ICD-10-PCS | Mod: HCNC,CPTII,S$GLB, | Performed by: INTERNAL MEDICINE

## 2022-01-01 PROCEDURE — 99999 PR PBB SHADOW E&M-EST. PATIENT-LVL IV: CPT | Mod: PBBFAC,,, | Performed by: THORACIC SURGERY (CARDIOTHORACIC VASCULAR SURGERY)

## 2022-01-01 PROCEDURE — 3075F SYST BP GE 130 - 139MM HG: CPT | Mod: HCNC,CPTII,S$GLB, | Performed by: NURSE PRACTITIONER

## 2022-01-01 PROCEDURE — 78815 NM PET CT ROUTINE: ICD-10-PCS | Mod: 26,PS,, | Performed by: RADIOLOGY

## 2022-01-01 PROCEDURE — 1160F RVW MEDS BY RX/DR IN RCRD: CPT | Mod: CPTII,S$GLB,, | Performed by: THORACIC SURGERY (CARDIOTHORACIC VASCULAR SURGERY)

## 2022-01-01 PROCEDURE — 97140 MANUAL THERAPY 1/> REGIONS: CPT | Mod: PN,CQ

## 2022-01-01 PROCEDURE — 99999 PR PBB SHADOW E&M-EST. PATIENT-LVL III: CPT | Mod: PBBFAC,HCNC,, | Performed by: ACUPUNCTURIST

## 2022-01-01 PROCEDURE — 99215 OFFICE O/P EST HI 40 MIN: CPT | Mod: S$GLB,,, | Performed by: NURSE PRACTITIONER

## 2022-01-01 PROCEDURE — 99999 PR PBB SHADOW E&M-EST. PATIENT-LVL IV: CPT | Mod: PBBFAC,HCNC,, | Performed by: NURSE PRACTITIONER

## 2022-01-01 PROCEDURE — 90834 PSYTX W PT 45 MINUTES: CPT | Mod: S$GLB,,, | Performed by: CASE MANAGER/CARE COORDINATOR

## 2022-01-01 PROCEDURE — 99215 OFFICE O/P EST HI 40 MIN: CPT | Mod: HCNC,S$GLB,, | Performed by: NURSE PRACTITIONER

## 2022-01-01 PROCEDURE — 3078F DIAST BP <80 MM HG: CPT | Mod: HCNC,CPTII,S$GLB, | Performed by: INTERNAL MEDICINE

## 2022-01-01 PROCEDURE — 90791 PSYCH DIAGNOSTIC EVALUATION: CPT | Mod: HCNC,S$GLB,, | Performed by: CASE MANAGER/CARE COORDINATOR

## 2022-01-01 PROCEDURE — 1125F PR PAIN SEVERITY QUANTIFIED, PAIN PRESENT: ICD-10-PCS | Mod: CPTII,S$GLB,, | Performed by: THORACIC SURGERY (CARDIOTHORACIC VASCULAR SURGERY)

## 2022-01-01 PROCEDURE — 36415 COLL VENOUS BLD VENIPUNCTURE: CPT | Mod: HCNC,PN | Performed by: INTERNAL MEDICINE

## 2022-01-01 PROCEDURE — 3078F DIAST BP <80 MM HG: CPT | Mod: CPTII,S$GLB,, | Performed by: THORACIC SURGERY (CARDIOTHORACIC VASCULAR SURGERY)

## 2022-01-01 PROCEDURE — 1159F PR MEDICATION LIST DOCUMENTED IN MEDICAL RECORD: ICD-10-PCS | Mod: HCNC,CPTII,S$GLB, | Performed by: FAMILY MEDICINE

## 2022-01-01 PROCEDURE — 99999 PR PBB SHADOW E&M-EST. PATIENT-LVL III: CPT | Mod: PBBFAC,,, | Performed by: ACUPUNCTURIST

## 2022-01-01 PROCEDURE — 99499 RISK ADDL DX/OHS AUDIT: ICD-10-PCS | Mod: S$GLB,,, | Performed by: INTERNAL MEDICINE

## 2022-01-01 PROCEDURE — 96523 IRRIG DRUG DELIVERY DEVICE: CPT | Mod: HCNC,PN

## 2022-01-01 PROCEDURE — 3078F PR MOST RECENT DIASTOLIC BLOOD PRESSURE < 80 MM HG: ICD-10-PCS | Mod: CPTII,S$GLB,, | Performed by: FAMILY MEDICINE

## 2022-01-01 PROCEDURE — 84443 ASSAY THYROID STIM HORMONE: CPT | Performed by: INTERNAL MEDICINE

## 2022-01-01 PROCEDURE — 3288F PR FALLS RISK ASSESSMENT DOCUMENTED: ICD-10-PCS | Mod: CPTII,S$GLB,, | Performed by: FAMILY MEDICINE

## 2022-01-01 PROCEDURE — 63600175 PHARM REV CODE 636 W HCPCS: Mod: HCNC,PN | Performed by: INTERNAL MEDICINE

## 2022-01-01 PROCEDURE — 99214 PR OFFICE/OUTPT VISIT, EST, LEVL IV, 30-39 MIN: ICD-10-PCS | Mod: HCNC,S$GLB,, | Performed by: INTERNAL MEDICINE

## 2022-01-01 PROCEDURE — 3075F PR MOST RECENT SYSTOLIC BLOOD PRESS GE 130-139MM HG: ICD-10-PCS | Mod: HCNC,CPTII,S$GLB, | Performed by: NURSE PRACTITIONER

## 2022-01-01 PROCEDURE — 1160F RVW MEDS BY RX/DR IN RCRD: CPT | Mod: HCNC,CPTII,S$GLB, | Performed by: NURSE PRACTITIONER

## 2022-01-01 PROCEDURE — 3079F DIAST BP 80-89 MM HG: CPT | Mod: HCNC,CPTII,S$GLB, | Performed by: NURSE PRACTITIONER

## 2022-01-01 PROCEDURE — 99215 PR OFFICE/OUTPT VISIT, EST, LEVL V, 40-54 MIN: ICD-10-PCS | Mod: S$GLB,,, | Performed by: FAMILY MEDICINE

## 2022-01-01 PROCEDURE — 90834 PSYTX W PT 45 MINUTES: CPT | Mod: HCNC,S$GLB,, | Performed by: CASE MANAGER/CARE COORDINATOR

## 2022-01-01 PROCEDURE — 1125F AMNT PAIN NOTED PAIN PRSNT: CPT | Mod: CPTII,S$GLB,, | Performed by: THORACIC SURGERY (CARDIOTHORACIC VASCULAR SURGERY)

## 2022-01-01 PROCEDURE — 1159F MED LIST DOCD IN RCRD: CPT | Mod: CPTII,S$GLB,, | Performed by: FAMILY MEDICINE

## 2022-01-01 PROCEDURE — 1159F MED LIST DOCD IN RCRD: CPT | Mod: HCNC,CPTII,S$GLB, | Performed by: NURSE PRACTITIONER

## 2022-01-01 PROCEDURE — 3074F SYST BP LT 130 MM HG: CPT | Mod: HCNC,CPTII,S$GLB, | Performed by: NURSE PRACTITIONER

## 2022-01-01 PROCEDURE — 1159F PR MEDICATION LIST DOCUMENTED IN MEDICAL RECORD: ICD-10-PCS | Mod: CPTII,S$GLB,, | Performed by: INTERNAL MEDICINE

## 2022-01-01 PROCEDURE — 90834 PR PSYCHOTHERAPY W/PATIENT, 45 MIN: ICD-10-PCS | Mod: HCNC,S$GLB,, | Performed by: CASE MANAGER/CARE COORDINATOR

## 2022-01-01 PROCEDURE — 1159F PR MEDICATION LIST DOCUMENTED IN MEDICAL RECORD: ICD-10-PCS | Mod: CPTII,S$GLB,, | Performed by: NURSE PRACTITIONER

## 2022-01-01 PROCEDURE — 82607 VITAMIN B-12: CPT | Performed by: INTERNAL MEDICINE

## 2022-01-01 PROCEDURE — 71250 CT CHEST ABDOMEN WITHOUT CONTRAST (XPD): ICD-10-PCS | Mod: 26,,, | Performed by: RADIOLOGY

## 2022-01-01 PROCEDURE — 1125F AMNT PAIN NOTED PAIN PRSNT: CPT | Mod: CPTII,S$GLB,, | Performed by: NURSE PRACTITIONER

## 2022-01-01 PROCEDURE — 97110 THERAPEUTIC EXERCISES: CPT | Mod: PN

## 2022-01-01 PROCEDURE — 99214 OFFICE O/P EST MOD 30 MIN: CPT | Mod: S$GLB,,, | Performed by: FAMILY MEDICINE

## 2022-01-01 PROCEDURE — 1101F PT FALLS ASSESS-DOCD LE1/YR: CPT | Mod: HCNC,CPTII,S$GLB, | Performed by: FAMILY MEDICINE

## 2022-01-01 PROCEDURE — 99999 PR PBB SHADOW E&M-EST. PATIENT-LVL V: ICD-10-PCS | Mod: PBBFAC,,, | Performed by: INTERNAL MEDICINE

## 2022-01-01 PROCEDURE — 74150 CT CHEST ABDOMEN WITHOUT CONTRAST (XPD): ICD-10-PCS | Mod: 26,,, | Performed by: RADIOLOGY

## 2022-01-01 PROCEDURE — 1125F PR PAIN SEVERITY QUANTIFIED, PAIN PRESENT: ICD-10-PCS | Mod: CPTII,S$GLB,, | Performed by: INTERNAL MEDICINE

## 2022-01-01 PROCEDURE — 1159F PR MEDICATION LIST DOCUMENTED IN MEDICAL RECORD: ICD-10-PCS | Mod: CPTII,S$GLB,, | Performed by: FAMILY MEDICINE

## 2022-01-01 PROCEDURE — 3078F PR MOST RECENT DIASTOLIC BLOOD PRESSURE < 80 MM HG: ICD-10-PCS | Mod: HCNC,CPTII,S$GLB, | Performed by: INTERNAL MEDICINE

## 2022-01-01 PROCEDURE — 1125F PR PAIN SEVERITY QUANTIFIED, PAIN PRESENT: ICD-10-PCS | Mod: CPTII,S$GLB,, | Performed by: FAMILY MEDICINE

## 2022-01-01 PROCEDURE — 1101F PT FALLS ASSESS-DOCD LE1/YR: CPT | Mod: CPTII,S$GLB,, | Performed by: THORACIC SURGERY (CARDIOTHORACIC VASCULAR SURGERY)

## 2022-01-01 PROCEDURE — 99999 PR PBB SHADOW E&M-EST. PATIENT-LVL IV: ICD-10-PCS | Mod: PBBFAC,HCNC,, | Performed by: NURSE PRACTITIONER

## 2022-01-01 PROCEDURE — 99214 OFFICE O/P EST MOD 30 MIN: CPT | Mod: HCNC,S$GLB,, | Performed by: INTERNAL MEDICINE

## 2022-01-01 PROCEDURE — 3078F DIAST BP <80 MM HG: CPT | Mod: HCNC,CPTII,S$GLB, | Performed by: FAMILY MEDICINE

## 2022-01-01 PROCEDURE — 97110 THERAPEUTIC EXERCISES: CPT | Mod: HCNC,PN,CQ

## 2022-01-01 PROCEDURE — 1125F PR PAIN SEVERITY QUANTIFIED, PAIN PRESENT: ICD-10-PCS | Mod: HCNC,CPTII,S$GLB, | Performed by: NURSE PRACTITIONER

## 2022-01-01 PROCEDURE — 97803 PR MED NUTR THER, SUBSQ, INDIV, EA 15 MIN: ICD-10-PCS | Mod: S$GLB,,,

## 2022-01-01 PROCEDURE — 71250 CT CHEST WITHOUT CONTRAST: ICD-10-PCS | Mod: 26,HCNC,, | Performed by: RADIOLOGY

## 2022-01-01 PROCEDURE — 1100F PR PT FALLS ASSESS DOC 2+ FALLS/FALL W/INJURY/YR: ICD-10-PCS | Mod: HCNC,CPTII,S$GLB, | Performed by: NURSE PRACTITIONER

## 2022-01-01 PROCEDURE — A9698 NON-RAD CONTRAST MATERIALNOC: HCPCS | Mod: HCNC,PO | Performed by: INTERNAL MEDICINE

## 2022-01-01 PROCEDURE — 82533 TOTAL CORTISOL: CPT | Performed by: INTERNAL MEDICINE

## 2022-01-01 PROCEDURE — 97803 MED NUTRITION INDIV SUBSEQ: CPT | Mod: S$GLB,,,

## 2022-01-01 PROCEDURE — 90837 PSYTX W PT 60 MINUTES: CPT | Mod: S$GLB,,, | Performed by: CASE MANAGER/CARE COORDINATOR

## 2022-01-01 PROCEDURE — 99999 PR PBB SHADOW E&M-EST. PATIENT-LVL V: CPT | Mod: PBBFAC,HCNC,, | Performed by: NURSE PRACTITIONER

## 2022-01-01 PROCEDURE — 97162 PT EVAL MOD COMPLEX 30 MIN: CPT | Mod: HCNC,PN

## 2022-01-01 PROCEDURE — 74177 CT ABDOMEN PELVIS WITH CONTRAST: ICD-10-PCS | Mod: 26,HCNC,, | Performed by: RADIOLOGY

## 2022-01-01 PROCEDURE — 1159F PR MEDICATION LIST DOCUMENTED IN MEDICAL RECORD: ICD-10-PCS | Mod: HCNC,CPTII,S$GLB, | Performed by: INTERNAL MEDICINE

## 2022-01-01 PROCEDURE — 3078F PR MOST RECENT DIASTOLIC BLOOD PRESSURE < 80 MM HG: ICD-10-PCS | Mod: HCNC,CPTII,S$GLB, | Performed by: NURSE PRACTITIONER

## 2022-01-01 PROCEDURE — 90834 PR PSYCHOTHERAPY W/PATIENT, 45 MIN: ICD-10-PCS | Mod: S$GLB,,, | Performed by: CASE MANAGER/CARE COORDINATOR

## 2022-01-01 PROCEDURE — 3074F PR MOST RECENT SYSTOLIC BLOOD PRESSURE < 130 MM HG: ICD-10-PCS | Mod: HCNC,CPTII,S$GLB, | Performed by: NURSE PRACTITIONER

## 2022-01-01 PROCEDURE — 1159F MED LIST DOCD IN RCRD: CPT | Mod: CPTII,S$GLB,, | Performed by: INTERNAL MEDICINE

## 2022-01-01 PROCEDURE — 1159F PR MEDICATION LIST DOCUMENTED IN MEDICAL RECORD: ICD-10-PCS | Mod: CPTII,S$GLB,, | Performed by: THORACIC SURGERY (CARDIOTHORACIC VASCULAR SURGERY)

## 2022-01-01 PROCEDURE — 99215 PR OFFICE/OUTPT VISIT, EST, LEVL V, 40-54 MIN: ICD-10-PCS | Mod: HCNC,S$GLB,, | Performed by: NURSE PRACTITIONER

## 2022-01-01 PROCEDURE — 1126F AMNT PAIN NOTED NONE PRSNT: CPT | Mod: CPTII,S$GLB,, | Performed by: FAMILY MEDICINE

## 2022-01-01 PROCEDURE — 1101F PR PT FALLS ASSESS DOC 0-1 FALLS W/OUT INJ PAST YR: ICD-10-PCS | Mod: HCNC,CPTII,S$GLB, | Performed by: FAMILY MEDICINE

## 2022-01-01 PROCEDURE — 84238 ASSAY NONENDOCRINE RECEPTOR: CPT | Mod: HCNC | Performed by: INTERNAL MEDICINE

## 2022-01-01 PROCEDURE — 1100F PTFALLS ASSESS-DOCD GE2>/YR: CPT | Mod: HCNC,CPTII,S$GLB, | Performed by: NURSE PRACTITIONER

## 2022-01-01 PROCEDURE — 1100F PR PT FALLS ASSESS DOC 2+ FALLS/FALL W/INJURY/YR: ICD-10-PCS | Mod: HCNC,CPTII,S$GLB, | Performed by: INTERNAL MEDICINE

## 2022-01-01 PROCEDURE — 1125F PR PAIN SEVERITY QUANTIFIED, PAIN PRESENT: ICD-10-PCS | Mod: HCNC,CPTII,S$GLB, | Performed by: FAMILY MEDICINE

## 2022-01-01 PROCEDURE — 3078F DIAST BP <80 MM HG: CPT | Mod: CPTII,S$GLB,, | Performed by: NURSE PRACTITIONER

## 2022-01-01 PROCEDURE — 1159F PR MEDICATION LIST DOCUMENTED IN MEDICAL RECORD: ICD-10-PCS | Mod: HCNC,CPTII,S$GLB, | Performed by: NURSE PRACTITIONER

## 2022-01-01 PROCEDURE — 3075F PR MOST RECENT SYSTOLIC BLOOD PRESS GE 130-139MM HG: ICD-10-PCS | Mod: CPTII,S$GLB,, | Performed by: NURSE PRACTITIONER

## 2022-01-01 PROCEDURE — 1125F AMNT PAIN NOTED PAIN PRSNT: CPT | Mod: CPTII,S$GLB,, | Performed by: FAMILY MEDICINE

## 2022-01-01 PROCEDURE — 3074F PR MOST RECENT SYSTOLIC BLOOD PRESSURE < 130 MM HG: ICD-10-PCS | Mod: HCNC,CPTII,S$GLB, | Performed by: FAMILY MEDICINE

## 2022-01-01 PROCEDURE — 1159F MED LIST DOCD IN RCRD: CPT | Mod: CPTII,S$GLB,, | Performed by: THORACIC SURGERY (CARDIOTHORACIC VASCULAR SURGERY)

## 2022-01-01 PROCEDURE — 3078F DIAST BP <80 MM HG: CPT | Mod: HCNC,CPTII,S$GLB, | Performed by: NURSE PRACTITIONER

## 2022-01-01 PROCEDURE — 82746 ASSAY OF FOLIC ACID SERUM: CPT | Performed by: INTERNAL MEDICINE

## 2022-01-01 PROCEDURE — 3288F PR FALLS RISK ASSESSMENT DOCUMENTED: ICD-10-PCS | Mod: CPTII,S$GLB,, | Performed by: INTERNAL MEDICINE

## 2022-01-01 PROCEDURE — 78815 PET IMAGE W/CT SKULL-THIGH: CPT | Mod: TC,PN,PS

## 2022-01-01 PROCEDURE — 82728 ASSAY OF FERRITIN: CPT | Mod: HCNC | Performed by: INTERNAL MEDICINE

## 2022-01-01 PROCEDURE — 99214 OFFICE O/P EST MOD 30 MIN: CPT | Mod: HCNC,S$GLB,, | Performed by: FAMILY MEDICINE

## 2022-01-01 PROCEDURE — 84153 ASSAY OF PSA TOTAL: CPT | Performed by: INTERNAL MEDICINE

## 2022-01-01 PROCEDURE — 1100F PTFALLS ASSESS-DOCD GE2>/YR: CPT | Mod: CPTII,S$GLB,, | Performed by: FAMILY MEDICINE

## 2022-01-01 PROCEDURE — 1125F AMNT PAIN NOTED PAIN PRSNT: CPT | Mod: HCNC,CPTII,S$GLB, | Performed by: INTERNAL MEDICINE

## 2022-01-01 PROCEDURE — 0004A COVID-19, MRNA, LNP-S, PF, 30 MCG/0.3 ML DOSE VACCINE: CPT | Mod: HCNC,PBBFAC | Performed by: FAMILY MEDICINE

## 2022-01-01 PROCEDURE — 1160F PR REVIEW ALL MEDS BY PRESCRIBER/CLIN PHARMACIST DOCUMENTED: ICD-10-PCS | Mod: CPTII,S$GLB,, | Performed by: NURSE PRACTITIONER

## 2022-01-01 PROCEDURE — 99999 PR PBB SHADOW E&M-EST. PATIENT-LVL V: CPT | Mod: PBBFAC,HCNC,, | Performed by: INTERNAL MEDICINE

## 2022-01-01 PROCEDURE — 97112 NEUROMUSCULAR REEDUCATION: CPT | Mod: PN,CQ

## 2022-01-01 PROCEDURE — 99999 PR PBB SHADOW E&M-EST. PATIENT-LVL V: ICD-10-PCS | Mod: PBBFAC,HCNC,, | Performed by: NURSE PRACTITIONER

## 2022-01-01 PROCEDURE — 83036 HEMOGLOBIN GLYCOSYLATED A1C: CPT | Performed by: FAMILY MEDICINE

## 2022-01-01 PROCEDURE — 74177 CT ABD & PELVIS W/CONTRAST: CPT | Mod: 26,HCNC,, | Performed by: RADIOLOGY

## 2022-01-01 PROCEDURE — 36415 COLL VENOUS BLD VENIPUNCTURE: CPT | Mod: PN | Performed by: INTERNAL MEDICINE

## 2022-01-01 PROCEDURE — 99214 PR OFFICE/OUTPT VISIT, EST, LEVL IV, 30-39 MIN: ICD-10-PCS | Mod: S$GLB,,, | Performed by: FAMILY MEDICINE

## 2022-01-01 PROCEDURE — 52000 CYSTOSCOPY: ICD-10-PCS | Mod: HCNC,S$GLB,, | Performed by: UROLOGY

## 2022-01-01 PROCEDURE — 1101F PR PT FALLS ASSESS DOC 0-1 FALLS W/OUT INJ PAST YR: ICD-10-PCS | Mod: HCNC,CPTII,S$GLB, | Performed by: NURSE PRACTITIONER

## 2022-01-01 PROCEDURE — 74150 CT ABDOMEN W/O CONTRAST: CPT | Mod: 26,,, | Performed by: RADIOLOGY

## 2022-01-01 PROCEDURE — 1101F PT FALLS ASSESS-DOCD LE1/YR: CPT | Mod: HCNC,CPTII,S$GLB, | Performed by: NURSE PRACTITIONER

## 2022-01-01 PROCEDURE — 99999 PR PBB SHADOW E&M-EST. PATIENT-LVL IV: ICD-10-PCS | Mod: PBBFAC,,, | Performed by: THORACIC SURGERY (CARDIOTHORACIC VASCULAR SURGERY)

## 2022-01-01 PROCEDURE — 3078F PR MOST RECENT DIASTOLIC BLOOD PRESSURE < 80 MM HG: ICD-10-PCS | Mod: CPTII,S$GLB,, | Performed by: THORACIC SURGERY (CARDIOTHORACIC VASCULAR SURGERY)

## 2022-01-01 PROCEDURE — 99999 PR PBB SHADOW E&M-EST. PATIENT-LVL V: ICD-10-PCS | Mod: PBBFAC,,, | Performed by: NURSE PRACTITIONER

## 2022-01-01 PROCEDURE — 99204 PR OFFICE/OUTPT VISIT, NEW, LEVL IV, 45-59 MIN: ICD-10-PCS | Mod: S$GLB,,, | Performed by: THORACIC SURGERY (CARDIOTHORACIC VASCULAR SURGERY)

## 2022-01-01 PROCEDURE — 36415 COLL VENOUS BLD VENIPUNCTURE: CPT | Mod: PO | Performed by: FAMILY MEDICINE

## 2022-01-01 PROCEDURE — 80053 COMPREHEN METABOLIC PANEL: CPT | Performed by: FAMILY MEDICINE

## 2022-01-01 RX ORDER — PROCHLORPERAZINE MALEATE 10 MG
10 TABLET ORAL EVERY 6 HOURS PRN
Qty: 30 TABLET | Refills: 1 | Status: SHIPPED | OUTPATIENT
Start: 2022-01-01 | End: 2022-01-01 | Stop reason: SDUPTHER

## 2022-01-01 RX ORDER — MELATONIN 3 MG
3 CAPSULE ORAL NIGHTLY PRN
COMMUNITY
Start: 2022-01-01

## 2022-01-01 RX ORDER — MIRTAZAPINE 15 MG/1
15 TABLET, FILM COATED ORAL NIGHTLY
Qty: 30 TABLET | Refills: 11 | Status: SHIPPED | OUTPATIENT
Start: 2022-01-01 | End: 2022-01-01

## 2022-01-01 RX ORDER — ONDANSETRON HYDROCHLORIDE 8 MG/1
8 TABLET, FILM COATED ORAL EVERY 8 HOURS PRN
Qty: 30 TABLET | Refills: 3 | Status: CANCELLED | OUTPATIENT
Start: 2022-01-01

## 2022-01-01 RX ORDER — MEGESTROL ACETATE 125 MG/ML
625 SUSPENSION ORAL DAILY
Qty: 150 ML | Refills: 11 | Status: ON HOLD | OUTPATIENT
Start: 2022-01-01 | End: 2022-01-01

## 2022-01-01 RX ORDER — MELATONIN 3 MG
3 CAPSULE ORAL NIGHTLY PRN
COMMUNITY
Start: 2022-01-01 | End: 2022-01-01

## 2022-01-01 RX ORDER — ERYTHROMYCIN 5 MG/G
OINTMENT OPHTHALMIC EVERY 8 HOURS
Qty: 3.5 G | Refills: 0 | Status: SHIPPED | OUTPATIENT
Start: 2022-01-01 | End: 2022-01-01

## 2022-01-01 RX ORDER — HEPARIN 100 UNIT/ML
500 SYRINGE INTRAVENOUS
Status: CANCELLED | OUTPATIENT
Start: 2022-01-01

## 2022-01-01 RX ORDER — HYDROCODONE BITARTRATE AND ACETAMINOPHEN 10; 325 MG/1; MG/1
1 TABLET ORAL EVERY 12 HOURS PRN
Qty: 60 TABLET | Refills: 0 | Status: SHIPPED | OUTPATIENT
Start: 2022-01-01 | End: 2022-01-01 | Stop reason: SDUPTHER

## 2022-01-01 RX ORDER — OMEPRAZOLE 40 MG/1
40 CAPSULE, DELAYED RELEASE ORAL DAILY
Qty: 90 CAPSULE | Refills: 3 | Status: SHIPPED | OUTPATIENT
Start: 2022-01-01

## 2022-01-01 RX ORDER — VENLAFAXINE HYDROCHLORIDE 75 MG/1
150 CAPSULE, EXTENDED RELEASE ORAL DAILY
Qty: 180 CAPSULE | Refills: 3 | Status: SHIPPED | OUTPATIENT
Start: 2022-01-01 | End: 2023-07-22

## 2022-01-01 RX ORDER — SODIUM CHLORIDE 0.9 % (FLUSH) 0.9 %
10 SYRINGE (ML) INJECTION
Status: CANCELLED | OUTPATIENT
Start: 2022-01-01

## 2022-01-01 RX ORDER — IRON POLYSACCHARIDE COMPLEX 150 MG
150 CAPSULE ORAL DAILY
Qty: 90 CAPSULE | Refills: 1 | Status: SHIPPED | OUTPATIENT
Start: 2022-01-01 | End: 2022-01-01

## 2022-01-01 RX ORDER — TIZANIDINE 4 MG/1
4 TABLET ORAL EVERY 8 HOURS
Qty: 90 TABLET | Refills: 3 | Status: SHIPPED | OUTPATIENT
Start: 2022-01-01 | End: 2022-01-01

## 2022-01-01 RX ORDER — SODIUM CHLORIDE 0.9 % (FLUSH) 0.9 %
10 SYRINGE (ML) INJECTION
Status: COMPLETED | OUTPATIENT
Start: 2022-01-01 | End: 2022-01-01

## 2022-01-01 RX ORDER — SERTRALINE HYDROCHLORIDE 50 MG/1
50 TABLET, FILM COATED ORAL DAILY
Qty: 30 TABLET | Refills: 11 | Status: SHIPPED | OUTPATIENT
Start: 2022-01-01 | End: 2023-08-01

## 2022-01-01 RX ORDER — MEGESTROL ACETATE 40 MG/ML
SUSPENSION ORAL
COMMUNITY
Start: 2022-01-01 | End: 2022-01-01 | Stop reason: CLARIF

## 2022-01-01 RX ORDER — GLIMEPIRIDE 2 MG/1
4 TABLET ORAL 2 TIMES DAILY
Qty: 360 TABLET | Refills: 1 | Status: SHIPPED | OUTPATIENT
Start: 2022-01-01 | End: 2022-01-01 | Stop reason: SDUPTHER

## 2022-01-01 RX ORDER — MEGESTROL ACETATE 40 MG/1
40 TABLET ORAL DAILY
Qty: 30 TABLET | Refills: 3 | Status: SHIPPED | OUTPATIENT
Start: 2022-01-01 | End: 2022-01-01

## 2022-01-01 RX ORDER — HEPARIN 100 UNIT/ML
500 SYRINGE INTRAVENOUS
Status: COMPLETED | OUTPATIENT
Start: 2022-01-01 | End: 2022-01-01

## 2022-01-01 RX ORDER — MIRTAZAPINE 15 MG/1
15 TABLET, FILM COATED ORAL NIGHTLY
Qty: 30 TABLET | Refills: 11 | Status: SHIPPED | OUTPATIENT
Start: 2022-01-01 | End: 2022-01-01 | Stop reason: SDUPTHER

## 2022-01-01 RX ORDER — METFORMIN HYDROCHLORIDE 500 MG/1
500 TABLET, EXTENDED RELEASE ORAL DAILY
Qty: 90 TABLET | Refills: 3 | Status: ON HOLD | OUTPATIENT
Start: 2022-01-01 | End: 2022-01-01 | Stop reason: HOSPADM

## 2022-01-01 RX ORDER — ONDANSETRON 8 MG/1
8 TABLET, ORALLY DISINTEGRATING ORAL EVERY 12 HOURS PRN
Qty: 30 TABLET | Refills: 1 | Status: SHIPPED | OUTPATIENT
Start: 2022-01-01 | End: 2023-08-01

## 2022-01-01 RX ADMIN — IOHEXOL 1000 ML: 9 SOLUTION ORAL at 12:03

## 2022-01-01 RX ADMIN — Medication 10 ML: at 11:01

## 2022-01-01 RX ADMIN — Medication 500 UNITS: at 11:01

## 2022-01-01 RX ADMIN — IOHEXOL 75 ML: 350 INJECTION, SOLUTION INTRAVENOUS at 12:03

## 2022-01-01 RX ADMIN — IOHEXOL 500 ML: 9 SOLUTION ORAL at 03:06

## 2022-01-04 PROBLEM — E11.49 TYPE II DIABETES MELLITUS WITH NEUROLOGICAL MANIFESTATIONS: Status: ACTIVE | Noted: 2022-01-01

## 2022-01-04 PROBLEM — R63.4 WEIGHT DECREASING: Status: ACTIVE | Noted: 2022-01-01

## 2022-01-04 PROBLEM — G89.29 CHRONIC BACK PAIN: Status: ACTIVE | Noted: 2022-01-01

## 2022-01-04 PROBLEM — M54.9 CHRONIC BACK PAIN: Status: ACTIVE | Noted: 2022-01-01

## 2022-01-04 NOTE — PATIENT INSTRUCTIONS
Insomnia  Insomnia refers to a difficulty going to sleep or staying asleep, or both. Insomnia has many causes, including anxiety, stress, depression, chronic pain, sleeping cycles out of balance due to working night shifts or excess napping during the day, and a condition called sleep apnea. Insomnia can be a side effect from stimulant medicines such as decongestants, asthma inhalers and pills, diet pills, and illegal drugs such as speed, crank, crack, and PCP.  Home Care:  · Review your medicines with your doctor or pharmacist to find out if they can cause insomnia.  · Caffeine, smoking and alcohol also affect sleep. Limit your daily use and do not use these before bedtime. Alcohol may make you sleepy at first, but as its effects wear off, you may awaken a few hours later and have trouble returning to sleep.  · Do not exercise, eat or drink large amounts of liquid within 2 hours of your bedtime.  · Improve your sleep habits. Have a fixed bed and wake-up time. Try to keep noise, light and heat in your bedroom at a comfortable level. Try using earplugs or eyeshades if needed.  · Avoid watching TV in bed.  · If you do not fall asleep within 30 minutes, try to relax by reading or listening to soft music.  · Limit daytime napping to one 30 minute period, early in the day.  · Get regular exercise. Find other ways to lessen your stress level.  · If a medicine was prescribed to help reset your sleep patterns, take it as directed. Sleeping pills are intended for short-term use, only. If taken for too long, the effect wears off while the risk of physical addiction and psychological dependence increases.  Follow-Up  with your doctor or as directed by our staff if you feel that your insomnia is not responding to the above measures.  Get Prompt Medical Attention  if any of the following occur:  · Extreme restlessness or irritability  · Confusion or hallucinations (seeing or hearing things that are not there)  · Anxiety,  depression  · Several days without sleeping  © 7133-0719 [employer]. [address] [place] [address], [place] [address]. All rights reserved. This information is not intended as a substitute for professional medical care. Always follow your healthcare professional's instructions.        Treating Insomnia  Good sleeping habits are a key part of treatment. If needed, some medications may help you sleep better at first. Making healthy lifestyle changes and learning to relax can improve your sleep. Treating insomnia takes commitment, but trust that your efforts will pay off. Talk to your health care provider before taking any medication.    Healthy Lifestyle  Your lifestyle affects your health and your sleep. Here are some healthy habits:  · Keep a regular sleep schedule. Go to bed and get up at the same time each day.  · Exercise regularly. It may help you reduce stress. Avoid strenuous exercise for two to four hours before bedtime.  · Avoid or limit naps, especially in the late afternoon.  · Use your bed only for sleep and sex.  · Dont spend too much time in bed trying to fall asleep. If you cant fall asleep, get up and do something (no electronics) until you become tired and drowsy.  · Avoid or limit caffeine and nicotine for up to 6 hours prior to bedtime. They can keep you awake at night.  · Also avoid alcohol for at least 4 to 6 hours prior to bedtime. It may help you fall asleep at first, but you will have more awakenings throughout the night, and your sleep will not be restful.  Before Bedtime  To sleep better every night, try these tips:  · Have a bedtime routine to let your body and mind know when its time to sleep.  · Think of going to bed as relaxing and enjoyable. Sleep will come sooner.  · If your worries dont let you sleep, write them down in a diary. Then close it, and go to bed.  · Make sure the room is not too hot or too cold. If its not dark enough, an eye mask can help. If its noisy, try  using earplugs.  Learn to Relax  Stress, anxiety, and body tension may keep you awake at night. To unwind before bedtime, try a warm bath, meditation, or yoga. Also, try the following:  · Deep breathing. Sit or lie back in a chair. Take a slow, deep breath. Hold it for 5 counts. Then breathe out slowly through your mouth. Keep doing this until you feel relaxed.  · Progressive muscle relaxation. Tense and then relax the muscles in your body as you breathe deeply. Start with your feet and work up your body to your neck and face.  © 4500-1688 [employer]. [address] [place] [address], [place] [address]. All rights reserved. This information is not intended as a substitute for professional medical care. Always follow your healthcare professional's instructions.
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
## 2022-01-05 NOTE — TELEPHONE ENCOUNTER
No new care gaps identified.  Powered by [employer]. Reference number: [id number].   1/05/2022 10:27:34 AM CST

## 2022-01-05 NOTE — TELEPHONE ENCOUNTER
Spoke with Ania and got him set up with PT, nutrition and Acup for the following weeks. I did inform Ania that his medication request for melatonin was OTC and did not need a Rx. She voiced understanding and acceptance all appts.

## 2022-01-06 NOTE — TELEPHONE ENCOUNTER
Refill Authorization Note   Stu Garcia  is requesting a refill authorization.  Brief Assessment and Rationale for Refill:  Approve     Medication Therapy Plan:       Medication Reconciliation Completed: No   Comments:   --->Care Gap information included below if applicable.   Orders Placed This Encounter    glimepiride (AMARYL) 2 MG tablet      Requested Prescriptions   Signed Prescriptions Disp Refills    glimepiride (AMARYL) 2 MG tablet 360 tablet 1     Sig: Take 2 tablets (4 mg total) by mouth 2 (two) times daily.       Endocrinology:  Diabetes - Sulfonylureas Passed - 1/5/2022 10:48 AM        Passed - Patient is at least 18 years old        Passed - Valid encounter within last 15 months     Recent Visits  Date Type Provider Dept   11/23/21 Office Visit Cade Juan MD [facility], [place], operated [facility]   11/18/21 Office Visit Cade Juan MD [facility] [place], [facility]   10/05/21 Office Visit Cade Juan MD [facility], [place], [facility]   09/16/21 Office Visit Cade Juan MD [facility] [place], [facility]   03/02/21 Office Visit Cade Juan MD [facility], [place], [facility]   11/24/20 Office Visit Cade Juan MD [facility], [place], [facility]   08/18/20 Office Visit Cade Juan MD [facility], [place], [facility]   02/18/20 Office Visit Cade Juan MD [facility], [place], operated [facility]   Showing recent visits within past 720 days and meeting all other requirements  Today's Visits  Date Type Provider Dept   01/06/22 Office Visit Cade Juan MD [facility], [place], [facility]   Showing today's visits and meeting all other requirements  Future Appointments  No visits were found meeting these conditions.  Showing future appointments within next 150 days and meeting all other requirements      Future Appointments              In 4 days PFIZER VACCINE, [facility] [place] [facility]  Arrive at: [facility] - COVID Vaccine    In 6 days DIETICIAN 1, [facility] -  Infusion, [facility]    In 6 days JESSICA BloomAc [facility] - [facility], [facility]    In 1 week ROCÍO Valdovinos MD [place] - Urology, [place]    In 1 week Lita Shaw, PT [facility] - Rehab, [facility]    In 1 month ABI Martinez-C [facility] - Integrative Therapies, [facility]    In 1 month INJECTION SCHEDULE, [facility] INFUSION [facility] - Infusion, [facility]    In 1 month LAB, [facility] - Lab, [place]    In 1 month [facility] CT1 LIMIT 500 LBS [place] - Imaging, [place]    In 1 month Clinton Andrews MD [facility] 2nd Fl, [facility]    In 3 months Cade Juan MD [facility], [place]                Passed - HBA1C within 180 days     Lab Results   Component Value Date    HGBA1C 6.3 (H) 12/13/2021    HGBA1C 6.7 (H) 11/18/2021    HGBA1C 8.0 (H) 08/25/2021              Passed - Cr is 1.39 or below and within 360 days     Lab Results   Component Value Date    CREATININE 1.0 12/13/2021    CREATININE 1.1 11/18/2021    CREATININE 1.03 09/29/2021              Passed - eGFR is 59 or above and within 360 days     Lab Results   Component Value Date    EGFRNONAA >60.0 12/13/2021    EGFRNONAA >60.0 11/18/2021    EGFRNONAA >60 09/29/2021                    Appointments  past 12m or future 3m with PCP    Date Provider   Last Visit   11/23/2021 Cade Juan MD   Next Visit   1/6/2022 Cade Juan MD   ED visits in past 90 days: 0     Note composed:12:05 PM 01/06/2022

## 2022-01-06 NOTE — PROGRESS NOTES
Subjective:       Patient ID: Stu Garcia is a 76 y.o. male.    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 Jeter - neck stable; swallowing stable  Lumbar fracture - back pain only occurring at night periodically; this is stable  Diabetes with neuropathy- following diabetic diet; He is taking glimepiride 4mg QHS. Metformin XR 500mg BID. BGs 100s. No hypoglycemia.   CAD - s/p 4 stents; following with Dr. Maldonado  Paroxysmal afib, HTN - taking xarelto; metoprolol 50mg BID  HLD - taking Lipitor 80mg daily    Back pain is improved, but still is occurring at night in central lower back when active  He is taking hydrocodone 10/325 BID, tizanidine QHS and 1/2 QAM.      Lost 4 pounds in 2 months    Diabetes  Pertinent negatives for hypoglycemia include no confusion, dizziness or headaches. Associated symptoms include fatigue. Pertinent negatives for diabetes include no chest pain, no polydipsia, no polyuria and no weakness.   Follow-up  Associated symptoms include fatigue and neck pain. Pertinent negatives include no abdominal pain, arthralgias, chest pain, chills, coughing, fever, headaches, joint swelling, nausea, rash, sore throat, vomiting or weakness.   Coronary Artery Disease  Pertinent negatives include no chest pain, chest tightness, dizziness, leg swelling, palpitations or shortness of breath.   Atrial Fibrillation  Symptoms are negative for chest pain, dizziness, palpitations, shortness of breath and weakness. Past medical history includes atrial fibrillation and CAD.   DepressionPatient is not experiencing: confusion,  palpitations and shortness of breath.  Patient has a history of: CAD        Past Medical History:   Diagnosis Date    Anticoagulant long-term use     xarelto,asa    Arthritis     Atrial fibrillation 2013    cardioverted    Bleb, lung     Cataract     OS    Colon polyp     Coronary artery disease     Coronary artery disease involving native coronary artery of native heart with unstable angina pectoris 3/18/2018    Diabetes mellitus     Diabetes mellitus, type 2     Diverticulosis     Encounter for blood transfusion     General anesthetics causing adverse effect in therapeutic use     delayed emergence per patient's wife    GERD (gastroesophageal reflux disease)     HEARING LOSS     bilateral aids    Hemorrhoid     HLD (hyperlipidemia)     Hypertension     Iron deficiency anemia     Neuropathy of leg     Pneumonia due to other staphylococcus     Prostate cancer     prostate    Spontaneous pneumothorax     bilateral    Thyroid disease     tumors, non cancerous       Past Surgical History:   Procedure Laterality Date    CARDIOVERSION      CATARACT EXTRACTION      bilateral    CERVICAL SPINE FRACTURE SURGERY      c7 t1 ACDF     CHEST TUBE INSERTION Right     COLONOSCOPY      CORONARY STENT PLACEMENT      x 2    ENDOSCOPIC ULTRASOUND OF UPPER GASTROINTESTINAL TRACT N/A 3/13/2019    Procedure: ULTRASOUND, UPPER GI TRACT, ENDOSCOPIC;  Surgeon: Andrew Shirley MD;  Location: [address]);  Service: Endoscopy;  Laterality: N/A;    ESOPHAGOGASTRODUODENOSCOPY N/A 2/23/2019    Procedure: ESOPHAGOGASTRODUODENOSCOPY (EGD);  Surgeon: Jackeline Richards MD;  Location: [facility];  Service: Endoscopy;  Laterality: N/A;    ESOPHAGOGASTRODUODENOSCOPY N/A 2/6/2020    Procedure: EGD (ESOPHAGOGASTRODUODENOSCOPY);  Surgeon: Andrea Kerns Jr., MD;  Location: [facility];  Service: Endoscopy;  Laterality: N/A;    HERNIA REPAIR      umbilical    INSERTION OF TUNNELED CENTRAL VENOUS CATHETER (CVC) WITH  SUBCUTANEOUS PORT Right 4/4/2019    Procedure: PAUASXTRY-LNRU-R-CATH (POWER PORT);  Surgeon: Nahum Zaidi MD;  Location: [facility];  Service: General;  Laterality: Right;    KNEE SURGERY Left 2017    open thoracotomy Left 1966    With pleurectomy    Pleurectomy Left 1966    For treatment of left pneumothorax    PROSTATECTOMY  2001    open    ROBOT-ASSISTED SURGICAL REMOVAL OF ESOPHAGUS USING DA ROSALIA XI N/A 7/2/2019    Procedure: XI ROBOTIC ESOPHAGECTOMY;  Surgeon: Chad Milian MD;  Location: [address];  Service: General;  Laterality: N/A;    TUBE THORACOTOMY  1966    pneumothorax left--open       Review of patient's allergies indicates:   Allergen Reactions    Codeine Nausea And Vomiting       Social History     Socioeconomic History    Marital status:     Number of children: 2   Occupational History    Occupation:     Occupation: prior [employer]    Tobacco Use    Smoking status: Former Smoker     Years: 20.00    Smokeless tobacco: Never Used    Tobacco comment: quit smoking in 1980's.   Substance and Sexual Activity    Alcohol use: Yes     Comment: 1 drink /month    Drug use: No    Sexual activity: Not Currently   Social History Narrative    From [place]     Social Determinants of Health     Financial Resource Strain: Low Risk     Difficulty of Paying Living Expenses: Not hard at all   Food Insecurity: No Food Insecurity    Worried About Running Out of Food in the Last Year: Never true    Ran Out of Food in the Last Year: Never true   Transportation Needs: No Transportation Needs    Lack of Transportation (Medical): No    Lack of Transportation (Non-Medical): No   Physical Activity: Inactive    Days of Exercise per Week: 0 days    Minutes of Exercise per Session: 0 min   Stress: No Stress Concern Present    Feeling of Stress : Only a little   Social Connections: Unknown    Frequency of Communication with Friends and Family: Once a week    Frequency of  Social Gatherings with Friends and Family: Patient refused    Active Member of Clubs or Organizations: Patient refused    Attends Club or Organization Meetings: Patient refused    Marital Status:    Housing Stability: Low Risk     Unable to Pay for Housing in the Last Year: No    Number of Places Lived in the Last Year: 1    Unstable Housing in the Last Year: No       Current Outpatient Medications on File Prior to Visit   Medication Sig Dispense Refill    aspirin (ECOTRIN) 81 MG EC tablet Take 1 tablet (81 mg total) by mouth once daily. May take over the counter 30 tablet 12    atorvastatin (LIPITOR) 80 MG tablet TAKE 1 TABLET(80 MG) BY MOUTH EVERY EVENING 90 tablet 0    b complex vitamins capsule Take 1 capsule by mouth once daily.      blood sugar diagnostic (ACCU-CHEK SHASHANK PLUS TEST STRP) Strp TEST ONCE DAILY 100 strip 3    lancets (ACCU-CHEK SOFTCLIX LANCETS) Misc 1 strip by Misc.(Non-Drug; Combo Route) route once daily. 100 each 3    lidocaine-prilocaine (EMLA) cream U UTD  0    melatonin 3 mg Cap Take 3 mg by mouth nightly as needed (sleep).      metFORMIN (GLUCOPHAGE-XR) 500 MG ER 24hr tablet TAKE 1 TABLET(500 MG) BY MOUTH TWICE DAILY WITH MEALS 180 tablet 1    metoprolol tartrate (LOPRESSOR) 50 MG tablet TAKE 1 TABLET BY MOUTH TWICE DAILY 60 tablet 11    multivitamin capsule Take 1 capsule by mouth once daily.      omeprazole (PRILOSEC) 40 MG capsule TAKE 1 CAPSULE(40 MG) BY MOUTH EVERY DAY 90 capsule 3    rivaroxaban (XARELTO) 20 mg Tab Take 1 tablet (20 mg total) by mouth daily with dinner or evening meal. 30 tablet 11    vit C,F-Ca-rsxzv-lutein-zeaxan (PRESERVISION AREDS 2) 250-90-40-1 mg Cap Take 1 tablet by mouth once daily.      glimepiride (AMARYL) 2 MG tablet Take 2 tablets (4 mg total) by mouth 2 (two) times daily. 360 tablet 1    glucagon, human recombinant, (GLUCAGON EMERGENCY KIT, HUMAN,) 1 mg SolR Inject 1 mg into the muscle as needed. (Patient not taking: No sig  "reported) 1 each 2    nitroGLYCERIN (NITROSTAT) 0.4 MG SL tablet DISSOLVE 1 TABLET UNDER THE TONGUE EVERY 5 MINUTES AS NEEDED FOR CHEST PAIN (Patient not taking: Reported on 1/6/2022) 100 tablet prn    vitamin A 64722 UNIT capsule Take 10,000 Units by mouth once daily.       No current facility-administered medications on file prior to visit.       Family History   Problem Relation Age of Onset    Mental illness Mother         dementia    Coronary artery disease Father     Cancer Father         stomach with mets    Diabetes Brother        Review of Systems   Constitutional: Positive for fatigue. Negative for activity change, appetite change, chills, fever and unexpected weight change.   HENT: Positive for hearing loss, rhinorrhea and trouble swallowing. Negative for sore throat.    Eyes: Negative for pain, discharge and visual disturbance.   Respiratory: Negative for cough, chest tightness, shortness of breath and wheezing.    Cardiovascular: Negative for chest pain, palpitations and leg swelling.   Gastrointestinal: Negative for abdominal pain, blood in stool, constipation, diarrhea, nausea and vomiting.   Endocrine: Negative for polydipsia and polyuria.   Genitourinary: Negative for difficulty urinating, dysuria, hematuria and urgency.   Musculoskeletal: Positive for back pain and neck pain. Negative for arthralgias, gait problem and joint swelling.   Skin: Negative for rash and wound.   Neurological: Negative for dizziness, weakness, light-headedness and headaches.   Hematological: Negative for adenopathy.   Psychiatric/Behavioral: Positive for depression. Negative for confusion and dysphoric mood.       Objective:      /70   Pulse 68   Temp 98 °F (36.7 °C) (Oral)   Resp 18   Ht 6' 4" (1.93 m)   Wt 78.2 kg (172 lb 6.4 oz)   SpO2 97%   BMI 20.99 kg/m²   Physical Exam  Constitutional:       General: He is not in acute distress.     Appearance: Normal appearance. He is well-developed.   HENT:      " Head: Normocephalic and atraumatic.      Right Ear: External ear normal.      Left Ear: External ear normal.      Mouth/Throat:      Pharynx: Uvula midline. No oropharyngeal exudate.   Eyes:      General: Lids are normal.      Conjunctiva/sclera: Conjunctivae normal.      Pupils: Pupils are equal, round, and reactive to light.   Neck:      Thyroid: No thyroid mass or thyromegaly.      Trachea: Phonation normal.   Cardiovascular:      Rate and Rhythm: Normal rate and regular rhythm.      Heart sounds: Normal heart sounds. No murmur heard.  No friction rub. No gallop.    Pulmonary:      Effort: Pulmonary effort is normal. No respiratory distress.      Breath sounds: Normal breath sounds. No wheezing or rales.   Abdominal:      General: Bowel sounds are normal.      Palpations: Abdomen is soft. There is no hepatomegaly or splenomegaly.      Tenderness: There is no abdominal tenderness.   Musculoskeletal:      Cervical back: Full passive range of motion without pain, normal range of motion and neck supple.      Lumbar back: Tenderness present. No swelling, deformity or spasms. Decreased range of motion.        Back:    Lymphadenopathy:      Cervical: No cervical adenopathy.   Skin:     General: Skin is warm and dry.   Neurological:      Mental Status: He is alert and oriented to person, place, and time.      Cranial Nerves: No cranial nerve deficit.      Coordination: Coordination normal.   Psychiatric:         Speech: Speech normal.         Behavior: Behavior normal.         Thought Content: Thought content normal.         Judgment: Judgment normal.           Results for orders placed or performed in visit on 12/13/21   Comprehensive Metabolic Panel   Result Value Ref Range    Sodium 138 136 - 145 mmol/L    Potassium 5.4 (H) 3.5 - 5.1 mmol/L    Chloride 106 95 - 110 mmol/L    CO2 28 23 - 29 mmol/L    Glucose 98 70 - 110 mg/dL    BUN 16 8 - 23 mg/dL    Creatinine 1.0 0.5 - 1.4 mg/dL    Calcium 9.6 8.7 - 10.5 mg/dL     Total Protein 6.5 6.0 - 8.4 g/dL    Albumin 3.7 3.5 - 5.2 g/dL    Total Bilirubin 0.6 0.1 - 1.0 mg/dL    Alkaline Phosphatase 93 55 - 135 U/L    AST 18 10 - 40 U/L    ALT 10 10 - 44 U/L    Anion Gap 4 (L) 8 - 16 mmol/L    eGFR if African American >60.0 >60 mL/min/1.73 m^2    eGFR if non African American >60.0 >60 mL/min/1.73 m^2   Hemoglobin A1C   Result Value Ref Range    Hemoglobin A1C 6.3 (H) 4.0 - 5.6 %    Estimated Avg Glucose 134 (H) 68 - 131 mg/dL     *Note: Due to a large number of results and/or encounters for the requested time period, some results have not been displayed. A complete set of results can be found in Results Review.         Assessment:       1. Lumbar sprain, subsequent encounter    2. Lumbar back sprain, sequela    3. Leukopenia due to antineoplastic chemotherapy    4. Thrombocytopenia, unspecified    5. PAF (paroxysmal atrial fibrillation)    6. Type II diabetes mellitus with peripheral circulatory disorder        Plan:       Lumbar sprain, subsequent encounter  -     HYDROcodone-acetaminophen (NORCO)  mg per tablet; Take 1 tablet by mouth every 12 (twelve) hours as needed for Pain.  Dispense: 60 tablet; Refill: 0    Lumbar back sprain, sequela  -     tiZANidine (ZANAFLEX) 4 MG tablet; Take 1 tablet (4 mg total) by mouth every 8 (eight) hours.  Dispense: 90 tablet; Refill: 3    Leukopenia due to antineoplastic chemotherapy    Thrombocytopenia, unspecified    PAF (paroxysmal atrial fibrillation)    Type II diabetes mellitus with peripheral circulatory disorder          Consider pain management if back pain persists  continue oncology follow-up as plannd  continue glucerna supplement to aid in weight gain  Continue protonix BID  F/u with cardiology as planned   Continue Glimepiride 4mg BID, metformin XR 500mg BID   Continue other present meds  Counseled on regular exercise, maintenance of a healthy weight, balanced diet rich in fruits/vegetables and lean protein, and avoidance of  unhealthy habits like smoking and excessive alcohol intake.  Integrative medicine follow-up regarding chemo-induced neuropathy

## 2022-01-08 PROBLEM — E11.51 TYPE II DIABETES MELLITUS WITH PERIPHERAL CIRCULATORY DISORDER: Status: ACTIVE | Noted: 2017-12-06

## 2022-01-12 NOTE — PROGRESS NOTES
Health Maintenance Due   Topic Date Due    Shingles Vaccine (2 of 3) 10/14/2014     Updates were requested from care everywhere.  Chart was reviewed for overdue Proactive [facility] Encounters (KAT) topics (CRS, Breast Cancer Screening, Eye exam)  Health Maintenance has been updated.  LINKS immunization registry triggered.  Immunizations were reconciled.

## 2022-01-12 NOTE — TELEPHONE ENCOUNTER
Spoke with Ania about getting a nutrition appt scheduled for Mr. Garcia. I explained the appts would be on a Thursday. She verbalized understanding and accepted appt for next week.

## 2022-01-12 NOTE — PROGRESS NOTES
Subjective:       Patient ID: Stu Garcia is a 76 y.o. male.    Chief Complaint: Pain (cLBP/Kidney pain/)    This patient was referred to me from Dr Juan for Chronic Low Back Pain and chemotherapy induced neuropathy. He also expressed that he has blood in his urine and pain that is radiating from the kidney area around his lower rib cage. He will see a urologist on the 13th. He wants to see if acupuncture can relieve the pain in his back and numbness in his hands and feet.    Pain  This is a chronic problem. The current episode started more than 1 year ago. The problem occurs constantly. The problem has been unchanged. Associated symptoms include anorexia, arthralgias, fatigue, myalgias, numbness, urinary symptoms and weakness. Improvement on treatment: is currently beginning treatment.     Review of Systems   Constitutional: Positive for fatigue.   HENT: Positive for hearing loss and trouble swallowing.    Gastrointestinal: Positive for anorexia.   Genitourinary: Positive for flank pain and hematuria.   Musculoskeletal: Positive for arthralgias, back pain, gait problem, leg pain and myalgias.   Neurological: Positive for weakness and numbness. Negative for speech difficulty.   Hematological: Negative for adenopathy. Bruises/bleeds easily.   Psychiatric/Behavioral:        Very pleasant and concerned about hematuria         Objective:      Physical Exam  Constitutional:              Assessment:     Back pain is a level 4-7/10    Today is visit #1    Problem List Items Addressed This Visit        Orthopedic    Chronic back pain - Primary      Other Visit Diagnoses     Chemotherapy-induced neuropathy              Plan:       Recommended acupuncture once per week for 4 weeks. Progress check at 4th visit. Patient needs scans/ scopes of Kidneys. If patient is approved to proceed by Urologist, he may continue acupuncture program as needed and if helps decrease back pain.    Patient was able to ask questions and  was satisfied with answers given.    Points used: shenmen, lumbar auricular point, LI 4, BL 60, GB 40, ST 36, DU 3,4,5, BL 23 20, yintang, DU 20    RTC next Wed    15 needles inserted at 10:45  15 needles removed at 11:15

## 2022-01-13 NOTE — PROCEDURES
"Cystoscopy  Performed by: ROCÍO Valdovinos MD  Authorized by: ROCÍO Valdovinos MD     Consent Done?:  Yes (Written)  Time out: Immediately prior to procedure a "time out" was called to verify the correct patient, procedure, equipment, support staff and site/side marked as required.    Indications: hematuria    Position:  Supine  Anesthesia:  Lidocaine jelly  Patient sedated?: No    Preparation: Patient was prepped and draped in usual sterile fashion      Scope type:  Flexible cystoscope    Patient tolerance:  Patient tolerated the procedure well with no immediate complications    Blood Loss:  None    The patients clinic history and physical were reviewed and there are no changes.     The flexible cystoscope was placed into the urethra and carefully advanced into the bladder.  A careful cystoscopic exam was then performed.  The entire bladder mucosa was systematically visualized.  Findings include moderate bladder wall trabeculation.  There were no lesions, masses foreign bodies or stones.   Each ureteral orifices were visualized and both had clear efflux of urine.  On retroflexion, the bladder neck appeared normal.  The cystoscope was then removed and I examined the entire length of the urethra.  The bladder neck was wide open.  He has had a previous radical prostatectomy.  The anterior urethra appeared normal.   He tolerated the procedure well.  There were no complications    Impression:  Normal cystoscopy.    "

## 2022-01-18 NOTE — PROGRESS NOTES
Subjective:       Patient ID: Stu Garcia is a 76 y.o. male.    Chief Complaint: Pain (cLBP/Kidney pain/)    This patient was referred to me from Dr uJan for Chronic Low Back Pain and chemotherapy induced neuropathy. He also expressed that he has blood in his urine and pain that is radiating from the kidney area around his lower rib cage. He will see a urologist on the 13th. He wants to see if acupuncture can relieve the pain in his back and numbness in his hands and feet.    Today is visit #2    Pain  This is a chronic problem. The current episode started more than 1 year ago. The problem occurs constantly. The problem has been unchanged. Associated symptoms include anorexia, arthralgias, fatigue, myalgias, numbness, urinary symptoms and weakness. Improvement on treatment: is currently beginning treatment.     Review of Systems   Constitutional: Positive for fatigue.   HENT: Positive for hearing loss and trouble swallowing.    Gastrointestinal: Positive for anorexia.   Genitourinary: Positive for flank pain and hematuria.   Musculoskeletal: Positive for arthralgias, back pain, gait problem, leg pain and myalgias.   Neurological: Positive for weakness and numbness. Negative for speech difficulty.   Hematological: Negative for adenopathy. Bruises/bleeds easily.   Psychiatric/Behavioral:        Very pleasant and concerned about hematuria         Objective:      Physical Exam  Constitutional:              Assessment:     Back pain is a level 4-7/10    Today is visit #1    Problem List Items Addressed This Visit        Orthopedic    Chronic back pain - Primary      Other Visit Diagnoses     Polyneuropathy              Plan:       Recommended acupuncture once per week for 4 weeks. Progress check at 4th visit. Patient needs scans/ scopes of Kidneys. If patient is approved to proceed by Urologist, he may continue acupuncture program as needed and if helps decrease back pain.    Patient was able to ask questions  and was satisfied with answers given.    Points used: nidia, lumbar auricular point, LI 4, BL 60, GB 40, ST 36, DU 3,4,5, BL 23 20, yintang, DU 20    RTC next Tuesday    15 needles inserted at 10:00  15 needles removed at 10:45
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
## 2022-01-20 NOTE — PATIENT INSTRUCTIONS
Increase from 1 egg to 2 in the mornings and/or use whole milk to scramble eggs and top with shredded cheddar cheese  Eat crackers and butter alongside soup for lunch (if crackers too hard to eat, pt will crumple up and put in soup to moisten)  Include different lunch options such as chicken salad or PB sandwich occasionally   Include ONS of choice (Boost) into daily routine at least twice per day at a snack time (between meals or HS)  Puree nuts to add to yogurt parfait at dinner and top with honey and whipped cream
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
## 2022-01-25 NOTE — PROGRESS NOTES
Subjective:       Patient ID: Stu Garcia is a 76 y.o. male.    Chief Complaint: Pain (cLBP,/Polyneuropathy)    This patient was referred to me from Dr Juan for Chronic Low Back Pain and chemotherapy induced neuropathy. He also expressed that he has blood in his urine and pain that is radiating from the kidney area around his lower rib cage. He will see a urologist on the 13th. He wants to see if acupuncture can relieve the pain in his back and numbness in his hands and feet.    Today is visit #3    Pain  This is a chronic problem. The current episode started more than 1 year ago. The problem occurs constantly. The problem has been unchanged. Associated symptoms include anorexia, arthralgias, fatigue, myalgias, numbness, urinary symptoms and weakness. Improvement on treatment: is currently beginning treatment.     Review of Systems   Constitutional: Positive for fatigue.   HENT: Positive for hearing loss and trouble swallowing.    Gastrointestinal: Positive for anorexia.   Genitourinary: Positive for flank pain and hematuria.   Musculoskeletal: Positive for arthralgias, back pain, gait problem, leg pain and myalgias.   Neurological: Positive for weakness and numbness. Negative for speech difficulty.   Hematological: Negative for adenopathy. Bruises/bleeds easily.   Psychiatric/Behavioral:        Very pleasant and concerned about hematuria         Objective:      Physical Exam  Constitutional:              Assessment:     Back pain is a level 4-7/10----feeling improvement with acupuncture, encouraged patient to continue    Today is visit #3    Problem List Items Addressed This Visit        Orthopedic    Chronic back pain - Primary      Other Visit Diagnoses     Polyneuropathy              Plan:       Recommended acupuncture once per week for 4 weeks. Progress check at 4th visit. Patient needs scans/ scopes of Kidneys. If patient is approved to proceed by Urologist, he may continue acupuncture program as  needed and if helps decrease back pain.    Patient was able to ask questions and was satisfied with answers given.    Todat someone from [employer] will come over to program his apple watch with the [facility] application requested    Points used: nidia, lumbar auricular point, LI 4, BL 60, GB 40, ST 36, DU 3,4,5, BL 23 20, yintang, DU 20    RTC next Tuesday    15 needles inserted at 11:00  15 needles removed at 11:30

## 2022-01-25 NOTE — TELEPHONE ENCOUNTER
Spoke with Jose about getting PT appts scheduled. He preferred Wednesday's 1pm and Friday's 2pm. I booked 3 weeks for him and informed him we were fully booked next week. He verbalized acceptance of appts.     ----- Message from Bo Silva MA sent at 1/25/2022  4:19 PM CST -----  Regarding: PT appts needed  2x week for 8 weeks, LOV 1/19

## 2022-01-27 NOTE — TELEPHONE ENCOUNTER
No new care gaps identified.  Powered by [employer]. Reference number: [id number].   1/27/2022 1:02:10 PM CST

## 2022-01-27 NOTE — TELEPHONE ENCOUNTER
rx was discontinued in chart on 1/4/22. Please resend it to [facility]. [facility] would not allow me to fax it to them because it shows it is discontinued. Orders pending

## 2022-02-01 NOTE — PROGRESS NOTES
Subjective:       Patient ID: Stu Garcia is a 76 y.o. male.    Chief Complaint: Pain (cLBP) and Cancer (GE junction carcinoma, Polyneuropathy )    This patient was referred to me from Dr Juan for Chronic Low Back Pain and chemotherapy induced neuropathy. He also expressed that he has blood in his urine and pain that is radiating from the kidney area around his lower rib cage. He saw  a urologist on the 13th. He says he is still experiencing hematuria.    Today is visit #4---he reports that the acupuncture is decreasing his back pain.    Pain  This is a chronic problem. The current episode started more than 1 year ago. The problem occurs constantly. The problem has been unchanged. Associated symptoms include anorexia, arthralgias, fatigue, myalgias, numbness, urinary symptoms and weakness. Improvement on treatment: is currently beginning treatment.   Cancer  Associated symptoms include anorexia, arthralgias, fatigue, myalgias, numbness, urinary symptoms and weakness.     Review of Systems   Constitutional: Positive for fatigue.   HENT: Positive for hearing loss and trouble swallowing.    Gastrointestinal: Positive for anorexia.   Genitourinary: Positive for flank pain and hematuria.   Musculoskeletal: Positive for arthralgias, back pain, gait problem, leg pain and myalgias.   Neurological: Positive for weakness and numbness. Negative for speech difficulty.   Hematological: Negative for adenopathy. Bruises/bleeds easily.   Psychiatric/Behavioral:        Very pleasant and concerned about hematuria         Objective:      Physical Exam  Constitutional:              Assessment:     Back pain is a level 4-7/10----feeling improvement with acupuncture, encouraged patient to continue    Today is visit #4    Problem List Items Addressed This Visit        Neuro    Polyneuropathy associated with underlying disease       Orthopedic    Chronic back pain - Primary          Plan:       Recommended acupuncture once per  week for 4 weeks. Progress check at 4th visit. Patient needs scans/ scopes of Kidneys. If patient is approved to proceed by Urologist, he may continue acupuncture program as needed and if helps decrease back pain.    Patient was able to ask questions and was satisfied with answers given.    Points used: nidia, lumbar auricular point, LI 4, BL 60, GB 40, ST 36, DU 3,4,5, BL 23 20, yintang, DU 20    RTC next Tuesday    15 needles inserted at 11:00  15 needles removed at 11:30

## 2022-02-02 PROBLEM — Z91.81 HISTORY OF FALL: Status: ACTIVE | Noted: 2022-01-01

## 2022-02-07 PROBLEM — G47.00 INSOMNIA: Status: ACTIVE | Noted: 2022-01-01

## 2022-02-07 NOTE — PROGRESS NOTES
Subjective:       Patient ID: Stu Garcia is a 76 y.o. male.    Chief Complaint: Pain (cLBP)    This patient was referred to me from Dr Juan for Chronic Low Back Pain and chemotherapy induced neuropathy. He also expressed that he has blood in his urine and pain that is radiating from the kidney area around his lower rib cage. He saw  a urologist on the 13th. He says he is still experiencing hematuria.    Today is visit #5---he reports that the acupuncture is decreasing his back pain.    Pain  This is a chronic problem. The current episode started more than 1 year ago. The problem occurs constantly. The problem has been unchanged. Associated symptoms include anorexia, arthralgias, fatigue, myalgias, numbness, urinary symptoms and weakness. Improvement on treatment: is currently beginning treatment.   Cancer  Associated symptoms include anorexia, arthralgias, fatigue, myalgias, numbness, urinary symptoms and weakness.     Review of Systems   Constitutional: Positive for fatigue.   HENT: Positive for hearing loss and trouble swallowing.    Gastrointestinal: Positive for anorexia.   Genitourinary: Positive for flank pain and hematuria.   Musculoskeletal: Positive for arthralgias, back pain, gait problem, leg pain and myalgias.   Neurological: Positive for weakness and numbness. Negative for speech difficulty.   Hematological: Negative for adenopathy. Bruises/bleeds easily.   Psychiatric/Behavioral:        Very pleasant and concerned about hematuria         Objective:      Physical Exam  Constitutional:              Assessment:     Back pain is a level 4-7/10----feeling improvement with acupuncture, encouraged patient to continue    Today is visit #5    Problem List Items Addressed This Visit        Orthopedic    Chronic back pain - Primary          Plan:       Recommended acupuncture once per week for 4 weeks. Progress check at 4th visit. Patient needs scans/ scopes of Kidneys. If patient is approved to proceed  by Urologist, he may continue acupuncture program as needed and if helps decrease back pain.    Patient was able to ask questions and was satisfied with answers given.    Points used: shenmen, lumbar auricular point, LI 4, BL 60, GB 40, ST 36, DU 3,4,5, BL 23 20, yintang, DU 20    RTC next Tuesday    15 needles inserted at 11:00  15 needles removed at 11:30
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
## 2022-02-07 NOTE — TELEPHONE ENCOUNTER
"----- Message from TORIBIO MartinezP-BENJA sent at 2/7/2022 12:23 PM CST -----  Good afternoon,     I sent Dr. Juan's secure chat, but I am not sure if he checks that so I sent the message here too.     I just had an office visit with Mr. Garcia. I copied what he told me during our visit.     Patient reports he is seeing people in his room at night. He states he talks to them but they do not speak back. He reports, "it is annoying that they are there and I do not know why they are there. The people are there about 3 nights a week. The people are dressed up with masks and they are working. It is really weird." He states this has never happened before but it has been occurring about 3-4 months.  When he gets up and turns the light on they are no longer there. His wife tells him he is dreaming, but he states it is not a dream because it is so real. He also states he knows people are not really in his room.     He takes Norco 10, Benadryl 50mg, Zanaflex, and Remeron nightly. I advised him to stop taking the benadryl. I told the patient and his wife I was going to contact you regarding the hallucinations and the nighttime medication.     Thank you,  Kostas       "

## 2022-02-09 NOTE — PATIENT INSTRUCTIONS
Lower Abdominals With Diaphragmatic Breathing        Breathe in lower belly and exhale and let air out. Clam Shell 45 Degrees: Leg Lift        Lying with hips and knees bent 45°, Rotate knee upward then lift leg. Lower foot then knee. Be sure pelvis does not roll backward. Do not arch back.  Do _10__ times, each leg, __2_ times per day.     Hip Abduction: Modified        Lying on right side. Raise top leg straight. Hamstring: Stretch (Sitting) 5 reps x 2 trials each leg.        Sit straight. Extend right knee until stretch is felt in back of thigh. Hold _30__ seconds. 3 reps alternating legs Relax.  Only stretch legs till you feel in back of thigh, not pain in back.   Repeat with other leg.       Copyright © [employer]. All rights reserved.       Copyright © [employer]. All rights reserved.
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
## 2022-02-09 NOTE — TELEPHONE ENCOUNTER
His symptoms are definitely medication related. Have him also stop the zanaflex at night to see if that helps.

## 2022-02-09 NOTE — PROGRESS NOTES
[facility] Outpatient Physical Therapy and Wellness Daily Note     Treatment Date: 2/9/2022    Name: Stu Garcia  Clinic Number: [id number]  Diagnosis:   Encounter Diagnoses   Name Primary?    GE junction carcinoma     Polyneuropathy associated with underlying disease     History of prostate cancer     Gait disturbance Yes     Physician: Keeley Gonzalez FNP-C  Precautions: Standard and Fall  Visit #: 2 of 16   PTA Visit #: 0  Time In: 1:06 PM late with traffic  Time Out: 2:00 pm  Total Treatment Time 1:1: 54 min    Evaluation Date: 1/19/2022  Visit # authorized: 20  Authorization period: through 4/30/2022  Plan of care Expiration: 10 visits/ 30 days   MD referral: PT eval and treat    Subjective     Pt reports: I am so fatigued.   He was seen for eval only on 1/19/22    Response to previous treatment: eval only    Pain, Nature, Location: 3/10 constant  Dead center of low back described as went to 6/10 while laying in supine on mat, which is harder than his mattress at home     Objective     Pt performed hand washing prior to start of session.      Stu received therapeutic exercises to develop strength, endurance, ROM, flexibility, posture and core stabilization for 54 minutes including:    Nu step 10 min L1 resting HR 77bpm and O2 sat 98%  At 5 min HR 78 bpm, O2 at 95%; at 10 min  HR 79 bpm And O2 sat 97%  UBE 6 min alternating forward and backward  L2  Diaphragmatic breathing in sidelye  Could not tolerate supine position on mat even with offloading  Perf sidelye clams with neutral pelvis with TA  PPT with TA in sidelye  Hip abduction 5 reps x 2 trials in sidelye  Sitting hamstring stretches 3 reps B 30 sec hold    Written Home Exercises Provided: yes.  Exercises were reviewed and Stu was able to demonstrate them prior to the end of the session.  Stu demonstrated good  understanding of the education provided.     See EMR under Patient Instructions for exercises provided  2/9/2022.    Education provided re: energy conservation, hep for strengthening  Stu verbalized good  understanding of education provided.     Assessment   Muscle soreness is expected over the next 24-48 hours. Pt has guarded gait pattern and may benefit from use of RW versus his small based quad cane.Pt is a high risk for falls.    Stu Is progressing well towards his goals.   Pt prognosis is Good.       Pt will continue to benefit from skilled outpatient physical therapy to address the deficits listed in the problem list box on initial evaluation, provide pt/family education and to maximize pt's level of independence in the home and community environment.     Pt's spiritual, cultural and educational needs considered and pt agreeable to plan of care and goals.    Anticipated barriers to physical therapy: none      This is a 76 y.o. male referred to outpatient physical therapy and presents with a medical diagnosis of chemotherapy induced neuropathy and gait disturbance. Patient presents with bilateral leg weakness, poor posture, history of back pain with comes and goes, impaired balance with noticeable unsteadiness in gait requiring AD for safety in community due to fall risk. Patient demonstrates limitations as described in the problem list. Pt rehab potential is Fair. Pt will benefit from skilled outpatient physical therapy to address the deficits listed below in the problem list, provide pt/family education and to maximize pt's level of independence in the home and community environment.         Medical necessity is demonstrated by the following IMPAIRMENTS/PROMBLEM LIST:  1. Fall Risk - impaired balance   2. Unable to participate in daily activities   3. Impaired functional mobility  4. Requires skilled supervision to complete and progress HEP   5. generalized weakness  6. Decreased CV endurance   7. Decreased standing tolerance   8. Decreased community ambulation   9. Decreased safety awareness with  functional mobility  10. Impaired muscle tone     Anticipated barriers to physical therapy: none     Pt's spiritual, cultural and educational needs considered and pt agreeable to plan of care and goals as stated below:      GOALS:   Short term goals: 4 weeks, pt agrees to goals set.  1. Pt and caregivers will evaluate home for safety, including checking lighting and hazards on the floor such as rugs or cords and remove them.  2. Pt will improve SLS on R by > or = 3 seconds in order to decrease risk for falls in the home and community environment.  3. Pt will improve SLS on L by > or = 3 seconds in order to decrease risk for falls in the home and community environment.  4. Pt will tolerate 30 minutes of physical therapy treatment with 1 rest break to demonstrate overall improvement in CV endurance  5. Pt will decrease TUG score by > or = 2 seconds with improved safety in order to demonstrate decreased risk for falls.  6. Pt will increase MMT for B LE's to > or = 4+/5 in order to improve stability and safety with community ambulation.  7. Patient will improve Tinetti Gait/Balance assessment score to 18/28 to aid in improvement of functional mobility and decrease fall risk.     Long term goals: 8 weeks, pt agrees to goals set  1. Pt will ambulate 1000 ft/communiuty distances with/without AD with no rest breaks with modified independence.  2. Pt will improve TUG score by > or = 2 seconds to decrease risk for falls in the home and community environment.  3. Increased MMT for B LE's to > or = 5/5 in order to improve stability and safety with community ambulation.  4. Pt will tolerate 50 minutes of physical therapy with < or = 1 rest breaks in order to demonstrate overall improved CV endurance.   5. Pt will be able to ambulate community distances decreased complaints of fatigue.         Plan   Outpatient physical therapy 1-2 times weekly to include: patient education including safety awareness, hep, therapeutic exercises,  therapeutic activities, neuromuscular re-education/ balance exercises, manual techniques and modalities prn.   Cont PT for  total of 16 visits. Patient may be seen by PTA as part of the rehabilitation team.   Blessing Peters,  PT 2/9/2022             Therapist: Blessing Peters, PT  2/9/2022

## 2022-02-11 PROBLEM — R53.1 WEAKNESS GENERALIZED: Status: ACTIVE | Noted: 2022-01-01

## 2022-02-11 PROBLEM — R53.81 DEBILITY: Status: ACTIVE | Noted: 2022-01-01

## 2022-02-11 NOTE — PROGRESS NOTES
"  [facility] Outpatient Physical Therapy and Wellness Daily Note     Treatment Date: 2/11/2022    Name: Sut Garcia  Clinic Number: [id number]  Diagnosis:   Encounter Diagnoses   Name Primary?    GE junction carcinoma Yes    Polyneuropathy associated with underlying disease     Gait disturbance     Chemotherapy-induced neuropathy     Debility     Weakness generalized      Physician: Keeley Gonzalez FNP-C  Precautions: Standard and Fall  Visit #: 3 of 16   PTA Visit #: 0  Time In: 2:03 PM   Time Out: 3:03 pm  Total Treatment Time 1:1: 60 min    Evaluation Date: 1/19/2022  Visit # authorized: 20  Authorization period: through 4/30/2022  Plan of care Expiration: 10 visits/ 30 days   MD referral: PT eval and treat    Subjective     Pt reports: I did not sleep at all last night because of my back pain, pt reports felt good after last session, no complaints of pain from session. " I have arthritis, that won't go away"       Response to previous treatment: denied muscle soreness or increased pain    Pain, Nature, Location: /10 constant      Objective     Pt performed hand washing prior to start of session.      Stu received therapeutic exercises to develop strength, endurance, ROM, flexibility, posture and core stabilization for 54 minutes including:    Nu step 10 min L2 resting HR 75 bpm and O2 sat 99%  At 5 min HR 78 bpm, O2 at 95%; at 10 min  HR 79 bpm And O2 sat 98%  UBE 6 min alternating forward and backward  L2 deferred  Hamstring stretch sitting in chair 30 sec hold x 3 each leg  gastroc stretch on wedge 30 sec x 3 each leg  Pillow squeeze with pelvic floor 10 reps 5 sec hold  TA initiation with ball press in lap with lean forward 5  Reps 10 sec hold  Standing with lean on elbows on mat raised, glut sets 10 reps  Laying in supported long sitting position on mat with pillows  SLR 10 reps each  Hip abd 10 reps each   Diaphragmatic breathing 5 min    Past session:   Could not tolerate supine " position on mat even with offloading  Perf sidelye clams with neutral pelvis with TA  PPT with TA in sidelye  Hip abduction 5 reps x 2 trials in sidelye  Sitting hamstring stretches 3 reps B 30 sec hold    Written Home Exercises Provided: yes. providd written progression of hep to perform daily.  Exercises were reviewed and Stu was able to demonstrate them prior to the end of the session.  Stu demonstrated good  understanding of the education provided.     See EMR under Patient Instructions for exercises provided 2/11/2022.    Education provided re: energy conservation, hep for strengthening  Stu verbalized good  understanding of education provided.     Assessment   Pt requires offloading of spine to complete most exercises today. Back pain was 7/10 upon entry decreased to a 4/10 afterwards   Muscle soreness is expected over the next 24-48 hours. Pt has guarded gait pattern and may benefit from use of RW versus his small based quad cane.Pt is a high risk for falls.    Stu Is progressing well towards his goals.   Pt prognosis is Good.       Pt will continue to benefit from skilled outpatient physical therapy to address the deficits listed in the problem list box on initial evaluation, provide pt/family education and to maximize pt's level of independence in the home and community environment.     Pt's spiritual, cultural and educational needs considered and pt agreeable to plan of care and goals.    Anticipated barriers to physical therapy: none      This is a 76 y.o. male referred to outpatient physical therapy and presents with a medical diagnosis of chemotherapy induced neuropathy and gait disturbance. Patient presents with bilateral leg weakness, poor posture, history of back pain with comes and goes, impaired balance with noticeable unsteadiness in gait requiring AD for safety in community due to fall risk. Patient demonstrates limitations as described in the problem list. Pt rehab potential is Fair.  Pt will benefit from skilled outpatient physical therapy to address the deficits listed below in the problem list, provide pt/family education and to maximize pt's level of independence in the home and community environment.         Medical necessity is demonstrated by the following IMPAIRMENTS/PROMBLEM LIST:  1. Fall Risk - impaired balance   2. Unable to participate in daily activities   3. Impaired functional mobility  4. Requires skilled supervision to complete and progress HEP   5. generalized weakness  6. Decreased CV endurance   7. Decreased standing tolerance   8. Decreased community ambulation   9. Decreased safety awareness with functional mobility  10. Impaired muscle tone     Anticipated barriers to physical therapy: none     Pt's spiritual, cultural and educational needs considered and pt agreeable to plan of care and goals as stated below:      GOALS:   Short term goals: 4 weeks, pt agrees to goals set.  1. Pt and caregivers will evaluate home for safety, including checking lighting and hazards on the floor such as rugs or cords and remove them.  2. Pt will improve SLS on R by > or = 3 seconds in order to decrease risk for falls in the home and community environment.  3. Pt will improve SLS on L by > or = 3 seconds in order to decrease risk for falls in the home and community environment.  4. Pt will tolerate 30 minutes of physical therapy treatment with 1 rest break to demonstrate overall improvement in CV endurance  5. Pt will decrease TUG score by > or = 2 seconds with improved safety in order to demonstrate decreased risk for falls.  6. Pt will increase MMT for B LE's to > or = 4+/5 in order to improve stability and safety with community ambulation.  7. Patient will improve Tinetti Gait/Balance assessment score to 18/28 to aid in improvement of functional mobility and decrease fall risk.     Long term goals: 8 weeks, pt agrees to goals set  1. Pt will ambulate 1000 ft/communiuty distances  with/without AD with no rest breaks with modified independence.  2. Pt will improve TUG score by > or = 2 seconds to decrease risk for falls in the home and community environment.  3. Increased MMT for B LE's to > or = 5/5 in order to improve stability and safety with community ambulation.  4. Pt will tolerate 50 minutes of physical therapy with < or = 1 rest breaks in order to demonstrate overall improved CV endurance.   5. Pt will be able to ambulate community distances decreased complaints of fatigue.         Plan   Outpatient physical therapy 1-2 times weekly to include: patient education including safety awareness, hep, therapeutic exercises, therapeutic activities, neuromuscular re-education/ balance exercises, manual techniques and modalities prn.   Cont PT for  total of 16 visits. Patient may be seen by PTA as part of the rehabilitation team.   Blessing Peters,  PT 2/9/2022             Therapist: Blessing Peters, PT  2/11/2022

## 2022-02-11 NOTE — PATIENT INSTRUCTIONS
Strengthening: Straight Leg Raise use support behind back       tighten muscles on front of left thigh, then lift leg from surface, keeping knee locked.  Repeat __10__ times per set. Do ___2_ sets per session.  HIP: Abduction - Supine        Move leg away from body. Keep knee and toes straight. Bend opposite leg and use trunk support.   __10_ reps per set, _2__ sets per day    Copyright © [employer]. All rights reserved.        [email or web address]     Hip adduction: sitting squeeze pillow between legs hold 5 seconds 10 reps  Diaphragmatic        Hands on navel, inhale through nose making navel move out toward hands. Exhale through puckered lips, hands follow exhalation in.  Repeat 10 ___ times. Rest _10__ seconds between repeats.   Copyright © [employer]. All rights reserved.         Copyright © [employer]. All rights reserved.

## 2022-02-14 NOTE — PROGRESS NOTES
Subjective:       Patient ID: Stu Garcia is a 76 y.o. male.    Chief Complaint: Pain (cLBP, neuropathy, Kidney area pain)    This patient was referred to me from Dr Juan for Chronic Low Back Pain and chemotherapy induced neuropathy. He also expressed that he has blood in his urine and pain that is radiating from the kidney area around his lower rib cage. He saw  a urologist on the 13th. He says he is still experiencing hematuria.    Today is visit #6---he reports that the acupuncture is decreasing his back pain.    Pain  This is a chronic problem. The current episode started more than 1 year ago. The problem occurs constantly. The problem has been unchanged. Associated symptoms include anorexia, arthralgias, fatigue, myalgias, numbness, urinary symptoms and weakness. Improvement on treatment: is currently beginning treatment.   Cancer  Associated symptoms include anorexia, arthralgias, fatigue, myalgias, numbness, urinary symptoms and weakness.     Review of Systems   Constitutional: Positive for fatigue.   HENT: Positive for hearing loss and trouble swallowing.    Gastrointestinal: Positive for anorexia.   Genitourinary: Positive for flank pain and hematuria.   Musculoskeletal: Positive for arthralgias, back pain, gait problem, leg pain and myalgias.   Neurological: Positive for weakness and numbness. Negative for speech difficulty.   Hematological: Negative for adenopathy. Bruises/bleeds easily.   Psychiatric/Behavioral:        Very pleasant and concerned about hematuria         Objective:      Physical Exam  Constitutional:              Assessment:     Back pain is a level 4-7/10----feeling improvement with acupuncture, encouraged patient to continue    Today is visit #6    Problem List Items Addressed This Visit        Orthopedic    Chronic back pain - Primary      Other Visit Diagnoses     Neuropathy              Plan:       Recommended acupuncture once per week for 4 weeks.  Patient needs scans/  scopes of Kidneys. If patient is approved to proceed by Urologist, he may continue acupuncture program as needed and if helps decrease back pain.    Patient was able to ask questions and was satisfied with answers given.    Points used: shenmen, lumbar auricular point, LI 4, BL 60, GB 40, ST 36, DU 3,4,5, BL 23 20, yintang, DU 20    RTC next Tuesday    15 needles inserted at 11:30  15 needles removed at 12:00

## 2022-02-17 NOTE — PROGRESS NOTES
"  [facility] Outpatient Physical Therapy and Wellness Daily Note     Treatment Date: 2/17/2022    Name: Stu Garcia  Clinic Number: [id number]  Diagnosis:   Encounter Diagnoses   Name Primary?    GE junction carcinoma Yes    Polyneuropathy associated with underlying disease     Gait disturbance     Chemotherapy-induced neuropathy     Debility     Weakness generalized      Physician: Keeley Gonzalez FNP-C  Precautions: Standard and Fall; pt is [place] (wears hearing aids)  Visit #: 4 of 16   PTA Visit #: 1  Time In: 9:10 AM   Time Out: 10:03 Am  Total Treatment Time 1:1: 53 min    Evaluation Date: 1/19/2022  Visit # authorized: 20  Authorization period: through 4/30/2022  Plan of care Expiration: 10 visits/ 30 days   MD referral: PT eval and treat    Subjective     Pt reports: that his legs feel more numb than usual today and not sure why. Wish that he hadn't let himself get so weak.       Response to previous treatment: denied muscle soreness or increased pain    Pain, Nature, Location: 4-5/10 constant      Objective     Pt ambulates into the clinic with a walking stick that is too short for his height; difficulty noted standing from chair in lobby and ambulates with wide DAVID with decreased step length.   resting HR 72 bpm and O2 sat 98%  Stu received therapeutic exercises to develop strength, endurance, ROM, flexibility, posture and core stabilization for 54 minutes including:    Nu step 10 min L2    HR 72 O2 sats 98%  gastroc stretch on wedge 30" x 3  Standing at barre holding onto rail, heel/toe raises x 10 ea  Sit to stand from chair with Airex pad in seat 2 x 10 (pt used his arms to assist)  Seated exercises:  Hamstring stretch sitting in chair 30 sec hold x 3 each leg  Pillow squeeze with pelvic floor 2 x 10 reps 2 sec hold  LAQ's x 10  Marches x 10  Seated ankle pumps x 10  Hip abduction with GTB around thighs, 2 x 10       TA initiation with ball press in lap with lean forward 5  Reps " 10 sec hold  Standing with lean on elbows on mat raised, glut sets 10 reps  Laying in supported long sitting position on mat with pillows  SLR 10 reps each  Hip abd 10 reps each   Diaphragmatic breathing 5 min    Past session:   Could not tolerate supine position on mat even with offloading  Perf sidelye clams with neutral pelvis with TA  PPT with TA in sidelye  Hip abduction 5 reps x 2 trials in sidelye  Sitting hamstring stretches 3 reps B 30 sec hold  UBE 6 min alternating forward and backward - L2 deferred  Written Home Exercises Provided: yes. providd written progression of hep to perform daily.  Exercises were reviewed and Stu was able to demonstrate them prior to the end of the session.  Stu demonstrated good  understanding of the education provided.     See EMR under Patient Instructions for exercises provided 2/11/2022.    Education provided re: energy conservation, hep for strengthening  Stu verbalized good  understanding of education provided.     Assessment   Pt tolerated treatment well, able to perform sit to stands from chair without assistance. Will continue to progress as able.   Muscle soreness is expected over the next 24-48 hours. Pt has guarded gait pattern and may benefit from use of RW versus his small based quad cane. Pt is a high risk for falls.    Stu Is progressing well towards his goals.   Pt prognosis is Good.       Pt will continue to benefit from skilled outpatient physical therapy to address the deficits listed in the problem list box on initial evaluation, provide pt/family education and to maximize pt's level of independence in the home and community environment.     Pt's spiritual, cultural and educational needs considered and pt agreeable to plan of care and goals.    Anticipated barriers to physical therapy: none      This is a 76 y.o. male referred to outpatient physical therapy and presents with a medical diagnosis of chemotherapy induced neuropathy and gait  disturbance. Patient presents with bilateral leg weakness, poor posture, history of back pain with comes and goes, impaired balance with noticeable unsteadiness in gait requiring AD for safety in community due to fall risk. Patient demonstrates limitations as described in the problem list. Pt rehab potential is Fair. Pt will benefit from skilled outpatient physical therapy to address the deficits listed below in the problem list, provide pt/family education and to maximize pt's level of independence in the home and community environment.         Medical necessity is demonstrated by the following IMPAIRMENTS/PROMBLEM LIST:  1. Fall Risk - impaired balance   2. Unable to participate in daily activities   3. Impaired functional mobility  4. Requires skilled supervision to complete and progress HEP   5. generalized weakness  6. Decreased CV endurance   7. Decreased standing tolerance   8. Decreased community ambulation   9. Decreased safety awareness with functional mobility  10. Impaired muscle tone     Anticipated barriers to physical therapy: none     Pt's spiritual, cultural and educational needs considered and pt agreeable to plan of care and goals as stated below:      GOALS:   Short term goals: 4 weeks, pt agrees to goals set.  1. Pt and caregivers will evaluate home for safety, including checking lighting and hazards on the floor such as rugs or cords and remove them.  2. Pt will improve SLS on R by > or = 3 seconds in order to decrease risk for falls in the home and community environment.  3. Pt will improve SLS on L by > or = 3 seconds in order to decrease risk for falls in the home and community environment.  4. Pt will tolerate 30 minutes of physical therapy treatment with 1 rest break to demonstrate overall improvement in CV endurance  5. Pt will decrease TUG score by > or = 2 seconds with improved safety in order to demonstrate decreased risk for falls.  6. Pt will increase MMT for B LE's to > or = 4+/5 in  order to improve stability and safety with community ambulation.  7. Patient will improve Tinetti Gait/Balance assessment score to 18/28 to aid in improvement of functional mobility and decrease fall risk.     Long term goals: 8 weeks, pt agrees to goals set  1. Pt will ambulate 1000 ft/communiuty distances with/without AD with no rest breaks with modified independence.  2. Pt will improve TUG score by > or = 2 seconds to decrease risk for falls in the home and community environment.  3. Increased MMT for B LE's to > or = 5/5 in order to improve stability and safety with community ambulation.  4. Pt will tolerate 50 minutes of physical therapy with < or = 1 rest breaks in order to demonstrate overall improved CV endurance.   5. Pt will be able to ambulate community distances decreased complaints of fatigue.         Plan   Outpatient physical therapy 1-2 times weekly to include: patient education including safety awareness, hep, therapeutic exercises, therapeutic activities, neuromuscular re-education/ balance exercises, manual techniques and modalities prn.   Cont PT for  total of 16 visits. Patient may be seen by PTA as part of the rehabilitation team.       Therapist: Lois Joe PTA  2/17/2022    PT/PTA met face to face to discuss patient's treatment plan and progress towards established goals.  Patient will be seen by physical therapist every sixth visit and minimally once per month.

## 2022-02-18 NOTE — PROGRESS NOTES
"  [facility] Outpatient Physical Therapy Re-assessment  and Wellness Daily Note     Treatment Date: 2/18/2022    Name: Stu Garcia  Clinic Number: [id number]  Diagnosis:   Encounter Diagnoses   Name Primary?    GE junction carcinoma Yes    Gait disturbance     Chemotherapy-induced neuropathy     Weakness generalized      Physician: Keeley Gonzalez FNP-C  Precautions: Standard and Fall; pt is [place] (wears hearing aids)  Visit #: 5 of 16   PTA Visit #: 1  Time In: 1 PM   Time Out: m  Total Treatment Time 1:1: 53 min    Evaluation Date: 1/19/2022  Visit # authorized: 20  Authorization period: through 4/30/2022  Plan of care Expiration: 10 visits/ 30 days   MD referral: PT eval and treat    Subjective     Pt reports: " I wish I could get rid of back pain" Pt reports back pain from arthritis.  Pt asked if it was a good idea if he start to use his treadmill at home? PT advised that his balance is not at a level that this is safe, explained that he is using a cane, and currently feel that he would be safer to use a rolling walker. Pt reports he does use RW at home because he can move faster.       Response to previous treatment: denied muscle soreness or increased pain    Pain, Nature, Location: 4-5/10 constant      Objective     Pt ambulates into the clinic with a walking stick that is too short for his height;ambulates with wide DAVID with decreased step length.   resting HR 72 bpm and O2 sat 99%    Gait Assessment:   - AD used: single point cane  - Assistance: MOD I  - Distance: 200-150 from lobby     GAIT DEVIATIONS:  Stu displays the following deviations with ambulation: increased DAVID, 3 point step to gait pattern, decreased reddy has difficulty with turning direction and watches floor closely during turns.   Impairments contributing to deviations: impaired motor control, weakness, decreased balance and proprioception          Evaluation Re-assess   Timed Up and Go 9.52 sec, blue airex pad in " "chair, uses UE to rise, falls into chair returning to sit 9.35 seconds   Single Limb Stance R LE 0 sec 0 sec   Single Limb Stance L LE 0 sec 0 sec   30 second Chair Rise 9 completed 12 completed                BALANCE SECTION  Patient is seated in a hard, armless chair.     1. Sitting Balance:   Steady; safe = 1   REASSESS: 2  2 .Rises from chair :  Able, uses arms to help = 1  3. Attempts to arise :  Able, requirese > 1 attempt = 1  4. Immediate standing Balance (first 5 seconds) : Steady but uses walker or other support = 1  5. Standing balance: Steady but wide stance (medal heel>4" apart) & uses cane or other support = 1  6. Nudged : Staggers, grabs, catches self = 1  7. Eyes closed: Unsteady = 0  8. Turning 360 degrees:  Discontinuous steps = 0 and Steady= 1 REASSESS  9. Sitting Down :uses arms = 1 REASSESS      Balance Score:  9/16   at [facility] was a 6/16  GAIT SECTION  Patient stands with therapist, walks across room (+/- aids), first at usual pace, then at rapid pace, but safe pace.      10. Initiation of Gait (Immediately after told to go.): Any hesitancy or multiple attempts to start = 0  11. Step length and height & Foot Clearance: Right swing foot does not pass left stance foot with step = 0 and Right foot completely clears floor = 1 and Left swing foot passes right stance foot = 1 and Left foot completely clears floor = 1  12. Step symmetry: Right & Left step length not equal (estimate) = 0  13. Step continuity : Stooping or discontinuity between steps = 0  14. Path: using walking aid = 1 Re-Assess  15. Trunk : Marked sway or uses walking aid = 0  16. Walking Time: Heels apart = 0     Balance score:9/16  Gait Score: 4/12     Total Score=Balance + Gait Score: 13/28            Stu received therapeutic exercises to develop strength, endurance, ROM, flexibility, posture and core stabilization for 30 minutes including:    Nu step 12 min L3    HR 78  O2 sats 99%  gastroc stretch on wedge 30" x 3  Standing at " barre holding onto rail, heel/toe raises x 10 ea  Sit to stand from chair with Airex pad in seat  x 12 reps (pt used his arms to assist)  Seated exercises:  Hamstring stretch sitting in chair 30 sec hold x 3 each leg  LAQ 10 reps slow with eccentric control  Seated Hip abduction with RTB around thighs, 2 x 10    Side steps with use of barre with red tband for resistance 20 each    deferred  Pillow squeeze with pelvic floor 2 x 10 reps 2 sec hold  Marches x 10  TA initiation with ball press in lap with lean forward 5  Reps 10 sec hold  Standing with lean on elbows on mat raised, glut sets 10 reps  Laying in supported long sitting position on mat with pillows  SLR 10 reps each  Hip abd 10 reps each   Diaphragmatic breathing 5 min    Neuromusculature re-ed x 25 min: balance retraining for ankle strategies and proprioception: tandem stance with use of barre one hand LFF/ RFF 5 minutes total, followed by standing on foam with hip stabilization / ankle strategy with side touch to 4 inch wooden block. Attempted single limb stance, pt unable to hold with holding onto barre with one hand. Retro walking with use of barre as handle.     Past session:   Could not tolerate supine position on mat even with offloading  Perf sidelye clams with neutral pelvis with TA  PPT with TA in sidelye  Hip abduction 5 reps x 2 trials in sidelye  Sitting hamstring stretches 3 reps B 30 sec hold  UBE 6 min alternating forward and backward - L2 deferred  Written Home Exercises Provided: yes. provided written progression of hep to perform daily.  Exercises were reviewed and Stu was able to demonstrate them prior to the end of the session.  Stu demonstrated good  understanding of the education provided.     See EMR under Patient Instructions for exercises provided 2/11/2022.    Education provided re: energy conservation, hep for strengthening  Stu verbalized good  understanding of education provided.     Assessment   Improving mobility and  strength  with TUG test and Sit to stand test as noted above.  Pt with improved steadiness in transition today from sit to stand. Back pain limits patient's tolerance to upright activity. Pt tolerated treatment well, able to perform sit to stands from chair without assistance. Will continue to progress as able. Pt has difficulty with turns in gait.   Muscle soreness is expected over the next 24-48 hours. Pt has guarded gait pattern and may benefit from use of RW versus his small based quad cane. Pt is a high risk for falls.    Stu Is progressing well towards his goals.   Pt prognosis is Good.       Pt will continue to benefit from skilled outpatient physical therapy to address the deficits listed in the problem list box on initial evaluation, provide pt/family education and to maximize pt's level of independence in the home and community environment.     Pt's spiritual, cultural and educational needs considered and pt agreeable to plan of care and goals.    Anticipated barriers to physical therapy: none      This is a 76 y.o. male referred to outpatient physical therapy and presents with a medical diagnosis of chemotherapy induced neuropathy and gait disturbance. Patient presents with bilateral leg weakness, poor posture, history of back pain with comes and goes, impaired balance with noticeable unsteadiness in gait requiring AD for safety in community due to fall risk. Patient demonstrates limitations as described in the problem list. Pt rehab potential is Fair. Pt will benefit from skilled outpatient physical therapy to address the deficits listed below in the problem list, provide pt/family education and to maximize pt's level of independence in the home and community environment. Will contniiue with skilled care towards goals in initial plan of care. Pt may not be able to meet single limb stance goals.        Medical necessity is demonstrated by the following IMPAIRMENTS/PROMBLEM LIST:  1. Fall Risk -  impaired balance   2. Unable to participate in daily activities   3. Impaired functional mobility  4. Requires skilled supervision to complete and progress HEP   5. generalized weakness  6. Decreased CV endurance   7. Decreased standing tolerance   8. Decreased community ambulation   9. Decreased safety awareness with functional mobility  10. Impaired muscle tone     Anticipated barriers to physical therapy: none     Pt's spiritual, cultural and educational needs considered and pt agreeable to plan of care and goals as stated below:      GOALS:   Short term goals: 4 weeks, pt agrees to goals set.  1. Pt and caregivers will evaluate home for safety, including checking lighting and hazards on the floor such as rugs or cords and remove them. GOAL MET   2. Pt will improve SLS on R by > or = 3 seconds in order to decrease risk for falls in the home and community environment. NOT MET  3. Pt will improve SLS on L by > or = 3 seconds in order to decrease risk for falls in the home and community environment. NOT MET  4. Pt will tolerate 30 minutes of physical therapy treatment with 1 rest break to demonstrate overall improvement in CV endurance GOAL MET 22  5. Pt will decrease TUG score by > or = 2 seconds with improved safety in order to demonstrate decreased risk for falls. IN PROGRESS  6. Pt will increase MMT for B LE's to > or = 4+/5 in order to improve stability and safety with community ambulation. IN PROGRESS  7. Patient will improve Tinetti Gait/Balance assessment score to  to aid in improvement of functional mobility and decrease fall risk. IN PROGRESS      Long term goals: 8 weeks, pt agrees to goals set  2022 continue with LT. Pt will ambulate 1000 ft/communiuty distances with/without AD with no rest breaks with modified independence.  2. Pt will improve TUG score by > or = 2 seconds to decrease risk for falls in the home and community environment.  3. Increased MMT for B LE's to > or =  5/5 in order to improve stability and safety with community ambulation.  4. Pt will tolerate 50 minutes of physical therapy with < or = 1 rest breaks in order to demonstrate overall improved CV endurance.   5. Pt will be able to ambulate community distances decreased complaints of fatigue.         Plan   Outpatient physical therapy 1-2 times weekly to include: patient education including safety awareness, hep, therapeutic exercises, therapeutic activities, neuromuscular re-education/ balance exercises, manual techniques and modalities prn.   Cont PT for  total of 16 visits. Patient may be seen by PTA as part of the rehabilitation team.       Therapist: Blessing Peters, PT  2/18/2022    PT/PTA met face to face to discuss patient's treatment plan and progress towards established goals.  Patient will be seen by physical therapist every sixth visit and minimally once per month.

## 2022-02-21 NOTE — PROGRESS NOTES
Subjective:       Patient ID: Stu Garcia is a 76 y.o. male.    Chief Complaint: Pain (cLBP) and Cancer (Chemotherapy induced neuropathy)    This patient was referred to me from Dr Juan for Chronic Low Back Pain and chemotherapy induced neuropathy. He also expressed that he has blood in his urine and pain that is radiating from the kidney area around his lower rib cage. He saw  a urologist on the 13th. He says he is still experiencing hematuria.    Today is visit #7---he reports that the acupuncture is decreasing his back pain.    Pain  This is a chronic problem. The current episode started more than 1 year ago. The problem occurs constantly. The problem has been unchanged. Associated symptoms include anorexia, arthralgias, fatigue, myalgias, numbness, urinary symptoms and weakness. Improvement on treatment: is currently beginning treatment.   Cancer  Associated symptoms include anorexia, arthralgias, fatigue, myalgias, numbness, urinary symptoms and weakness.     Review of Systems   Constitutional: Positive for fatigue.   HENT: Positive for hearing loss and trouble swallowing.    Gastrointestinal: Positive for anorexia.   Genitourinary: Positive for flank pain and hematuria.   Musculoskeletal: Positive for arthralgias, back pain, gait problem, leg pain and myalgias.   Neurological: Positive for weakness and numbness. Negative for speech difficulty.   Hematological: Negative for adenopathy. Bruises/bleeds easily.   Psychiatric/Behavioral:        Very pleasant and concerned about hematuria         Objective:      Physical Exam  Constitutional:              Assessment:     Back pain is a level 4-7/10----feeling improvement with acupuncture, encouraged patient to continue  Hands and feet are still pretty numb but he says he feels improvements with acupuncture every time he has it.  Today is visit #7    Problem List Items Addressed This Visit        Orthopedic    Chronic back pain - Primary      Other Visit  Diagnoses     Chemotherapy-induced neuropathy              Plan:       Recommended acupuncture once per week for 4 weeks.  Patient needs scans/ scopes of Kidneys. If patient is approved to proceed by Urologist, he may continue acupuncture program as needed and if helps decrease back pain.    Patient was able to ask questions and was satisfied with answers given.    Points used: nidia, lumbar auricular point, LI 4, BL 60, GB 40, ST 36, DU 3,4,5, BL 23 20, yintang, DU 20    RTC next Tuesday    15 needles inserted at 1:30  15 needles removed at 2:00

## 2022-02-21 NOTE — Clinical Note
Obtain stat iron, TIBC, ferritin, soluble transferrin receptor and stool cards. Phone review results. Otherwise return to clinic 6 months from now with interval CBC, CMP, LDH, and CT scanning of the chest without contrast prior to appointment. Patient may see nurse practitioner next visit.

## 2022-02-21 NOTE — PROGRESS NOTES
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  The patient is approximately 30 months postop and returns to clinic for interval surveillance examination.  Pain from peripheral neuropathy appears stable.  Patient is feeling generally weak, has diminished appetite, and is losing weight.  No signs or symptoms of GI blood loss.    Physical examination:  Well-developed, thin appearing, elderly, white gentleman, in no acute distress, who has a weight of 167.5 lb (decreased by 36 lb).  VITAL SIGNS: Documented  and reviewed this visit.  HEENT:  Bitemporal wasting, atraumatic. Oral mucosa pink and moist. Lips without lesions. Tongue midline. Oropharynx clear. Nonicteric sclerae.   NECK: Supple, no adenopathy. No carotid bruits, thyromegaly or thyroid nodule.   HEART: Regular rate and rhythm without murmur, gallop or rub.   LUNGS: Clear to auscultation bilaterally. Normal respiratory effort.   ABDOMEN: Soft, nontender, nondistended with positive normoactive bowel sounds, no hepatosplenomegaly.   EXTREMITIES: No cyanosis, clubbing or edema. Distal pulses are intact.  Increased bony prominences consistent with weight loss.  AXILLAE AND GROIN: No palpable pathologic lymphadenopathy is appreciated.   SKIN: Intact/turgor normal   NEUROLOGIC: Cranial nerves II-XII grossly intact. Motor: Good muscle bulk and tone. Strength/sensory 5/5 throughout. Gait unstable.  Patient is ambulating with the assistance of a cane.    Laboratory:  White count 4.5, hemoglobin 11.6, hematocrit 36, MCV 98, RDW 13.2, platelets 120, absolute neutrophil count is 3300.  Sodium 138, potassium 4.7, chloride 103, CO2 28, BUN 28, creatinine 1.2, glucose 162, calcium 9.8, liver  function test within normal limits, , GFR is 58.    Noncontrast CT of the chest:  Postsurgical changes consistent with prior esophagectomy and pull-through are noted.  Numerous, small, pulmonary nodules all of which are stable compared to prior examination with the exception of 1 lesion in the right lower lobe posterior to the right bronchus intermedius which was 8.5 x 5 mm and is now 11 x 6 mm.  No new pulmonary nodules are noted.    Impression:  1.  Adenocarcinoma of the GE junction (T2, N0, M0).  2.  Mild thrombocytopenia-stable.  3.  Peripheral neuropathy.  4.  Gait disturbance.  5. Indeterminate pulmonary nodules/single lesion subtly enlarged the right lower lobe since last study.   6. Normocytic anemia.    7. Unexplained weight loss.    Plan:  1.  Obtain iron studies, CT scanning of the abdomen/pelvis with contrast, and stools for occult blood.  2. Review results at earliest convenience.  3. Otherwise, return to clinic in 6 months with CBC, CMP, LDH, and CT of the chest without contrast prior appointment.        This note was created using voice recognition software and may contain grammatical errors.

## 2022-02-23 NOTE — PROGRESS NOTES
"  [facility] Outpatient Physical Therapy Re-assessment  and Wellness Daily Note     Treatment Date: 2/23/2022    Name: Stu Garcia  Clinic Number: [id number]  Diagnosis:   Encounter Diagnoses   Name Primary?    Gait disturbance Yes    Chemotherapy-induced neuropathy     Weakness generalized      Physician: Keeley Gonzalez FNP-C  Precautions: Standard and Fall; pt is [place] (wears hearing aids)  Visit #: 5 of 16 including eval  PTA Visit #: 0  Time In: 1 PM   Time Out: 2 PM  Total Treatment Time 1:1: 60 min    Evaluation Date: 1/19/2022  Visit # authorized: 20  Authorization period: through 4/30/2022  Plan of care Expiration: 10 visits/ 30 days   MD referral: PT eval and treat    Subjective     Pt reports: pt reports increase in neuropathy in feet and it is worse to walk with increased back pain today. Reports pain increasing with standing today.    Response to previous treatment: denied muscle soreness or increased pain    Pain, Nature, Location: 6/10 constant      Objective     Pt ambulates into the clinic with a walking stick that is too short for his height;ambulates with wide DAVID with decreased step length. Eyes fiexed on ground with very guarded gait. PT recommending to pt he use his RW for stability and safety. Pt is in agreement.   resting HR 88 bpm and O2 sat       Stu received therapeutic exercises to develop strength, endurance, ROM, flexibility, posture and core stabilization for 55 minutes including:    Nu step 10 min L3    HR 78  O2 sats 99%  gastroc stretch on wedge 30" x 3  Sitting Marches x with red tband resistance in sitting  10x  x 2 trials   LAQ 5 sec hold 10 reps     Supine:   Single knee to chest 3 reps alternating 20 sec each leg.  Lower Trunk rotation x 20 reps   Pillow squeeze with pelvic floor 2 x 10 reps 2 sec hold  TA initiation with ball press in supine  with lean forward 5  Rep5 sec hold  Laying in supported long sitting position on mat with pillows  SLR 10 reps on " R and 6 reps on L each with verbal cues for slow controlled difficulty with L with increase in back pain  Hip abd 10 reps each   Bridging 10 reps with TA with verbal cues.  Diaphragmatic breathing 5 min  B Side lye hip abduction lengthen and lift 5 reps  B Clams 10 reps each  B Heel press in side lye x 10 reps    deferred  Neuromusculature re-ed x 25 min: balance retraining for ankle strategies and proprioception: tandem stance with use of barre one hand LFF/ RFF 5 minutes total, followed by standing on foam with hip stabilization / ankle strategy with side touch to 4 inch wooden block. Attempted single limb stance, pt unable to hold with holding onto barre with one hand. Retro walking with use of barre as handle.     Past session:   Could not tolerate supine position on mat even with offloading  Perf sidelye clams with neutral pelvis with TA  PPT with TA in sidelye  Hip abduction 5 reps x 2 trials in sidelye  Sitting hamstring stretches 3 reps B 30 sec hold  UBE 6 min alternating forward and backward - L2 deferred  Deferred Standing at barre holding onto rail, heel/toe raises x 10 ea  Deferred Sit to stand from chair with Airex pad in seat  x 12 reps (pt used his arms to assist)  Seated exercises:  Hamstring stretch sitting in chair 30 sec hold x 3 each leg  LAQ 10 reps slow with eccentric control 5 sec holds B   Seated Hip abduction with RTB around thighs, 2 x 10   Standing with lean on elbows on mat raised, glut sets 10 reps  deferred Side steps with use of barre with red tband for resistance 20 each  Written Home Exercises Provided: yes. provided written progression of hep to perform daily.  Exercises were reviewed and Stu was able to demonstrate them prior to the end of the session.  Stu demonstrated good  understanding of the education provided.     See EMR under Patient Instructions for exercises provided 2/11/2022.    Education provided re: energy conservation, hep for strengthening  Stu verbalized  good  understanding of education provided.     Assessment   Pt with decreased  steadiness in gait today  Back pain limits patient's tolerance to upright activity. Pt tolerated treatment well, focused most of time Will continue to progress as able. Pt has difficulty with turns in gait.   Muscle soreness is expected over the next 24-48 hours. Pt has guarded gait pattern and may benefit from use of RW versus his small based quad cane. Pt is a high risk for falls.  Strongly recommended pt use RW. Pt with good understanding.   Stu Is progressing well towards his goals.   Pt prognosis is Good.       Pt will continue to benefit from skilled outpatient physical therapy to address the deficits listed in the problem list box on initial evaluation, provide pt/family education and to maximize pt's level of independence in the home and community environment.     Pt's spiritual, cultural and educational needs considered and pt agreeable to plan of care and goals.    Anticipated barriers to physical therapy: none      This is a 76 y.o. male referred to outpatient physical therapy and presents with a medical diagnosis of chemotherapy induced neuropathy and gait disturbance. Patient presents with bilateral leg weakness, poor posture, history of back pain with comes and goes, impaired balance with noticeable unsteadiness in gait requiring AD for safety in community due to fall risk. Patient demonstrates limitations as described in the problem list. Pt rehab potential is Fair. Pt will benefit from skilled outpatient physical therapy to address the deficits listed below in the problem list, provide pt/family education and to maximize pt's level of independence in the home and community environment. Will contniiue with skilled care towards goals in initial plan of care. Pt may not be able to meet single limb stance goals.        Medical necessity is demonstrated by the following IMPAIRMENTS/PROMBLEM LIST:  1. Fall Risk - impaired  balance   2. Unable to participate in daily activities   3. Impaired functional mobility  4. Requires skilled supervision to complete and progress HEP   5. generalized weakness  6. Decreased CV endurance   7. Decreased standing tolerance   8. Decreased community ambulation   9. Decreased safety awareness with functional mobility  10. Impaired muscle tone     Anticipated barriers to physical therapy: none     Pt's spiritual, cultural and educational needs considered and pt agreeable to plan of care and goals as stated below:      GOALS:   Short term goals: 4 weeks, pt agrees to goals set.  1. Pt and caregivers will evaluate home for safety, including checking lighting and hazards on the floor such as rugs or cords and remove them. GOAL MET   2. Pt will improve SLS on R by > or = 3 seconds in order to decrease risk for falls in the home and community environment. NOT MET  3. Pt will improve SLS on L by > or = 3 seconds in order to decrease risk for falls in the home and community environment. NOT MET  4. Pt will tolerate 30 minutes of physical therapy treatment with 1 rest break to demonstrate overall improvement in CV endurance GOAL MET 22  5. Pt will decrease TUG score by > or = 2 seconds with improved safety in order to demonstrate decreased risk for falls. IN PROGRESS  6. Pt will increase MMT for B LE's to > or = 4+/5 in order to improve stability and safety with community ambulation. IN PROGRESS  7. Patient will improve Tinetti Gait/Balance assessment score to  to aid in improvement of functional mobility and decrease fall risk. IN PROGRESS      Long term goals: 8 weeks, pt agrees to goals set  2022 continue with LT. Pt will ambulate 1000 ft/communiuty distances with/without AD with no rest breaks with modified independence.  2. Pt will improve TUG score by > or = 2 seconds to decrease risk for falls in the home and community environment.  3. Increased MMT for B LE's to > or = 5/5 in  order to improve stability and safety with community ambulation.  4. Pt will tolerate 50 minutes of physical therapy with < or = 1 rest breaks in order to demonstrate overall improved CV endurance.   5. Pt will be able to ambulate community distances decreased complaints of fatigue.         Plan   Outpatient physical therapy 1-2 times weekly to include: patient education including safety awareness, hep, therapeutic exercises, therapeutic activities, neuromuscular re-education/ balance exercises, manual techniques and modalities prn.   Cont PT for  total of 16 visits. Patient may be seen by PTA as part of the rehabilitation team.       Therapist: Blessing Peters, PT  2/23/2022    PT/PTA met face to face to discuss patient's treatment plan and progress towards established goals.  Patient will be seen by physical therapist every sixth visit and minimally once per month.

## 2022-02-23 NOTE — PROGRESS NOTES
PSYCHO-ONCOLOGY NOTE/ Individual Psychotherapy     Date: 2/23/2022   Site:  [facility]      Therapeutic Intervention: Met with patient.  Outpatient - Behavior modifying psychotherapy 45 min - CPT code 47985 and Outpatient - Supportive psychotherapy 45 min - CPT Code 48176      Patient was last seen by me on 2/9/2022    Problem list  Patient Active Problem List   Diagnosis    Chronic anticoagulation    PAF (paroxysmal atrial fibrillation)    Iron deficiency anemia    HLD (hyperlipidemia)    Primary osteoarthritis of left knee    History of prostate cancer    Essential hypertension    Type II diabetes mellitus with peripheral circulatory disorder    Thrombocytopenia    Dyslipidemia    History of heart artery stent    Varicose veins of both legs with edema    Coronary artery disease involving native coronary artery of native heart with unstable angina pectoris    Symptomatic anemia    GE junction carcinoma    Anemia associated with chemotherapy    Chemotherapy-induced thrombocytopenia    Leukopenia due to antineoplastic chemotherapy    Chest pain    Unexplained weight loss    Polyneuropathy associated with underlying disease    Gait disturbance    Pulmonary nodule    Type II diabetes mellitus with neurological manifestations    Chronic back pain    Weight decreasing    History of fall    Insomnia    Weakness generalized       Chief complaint/reason for encounter: Irritability, depression   Met with patient to evaluate psychosocial adaptation to treatment course (GE junction carcinoma) and managing his chronic pain.    Current Medications  Current Outpatient Medications   Medication    aspirin (ECOTRIN) 81 MG EC tablet    atorvastatin (LIPITOR) 80 MG tablet    b complex vitamins capsule    blood sugar diagnostic (ACCU-CHEK SHASHANK PLUS TEST STRP) Strp    glimepiride (AMARYL) 2 MG tablet    glucagon, human recombinant, (GLUCAGON EMERGENCY KIT, HUMAN,) 1 mg SolR     HYDROcodone-acetaminophen (NORCO)  mg per tablet    lancets (ACCU-CHEK SOFTCLIX LANCETS) Misc    lidocaine-prilocaine (EMLA) cream    metFORMIN (GLUCOPHAGE-XR) 500 MG ER 24hr tablet    metoprolol tartrate (LOPRESSOR) 50 MG tablet    mirtazapine (REMERON) 15 MG tablet    multivitamin capsule    nitroGLYCERIN (NITROSTAT) 0.4 MG SL tablet    omeprazole (PRILOSEC) 40 MG capsule    rivaroxaban (XARELTO) 20 mg Tab    tiZANidine (ZANAFLEX) 4 MG tablet    vit C,O-Hm-omlmd-lutein-zeaxan (PRESERVISION AREDS 2) 250-90-40-1 mg Cap    vitamin A 15192 UNIT capsule     No current facility-administered medications for this visit.       Objective:  Stu Garcia arrived  promptly for the session. Mr. Garcia was independently ambulatory with the use of a cane at the time of session. The patient was fully cooperative throughout the session.  Appearance: age appropriate, casually  dressed, adequately  groomed  Behavior/Cooperation: friendly and cooperative  Speech: normal in volume and tone and somewhat below average rate at times; appropriate quality, quantity and organization of sentences  Mood: euthymic  Affect: mood congruent and appropriate  Thought Process: goal-directed, logical  Thought Content: normal,  No delusions or paranoia; did not appear to be responding to internal stimuli during the session  Orientation: grossly intact  Memory: grossly intact  Attention Span/Concentration: Attends to session without distraction  Fund of Knowledge: average  Estimate of Intelligence: average from verbal skills and history  Cognition: grossly intact  Insight: patient has awareness of illness; good insight into own behavior and behavior of others  Judgment: the patient's behavior is adequate to circumstances    Interval history and content of current session: Discussed current adaptation to disease and treatment status. Reports to be coping adequately with his Cancer treatment status. Patient reported however that he is  having some difficulties adjusting to not being able to do tasks around the house that he used to physically be able to do. He also noted that his wife still at times asks him to do maintenance tasks around the house that he feels he cannot do anymore.  Patient reported that thinking of his physical limitations makes him irritable and that he sometimes yells at his wife when frustrated. He also reported that his appetite his increased somewhat since last session with writer, but is still decreased from what it was previously. Evaluated cognitive response, paying particular attention to negative intrusive thoughts of a persistent and detrimental nature. Thoughts of this type are in evidence with mild distress. Provided cognitive behavioral therapy to address negative cognitions. Identified and evaluated psychosocial and environmental stressors secondary to diagnosis and treatment. Examined proactive behaviors that may be implemented to minimize or ameliorate psychosocial stressors secondary to diagnosis and treatment. Discussed with patient doing activities in small manageable tasks and asking for help when needed. Patient noted that he tends not to ask for help but shared he has friends he can ask as well as his son-in-law to help him do some work at home when needed. He acknowledged that the job may not be completed as quickly if he asks for help, but also stated that it would be safer for him to ask for help when he needs it. Patient was also advised to leave the situation when feeling irritable. Patient shared that he currently has back pain that he rates as a 6/10 (10 being the worst pain) and numbness in his legs. Patient said that acupuncture has helped with the pain and is not sure if PT has been helping. He did however acknowledge that he has not been doing the PT exercises at home as often as recommended by his PT. Patient shared that his biggest take away from the session was that it is ok to ask other  people for help when needed.     Risk parameters:   Patient reports no suicidal ideation  Patient reports no homicidal ideation  Patient reports no self-injurious behavior  Patient reports no violent behavior   Safety needs:  None at this time      Verbal deficits: None     Patient's response to intervention:The patient's response to intervention is accepting.     Progress toward goals and other mental status changes:  The patient's progress toward goals is fair .      Progress to date:Progress - Ongoing, but Slow      Goals from last visit: Attempted, partially met        Patient Strengths: verbal, intelligent, successful, good social support, good insight      Treatment Plan:individual psychotherapy  · Target symptoms: Irritability  · Why chosen therapy is appropriate versus another modality: relevant to diagnosis, patient responds to this modality  · Outcome monitoring methods: self-report  · Therapeutic intervention type: insight oriented psychotherapy, behavior modifying psychotherapy, supportive psychotherapy  · Prognosis: Good      Behavioral goals:    Exercise: Do PT exercises as recommended by PT   Therapy: Break tasks up into small and manageable pieces; Work on Assertive Communication Skills to feel more confident asking for help (Future)         Return to clinic: 3 weeks     Length of Service (minutes direct face-to-face contact): 45 minutes    Diagnosis:     ICD-10-CM ICD-9-CM   1. Adjustment disorder with depressed mood  F43.21 309.0         Bekah Musa License #[id number]             Bekah Garrett License #[id number]  [place] License #[id number]

## 2022-02-25 NOTE — PROGRESS NOTES
"  [facility] Outpatient Physical Therapy Re-assessment  and Wellness Daily Note     Treatment Date: 2/25/2022    Name: Stu Garcia  Clinic Number: [id number]  Diagnosis:   Encounter Diagnoses   Name Primary?    Gait disturbance Yes    Chemotherapy-induced neuropathy     Weakness generalized     GE junction carcinoma      Physician: Keeley Gonzalez FNP-C  Precautions: Standard and Fall; pt is Solomon (wears hearing aids)  Visit #: 7 of 16 including eval  PTA Visit #: 0  Time In: 2 PM   Time Out: 3 PM  Total Treatment Time 1:1: 60 min    Evaluation Date: 1/19/2022  Visit # authorized: 20  Authorization period: through 4/30/2022  Plan of care Expiration: 10 visits/ 30 days   MD referral: PT eval and treat    Subjective     Pt reports: pain in my back isn't as bad as it was on Wednesday.      Response to previous treatment: denied muscle soreness or increased pain    Pain, Nature, Location: 3-4/10 constant      Objective     Pt ambulates into the clinic with his walker today (has gliders on front too legs rather than wheels.) PT needing to readjust walker height,  too short for his height;ambulates with wide DAVID with decreased step length. Eyes fiexed on ground with very guarded gait.     resting HR 78 bpm and O2 sat 98      Stu received therapeutic exercises to develop strength, endurance, ROM, flexibility, posture and core stabilization for 55 minutes including:    Nu step L3 for 8 minutes, L4 last 2 minutes min  HR 78  O2 sats 99%  gastroc stretch on wedge 30" x 3  Supine:   AP's 2x20 reps  Single knee to chest 3 reps alternating 20 sec each leg.  Lower Trunk rotation x 20 reps   Pillow squeeze with pelvic floor 2 x 10 reps 2 sec hold  TA initiation with ball press in supine  with lean forward 5  Rep5 sec hold  Laying in supported long sitting position on mat with pillows  SLR 2x 5 reps each with verbal cues for slow controlled difficulty with L with increase in back pain  Supine Hook Lying clam " "YTB 2x10   Hip abd  2x10 reps each   Bridging 10 reps with TA with verbal cues. (unable today, triggers back pain)  Diaphragmatic breathing 5 min  B Side lye hip abduction lengthen and lift 5 reps  Side Lying Clams 10 reps each  B Heel press in side lye x 10 reps    Seated end of mat:  Scapular retractions "T" danny 2x10  Shoulder rolls  Rows YTB 2x15  LAQ 5 sec hold 10 reps   Sitting Marches x with red tband resistance in sitting  10x  x 2 trials -deferred      deferred  Neuromusculature re-ed x 25 min: balance retraining for ankle strategies and proprioception: tandem stance with use of barre one hand LFF/ RFF 5 minutes total, followed by standing on foam with hip stabilization / ankle strategy with side touch to 4 inch wooden block. Attempted single limb stance, pt unable to hold with holding onto barre with one hand. Retro walking with use of barre as handle.     Past session:   Could not tolerate supine position on mat even with offloading  Perf sidelye clams with neutral pelvis with TA  PPT with TA in sidelye  Hip abduction 5 reps x 2 trials in sidelye  Sitting hamstring stretches 3 reps B 30 sec hold  UBE 6 min alternating forward and backward - L2 deferred  Deferred Standing at barre holding onto rail, heel/toe raises x 10 ea  Deferred Sit to stand from chair with Airex pad in seat  x 12 reps (pt used his arms to assist)  Seated exercises:  Hamstring stretch sitting in chair 30 sec hold x 3 each leg  LAQ 10 reps slow with eccentric control 5 sec holds B   Seated Hip abduction with RTB around thighs, 2 x 10   Standing with lean on elbows on mat raised, glut sets 10 reps  deferred Side steps with use of barre with red tband for resistance 20 each  Written Home Exercises Provided: yes. provided written progression of hep to perform daily.  Exercises were reviewed and Stu was able to demonstrate them prior to the end of the session.  Stu demonstrated good  understanding of the education provided.     See EMR " under Patient Instructions for exercises provided 2/11/2022.    Education provided re: energy conservation, hep for strengthening  Stu verbalized good  understanding of education provided.     Assessment   Pt with arrived with walker today, improved stability with gait. Back pain limits patient's tolerance to upright activity. Pt tolerated LE and core strengthening supine exercises well today, discomfort with bridging noted. Will continue to progress as able. Pt has difficulty with turns in gait.   Muscle soreness is expected over the next 24-48 hours. Pt has guarded gait pattern and may benefit from use of RW versus his small based quad cane. Pt is a high risk for falls.  Strongly recommended pt use RW. Pt with good understanding.   Stu Is progressing well towards his goals.   Pt prognosis is Good.       Pt will continue to benefit from skilled outpatient physical therapy to address the deficits listed in the problem list box on initial evaluation, provide pt/family education and to maximize pt's level of independence in the home and community environment.     Pt's spiritual, cultural and educational needs considered and pt agreeable to plan of care and goals.    Anticipated barriers to physical therapy: none      This is a 76 y.o. male referred to outpatient physical therapy and presents with a medical diagnosis of chemotherapy induced neuropathy and gait disturbance. Patient presents with bilateral leg weakness, poor posture, history of back pain with comes and goes, impaired balance with noticeable unsteadiness in gait requiring AD for safety in community due to fall risk. Patient demonstrates limitations as described in the problem list. Pt rehab potential is Fair. Pt will benefit from skilled outpatient physical therapy to address the deficits listed below in the problem list, provide pt/family education and to maximize pt's level of independence in the home and community environment. Will contniiue with  skilled care towards goals in initial plan of care. Pt may not be able to meet single limb stance goals.        Medical necessity is demonstrated by the following IMPAIRMENTS/PROMBLEM LIST:  1. Fall Risk - impaired balance   2. Unable to participate in daily activities   3. Impaired functional mobility  4. Requires skilled supervision to complete and progress HEP   5. generalized weakness  6. Decreased CV endurance   7. Decreased standing tolerance   8. Decreased community ambulation   9. Decreased safety awareness with functional mobility  10. Impaired muscle tone     Anticipated barriers to physical therapy: none     Pt's spiritual, cultural and educational needs considered and pt agreeable to plan of care and goals as stated below:      GOALS:   Short term goals: 4 weeks, pt agrees to goals set.  1. Pt and caregivers will evaluate home for safety, including checking lighting and hazards on the floor such as rugs or cords and remove them. GOAL MET   2. Pt will improve SLS on R by > or = 3 seconds in order to decrease risk for falls in the home and community environment. NOT MET  3. Pt will improve SLS on L by > or = 3 seconds in order to decrease risk for falls in the home and community environment. NOT MET  4. Pt will tolerate 30 minutes of physical therapy treatment with 1 rest break to demonstrate overall improvement in CV endurance GOAL MET 22  5. Pt will decrease TUG score by > or = 2 seconds with improved safety in order to demonstrate decreased risk for falls. IN PROGRESS  6. Pt will increase MMT for B LE's to > or = 4+/5 in order to improve stability and safety with community ambulation. IN PROGRESS  7. Patient will improve Tinetti Gait/Balance assessment score to  to aid in improvement of functional mobility and decrease fall risk. IN PROGRESS      Long term goals: 8 weeks, pt agrees to goals set  2022 continue with LT. Pt will ambulate 1000 ft/communiuty distances with/without AD  with no rest breaks with modified independence.  2. Pt will improve TUG score by > or = 2 seconds to decrease risk for falls in the home and community environment.  3. Increased MMT for B LE's to > or = 5/5 in order to improve stability and safety with community ambulation.  4. Pt will tolerate 50 minutes of physical therapy with < or = 1 rest breaks in order to demonstrate overall improved CV endurance.   5. Pt will be able to ambulate community distances decreased complaints of fatigue.         Plan   Outpatient physical therapy 1-2 times weekly to include: patient education including safety awareness, hep, therapeutic exercises, therapeutic activities, neuromuscular re-education/ balance exercises, manual techniques and modalities prn.   Cont PT for  total of 16 visits. Patient may be seen by PTA as part of the rehabilitation team.       Therapist: Lita Shaw PT  2/25/2022    PT/PTA met face to face to discuss patient's treatment plan and progress towards established goals.  Patient will be seen by physical therapist every sixth visit and minimally once per month.

## 2022-02-28 NOTE — TELEPHONE ENCOUNTER
I spoke with patient about getting more PT appts scheduled. He verbalized understanding of what we offer and verbalized acceptance of a date/time.    Patient is calling as he was seen on Saturday for a work comp issue. This is a different issue than he had before. He has a groin strain/pull. He is requesting a note stating he needs to be off of work until he sees the specialist ([facility]-who he sees for his elbow) on the 29th. He did try to schedule an appointment with Venecia but she is booked the next two days.     Marie [facility]

## 2022-02-28 NOTE — PROGRESS NOTES
Subjective:       Patient ID: Stu Garcia is a 76 y.o. male.    Chief Complaint: Pain (cLBP, neuropathy of feet/)    This patient was referred to me from Dr. Juan  for Chronic Low Back Pain and chemotherapy induced neuropathy. He also expressed that he has blood in his urine and pain that is radiating from the kidney area around his lower rib cage. He saw  a urologist on the 13th. He says he is still experiencing hematuria.    Today is visit #8---he reports that the acupuncture is decreasing his back pain.    Pain  This is a chronic problem. The current episode started more than 1 year ago. The problem occurs constantly. The problem has been unchanged. Associated symptoms include anorexia, arthralgias, fatigue, myalgias, numbness, urinary symptoms and weakness. Improvement on treatment: is currently beginning treatment.   Cancer  Associated symptoms include anorexia, arthralgias, fatigue, myalgias, numbness, urinary symptoms and weakness.     Review of Systems   Constitutional: Positive for fatigue.   HENT: Positive for hearing loss and trouble swallowing.    Gastrointestinal: Positive for anorexia.   Genitourinary: Positive for flank pain and hematuria.   Musculoskeletal: Positive for arthralgias, back pain, gait problem, leg pain and myalgias.   Neurological: Positive for weakness and numbness. Negative for speech difficulty.   Hematological: Negative for adenopathy. Bruises/bleeds easily.   Psychiatric/Behavioral:        Very pleasant and concerned about hematuria         Objective:      Physical Exam  Constitutional:              Assessment:     Back pain is a level 4-7/10----feeling improvement with acupuncture, encouraged patient to continue  Hands and feet are still pretty numb but he says he feels improvements with acupuncture every time he has it.  Today is visit #8    Problem List Items Addressed This Visit        Orthopedic    Chronic back pain - Primary      Other Visit Diagnoses     Neuropathy  of both feet              Plan:       Recommended acupuncture once per week for 4 weeks.  Patient needs scans/ scopes of Kidneys. If patient is approved to proceed by Urologist, he may continue acupuncture program as needed and if helps decrease back pain.    Patient was able to ask questions and was satisfied with answers given.    Points used: nidia, lumbar auricular point, LI 4, BL 60, GB 40, ST 36, DU 3,4,5, BL 23 20, yintang, DU 20    RTC next Monday    15 needles inserted at 1:30  15 needles removed at 2:00

## 2022-03-03 NOTE — TELEPHONE ENCOUNTER
Left message advising all testing looked good. Slight iron deficiency and iron replacement sent to pharmacy. Keep follow up in 6 months----- Message from Jyoti Pat MA sent at 3/2/2022 10:29 AM CST -----  Review results of Ct and labs with diana

## 2022-03-07 NOTE — PROGRESS NOTES
Subjective:       Patient ID: Stu Garcia is a 76 y.o. male.    Chief Complaint: Pain (cLBP)    This patient was referred to me from Dr. Juan  for Chronic Low Back Pain and chemotherapy induced neuropathy. He also expressed that he has blood in his urine and pain that is radiating from the kidney area around his lower rib cage. He saw  a urologist on the 13th. He says he is still experiencing hematuria.    Today is visit #9---he reports that the acupuncture is decreasing his back pain.    Today he ambulates with a walker instead of cane    Pain  This is a chronic problem. The current episode started more than 1 year ago. The problem occurs constantly. The problem has been unchanged. Associated symptoms include anorexia, arthralgias, fatigue, myalgias, numbness, urinary symptoms and weakness. Improvement on treatment: is currently beginning treatment.   Cancer  Associated symptoms include anorexia, arthralgias, fatigue, myalgias, numbness, urinary symptoms and weakness.     Review of Systems   Constitutional: Positive for fatigue.   HENT: Positive for hearing loss and trouble swallowing.    Gastrointestinal: Positive for anorexia.   Genitourinary: Positive for flank pain and hematuria.   Musculoskeletal: Positive for arthralgias, back pain, gait problem, leg pain and myalgias.   Neurological: Positive for weakness and numbness. Negative for speech difficulty.   Hematological: Negative for adenopathy. Bruises/bleeds easily.   Psychiatric/Behavioral:        Very pleasant and concerned about hematuria         Objective:      Physical Exam  Constitutional:              Assessment:     Back pain is a level 4-7/10----feeling improvement with acupuncture, encouraged patient to continue  Hands and feet are still pretty numb but he says he feels improvements with acupuncture every time he has it.  Today is visit #9    Problem List Items Addressed This Visit        Orthopedic    Chronic back pain - Primary      Other  Visit Diagnoses     Neuropathy              Plan:       Recommended acupuncture once per week for 4 weeks.  Patient needs scans/ scopes of Kidneys. If patient is approved to proceed by Urologist, he may continue acupuncture program as needed and if helps decrease back pain.    Patient was able to ask questions and was satisfied with answers given.    Points used: shenmen, lumbar auricular point, LI 4, BL 60, GB 40, ST 36, DU 3,4,5, BL 23 20, yintang, DU 20    RTC next Monday    15 needles inserted at 1:00  15 needles removed at 1:30

## 2022-03-11 NOTE — PROGRESS NOTES
"  [facility] Outpatient Physical Therapy and Wellness Daily Note     Treatment Date: 3/11/2022    Name: Stu Garcia  Clinic Number: [id number]  Diagnosis:   Encounter Diagnoses   Name Primary?    Gait disturbance Yes    Weakness generalized     Chemotherapy-induced neuropathy      Physician: Keeley Gonzalez FNP-C  Precautions: Standard and Fall; pt is [place] (wears hearing aids)  Visit #: 7 of 16 including eval  PTA Visit #: 0  Time In:11AM  Time Out: 12 PM  Total Treatment Time 1:1: 60 min    Evaluation Date: 1/19/2022  Visit # authorized: 20  Authorization period: through 4/30/2022  Plan of care Expiration: 10 visits/ 30 days   MD referral: PT eval and treat    Subjective     Pt reports: doing ok  Pain better     Response to previous treatment: denied muscle soreness or increased pain    Pain, Nature, Location: 4/10 constant      Objective     Pt ambulates into the clinic with his walker today (has gliders on front too legs rather than wheels.) ambulates with wide DAVID with decreased step length. Eyes fixed on ground with very guarded gait.     resting HR 90 bpm and O2 sat 100      Stu received therapeutic exercises to develop strength, endurance, ROM, flexibility, posture and core stabilization for 55 minutes including:  tx considerations: Could not tolerate supine position on mat even with offloading  Nu step L3 for 4 minutes, L4 last 6 minutes min  HR 96  O2 sats 98%  gastroc stretch on wedge 30" x 3  Mat exercises:  Diaphragmatic breathing 5 min  B Hip abduction 10 reps x 1 trials in sidelye  Side Lying Clams 10 reps each 3 sets  PPT with TA in sidelye  Side lye ball press in front with pillow squeeze 5 sec hold 20 reps    Seated end of mat:  Seated scap retraction with double red 2 x 10 reps  Shoulder rolls  Scapular retractions "T" danny x10 sec x 10 reps  Rows green tband one arm at a time with PT to assist , 2 x 10   Hamstring stretch sitting with trunk lean with leg extended 30 sec hold " 3 reps each leg   LAQ 5 sec hold 10 reps B LE with cues to sit in neutral pelvis and not to compensate by leaning backward.   Sitting Marches x with red tband resistance in sitting  10x  x 2 trials -deferred  Sit to stand with  Walker in front from edge of mat: 10 reps      Deferred all supine ex:  Supine:   AP's 2x20 reps  Single knee to chest 3 reps alternating 20 sec each leg.  Lower Trunk rotation x 20 reps   Pillow squeeze with pelvic floor 2 x 10 reps 2 sec hold  TA initiation with ball press in supine  with lean forward 5  Rep5 sec hold  Laying in supported long sitting position on mat with pillows  SLR 2x 5 reps each with verbal cues for slow controlled difficulty with L with increase in back pain  Supine Hook Lying clam YTB 2x10      deferred  Neuromusculature re-ed x 25 min: balance retraining for ankle strategies and proprioception: tandem stance with use of barre one hand LFF/ RFF 5 minutes total, followed by standing on foam with hip stabilization / ankle strategy with side touch to 4 inch wooden block. Attempted single limb stance, pt unable to hold with holding onto barre with one hand. Retro walking with use of barre as handle.       Sitting hamstring stretches 3 reps B 30 sec hold  UBE 6 min alternating forward and backward - L2 deferred  Deferred Standing at barre holding onto rail, heel/toe raises x 10 ea  Deferred Sit to stand from chair with Airex pad in seat  x 12 reps (pt used his arms to assist)  Seated exercises:  Hamstring stretch sitting in chair 30 sec hold x 3 each leg  LAQ 10 reps slow with eccentric control 5 sec holds B   Seated Hip abduction with RTB around thighs, 2 x 10   Standing with lean on elbows on mat raised, glut sets 10 reps  deferred Side steps with use of barre with red tband for resistance 20 each  Written Home Exercises Provided: yes. provided written progression of hep to perform daily. Provided information on wheel attachment for walker. Available on line or through  [employer]. Pt to purchase.   Exercises were reviewed and Stu was able to demonstrate them prior to the end of the session.  Stu demonstrated good  understanding of the education provided.     See EMR under Patient Instructions for exercises provided 2/11/2022.      Education provided re: energy conservation, hep for strengthening  Stu verbalized good  understanding of education provided.     Assessment   Pt with arrived with walker today, improved stability with gait. Back pain limits patient's tolerance to upright activity. Pt tolerated LE and core strengthening supine exercises well today worked in sidelye and sitting avoided supine position on mat.  Will continue to progress as able. Pt has difficulty with turns in gait. Recommending wheel attachments for walker to improve gait pattern and reduce pt placing walker too forward forward while walking.Responding well with postural exercises working on trunk strength, flexibility in hamstrings, and use of supportive device for gait.   Muscle soreness is expected over the next 24-48 hours. Pt has guarded gait pattern and may benefit from use of RW versus his small based quad cane. Pt is a high risk for falls.  Strongly recommended pt use RW. Pt with good understanding.   Stu Is progressing well towards his goals.   Pt prognosis is Good.       Pt will continue to benefit from skilled outpatient physical therapy to address the deficits listed in the problem list box on initial evaluation, provide pt/family education and to maximize pt's level of independence in the home and community environment.     Pt's spiritual, cultural and educational needs considered and pt agreeable to plan of care and goals.    Anticipated barriers to physical therapy: none      This is a 76 y.o. male referred to outpatient physical therapy and presents with a medical diagnosis of chemotherapy induced neuropathy and gait disturbance. Patient presents with bilateral leg weakness, poor  posture, history of back pain with comes and goes, impaired balance with noticeable unsteadiness in gait requiring AD for safety in community due to fall risk. Patient demonstrates limitations as described in the problem list. Pt rehab potential is Fair. Pt will benefit from skilled outpatient physical therapy to address the deficits listed below in the problem list, provide pt/family education and to maximize pt's level of independence in the home and community environment. Will contniiue with skilled care towards goals in initial plan of care. Pt may not be able to meet single limb stance goals.        Medical necessity is demonstrated by the following IMPAIRMENTS/PROMBLEM LIST:  1. Fall Risk - impaired balance   2. Unable to participate in daily activities   3. Impaired functional mobility  4. Requires skilled supervision to complete and progress HEP   5. generalized weakness  6. Decreased CV endurance   7. Decreased standing tolerance   8. Decreased community ambulation   9. Decreased safety awareness with functional mobility  10. Impaired muscle tone     Anticipated barriers to physical therapy: none     Pt's spiritual, cultural and educational needs considered and pt agreeable to plan of care and goals as stated below:      GOALS:   Short term goals: 4 weeks, pt agrees to goals set.  1. Pt and caregivers will evaluate home for safety, including checking lighting and hazards on the floor such as rugs or cords and remove them. GOAL MET   2. Pt will improve SLS on R by > or = 3 seconds in order to decrease risk for falls in the home and community environment. NOT MET  3. Pt will improve SLS on L by > or = 3 seconds in order to decrease risk for falls in the home and community environment. NOT MET  4. Pt will tolerate 30 minutes of physical therapy treatment with 1 rest break to demonstrate overall improvement in CV endurance GOAL MET 2/18/22  5. Pt will decrease TUG score by > or = 2 seconds with improved safety  in order to demonstrate decreased risk for falls. IN PROGRESS  6. Pt will increase MMT for B LE's to > or = 4+/5 in order to improve stability and safety with community ambulation. IN PROGRESS  7. Patient will improve Tinetti Gait/Balance assessment score to  to aid in improvement of functional mobility and decrease fall risk. IN PROGRESS      Long term goals: 8 weeks, pt agrees to goals set  2022 continue with LT. Pt will ambulate 1000 ft/communiuty distances with/without AD with no rest breaks with modified independence.  2. Pt will improve TUG score by > or = 2 seconds to decrease risk for falls in the home and community environment.  3. Increased MMT for B LE's to > or = 5/5 in order to improve stability and safety with community ambulation.  4. Pt will tolerate 50 minutes of physical therapy with < or = 1 rest breaks in order to demonstrate overall improved CV endurance.   5. Pt will be able to ambulate community distances decreased complaints of fatigue.         Plan   Outpatient physical therapy 1-2 times weekly to include: patient education including safety awareness, hep, therapeutic exercises, therapeutic activities, neuromuscular re-education/ balance exercises, manual techniques and modalities prn.   Cont PT for  total of 16 visits. Patient may be seen by PTA as part of the rehabilitation team.       Therapist: Blessing Peters, PT  3/11/2022    PT/PTA met face to face to discuss patient's treatment plan and progress towards established goals.  Patient will be seen by physical therapist every sixth visit and minimally once per month.

## 2022-03-11 NOTE — PATIENT INSTRUCTIONS
Walker Wheels and Ski Glides - Replacement Feet - Accessories Parts Set for Folding Medical Walkers - [place] Front, Back Stability Safety Wheel - Includes 2 Glide Tips, Two 5 Inch Rubber Wheel    SIT TO STAND:         Lean chest forward from elevated seat have walker or chair in front of you. Push through legs to stand up and then allow youlegs to lwer yourself back to sitting. 10 reps.

## 2022-03-14 NOTE — PROGRESS NOTES
Subjective:       Patient ID: Stu Garcia is a 76 y.o. male.    Chief Complaint: Pain (cLBP)    This patient was referred to me from Dr. Juan  for Chronic Low Back Pain and chemotherapy induced neuropathy. He also expressed that he has blood in his urine and pain that is radiating from the kidney area around his lower rib cage. He saw  a urologist on the 13th. He says he is still experiencing hematuria.    Today is visit #10---he reports that the acupuncture is decreasing his back pain.    Today he ambulates with a walker instead of cane    Pain  This is a chronic problem. The current episode started more than 1 year ago. The problem occurs constantly. The problem has been unchanged. Associated symptoms include anorexia, arthralgias, fatigue, myalgias, numbness, urinary symptoms and weakness. Improvement on treatment: is currently beginning treatment.   Cancer  Associated symptoms include anorexia, arthralgias, fatigue, myalgias, numbness, urinary symptoms and weakness.     Review of Systems   Constitutional: Positive for fatigue.   HENT: Positive for hearing loss and trouble swallowing.    Gastrointestinal: Positive for anorexia.   Genitourinary: Positive for flank pain and hematuria.   Musculoskeletal: Positive for arthralgias, back pain, gait problem, leg pain and myalgias.   Neurological: Positive for weakness and numbness. Negative for speech difficulty.   Hematological: Negative for adenopathy. Bruises/bleeds easily.   Psychiatric/Behavioral:        Very pleasant and concerned about hematuria         Objective:      Physical Exam  Constitutional:              Assessment:     Back pain is a level 4-7/10----feeling improvement with acupuncture, encouraged patient to continue  Hands and feet are still pretty numb but he says he feels improvements with acupuncture every time he has it.  Today is visit #10    Problem List Items Addressed This Visit        Orthopedic    Chronic back pain - Primary       Other Visit Diagnoses     Neuropathy of both feet              Plan:       Recommended acupuncture once per week for 4 weeks.  Patient needs scans/ scopes of Kidneys. If patient is approved to proceed by Urologist, he may continue acupuncture program as needed and if helps decrease back pain.    Patient was able to ask questions and was satisfied with answers given.    Points used: nidia, lumbar auricular point, LI 4, BL 60, GB 40, ST 36, DU 3,4,5, BL 23 20, yintang, DU 20, LV 3, ST 44, GB 43    RTC next Monday    21 needles inserted at 1:00  21 needles removed at 1:30

## 2022-03-15 NOTE — PROGRESS NOTES
PSYCHO-ONCOLOGY NOTE/ Individual Psychotherapy     Date: 3/16/2022   Site:  [facility]      Therapeutic Intervention: Met with patient.  Outpatient - Behavior modifying psychotherapy 45 min - CPT code 06224 and Outpatient - Supportive psychotherapy 45 min - CPT Code 22749      Patient was last seen by me on 2/23/2022    Problem list  Patient Active Problem List   Diagnosis    Chronic anticoagulation    PAF (paroxysmal atrial fibrillation)    Iron deficiency anemia    HLD (hyperlipidemia)    Primary osteoarthritis of left knee    History of prostate cancer    Essential hypertension    Type II diabetes mellitus with peripheral circulatory disorder    Thrombocytopenia    Dyslipidemia    History of heart artery stent    Varicose veins of both legs with edema    Coronary artery disease involving native coronary artery of native heart with unstable angina pectoris    Symptomatic anemia    GE junction carcinoma    Anemia associated with chemotherapy    Chemotherapy-induced thrombocytopenia    Leukopenia due to antineoplastic chemotherapy    Chest pain    Unexplained weight loss    Polyneuropathy associated with underlying disease    Gait disturbance    Pulmonary nodule    Type II diabetes mellitus with neurological manifestations    Chronic back pain    Weight decreasing    History of fall    Insomnia    Weakness generalized       Chief complaint/reason for encounter: depression and irritability   Met with patient to evaluate psychosocial adaptation to diagnosis and treatment course of GE junction carcinoma.    Current Medications  Current Outpatient Medications   Medication    aspirin (ECOTRIN) 81 MG EC tablet    atorvastatin (LIPITOR) 80 MG tablet    b complex vitamins capsule    blood sugar diagnostic (ACCU-CHEK SHASHANK PLUS TEST STRP) Strp    glimepiride (AMARYL) 2 MG tablet    glucagon, human recombinant, (GLUCAGON EMERGENCY KIT, HUMAN,) 1 mg SolR    HYDROcodone-acetaminophen  (NORCO)  mg per tablet    iron polysaccharides (NIFEREX) 150 mg iron Cap    lancets (ACCU-CHEK SOFTCLIX LANCETS) Misc    lidocaine-prilocaine (EMLA) cream    metFORMIN (GLUCOPHAGE-XR) 500 MG ER 24hr tablet    metoprolol tartrate (LOPRESSOR) 50 MG tablet    mirtazapine (REMERON) 15 MG tablet    multivitamin capsule    nitroGLYCERIN (NITROSTAT) 0.4 MG SL tablet    omeprazole (PRILOSEC) 40 MG capsule    prochlorperazine (COMPAZINE) 10 MG tablet    rivaroxaban (XARELTO) 20 mg Tab    tiZANidine (ZANAFLEX) 4 MG tablet    vit C,Y-Jo-pltzq-lutein-zeaxan (PRESERVISION AREDS 2) 250-90-40-1 mg Cap    vitamin A 41542 UNIT capsule     No current facility-administered medications for this visit.       Objective:  Stu Garcia arrived promptly for the session.  Mr. Garcia was ambulatory at the time of session with the use of a walker. The patient was fully cooperative throughout the session.  Appearance: age appropriate, casually  dressed, adequately  groomed  Behavior/Cooperation: friendly and cooperative  Speech: normal in rate, volume, and tone and appropriate quality, quantity and organization of sentences  Mood: steady  Affect: mood congruent and appropriate  Thought Process: goal-directed, logical  Thought Content: normal,  No delusions or paranoia; did not appear to be responding to internal stimuli during the session  Orientation: grossly intact  Memory: grossly intact  Attention Span/Concentration: Attends to session without distraction; reports no difficulty  Fund of Knowledge: average  Estimate of Intelligence: average from verbal skills and history  Cognition: grossly intact  Insight: patient has awareness of illness; good insight into own behavior and behavior of others  Judgment: the patient's behavior is adequate to circumstances    Interval history and content of current session: Patient stated his back is hurting today. He acknowledged that he has been doing some of the PT exercises at home  and he sees some relief from pain after doing them.  Discussed current adaptation to disease and treatment status. Reports to be coping with some difficulty with his treatment status. Evaluated cognitive response, paying particular attention to negative intrusive thoughts of a persistent and detrimental nature. He noted concern about adjusting to his physical limitations. Thoughts of this type are in evidence with mild distress. Provided cognitive behavioral therapy to address negative cognitions. Identified and evaluated psychosocial and environmental stressors secondary to diagnosis and treatment. Patient shared that he sometimes feels irritable due to not being able to do physical tasks that he used to be able to do. He provided examples that he used to be able to do including household repairs. He stated that he felt irritable two days in the past week. Examined proactive behaviors that may be implemented to minimize or ameliorate psychosocial stressors secondary to diagnosis and treatment. Taught patient deep breathing exercise to do when he begins feelings frustrated. Patient acknowledged that he would try this technique in the future. Patient also shared that his wife has been having difficulty adjusting to his current physical abilities. He did express that she is improving in this area. Discussed assertive communication skills and how it could help relieve stress brought on by his wife's expectations. Patient stated he is open to communicating with her more about his current physical abilities.       Risk parameters:   Patient reports no suicidal ideation  Patient reports no homicidal ideation  Patient reports no self-injurious behavior  Patient reports no violent behavior   Safety needs:  None at this time      Verbal deficits: None     Patient's response to intervention:The patient's response to intervention is accepting.     Progress toward goals and other mental status changes:  The patient's progress  toward goals is good.      Progress to date:Progress - Ongoing, but Slow      Goals from last visit: Attempted, partially met        Patient Strengths: verbal, intelligent, successful, good insight, and gratitude about the life he has led      Treatment Plan:individual psychotherapy  · Target symptoms: depression, irritability  · Why chosen therapy is appropriate versus another modality: relevant to diagnosis, patient responds to this modality  · Outcome monitoring methods: self-report  · Therapeutic intervention type: behavior modifying psychotherapy, supportive psychotherapy  · Prognosis: Good      Behavioral goals:      Stress management: Practice Deep Breathing Exercise to Relieve Irritability    Therapy: Increase Pleasant Event Scheduling and Practice Assertive Communication Skills    Return to clinic: three to four weeks     Length of Service (minutes direct face-to-face contact): 40 minutes    Diagnosis:     ICD-10-CM ICD-9-CM   1. Adjustment disorder with mixed anxiety and depressed mood  F43.23 309.28           Nahum Persaud Psy.D.  [place] License #[id number]             [place]ek[place] Garrett License #[id number]  [place] License #[id number]

## 2022-03-16 NOTE — PROGRESS NOTES
"  [facility] Outpatient Physical Therapy and Wellness Daily Note     Treatment Date: 3/16/2022    Name: Stu Garcia  Clinic Number: [id number]  Diagnosis:   Encounter Diagnoses   Name Primary?    Gait disturbance Yes    Weakness generalized     Chemotherapy-induced neuropathy      Physician: Keeley Gonzalez FNP-C  Precautions: Standard and Fall; pt is [place] (wears hearing aids)  Visit #: 8 of 16 including eval  PTA Visit #: 0  Time In: 01:00PM  Time Out: 01:55Pm  Total Treatment Time 1:1: 55 min    Evaluation Date: 1/19/2022  Visit # authorized: 20  Authorization period: through 4/30/2022  Plan of care Expiration: 10 visits/ 30 days   MD referral: PT eval and treat    Subjective     Pt reports: doing ok  Some days better than others. Still have the neuropathy in my feet, don't think that will ever get better.    I got my new wheels for my walker had to go to a few different places to find them.      Response to previous treatment: denied muscle soreness or increased pain    Pain, Nature, Location: 4/10 constant low back    Objective     Pt ambulates into the clinic with his walker today (has new wheels on front two legs, gliders on the back legs.) ambulates with wide DAVID with decreased step length. Eyes fixed on ground with very guarded gait.     resting HR 78 bpm and O2 sat 100      Stu received therapeutic exercises to develop strength, endurance, ROM, flexibility, posture and core stabilization for 55 minutes including:  tx considerations: Could not tolerate supine position on mat even with offloading  Nu step L4  minutes min  HR 96  O2 sats 98%  gastroc stretch on wedge 30" x 3  Mat exercises:  Diaphragmatic breathing 5 min  B Hip abduction 10 reps x 1 trials in sidelye  Supine hip abduction 2x10 reps   Supine PPT/glute set with hip add/pillow squeeze 5 sec hold x10 reps  Side Lying Clams YTB 10 reps each 3 sets  PPT with TA in sidelye  Side lye ball press in front with pillow squeeze 5 sec " "hold 20 reps    Seated end of mat:  Seated scap retraction with double red 2 x 10 reps  Shoulder rolls  Scapular retractions "T" danny x10 sec x 10 reps  Rows green tband one arm at a time with PT to assist , 2 x 10   Hamstring stretch sitting with trunk lean with leg extended 30 sec hold 3 reps each leg (deferred due to time)  LAQ 5 sec hold 10 reps B LE with cues to sit in neutral pelvis and not to compensate by leaning backward.   Sitting Marches x with red tband resistance in sitting  10x  x 2 trials -deferred  Sit to stand with  Walker in front from edge of mat raised 25 inches: 2x 10 reps  (light finger tip upport on walker with one hand)    Deferred all supine ex:  Supine:   AP's 2x20 reps  Single knee to chest 3 reps alternating 20 sec each leg.  Lower Trunk rotation x 20 reps   Pillow squeeze with pelvic floor 2 x 10 reps 2 sec hold  TA initiation with ball press in supine  with lean forward 5  Rep5 sec hold  Laying in supported long sitting position on mat with pillows  SLR 2x 5 reps each with verbal cues for slow controlled difficulty with L with increase in back pain  Supine Hook Lying clam YTB 2x10      deferred  Neuromusculature re-ed x 25 min: balance retraining for ankle strategies and proprioception: tandem stance with use of barre one hand LFF/ RFF 5 minutes total, followed by standing on foam with hip stabilization / ankle strategy with side touch to 4 inch wooden block. Attempted single limb stance, pt unable to hold with holding onto barre with one hand. Retro walking with use of barre as handle.     Deferred:  Sitting hamstring stretches 3 reps B 30 sec hold  UBE 6 min alternating forward and backward - L2 deferred  Deferred Standing at barre holding onto rail, heel/toe raises x 10 ea  Deferred Sit to stand from chair with Airex pad in seat  x 12 reps (pt used his arms to assist)  Seated exercises:  Hamstring stretch sitting in chair 30 sec hold x 3 each leg  LAQ 10 reps slow with eccentric " control 5 sec holds B   Seated Hip abduction with RTB around thighs, 2 x 10   Standing with lean on elbows on mat raised, glut sets 10 reps  deferred Side steps with use of barre with red tband for resistance 20 each  Written Home Exercises Provided: yes. provided written progression of hep to perform daily. Provided information on wheel attachment for walker. Available on line or through [employer]. Pt to purchase.   Exercises were reviewed and Stu was able to demonstrate them prior to the end of the session.  Stu demonstrated good  understanding of the education provided.     See EMR under Patient Instructions for exercises provided 2/11/2022.      Education provided re: energy conservation, hep for strengthening  Stu verbalized good  understanding of education provided.     Assessment   Pt with arrived with walker today with new front wheels, improved stability with gait. Back pain limits patient's tolerance to upright activity. Pt tolerated LE and core strengthening supine exercises well today worked in sidelye and sitting avoided supine position on mat.  Will continue to progress as able. Responding well with postural exercises working on trunk strength, flexibility in hamstrings, and use of supportive device for gait.   Muscle soreness is expected over the next 24-48 hours. Pt has guarded gait pattern and may benefit from use of RW versus his small based quad cane. Pt is a high risk for falls.  Strongly recommended pt use RW. Pt with good understanding.   Stu Is progressing well towards his goals.   Pt prognosis is Good.       Pt will continue to benefit from skilled outpatient physical therapy to address the deficits listed in the problem list box on initial evaluation, provide pt/family education and to maximize pt's level of independence in the home and community environment.     Pt's spiritual, cultural and educational needs considered and pt agreeable to plan of care and goals.    Anticipated  barriers to physical therapy: none      This is a 76 y.o. male referred to outpatient physical therapy and presents with a medical diagnosis of chemotherapy induced neuropathy and gait disturbance. Patient presents with bilateral leg weakness, poor posture, history of back pain with comes and goes, impaired balance with noticeable unsteadiness in gait requiring AD for safety in community due to fall risk. Patient demonstrates limitations as described in the problem list. Pt rehab potential is Fair. Pt will benefit from skilled outpatient physical therapy to address the deficits listed below in the problem list, provide pt/family education and to maximize pt's level of independence in the home and community environment. Will contniiue with skilled care towards goals in initial plan of care. Pt may not be able to meet single limb stance goals.        Medical necessity is demonstrated by the following IMPAIRMENTS/PROMBLEM LIST:  1. Fall Risk - impaired balance   2. Unable to participate in daily activities   3. Impaired functional mobility  4. Requires skilled supervision to complete and progress HEP   5. generalized weakness  6. Decreased CV endurance   7. Decreased standing tolerance   8. Decreased community ambulation   9. Decreased safety awareness with functional mobility  10. Impaired muscle tone     Anticipated barriers to physical therapy: none     Pt's spiritual, cultural and educational needs considered and pt agreeable to plan of care and goals as stated below:      GOALS:   Short term goals: 4 weeks, pt agrees to goals set.  1. Pt and caregivers will evaluate home for safety, including checking lighting and hazards on the floor such as rugs or cords and remove them. GOAL MET   2. Pt will improve SLS on R by > or = 3 seconds in order to decrease risk for falls in the home and community environment. NOT MET  3. Pt will improve SLS on L by > or = 3 seconds in order to decrease risk for falls in the home and  community environment. NOT MET  4. Pt will tolerate 30 minutes of physical therapy treatment with 1 rest break to demonstrate overall improvement in CV endurance GOAL MET 22  5. Pt will decrease TUG score by > or = 2 seconds with improved safety in order to demonstrate decreased risk for falls. IN PROGRESS  6. Pt will increase MMT for B LE's to > or = 4+/5 in order to improve stability and safety with community ambulation. IN PROGRESS  7. Patient will improve Tinetti Gait/Balance assessment score to  to aid in improvement of functional mobility and decrease fall risk. IN PROGRESS      Long term goals: 8 weeks, pt agrees to goals set  2022 continue with LT. Pt will ambulate 1000 ft/communiuty distances with/without AD with no rest breaks with modified independence.  2. Pt will improve TUG score by > or = 2 seconds to decrease risk for falls in the home and community environment.  3. Increased MMT for B LE's to > or = 5/5 in order to improve stability and safety with community ambulation.  4. Pt will tolerate 50 minutes of physical therapy with < or = 1 rest breaks in order to demonstrate overall improved CV endurance.   5. Pt will be able to ambulate community distances decreased complaints of fatigue.         Plan   Outpatient physical therapy 1-2 times weekly to include: patient education including safety awareness, hep, therapeutic exercises, therapeutic activities, neuromuscular re-education/ balance exercises, manual techniques and modalities prn.   Cont PT for  total of 16 visits. Patient may be seen by PTA as part of the rehabilitation team.       Therapist: Lita Shaw PT, NISHIT  3/16/2022    PT/PTA met face to face to discuss patient's treatment plan and progress towards established goals.  Patient will be seen by physical therapist every sixth visit and minimally once per month.

## 2022-03-18 NOTE — PROGRESS NOTES
"  [facility] Outpatient Physical Therapy and Wellness Daily Note     Treatment Date: 3/17/2022    Name: Stu Garcia  Clinic Number: [id number]  Diagnosis:   Encounter Diagnoses   Name Primary?    Gait disturbance Yes    Weakness generalized     Chemotherapy-induced neuropathy      Physician: Keeley Gonzalez FNP-C  Precautions: Standard and Fall; pt is [place] (wears hearing aids)  Visit #: 9 of 16 including eval  PTA Visit #: 1  Time In: 09:00 AM  Time Out: 10:55 AM  Total Treatment Time 1:1: 55 min    Evaluation Date: 1/19/2022  Visit # authorized: 20  Authorization period: through 4/30/2022  Plan of care Expiration: 10 visits/ 30 days   MD referral: PT eval and treat    Subjective     Pt reports: that he is tired; had therapy yesterday. "My back is hurting more from yesterday." Pt states that the numbness in her feet varies from day to day; doesn't know why that happens.     Response to previous treatment: denied muscle soreness or increased pain    Pain, Nature, Location: 5/10 constant low back    Objective     Pt ambulates into the clinic with his walker today (has new wheels on front two legs, gliders on the back legs.) ambulates with wide DAVID with decreased step length. Eyes fixed on ground with very guarded gait.     resting HR 84 bpm and O2 sat 97%      Stu received therapeutic exercises to develop strength, endurance, ROM, flexibility, posture and core stabilization for 40 minutes including:  tx considerations: Could not tolerate supine position on mat even with offloading  Nu step L4  minutes min  HR 82  O2 sats 98%  gastroc stretch on wedge 30" x 3    Seated end of mat:  Seated scap retraction with double red 2 x 10 reps  Shoulder rolls  Scapular retractions "T" danny x10 sec x 10 reps  Rows green tband one arm at a time with PT to assist , 2 x 10   Hamstring stretch sitting with trunk lean with leg extended 30 sec hold 3 reps each leg  LAQ 5 sec hold 10 reps B LE with cues to sit in " neutral pelvis and not to compensate by leaning backward.   Sitting Marches 10x  x 2 trials   Sit to stand with  Walker in front from edge of mat raised 25 inches: 2x 10 reps  (light finger tip upport on walker with one hand)    Neuromusculature re-ed x 15 min: balance retraining for ankle strategies and proprioception: amb on Airex pad with use of barre one hand LFF/ RFF 5 minutes total, followed by standing on foam with hip stabilization / ankle strategy with side touch to 4 inch wooden block. Retro walking with use of barre as handle.       Deferred all supine ex:  Supine:   AP's 2x20 reps  Single knee to chest 3 reps alternating 20 sec each leg.  Lower Trunk rotation x 20 reps   Pillow squeeze with pelvic floor 2 x 10 reps 2 sec hold  TA initiation with ball press in supine  with lean forward 5  Rep5 sec hold  Laying in supported long sitting position on mat with pillows  SLR 2x 5 reps each with verbal cues for slow controlled difficulty with L with increase in back pain  Supine Hook Lying clam YTB 2x10  Mat exercises:  Diaphragmatic breathing 5 min  B Hip abduction 10 reps x 1 trials in sidelye  Supine hip abduction 2x10 reps   Supine PPT/glute set with hip add/pillow squeeze 5 sec hold x10 reps  Side Lying Clams YTB 10 reps each 3 sets  PPT with TA in sidelye  Side lye ball press in front with pillow squeeze 5 sec hold 20 reps        Deferred:  Sitting hamstring stretches 3 reps B 30 sec hold  UBE 6 min alternating forward and backward - L2 deferred  Deferred Standing at barre holding onto rail, heel/toe raises x 10 ea  Deferred Sit to stand from chair with Airex pad in seat  x 12 reps (pt used his arms to assist)  Seated exercises:  Hamstring stretch sitting in chair 30 sec hold x 3 each leg  LAQ 10 reps slow with eccentric control 5 sec holds B   Seated Hip abduction with RTB around thighs, 2 x 10   Standing with lean on elbows on mat raised, glut sets 10 reps  deferred Side steps with use of barre with red  tband for resistance 20 each  Written Home Exercises Provided: yes. provided written progression of hep to perform daily. Provided information on wheel attachment for walker. Available on line or through [employer]. Pt to purchase.   Exercises were reviewed and Stu was able to demonstrate them prior to the end of the session.  Stu demonstrated good  understanding of the education provided.     See EMR under Patient Instructions for exercises provided 2/11/2022.      Education provided re: energy conservation, hep for strengthening  Stu verbalized good  understanding of education provided.     Assessment   Pt with arrived with walker today with new front wheels, improved stability with gait. Back pain limits patient's tolerance to upright activity. Pt tolerated LE and core strengthening supine exercises well today worked in sidelye and sitting avoided supine position on mat.  Will continue to progress as able. Responding well with postural exercises working on trunk strength, flexibility in hamstrings, and use of supportive device for gait.   Muscle soreness is expected over the next 24-48 hours. Pt has guarded gait pattern and may benefit from use of RW versus his small based quad cane. Pt is a high risk for falls.  Strongly recommended pt use RW. Pt with good understanding.   Stu Is progressing well towards his goals.   Pt prognosis is Good.       Pt will continue to benefit from skilled outpatient physical therapy to address the deficits listed in the problem list box on initial evaluation, provide pt/family education and to maximize pt's level of independence in the home and community environment.     Pt's spiritual, cultural and educational needs considered and pt agreeable to plan of care and goals.    Anticipated barriers to physical therapy: none      This is a 76 y.o. male referred to outpatient physical therapy and presents with a medical diagnosis of chemotherapy induced neuropathy and gait  disturbance. Patient presents with bilateral leg weakness, poor posture, history of back pain with comes and goes, impaired balance with noticeable unsteadiness in gait requiring AD for safety in community due to fall risk. Patient demonstrates limitations as described in the problem list. Pt rehab potential is Fair. Pt will benefit from skilled outpatient physical therapy to address the deficits listed below in the problem list, provide pt/family education and to maximize pt's level of independence in the home and community environment. Will contniiue with skilled care towards goals in initial plan of care. Pt may not be able to meet single limb stance goals.        Medical necessity is demonstrated by the following IMPAIRMENTS/PROMBLEM LIST:  1. Fall Risk - impaired balance   2. Unable to participate in daily activities   3. Impaired functional mobility  4. Requires skilled supervision to complete and progress HEP   5. generalized weakness  6. Decreased CV endurance   7. Decreased standing tolerance   8. Decreased community ambulation   9. Decreased safety awareness with functional mobility  10. Impaired muscle tone     Anticipated barriers to physical therapy: none     Pt's spiritual, cultural and educational needs considered and pt agreeable to plan of care and goals as stated below:      GOALS:   Short term goals: 4 weeks, pt agrees to goals set.  1. Pt and caregivers will evaluate home for safety, including checking lighting and hazards on the floor such as rugs or cords and remove them. GOAL MET   2. Pt will improve SLS on R by > or = 3 seconds in order to decrease risk for falls in the home and community environment. NOT MET  3. Pt will improve SLS on L by > or = 3 seconds in order to decrease risk for falls in the home and community environment. NOT MET  4. Pt will tolerate 30 minutes of physical therapy treatment with 1 rest break to demonstrate overall improvement in CV endurance GOAL MET 2/18/22  5. Pt  will decrease TUG score by > or = 2 seconds with improved safety in order to demonstrate decreased risk for falls. IN PROGRESS  6. Pt will increase MMT for B LE's to > or = 4+/5 in order to improve stability and safety with community ambulation. IN PROGRESS  7. Patient will improve Tinetti Gait/Balance assessment score to  to aid in improvement of functional mobility and decrease fall risk. IN PROGRESS      Long term goals: 8 weeks, pt agrees to goals set  2022 continue with LT. Pt will ambulate 1000 ft/communiuty distances with/without AD with no rest breaks with modified independence.  2. Pt will improve TUG score by > or = 2 seconds to decrease risk for falls in the home and community environment.  3. Increased MMT for B LE's to > or = 5/5 in order to improve stability and safety with community ambulation.  4. Pt will tolerate 50 minutes of physical therapy with < or = 1 rest breaks in order to demonstrate overall improved CV endurance.   5. Pt will be able to ambulate community distances decreased complaints of fatigue.         Plan   Outpatient physical therapy 1-2 times weekly to include: patient education including safety awareness, hep, therapeutic exercises, therapeutic activities, neuromuscular re-education/ balance exercises, manual techniques and modalities prn.   Cont PT for  total of 16 visits. Patient may be seen by PTA as part of the rehabilitation team.       Therapist: Lois Joe PTA, CLT  3/17/2022    PT/PTA met face to face to discuss patient's treatment plan and progress towards established goals.  Patient will be seen by physical therapist every sixth visit and minimally once per month.

## 2022-03-21 NOTE — PROGRESS NOTES
Subjective:       Patient ID: Stu Garcia is a 76 y.o. male.    Chief Complaint: Pain (Chronic low back pain/) and Cancer (Neuropathy of feet)    This patient was referred to me from Dr. Juan  for Chronic Low Back Pain and chemotherapy induced neuropathy. He also expressed that he has blood in his urine and pain that is radiating from the kidney area around his lower rib cage. He saw  a urologist on the 13th. He says he is still experiencing hematuria.    Today is visit #11---he reports that the acupuncture is decreasing his back pain.    Today he ambulates with a walker instead of cane    Pain  This is a chronic problem. The current episode started more than 1 year ago. The problem occurs constantly. The problem has been unchanged. Associated symptoms include anorexia, arthralgias, fatigue, myalgias, numbness, urinary symptoms and weakness. Improvement on treatment: is currently beginning treatment.   Cancer  Associated symptoms include anorexia, arthralgias, fatigue, myalgias, numbness, urinary symptoms and weakness.     Review of Systems   Constitutional: Positive for fatigue.   HENT: Positive for hearing loss and trouble swallowing.    Gastrointestinal: Positive for anorexia.   Genitourinary: Positive for flank pain and hematuria.   Musculoskeletal: Positive for arthralgias, back pain, gait problem, leg pain and myalgias.   Neurological: Positive for weakness and numbness. Negative for speech difficulty.   Hematological: Negative for adenopathy. Bruises/bleeds easily.   Psychiatric/Behavioral:        Very pleasant and concerned about hematuria         Objective:      Physical Exam  Constitutional:              Assessment:     Back pain is a level 4-7/10----feeling improvement with acupuncture, encouraged patient to continue  Hands and feet are still pretty numb but he says he feels improvements with acupuncture every time he has it.  Today is visit #11    Problem List Items Addressed This Visit     None         Plan:       Recommended acupuncture once per week for 4 weeks.  Patient needs scans/ scopes of Kidneys. If patient is approved to proceed by Urologist, he may continue acupuncture program as needed and if helps decrease back pain.    Patient was able to ask questions and was satisfied with answers given.    Points used: shenmen, lumbar auricular point, LI 4, BL 60, GB 40, ST 36, DU 3,4,5, BL 23 20, yintang, DU 20, LV 3, ST 44, GB 43    RTC next Monday    21 needles inserted at 1:00  21 needles removed at 1:30

## 2022-03-23 NOTE — PROGRESS NOTES
Oncology Nutrition Assessment for Medical Nutrition Therapy  Follow-up Visit    Stu Garcia   1945    Referring Provider:  TOÑO Martinez    Reason for Visit: Pt in for education and nutrition counseling     PMHx:   Past Medical History:   Diagnosis Date    Anticoagulant long-term use     xarelto,asa    Arthritis     Atrial fibrillation 2013    cardioverted    Bleb, lung     Cataract     OS    Colon polyp     Coronary artery disease     Coronary artery disease involving native coronary artery of native heart with unstable angina pectoris 3/18/2018    Diabetes mellitus     Diabetes mellitus, type 2     Diverticulosis     Encounter for blood transfusion     General anesthetics causing adverse effect in therapeutic use     delayed emergence per patient's wife    GERD (gastroesophageal reflux disease)     HEARING LOSS     bilateral aids    Hemorrhoid     HLD (hyperlipidemia)     Hypertension     Iron deficiency anemia     Neuropathy of leg     Pneumonia due to other staphylococcus     Prostate cancer     prostate    Spontaneous pneumothorax     bilateral    Thyroid disease     tumors, non cancerous       Nutrition Assessment    This is a 76 y.o.male with a 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 Pt reports he was diagnosed in 2019 and underwent surgery which removed part of his GI tract, including his esophagus.     RD following up with initial visit from pt on 1/20/22. Pt states he has been struggling to consume adequate food despite previous recommendations. Pt feels frustrated because he knows he needs to eat but it is very uncomfortable and sometimes painful d/t his anatomy post op. RD discussed recommendations and goals from previous visit but pt states unfortunately many of them are difficult for him. Pt states two eggs are too much at one time. Unable to drink more than on Boost per day, and is usually doing every other day. Discussed current eating regimen and  "made more realistic goals for patient to start with.     Pt's mood appears depressed. Pt stating he has no desire to gain weight and seems apathetic. RD asked pt if he would be interested in speaking with one of the social workers. Pt denies and states he is currently seeing a psychologist here, Dr. Persaud. RD provided encouragement.      Patient's diet recall:   B: one scrambled egg, cheese, one cup of coffee with original creamer   L: one can stew (canned), sandwich  Snack: chocolate ice cream bar  D: yogurt with berries and whip cream on top    Weight:75.4 kg (166 lb 3.6 oz)  Height:6' 4" (1.93 m)  BMI:Body mass index is 20.23 kg/m².   IBW: Ideal body weight: 86.8 kg (191 lb 5.7 oz)      Allergies: Codeine    Current Medications:    Current Outpatient Medications:     aspirin (ECOTRIN) 81 MG EC tablet, Take 1 tablet (81 mg total) by mouth once daily. May take over the counter, Disp: 30 tablet, Rfl: 12    atorvastatin (LIPITOR) 80 MG tablet, TAKE 1 TABLET(80 MG) BY MOUTH EVERY EVENING, Disp: 90 tablet, Rfl: 0    b complex vitamins capsule, Take 1 capsule by mouth once daily., Disp: , Rfl:     blood sugar diagnostic (ACCU-CHEK SHASHANK PLUS TEST STRP) Strp, TEST ONCE DAILY, Disp: 100 strip, Rfl: 3    glimepiride (AMARYL) 2 MG tablet, Take 2 tablets (4 mg total) by mouth 2 (two) times daily., Disp: 360 tablet, Rfl: 1    glucagon, human recombinant, (GLUCAGON EMERGENCY KIT, HUMAN,) 1 mg SolR, Inject 1 mg into the muscle as needed., Disp: 1 each, Rfl: 2    HYDROcodone-acetaminophen (NORCO)  mg per tablet, Take 1 tablet by mouth every 12 (twelve) hours as needed for Pain., Disp: 60 tablet, Rfl: 0    iron polysaccharides (NIFEREX) 150 mg iron Cap, Take 1 capsule (150 mg total) by mouth once daily., Disp: 90 capsule, Rfl: 1    lancets (ACCU-CHEK SOFTCLIX LANCETS) Misc, 1 strip by Misc.(Non-Drug; Combo Route) route once daily., Disp: 100 each, Rfl: 3    lidocaine-prilocaine (EMLA) cream, U UTD, Disp: , " Rfl: 0    metFORMIN (GLUCOPHAGE-XR) 500 MG ER 24hr tablet, TAKE 1 TABLET(500 MG) BY MOUTH TWICE DAILY WITH MEALS, Disp: 180 tablet, Rfl: 1    metoprolol tartrate (LOPRESSOR) 50 MG tablet, TAKE 1 TABLET BY MOUTH TWICE DAILY, Disp: 60 tablet, Rfl: 11    mirtazapine (REMERON) 15 MG tablet, Take 1 tablet (15 mg total) by mouth every evening., Disp: 30 tablet, Rfl: 11    multivitamin capsule, Take 1 capsule by mouth once daily., Disp: , Rfl:     nitroGLYCERIN (NITROSTAT) 0.4 MG SL tablet, DISSOLVE 1 TABLET UNDER THE TONGUE EVERY 5 MINUTES AS NEEDED FOR CHEST PAIN (Patient not taking: Reported on 2/21/2022), Disp: 100 tablet, Rfl: prn    omeprazole (PRILOSEC) 40 MG capsule, TAKE 1 CAPSULE(40 MG) BY MOUTH EVERY DAY, Disp: 90 capsule, Rfl: 3    prochlorperazine (COMPAZINE) 10 MG tablet, Take 1 tablet (10 mg total) by mouth every 6 (six) hours as needed., Disp: 30 tablet, Rfl: 1    rivaroxaban (XARELTO) 20 mg Tab, Take 1 tablet (20 mg total) by mouth daily with dinner or evening meal., Disp: 30 tablet, Rfl: 11    tiZANidine (ZANAFLEX) 4 MG tablet, Take 1 tablet (4 mg total) by mouth every 8 (eight) hours., Disp: 90 tablet, Rfl: 3    vit C,M-Zt-qeexv-lutein-zeaxan (PRESERVISION AREDS 2) 250-90-40-1 mg Cap, Take 1 tablet by mouth once daily., Disp: , Rfl:     vitamin A 69982 UNIT capsule, Take 10,000 Units by mouth once daily., Disp: , Rfl:     Vitamins/Supplements: Reviewed    Labs: Reviewed     Nutrition Diagnosis    Problem: Severe protein-calorie malnutrition  Etiology (related to): appetite loss , early satiety  and altered GI tract  Signs/Symptoms (as evidenced by): BMI = 20.23 (low for age)    Nutrition Intervention    Nutrition Prescription   2488 Kcals (33 kcal/kg)  113 g protein (1.5g/kg)   2488 mL fluid (1mL/kg)    Recommendations:  Drink Boost daily (patient wants to start with one per day) with eventual goal of increasing if able  Do not skip meals- if not hungry for full meal, drink Boost or high  calorie snack such as PB crackers  Pt states nuts/granola are hard to digest- will try adding Honey or other flavor syrups to yogurt to add calories     Materials Provided/Reviewed   Nutrition During Cancer Treatment (with goals written on back)     Nutrition Monitoring and Evaluation    Monitor: energy intake and weight    Goals: 1-2# weight gain per week until BMI >22    Follow up RD encouraged pt to f/u with Dr. Persaud regarding feelings of frustration- of note patient with f/u appointment scheduled. RD will continue to monitor and intervene as medically appropriate. Pt with RD contact as well and instructed to contact with any questions/concerns.     Communication to referring provider/care team: note available in chart     Counseling time: 30 Minutes    Zahira Chavez MS, RD, LDN  [phone]

## 2022-03-23 NOTE — TELEPHONE ENCOUNTER
Spoke with Sut and asked if he wanted to come in today at 3pm for f/u with dietitian. He voiced understanding and said he will be here.

## 2022-03-23 NOTE — PROGRESS NOTES
[facility] Outpatient Physical Therapy Re-Assessment and Wellness Daily Note     Treatment Date: 3/23/2022    Name: Stu Garcia  Clinic Number: [id number]  Diagnosis:   Encounter Diagnoses   Name Primary?    Gait disturbance Yes    Weakness generalized      Physician: Keeley Gonzalez FNP-C  Precautions: Standard and Fall; pt is [place] (wears hearing aids)  Visit #: 11 of 16 including eval  PTA Visit #: 0  Time In: 04:00 PM  Time Out: 05:00 PM  Total Treatment Time 1:1: 55 min    Evaluation Date: 1/19/2022  Visit # authorized: 20  Authorization period: through 4/30/2022  Plan of care Expiration: 10 visits/ 30 days   MD referral: PT eval and treat    Subjective     Pt reports:bad day today, having increased back pain and just not feeling great.    Response to previous treatment: denied muscle soreness or increased pain    Pain, Nature, Location: 6/10 constant low back reduced after manual techniques 3/10    Objective     Pt ambulates into the clinic with his walker today (has new wheels on front two legs, gliders on the back legs.) ambulates with wide DAVID with decreased step length. Eyes fixed on ground with very guarded gait.  Trunk flexed with height of RW approx 1-2 inches too short.     resting HR 75 bpm and O2 sat 96%  Gait Assessment:   - AD used: single point cane switched to RW   - Assistance: MOD I  - Distance: 200-150 from lobby     GAIT DEVIATIONS:  Stu displays the following deviations with ambulation: increased DAVID, trunk flexed, decreased reddy has improved ability  with turning direction however continues to watch floor closely during turns.   Impairments contributing to deviations: impaired motor control, weakness, decreased balance and proprioception     After sit to stand testing: O2 at 99% and HR at 80 bpm     Evaluation Re-assess 2/19/22 Re-assessment 3/22/2022   Timed Up and Go 9.52 sec, blue airex pad in chair, uses UE to rise, falls into chair returning to sit 9.35  "seconds 11.5 with RW   Single Limb Stance R LE 0 sec 0 sec 7   Single Limb Stance L LE 0 sec 0 sec 3   30 second Chair Rise 9 completed 12 completed 10                Adjusted his height on RW by 2 notches, and pt with improved trunk extension.     BALANCE SECTION  Patient is seated in a hard, armless chair.     1. Sitting Balance:   Steady; safe = 1   REASSESS  2 .Rises from chair :  Able, uses arms to help = 1  3. Attempts to arise :  Able Reassess rises on first try: 2  4. Immediate standing Balance (first 5 seconds) : Steady but uses walker or other support = 1  5. Standing balance: Steady but wide stance (medal heel>4" apart) & uses cane or other support = 1  6. Nudged : Staggers, grabs, catches self = 1  7. Eyes closed: Unsteady = 0  8. Turning 360 degrees:  Discontinuous steps = 01and Steady= 1 REASSESS  9. Sitting Down :uses arms = 1 REASSESS      Balance Score:  9/16   at [facility] was a 6/16  GAIT SECTION  Patient stands with therapist, walks across room (+/- aids), first at usual pace, then at rapid pace, but safe pace.      10. Initiation of Gait (Immediately after told to go.): Any hesitancy or multiple attempts to start = 0  11. Step length and height & Foot Clearance: Right swing foot  pass left stance foot with step = 1 and Right foot completely clears floor = 1 and Left swing foot passes right stance foot = 1 and Left foot completely clears floor = 1  12. Step symmetry: Right & Left step length equal (estimate) = 1  13. Step continuity : Stooping or discontinuity between steps = 0  14. Path: using walking aid = 1 Re-Assess  15. Trunk : Marked sway or uses walking aid = 0  16. Walking Time: Heels apart = 0     Balance score:9/16  Gait Score: 5/12     Total Score=Balance + Gait Score:14 /28       Stu received therapeutic exercises to develop strength, endurance, ROM, flexibility, posture and core stabilization for 30 minutes including:  tx considerations: Could not tolerate supine position on mat even " "with offloading  Nu step L4  10 minutes HR 80  O2 sats 99%  UBE 2  min L1 HR 96 bpm  gastroc stretch on wedge 30" x 3   attempted posterior pelvic tilt on sidelye pt unable  Sitting hamstring stretches 3 reps B 30 sec hold  Sit to stand 10 reps  Pt rec'd manual techniques for soft tissue mobilization of B lumbar paraspinals, L hip 25 min gentle sacral inferior glide with pt in sidelye. Pt with reported pain relief with man tech.      deferred  Seated end of mat:  Seated scap retraction with double red 2 x 10 reps  Shoulder rolls  Scapular retractions "T" danny x10 sec x 10 reps  Rows green tband one arm at a time with PT to assist , 2 x 10   Hamstring stretch sitting with trunk lean with leg extended 30 sec hold 3 reps each leg  LAQ 5 sec hold 10 reps B LE with cues to sit in neutral pelvis and not to compensate by leaning backward.   Sitting Marches 10x  x 2 trials   Sit to stand with  Walker in front from edge of mat raised 25 inches: 2x 10 reps  (light finger tip upport on walker with one hand)  deferred  Neuromusculature re-ed x 15 min: balance retraining for ankle strategies and proprioception: amb on Airex pad with use of barre one hand LFF/ RFF 5 minutes total, followed by standing on foam with hip stabilization / ankle strategy with side touch to 4 inch wooden block. Retro walking with use of barre as handle.       Deferred all supine ex:  Supine:   AP's 2x20 reps  Single knee to chest 3 reps alternating 20 sec each leg.  Lower Trunk rotation x 20 reps   Pillow squeeze with pelvic floor 2 x 10 reps 2 sec hold  TA initiation with ball press in supine  with lean forward 5  Rep5 sec hold  Laying in supported long sitting position on mat with pillows  SLR 2x 5 reps each with verbal cues for slow controlled difficulty with L with increase in back pain  Supine Hook Lying clam YTB 2x10  Mat exercises:  Diaphragmatic breathing 5 min  B Hip abduction 10 reps x 1 trials in sidelye  Supine hip abduction 2x10 reps "   Supine PPT/glute set with hip add/pillow squeeze 5 sec hold x10 reps  Side Lying Clams YTB 10 reps each 3 sets  PPT with TA in sidelye  Side lye ball press in front with pillow squeeze 5 sec hold 20 reps        Deferred:    UBE 6 min alternating forward and backward - L2 deferred  Deferred Standing at barre holding onto rail, heel/toe raises x 10 ea  Deferred Sit to stand from chair with Airex pad in seat  x 12 reps (pt used his arms to assist)  Seated exercises:  Hamstring stretch sitting in chair 30 sec hold x 3 each leg  LAQ 10 reps slow with eccentric control 5 sec holds B   Seated Hip abduction with RTB around thighs, 2 x 10   Standing with lean on elbows on mat raised, glut sets 10 reps  deferred Side steps with use of barre with red tband for resistance 20 each  Written Home Exercises Provided: yes. provided written progression of hep to perform daily. Provided information on wheel attachment for walker. Available on line or through [employer]. Pt to purchase.   Exercises were reviewed and Stu was able to demonstrate them prior to the end of the session.  Stu demonstrated good  understanding of the education provided.     See EMR under Patient Instructions for exercises provided 2/11/2022.      Education provided re: energy conservation, hep for strengthening  Stu verbalized good  understanding of education provided.     Assessment   Pt with arrived with walker today with new front wheels, improved stability with gait. Back pain limits patient's tolerance to upright activity. Pt tolerated exercises fair, and responded to manual techniques to reduce level of pain in low back.  Will continue to progress as able. Responding well with postural exercises working on trunk strength, flexibility in hamstrings, and use of supportive device for gait.   Muscle soreness is expected over the next 24-48 hours. Pt has guarded gait pattern and may benefit from use of RW versus his small based quad cane. Pt is a high  risk for falls.  Strongly recommended pt use RW. Pt with good understanding.   Stu Is progressing well towards his goals.   Pt prognosis is Good.       Pt will continue to benefit from skilled outpatient physical therapy to address the deficits listed in the problem list box on initial evaluation, provide pt/family education and to maximize pt's level of independence in the home and community environment.     Pt's spiritual, cultural and educational needs considered and pt agreeable to plan of care and goals.    Anticipated barriers to physical therapy: none      This is a 76 y.o. male referred to outpatient physical therapy and presents with a medical diagnosis of chemotherapy induced neuropathy and gait disturbance. Patient presents with bilateral leg weakness, poor posture, history of back pain with comes and goes, impaired balance with noticeable unsteadiness in gait requiring AD for safety in community due to fall risk. Patient demonstrates limitations as described in the problem list. Pt rehab potential is Fair. Pt will benefit from skilled outpatient physical therapy to address the deficits listed below in the problem list, provide pt/family education and to maximize pt's level of independence in the home and community environment. Will contniiue with skilled care towards goals in initial plan of care. Pt may not be able to meet single limb stance goals.        Medical necessity is demonstrated by the following IMPAIRMENTS/PROMBLEM LIST:  1. Fall Risk - impaired balance   2. Unable to participate in daily activities   3. Impaired functional mobility  4. Requires skilled supervision to complete and progress HEP   5. generalized weakness  6. Decreased CV endurance   7. Decreased standing tolerance   8. Decreased community ambulation   9. Decreased safety awareness with functional mobility  10. Impaired muscle tone     Anticipated barriers to physical therapy: none     Pt's spiritual, cultural and educational  needs considered and pt agreeable to plan of care and goals as stated below:      GOALS:   Short term goals: 4 weeks, pt agrees to goals set.  1. Pt and caregivers will evaluate home for safety, including checking lighting and hazards on the floor such as rugs or cords and remove them. GOAL MET   2. Pt will improve SLS on R by > or = 3 seconds in order to decrease risk for falls in the home and community environment.  GOAL MET 3/23/22  3. Pt will improve SLS on L by > or = 3 seconds in order to decrease risk for falls in the home and community environment. GOAL MET 3/23/22  4. Pt will tolerate 30 minutes of physical therapy treatment with 1 rest break to demonstrate overall improvement in CV endurance GOAL MET 22  5. Pt will decrease TUG score by > or = 2 seconds with improved safety in order to demonstrate decreased risk for falls. IN PROGRESS  6. Pt will increase MMT for B LE's to > or = 4+/5 in order to improve stability and safety with community ambulation. IN PROGRESS  7. Patient will improve Tinetti Gait/Balance assessment score to  to aid in improvement of functional mobility and decrease fall risk. IN PROGRESS      Long term goals: 8 weeks, pt agrees to goals set  2022 continue with LT. Pt will ambulate 1000 ft/communiuty distances with/without AD with no rest breaks with modified independence.  2. Pt will improve TUG score by > or = 2 seconds to decrease risk for falls in the home and community environment.  3. Increased MMT for B LE's to > or = 5/5 in order to improve stability and safety with community ambulation.  4. Pt will tolerate 50 minutes of physical therapy with < or = 1 rest breaks in order to demonstrate overall improved CV endurance.   5. Pt will be able to ambulate community distances decreased complaints of fatigue.         Plan   Patient has 5 more visits left on plan of care.   Outpatient physical therapy 1-2 times weekly to include: patient education including  safety awareness, hep, therapeutic exercises, therapeutic activities, neuromuscular re-education/ balance exercises, manual techniques and modalities prn.   Cont PT for  total of 16 visits. Patient may be seen by PTA as part of the rehabilitation team.       Therapist: Blessing Peters, PT, CLT  3/23/2022    PT/PTA met face to face to discuss patient's treatment plan and progress towards established goals.  Patient will be seen by physical therapist every sixth visit and minimally once per month.

## 2022-03-28 NOTE — TELEPHONE ENCOUNTER
Spoke with Stu and got him scheduled for more appts before time for his reassessment,. I also suggested changing his time to see Keeley Gonzalez from 10 to 3pm so he only has to make 1 trip for appts. He voiced acceptance for all appts.    ----- Message from Bo Silva MA sent at 3/25/2022  8:40 AM CDT -----  NEEDS MORE pt APPTS

## 2022-03-28 NOTE — PROGRESS NOTES
Subjective:       Patient ID: Stu Garcia is a 76 y.o. male.    Chief Complaint: Pain (Chronic Low back Pain) and Peripheral Neuropathy    This patient was referred to me from Dr. Juan  for Chronic Low Back Pain and chemotherapy induced neuropathy. He also expressed that he has blood in his urine and pain that is radiating from the kidney area around his lower rib cage. He saw  a urologist on the 13th. He says he is still experiencing hematuria.    Today is visit #12---he reports that the acupuncture is decreasing his back pain.    Today he ambulates with a walker instead of cane and presents with SOB and more all over body pain than usual.    Pain  This is a chronic problem. The current episode started more than 1 year ago. The problem occurs constantly. The problem has been unchanged. Associated symptoms include anorexia, arthralgias, fatigue, myalgias, numbness, urinary symptoms and weakness. Improvement on treatment: is currently beginning treatment.   Cancer  Associated symptoms include anorexia, arthralgias, fatigue, myalgias, numbness, urinary symptoms and weakness.     Review of Systems   Constitutional: Positive for fatigue.   HENT: Positive for hearing loss and trouble swallowing.    Gastrointestinal: Positive for anorexia.   Genitourinary: Positive for flank pain and hematuria.   Musculoskeletal: Positive for arthralgias, back pain, gait problem, leg pain and myalgias.   Neurological: Positive for weakness and numbness. Negative for speech difficulty.   Hematological: Negative for adenopathy. Bruises/bleeds easily.   Psychiatric/Behavioral:        Very pleasant and concerned about hematuria         Objective:      Physical Exam  Constitutional:              Assessment:     Back pain is a level 4-7/10----feeling improvement with acupuncture, encouraged patient to continue  Hands and feet are still pretty numb but he says he feels improvements with acupuncture every time he has it.  Today is visit  #11    Problem List Items Addressed This Visit    None         Plan:       Recommended acupuncture once per week for 4 weeks.  Patient needs scans/ scopes of Kidneys. If patient is approved to proceed by Urologist, he may continue acupuncture program as needed and if helps decrease back pain.    Patient was able to ask questions and was satisfied with answers given.    Points used: shenmen, lumbar auricular point, LI 4, BL 60, GB 40, ST 36, DU 3,4,5, BL 23 20, yintang, DU 20, LV 3, ST 44, GB 43    RTC next Monday    21 needles inserted at 1:00  21 needles removed at 1:30

## 2022-03-30 NOTE — PROGRESS NOTES
[facility] Outpatient Physical Therapy Wellness Daily Note     Treatment Date: 3/30/2022    Name: Stu Garcia  Clinic Number: [id number]  Diagnosis:   No diagnosis found.  Physician: Keeley Gonzalez FNP-C  Precautions: Standard and Fall; pt is [place] (wears hearing aids)  Visit #: 12 of 16 including eval  PTA Visit #: 0  Time In: 01:00 PM  Time Out: 02:00PM  Total Treatment Time 1:1: 55 min    Evaluation Date: 1/19/2022  Re-assessment: 02/23/2022  Re-Assessment: 03/23/2022    Visit # authorized: 20  Authorization period: through 4/30/2022  Plan of care Expiration: 10 visits/ 30 days   MD referral: PT eval and treat    Subjective     Pt reports: not sure how good of a day it is, having bad day today, having increased back pain and just not feeling great. Wonder if the weather has anything to do with it.     Response to previous treatment: denied muscle soreness or increased pain    Pain, Nature, Location: 7/10 constant low back reduced after manual techniques and exercises 5/10    Objective     Pt ambulates into the clinic with his walker today (has new wheels on front two legs, gliders on the back legs.) ambulates with wide DAVID with decreased step length. Eyes fixed on ground with very guarded gait.  Trunk flexed with height of RW approx 1-2 inches too short.   Patient assisted to scale for his weight per request of BRANDON Mina. Patient wt at 75kg.  resting HR 93 bpm and O2 sat 97%      Pt rec'd manual techniques for soft tissue mobilization of B lumbar paraspinals, L hip 15 min gentle sacral inferior glide with pt in sidelye. Tenderness noted to R paraspinals>L     Stu received therapeutic exercises to develop strength, endurance, ROM, flexibility, posture and core stabilization for 40 minutes including:  tx considerations: Could not tolerate supine position on mat even with offloading  Side lye open book trunk rotations limited tolerance  Lower Trunk rotation x 20 reps   Hook lying SKTC 3x30  "sec  PT assisted piriformis stretch 2x20  Hamstring stretch sitting with trunk lean with leg extended 30 sec hold 3 reps each leg  Seated TA activation press into small red physioball 5 sec hold x 10 reps  Sitting Marches 10x  X 5 sec hold at the top  Seated ball roll outs 3-way x10 each lg green physioball  Rows green tband one arm at a time with PT to assist , 2 x 10     Pt with reported pain decrease in back pain after manual techniques and exercises.        Deferred today:  Nu step L4  10 minutes HR 80  O2 sats 99%  UBE 2  min L1 HR 96 bpm  gastroc stretch on wedge 30" x 3  attempted posterior pelvic tilt on sidelye pt unable  Sit to stand 10 reps       deferred  Seated end of mat:  Seated scap retraction with double red 2 x 10 reps  Shoulder rolls  Scapular retractions "T" danyn x10 sec x 10 reps  LAQ 5 sec hold 10 reps B LE with cues to sit in neutral pelvis and not to compensate by leaning backward.     Sit to stand with  Walker in front from edge of mat raised 25 inches: 2x 10 reps  (light finger tip upport on walker with one hand)  deferred  Neuromusculature re-ed x 15 min: balance retraining for ankle strategies and proprioception: amb on Airex pad with use of barre one hand LFF/ RFF 5 minutes total, followed by standing on foam with hip stabilization / ankle strategy with side touch to 4 inch wooden block. Retro walking with use of barre as handle.       Deferred all supine ex:  Supine:   AP's 2x20 reps  Single knee to chest 3 reps alternating 20 sec each leg.  Pillow squeeze with pelvic floor 2 x 10 reps 2 sec hold  TA initiation with ball press in supine  with lean forward 5  Rep5 sec hold  Laying in supported long sitting position on mat with pillows  SLR 2x 5 reps each with verbal cues for slow controlled difficulty with L with increase in back pain  Supine Hook Lying clam YTB 2x10  Mat exercises:  Diaphragmatic breathing 5 min  B Hip abduction 10 reps x 1 trials in sidelye  Supine hip abduction 2x10 " reps   Supine PPT/glute set with hip add/pillow squeeze 5 sec hold x10 reps  Side Lying Clams YTB 10 reps each 3 sets  PPT with TA in sidelye  Side lye ball press in front with pillow squeeze 5 sec hold 20 reps        Deferred:    UBE 6 min alternating forward and backward - L2 deferred  Deferred Standing at barre holding onto rail, heel/toe raises x 10 ea  Deferred Sit to stand from chair with Airex pad in seat  x 12 reps (pt used his arms to assist)  Seated exercises:  Hamstring stretch sitting in chair 30 sec hold x 3 each leg  LAQ 10 reps slow with eccentric control 5 sec holds B   Seated Hip abduction with RTB around thighs, 2 x 10   Standing with lean on elbows on mat raised, glut sets 10 reps  deferred Side steps with use of barre with red tband for resistance 20 each  Written Home Exercises Provided: yes. provided written progression of hep to perform daily. Provided information on wheel attachment for walker. Available on line or through [employer]. Pt to purchase.   Exercises were reviewed and Stu was able to demonstrate them prior to the end of the session.  Stu demonstrated good  understanding of the education provided.     See EMR under Patient Instructions for exercises provided 2/11/2022.      Education provided re: energy conservation, hep for strengthening  Stu verbalized good  understanding of education provided.     Assessment   Pt with arrived with walker today with new front wheels, improved stability with gait. Patient reporting increased right sided back pain and across low back over last few days. Tenderness noted to Right paraspinals compared to left. Back pain limits patient's tolerance to upright activity. Pt tolerated exercises fair, and responded to manual techniques to reduce level of pain in low back. Decreased from 7-8/10 to a 5/10 after session.  Will continue to progress as able. Responding well with postural exercises working on trunk strength, flexibility in hamstrings, and  use of supportive device for gait.   Muscle soreness is expected over the next 24-48 hours. Pt has guarded gait pattern and may benefit from use of RW versus his small based quad cane. Pt is a high risk for falls.  Strongly recommended pt use RW. Pt with good understanding.   Stu Is progressing well towards his goals.   Pt prognosis is Good.       Pt will continue to benefit from skilled outpatient physical therapy to address the deficits listed in the problem list box on initial evaluation, provide pt/family education and to maximize pt's level of independence in the home and community environment.     Pt's spiritual, cultural and educational needs considered and pt agreeable to plan of care and goals.    Anticipated barriers to physical therapy: none      This is a 76 y.o. male referred to outpatient physical therapy and presents with a medical diagnosis of chemotherapy induced neuropathy and gait disturbance. Patient presents with bilateral leg weakness, poor posture, history of back pain with comes and goes, impaired balance with noticeable unsteadiness in gait requiring AD for safety in community due to fall risk. Patient demonstrates limitations as described in the problem list. Pt rehab potential is Fair. Pt will benefit from skilled outpatient physical therapy to address the deficits listed below in the problem list, provide pt/family education and to maximize pt's level of independence in the home and community environment. Will contniiue with skilled care towards goals in initial plan of care. Pt may not be able to meet single limb stance goals.        Medical necessity is demonstrated by the following IMPAIRMENTS/PROMBLEM LIST:  1. Fall Risk - impaired balance   2. Unable to participate in daily activities   3. Impaired functional mobility  4. Requires skilled supervision to complete and progress HEP   5. generalized weakness  6. Decreased CV endurance   7. Decreased standing tolerance   8. Decreased  community ambulation   9. Decreased safety awareness with functional mobility  10. Impaired muscle tone     Anticipated barriers to physical therapy: none     Pt's spiritual, cultural and educational needs considered and pt agreeable to plan of care and goals as stated below:      GOALS:   Short term goals: 4 weeks, pt agrees to goals set.  1. Pt and caregivers will evaluate home for safety, including checking lighting and hazards on the floor such as rugs or cords and remove them. GOAL MET   2. Pt will improve SLS on R by > or = 3 seconds in order to decrease risk for falls in the home and community environment.  GOAL MET 3/23/22  3. Pt will improve SLS on L by > or = 3 seconds in order to decrease risk for falls in the home and community environment. GOAL MET 3/23/22  4. Pt will tolerate 30 minutes of physical therapy treatment with 1 rest break to demonstrate overall improvement in CV endurance GOAL MET 22  5. Pt will decrease TUG score by > or = 2 seconds with improved safety in order to demonstrate decreased risk for falls. IN PROGRESS  6. Pt will increase MMT for B LE's to > or = 4+/5 in order to improve stability and safety with community ambulation. IN PROGRESS  7. Patient will improve Tinetti Gait/Balance assessment score to  to aid in improvement of functional mobility and decrease fall risk. IN PROGRESS      Long term goals: 8 weeks, pt agrees to goals set  2022 continue with LT. Pt will ambulate 1000 ft/communiuty distances with/without AD with no rest breaks with modified independence.  2. Pt will improve TUG score by > or = 2 seconds to decrease risk for falls in the home and community environment.  3. Increased MMT for B LE's to > or = 5/5 in order to improve stability and safety with community ambulation.  4. Pt will tolerate 50 minutes of physical therapy with < or = 1 rest breaks in order to demonstrate overall improved CV endurance.   5. Pt will be able to ambulate  community distances decreased complaints of fatigue.         Plan   Patient has 5 more visits left on plan of care.   Outpatient physical therapy 1-2 times weekly to include: patient education including safety awareness, hep, therapeutic exercises, therapeutic activities, neuromuscular re-education/ balance exercises, manual techniques and modalities prn.   Cont PT for  total of 16 visits. Patient may be seen by PTA as part of the rehabilitation team.       Therapist: Lita Shaw PT, CLT  3/30/2022    PT/PTA met face to face to discuss patient's treatment plan and progress towards established goals.  Patient will be seen by physical therapist every sixth visit and minimally once per month.

## 2022-04-06 NOTE — PROGRESS NOTES
Subjective:       Patient ID: Stu Garcia is a 76 y.o. male.    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 Jeter - neck stable; swallowing stable  Lumbar fracture - back pain only occurring at night periodically; this is stable  Diabetes with neuropathy- following diabetic diet; He is taking glimepiride 4mg BID. Metformin XR 500mg BID. BGs 100s. No hypoglycemia.   CAD - s/p 4 stents; following with Dr. Maldonado  Paroxysmal afib, HTN - taking xarelto; metoprolol 50mg BID  HLD - taking Lipitor 80mg daily  Regular nausea, cholecystectomy - gets nausea most mornings    Back pain is stable, but still is occurring incentral lower back when active  He is taking hydrocodone 10/325 BID, tizanidine QHS and 1/2 QAM.  He is doing PT for leg weakness. Dry needling has been effective.  Using walker.    Mood stable; taking remeron 15mg    DepressionPatient is not experiencing: confusion, palpitations and shortness of breath.  Patient has a history of: CAD    Diabetes  Pertinent negatives for hypoglycemia include no confusion, dizziness or headaches. Associated symptoms include fatigue. Pertinent negatives for diabetes include no chest pain, no polydipsia, no polyuria and no weakness.   Follow-up  Associated symptoms include fatigue and neck pain. Pertinent negatives include no abdominal pain, arthralgias, chest pain, chills, coughing, fever, headaches, joint swelling, nausea, rash, sore throat, vomiting or weakness.   Coronary Artery Disease  Pertinent negatives include no chest pain, chest tightness, dizziness, leg swelling,  palpitations or shortness of breath.   Atrial Fibrillation  Symptoms are negative for chest pain, dizziness, palpitations, shortness of breath and weakness. Past medical history includes atrial fibrillation and CAD.       Past Medical History:   Diagnosis Date    Anticoagulant long-term use     xarelto,asa    Arthritis     Atrial fibrillation 2013    cardioverted    Bleb, lung     Cataract     OS    Colon polyp     Coronary artery disease     Coronary artery disease involving native coronary artery of native heart with unstable angina pectoris 3/18/2018    Diabetes mellitus     Diabetes mellitus, type 2     Diverticulosis     Encounter for blood transfusion     General anesthetics causing adverse effect in therapeutic use     delayed emergence per patient's wife    GERD (gastroesophageal reflux disease)     HEARING LOSS     bilateral aids    Hemorrhoid     HLD (hyperlipidemia)     Hypertension     Iron deficiency anemia     Neuropathy of leg     Pneumonia due to other staphylococcus     Prostate cancer     prostate    Spontaneous pneumothorax     bilateral    Thyroid disease     tumors, non cancerous       Past Surgical History:   Procedure Laterality Date    CARDIOVERSION      CATARACT EXTRACTION      bilateral    CERVICAL SPINE FRACTURE SURGERY      c7 t1 ACDF     CHEST TUBE INSERTION Right     COLONOSCOPY      CORONARY STENT PLACEMENT      x 2    ENDOSCOPIC ULTRASOUND OF UPPER GASTROINTESTINAL TRACT N/A 3/13/2019    Procedure: ULTRASOUND, UPPER GI TRACT, ENDOSCOPIC;  Surgeon: Andrew Shirley MD;  Location: [address]);  Service: Endoscopy;  Laterality: N/A;    ESOPHAGOGASTRODUODENOSCOPY N/A 2/23/2019    Procedure: ESOPHAGOGASTRODUODENOSCOPY (EGD);  Surgeon: Jackeline Richards MD;  Location: [facility];  Service: Endoscopy;  Laterality: N/A;    ESOPHAGOGASTRODUODENOSCOPY N/A 2/6/2020    Procedure: EGD (ESOPHAGOGASTRODUODENOSCOPY);  Surgeon: Andrea Kerns Jr., MD;   Location: [facility] ENDO;  Service: Endoscopy;  Laterality: N/A;    HERNIA REPAIR      umbilical    INSERTION OF TUNNELED CENTRAL VENOUS CATHETER (CVC) WITH SUBCUTANEOUS PORT Right 4/4/2019    Procedure: [id number] (POWER PORT);  Surgeon: Nahum Zaidi MD;  Location: [facility] OR;  Service: General;  Laterality: Right;    KNEE SURGERY Left 2017    open thoracotomy Left 1966    With pleurectomy    Pleurectomy Left 1966    For treatment of left pneumothorax    PROSTATECTOMY  2001    open    ROBOT-ASSISTED SURGICAL REMOVAL OF ESOPHAGUS USING DA ROSALIA XI N/A 7/2/2019    Procedure: XI ROBOTIC ESOPHAGECTOMY;  Surgeon: Chad Milian MD;  Location: [address];  Service: General;  Laterality: N/A;    TUBE THORACOTOMY  1966    pneumothorax left--open       Review of patient's allergies indicates:   Allergen Reactions    Codeine Nausea And Vomiting       Social History     Socioeconomic History    Marital status:     Number of children: 2   Occupational History    Occupation:     Occupation: prior [employer]    Tobacco Use    Smoking status: Former Smoker     Packs/day: 1.00     Years: 20.00     Pack years: 20.00     Types: Cigarettes    Smokeless tobacco: Never Used    Tobacco comment: quit smoking in 1980's.   Substance and Sexual Activity    Alcohol use: Yes     Comment: 1 drink /month    Drug use: No    Sexual activity: Not Currently   Social History Narrative    From [place]     Social Determinants of Health     Financial Resource Strain: Low Risk     Difficulty of Paying Living Expenses: Not hard at all   Food Insecurity: No Food Insecurity    Worried About Running Out of Food in the Last Year: Never true    Ran Out of Food in the Last Year: Never true   Transportation Needs: No Transportation Needs    Lack of Transportation (Medical): No    Lack of Transportation (Non-Medical): No   Physical Activity: Insufficiently Active    Days of Exercise per Week: 2 days     Minutes of Exercise per Session: 60 min   Stress: No Stress Concern Present    Feeling of Stress : Not at all   Social Connections: Unknown    Frequency of Communication with Friends and Family: Twice a week    Frequency of Social Gatherings with Friends and Family: Once a week    Active Member of Clubs or Organizations: No    Attends Club or Organization Meetings: Never    Marital Status:    Housing Stability: Low Risk     Unable to Pay for Housing in the Last Year: No    Number of Places Lived in the Last Year: 1    Unstable Housing in the Last Year: No       Current Outpatient Medications on File Prior to Visit   Medication Sig Dispense Refill    aspirin (ECOTRIN) 81 MG EC tablet Take 1 tablet (81 mg total) by mouth once daily. May take over the counter 30 tablet 12    atorvastatin (LIPITOR) 80 MG tablet TAKE 1 TABLET(80 MG) BY MOUTH EVERY EVENING 90 tablet 0    b complex vitamins capsule Take 1 capsule by mouth once daily.      blood sugar diagnostic (ACCU-CHEK SHASHANK PLUS TEST STRP) Strp TEST ONCE DAILY 100 strip 3    glimepiride (AMARYL) 2 MG tablet Take 2 tablets (4 mg total) by mouth 2 (two) times daily. 360 tablet 1    lancets (ACCU-CHEK SOFTCLIX LANCETS) Misc 1 strip by Misc.(Non-Drug; Combo Route) route once daily. 100 each 3    lidocaine-prilocaine (EMLA) cream U UTD  0    metFORMIN (GLUCOPHAGE-XR) 500 MG ER 24hr tablet TAKE 1 TABLET(500 MG) BY MOUTH TWICE DAILY WITH MEALS 180 tablet 1    metoprolol tartrate (LOPRESSOR) 50 MG tablet TAKE 1 TABLET BY MOUTH TWICE DAILY 60 tablet 11    mirtazapine (REMERON) 15 MG tablet Take 1 tablet (15 mg total) by mouth every evening. 30 tablet 11    multivitamin capsule Take 1 capsule by mouth once daily.      omeprazole (PRILOSEC) 40 MG capsule TAKE 1 CAPSULE(40 MG) BY MOUTH EVERY DAY 90 capsule 3    prochlorperazine (COMPAZINE) 10 MG tablet Take 1 tablet (10 mg total) by mouth every 6 (six) hours as needed. 30 tablet 1    rivaroxaban  (XARELTO) 20 mg Tab Take 1 tablet (20 mg total) by mouth daily with dinner or evening meal. 30 tablet 11    tiZANidine (ZANAFLEX) 4 MG tablet Take 1 tablet (4 mg total) by mouth every 8 (eight) hours. 90 tablet 3    vit C,P-As-wltok-lutein-zeaxan (PRESERVISION AREDS 2) 250-90-40-1 mg Cap Take 1 tablet by mouth once daily.      vitamin A 19591 UNIT capsule Take 10,000 Units by mouth once daily.      [DISCONTINUED] HYDROcodone-acetaminophen (NORCO)  mg per tablet Take 1 tablet by mouth every 12 (twelve) hours as needed for Pain. 60 tablet 0    glucagon, human recombinant, (GLUCAGON EMERGENCY KIT, HUMAN,) 1 mg SolR Inject 1 mg into the muscle as needed. 1 each 2    iron polysaccharides (NIFEREX) 150 mg iron Cap Take 1 capsule (150 mg total) by mouth once daily. 90 capsule 1    nitroGLYCERIN (NITROSTAT) 0.4 MG SL tablet DISSOLVE 1 TABLET UNDER THE TONGUE EVERY 5 MINUTES AS NEEDED FOR CHEST PAIN (Patient not taking: No sig reported) 100 tablet prn     No current facility-administered medications on file prior to visit.       Family History   Problem Relation Age of Onset    Mental illness Mother         dementia    Coronary artery disease Father     Cancer Father         stomach with mets    Diabetes Brother        Review of Systems   Constitutional: Positive for fatigue. Negative for activity change, appetite change, chills, fever and unexpected weight change.   HENT: Positive for hearing loss, rhinorrhea and trouble swallowing. Negative for sore throat.    Eyes: Negative for pain, discharge and visual disturbance.   Respiratory: Negative for cough, chest tightness, shortness of breath and wheezing.    Cardiovascular: Negative for chest pain, palpitations and leg swelling.   Gastrointestinal: Negative for abdominal pain, blood in stool, constipation, diarrhea, nausea and vomiting.   Endocrine: Negative for polydipsia and polyuria.   Genitourinary: Negative for difficulty urinating, dysuria, hematuria  "and urgency.   Musculoskeletal: Positive for back pain and neck pain. Negative for arthralgias, gait problem and joint swelling.   Skin: Negative for rash and wound.   Neurological: Negative for dizziness, weakness, light-headedness and headaches.   Hematological: Negative for adenopathy.   Psychiatric/Behavioral: Positive for depression. Negative for confusion and dysphoric mood.       Objective:      BP (!) 110/50   Pulse 71   Temp 98 °F (36.7 °C) (Oral)   Resp 18   Ht 6' 4" (1.93 m)   Wt 75.3 kg (166 lb 0.1 oz)   SpO2 98%   BMI 20.21 kg/m²   Physical Exam  Constitutional:       General: He is not in acute distress.     Appearance: Normal appearance. He is well-developed.   HENT:      Head: Normocephalic and atraumatic.      Right Ear: External ear normal.      Left Ear: External ear normal.      Mouth/Throat:      Pharynx: Uvula midline. No oropharyngeal exudate.   Eyes:      General: Lids are normal.      Conjunctiva/sclera: Conjunctivae normal.      Pupils: Pupils are equal, round, and reactive to light.   Neck:      Thyroid: No thyroid mass or thyromegaly.      Trachea: Phonation normal.   Cardiovascular:      Rate and Rhythm: Normal rate and regular rhythm.      Heart sounds: Normal heart sounds. No murmur heard.    No friction rub. No gallop.   Pulmonary:      Effort: Pulmonary effort is normal. No respiratory distress.      Breath sounds: Normal breath sounds. No wheezing or rales.   Abdominal:      General: Bowel sounds are normal.      Palpations: Abdomen is soft. There is no hepatomegaly or splenomegaly.      Tenderness: There is no abdominal tenderness.   Musculoskeletal:      Cervical back: Full passive range of motion without pain, normal range of motion and neck supple.      Lumbar back: Tenderness present. No swelling, deformity or spasms. Decreased range of motion.        Back:    Lymphadenopathy:      Cervical: No cervical adenopathy.   Skin:     General: Skin is warm and dry. "   Neurological:      Mental Status: He is alert and oriented to person, place, and time.      Cranial Nerves: No cranial nerve deficit.      Coordination: Coordination normal.   Psychiatric:         Speech: Speech normal.         Behavior: Behavior normal.         Thought Content: Thought content normal.         Judgment: Judgment normal.       bilateral lower lid blepharitis    Results for orders placed or performed in visit on 02/28/22   Occult blood x 1, stool    Specimen: Stool   Result Value Ref Range    Occult Blood Negative Negative   Occult blood x 1, stool    Specimen: Stool   Result Value Ref Range    Occult Blood Negative Negative   Occult blood x 1, stool    Specimen: Stool   Result Value Ref Range    Occult Blood Negative Negative     *Note: Due to a large number of results and/or encounters for the requested time period, some results have not been displayed. A complete set of results can be found in Results Review.         Assessment:       1. Type 2 diabetes mellitus treated without insulin    2. Lumbar spondylosis    3. Lumbar sprain, subsequent encounter    4. Blepharitis of lower eyelids of both eyes, unspecified type    5. Coronary artery disease involving native coronary artery of native heart with unstable angina pectoris        Plan:       Type 2 diabetes mellitus treated without insulin    Lumbar spondylosis    Lumbar sprain, subsequent encounter  -     HYDROcodone-acetaminophen (NORCO)  mg per tablet; Take 1 tablet by mouth every 12 (twelve) hours as needed for Pain.  Dispense: 60 tablet; Refill: 0    Blepharitis of lower eyelids of both eyes, unspecified type  -     erythromycin (ROMYCIN) ophthalmic ointment; Place into both eyes every 8 (eight) hours.  Dispense: 3.5 g; Refill: 0    Coronary artery disease involving native coronary artery of native heart with unstable angina pectoris          Overall stable  Continue PT  continue oncology follow-up as planned  Continue protonix  BID  F/u with cardiology as planned   Continue Glimepiride 4mg BID, metformin XR 500mg BID   Continue other present meds  Counseled on regular exercise, maintenance of a healthy weight, balanced diet rich in fruits/vegetables and lean protein, and avoidance of unhealthy habits like smoking and excessive alcohol intake.  Integrative medicine follow-up regarding chemo-induced neuropathy

## 2022-04-06 NOTE — TELEPHONE ENCOUNTER
Spoke with pt, instructed him Dr MOREL is calling in med to pharmacy and to check with pharmacy in a few hours, pt verbalized  understanding

## 2022-04-08 PROBLEM — R53.83 FATIGUE: Status: ACTIVE | Noted: 2022-01-01

## 2022-04-08 NOTE — PROGRESS NOTES
Stu WARD Garcia  76 y.o. 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    HPI  Patient reports he celebrated his 53rd wedding anniversary yesterday. He reports he is sleeping much better since stopping the benadryl and starting the melatonin.  He reports he is no longer seeing people in his room who were dressing in masksHe reports he no longer has these strange occurences when he knows he is awake but is seeing people in his room. He reports he thinks the acupuncture is working to help relieve some of his pain in his lower back. He continues to have neuropathy in hands and feet. He reports the numbness to his fingers affects him more than his feet. He reports eating enough is difficult and he no longer enjoys eating but he does try to eat 3 meals a day.     Assessment    Sleep  How many hours of sleep per night? 8 hours  Do you have trouble falling asleep, staying asleep or waking up earlier than you need to? yes  Do you have daytime fatigue? Yes, better since he is sleeping better but still fatigued.   Do you need medication for sleep? no  Do you use any supplements or other interventions for sleep? yes Melatonin     Resilience  Rate your current level of stress- low to medium depending on the situation    Nutrition   Food allergies or sensitivities: yes, due to surgery. He states it depends on how easy or hard something is to swallow.   Do you adhere to a particular type of diet? no  Do you have any concerns with your eating habits?  He reports he does not like to eat. He reports he is not hungry. He reports he makes himself eat. He tries to eat 3 meals a day.     Exercise  How would you describe your physical activity level? Low to moderate      Past Medical History  Past Medical History:   Diagnosis Date    Anticoagulant long-term use     xarelto,asa    Arthritis     Atrial fibrillation 2013    cardioverted    Bleb, lung      Cataract     OS    Colon polyp     Coronary artery disease     Coronary artery disease involving native coronary artery of native heart with unstable angina pectoris 3/18/2018    Diabetes mellitus     Diabetes mellitus, type 2     Diverticulosis     Encounter for blood transfusion     General anesthetics causing adverse effect in therapeutic use     delayed emergence per patient's wife    GERD (gastroesophageal reflux disease)     HEARING LOSS     bilateral aids    Hemorrhoid     HLD (hyperlipidemia)     Hypertension     Iron deficiency anemia     Neuropathy of leg     Pneumonia due to other staphylococcus     Prostate cancer     prostate    Spontaneous pneumothorax     bilateral    Thyroid disease     tumors, non cancerous        Past Surgical History   Past Surgical History:   Procedure Laterality Date    CARDIOVERSION      CATARACT EXTRACTION      bilateral    CERVICAL SPINE FRACTURE SURGERY      c7 t1 ACDF     CHEST TUBE INSERTION Right     COLONOSCOPY      CORONARY STENT PLACEMENT      x 2    ENDOSCOPIC ULTRASOUND OF UPPER GASTROINTESTINAL TRACT N/A 3/13/2019    Procedure: ULTRASOUND, UPPER GI TRACT, ENDOSCOPIC;  Surgeon: Andrew Shirley MD;  Location: [address]);  Service: Endoscopy;  Laterality: N/A;    ESOPHAGOGASTRODUODENOSCOPY N/A 2/23/2019    Procedure: ESOPHAGOGASTRODUODENOSCOPY (EGD);  Surgeon: Jackeline Richards MD;  Location[facility];  Service: Endoscopy;  Laterality: N/A;    ESOPHAGOGASTRODUODENOSCOPY N/A 2/6/2020    Procedure: EGD (ESOPHAGOGASTRODUODENOSCOPY);  Surgeon: Andrea Kerns Jr., MD;  Location: [facility];  Service: Endoscopy;  Laterality: N/A;    HERNIA REPAIR      umbilical    INSERTION OF TUNNELED CENTRAL VENOUS CATHETER (CVC) WITH SUBCUTANEOUS PORT Right 4/4/2019    Procedure: [id number] (POWER PORT);  Surgeon: Nahum Zaidi MD;  Location: [facility];  Service: General;  Laterality: Right;    KNEE SURGERY Left 2017    open  thoracotomy Left 1966    With pleurectomy    Pleurectomy Left 1966    For treatment of left pneumothorax    PROSTATECTOMY  2001    open    ROBOT-ASSISTED SURGICAL REMOVAL OF ESOPHAGUS USING DA ROSALIA XI N/A 7/2/2019    Procedure: XI ROBOTIC ESOPHAGECTOMY;  Surgeon: Chad Milian MD;  Location: [facility] OR [address] [place] [address];  Service: General;  Laterality: N/A;    TUBE THORACOTOMY  1966    pneumothorax left--open        Family History   Family History   Problem Relation Age of Onset    Mental illness Mother         dementia    Coronary artery disease Father     Cancer Father         stomach with mets    Diabetes Brother         Social History  Social History     Socioeconomic History    Marital status:     Number of children: 2   Occupational History    Occupation:     Occupation: prior [employer]    Tobacco Use    Smoking status: Former Smoker     Packs/day: 1.00     Years: 20.00     Pack years: 20.00     Types: Cigarettes    Smokeless tobacco: Never Used    Tobacco comment: quit smoking in 1980's.   Substance and Sexual Activity    Alcohol use: Yes     Comment: 1 drink /month    Drug use: No    Sexual activity: Not Currently   Social History Narrative    From [place]     Social Determinants of Health     Financial Resource Strain: Low Risk     Difficulty of Paying Living Expenses: Not hard at all   Food Insecurity: No Food Insecurity    Worried About Running Out of Food in the Last Year: Never true    Ran Out of Food in the Last Year: Never true   Transportation Needs: No Transportation Needs    Lack of Transportation (Medical): No    Lack of Transportation (Non-Medical): No   Physical Activity: Insufficiently Active    Days of Exercise per Week: 2 days    Minutes of Exercise per Session: 60 min   Stress: No Stress Concern Present    Feeling of Stress : Not at all   Social Connections: Unknown    Frequency of Communication with Friends and Family: Twice a week    Frequency of  Social Gatherings with Friends and Family: Once a week    Active Member of Clubs or Organizations: No    Attends Club or Organization Meetings: Never    Marital Status:    Housing Stability: Low Risk     Unable to Pay for Housing in the Last Year: No    Number of Places Lived in the Last Year: 1    Unstable Housing in the Last Year: No        Allergies  Review of patient's allergies indicates:   Allergen Reactions    Codeine Nausea And Vomiting        Current Medications:    Current Outpatient Medications:     aspirin (ECOTRIN) 81 MG EC tablet, Take 1 tablet (81 mg total) by mouth once daily. May take over the counter, Disp: 30 tablet, Rfl: 12    atorvastatin (LIPITOR) 80 MG tablet, TAKE 1 TABLET(80 MG) BY MOUTH EVERY EVENING, Disp: 90 tablet, Rfl: 0    b complex vitamins capsule, Take 1 capsule by mouth once daily., Disp: , Rfl:     blood sugar diagnostic (ACCU-CHEK SHASHANK PLUS TEST STRP) Strp, TEST ONCE DAILY, Disp: 100 strip, Rfl: 3    erythromycin (ROMYCIN) ophthalmic ointment, Place into both eyes every 8 (eight) hours., Disp: 3.5 g, Rfl: 0    glimepiride (AMARYL) 2 MG tablet, Take 2 tablets (4 mg total) by mouth 2 (two) times daily., Disp: 360 tablet, Rfl: 1    glucagon, human recombinant, (GLUCAGON EMERGENCY KIT, HUMAN,) 1 mg SolR, Inject 1 mg into the muscle as needed., Disp: 1 each, Rfl: 2    HYDROcodone-acetaminophen (NORCO)  mg per tablet, Take 1 tablet by mouth every 12 (twelve) hours as needed for Pain., Disp: 60 tablet, Rfl: 0    iron polysaccharides (NIFEREX) 150 mg iron Cap, Take 1 capsule (150 mg total) by mouth once daily., Disp: 90 capsule, Rfl: 1    lancets (ACCU-CHEK SOFTCLIX LANCETS) Misc, 1 strip by Misc.(Non-Drug; Combo Route) route once daily., Disp: 100 each, Rfl: 3    lidocaine-prilocaine (EMLA) cream, U UTD, Disp: , Rfl: 0    metFORMIN (GLUCOPHAGE-XR) 500 MG ER 24hr tablet, TAKE 1 TABLET(500 MG) BY MOUTH TWICE DAILY WITH MEALS, Disp: 180 tablet, Rfl: 1     metoprolol tartrate (LOPRESSOR) 50 MG tablet, TAKE 1 TABLET BY MOUTH TWICE DAILY, Disp: 60 tablet, Rfl: 11    mirtazapine (REMERON) 15 MG tablet, Take 1 tablet (15 mg total) by mouth every evening., Disp: 30 tablet, Rfl: 11    multivitamin capsule, Take 1 capsule by mouth once daily., Disp: , Rfl:     nitroGLYCERIN (NITROSTAT) 0.4 MG SL tablet, DISSOLVE 1 TABLET UNDER THE TONGUE EVERY 5 MINUTES AS NEEDED FOR CHEST PAIN (Patient not taking: No sig reported), Disp: 100 tablet, Rfl: prn    omeprazole (PRILOSEC) 40 MG capsule, TAKE 1 CAPSULE(40 MG) BY MOUTH EVERY DAY, Disp: 90 capsule, Rfl: 3    prochlorperazine (COMPAZINE) 10 MG tablet, Take 1 tablet (10 mg total) by mouth every 6 (six) hours as needed., Disp: 30 tablet, Rfl: 1    rivaroxaban (XARELTO) 20 mg Tab, Take 1 tablet (20 mg total) by mouth daily with dinner or evening meal., Disp: 30 tablet, Rfl: 11    tiZANidine (ZANAFLEX) 4 MG tablet, Take 1 tablet (4 mg total) by mouth every 8 (eight) hours., Disp: 90 tablet, Rfl: 3    vit C,G-Tc-cdhhl-lutein-zeaxan (PRESERVISION AREDS 2) 250-90-40-1 mg Cap, Take 1 tablet by mouth once daily., Disp: , Rfl:     vitamin A 90655 UNIT capsule, Take 10,000 Units by mouth once daily., Disp: , Rfl:      Review of Systems  Review of Systems   Constitutional: Positive for malaise/fatigue.   HENT: Negative.    Eyes: Negative.    Respiratory: Negative.    Cardiovascular: Negative.    Gastrointestinal: Negative.    Genitourinary: Negative.    Musculoskeletal: Positive for back pain.   Skin: Negative.    Neurological: Positive for tingling.        Numbness to fingers and feet   Endo/Heme/Allergies: Negative.    Psychiatric/Behavioral: Negative.         Physical Exam      Vitals:    04/08/22 1513   BP: 133/69   Pulse: 70   Resp: 18   Temp: 97.3 °F (36.3 °C)     Body mass index is 20.21 kg/m².      Physical Exam  Vitals reviewed.   Constitutional:       Appearance: Normal appearance.   Neurological:      Mental Status: He is  alert.   Psychiatric:         Mood and Affect: Mood normal.         Behavior: Behavior normal.          ASSESSMENT :  1. Polyneuropathy associated with underlying disease    2. Chronic low back pain, unspecified back pain laterality, unspecified whether sciatica present    3. Fatigue, unspecified type           PLAN:  Reviewed all information discussed at today's visit and all questions were answered.    Continue with Acupuncture I discussed Acupuncture with Stu Garcia .    Continue with PT   Continue with Oncology Psychology      Follow up with Integrative Services in 6 months      I spent a total of 41 minutes on the day of the visit.This includes face to face time and non-face to face time preparing to see the patient (eg, review of tests), obtaining and/or reviewing separately obtained history, documenting clinical information in the electronic or other health record, independently interpreting results and communicating results to the patient/family/caregiver, or care coordinator.

## 2022-04-11 NOTE — PROGRESS NOTES
"  [facility] Outpatient Physical Therapy Wellness Daily Note     Treatment Date: 4/08/2022    Name: Stu Garcia  Clinic Number: [id number]  Diagnosis:   Encounter Diagnoses   Name Primary?    Gait disturbance Yes    Weakness generalized     GE junction carcinoma     Debility      Physician: Keeley Gonzalez FNP-C  Precautions: Standard and Fall; pt is [place] (wears hearing aids)  Visit #: 11 of 16 including eval  PTA Visit #: 1  Time In: 4:00 PM  Time Out: 5:00 PM  Total Treatment Time 1:1: 60 min    Evaluation Date: 1/19/2022  Re-assessment: 02/23/2022  Re-Assessment: 03/23/2022    Visit # authorized: 20  Authorization period: through 4/30/2022  Plan of care Expiration: 10 visits/ 30 days   MD referral: PT eval and treat    Subjective     Pt reports: Had to cancel last appointment due to his back pain. "I can take the pain meds every 6 hrs; I usually don't, but today I did." Pt states that he isn't doing very well today.      Response to previous treatment: denied muscle soreness or increased pain    Pain, Nature, Location: 8/10 constant low back reduced after manual techniques and exercises 5/10    Objective     Pt ambulates into the clinic with his walker today (has new wheels on front two legs, gliders on the back legs.) ambulates with wide DAVID with decreased step length. Eyes fixed on ground with very guarded gait.  Trunk flexed with height of RW approx 1-2 inches too short.       Pt rec'd manual techniques for soft tissue mobilization of B lumbar paraspinals, L hip 15 min gentle sacral inferior glide with pt in sidelye. Tenderness noted to R paraspinals>L     Stu received therapeutic exercises to develop strength, endurance, ROM, flexibility, posture and core stabilization for 40 minutes including:    Nu step L4  10 minutes HR 80  O2 sats 99%  gastroc stretch on wedge 30" x 3  UBE 2  min L1 HR 96 bpm  Seated ball roll outs 3-way x10 each [facility]  Side lye open book trunk " "rotations limited tolerance  Lower Trunk rotation x 20 reps   Hook lying SKTC 3x30 sec  PT assisted piriformis stretch 2x20  Hamstring stretch sitting with trunk lean with leg extended 30 sec hold 3 reps each leg  Seated TA activation press into small red physioball 5 sec hold x 10 reps    Pt with reported pain decrease in back pain after manual techniques and exercises.        Deferred today:  Sitting Marches 10x  X 5 sec hold at the top  Rows green tband one arm at a time with PT to assist , 2 x 10   attempted posterior pelvic tilt on sidelye pt unable  Sit to stand 10 reps  Seated end of mat:  Seated scap retraction with double red 2 x 10 reps  Shoulder rolls  Scapular retractions "T" danny x10 sec x 10 reps  LAQ 5 sec hold 10 reps B LE with cues to sit in neutral pelvis and not to compensate by leaning backward.     Sit to stand with  Walker in front from edge of mat raised 25 inches: 2x 10 reps  (light finger tip upport on walker with one hand)  deferred  Neuromusculature re-ed x 15 min: balance retraining for ankle strategies and proprioception: amb on Airex pad with use of barre one hand LFF/ RFF 5 minutes total, followed by standing on foam with hip stabilization / ankle strategy with side touch to 4 inch wooden block. Retro walking with use of barre as handle.       Deferred all supine ex:  Supine:   AP's 2x20 reps  Single knee to chest 3 reps alternating 20 sec each leg.  Pillow squeeze with pelvic floor 2 x 10 reps 2 sec hold  TA initiation with ball press in supine  with lean forward 5  Rep5 sec hold  Laying in supported long sitting position on mat with pillows  SLR 2x 5 reps each with verbal cues for slow controlled difficulty with L with increase in back pain  Supine Hook Lying clam YTB 2x10  Mat exercises:  Diaphragmatic breathing 5 min  B Hip abduction 10 reps x 1 trials in sidelye  Supine hip abduction 2x10 reps   Supine PPT/glute set with hip add/pillow squeeze 5 sec hold x10 reps  Side Lying Clams " YTB 10 reps each 3 sets  PPT with TA in sidelye  Side lye ball press in front with pillow squeeze 5 sec hold 20 reps        Deferred:    UBE 6 min alternating forward and backward - L2 deferred  Deferred Standing at barre holding onto rail, heel/toe raises x 10 ea  Deferred Sit to stand from chair with Airex pad in seat  x 12 reps (pt used his arms to assist)  Seated exercises:  Hamstring stretch sitting in chair 30 sec hold x 3 each leg  LAQ 10 reps slow with eccentric control 5 sec holds B   Seated Hip abduction with RTB around thighs, 2 x 10   Standing with lean on elbows on mat raised, glut sets 10 reps  deferred Side steps with use of barre with red tband for resistance 20 each  Written Home Exercises Provided: yes. provided written progression of hep to perform daily. Provided information on wheel attachment for walker. Available on line or through [employer]. Pt to purchase.   Exercises were reviewed and Stu was able to demonstrate them prior to the end of the session.  Stu demonstrated good  understanding of the education provided.     See EMR under Patient Instructions for exercises provided 2/11/2022.      Education provided re: energy conservation, hep for strengthening  Stu verbalized good  understanding of education provided.     Assessment   Pt with more pain this session, but subjective report of decreased pain post treatment. Patient reporting increased right sided back pain and across low back over last few days. Tenderness noted to Right paraspinals compared to left. Back pain limits patient's tolerance to upright activity. Pt tolerated exercises fair, and responded to manual techniques to reduce level of pain in low back. Decreased from 8/10 to a 5/10 after session.  Will continue to progress as able. Responding well with postural exercises working on trunk strength, flexibility in hamstrings, and use of supportive device for gait.   Muscle soreness is expected over the next 24-48 hours. Pt  has guarded gait pattern and may benefit from use of RW versus his small based quad cane. Pt is a high risk for falls.  Strongly recommended pt use RW. Pt with good understanding.   Stu Is progressing well towards his goals.   Pt prognosis is Good.       Pt will continue to benefit from skilled outpatient physical therapy to address the deficits listed in the problem list box on initial evaluation, provide pt/family education and to maximize pt's level of independence in the home and community environment.     Pt's spiritual, cultural and educational needs considered and pt agreeable to plan of care and goals.    Anticipated barriers to physical therapy: none      This is a 76 y.o. male referred to outpatient physical therapy and presents with a medical diagnosis of chemotherapy induced neuropathy and gait disturbance. Patient presents with bilateral leg weakness, poor posture, history of back pain with comes and goes, impaired balance with noticeable unsteadiness in gait requiring AD for safety in community due to fall risk. Patient demonstrates limitations as described in the problem list. Pt rehab potential is Fair. Pt will benefit from skilled outpatient physical therapy to address the deficits listed below in the problem list, provide pt/family education and to maximize pt's level of independence in the home and community environment. Will contniiue with skilled care towards goals in initial plan of care. Pt may not be able to meet single limb stance goals.        Medical necessity is demonstrated by the following IMPAIRMENTS/PROMBLEM LIST:  1. Fall Risk - impaired balance   2. Unable to participate in daily activities   3. Impaired functional mobility  4. Requires skilled supervision to complete and progress HEP   5. generalized weakness  6. Decreased CV endurance   7. Decreased standing tolerance   8. Decreased community ambulation   9. Decreased safety awareness with functional mobility  10. Impaired  muscle tone     Anticipated barriers to physical therapy: none     Pt's spiritual, cultural and educational needs considered and pt agreeable to plan of care and goals as stated below:      GOALS:   Short term goals: 4 weeks, pt agrees to goals set.  1. Pt and caregivers will evaluate home for safety, including checking lighting and hazards on the floor such as rugs or cords and remove them. GOAL MET   2. Pt will improve SLS on R by > or = 3 seconds in order to decrease risk for falls in the home and community environment.  GOAL MET 3/23/22  3. Pt will improve SLS on L by > or = 3 seconds in order to decrease risk for falls in the home and community environment. GOAL MET 3/23/22  4. Pt will tolerate 30 minutes of physical therapy treatment with 1 rest break to demonstrate overall improvement in CV endurance GOAL MET 22  5. Pt will decrease TUG score by > or = 2 seconds with improved safety in order to demonstrate decreased risk for falls. IN PROGRESS  6. Pt will increase MMT for B LE's to > or = 4+/5 in order to improve stability and safety with community ambulation. IN PROGRESS  7. Patient will improve Tinetti Gait/Balance assessment score to  to aid in improvement of functional mobility and decrease fall risk. IN PROGRESS      Long term goals: 8 weeks, pt agrees to goals set  2022 continue with LT. Pt will ambulate 1000 ft/communiuty distances with/without AD with no rest breaks with modified independence.  2. Pt will improve TUG score by > or = 2 seconds to decrease risk for falls in the home and community environment.  3. Increased MMT for B LE's to > or = 5/5 in order to improve stability and safety with community ambulation.  4. Pt will tolerate 50 minutes of physical therapy with < or = 1 rest breaks in order to demonstrate overall improved CV endurance.   5. Pt will be able to ambulate community distances decreased complaints of fatigue.         Plan   Patient has 5 more visits left  on plan of care.   Outpatient physical therapy 1-2 times weekly to include: patient education including safety awareness, hep, therapeutic exercises, therapeutic activities, neuromuscular re-education/ balance exercises, manual techniques and modalities prn.   Cont PT for  total of 16 visits. Patient may be seen by PTA as part of the rehabilitation team.       Therapist: Lois Joe PTA, CLT  4/08/2022    PT/PTA met face to face to discuss patient's treatment plan and progress towards established goals.  Patient will be seen by physical therapist every sixth visit and minimally once per month.

## 2022-04-12 NOTE — PROGRESS NOTES
PSYCHO-ONCOLOGY NOTE/ Individual Psychotherapy     Date: 4/13/2022   Site:  [facility]      Therapeutic Intervention: Met with patient.  Outpatient - Behavior modifying psychotherapy 60 min - CPT code 94537, Outpatient - Supportive psychotherapy 60 min - CPT Code 46298 and Outpatient - Interactive psychotherapy 60 min - CPT code 92960      Patient was last seen by me on 3/16/2022    Problem list  Patient Active Problem List   Diagnosis    Chronic anticoagulation    PAF (paroxysmal atrial fibrillation)    Iron deficiency anemia    HLD (hyperlipidemia)    Primary osteoarthritis of left knee    History of prostate cancer    Essential hypertension    Type II diabetes mellitus with peripheral circulatory disorder    Thrombocytopenia    Dyslipidemia    History of heart artery stent    Varicose veins of both legs with edema    Coronary artery disease involving native coronary artery of native heart with unstable angina pectoris    Symptomatic anemia    GE junction carcinoma    Anemia associated with chemotherapy    Chemotherapy-induced thrombocytopenia    Leukopenia due to antineoplastic chemotherapy    Chest pain    Unexplained weight loss    Polyneuropathy associated with underlying disease    Gait disturbance    Pulmonary nodule    Type II diabetes mellitus with neurological manifestations    Chronic back pain    Weight decreasing    History of fall    Insomnia    Fatigue       Chief complaint/reason for encounter: depression and lower back pain   Met with patient to evaluate psychosocial adaptation to treatment course of GE junction carcinoma.    Current Medications  Current Outpatient Medications   Medication    aspirin (ECOTRIN) 81 MG EC tablet    atorvastatin (LIPITOR) 80 MG tablet    b complex vitamins capsule    blood sugar diagnostic (ACCU-CHEK SHASHANK PLUS TEST STRP) Strp    erythromycin (ROMYCIN) ophthalmic ointment    glimepiride (AMARYL) 2 MG tablet    glucagon, human  recombinant, (GLUCAGON EMERGENCY KIT, HUMAN,) 1 mg SolR    HYDROcodone-acetaminophen (NORCO)  mg per tablet    iron polysaccharides (NIFEREX) 150 mg iron Cap    lancets (ACCU-CHEK SOFTCLIX LANCETS) Misc    lidocaine-prilocaine (EMLA) cream    metFORMIN (GLUCOPHAGE-XR) 500 MG ER 24hr tablet    metoprolol tartrate (LOPRESSOR) 50 MG tablet    mirtazapine (REMERON) 15 MG tablet    multivitamin capsule    nitroGLYCERIN (NITROSTAT) 0.4 MG SL tablet    omeprazole (PRILOSEC) 40 MG capsule    prochlorperazine (COMPAZINE) 10 MG tablet    rivaroxaban (XARELTO) 20 mg Tab    tiZANidine (ZANAFLEX) 4 MG tablet    vit C,X-Dj-rlauv-lutein-zeaxan (PRESERVISION AREDS 2) 250-90-40-1 mg Cap    vitamin A 42042 UNIT capsule     No current facility-administered medications for this visit.       Objective:  Stu Garcia arrived promptly for the session.  Mr. Garcia was ambulatory at the time of session with the use of a walker. The patient was fully cooperative throughout the session.  Appearance: age appropriate, appropriately  dressed, well groomed  Behavior/Cooperation: friendly and cooperative  Speech: normal in rate, volume, and tone and appropriate quality, quantity and organization of sentences  Mood: steady  Affect: mood congruent and appropriate  Thought Process: goal-directed, logical  Thought Content: normal,  No delusions or paranoia; did not appear to be responding to internal stimuli during the session  Orientation: grossly intact  Memory: grossly intact  Attention Span/Concentration: Attends to session without distraction; no difficulty reported  Fund of Knowledge: average  Estimate of Intelligence: average from verbal skills and history  Cognition: grossly intact  Insight: patient has awareness of illness; good insight into own behavior and behavior of others  Judgment: the patient's behavior is adequate to circumstances    Interval history and content of current session: Patient noted that he has  "been feeling "rough" recently.  Discussed current adaptation to disease and treatment status. Reports to be coping with some difficulty at this time. Patient shared that he recently had a flair up with his back pain. He noted that today the pain was a 4/10 (10 being the worst). He stated the highest it has gotten in the last two weeks was a 9/10. Patient shared that acupuncture has been helpful in reducing his pain. Patient also reported fluctuating mood, poor appetite, and fatigue. He reported that in the last two weeks, he has had 0 days of diminished mood that lasted at least half of the day. He also noted that his sleep is improving and denied any stress at this time. Patient appears to be in mild to moderate distress at this time due to difficulties with pain. Discussed with patient his communication with his wife and patient reported that their communication appears to be improving. Examined proactive behaviors that may be implemented to minimize or ameliorate psychosocial stressors. Patient noted that he tends to play solitaire on his phone regularly and enjoys it. Practiced visual imagery exercise with patient to focus on relaxation. Patient engaged effectively in the exercise. He noted after the exercise that he felt "calm" and that his physical pain in his lower back had reduced from a 4/10 to a 3/10. Patient was recommended to practice this exercise regularly. Patient noted that he tends to have more pain in the morning and acknowledged he could practice it both in the morning and in the evening.Patient was also recommended to continue pleasant event scheduling as is physically appropriate for him as patient noted he does not leave the house much besides going to doctors appointments.     Risk parameters:   Patient reports no suicidal ideation  Patient reports no homicidal ideation  Patient reports no self-injurious behavior  Patient reports no violent behavior   Safety needs:  None at this " "time      Verbal deficits: None     Patient's response to intervention:The patient's response to intervention is accepting.     Progress toward goals and other mental status changes:  The patient's progress toward goals is fair .      Progress to date:Progress as Expected      Goals from last visit: Attempted, partially met        Patient Strengths: verbal, intelligent, good insight, commitment to wellness, and "hopeful"    Treatment Plan:individual psychotherapy  · Target symptoms: depression, pain  · Why chosen therapy is appropriate versus another modality: relevant to diagnosis, patient responds to this modality  · Outcome monitoring methods: self-report  · Therapeutic intervention type: behavior modifying psychotherapy, supportive psychotherapy, interactive psychotherapy  · Prognosis: Fair      Behavioral goals:    Therapy: Pleasant Event Scheduling as is physically appropriate   Pain Management: Practice relaxation exercise to decrease pain    Return to clinic: 3 weeks     Length of Service (minutes direct face-to-face contact): 53 minutes    Diagnosis:     ICD-10-CM ICD-9-CM   1. Adjustment disorder with depressed mood  F43.21 309.0           Nahum Persaud Psy.D.  [place] License #[id number]             [place]ekah Garrett License #[id number]  [place] License #[id number]  "

## 2022-04-20 PROBLEM — R53.1 WEAKNESS GENERALIZED: Status: ACTIVE | Noted: 2022-01-01

## 2022-04-20 NOTE — PROGRESS NOTES
[facility] Outpatient Physical Therapy Re-assessment Wellness Daily Note     Treatment Date: 4/20/2022    Name: Stu Garcia  Clinic Number: [id number]  Diagnosis:   Encounter Diagnoses   Name Primary?    Weakness generalized Yes    Gait disturbance      Physician: Keeley Gonzalez FNP-C  Precautions: Standard and Fall; pt is [place] (wears hearing aids)  Visit #: 12 of 16 including eval  PTA Visit #: 0  Time In: 1:00 PM  Time Out: 2:00 PM  Total Treatment Time 1:1: 60 min    Evaluation Date: 1/19/2022  Re-assessment: 02/23/2022  Re-Assessment: 03/23/2022    Visit # authorized: 20  Authorization period: through 4/30/2022  Plan of care Expiration: 10 visits/ 30 days   MD referral: PT eval and treat    Subjective     Pt reports: Pt arrives to therapy with increased low back pain. Pt reports he got a prescription for medical mariajuana and has been taking it for the past month which has helped decrease his low back pain. Reports a fall last week hitting the R hip on cement, but he did not go to ER or see MD about this. PT encouraged pt to get himself checked on and pt deferred secondary he can place weight through leg.    Response to previous treatment: denied muscle soreness or increased pain    Pain, Nature, Location: 8/10 constant low back reduced after manual techniques and exercises     Objective     Pt ambulates into the clinic with his walker today. Ambulates with wide DAVID and decreased step length. Eyes fixed on ground with very guarded gait. Trunk flexed with height of RW approx 1-2 inches too short.     Stu received manual techniques for 30 minutes including:  Soft tissue mobilization of B lumbar paraspinals   R hip gentle sacral inferior glide with pt in sidelye.   STM to R piriformis and to R hamstring and R IT band     Stu received therapeutic exercises to develop strength, endurance, ROM, flexibility, posture and core stabilization for 25 minutes including:  Nu step L3 11 minutes HR  "83  O2 sats 99%  Seated ball roll outs 3-way x10 each lg green physioball  Gastroc stretch on wedge 30" x 3  Hamstring stretch sitting with trunk lean with leg extended 30 sec hold 2 reps each leg  LAQ with red theraband 2 x 10 bilat    Pt with reported pain decrease in back pain after manual techniques and exercises.      Deferred today:  UBE 2  min L1 HR 96 bpm   Side lye open book trunk rotations limited tolerance  Lower Trunk rotation x 20 reps   Hook lying SKTC 3x30 sec  PT assisted piriformis stretch 2x20  Seated TA activation press into small red physioball 5 sec hold x 10 reps  Sitting Marches 10x  X 5 sec hold at the top  Rows green tband one arm at a time with PT to assist , 2 x 10   Sit to stand 10 reps  Seated end of mat:  Seated scap retraction with double red 2 x 10 reps  Shoulder rolls  Scapular retractions "T" danny x10 sec x 10 reps  LAQ 5 sec hold 10 reps B LE with cues to sit in neutral pelvis and not to compensate by leaning backward.   Sit to stand with  Walker in front from edge of mat raised 25 inches: 2x 10 reps  (light finger tip upport on walker with one hand)  UBE 6 min alternating forward and backward - L2 deferred  Deferred Standing at barre holding onto rail, heel/toe raises x 10 ea  Deferred Sit to stand from chair with Airex pad in seat  x 12 reps (pt used his arms to assist)  Seated exercises:  Hamstring stretch sitting in chair 30 sec hold x 3 each leg  LAQ 10 reps slow with eccentric control 5 sec holds B   Seated Hip abduction with RTB around thighs, 2 x 10   Standing with lean on elbows on mat raised, glut sets 10 reps  deferred Side steps with use of barre with red tband for resistance 20 eac  Supine:   AP's 2x20 reps  Single knee to chest 3 reps alternating 20 sec each leg.  Pillow squeeze with pelvic floor 2 x 10 reps 2 sec hold  TA initiation with ball press in supine  with lean forward 5  Rep5 sec hold  Laying in supported long sitting position on mat with pillows  SLR 2x 5 " reps each with verbal cues for slow controlled difficulty with L with increase in back pain  Supine Hook Lying clam YTB 2x10  Mat exercises:  Diaphragmatic breathing 5 min  B Hip abduction 10 reps x 1 trials in sidelye  Supine hip abduction 2x10 reps   Supine PPT/glute set with hip add/pillow squeeze 5 sec hold x10 reps  Side Lying Clams YTB 10 reps each 3 sets  PPT with TA in sidelye  Side lye ball press in front with pillow squeeze 5 sec hold 20 reps    Deferred today:  Neuromusculature re-ed x 15 min: balance retraining for ankle strategies and proprioception: amb on Airex pad with use of barre one hand LFF/ RFF 5 minutes total, followed by standing on foam with hip stabilization / ankle strategy with side touch to 4 inch wooden block. Retro walking with use of barre as handle.       Written Home Exercises Provided: yes. provided written progression of hep to perform daily. Provided information on wheel attachment for walker. Available on line or through [employer] ( [employer] ). Pt to purchase.   Exercises were reviewed and Stu was able to demonstrate them prior to the end of the session.  Stu demonstrated good  understanding of the education provided.     See EMR under Patient Instructions for exercises provided 2/11/2022.  Re-assess:  Unable to perform standardized MMT and 6 min walk test, too much LBP for sit to stand as well.   Education provided re: energy conservation, hep for strengthening  Stu verbalized good  understanding of education provided.     Assessment     Limited tolerance to standing exercise with back pain. Pt reporting improved pain with back pain after soft tissue mobilization with cannibis helping overall pain. Pt LBP caused his fall according to patient. Lost control over his legs and went down to R hip. Feel therapy helping.    Pt with more pain this session, but subjective report of decreased pain post treatment. Pt tolerated exercises fair, and responded to manual techniques to reduce level of  pain in low back R>L. Will continue to progress as able. Responding well with postural exercises working on trunk strength, flexibility in hamstrings, and use of supportive device for gait. Pt will benefit from continued skilled physical therapy to address  the deficits listed in the problem list box on initial evaluation, provide pt/family education and to maximize pt's level of independence in the home and community environment.     Stu Is progressing fair towards his goals.   Pt prognosis is Fair.     This is a 76 y.o. male referred to outpatient physical therapy and presents with a medical diagnosis of chemotherapy induced neuropathy and gait disturbance. Patient presents with bilateral leg weakness, poor posture, history of back pain with comes and goes, impaired balance with noticeable unsteadiness in gait requiring AD for safety in community due to fall risk. Patient demonstrates limitations as described in the problem list. Pt rehab potential is Fair. Pt will benefit from skilled outpatient physical therapy to address the deficits listed below in the problem list, provide pt/family education and to maximize pt's level of independence in the home and community environment. Will contniiue with skilled care towards goals in initial plan of care. Pt may not be able to meet single limb stance goals.        Medical necessity is demonstrated by the following IMPAIRMENTS/PROMBLEM LIST:  1. Fall Risk - impaired balance   2. Unable to participate in daily activities   3. Impaired functional mobility  4. Requires skilled supervision to complete and progress HEP   5. generalized weakness  6. Decreased CV endurance   7. Decreased standing tolerance   8. Decreased community ambulation   9. Decreased safety awareness with functional mobility  10. Impaired muscle tone     Anticipated barriers to physical therapy: none     Pt's spiritual, cultural and educational needs considered and pt agreeable to plan of care and goals  as stated below:      GOALS:   Short term goals: 4 weeks, pt agrees to goals set.  1. Pt and caregivers will evaluate home for safety, including checking lighting and hazards on the floor such as rugs or cords and remove them. GOAL MET   2. Pt will improve SLS on R by > or = 3 seconds in order to decrease risk for falls in the home and community environment.  GOAL MET 3/23/22  3. Pt will improve SLS on L by > or = 3 seconds in order to decrease risk for falls in the home and community environment. GOAL MET 3/23/22  4. Pt will tolerate 30 minutes of physical therapy treatment with 1 rest break to demonstrate overall improvement in CV endurance GOAL MET 2/18/22  5. Pt will decrease TUG score by > or = 2 seconds with improved safety in order to demonstrate decreased risk for falls. IN PROGRESS  6. Pt will increase MMT for B LE's to > or = 4+/5 in order to improve stability and safety with community ambulation. IN PROGRESS  7. Patient will improve Tinetti Gait/Balance assessment score to 18/28 to aid in improvement of functional mobility and decrease fall risk. IN PROGRESS 14/28     Long term goals: 8 weeks, continue with LTG:  NOT MET  1. Pt will ambulate 1000 ft/communiuty distances with/without AD with no rest breaks with modified independence.  2. Pt will improve TUG score by > or = 2 seconds to decrease risk for falls in the home and community environment.  3. Increased MMT for B LE's to > or = 5/5 in order to improve stability and safety with community ambulation.  4. Pt will tolerate 50 minutes of physical therapy with < or = 1 rest breaks in order to demonstrate overall improved CV endurance.   5. Pt will be able to ambulate community distances decreased complaints of fatigue.         Plan   Patient has 4 more visits left on plan of care. Will see after 2 more visits if we need to add to his plan of care.  Outpatient physical therapy 1-2 times weekly to include: patient education including safety awareness, hep,  therapeutic exercises, therapeutic activities, neuromuscular re-education/ balance exercises, manual techniques and modalities prn.   Cont PT for  total of 16 visits. Patient may be seen by PTA as part of the rehabilitation team.       Therapist: Blessing Peters, PT, CLT  4/20/2022    PT/PTA met face to face to discuss patient's treatment plan and progress towards established goals.  Patient will be seen by physical therapist every sixth visit and minimally once per month.

## 2022-05-03 NOTE — TELEPHONE ENCOUNTER
No new care gaps identified.  Powered by [employer] Reference number: [id number].   5/03/2022 11:48:20 AM CDT

## 2022-05-04 NOTE — PROGRESS NOTES
PSYCHO-ONCOLOGY NOTE/ Individual Psychotherapy     Date: 5/4/2022   Site:  [facility]      Therapeutic Intervention: Met with patient.  Outpatient - Behavior modifying psychotherapy 60 min - CPT code 20403 and Outpatient - Supportive psychotherapy 60 min - CPT Code 95212      Patient was last seen by me on 4/13/2022    Problem list  Patient Active Problem List   Diagnosis    Chronic anticoagulation    PAF (paroxysmal atrial fibrillation)    Iron deficiency anemia    HLD (hyperlipidemia)    Primary osteoarthritis of left knee    History of prostate cancer    Essential hypertension    Type II diabetes mellitus with peripheral circulatory disorder    Thrombocytopenia    Dyslipidemia    History of heart artery stent    Varicose veins of both legs with edema    Coronary artery disease involving native coronary artery of native heart with unstable angina pectoris    Symptomatic anemia    GE junction carcinoma    Anemia associated with chemotherapy    Chemotherapy-induced thrombocytopenia    Leukopenia due to antineoplastic chemotherapy    Chest pain    Unexplained weight loss    Polyneuropathy associated with underlying disease    Gait disturbance    Pulmonary nodule    Type II diabetes mellitus with neurological manifestations    Chronic back pain    Weight decreasing    History of fall    Insomnia    Fatigue    Weakness generalized       Chief complaint/reason for encounter: Back pain and depressed mood due to it   Met with patient to evaluate psychosocial adaptation to disease and treatment course effects of GE junction carcinoma.    Current Medications  Current Outpatient Medications   Medication    ACC[facility]IX [facility]ANCETS [facility]    blood sugar diagnostic (ACCU-CHEK SHASHANK PLUS TEST STRP) Strp    aspirin (ECOTRIN) 81 MG EC tablet    atorvastatin (LIPITOR) 80 MG tablet    b complex vitamins capsule    erythromycin (ROMYCIN) ophthalmic ointment    glimepiride (AMARYL) 2 MG tablet     glucagon, human recombinant, (GLUCAGON EMERGENCY KIT, HUMAN,) 1 mg SolR    HYDROcodone-acetaminophen (NORCO)  mg per tablet    iron polysaccharides (NIFEREX) 150 mg iron Cap    lidocaine-prilocaine (EMLA) cream    metFORMIN (GLUCOPHAGE-XR) 500 MG ER 24hr tablet    metoprolol tartrate (LOPRESSOR) 50 MG tablet    mirtazapine (REMERON) 15 MG tablet    multivitamin capsule    nitroGLYCERIN (NITROSTAT) 0.4 MG SL tablet    omeprazole (PRILOSEC) 40 MG capsule    prochlorperazine (COMPAZINE) 10 MG tablet    rivaroxaban (XARELTO) 20 mg Tab    tiZANidine (ZANAFLEX) 4 MG tablet    vit C,B-Sn-zuays-lutein-zeaxan (PRESERVISION AREDS 2) 250-90-40-1 mg Cap    vitamin A 96252 UNIT capsule     No current facility-administered medications for this visit.       Objective:  Stu Garcia arrived promptly for the session.   Mr. Garcia was independently ambulatory with the use of a walker at the time of session. The patient was fully cooperative throughout the session.  Appearance: age appropriate, appropriately  dressed, adequately  groomed  Behavior/Cooperation: friendly and cooperative; patient appeared to have some difficulties with hearing during the session as a few questions had to be repeated at a slightly louder volume  Speech: normal in rate, volume, and tone and appropriate quality, quantity and organization of sentences  Mood: steady  Affect: mood congruent and appropriate  Thought Process: goal-directed, logical  Thought Content: normal,  No delusions or paranoia; did not appear to be responding to internal stimuli during the session  Orientation: grossly intact  Memory: grossly intact  Attention Span/Concentration: Attends to session without distraction; reports no difficulty  Fund of Knowledge: average  Estimate of Intelligence: average from verbal skills and history  Cognition: grossly intact  Insight: patient has awareness of illness; good insight into own behavior and behavior of  others  Judgment: the patient's behavior is adequate to circumstances    Interval history and content of current session: Patient stated that he feels bad today physically due to back pain the past few weeks. Patient noted that he is prescribed marijuana to help relieve pain but did not take it today since he was driving. Patient reported that he fell twice since last session due to pain. Patient noted that he fell on his butt and was able to stand back up. Patient acknowledged some anxiety about falling in the future. Patient stated he does not drive if he feels like he is in too much pain.   Discussed treatment status and patient's current goals for himself. Patient stated he would feel better emotionally if his pain was decreased and he had an easier time swallowing. 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 Patient stated the pain is mostly worse in the morning. Discussed previously learned visual imagery technique for pain management. Patient stated he forgot how to do the exercise. Practiced exercise during the session. Patient appeared to have difficulty at first following the exercise, but appeared more comfortable with it after a few minutes. Patient noted at the beginning of the exercise that his pain was a 5 or 6 out of 10 with the being the worst pain. After the exercise, patient stated he felt somewhat better and rated the pain as a 4/10. Discussed with patient how this exercise also shows patient that he has some control over his pain. Patient agreed to practice this exercise at home. Also reviewed with patient his current pain medications and advised patient to take his medications as prescribed by his PCP and to meet with his PCP if he has any concerns or questions about his medications. Patient noted that he plans on continuing PT and acupuncture as he sees some benefit in pain reduction from both of them. Patient plans to communicate with integrative  oncology to ensure that he is scheduled for future PT and acupuncture sessions. It appears that patient's pain lead to feelings of sadness and anxiety and reducing his physical pain would help alleviate these emotional difficulties.      Risk parameters:   Patient reports no suicidal ideation  Patient reports no homicidal ideation  Patient reports no self-injurious behavior  Patient reports no violent behavior   Safety needs:  None at this time      Verbal deficits: None     Patient's response to intervention:The patient's response to intervention is accepting, motivated.     Progress toward goals and other mental status changes:  The patient's progress toward goals is fair .      Progress to date:Progress - Ongoing, but Slow      Goals from last visit: Attempted, partially met        Patient Strengths: verbal, intelligent, successful, good insight, and commitment to wellness      Treatment Plan:individual psychotherapy and patient plans on continuing PT and acupuncture  · Target symptoms: depression, pain, and anxiety  · Why chosen therapy is appropriate versus another modality: relevant to diagnosis, patient responds to this modality, evidence based practice  · Outcome monitoring methods: self-report  · Therapeutic intervention type: behavior modifying psychotherapy, supportive psychotherapy  · Prognosis: Good      Behavioral goals:    Exercise: Continue PT and Acupuncture as planned   Therapy: Practice Visual Imagery Exercise for Pain Management    Return to clinic: 1 month     Length of Service (minutes direct face-to-face contact): 60    Diagnosis:     ICD-10-CM ICD-9-CM   1. Adjustment disorder with mixed anxiety and depressed mood  F43.23 309.28           Nahum Persaud Psy.D.  [place] License #[id number]             [place]ekah Garrett License #[id number]  [place] License #[id number]

## 2022-05-09 NOTE — PROGRESS NOTES
Subjective:       Patient ID: Stu Garcia is a 76 y.o. male.    Chief Complaint: Pain (cLBP, right hip pain/)    This patient was referred to me from Dr. Juan  for Chronic Low Back Pain and chemotherapy induced neuropathy. He also expressed that he has blood in his urine and pain that is radiating from the kidney area around his lower rib cage. He saw  a urologist on the 13th. He says he is still experiencing hematuria.    Today is visit #12---he reports that the acupuncture is decreasing his back pain.    Today he ambulates with a walker instead of cane and presents with SOB and more all over body pain than usual.    He relates that two weeks ago, he fell twice d/t sciatica in right leg. He says he did not hit his head and has no pain or swelling.    Pain  This is a chronic problem. The current episode started more than 1 year ago. The problem occurs constantly. The problem has been unchanged. Associated symptoms include anorexia, arthralgias, fatigue, myalgias, numbness, urinary symptoms and weakness. Improvement on treatment: is currently beginning treatment.   Cancer  Associated symptoms include anorexia, arthralgias, fatigue, myalgias, numbness, urinary symptoms and weakness.     Review of Systems   Constitutional: Positive for fatigue.   HENT: Positive for hearing loss and trouble swallowing.    Gastrointestinal: Positive for anorexia.   Genitourinary: Positive for flank pain and hematuria.   Musculoskeletal: Positive for arthralgias, back pain, gait problem, leg pain and myalgias.   Neurological: Positive for weakness and numbness. Negative for speech difficulty.   Hematological: Negative for adenopathy. Bruises/bleeds easily.   Psychiatric/Behavioral:        Very pleasant and concerned about hematuria         Objective:      Physical Exam  Constitutional:              Assessment:     Back pain is a level 4-7/10----feeling improvement with acupuncture, encouraged patient to continue  Hands and feet  are still pretty numb but he says he feels improvements with acupuncture every time he has it.  Today is visit #12    Problem List Items Addressed This Visit        Orthopedic    Chronic back pain - Primary          Plan:       Recommended acupuncture once per week for 4 weeks.  Patient needs scans/ scopes of Kidneys. If patient is approved to proceed by Urologist, he may continue acupuncture program as needed and if helps decrease back pain.    Patient was able to ask questions and was satisfied with answers given.    Points used: nidia, lumbar auricular point, LI 4, BL 60, GB 40, ST 36, DU 3,4,5, BL 23 20, yintang, DU 20, LV 3, ST 44, GB 43    RTC next Monday    21 needles inserted at 1:00  21 needles removed at 1:30

## 2022-05-16 NOTE — PROGRESS NOTES
Subjective:       Patient ID: Stu Garcia is a 76 y.o. male.    Chief Complaint: Pain (Chronic low back pain, right side)    This patient was referred to me from Dr. Juan  for Chronic Low Back Pain and chemotherapy induced neuropathy. He also expressed that he has blood in his urine and pain that is radiating from the kidney area around his lower rib cage. He saw  a urologist on the 13th. He says he is still experiencing hematuria.    ---he reports that the acupuncture is decreasing his back pain.    Today he ambulates with a walker instead of cane and presents with SOB and more all over body pain than usual.    He relates that two weeks ago, he fell twice d/t sciatica in right leg. He says he did not hit his head and has no pain or swelling.    Pain  This is a chronic problem. The current episode started more than 1 year ago. The problem occurs constantly. The problem has been unchanged. Associated symptoms include anorexia, arthralgias, fatigue, myalgias, numbness, urinary symptoms and weakness. Improvement on treatment: is currently beginning treatment.   Cancer  Associated symptoms include anorexia, arthralgias, fatigue, myalgias, numbness, urinary symptoms and weakness.     Review of Systems   Constitutional: Positive for fatigue.   HENT: Positive for hearing loss and trouble swallowing.    Gastrointestinal: Positive for anorexia.   Genitourinary: Positive for flank pain and hematuria.   Musculoskeletal: Positive for arthralgias, back pain, gait problem, leg pain and myalgias.   Neurological: Positive for weakness and numbness. Negative for speech difficulty.   Hematological: Negative for adenopathy. Bruises/bleeds easily.   Psychiatric/Behavioral:        Very pleasant and concerned about hematuria         Objective:      Physical Exam  Constitutional:              Assessment:     Back pain is a level 4-7/10----feeling improvement with acupuncture, encouraged patient to continue  Hands and feet are  still pretty numb but he says he feels improvements with acupuncture every time he has it.      Problem List Items Addressed This Visit        Orthopedic    Chronic back pain - Primary          Plan:       Recommended acupuncture once per week for 4 weeks.  Patient needs scans/ scopes of Kidneys. If patient is approved to proceed by Urologist, he may continue acupuncture program as needed and if helps decrease back pain.    Patient was able to ask questions and was satisfied with answers given.    Points used: shenmen, lumbar auricular point, LI 4, BL 60, GB 40, ST 36, DU 3,4,5, BL 23 20, yintang, DU 20, LV 3, ST 44, GB 43    RTC next Monday    21 needles inserted at 1:00  21 needles removed at 1:30

## 2022-06-08 NOTE — TELEPHONE ENCOUNTER
Called pt wife to r/s appt no answer LVM (left clinic contact info)        ----- Message from Marga Mahan, Patient Care Assistant sent at 6/8/2022  2:25 PM CDT -----  Contact: thom  Type: Needs Medical Advice  Who Called:  thom  Maximo Call Back Number: [phone]    Additional Information: please call with new date and time for appointment , he was unable to make today , he is still at [facility] , thank you           5

## 2022-06-13 NOTE — TELEPHONE ENCOUNTER
Pt was dcd from hospital for hypoglycemia on Friday 6/10 .pt has appt on 6/16 with Dr Juan    Pt is still taking glimepiride 4 mg twice daily  Metformin 500 mg twice daily     Takes blood glucose once daily    Yesterday CBG 76  Today 67    Wife doesn't want him to bottom out again and wants to know what meds she should hive him until his hospital f/u appt on 6/16

## 2022-06-13 NOTE — TELEPHONE ENCOUNTER
Have him stop his glimepiride entirely and record his daily glucose readings until his appointment

## 2022-06-15 NOTE — PROGRESS NOTES
PSYCHO-ONCOLOGY NOTE/ Individual Psychotherapy     Date: 6/15/2022   Site:  [facility]      Therapeutic Intervention: Met with patient.  Outpatient - Behavior modifying psychotherapy 60 min - CPT code 15090 and Outpatient - Supportive psychotherapy 60 min - CPT Code 99159    This includes face to face time and non-face to face time preparing to see the patient, obtaining and/or reviewing separately obtained history, documenting clinical information in the electronic or other health record, independently interpreting results and communicating results to the patient/family/caregiver, or care coordinator.       Patient was last seen by me on 5/4/2022    Problem list  Patient Active Problem List   Diagnosis    Chronic anticoagulation    PAF (paroxysmal atrial fibrillation)    Iron deficiency anemia    HLD (hyperlipidemia)    Primary osteoarthritis of left knee    History of prostate cancer    Essential hypertension    Type II diabetes mellitus with peripheral circulatory disorder    Thrombocytopenia    Dyslipidemia    History of heart artery stent    Varicose veins of both legs with edema    Coronary artery disease involving native coronary artery of native heart with unstable angina pectoris    Symptomatic anemia    GE junction carcinoma    Anemia associated with chemotherapy    Chemotherapy-induced thrombocytopenia    Leukopenia due to antineoplastic chemotherapy    Chest pain    Unexplained weight loss    Polyneuropathy associated with underlying disease    Gait disturbance    Pulmonary nodule    Type II diabetes mellitus with neurological manifestations    Chronic back pain    Weight decreasing    History of fall    Insomnia    Fatigue    Weakness generalized       Chief complaint/reason for encounter: depression and chronic pain   Met with patient to evaluate psychosocial adaptation to treatment course effects of GE junction carcinoma.    Current Medications  Current Outpatient  2022    Nellie Sandifer (:  1977) is a 54 y.o. female, here for a preventive medicine evaluation. Patient Active Problem List   Diagnosis    Hypothyroidism    Vitamin D deficiency    Restless legs syndrome     Hypothyroidism  TSH stable   Saw improvement after taking the medication- energy levels, sleep is better  Is taking synthroid    Can have night sweats- worse 10 days before menses  Irritated more around menses/ super emotional  Continues with regular menses- can be light, heavy (the flow is like when she was a teenager)    Cherry angiomas- is bothered by the ones on her face    Yeast infection  Burning, irritation, discharge  Had one 3 weeks ago  Used 3 day Monistat- didn't go away  Then used 1 day- got better    Is going to [facility] in [facility] weeks  Review of Systems    Prior to Visit Medications    Medication Sig Taking?  Authorizing Provider   levothyroxine (SYNTHROID) 50 MCG tablet TAKE 1 TABLET BY MOUTH DAILY FOR 5 DAYS OF THE WEEK Yes Tammy Hayes MD        No Known Allergies    Past Medical History:   Diagnosis Date    Hypothyroidism        Past Surgical History:   Procedure Laterality Date    FOOT SURGERY Right     2002    FOOT SURGERY Left            Social History     Socioeconomic History    Marital status:      Spouse name: Not on file    Number of children: Not on file    Years of education: Not on file    Highest education level: Not on file   Occupational History    Not on file   Tobacco Use    Smoking status: Never Smoker    Smokeless tobacco: Never Used   Vaping Use    Vaping Use: Never used   Substance and Sexual Activity    Alcohol use: Never    Drug use: Never    Sexual activity: Not Currently   Other Topics Concern    Not on file   Social History Narrative    Not on file     Social Determinants of Health     Financial Resource Strain: Low Risk     Difficulty of Paying Living Expenses: Not hard at all   Food Insecurity: No Food Insecurity    Worried About Running Out of Food in the Last Year: Never true    Ilana of Food in the Last Year: Never true   Transportation Needs: No Transportation Needs    Lack of Transportation (Medical): No    Lack of Transportation (Non-Medical): No   Physical Activity:     Days of Exercise per Week: Not on file    Minutes of Exercise per Session: Not on file   Stress:     Feeling of Stress : Not on file   Social Connections:     Frequency of Communication with Friends and Family: Not on file    Frequency of Social Gatherings with Friends and Family: Not on file    Attends Mandaen Services: Not on file    Active Member of [address] [place] [address] [employer] or Organizations: Not on file    Attends Club or Organization Meetings: Not on file    Marital Status: Not on file   Intimate Partner Violence:     Fear of Current or Ex-Partner: Not on file    Emotionally Abused: Not on file    Physically Abused: Not on file    Sexually Abused: Not on file   Housing Stability:     Unable to Pay for Housing in the Last Year: Not on file    Number of [place] in the Last Year: Not on file    Unstable Housing in the Last Year: Not on file        Family History   Problem Relation Age of Onset    Cancer Mother     High Blood Pressure Father     Stroke Father        ADVANCE DIRECTIVE: N, <no information>    Vitals:    04/08/22 1614   BP: 106/68   Pulse: 59   Resp: 16   Temp: 96.8 °F (36 °C)   TempSrc: Tympanic   SpO2: 97%   Weight: 158 lb 3.2 oz (71.8 kg)   Height: 5' 4\" (1.626 m)     Estimated body mass index is 27.15 kg/m² as calculated from the following:    Height as of this encounter: 5' 4\" (1.626 m). Weight as of this encounter: 158 lb 3.2 oz (71.8 kg). Physical Exam  Vitals reviewed. Constitutional:       Appearance: Normal appearance. HENT:      Head: Normocephalic.       Right Ear: Tympanic membrane, ear canal and external ear normal.      Left Ear: Tympanic membrane, ear canal and external ear normal. Medications   Medication    ACCU-CHEK SOFTCLIX LANCETS Misc    aspirin (ECOTRIN) 81 MG EC tablet    atorvastatin (LIPITOR) 80 MG tablet    b complex vitamins capsule    blood sugar diagnostic (ACCU-CHEK SHASHANK PLUS TEST STRP) Strp    erythromycin (ROMYCIN) ophthalmic ointment    glimepiride (AMARYL) 2 MG tablet    glucagon, human recombinant, (GLUCAGON EMERGENCY KIT, HUMAN,) 1 mg SolR    HYDROcodone-acetaminophen (NORCO)  mg per tablet    iron polysaccharides (NIFEREX) 150 mg iron Cap    lidocaine-prilocaine (EMLA) cream    metFORMIN (GLUCOPHAGE-XR) 500 MG ER 24hr tablet    metoprolol tartrate (LOPRESSOR) 50 MG tablet    mirtazapine (REMERON) 15 MG tablet    multivitamin capsule    nitroGLYCERIN (NITROSTAT) 0.4 MG SL tablet    omeprazole (PRILOSEC) 40 MG capsule    prochlorperazine (COMPAZINE) 10 MG tablet    rivaroxaban (XARELTO) 20 mg Tab    tiZANidine (ZANAFLEX) 4 MG tablet    vit C,Z-Ul-cljnt-lutein-zeaxan (PRESERVISION AREDS 2) 250-90-40-1 mg Cap    vitamin A 12691 UNIT capsule     No current facility-administered medications for this visit.       Objective:  Stu Garcia arrived promptly for the session   Mr. Garcia was independently ambulatory with the use of a walker at the time of session. The patient was fully cooperative throughout the session.  Appearance: age appropriate, appropriately  dressed, well groomed  Behavior/Cooperation: friendly and cooperative; patient appeared to have some difficulties with hearing during the session as a few times he asked for questions to be repeated  Speech: normal in rate, volume, and tone and appropriate quality, quantity and organization of sentences  Mood: steady and tired  Affect: mood congruent and appropriate; some agitation noted in regards to feelings tired often  Thought Process: goal-directed, logical  Thought Content: normal,  No delusions or paranoia; did not appear to be responding to internal stimuli during the  Nose: Nose normal.      Mouth/Throat:      Lips: Pink. Mouth: Mucous membranes are moist.      Pharynx: Oropharynx is clear. Uvula midline. Cardiovascular:      Rate and Rhythm: Normal rate and regular rhythm. Pulses: Normal pulses. Heart sounds: Normal heart sounds, S1 normal and S2 normal.   Pulmonary:      Effort: Pulmonary effort is normal.      Breath sounds: Normal breath sounds and air entry. Abdominal:      Palpations: Abdomen is soft. Tenderness: There is no abdominal tenderness. Musculoskeletal:      Right lower leg: No edema. Left lower leg: No edema. Skin:     Comments: Small cherry angiomas   Neurological:      Mental Status: She is alert. Psychiatric:         Mood and Affect: Mood normal.         No flowsheet data found.     Lab Results   Component Value Date    CHOL 210 07/17/2019    TRIG 81 07/17/2019    HDL 72 07/17/2019    LDLCALC 122 07/17/2019    GLUCOSE 79 12/14/2020       The 10-year ASCVD risk score (Vivien Enrique, et al., 2013) is: 0.4%    Values used to calculate the score:      Age: 54 years      Sex: Female      Is Non- : No      Diabetic: No      Tobacco smoker: No      Systolic Blood Pressure: 229 mmHg      Is BP treated: No      HDL Cholesterol: 72 mg/dL      Total Cholesterol: 210 mg/dL    Immunization History   Administered Date(s) Administered    COVID-19, Pfizer Purple top, DILUTE for use, 12+ yrs, 30mcg/0.3mL dose 04/22/2021, 05/13/2021, 12/29/2021, 12/29/2021    Tdap (Boostrix, Adacel) 10/24/2019       Health Maintenance   Topic Date Due    Hepatitis C screen  Never done    HIV screen  Never done    Colorectal Cancer Screen  Never done    Cervical cancer screen  03/01/2022    TSH testing  06/03/2022    Flu vaccine (Season Ended) 09/01/2022    Depression Screen  04/08/2023    Lipid screen  07/17/2024    DTaP/Tdap/Td vaccine (2 - Td or Tdap) 10/24/2029    COVID-19 Vaccine  Completed    Hepatitis A vaccine  Aged Out session  Orientation: grossly intact  Memory: grossly intact  Attention Span/Concentration: Attends to session without distraction  Fund of Knowledge: average  Estimate of Intelligence: average from verbal skills and history  Cognition: grossly intact  Insight: patient has awareness of illness; good insight into own behavior and behavior of others  Judgment: the patient's behavior is adequate to circumstances    Interval history and content of current session: Patient was reportedly presented to the ED last week due to low blood sugar. He stated he is feeling much better now physically. He has an appointment scheduled for tomorrow with his PCP. Discussed chronic pain and physical health. Reports to be coping with some difficulty at this time. Evaluated cognitive response, paying particular attention to negative intrusive thoughts of a persistent and detrimental nature. He noted agitation in regards to being often tired. He noted his energy levels fluctuate but that he often has low energy. He also reported that he wants to feel better physically as he noted he has pain in his stomach today that he rated as a 4 or 5 out of 10 with 10 being the worst pain. Patient switched chairs during the session and noted that his pain level slightly decreased. Thoughts of this type are in evidence with mild distress. Provided cognitive behavioral therapy to address negative cognitions. Patient was able to acknowledge during the session that he will not be able to do what he did physically when he was 39 years old but stated that he wants to feel more comfortable physically so he can feel relaxed. Examined proactive behaviors that may be implemented to minimize or ameliorate psychosocial stressors secondary to diagnosis and treatment. Patient stated that his wife tends to try to get him to eat foods that will increase his energy but patient stated that he tends to not like these foods. Patient did stated that he and his wife drove  around a couple of days ago and that this increased his energy. Patient was recommended to do things he enjoys to help increase his energy. Patient reported that he practiced the previously learned visual imagery exercise a few times since last session and that it helps reduce his pain a little bit. Patient noted that he would likely benefit from practicing this skill more often. It appears that patient's feelings of anxiety and depression are related to him adjusting to his current physical difficulties including pain and low energy.     Risk parameters:   Patient reports no suicidal ideation  Patient reports no homicidal ideation  Patient reports no self-injurious behavior  Patient reports no violent behavior   Safety needs:  None at this time      Verbal deficits: None     Patient's response to intervention:The patient's response to intervention is accepting.     Progress toward goals and other mental status changes:  The patient's progress toward goals is fair .      Progress to date:Progress - Ongoing, but Slow      Goals from last visit: Attempted, partially met        Patient Strengths: verbal, intelligent, successful, support from his wife, good insight, and commitment to wellness      Treatment Plan:individual psychotherapy and patient will meet with PCP tomorrow regarding physical concerns  · Target symptoms: depression  · Why chosen therapy is appropriate versus another modality: relevant to diagnosis, patient responds to this modality, evidence based practice  · Outcome monitoring methods: self-report  · Therapeutic intervention type: insight oriented psychotherapy, behavior modifying psychotherapy, supportive psychotherapy  · Prognosis: Good      Behavioral goals:      Pain Management: Meet with PCP tomorrow and discuss pain management with him; practice visual imagery exercise for pain management   Therapy: Increase Behavioral Activation    Return to clinic: 3 weeks     Length of Service (minutes  direct face-to-face contact): 55 minutes    Diagnosis:     ICD-10-CM ICD-9-CM   1. Adjustment disorder with mixed anxiety and depressed mood  F43.23 309.28         Nahum Persaud Psy.D.  [place] License #[id number]             Bekah Garrett License #[id number]  [place] License #[id number]

## 2022-06-16 NOTE — PROGRESS NOTES
Subjective:       Patient ID: Stu Garcia is a 76 y.o. male.    Chief Complaint: ER Follow UP [facility] (Low Blood Sugar)      Here for f/u on recent ED visit for episode of hypoglycemia  We stopped glimepiride entirely after that visit  No episodes since the ER visit.  Appetite still down.  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 Jeter - neck stable; swallowing stable  Lumbar fracture - back pain only occurring at night periodically; this is stable  Diabetes with neuropathy- following diabetic diet; He is taking  Metformin XR 500mg BID. BGs have been less than 100. No hypoglycemia.   CAD - s/p 4 stents; following with Dr. Maldonado  Paroxysmal afib, HTN - taking xarelto; metoprolol 50mg BID  HLD - taking Lipitor 80mg daily  Regular nausea, cholecystectomy - gets nausea most mornings    Back pain is stable, but still is occurring incentral lower back when active  He is taking hydrocodone 10/325 BID, tizanidine QHS and 1/2 QAM.  He is doing PT for leg weakness. Dry needling has been effective.  Using walker.    Mood stable; taking remeron 15mg    Follow-up  Associated symptoms include fatigue and neck pain. Pertinent negatives include no abdominal pain, arthralgias, chest pain, chills, coughing, fever, headaches, joint swelling, nausea, rash, sore throat, vomiting or weakness.   DepressionPatient is not experiencing: confusion, palpitations and shortness of breath.  Patient has a history of: CAD    Diabetes  Pertinent negatives for hypoglycemia include no confusion, dizziness or headaches. Associated symptoms include fatigue. Pertinent negatives for diabetes include no chest  pain, no polydipsia, no polyuria and no weakness.   Coronary Artery Disease  Pertinent negatives include no chest pain, chest tightness, dizziness, leg swelling, palpitations or shortness of breath.   Atrial Fibrillation  Symptoms are negative for chest pain, dizziness, palpitations, shortness of breath and weakness. Past medical history includes atrial fibrillation and CAD.       Past Medical History:   Diagnosis Date    Anticoagulant long-term use     xarelto,asa    Arthritis     Atrial fibrillation 2013    cardioverted    Bleb, lung     Cataract     OS    Colon polyp     Coronary artery disease     Coronary artery disease involving native coronary artery of native heart with unstable angina pectoris 3/18/2018    Diabetes mellitus     Diabetes mellitus, type 2     Diverticulosis     Encounter for blood transfusion     General anesthetics causing adverse effect in therapeutic use     delayed emergence per patient's wife    GERD (gastroesophageal reflux disease)     HEARING LOSS     bilateral aids    Hemorrhoid     HLD (hyperlipidemia)     Hypertension     Iron deficiency anemia     Neuropathy of leg     Pneumonia due to other staphylococcus     Prostate cancer     prostate    Spontaneous pneumothorax     bilateral    Thyroid disease     tumors, non cancerous       Past Surgical History:   Procedure Laterality Date    CARDIOVERSION      CATARACT EXTRACTION      bilateral    CERVICAL SPINE FRACTURE SURGERY      c7 t1 ACDF     CHEST TUBE INSERTION Right     COLONOSCOPY      CORONARY STENT PLACEMENT      x 2    ENDOSCOPIC ULTRASOUND OF UPPER GASTROINTESTINAL TRACT N/A 3/13/2019    Procedure: ULTRASOUND, UPPER GI TRACT, ENDOSCOPIC;  Surgeon: Andrew Shirley MD;  Location: [address]);  Service: Endoscopy;  Laterality: N/A;    ESOPHAGOGASTRODUODENOSCOPY N/A 2/23/2019    Procedure: ESOPHAGOGASTRODUODENOSCOPY (EGD);  Surgeon: Jackeline Richards MD;  Location: [facility];  Service:  Endoscopy;  Laterality: N/A;    ESOPHAGOGASTRODUODENOSCOPY N/A 2/6/2020    Procedure: EGD (ESOPHAGOGASTRODUODENOSCOPY);  Surgeon: Andrea Kerns Jr., MD;  Location: [facility] ENDO;  Service: Endoscopy;  Laterality: N/A;    HERNIA REPAIR      umbilical    INSERTION OF TUNNELED CENTRAL VENOUS CATHETER (CVC) WITH SUBCUTANEOUS PORT Right 4/4/2019    Procedure: KUBBAXYUB-VWCQ-C-CATH (POWER PORT);  Surgeon: Nahum Zaidi MD;  Location: [facility] OR;  Service: General;  Laterality: Right;    KNEE SURGERY Left 2017    open thoracotomy Left 1966    With pleurectomy    Pleurectomy Left 1966    For treatment of left pneumothorax    PROSTATECTOMY  2001    open    ROBOT-ASSISTED SURGICAL REMOVAL OF ESOPHAGUS USING DA ROSALIA XI N/A 7/2/2019    Procedure: XI ROBOTIC ESOPHAGECTOMY;  Surgeon: Chad Milian MD;  Location: [address];  Service: General;  Laterality: N/A;    TUBE THORACOTOMY  1966    pneumothorax left--open       Review of patient's allergies indicates:   Allergen Reactions    Codeine Nausea And Vomiting       Social History     Socioeconomic History    Marital status:     Number of children: 2   Occupational History    Occupation:     Occupation: prior [employer]    Tobacco Use    Smoking status: Former Smoker     Packs/day: 1.00     Years: 20.00     Pack years: 20.00     Types: Cigarettes    Smokeless tobacco: Never Used    Tobacco comment: quit smoking in 1980's.   Substance and Sexual Activity    Alcohol use: Yes     Comment: 1 drink /month    Drug use: No    Sexual activity: Not Currently   Social History Narrative    From [place]     Social Determinants of Health     Financial Resource Strain: Low Risk     Difficulty of Paying Living Expenses: Not hard at all   Food Insecurity: No Food Insecurity    Worried About Running Out of Food in the Last Year: Never true    Ran Out of Food in the Last Year: Never true   Transportation Needs: No Transportation Needs    Lack  of Transportation (Medical): No    Lack of Transportation (Non-Medical): No   Physical Activity: Inactive    Days of Exercise per Week: 0 days    Minutes of Exercise per Session: 0 min   Stress: Stress Concern Present    Feeling of Stress : To some extent   Social Connections: Unknown    Frequency of Communication with Friends and Family: Twice a week    Frequency of Social Gatherings with Friends and Family: Once a week    Active Member of Clubs or Organizations: No    Attends Club or Organization Meetings: 1 to 4 times per year    Marital Status:    Housing Stability: High Risk    Unable to Pay for Housing in the Last Year: No    Number of Places Lived in the Last Year: 11    Unstable Housing in the Last Year: No       Current Outpatient Medications on File Prior to Visit   Medication Sig Dispense Refill    ACCU-CHEK SOFTCLIX LANCETS Misc TEST ONCE DAILY AS DIRECTED 100 each 3    atorvastatin (LIPITOR) 80 MG tablet TAKE 1 TABLET(80 MG) BY MOUTH EVERY EVENING 90 tablet 0    b complex vitamins capsule Take 1 capsule by mouth once daily.      blood sugar diagnostic (ACCU-CHEK SHASHANK PLUS TEST STRP) Strp TEST BLOOD SUGAR ONCE DAILY 100 strip 3    HYDROcodone-acetaminophen (NORCO)  mg per tablet Take 1 tablet by mouth every 12 (twelve) hours as needed for Pain. 60 tablet 0    metFORMIN (GLUCOPHAGE-XR) 500 MG ER 24hr tablet TAKE 1 TABLET(500 MG) BY MOUTH TWICE DAILY WITH MEALS 180 tablet 0    metoprolol tartrate (LOPRESSOR) 50 MG tablet Take 1 tablet (50 mg total) by mouth 2 (two) times daily. 60 tablet 6    mirtazapine (REMERON) 15 MG tablet Take 1 tablet (15 mg total) by mouth every evening. 30 tablet 11    omeprazole (PRILOSEC) 40 MG capsule Take 1 capsule (40 mg total) by mouth once daily. 90 capsule 3    prochlorperazine (COMPAZINE) 10 MG tablet Take 1 tablet (10 mg total) by mouth every 6 (six) hours as needed. 30 tablet 1    rivaroxaban (XARELTO) 20 mg Tab Take 1 tablet (20 mg  total) by mouth daily with dinner or evening meal. 30 tablet 11    tiZANidine (ZANAFLEX) 4 MG tablet Take 1 tablet (4 mg total) by mouth every 8 (eight) hours. 90 tablet 3    aspirin (ECOTRIN) 81 MG EC tablet Take 1 tablet (81 mg total) by mouth once daily. May take over the counter 30 tablet 12    erythromycin (ROMYCIN) ophthalmic ointment Place into both eyes every 8 (eight) hours. (Patient not taking: Reported on 6/16/2022) 3.5 g 0    glucagon, human recombinant, (GLUCAGON EMERGENCY KIT, HUMAN,) 1 mg SolR Inject 1 mg into the muscle as needed. (Patient not taking: Reported on 6/16/2022) 1 each 2    iron polysaccharides (NIFEREX) 150 mg iron Cap Take 1 capsule (150 mg total) by mouth once daily. (Patient not taking: Reported on 6/16/2022) 90 capsule 1    lidocaine-prilocaine (EMLA) cream U UTD  0    multivitamin capsule Take 1 capsule by mouth once daily.      nitroGLYCERIN (NITROSTAT) 0.4 MG SL tablet DISSOLVE 1 TABLET UNDER THE TONGUE EVERY 5 MINUTES AS NEEDED FOR CHEST PAIN (Patient not taking: Reported on 6/16/2022) 100 tablet prn    vit C,J-Mk-ioxjo-lutein-zeaxan (PRESERVISION AREDS 2) 250-90-40-1 mg Cap Take 1 tablet by mouth once daily.      vitamin A 02769 UNIT capsule Take 10,000 Units by mouth once daily.       No current facility-administered medications on file prior to visit.       Family History   Problem Relation Age of Onset    Mental illness Mother         dementia    Coronary artery disease Father     Cancer Father         stomach with mets    Diabetes Brother        Review of Systems   Constitutional: Positive for appetite change, fatigue and unexpected weight change. Negative for activity change, chills and fever.   HENT: Positive for hearing loss, rhinorrhea and trouble swallowing. Negative for sore throat.    Eyes: Negative for pain, discharge and visual disturbance.   Respiratory: Negative for cough, chest tightness, shortness of breath and wheezing.    Cardiovascular: Negative  for chest pain, palpitations and leg swelling.   Gastrointestinal: Negative for abdominal pain, blood in stool, constipation, diarrhea, nausea and vomiting.   Endocrine: Negative for polydipsia and polyuria.   Genitourinary: Negative for difficulty urinating, dysuria, hematuria and urgency.   Musculoskeletal: Positive for back pain and neck pain. Negative for arthralgias, gait problem and joint swelling.   Skin: Negative for rash and wound.   Neurological: Negative for dizziness, weakness, light-headedness and headaches.   Hematological: Negative for adenopathy.   Psychiatric/Behavioral: Positive for depression. Negative for confusion and dysphoric mood.       Objective:      /60   Pulse 70   Temp 98.6 °F (37 °C) (Oral)   Resp 18   Ht 6' (1.829 m)   Wt 69.1 kg (152 lb 5.4 oz)   SpO2 98%   BMI 20.66 kg/m²   Physical Exam  Constitutional:       General: He is not in acute distress.     Appearance: Normal appearance. He is well-developed.   HENT:      Head: Normocephalic and atraumatic.      Right Ear: External ear normal.      Left Ear: External ear normal.      Mouth/Throat:      Pharynx: Uvula midline. No oropharyngeal exudate.   Eyes:      General: Lids are normal.      Conjunctiva/sclera: Conjunctivae normal.      Pupils: Pupils are equal, round, and reactive to light.   Neck:      Thyroid: No thyroid mass or thyromegaly.      Trachea: Phonation normal.   Cardiovascular:      Rate and Rhythm: Normal rate and regular rhythm.      Heart sounds: Normal heart sounds. No murmur heard.    No friction rub. No gallop.   Pulmonary:      Effort: Pulmonary effort is normal. No respiratory distress.      Breath sounds: Normal breath sounds. No wheezing or rales.   Abdominal:      General: Bowel sounds are normal.      Palpations: Abdomen is soft. There is no hepatomegaly or splenomegaly.      Tenderness: There is no abdominal tenderness.   Musculoskeletal:      Cervical back: Full passive range of motion without  pain, normal range of motion and neck supple.      Lumbar back: Tenderness present. No swelling, deformity or spasms. Decreased range of motion.        Back:    Lymphadenopathy:      Cervical: No cervical adenopathy.   Skin:     General: Skin is warm and dry.   Neurological:      Mental Status: He is alert and oriented to person, place, and time.      Cranial Nerves: No cranial nerve deficit.      Coordination: Coordination normal.   Psychiatric:         Speech: Speech normal.         Behavior: Behavior normal.         Thought Content: Thought content normal.         Judgment: Judgment normal.       bilateral lower lid blepharitis    Results for orders placed or performed during the hospital encounter of 06/10/22   CBC auto differential   Result Value Ref Range    WBC 4.34 3.90 - 12.70 K/uL    RBC 3.73 (L) 4.60 - 6.20 M/uL    Hemoglobin 11.6 (L) 14.0 - 18.0 g/dL    Hematocrit 36.3 (L) 40.0 - 54.0 %    MCV 97 82 - 98 fL    MCH 31.1 (H) 27.0 - 31.0 pg    MCHC 32.0 32.0 - 36.0 g/dL    RDW 13.6 11.5 - 14.5 %    Platelets 119 (L) 150 - 450 K/uL    MPV 11.0 9.2 - 12.9 fL    Immature Granulocytes 0.2 0.0 - 0.5 %    Gran # (ANC) 3.3 1.8 - 7.7 K/uL    Immature Grans (Abs) 0.01 0.00 - 0.04 K/uL    Lymph # 0.5 (L) 1.0 - 4.8 K/uL    Mono # 0.4 0.3 - 1.0 K/uL    Eos # 0.1 0.0 - 0.5 K/uL    Baso # 0.02 0.00 - 0.20 K/uL    nRBC 0 0 /100 WBC    Gran % 76.8 (H) 38.0 - 73.0 %    Lymph % 10.6 (L) 18.0 - 48.0 %    Mono % 9.4 4.0 - 15.0 %    Eosinophil % 2.5 0.0 - 8.0 %    Basophil % 0.5 0.0 - 1.9 %    Differential Method Automated    Comprehensive metabolic panel (CMP)   Result Value Ref Range    Sodium 138 136 - 145 mmol/L    Potassium 4.1 3.5 - 5.1 mmol/L    Chloride 104 95 - 110 mmol/L    CO2 30 22 - 31 mmol/L    Glucose 40 (LL) 70 - 110 mg/dL    BUN 32 (H) 9 - 21 mg/dL    Creatinine 0.94 0.50 - 1.40 mg/dL    Calcium 9.4 8.4 - 10.2 mg/dL    Total Protein 7.1 6.0 - 8.4 g/dL    Albumin 4.2 3.5 - 5.2 g/dL    Total Bilirubin 0.6 0.2 -  1.3 mg/dL    Alkaline Phosphatase 96 38 - 145 U/L    AST 25 17 - 59 U/L    ALT 12 0 - 50 U/L    Anion Gap 4 (L) 8 - 16 mmol/L    eGFR if African American >60 >60 mL/min/1.73 m^2    eGFR if non African American >60 >60 mL/min/1.73 m^2   Troponin   Result Value Ref Range    Troponin I <0.012 0.012 - 0.034 ng/mL   Urinalysis, Reflex to Urine Culture Urine, Clean Catch    Specimen: Urine   Result Value Ref Range    Specimen UA Urine, Clean Catch     Color, UA Yellow Yellow, Straw, Romina    Appearance, UA Clear Clear    pH, UA 6.0 5.0 - 8.0    Specific Gravity, UA 1.025 1.005 - 1.030    Protein, UA 1+ (A) Negative    Glucose, UA Negative Negative    Ketones, UA Trace (A) Negative    Bilirubin (UA) Negative Negative    Occult Blood UA 1+ (A) Negative    Nitrite, UA Negative Negative    Urobilinogen, UA 1.0 <2.0 EU/dL    Leukocytes, UA Trace (A) Negative   WBC, UA   Result Value Ref Range    WBC, UA 6 (H) 0 - 5 /hpf   RBC, UA   Result Value Ref Range    RBC, UA 17 (H) 0 - 4 /hpf   Squamous Epithelial, UA   Result Value Ref Range    Squam Epithel, UA 1 /hpf   Hyaline Casts, UA   Result Value Ref Range    Hyaline Casts, UA 4 (A) 0 - 1 /lpf   Bacteria, UA   Result Value Ref Range    Bacteria Negative Negative /hpf   Urinalysis Microscopic   Result Value Ref Range    RBC, UA 17 (H) 0 - 4 /hpf    WBC, UA 6 (H) 0 - 5 /hpf    Bacteria Negative Negative /hpf    Squam Epithel, UA 1 /hpf    Hyaline Casts, UA 4 (A) 0 - 1 /lpf    Microscopic Comment SEE COMMENT    COVID-19 Rapid Screening   Result Value Ref Range    SARS-CoV-2 RNA, Amplification, Qual Negative Negative   POCT glucose   Result Value Ref Range    POCT Glucose 48 (LL) 70 - 110 mg/dL   POCT glucose   Result Value Ref Range    POCT Glucose 140 (H) 70 - 110 mg/dL   POCT glucose   Result Value Ref Range    POCT Glucose 97 70 - 110 mg/dL   POCT glucose   Result Value Ref Range    POCT Glucose 120 (H) 70 - 110 mg/dL   POCT glucose   Result Value Ref Range    POCT Glucose 36  (LL) 70 - 110 mg/dL     *Note: Due to a large number of results and/or encounters for the requested time period, some results have not been displayed. A complete set of results can be found in Results Review.         Assessment:       1. GE junction carcinoma    2. Abnormal weight loss    3. Type 2 diabetes mellitus treated without insulin    4. Lumbar spondylosis    5. Coronary artery disease involving native coronary artery of native heart with unstable angina pectoris    6. Loss of appetite    7. PAF (paroxysmal atrial fibrillation)    8. Essential hypertension        Plan:       GE junction carcinoma  -     CT Chest Abdomen Without Contrast (XPD); Future; Expected date: 06/16/2022    Abnormal weight loss  -     megestroL (MEGACE) 40 MG Tab; Take 1 tablet (40 mg total) by mouth once daily.  Dispense: 30 tablet; Refill: 3    Type 2 diabetes mellitus treated without insulin  -     Hemoglobin A1C; Future; Expected date: 06/16/2022  -     Comprehensive Metabolic Panel; Future; Expected date: 06/16/2022    Lumbar spondylosis    Coronary artery disease involving native coronary artery of native heart with unstable angina pectoris    Loss of appetite    PAF (paroxysmal atrial fibrillation)    Essential hypertension        Trial of megace  Suspect likely cancer progression as source of progressive weight loss  continue oncology follow-up as planned  Continue protonix BID  F/u with cardiology as planned   discontinue Glimepiride 4mg BID  Continue metformin XR 500mg BID   Continue other present meds  Counseled on regular exercise, maintenance of a healthy weight, balanced diet rich in fruits/vegetables and lean protein, and avoidance of unhealthy habits like smoking and excessive alcohol intake.  Integrative medicine follow-up regarding chemo-induced neuropathy      1 hour spent with patient and wife

## 2022-06-16 NOTE — TELEPHONE ENCOUNTER
Sent a message to CT scan to get CTordered, waiting to hear back Friday, pt needs CT scan soon per Dr Larson

## 2022-06-20 NOTE — TELEPHONE ENCOUNTER
Called pt to schedule appt no answer LVM (left clinic contact info)      ----- Message from Nahum Persaud PsyD sent at 6/15/2022  1:52 PM CDT -----  Please call to schedule a follow-up in about three weeks. Thank you

## 2022-07-06 NOTE — Clinical Note
Patient with increasing epigastric pain, loss of appetite and 20 pounds reji loss in the last month. His recent abdominal CT did not show any major findings. I have referred him to Dr. Kerns to do an endoscopy as I feel his GE cancer has likely recurred. Just TETO in case you would recommend anything else at this time. He will follow-up with [facility] in August.

## 2022-07-06 NOTE — PROGRESS NOTES
Subjective:       Patient ID: Stu Garcia is a 76 y.o. male.    Chief Complaint: Diabetes (3 month follow up with labs and CT)    Patient presents with:  Diabetes: 3 month follow up with labs and CT      Here for f/u on chronic health issues  We stopped glimepiride entirely after ER visit for hypoglycemia  Appetite still down.  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 Jeter - neck stable; swallowing stable  Lumbar fracture - back pain only occurring at night periodically; this is stable  Diabetes with neuropathy- following diabetic diet; He is taking  Metformin XR 500mg BID. BGs have been less than 100. No hypoglycemia.   CAD - s/p 4 stents; following with Dr. Maldonado  Paroxysmal afib, HTN - taking xarelto; metoprolol 50mg BID  HLD - taking Lipitor 80mg daily  Regular nausea, cholecystectomy - gets nausea most mornings    Back pain is stable, but still is occurring incentral lower back when active  He is taking hydrocodone 10/325 BID, tizanidine QHS and 1/2 QAM.  He is doing PT for leg weakness. Dry needling has been effective.  Using walker.    Mood stable; taking remeron 15mg; denies depression    Follow-up  Associated symptoms include abdominal pain, fatigue and neck pain. Pertinent negatives include no arthralgias, chest pain, chills, coughing, fever, headaches, joint swelling, nausea, rash, sore throat, vomiting or weakness.   DepressionPatient is not experiencing: confusion, palpitations and shortness of breath.  Patient has a history of: CAD    Diabetes  Pertinent negatives for  hypoglycemia include no confusion, dizziness or headaches. Associated symptoms include fatigue. Pertinent negatives for diabetes include no chest pain, no polydipsia, no polyuria and no weakness.   Coronary Artery Disease  Pertinent negatives include no chest pain, chest tightness, dizziness, leg swelling, palpitations or shortness of breath.   Atrial Fibrillation  Symptoms are negative for chest pain, dizziness, palpitations, shortness of breath and weakness. Past medical history includes atrial fibrillation and CAD.       Past Medical History:   Diagnosis Date    Anticoagulant long-term use     xarelto,asa    Arthritis     Atrial fibrillation 2013    cardioverted    Bleb, lung     Cataract     OS    Colon polyp     Coronary artery disease     Coronary artery disease involving native coronary artery of native heart with unstable angina pectoris 3/18/2018    Diabetes mellitus     Diabetes mellitus, type 2     Diverticulosis     Encounter for blood transfusion     General anesthetics causing adverse effect in therapeutic use     delayed emergence per patient's wife    GERD (gastroesophageal reflux disease)     HEARING LOSS     bilateral aids    Hemorrhoid     HLD (hyperlipidemia)     Hypertension     Iron deficiency anemia     Neuropathy of leg     Pneumonia due to other staphylococcus     Prostate cancer     prostate    Spontaneous pneumothorax     bilateral    Thyroid disease     tumors, non cancerous       Past Surgical History:   Procedure Laterality Date    CARDIOVERSION      CATARACT EXTRACTION      bilateral    CERVICAL SPINE FRACTURE SURGERY      c7 t1 ACDF     CHEST TUBE INSERTION Right     COLONOSCOPY      CORONARY STENT PLACEMENT      x 2    ENDOSCOPIC ULTRASOUND OF UPPER GASTROINTESTINAL TRACT N/A 3/13/2019    Procedure: ULTRASOUND, UPPER GI TRACT, ENDOSCOPIC;  Surgeon: Andrew Shirley MD;  Location: [facility] ([address] [place] [address]);  Service: Endoscopy;  Laterality: N/A;     ESOPHAGOGASTRODUODENOSCOPY N/A 2/23/2019    Procedure: ESOPHAGOGASTRODUODENOSCOPY (EGD);  Surgeon: Jackeline Richards MD;  [facility];  Service: Endoscopy;  Laterality: N/A;    ESOPHAGOGASTRODUODENOSCOPY N/A 2/6/2020    Procedure: EGD (ESOPHAGOGASTRODUODENOSCOPY);  Surgeon: Andrea Kerns Jr., MD;  Location: [facility];  Service: Endoscopy;  Laterality: N/A;    HERNIA REPAIR      umbilical    INSERTION OF TUNNELED CENTRAL VENOUS CATHETER (CVC) WITH SUBCUTANEOUS PORT Right 4/4/2019    Procedure: [id number] (POWER PORT);  Surgeon: Nahum Zaidi MD;  Location: [facility];  Service: General;  Laterality: Right;    KNEE SURGERY Left 2017    open thoracotomy Left 1966    With pleurectomy    Pleurectomy Left 1966    For treatment of left pneumothorax    PROSTATECTOMY  2001    open    ROBOT-ASSISTED SURGICAL REMOVAL OF ESOPHAGUS USING DA ROSALIA XI N/A 7/2/2019    Procedure: XI ROBOTIC ESOPHAGECTOMY;  Surgeon: Chad Milian MD;  Location: [address];  Service: General;  Laterality: N/A;    TUBE THORACOTOMY  1966    pneumothorax left--open       Review of patient's allergies indicates:   Allergen Reactions    Codeine Nausea And Vomiting       Social History     Socioeconomic History    Marital status:     Number of children: 2   Occupational History    Occupation:     Occupation: prior [employer]    Tobacco Use    Smoking status: Former Smoker     Packs/day: 1.00     Years: 20.00     Pack years: 20.00     Types: Cigarettes    Smokeless tobacco: Never Used    Tobacco comment: quit smoking in 1980's.   Substance and Sexual Activity    Alcohol use: Yes     Comment: 1 drink /month    Drug use: No    Sexual activity: Not Currently   Social History Narrative    From [place]     Social Determinants of Health     Financial Resource Strain: Low Risk     Difficulty of Paying Living Expenses: Not hard at all   Food Insecurity: No Food Insecurity    Worried About  Running Out of Food in the Last Year: Never true    Ran Out of Food in the Last Year: Never true   Transportation Needs: No Transportation Needs    Lack of Transportation (Medical): No    Lack of Transportation (Non-Medical): No   Physical Activity: Inactive    Days of Exercise per Week: 0 days    Minutes of Exercise per Session: 0 min   Stress: Stress Concern Present    Feeling of Stress : To some extent   Social Connections: Unknown    Frequency of Communication with Friends and Family: More than three times a week    Frequency of Social Gatherings with Friends and Family: More than three times a week    Active Member of Clubs or Organizations: No    Attends Club or Organization Meetings: Never    Marital Status:    Housing Stability: Low Risk     Unable to Pay for Housing in the Last Year: No    Number of Places Lived in the Last Year: 1    Unstable Housing in the Last Year: No       Current Outpatient Medications on File Prior to Visit   Medication Sig Dispense Refill    atorvastatin (LIPITOR) 80 MG tablet TAKE 1 TABLET(80 MG) BY MOUTH EVERY EVENING 90 tablet 0    HYDROcodone-acetaminophen (NORCO)  mg per tablet Take 1 tablet by mouth every 12 (twelve) hours as needed for Pain. 60 tablet 0    metoprolol tartrate (LOPRESSOR) 50 MG tablet Take 1 tablet (50 mg total) by mouth 2 (two) times daily. 60 tablet 6    mirtazapine (REMERON) 15 MG tablet Take 1 tablet (15 mg total) by mouth every evening. 30 tablet 11    omeprazole (PRILOSEC) 40 MG capsule Take 1 capsule (40 mg total) by mouth once daily. 90 capsule 3    rivaroxaban (XARELTO) 20 mg Tab Take 1 tablet (20 mg total) by mouth daily with dinner or evening meal. 30 tablet 11    tiZANidine (ZANAFLEX) 4 MG tablet TAKE 1 TABLET(4 MG) BY MOUTH EVERY 8 HOURS 90 tablet 3    b complex vitamins capsule Take 1 capsule by mouth once daily.      lidocaine-prilocaine (EMLA) cream U UTD  0    nitroGLYCERIN (NITROSTAT) 0.4 MG SL tablet  DISSOLVE 1 TABLET UNDER THE TONGUE EVERY 5 MINUTES AS NEEDED FOR CHEST PAIN (Patient not taking: No sig reported) 100 tablet prn    vitamin A 11324 UNIT capsule Take 10,000 Units by mouth once daily.       No current facility-administered medications on file prior to visit.       Family History   Problem Relation Age of Onset    Mental illness Mother         dementia    Coronary artery disease Father     Cancer Father         stomach with mets    Diabetes Brother        Review of Systems   Constitutional: Positive for appetite change, fatigue and unexpected weight change. Negative for activity change, chills and fever.   HENT: Positive for hearing loss, rhinorrhea and trouble swallowing. Negative for sore throat.    Eyes: Negative for pain, discharge and visual disturbance.   Respiratory: Negative for cough, chest tightness, shortness of breath and wheezing.    Cardiovascular: Negative for chest pain, palpitations and leg swelling.   Gastrointestinal: Positive for abdominal pain. Negative for blood in stool, constipation, diarrhea, nausea and vomiting.   Endocrine: Negative for polydipsia and polyuria.   Genitourinary: Negative for difficulty urinating, dysuria, hematuria and urgency.   Musculoskeletal: Positive for back pain and neck pain. Negative for arthralgias, gait problem and joint swelling.   Skin: Negative for rash and wound.   Neurological: Negative for dizziness, weakness, light-headedness and headaches.   Hematological: Negative for adenopathy.   Psychiatric/Behavioral: Positive for depression. Negative for confusion and dysphoric mood.       Objective:      /60   Pulse 99   Temp 98.2 °F (36.8 °C) (Oral)   Resp 18   Ht 6' (1.829 m)   Wt 64.1 kg (141 lb 5 oz)   SpO2 97%   BMI 19.17 kg/m²   Physical Exam  Constitutional:       General: He is not in acute distress.     Appearance: Normal appearance. He is well-developed.   HENT:      Head: Normocephalic and atraumatic.      Right Ear:  External ear normal.      Left Ear: External ear normal.      Mouth/Throat:      Pharynx: Uvula midline. No oropharyngeal exudate.   Eyes:      General: Lids are normal.      Conjunctiva/sclera: Conjunctivae normal.      Pupils: Pupils are equal, round, and reactive to light.   Neck:      Thyroid: No thyroid mass or thyromegaly.      Trachea: Phonation normal.   Cardiovascular:      Rate and Rhythm: Normal rate and regular rhythm.      Heart sounds: Normal heart sounds. No murmur heard.    No friction rub. No gallop.   Pulmonary:      Effort: Pulmonary effort is normal. No respiratory distress.      Breath sounds: Normal breath sounds. No wheezing or rales.   Abdominal:      General: Abdomen is flat. Bowel sounds are normal.      Palpations: Abdomen is soft. There is no hepatomegaly or splenomegaly.      Tenderness: There is abdominal tenderness in the epigastric area.   Musculoskeletal:      Cervical back: Full passive range of motion without pain, normal range of motion and neck supple.      Lumbar back: Tenderness present. No swelling, deformity or spasms. Decreased range of motion.        Back:    Lymphadenopathy:      Cervical: No cervical adenopathy.   Skin:     General: Skin is warm and dry.   Neurological:      Mental Status: He is alert and oriented to person, place, and time.      Cranial Nerves: No cranial nerve deficit.      Coordination: Coordination normal.   Psychiatric:         Speech: Speech normal.         Behavior: Behavior normal.         Thought Content: Thought content normal.         Judgment: Judgment normal.           Results for orders placed or performed in visit on 06/29/22   Hemoglobin A1C   Result Value Ref Range    Hemoglobin A1C 6.1 (H) 4.0 - 5.6 %    Estimated Avg Glucose 128 68 - 131 mg/dL   Comprehensive Metabolic Panel   Result Value Ref Range    Sodium 138 136 - 145 mmol/L    Potassium 5.2 (H) 3.5 - 5.1 mmol/L    Chloride 105 95 - 110 mmol/L    CO2 27 23 - 29 mmol/L    Glucose  147 (H) 70 - 110 mg/dL    BUN 27 (H) 8 - 23 mg/dL    Creatinine 1.0 0.5 - 1.4 mg/dL    Calcium 9.8 8.7 - 10.5 mg/dL    Total Protein 6.8 6.0 - 8.4 g/dL    Albumin 3.9 3.5 - 5.2 g/dL    Total Bilirubin 0.6 0.1 - 1.0 mg/dL    Alkaline Phosphatase 76 55 - 135 U/L    AST 15 10 - 40 U/L    ALT 11 10 - 44 U/L    Anion Gap 6 (L) 8 - 16 mmol/L    eGFR if African American >60.0 >60 mL/min/1.73 m^2    eGFR if non African American >60.0 >60 mL/min/1.73 m^2     *Note: Due to a large number of results and/or encounters for the requested time period, some results have not been displayed. A complete set of results can be found in Results Review.     CT abdomen reviewed    Assessment:       1. GE junction carcinoma    2. Pain of upper abdomen    3. Type 2 diabetes mellitus treated without insulin    4. Abnormal weight loss    5. Coronary artery disease involving native coronary artery of native heart with unstable angina pectoris    6. Essential hypertension    7. Depression, unspecified depression type        Plan:       GE junction carcinoma  -     Case Request Endoscopy: ESOPHAGOGASTRODUODENOSCOPY (EGD)    Pain of upper abdomen  -     Case Request Endoscopy: ESOPHAGOGASTRODUODENOSCOPY (EGD)    Type 2 diabetes mellitus treated without insulin  -     metFORMIN (GLUCOPHAGE-XR) 500 MG ER 24hr tablet; Take 1 tablet (500 mg total) by mouth once daily at 6am.  Dispense: 90 tablet; Refill: 3  -     Comprehensive Metabolic Panel; Future; Expected date: 10/04/2022  -     Hemoglobin A1C; Future; Expected date: 10/04/2022    Abnormal weight loss    Coronary artery disease involving native coronary artery of native heart with unstable angina pectoris    Essential hypertension    Depression, unspecified depression type        stop megace (ineffective)  Still suspect likely cancer progression as source of progressive weight loss, but no specific source seen on recent CT  Will arrange urgent EGD  continue oncology follow-up as planned  Continue  protonix BID  F/u with cardiology as planned   Reduce to metformin XR 500mg QAM  Continue other present meds  Counseled on regular exercise, maintenance of a healthy weight, balanced diet rich in fruits/vegetables and lean protein, and avoidance of unhealthy habits like smoking and excessive alcohol intake.  Integrative medicine follow-up regarding chemo-induced neuropathy

## 2022-07-06 NOTE — TELEPHONE ENCOUNTER
----- Message from Annel Valente sent at 7/6/2022  2:24 PM CDT -----  Contact: Wife, Ania  Type:  Patient Returning Call    Who Called:  Pt  Who Left Message for Patient:  Calista  Does the patient know what this is regarding?:  Yes  Best Call Back Number:  [phone]  Additional Information:  Please call back.  Thanks.

## 2022-07-06 NOTE — TELEPHONE ENCOUNTER
Attempted to call the patient at numbers listed in chart to notify them of when and where EGD is scheduled as well as the instructions regarding Xarelto and other medications, left message asking for a call back, clinic number provided.

## 2022-07-06 NOTE — Clinical Note
Patient with h/o GE junction cancer with rapid weight loss. Recent abdomen CT unremarkable. I have ordered and urgent EGD to further evaluate for cancer recurrence.  Please contact to arrange this with patient ASAP.

## 2022-07-06 NOTE — TELEPHONE ENCOUNTER
Returned call to patient's wife Mrs Jorge, Mrs Jorge was notified that patient is scheduled for EGD on Friday 07/08/2022 at [facility], medication directions were discussed with Mrs Jorge, EGD instructions were discussed with there, Mrs Jorge verbalized understanding of this.

## 2022-07-06 NOTE — TELEPHONE ENCOUNTER
Called and notified Mrs Jorge that I have spoken with Dr. Kerns and Dr. Kerns states that he will look at the schedule for tomorrow and let us know if patient can be scoped tomorrow, patient's wife states that patient has not taken Xarelto yet today.

## 2022-07-07 NOTE — TELEPHONE ENCOUNTER
----- Message from Clinton Andrews MD sent at 7/6/2022  5:51 PM CDT -----  Let's see him post endo; if no explanation may want to pursue chest imaging.  Thx.  ----- Message -----  From: Jyoti Pat MA  Sent: 7/6/2022  10:58 AM CDT  To: Clinton Andrews MD      ----- Message -----  From: Cade Juan MD  Sent: 7/6/2022  10:54 AM CDT  To: Juliana SCHROEDER. Staff    Patient with increasing epigastric pain, loss of appetite and 20 pounds reji loss in the last month. His recent abdominal CT did not show any major findings. I have referred him to Dr. Kerns to do an endoscopy as I feel his GE cancer has likely recurred. Just FYI in case you would recommend anything else at this time. He will follow-up with [facility] in August.

## 2022-07-08 PROBLEM — R13.10 DYSPHAGIA: Status: ACTIVE | Noted: 2022-01-01

## 2022-07-13 NOTE — TELEPHONE ENCOUNTER
Called and spoke with mandi's wife to set up Video Swallow study with speech per written notes on post procedure note of Dr. Kerns, procedure set up at [facility] on 07/21/2022 at 10 am, patient's wife verbalized understanding of this.

## 2022-07-22 NOTE — Clinical Note
Case request for colonoscopy/panchito asap Fasting cortisol, b-12 folate, tsh and free t4 psa asap Pet scan asap Rtc with results.

## 2022-07-22 NOTE — TELEPHONE ENCOUNTER
Called pharmacy need verbal for different concentration of megace which would be a much cheaper copay of around 47 dollars.  Okayed for megace 40mg/ml take 15mls daily.

## 2022-07-22 NOTE — PROGRESS NOTES
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  The patient is approximately 30 months postop and returns to clinic for interval surveillance examination.  Pain from peripheral neuropathy appears stable.  Patient is feeling generally weak, has diminished appetite, and is losing weight.  No signs or symptoms of GI blood loss.  Patient fears cancer may have returned in light of how he is feeling.  Despite recent swallowing study which indicated anastomotic apple-core lesion, with the patient underwent interval endoscopy no significant pathology was found other than benign stenosis.  This was dilated.  Biopsies taken were negative.  Wife is very concerned with his lack of appetite/interest in food as well as his continued profound weight loss.  She is enquiring about what additional workup can be undertaken as well as which medication adjustments might be helpful in his situation.  Patient has begun to experience constipation which is a new difficulty.  He will go several days between bowel movements.  Patient attributes this to his lack of oral intake.    Physical examination:  Well-developed, cachectic, elderly, white gentleman, in no acute distress, who has a weight of 138.5 lb (decreased by 29 lb).  VITAL SIGNS: Documented  and reviewed this visit.  HEENT:  Bitemporal wasting, atraumatic. Oral mucosa pink and moist. Lips without lesions. Tongue midline. Oropharynx clear. Nonicteric sclerae.   NECK: Supple, no adenopathy. No carotid bruits, thyromegaly or thyroid nodule.   HEART: Regular rate and rhythm without murmur, gallop or rub.   LUNGS: Clear to auscultation bilaterally. Normal respiratory effort.   ABDOMEN: Soft, nontender,  nondistended with positive normoactive bowel sounds, no hepatosplenomegaly.   EXTREMITIES: No cyanosis, clubbing or edema. Distal pulses are intact.  Increased bony prominences consistent with weight loss.  AXILLAE AND GROIN: No palpable pathologic lymphadenopathy is appreciated.   SKIN: Intact/turgor normal   NEUROLOGIC: Cranial nerves II-XII grossly intact. Motor:  Generalized loss of muscle bulk and tone. Strength/sensory 5/5 throughout. Gait unstable.  Patient is ambulating with the assistance of a wheelchair.    Laboratory:  Most recent laboratory was obtained on 06/29/2022.  Sodium 138, potassium 5.2, chloride 105, CO2 27, BUN 27, creatinine 1, glucose 147, calcium 9.8, liver function test within normal limits, GFR is greater than 60.   Most recent CBC is from 06/10/2022.  White count 4.3, hemoglobin 11.6, hematocrit 36.3, platelets 119, absolute neutrophil count 3300.     Noncontrast CT of the chest/abdomen:  Study obtained 06/21/2022.  Status post esophagectomy and gastric pull-up procedure.  Mediastinal adenopathy is not seen.  Atherosclerotic calcification of the LAD coronary artery is prominent.  Multiple soft tissue densities in the lung fields bilaterally the largest in the right lower lobe measuring 9 mm.  These are unchanged.     New faint infiltrate in the left lower lobe may represent an infectious process.  The patient has anasarca in the abdomen fat which decreases the quality of the images.  There is possible retroperitoneal lymph node on the left with a short axis of 1 cm at the level of the left renal hilum.  Cholelithiasis.    Barium swallow:  Study obtained 07/21/2022.  1. No aspiration.  Please see speech pathology report for full details.  2. Apple-core appearance at the GE junction concerning for mass lesion.  Endoscopy should be considered.    EGD:  Study performed 07/08/2022.  - Normal oropharynx.   - Normal cricopharyngeus.   - Normal upper third of esophagus.   - Normal esophageal  anastomosis at 20 cm. Biopsied.   - Gastric mucosal atrophy.   - Normal stomach otherwise.   - Normal pylorus.   - Normal examined duodenum.   - Normal esophageal anastomosis. Dilated.     Pathology:  GEJ, BIOPSY:   --SQUAMOGLANDULAR MUCOSA WITH MARKED ACUTE AND CHRONIC INFLAMMATION.   --NO VIRAL CYTOPATHIC EFFECT IS SEEN.   --NO FUNGAL HYPHAE ARE SEEN ON ROUTINE H  and  E STAIN.   --NEGATIVE FOR SPECIALIZED INTESTINAL METAPLASIA.   --WELL CONTROLLED PAS STAIN IS NEGATIVE FOR INVASIVE FUNGAL HYPHAE.     Impression:  1.  Adenocarcinoma of the GE junction (T2, N0, M0).  2.  Mild thrombocytopenia-stable.  3.  Peripheral neuropathy.  4.  Gait disturbance.  5. Indeterminate pulmonary nodules/single lesion subtly enlarged the right lower lobe since last study.   6. Normocytic anemia.    7. Unexplained weight loss.  8. New onset constipation.    Plan:  1. Obtain additional lab to include fasting cortisol, TSH, free T4, B12, folate, and PSA.  2. Obtain additional imaging to consist of a CT PET scan.  3. Case request for colonoscopy at earliest convenience.  4. Discontinue Remeron.  5. Start Effexor  mg p.o. daily.  6. Megace extended release 625 mg p.o. daily  7. Return to clinic to review results of interval workup at earliest convenience.        This note was created using voice recognition software and may contain grammatical errors.

## 2022-07-22 NOTE — TELEPHONE ENCOUNTER
----- Message from Mary Taveras sent at 7/22/2022  4:23 PM CDT -----  Type:Needs Medical Advice    Who Called:##@ Andrew  Best call back number:[phone]  Additional Info: Requesting a call back regarding#Medication prescribed to PT is $500 after Insurance. Pharmacist suggested alternate. Please call to discuss.  Please Advise- Thank you

## 2022-07-26 NOTE — TELEPHONE ENCOUNTER
Patient called requesting results of the swallow study that he performed recently, results are finalized, please advise.

## 2022-07-27 NOTE — PROGRESS NOTES
PET Imaging Questionnaire    1. Are you a Diabetic? Recent Blood Sugar level? Yes    2. Are you anemic? Bone Marrow Stimulation Meds? Yes    3. Have you had a CT Scan, if so when & where was your last one? Yes -     4. Have you had a PET Scan, if so when & where was your last one? Yes -     5. Chemotherapy or currently on Chemotherapy? Yes    6. Radiation therapy? Yes    Surgical History:   Past Surgical History:   Procedure Laterality Date    CARDIOVERSION      CATARACT EXTRACTION      bilateral    CERVICAL SPINE FRACTURE SURGERY      c7 t1 ACDF     CHEST TUBE INSERTION Right     COLONOSCOPY      CORONARY STENT PLACEMENT      x 2    ENDOSCOPIC ULTRASOUND OF UPPER GASTROINTESTINAL TRACT N/A 3/13/2019    Procedure: ULTRASOUND, UPPER GI TRACT, ENDOSCOPIC;  Surgeon: Andrew Shirley MD;  Location: [address]);  Service: Endoscopy;  Laterality: N/A;    ESOPHAGOGASTRODUODENOSCOPY N/A 2/23/2019    Procedure: ESOPHAGOGASTRODUODENOSCOPY (EGD);  Surgeon: Jackeline Richards MD;  Location: [facility];  Service: Endoscopy;  Laterality: N/A;    ESOPHAGOGASTRODUODENOSCOPY N/A 2/6/2020    Procedure: EGD (ESOPHAGOGASTRODUODENOSCOPY);  Surgeon: Andrea Kerns Jr., MD;  Location: [facility];  Service: Endoscopy;  Laterality: N/A;    ESOPHAGOGASTRODUODENOSCOPY N/A 7/8/2022    Procedure: ESOPHAGOGASTRODUODENOSCOPY (EGD);  Surgeon: Andrea Kerns Jr., MD;  Location: [facility];  Service: Endoscopy;  Laterality: N/A;    HERNIA REPAIR      umbilical    INSERTION OF TUNNELED CENTRAL VENOUS CATHETER (CVC) WITH SUBCUTANEOUS PORT Right 4/4/2019    Procedure: [id number] (POWER PORT);  Surgeon: Nahum Zaidi MD;  Location: [facility];  Service: General;  Laterality: Right;    KNEE SURGERY Left 2017    open thoracotomy Left 1966    With pleurectomy    Pleurectomy Left 1966    For treatment of left pneumothorax    PROSTATECTOMY  2001    open    ROBOT-ASSISTED SURGICAL REMOVAL OF ESOPHAGUS USING DA ROSALIA XI N/A  7/2/2019    Procedure: XI ROBOTIC ESOPHAGECTOMY;  Surgeon: Chad Milian MD;  Location: [facility] OR [address] [place] [address];  Service: General;  Laterality: N/A;    TUBE THORACOTOMY  1966    pneumothorax left--open   7.      8. Have you been fasting for at least 6 hours? Yes    9. Is there any chance you may be pregnant or breastfeeding? No    Assay: 14.1 MCi@:13:10   Injection Site:RT Forearm    Residual: 1.868 mCi@: 13:12   Technologist: Will Portillo Injected:12.23mCi

## 2022-08-01 NOTE — Clinical Note
Pt has never been contacted by GI to schedule a colonoscopy.  Please call them to expedited emergent scheduling Consult leandroorte for consideration of thoracoscopy/wedge biopsy of small pulmonary nodules See me in 4 weeks with cbc cmp ldh mg

## 2022-08-01 NOTE — PROGRESS NOTES
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  The patient is approximately 30 months postop and returns to clinic for interval surveillance examination.  Pain from peripheral neuropathy appears stable.  Patient is feeling generally weak, has diminished appetite, and is losing weight.  No signs or symptoms of GI blood loss.  Patient fears cancer may have returned in light of how he is feeling.  Despite recent swallowing study which indicated anastomotic apple-core lesion, with the patient underwent interval endoscopy no significant pathology was found other than benign stenosis.  This was dilated.  Biopsies taken were negative.  Wife is very concerned with his lack of appetite/interest in food as well as his continued profound weight loss.  She is enquiring about what additional workup can be undertaken as well as which medication adjustments might be helpful in his situation.  Patient has begun to experience constipation which is a new difficulty.  He will go several days between bowel movements.  Patient attributes this to his lack of oral intake.    Patient returns to clinic to review interval studies.  Patient was never contacted for colonoscopy.  Patient continues to do poorly at home.  He is weak/wheelchair confined.  Patient is requesting a hospital bed for his comfort.  Patient is tolerating Megace syrup and is at least consuming 3 small meals per day where he had not been able to do so before.  He remains chronically nauseated and is clutching an emesis bag at the time of my visit today.    Physical examination:  Well-developed, cachectic, elderly, white gentleman, in no acute distress, who has a weight of 138.5 lb  (stable).  VITAL SIGNS: Documented  and reviewed this visit.  HEENT:  Bitemporal wasting, atraumatic. Oral mucosa pink and moist. Lips without lesions. Tongue midline. Oropharynx clear. Nonicteric sclerae.   NECK: Supple, no adenopathy. No carotid bruits, thyromegaly or thyroid nodule.   HEART: Regular rate and rhythm without murmur, gallop or rub.   LUNGS: Clear to auscultation bilaterally. Normal respiratory effort.   ABDOMEN: Soft, nontender, nondistended with positive normoactive bowel sounds, no hepatosplenomegaly.   EXTREMITIES: No cyanosis, clubbing or edema. Distal pulses are intact.  Increased bony prominences consistent with weight loss.  AXILLAE AND GROIN: No palpable pathologic lymphadenopathy is appreciated.   SKIN: Intact/turgor normal   NEUROLOGIC: Cranial nerves II-XII grossly intact. Motor:  Generalized loss of muscle bulk and tone. Strength/sensory 5/5 throughout. Gait unstable.  Patient is ambulating with the assistance of a wheelchair.    Laboratory:  PSA 0.05.  Vitamin B12 258, folate 8.8.  Fasting cortisol 18.3  TSH 0.738, free T4 1.24    CT PET:  - Multiple lung nodules which are new since 2020, some with very low level FDG uptake.  These may be infectious/inflammatory but metastatic disease remains in the differential, as well as primary bronchogenic carcinoma in this patient with evidence of emphysema.  - No other findings suspicious for malignancy are noted.  Multiple incidental CT findings detailed above.    Colonoscopy:  Remains to be scheduled.    Impression:  1.  Adenocarcinoma of the GE junction (T2, N0, M0).  2.  Mild thrombocytopenia-stable.  3.  Peripheral neuropathy.  4.  Gait disturbance.  5. Indeterminate pulmonary nodules/single lesion subtly enlarged the right lower lobe since last study.   6. Normocytic anemia.    7. Unexplained weight loss.  8. New onset constipation.  9. Small, bilateral, indeterminate pulmonary nodules-metastases cannot be ruled out and none of the lesions  are amenable to percutaneous biopsy.    Plan:  1. Continue Megace.  2. Discontinue Remeron in favor of Zoloft 50 mg p.o. daily.  3. Trial of Zofran oral dissolving tablet for control of nausea emesis.  4. Contact GI regarding rescheduling colonoscopy.  5. Consult Dr. Mcgee for consideration thoracoscopy/wedge biopsy of indeterminate pulmonary nodules.    6. Hospital bed for comfort, as patient's weight loss causes him pain when lying in atypical bed.  He needs to be able to position himself in such a way that he can actually sleep.      This note was created using voice recognition software and may contain grammatical errors.

## 2022-08-01 NOTE — TELEPHONE ENCOUNTER
I reached out to Dr Kerns's office to inform of the emergent Colonoscopy and she stated she will have Dr Kerns take a look because he's booked for the next couple months. Pt informed they will reach out and if he doesn't hear back to give the office a call

## 2022-08-01 NOTE — TELEPHONE ENCOUNTER
----- Message from Clinton Andrews MD sent at 8/1/2022 11:30 AM CDT -----  Pt has never been contacted by GI to schedule a colonoscopy.  Please call them to expedited emergent scheduling  Consult ayan for consideration of thoracoscopy/wedge biopsy of small pulmonary nodules  See me in 4 weeks with cbc cmp ldh mg

## 2022-08-03 NOTE — TELEPHONE ENCOUNTER
----- Message from Eduard Cano MD sent at 8/3/2022 11:10 AM CDT -----  Regarding: FW: Xarelto and aspirin  Okay to hold Xarelto 2 days before procedure and aspirin 3 days before procedure  ----- Message -----  From: Rasheeda Jeter LPN  Sent: 8/3/2022  10:09 AM CDT  To: Eduard Cano MD  Subject: FW: Xarelto and aspirin                            ----- Message -----  From: Calista Calderón LPN  Sent: 8/3/2022   8:49 AM CDT  To: Rachael Love  Subject: Xarelto and aspirin                              Good Morning,  This patient is scheduled for a colonoscopy with Dr. Kerns on 08/24/2022 and will need to hold his xarelto and aspirin for 2 days prior to the procedure.  We need the approval to hold the medication.  Please advise.  Calista Bertrand LPN

## 2022-08-03 NOTE — TELEPHONE ENCOUNTER
Called and spoke with patient's wife, colonoscopy scheduled with the patient's wife, my chart message sent to my chart, patient's wife verbalized understanding of this.

## 2022-08-06 NOTE — TELEPHONE ENCOUNTER
Having rectal bleeding, called to room because he could not get out of bed. Noted his underwear is saturated is blood. Not out of bed due to his chronic pain, ate drank. He is scheduled for a colonoscopy end of month. Triage done- dispo 911. Spouse states states she can get him to the ED with help, advised to let 911 come as she stated he was very cold, unable to get up, blood and on blood thinner. Verb understanding. Instructed to call back for any further questions or concerns or worsening S/S.     Reason for Disposition   Shock suspected (e.g., cold/pale/clammy skin, too weak to stand, low BP, rapid pulse)    Protocols used: RECTAL BLEEDING-A-AH

## 2022-08-07 NOTE — TELEPHONE ENCOUNTER
Spoke with Ania (wife) states she missed a call form Dr. Juan 15 minutes ago.  Attempted to call Dr. Juan x 2 no answer.  LVM to call back.  Informed Ania that a voicemail was left for a return call from Dr. Juan.  He is currently on call today. Wife then states patient has been dizzy for weeks.  She asked if patient could take any medication to help with the dizziness. Wife states he was examined  by a provider and they cannot find reason for the continued dizziness.  Offered to triage patient she declined stated he was sleep and she did not want to wake him up.  Advised wife to call back when patient wakes up.  Ania verbalized understanding.      Reason for Disposition   Nursing judgment or information in reference    Protocols used: NO GUIDELINE [id number]

## 2022-08-08 NOTE — NURSING
Spoke to patient's wife and confirmed appt scheduled for tomorrow with Dr. Randolph at 0900. Date time and location confirmed.   Patient's wife asking about a hospital bed order from Dr. Andrews. I read her Guillermina's response to her portal message and then forwarded Guillermina Mrs. Garcia's answer that they do not have a home health team.        0 = independent

## 2022-08-09 NOTE — PROGRESS NOTES
[facility]      THORACIC MULTIDISCIPLINARY TUMOR BOARD  PATIENT REVIEW FORM  ________________________________________________________________________    CLINIC #: [id number]  DATE: 08/09/2022    TUMOR SITE:   pulm nodules     PRESENTER:   Dr. Randolph     PATIENT SUMMARY:   Hx of GE junction carcinoma who completed neoadjuvant chemo/XRT, went on to have surgical resection and primary anastomosis  Seen for eval of pulmonary nodules however presented with complaints of Abd pain, weight loss, poor appetite     PET/CT 7/27/22: Several new pulmonary nodules measuring 8-9 mm, concerning. Low FDG uptake.     Physically not well enough for surgical intervention.     BOARD RECOMMENDATIONS:   Refer to primary oncologist to monitor. Message sent to Dr. Clinton Andrews    CONSULT NEEDED:     [] Surgery    [] Hem/Onc    [] Rad/Onc    [] Dietary                 [] Social Service    [] Psychology       [] Pulmonology      GROUP STAGE:     [] O    [] 1A    [] IB    [] IIA    [] IIB     [] IIIA     [] IIIB     [] IIIC    [] IV                               [] Local recurrence     [] Regional recurrence     [] Distant recurrence                   [] NSCLC     [] SCLC     Tumor type          [x] Janell'l Treatment Guidelines reviewed and care planned is consistent with guidelines.         (i.e., NCCN, NCI, PD, ACO, AUA, etc.)    PRESENTATION AT CANCER CONFERENCE:         [x] Prospective    [] Retrospective     [] Follow-Up          [] Eligible for clinical trial      Maria T River

## 2022-08-15 NOTE — TELEPHONE ENCOUNTER
[facility] and [facility] stated they [facility] and is now completed insurance verification. Will process this afternoon and reach out to patient.

## 2022-08-16 NOTE — TELEPHONE ENCOUNTER
----- Message from Evelyn Barth sent at 8/16/2022  4:16 PM CDT -----  Contact: Wife Ania  Type:  Patient Returning Call    Who Called:  Ania (wife)  Who Left Message for Patient:  N/A- needs to speak to the nurse please  Does the patient know what this is regarding?:  per HH company, Dr Juan needs to place an order for the hospital bed as well  Best Call Back Number:  [phone]  Additional Information:  Please call Mrs Jorge back to discuss. Thank You.

## 2022-08-17 PROBLEM — E86.1 INTRAVASCULAR VOLUME DEPLETION: Status: ACTIVE | Noted: 2022-01-01

## 2022-08-17 PROBLEM — Z71.89 ACP (ADVANCE CARE PLANNING): Status: ACTIVE | Noted: 2022-01-01

## 2022-08-17 PROBLEM — N30.00 ACUTE CYSTITIS WITHOUT HEMATURIA: Status: ACTIVE | Noted: 2022-01-01

## 2022-08-17 PROBLEM — R64 CACHEXIA: Status: ACTIVE | Noted: 2022-01-01

## 2022-08-17 NOTE — TELEPHONE ENCOUNTER
I spoke with Ms. Jorge and let her know that Dr. Aguilar did order a hospital bed and the order was faxed to [facility].

## 2022-08-18 PROBLEM — E43 SEVERE MALNUTRITION: Status: ACTIVE | Noted: 2022-01-01

## 2022-08-18 PROBLEM — R33.9 URINARY RETENTION: Status: ACTIVE | Noted: 2022-01-01

## 2022-08-19 NOTE — TELEPHONE ENCOUNTER
Good afternoon,    Please contact patient regarding rescheduling procedure scheduled on 8/24. Patient is currently in the hospital.

## 2022-08-19 NOTE — TELEPHONE ENCOUNTER
Attempted to reach the patient's wife to check on them, left message asking for a call back, ,clinic number provided.  Procedure cancelled as patient is currently admitted to [facility].

## 2022-08-22 NOTE — PLAN OF CARE
Problem: Physical Therapy Goal  Goal: Physical Therapy Goal  Goals to be met by: 2019    Patient will increase functional independence with mobility by performin. Supine <> sit with [place].  2. Sit <> stand transfer with [place] using No Assistive Device.  3. Bed <> chair transfer via Step Transfer with Supervision using No Assistive Device.  4. Gait  x 300 feet with Supervision using Straight Cane to prepare for community ambulation and endurance activities.  5. Able to tolerate exercise for 15-20 reps with independence.  6. Ascend/descend 2 stairs with right Handrails Stand-by Assistance using Straight Cane.      Outcome: Ongoing (interventions implemented as appropriate)    Discharge Recommendation: HHPT.  Please continue Progressive Mobility Protocol as appropriate.  Appropriate transfer level with nursing staff: Bed <> Chair:  Stand Pivot with minimum assistance with No Assistive Device.    [facility]  7/3/2019         Attending Attestation (For Attendings USE Only)...

## 2022-08-23 NOTE — TELEPHONE ENCOUNTER
Looks like Dr. Andrews offered to see the wife if she wants to come in to talk to him, see if she wants to do that. But it looks like he has been discharged to hospice

## 2022-08-27 NOTE — PROGRESS NOTES
CHIEF COMPLAINT:   Chief Complaint   Patient presents with    Pulmonary Nodules     Boykin/pulmonary nodules       REFERRING PROVIDER: Clinton Andrews MD    Reason for consult:  Evaluation of pulmonary nodules    History of Present Illness     Patient is a 76 y.o. 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  He was found to have metastatic disease in 3 of the resected lymph nodes.  Recent studies have revealed some lung nodules.    He complains of significant abdominal pain, and he has had significant weight loss as well.  His undergone upper endoscopy with findings of an anastomotic narrowing that appeared to be benign by biopsy.  His major complaint is significant abdominal pain in the upper abdomen.    Patient Active Problem List    Diagnosis Date Noted    Urinary retention 08/18/2022    Severe malnutrition 08/18/2022    Acute cystitis without hematuria 08/17/2022    Cachexia 08/17/2022    ACP (advance care planning) 08/17/2022    Intravascular volume depletion 08/17/2022    Dysphagia 07/08/2022    Weakness generalized 04/20/2022    Fatigue 02/11/2022    Insomnia 02/07/2022    History of fall 02/02/2022    Type II diabetes mellitus with neurological manifestations 01/04/2022    Chronic back pain 01/04/2022    Weight decreasing 01/04/2022    Pulmonary nodule 08/27/2021    Polyneuropathy associated with underlying disease 08/19/2020    Gait disturbance 08/19/2020    Unexplained weight loss 01/21/2020    Chest pain 11/14/2019    Leukopenia due to antineoplastic chemotherapy 04/22/2019    Anemia associated with chemotherapy 04/15/2019    Chemotherapy-induced thrombocytopenia 04/15/2019    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    Symptomatic anemia     Coronary artery disease involving native coronary artery of native heart with unstable angina pectoris 03/18/2018    History of heart artery stent      Varicose veins of both legs with edema     Dyslipidemia     Thrombocytopenia 12/07/2017    Type II diabetes mellitus with peripheral circulatory disorder 12/06/2017    History of prostate cancer 08/04/2016    Essential hypertension 08/04/2016    Primary osteoarthritis of left knee 06/22/2016    HLD (hyperlipidemia)     Iron deficiency anemia     PAF (paroxysmal atrial fibrillation)     Chronic anticoagulation 06/27/2013     Past Medical History:   Diagnosis Date    Anticoagulant long-term use     xarelto,asa    Arthritis     Atrial fibrillation 2013    cardioverted    Bleb, lung     Cataract     OS    Colon polyp     Coronary artery disease     Coronary artery disease involving native coronary artery of native heart with unstable angina pectoris 3/18/2018    Diabetes mellitus     Diabetes mellitus, type 2     Diverticulosis     Encounter for blood transfusion     General anesthetics causing adverse effect in therapeutic use     delayed emergence per patient's wife    GERD (gastroesophageal reflux disease)     HEARING LOSS     bilateral aids    Hemorrhoid     HLD (hyperlipidemia)     Hypertension     Iron deficiency anemia     Neuropathy of leg     Pneumonia due to other staphylococcus     Prostate cancer     prostate    Spontaneous pneumothorax     bilateral    Thyroid disease     tumors, non cancerous      Past Surgical History:   Procedure Laterality Date    CARDIOVERSION      CATARACT EXTRACTION      bilateral    CERVICAL SPINE FRACTURE SURGERY      c7 t1 ACDF     CHEST TUBE INSERTION Right     COLONOSCOPY      CORONARY STENT PLACEMENT      x 2    ENDOSCOPIC ULTRASOUND OF UPPER GASTROINTESTINAL TRACT N/A 3/13/2019    Procedure: ULTRASOUND, UPPER GI TRACT, ENDOSCOPIC;  Surgeon: Andrew Shirley MD;  Location: [facility] ([address] [place] [address]);  Service: Endoscopy;  Laterality: N/A;    ESOPHAGOGASTRODUODENOSCOPY N/A 2/23/2019    Procedure: ESOPHAGOGASTRODUODENOSCOPY (EGD);  Surgeon: Jackeline Richards MD;  Location: [facility];   Service: Endoscopy;  Laterality: N/A;    ESOPHAGOGASTRODUODENOSCOPY N/A 2/6/2020    Procedure: EGD (ESOPHAGOGASTRODUODENOSCOPY);  Surgeon: Andrea Kerns Jr., MD;  Location: [facility];  Service: Endoscopy;  Laterality: N/A;    ESOPHAGOGASTRODUODENOSCOPY N/A 7/8/2022    Procedure: ESOPHAGOGASTRODUODENOSCOPY (EGD);  Surgeon: Andrea Kerns Jr., MD;  Location: [facility];  Service: Endoscopy;  Laterality: N/A;    HERNIA REPAIR      umbilical    INSERTION OF TUNNELED CENTRAL VENOUS CATHETER (CVC) WITH SUBCUTANEOUS PORT Right 4/4/2019    Procedure: [id number] (POWER PORT);  Surgeon: Nahum Zaidi MD;  Location: [facility];  Service: General;  Laterality: Right;    KNEE SURGERY Left 2017    open thoracotomy Left 1966    With pleurectomy    Pleurectomy Left 1966    For treatment of left pneumothorax    PROSTATECTOMY  2001    open    ROBOT-ASSISTED SURGICAL REMOVAL OF ESOPHAGUS USING DA ROSALIA XI N/A 7/2/2019    Procedure: XI ROBOTIC ESOPHAGECTOMY;  Surgeon: Chad Milian MD;  Location: [address];  Service: General;  Laterality: N/A;    TUBE THORACOTOMY  1966    pneumothorax left--open      Medication List with Changes/Refills   New Medications    FENTANYL (DURAGESIC) 25 MCG/HR    Place 1 patch onto the skin every 72 hours.    FENTANYL (DURAGESIC) 50 MCG/HR    Place 1 patch onto the skin every 72 hours.   Current Medications    ACETAMINOPHEN (TYLENOL) 650 MG SUPP    Place 650 mg rectally every 4 (four) hours as needed (Fever).    ALBUTEROL-IPRATROPIUM (DUO-NEB) 2.5 MG-0.5 MG/3 ML NEBULIZER SOLUTION    Take 3 mLs by nebulization every 4 (four) hours as needed for Shortness of Breath or Wheezing.    FENTANYL (DURAGESIC) 25 MCG/HR    Place 1 patch onto the skin Every 3 (three) days. Indications: severe chronic pain with opioid tolerance    FENTANYL (DURAGESIC) 50 MCG/HR    Place 1 patch onto the skin every 72 hours. Indications: severe chronic pain with opioid tolerance    HALOPERIDOL (HALDOL) 2 MG/ML  SOLUTION    Take 1 mL by mouth every evening. give at 2100  Indications: anxiety/agitation    HALOPERIDOL (HALDOL) 2 MG/ML SOLUTION    Take 0.5 mLs by mouth every 6 (six) hours. Indications: delirium    HALOPERIDOL (HALDOL) 2 MG/ML SOLUTION    Take 1 mL by mouth every 4 (four) hours as needed (anxiety/agitation). Indications: delirium    HYOSCYAMINE (LEVSIN/SL) 0.125 MG SUBL    Place 0.125 mg under the tongue every 6 (six) hours as needed (Excessive oral secretions).    LACTULOSE (CHRONULAC) 10 GRAM/15 ML SOLUTION    Take 10 g by mouth daily as needed (Constipation).    LORAZEPAM (ATIVAN) 2 MG/ML CONC    Take 0.5 mg by mouth every 2 (two) hours as needed (Anxiety).    MEGESTROL (MEGACE) 400 MG/10 ML (40 MG/ML) SUSP    Take 600 mg by mouth every evening.    METOPROLOL TARTRATE (LOPRESSOR) 50 MG TABLET    Take 1 tablet (50 mg total) by mouth 2 (two) times daily.    MORPHINE 20 MG/ML CONCENTRATED SYRINGE    Take 20 mg by mouth every 2 (two) hours as needed for Pain (shortness of breath). Indications: severe pain with opioid tolerance    OMEPRAZOLE (PRILOSEC) 40 MG CAPSULE    Take 1 capsule (40 mg total) by mouth once daily.    ONDANSETRON (ZOFRAN ODT) 4 MG TBDL    Take 4 mg by mouth every 4 (four) hours as needed (Nausea).    ONDANSETRON (ZOFRAN-ODT) 8 MG TBDL    Take 1 tablet (8 mg total) by mouth every 12 (twelve) hours as needed.    PROCHLORPERAZINE (COMPAZINE) 10 MG TABLET    Take 1 tablet (10 mg total) by mouth every 6 (six) hours as needed.    RIVAROXABAN (XARELTO) 20 MG TAB    Take 1 tablet (20 mg total) by mouth daily with dinner or evening meal.    SENNA-DOCUSATE 8.6-50 MG (SENNA-S) 8.6-50 MG PER TABLET    Take 1 tablet by mouth daily as needed for Constipation.    SERTRALINE (ZOLOFT) 50 MG TABLET    Take 1 tablet (50 mg total) by mouth once daily.    TEMAZEPAM (RESTORIL) 15 MG CAP    Take 15 mg by mouth nightly as needed (Sleep).    TIZANIDINE (ZANAFLEX) 4 MG TABLET    TAKE 1 TABLET(4 MG) BY MOUTH EVERY 8  HOURS    VENLAFAXINE (EFFEXOR XR) 75 MG 24 HR CAPSULE    Take 2 capsules (150 mg total) by mouth once daily.   Discontinued Medications    ATORVASTATIN (LIPITOR) 80 MG TABLET    TAKE 1 TABLET(80 MG) BY MOUTH EVERY EVENING    B COMPLEX VITAMINS CAPSULE    Take 1 capsule by mouth once daily.    BISACODYL (DULCOLAX) 5 MG EC TABLET    Take by mouth.    HYDROCODONE-ACETAMINOPHEN (NORCO)  MG PER TABLET    Take 1 tablet by mouth every 12 (twelve) hours as needed for Pain.    LIDOCAINE-PRILOCAINE (EMLA) CREAM    U UTD    MEGESTROL (MEGACE ES) 625 MG/5 ML (125 MG/ML) SUSP    Take 5 mLs (625 mg total) by mouth once daily.    MEGESTROL (MEGACE) 400 MG/10 ML (40 MG/ML) SUSP        METFORMIN (GLUCOPHAGE-XR) 500 MG ER 24HR TABLET    Take 1 tablet (500 mg total) by mouth once daily at 6am.    NITROGLYCERIN (NITROSTAT) 0.4 MG SL TABLET    DISSOLVE 1 TABLET UNDER THE TONGUE EVERY 5 MINUTES AS NEEDED FOR CHEST PAIN    POLYETHYLENE GLYCOL (GOLYTELY) 236-22.74-6.74 -5.86 GRAM SUSPENSION    Take as directed    VITAMIN A 83954 UNIT CAPSULE    Take 10,000 Units by mouth once daily.     Review of patient's allergies indicates:   Allergen Reactions    Codeine Nausea And Vomiting      Social History     Tobacco Use    Smoking status: Former     Packs/day: 1.00     Years: 20.00     Pack years: 20.00     Types: Cigarettes    Smokeless tobacco: Never    Tobacco comments:     quit smoking in 1980's.   Substance Use Topics    Alcohol use: Yes     Comment: 1 drink /month      Family History   Problem Relation Age of Onset    Mental illness Mother         dementia    Coronary artery disease Father     Cancer Father         stomach with mets    Diabetes Brother       Review of Systems  General:  Positive progressive weight loss and malaise.    HEENT:  No new visual field changes or hoarseness.    Neck:  No complaints.  Respiratory: No shortness of breath.    GI: Positive significant upper abdominal pain for several months.  He has poor  "appetite.    : No complaints.    Musculoskeletal no complaints.    Neurologic: No complaints.      Objective: Physical Exam     Vitals:    08/09/22 0921   BP: 97/68   Pulse: 89   Weight: 61.2 kg (135 lb)   Height: 6' 4" (1.93 m)   PainSc:   4   PainLoc: Abdomen       Objective:  Vital signs: (most recent): Blood pressure 97/68, pulse 89, height 6' 4" (1.93 m), weight 61.2 kg (135 lb).      General:  Frail-appearing man with signs of significant weight loss and cachexia.    HEENT: Normocephalic, atraumatic.  Neck:  Surgical scars present.    Chest:  Significant paucity of subcutaneous tissue and muscles with signs of malnutrition.  Surgical scars are present from previous chest tubes bilaterally.  Lungs: Clear.    Heart: Regular rate and rhythm.    Musculoskeletal:  Patient appears emaciated.  Abdomen:  Surgical scars present.  The entire abdominal cavity is sunken.  No significant masses palpable.    Data Review:   Lab Results   Component Value Date    WBC 5.61 08/18/2022    HGB 11.4 (L) 08/18/2022    HCT 33.1 (L) 08/18/2022    MCV 94 08/18/2022    PLT 91 (L) 08/18/2022   ,   BMP   Lab Results   Component Value Date     08/19/2022    K 3.8 08/19/2022     08/19/2022    CO2 22 08/19/2022    BUN 16 08/19/2022    CREATININE 0.50 08/19/2022    CALCIUM 8.1 (L) 08/19/2022    ANIONGAP 11 08/19/2022    ESTGFRAFRICA >60.0 06/29/2022    EGFRNONAA >60.0 06/29/2022   ,   CMP  Sodium   Date Value Ref Range Status   08/19/2022 136 136 - 145 mmol/L Final     Potassium   Date Value Ref Range Status   08/19/2022 3.8 3.5 - 5.1 mmol/L Final     Chloride   Date Value Ref Range Status   08/19/2022 103 95 - 110 mmol/L Final     CO2   Date Value Ref Range Status   08/19/2022 22 22 - 31 mmol/L Final     Glucose   Date Value Ref Range Status   08/19/2022 129 (H) 70 - 110 mg/dL Final     Comment:     The ADA recommends the following guidelines for fasting glucose:    Normal:       less than 100 mg/dL    Prediabetes:  100 mg/dL " to 125 mg/dL    Diabetes:     126 mg/dL or higher       BUN   Date Value Ref Range Status   08/19/2022 16 9 - 21 mg/dL Final     Creatinine   Date Value Ref Range Status   08/19/2022 0.50 0.50 - 1.40 mg/dL Final     Calcium   Date Value Ref Range Status   08/19/2022 8.1 (L) 8.4 - 10.2 mg/dL Final     Total Protein   Date Value Ref Range Status   08/19/2022 5.5 (L) 6.0 - 8.4 g/dL Final     Albumin   Date Value Ref Range Status   08/19/2022 2.9 (L) 3.5 - 5.2 g/dL Final     Total Bilirubin   Date Value Ref Range Status   08/19/2022 0.9 0.2 - 1.3 mg/dL Final     Alkaline Phosphatase   Date Value Ref Range Status   08/19/2022 66 38 - 145 U/L Final     AST   Date Value Ref Range Status   08/19/2022 34 17 - 59 U/L Final     ALT   Date Value Ref Range Status   08/19/2022 16 0 - 50 U/L Final     Anion Gap   Date Value Ref Range Status   08/19/2022 11 8 - 16 mmol/L Final     eGFR if    Date Value Ref Range Status   06/29/2022 >60.0 >60 mL/min/1.73 m^2 Final     eGFR if non    Date Value Ref Range Status   06/29/2022 >60.0 >60 mL/min/1.73 m^2 Final     Comment:     Calculation used to obtain the estimated glomerular filtration  rate (eGFR) is the CKD-EPI equation.      ,   Lab Results   Component Value Date    INR 1.3 08/17/2022    INR 1.5 08/06/2022    INR 1.1 07/03/2019       Chest X-Ray: Results for orders placed during the hospital encounter of 07/02/19    X-Ray Chest 1 View    Narrative  EXAMINATION:  XR CHEST 1 VIEW    CLINICAL HISTORY:  chest tube in place, s/p esophagectomy;    COMPARISON:  Comparison is made to 07/04/2019.    FINDINGS:  No significant interval change in the appearance of the chest since 07/04/2019 is observed.  No pneumothorax.    Impression  As above      Electronically signed by: Chad Arizmendi MD  Date:    07/05/2019  Time:    07:37      CT Scan:     CT CHEST WITHOUT CONTRAST    Narrative  EXAMINATION:  CT CHEST WITHOUT CONTRAST    CLINICAL HISTORY:  follow up left  apical pulmonary nodule; Malignant neoplasm of cardia    TECHNIQUE:  Low-dose non-contrast axial images were acquired through the chest.  Subsequently, coronal and sagittal reconstructed images were generated from the source data.  Axial MIPs were generated to improve nodule detection.    COMPARISON:  CT of the chest without contrast-11/19/2021.    FINDINGS:  The thyroid gland is enlarged and heterogeneous suggesting a possible underlying multinodular goiter, most prominently affecting the right thyroid lobe.  There is a right chest chemotherapy port with the tip of its catheter noted in the SVC.  There is atherosclerotic calcification present within the thoracic aortic arch.  There is atherosclerotic calcification present within the innominate artery and left subclavian artery.  No thoracic aortic aneurysm.  There are prominent coronary artery calcifications.  No significant volume of pericardial fluid.  There are postoperative changes of total esophagectomy and proximal gastrectomy.  No new enlarged lymph nodes in the chest or axilla.  The trachea and mainstem bronchi are unremarkable.    There are a few scattered calcified granulomas present throughout the chest.  There are multiple scattered noncalcified pulmonary nodules observed in the chest.  There is an 11 x 6 mm solid nodule in the superior segment of the right lower lobe posterior to the right bronchus intermedius on series 4, image 264, slightly larger in size than at the time of the previous study (previously 8.5 x 5 mm).  There is an unchanged 11 mm linear index nodule present in the right middle lobe on series 4, image 415.  There is an unchanged 4 mm solid nodule present in the right upper lobe abutting the interlobar fissure on series 4, image 253 (previously 4 mm).  There is an unchanged 7 mm nodule in the left upper lobe on series 4, image 211.  There is an unchanged 4 mm solid nodule in the left lingula on series 4, image 362.  There is  emphysematous lung architecture present, most pronounced in the lung apices.  There is biapical pleuro-parenchymal scarring.  There are scattered areas of linear atelectasis/fibrosis present throughout the chest.  No new pulmonary nodules observed in the chest on today's study.  No significant volume of pleural fluid or pneumothorax.  No new zone of airspace consolidation.    There is a 16 mm hyperdensity present in the dependent gallbladder body which measures 70 HU, possibly tumefactive sludge as noted at the time of the previous study..  There is possible constipation.  Please correlate clinically.    There is multilevel degenerative change and/or DISH of the thoracic spine.  There is a chronic superior endplate compression fracture observed T10.  No definite new osseous metastatic disease appreciated on the provided images.  There are postoperative changes of ACDF in the lower cervical spine.  There is advanced degenerative change of the right acromioclavicular joint.  No displaced rib fractures.    Impression  1. Status post esophagectomy and partial gastrectomy for treatment of GE junction carcinoma.  There are multiple noncalcified solid pulmonary nodules again noted within the chest, the vast majority of which are unchanged.  One nodule in the superior segment of the right lower lobe posterior to the right bronchus intermedius is slightly larger in size on today's examination measuring 11 x 6 mm (previously 8.5 x 5 mm).  No new pulmonary nodules appreciated on today's study.  2. Enlarged heterogeneous thyroid gland suggesting possible multinodular goiter.  3. Emphysematous lung architecture with scattered areas of atelectasis/fibrosis.  4. Unchanged appearance of the gallbladder when compared to the previous study.  5. Other findings as detailed above.  This report was flagged in Epic as abnormal.      Electronically signed by: Eugenio Mac MD  Date:    02/14/2022  Time:    12:14        Assessment:     1.  Pulmonary nodules uncertain etiology.    2. 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    3. Signs of significant protein calorie malnutrition.  He has had profound weight loss.        Plan:     Patient will be presented at multidisciplinary conference.    Do not believe his generalized current condition would be conducive to surgical resection of lung nodules.  This would also not address his major complaint which is his abdominal pain.

## 2022-09-11 DIAGNOSIS — E11.9 TYPE 2 DIABETES MELLITUS TREATED WITHOUT INSULIN: ICD-10-CM

## 2022-09-11 RX ORDER — METFORMIN HYDROCHLORIDE 500 MG/1
TABLET, EXTENDED RELEASE ORAL
Qty: 180 TABLET | Refills: 0 | OUTPATIENT
Start: 2022-09-11

## 2022-11-15 NOTE — PROGRESS NOTES
Jh Kong discharged to home accompanied by family member.   Patient provided with the following educational materials upon discharge:  avs.   Valuables and belongings sent with patient.   discharge summary, discharge instructions, medications and follow up appointments reviewed with patient and family member.  Patient and family member verbalized understanding   [facility]  General Surgery  Progress Note    Subjective:     History of Present Illness:  No notes on file    Post-Op Info:  Procedure(s) (LRB):  XI ROBOTIC ESOPHAGECTOMY (N/A)   5 Days Post-Op     Interval History:  NAEON.  Still in NSR.  Passing gas, no BM yet.  Abd fullness improved from yesterday.  Didn't sleep much     Medications:  Continuous Infusions:   amiodarone in dextrose 5% 0.5 mg/min (07/07/19 0819)     Scheduled Meds:   enoxaparin  1 mg/kg Subcutaneous Q12H    famotidine (PF)  20 mg Intravenous Q12H    ketorolac  15 mg Intravenous Q8H    levalbuterol  0.63 mg Nebulization Q6H WAKE    metoprolol  25 mg Per J Tube BID    tamsulosin  0.4 mg Oral Daily     PRN Meds:Dextrose 10% Bolus, Dextrose 10% Bolus, glucagon (human recombinant), hydrocodone-apap 7.5-325 MG/15 ML, hydrocodone-apap 7.5-325 MG/15 ML, insulin aspart U-100, ondansetron, promethazine (PHENERGAN) IVPB     Review of patient's allergies indicates:   Allergen Reactions    Codeine Nausea And Vomiting     Objective:     Vital Signs (Most Recent):  Temp: 97.7 °F (36.5 °C) (07/07/19 0736)  Pulse: 74 (07/07/19 0742)  Resp: 17 (07/07/19 0736)  BP: (!) 158/74 (07/07/19 0736)  SpO2: 97 % (07/07/19 0736) Vital Signs (24h Range):  Temp:  [97.7 °F (36.5 °C)-99.6 °F (37.6 °C)] 97.7 °F (36.5 °C)  Pulse:  [71-92] 74  Resp:  [13-20] 17  SpO2:  [95 %-97 %] 97 %  BP: (127-159)/(62-74) 158/74     Weight: 116.9 kg (257 lb 12.8 oz)  Body mass index is 31.38 kg/m².    Intake/Output - Last 3 Shifts       07/05 0700 - 07/06 0659 07/06 0700 - 07/07 0659 07/07 0700 - 07/08 0659    I.V. (mL/kg) 383.6 (3.3) 211.5 (1.8)     NG/ 124     IV Piggyback 100      Total Intake(mL/kg) 823.6 (7) 335.5 (2.9)     Urine (mL/kg/hr) 1400 (0.5) 390 (0.1)     Drains 5 7.5     Stool 0 0     Chest Tube       Total Output 1405 397.5     Net -581.4 -62            Urine Occurrence  1 x     Stool Occurrence 0 x 1 x           Physical Exam   Constitutional:  He is oriented to person, place, and time. No distress.   Eyes: Conjunctivae are normal.   Neck:   GIGI drain intact with serous output.  Incision clean and dry.   Cardiovascular: Normal rate.   Pulmonary/Chest: Effort normal. No respiratory distress.   CT site c/d/i   Abdominal: Soft. He exhibits distension (Mild).   Incisions clean and dry.  J tube in place.  Mild tenderness around incision sites.   Musculoskeletal: He exhibits no edema.   Neurological: He is alert and oriented to person, place, and time.   Psychiatric: He has a normal mood and affect.       Significant Labs:  CBC:   Recent Labs   Lab 07/07/19  0358   WBC 4.09   RBC 3.47*   HGB 11.0*   HCT 33.7*   PLT 95*   MCV 97   MCH 31.7*   MCHC 32.6     CMP:   Recent Labs   Lab 07/07/19  0358   *   CALCIUM 8.9   ALBUMIN 2.5*   PROT 5.4*      K 4.6   CO2 26      BUN 24*   CREATININE 0.9   ALKPHOS 78   ALT 28   AST 22   BILITOT 0.9     ABGs:   Recent Labs   Lab 07/03/19  0508   PH 7.310*   PCO2 45.3*   PO2 127*   HCO3 22.8*   POCSATURATED 99   BE -3       Significant Diagnostics:  I have reviewed all pertinent imaging results/findings within the past 24 hours.    Assessment/Plan:     * Esophageal cancer  Stu Garcia is a 73 y.o. male with a history of esophageal cancer s/p esophagectomy on 7/2/19    Pathway:  - NPO  - J tube feeds at 20cc/hr 2p-8a  - Terapeutic lovenox - see a fib  - J tube pain meds  - Toradol   - Beta blockade via J tube  - Amio  - Ambulate TID  - Shower  - Nutrition consult for TF recs   - PT/OT      PAF (paroxysmal atrial fibrillation)  Appreciate cards recs  In NSR this AM  On Amio  Therapeutic lovenox         Aniceto Russell MD  General Surgery  [facility]

## (undated) DEVICE — SEE MEDLINE ITEM 152622

## (undated) DEVICE — SUT MONOCRYL 4-0 PS-2

## (undated) DEVICE — SYR 10CC LUER LOCK

## (undated) DEVICE — BLADE SURG CARBON STEEL SZ11

## (undated) DEVICE — SEE MEDLINE ITEM 157128

## (undated) DEVICE — STAPLER ENDOWRIST 45 GREEN XI

## (undated) DEVICE — Device

## (undated) DEVICE — DRAIN CHEST DRY SUCTION

## (undated) DEVICE — TROCAR ENDOPATH XCEL 12MM 10CM

## (undated) DEVICE — PACK UNIVERSAL SPLIT II

## (undated) DEVICE — ELECTRODE REM PLYHSV RETURN 9

## (undated) DEVICE — SEALER VESSEL DAVINCI XI

## (undated) DEVICE — DRAPE INCISE IOBAN 2 23X33IN

## (undated) DEVICE — GLOVE SURG BIOGEL LATEX SZ 7.5

## (undated) DEVICE — APPLICATOR CHLORAPREP ORN 26ML

## (undated) DEVICE — SUT 3-0 VICRYL / SH (J416)

## (undated) DEVICE — PAD CAST SPECIALIST STRL 6

## (undated) DEVICE — SEE MEDLINE ITEM 146231

## (undated) DEVICE — TRAY FOLEY 16FR INFECTION CONT

## (undated) DEVICE — GLOVE SURGICAL LATEX SZ 8

## (undated) DEVICE — DRAPE LAP TIBURON 77X122IN

## (undated) DEVICE — SOL NS 1000CC

## (undated) DEVICE — SUT ETHILON 2-0 PSLX 30IN

## (undated) DEVICE — DRAPE EXTREMITY W/ABC NON-SLIP

## (undated) DEVICE — SLEEVE SCD EXPRESS CALF MEDIUM

## (undated) DEVICE — DRESSING N ADH OIL EMUL 3X3

## (undated) DEVICE — DRESSING TRANS 4X4 TEGADERM

## (undated) DEVICE — SUT CHROMIC 2-0 SH 27IN BRN

## (undated) DEVICE — SUT ETHILON 3-0 PS2 18 BLK

## (undated) DEVICE — CHLORAPREP 10.5 ML APPLICATOR

## (undated) DEVICE — CUTTER AGGRESSIVE PLUS 3.5MM

## (undated) DEVICE — MANIFOLD 4 PORT

## (undated) DEVICE — MAT SUCTION PUDDLEVAC ORANGE

## (undated) DEVICE — CANNULA SEAL 12MM

## (undated) DEVICE — SOL CLEARIFY VISUALIZATION LAP

## (undated) DEVICE — GOWN SURGICAL X-LARGE

## (undated) DEVICE — RELOAD ENDO GIA TRISTAPLE 45MM

## (undated) DEVICE — SEE MEDLINE ITEM 152530

## (undated) DEVICE — SUT VICRYL PLUS 3-0 SH 18IN

## (undated) DEVICE — SEE L#120831

## (undated) DEVICE — DRAIN CHANNEL ROUND 19FR

## (undated) DEVICE — SHEATH ENDOWRIST 45MM

## (undated) DEVICE — NDL SPINAL 18GX3.5 SPINOCAN

## (undated) DEVICE — ENDOSTITCH INSTRUMENT

## (undated) DEVICE — PENCIL ROCKER SWITCH 10FT CORD

## (undated) DEVICE — PACK DRAPE UNIVERSAL CONVERTOR

## (undated) DEVICE — STAPLER GIA HANDLE STD

## (undated) DEVICE — SUT 2/0 30IN SILK BLK BRAI

## (undated) DEVICE — COVER LIGHT HANDLE

## (undated) DEVICE — SOL IRR NACL .9% 3000ML

## (undated) DEVICE — CLOSURE SKIN STERI STRIP 1/2X4

## (undated) DEVICE — LUBRICANT SURGILUBE 2 OZ

## (undated) DEVICE — SUT 3-0 12-18IN SILK

## (undated) DEVICE — SUT 0 VICRYL / UR6 (J603)

## (undated) DEVICE — DRAPE ARM DAVINCI XI

## (undated) DEVICE — ADHESIVE DERMABOND ADVANCED

## (undated) DEVICE — SPONGE DERMACEA 4X4IN 12PLY

## (undated) DEVICE — DRAIN CHAN RND HUBLS 8MM 24FR

## (undated) DEVICE — TUBE SET SINGLE LUMEN FILTERED

## (undated) DEVICE — CATH URETHRAL RED RUBBER 12FR

## (undated) DEVICE — DRAPE STERI U-SHAPED 47X51IN

## (undated) DEVICE — ENDO GIA UNIVERSAL 12MM

## (undated) DEVICE — CANNULA REDUCER 12-8MM

## (undated) DEVICE — SEE MEDLINE ITEM 146313

## (undated) DEVICE — SPONGE DERMACEA GAUZE 4X4

## (undated) DEVICE — IRRIGATOR ENDOSCOPY DISP.

## (undated) DEVICE — SEAL UNIVERSAL 5MM-8MM XI

## (undated) DEVICE — UNDERGLOVES BIOGEL PI SIZE 7.5

## (undated) DEVICE — GOWN SMART IMP BREATHABLE XXLG

## (undated) DEVICE — SUT VICRYL 3-0 27 SH

## (undated) DEVICE — DRAPE PLASTIC U 60X72

## (undated) DEVICE — SUT 2-0 VICRYL / SH (J417)

## (undated) DEVICE — DRAPE COLUMN DAVINCI XI

## (undated) DEVICE — INTRODUCER LEAD 14FR

## (undated) DEVICE — TOURNIQUET SB QC DP 34X4IN

## (undated) DEVICE — GAUZE SPONGE 4X4 12PLY

## (undated) DEVICE — DRAPE C ARM 42 X 120 10/BX

## (undated) DEVICE — TROCAR ENDOPATH XCEL 5X100MM

## (undated) DEVICE — STAPLER ENDOWRIST 30 WHITE

## (undated) DEVICE — CONTAINER SPECIMEN STRL 4OZ

## (undated) DEVICE — NDL INSUF ULTRA VERESS 120MM

## (undated) DEVICE — TUBE SET INFLOW/OUTFLOW

## (undated) DEVICE — PAD ABD 8X10 STERILE

## (undated) DEVICE — SUT SURGIDAC ENDO STITCH2-0

## (undated) DEVICE — SEE MEDLINE ITEM 157216

## (undated) DEVICE — SCISSOR 5MMX35CM DIRECT DRIVE

## (undated) DEVICE — STAPLER ENDOWRIST 45 BLUE XI

## (undated) DEVICE — SET DECANTER MEDICHOICE

## (undated) DEVICE — GOWN SMARTGOWN LVL4 X-LONG XL

## (undated) DEVICE — DRAPE SCOPE PILLOW WARMER

## (undated) DEVICE — BANDAGE ESMARK 6X12

## (undated) DEVICE — DEVICE CARESITE LUER ACCESS

## (undated) DEVICE — SEE MEDLINE ITEM 156902